# Patient Record
Sex: MALE | Race: WHITE | Employment: OTHER | URBAN - METROPOLITAN AREA
[De-identification: names, ages, dates, MRNs, and addresses within clinical notes are randomized per-mention and may not be internally consistent; named-entity substitution may affect disease eponyms.]

---

## 2017-01-04 ENCOUNTER — ONCOLOGY VISIT (OUTPATIENT)
Dept: TRANSPLANT | Facility: CLINIC | Age: 57
End: 2017-01-04
Attending: INTERNAL MEDICINE
Payer: COMMERCIAL

## 2017-01-04 ENCOUNTER — APPOINTMENT (OUTPATIENT)
Dept: LAB | Facility: CLINIC | Age: 57
End: 2017-01-04
Attending: INTERNAL MEDICINE
Payer: COMMERCIAL

## 2017-01-04 VITALS
RESPIRATION RATE: 18 BRPM | TEMPERATURE: 98.2 F | SYSTOLIC BLOOD PRESSURE: 156 MMHG | HEART RATE: 83 BPM | BODY MASS INDEX: 35.99 KG/M2 | WEIGHT: 254.19 LBS | OXYGEN SATURATION: 98 % | DIASTOLIC BLOOD PRESSURE: 92 MMHG

## 2017-01-04 DIAGNOSIS — D46.9 MDS (MYELODYSPLASTIC SYNDROME) (H): Primary | ICD-10-CM

## 2017-01-04 DIAGNOSIS — D89.813 GVHD AS COMPLICATION OF BONE MARROW TRANSPLANT (H): Primary | ICD-10-CM

## 2017-01-04 DIAGNOSIS — D46.9 MDS (MYELODYSPLASTIC SYNDROME) (H): ICD-10-CM

## 2017-01-04 DIAGNOSIS — T86.09 GVHD AS COMPLICATION OF BONE MARROW TRANSPLANT (H): Primary | ICD-10-CM

## 2017-01-04 LAB
ALBUMIN SERPL-MCNC: 3.4 G/DL (ref 3.4–5)
ALP SERPL-CCNC: 91 U/L (ref 40–150)
ALT SERPL W P-5'-P-CCNC: 40 U/L (ref 0–70)
ANION GAP SERPL CALCULATED.3IONS-SCNC: 5 MMOL/L (ref 3–14)
AST SERPL W P-5'-P-CCNC: ABNORMAL U/L (ref 0–45)
BASOPHILS # BLD AUTO: 0 10E9/L (ref 0–0.2)
BASOPHILS NFR BLD AUTO: 0.5 %
BILIRUB SERPL-MCNC: 0.4 MG/DL (ref 0.2–1.3)
BUN SERPL-MCNC: 14 MG/DL (ref 7–30)
CALCIUM SERPL-MCNC: 8.4 MG/DL (ref 8.5–10.1)
CHLORIDE SERPL-SCNC: 104 MMOL/L (ref 94–109)
CO2 SERPL-SCNC: 31 MMOL/L (ref 20–32)
CREAT SERPL-MCNC: 0.79 MG/DL (ref 0.66–1.25)
DIFFERENTIAL METHOD BLD: ABNORMAL
EOSINOPHIL # BLD AUTO: 0 10E9/L (ref 0–0.7)
EOSINOPHIL NFR BLD AUTO: 0.2 %
ERYTHROCYTE [DISTWIDTH] IN BLOOD BY AUTOMATED COUNT: 13.3 % (ref 10–15)
GFR SERPL CREATININE-BSD FRML MDRD: ABNORMAL ML/MIN/1.7M2
GLUCOSE SERPL-MCNC: 89 MG/DL (ref 70–99)
HCT VFR BLD AUTO: 49.9 % (ref 40–53)
HGB BLD-MCNC: 17.1 G/DL (ref 13.3–17.7)
IMM GRANULOCYTES # BLD: 0.2 10E9/L (ref 0–0.4)
IMM GRANULOCYTES NFR BLD: 2.5 %
LYMPHOCYTES # BLD AUTO: 1.9 10E9/L (ref 0.8–5.3)
LYMPHOCYTES NFR BLD AUTO: 31.9 %
MCH RBC QN AUTO: 32.9 PG (ref 26.5–33)
MCHC RBC AUTO-ENTMCNC: 34.3 G/DL (ref 31.5–36.5)
MCV RBC AUTO: 96 FL (ref 78–100)
MONOCYTES # BLD AUTO: 0.7 10E9/L (ref 0–1.3)
MONOCYTES NFR BLD AUTO: 12.4 %
NEUTROPHILS # BLD AUTO: 3.1 10E9/L (ref 1.6–8.3)
NEUTROPHILS NFR BLD AUTO: 52.5 %
NRBC # BLD AUTO: 0 10*3/UL
NRBC BLD AUTO-RTO: 0 /100
PLATELET # BLD AUTO: 110 10E9/L (ref 150–450)
POTASSIUM SERPL-SCNC: 4.6 MMOL/L (ref 3.4–5.3)
PROT SERPL-MCNC: 7 G/DL (ref 6.8–8.8)
RBC # BLD AUTO: 5.2 10E12/L (ref 4.4–5.9)
SIROLIMUS BLD-MCNC: 11.5 UG/L (ref 5–15)
SODIUM SERPL-SCNC: 140 MMOL/L (ref 133–144)
TME LAST DOSE: NORMAL H
WBC # BLD AUTO: 6 10E9/L (ref 4–11)

## 2017-01-04 PROCEDURE — 25000128 H RX IP 250 OP 636: Mod: ZF | Performed by: INTERNAL MEDICINE

## 2017-01-04 PROCEDURE — 80053 COMPREHEN METABOLIC PANEL: CPT | Performed by: PHYSICIAN ASSISTANT

## 2017-01-04 PROCEDURE — 96365 THER/PROPH/DIAG IV INF INIT: CPT

## 2017-01-04 PROCEDURE — 80195 ASSAY OF SIROLIMUS: CPT | Performed by: PHYSICIAN ASSISTANT

## 2017-01-04 PROCEDURE — 85025 COMPLETE CBC W/AUTO DIFF WBC: CPT | Performed by: PHYSICIAN ASSISTANT

## 2017-01-04 PROCEDURE — 87799 DETECT AGENT NOS DNA QUANT: CPT | Performed by: PHYSICIAN ASSISTANT

## 2017-01-04 PROCEDURE — 25000125 ZZHC RX 250: Mod: ZF | Performed by: INTERNAL MEDICINE

## 2017-01-04 PROCEDURE — 99211 OFF/OP EST MAY X REQ PHY/QHP: CPT | Mod: 25

## 2017-01-04 RX ORDER — ACYCLOVIR 800 MG/1
800 TABLET ORAL 2 TIMES DAILY
Qty: 60 TABLET | Refills: 3 | Status: SHIPPED
Start: 2017-01-04 | End: 2017-05-01

## 2017-01-04 RX ADMIN — SODIUM CHLORIDE 1750 MG: 9 INJECTION, SOLUTION INTRAVENOUS at 12:05

## 2017-01-04 NOTE — MR AVS SNAPSHOT
After Visit Summary   1/4/2017    Byron Russo    MRN: [id number]           Patient Information     Date Of Birth          1960        Visit Information        Provider Department      1/4/2017 10:30 AM Uli Enciso MD [facility] Blood and Marrow Transplant        Today's Diagnoses     MDS (myelodysplastic syndrome) (H)               [facility] [place]  [address] [place] [address]  Phone: [phone]  Clinic Hours:   Monday-Friday:    8am to 4:30pm   Weekends and holidays:    8am to noon (in general)  If your fever is 100.5  or greater,   call the clinic.  After hours call the   hospital at [phone] or   [phone]. Ask for the BMT   fellow for pediatric or adult patients           Care Instructions    1/11/17 @ 2pm labs, 230pm with MD/infusion         Follow-ups after your visit        Your next 10 appointments already scheduled     Jan 11, 2017 10:30 AM   [facility] Draw with  [facility] DRAW   [facility] Draw ([facility])    [address] [place] [id number] [phone]            Jan 11, 2017 11:30 AM   Infusion 120 with UC BMT INFUSION, UC 2 ATC   [facility] Blood and Marrow Transplant ([facility])    [address] [place] [id number] [phone]            Jan 11, 2017  2:30 PM   Return with Uli Enciso MD   [facility] Blood and Marrow Transplant ([facility])    [address] [place] [id number] [phone]              Who to contact     If you have questions or need follow up information about today's clinic visit or your schedule please contact [facility] BLOOD AND MARROW TRANSPLANT directly at [phone].  Normal or non-critical lab and imaging results will be communicated to you by MyChart, letter or phone within 4 business days after the clinic has received the results.  If you do not hear from us within 7 days, please contact the clinic through [employer] or phone. If you have a critical or abnormal lab result, we will notify you by phone as soon as possible.  Submit refill requests through [employer] or call your pharmacy and they will forward the refill request to us. Please allow 3 business days for your refill to be completed.          Additional Information About Your Visit        [employer] Information     [employer] gives you secure access to your electronic health record. If you see a primary care provider, you can also send messages to your care team and make appointments. If you have questions, please call your primary care clinic.  If you do not have a primary care provider, please call [phone] and they will assist you.        Care EveryWhere ID     This is your Care EveryWhere ID. This could be used by other organizations to access your [place] medical records  [id number]        Your Vitals Were     Pulse Temperature Respirations Pulse Oximetry          83 98.2  F (36.8  C) (Oral) 18 98%         Blood Pressure from Last 3 Encounters:   01/04/17 156/92   12/28/16 129/81   12/14/16 141/89    Weight from Last 3 Encounters:   01/04/17 115.3 kg (254 lb 3.1 oz)   12/28/16 113.898 kg (251 lb 1.6 oz)   12/14/16 116.983 kg (257 lb 14.4 oz)              We Performed the Following     CBC with platelets differential     CMV DNA quantification     Comprehensive metabolic panel     EBV DNA PCR Quantitative Whole Blood     Sirolimus level          Today's Medication Changes          These changes are accurate as of: 1/4/17  1:47 PM.  If you have any questions, ask your nurse or doctor.               These medicines have changed or have updated prescriptions.        Dose/Directions    hydrocortisone acetate 1 % Crea   This may have changed:    - when to take this  - reasons to take this   Used for:  MDS (myelodysplastic syndrome) (H)        Externally apply topically 3 times daily    Quantity:  30 g   Refills:  1            Where to get your medicines      These medications were sent to [facility] [place], [place] - [address]  [address], [facility] [address]    Hours:  TRANSPLANT PHONE NUMBER [phone] Phone:  [phone]    - acyclovir 800 MG tablet             Recent Review Flowsheet Data     BMT Recent Results Latest Ref Rng 11/23/2016 11/30/2016 12/7/2016 12/14/2016 12/21/2016 12/28/2016 1/4/2017    WBC 4.0 - 11.0 10e9/L 6.1 8.2 7.6 6.1 5.5 5.2 6.0    Hemoglobin 13.3 - 17.7 g/dL 15.8 16.4 16.3 15.5 16.1 17.4 17.1    Platelet Count 150 - 450 10e9/L 204 168 134(L) 120(L) 111(L) 84(L) 110(L)    Neutrophils (Absolute) 1.6 - 8.3 10e9/L 4.1 4.8 4.6 4.6 2.4 2.4 3.1    Sodium 133 - 144 mmol/L 142 140 139 140 142 139 140    Potassium 3.4 - 5.3 mmol/L 3.8 3.7 3.7 4.0 4.0 4.4 4.6    Chloride 94 - 109 mmol/L 105 105 106 106 106 105 104    Glucose 70 - 99 mg/dL 94 98 84 109(H) 94 101(H) 89    Urea Nitrogen 7 - 30 mg/dL 12 20 14 14 13 14 14    Creatinine 0.66 - 1.25 mg/dL 0.76 0.91 0.87 0.85 0.96 0.86 0.79    Calcium (Total) 8.5 - 10.1 mg/dL 8.2(L) 8.5 8.3(L) 8.2(L) 8.4(L) 8.7 8.4(L)    Protein (Total) 6.8 - 8.8 g/dL 7.2 7.1 6.6(L) 6.4(L) 7.0 7.0 7.0    Albumin 3.4 - 5.0 g/dL 3.4 3.3(L) 3.3(L) 3.2(L) 3.5 3.4 3.4    Alkaline Phosphatase 40 - 150 U/L 103 90 85 90 103 96 91    AST 0 - 45 U/L 38 34 32 38 36 Canceled, Test credited  Unsatisfactory specimen - hemolyzed  NOTIFIED JOHNATHAN PAGE AT BMT AT 1440 ON 12/28/16 BY ISABELLE Canceled, Test credited  Unsatisfactory specimen - hemolyzed  NOTIFIED DR STOVALL BMT 01/04 1115 BY [facility]    ALT 0 - 70 U/L 64 53 44 44 40 41 40    MCV 78 - 100 fl 98 97 98 97 97 95 96               Primary Care Provider Office Phone # Fax #    Cole Duong [phone]       [address] [address]        Thank you!     Thank you for choosing [facility] BLOOD AND MARROW TRANSPLANT   for your care. Our goal is always to provide you with excellent care. Hearing back from our patients is one way we can continue to improve our services. Please take a few minutes to complete the written survey that you may receive in the mail after your visit with us. Thank you!             Your Updated Medication List - Protect others around you: Learn how to safely use, store and throw away your medicines at www.disposemymeds.org.          This list is accurate as of: 1/4/17  1:47 PM.  Always use your most recent med list.                   Brand Name Dispense Instructions for use    acyclovir 800 MG tablet    ZOVIRAX    60 tablet    Take 1 tablet (800 mg) by mouth 2 times daily       aspirin 81 MG EC tablet      Take 1 tablet (81 mg) by mouth daily       carboxymethylcellulose 0.5 % Soln ophthalmic solution    carboxymethylcellulose sodium    1 Bottle    Place 1 drop into both eyes 3 times daily as needed for dry eyes       dexamethasone 0.5 MG/5ML Elix     237 mL    10 ml by mouth swish and spit  3-4 times daily       hydrocortisone acetate 1 % Crea     30 g    Externally apply topically 3 times daily       levofloxacin 250 MG tablet    LEVAQUIN    30 tablet    Take 1 tablet (250 mg) by mouth daily       levothyroxine 150 MCG tablet    SYNTHROID/LEVOTHROID     Take 150 mcg by mouth daily.       magnesium oxide 400 (241.3 MG) MG tablet    MAG-OX     Take 2 tablets daily       metoprolol 25 MG tablet    LOPRESSOR     Take 2 tablets (50 mg) by mouth 2 times daily       NITROGLYCERIN ER PO      Take by mouth as needed       OMEPRAZOLE PO      Take 20 mg by mouth       predniSONE 20 MG tablet    DELTASONE     Take 2.5 tablets (50 mg) by mouth daily       rosuvastatin 5 MG tablet    CRESTOR    30 tablet    Take 1 tablet (5 mg) by mouth daily       sirolimus 0.5 MG tablet    RAPAMUNE - GENERIC EQUIVALENT    30 tablet    Take 1 tablet (0.5 mg) by mouth daily Starting on 12/24       sulfamethoxazole-trimethoprim 800-160  MG per tablet    BACTRIM DS/SEPTRA DS    28 tablet    Take 1 tablet by mouth 2 times daily On Mondays and Tuesdays       triamcinolone 0.1 % cream    KENALOG    453 g    Apply sparingly to affected area three times daily as needed       ursodiol 300 MG capsule    ACTIGALL    90 capsule    Take 1 capsule (300 mg) by mouth 3 times daily       voriconazole 50 MG tablet    VFEND     Take 4 tablets (200 mg) by mouth 2 times daily

## 2017-01-04 NOTE — Clinical Note
1/4/2017      RE: Byron Russo  [address] [address]       REASON FOR VISIT:  I saw Byron Russo today for evaluation of poorly controlled chronic nvfge-ehgkgl-otbs disease.      HISTORY OF PRESENT ILLNESS:  Mr. 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  His last engraftment analysis    2 months ago showed him to have 100% donor CD33 and CD3 fractions.  He developed a skin rash as he was tapering his cyclosporine which developed at the beginning of November of this past year.  The skin biopsy was equivocal but could not exclude GVHD.  He was initially treated with topical steroids, but the rash spread and he was eventually started on 80 mg of prednisone every day on 11/16.  He also then developed lichenoid changes in the mouth, eosinophilia and liver function elevation, all compatible with chronic ssngm-xppvsd-gzra disease.  An attempt was made to taper his prednisone, but he developed new buccal ulcers that were noted on a clinic visit on 12/28/2016.  He also has very pronounced erythema which seems to be worsening, predominantly in his upper body and face.  He was seen in clinic on 12/28.  He has continued to take 50 mg of prednisone daily.  Of note, his sirolimus level on his two last clinic visits have all been extremely high, above 20, and so adjustments have been made accordingly.  Last week I was notified that he might potentially be eligible for a study of a ROCK2 inhibitor, but he would require sirolimus washout.  He is now here for reevaluation and making a decision regarding switches in his immunosuppressive therapy.        REVIEW OF SYSTEMS:  Otherwise, he states he has had no fevers, chills, nausea, vomiting, diarrhea, cough, hemoptysis, shortness of breath, hematuria, dysuria, bruising or bleeding.  The remainder of the 10-point review of systems is negative except for mouth sores and  the rash.       PHYSICAL EXAMINATION:     VITAL SIGNS:  He was slightly hypertensive with a blood pressure of 150/92.  Vitals otherwise were normal.    HEENT:  Sclerae were anicteric and non-injected.  He does have lichenoid changes of the buccal mucosa bilaterally as well as central ulcerations bilaterally to the front of the bite line.    LYMPH NODES:  No palpable supraclavicular, cervical, axillary or inguinal adenopathy.   LUNGS:  Clear to auscultation.   CARDIAC:  Without S3, rub or murmur.   ABDOMEN:  Nondistended, active bowel sounds, no organomegaly.   EXTREMITIES:  Trace pedal edema.  He does have a rather bright reddish brown rash that is most prominent on his scalp but also extends to the upper part of the torso.  The rash then fades both anteriorly and posteriorly on the trunk and at the midline disappears.  He also has involvement of his upper arms and forearms with a similar rash.       LABORATORY DATA:  Labs today included normal electrolytes and liver function test panel.  His white count was 6,000, hemoglobin 17.1 and platelets 110,000.      ASSESSMENT AND PLAN:     1.  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    2.  Steroid-refractory chronic xygda-llgpfd-kiqp disease.        He did give me the history that on the days that he takes Bactrim he feels that the rash is much worse than on the days when he does not.  For this reason, we will discontinue Bactrim and switch him to aerosol pentamidine starting a week from today and continuing on a monthly basis indefinitely.  I will also have him stop taking sirolimus and follow the level from today and allow the level to drop over the next week which will be slow because of the long half-life (3 days) of sirolimus.  I also decided to initiate a series of 3 weekly  methylprednisolone boluses at 15 mg/kg, the first of which will be given today.  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  In the event he does not respond to these measures, I would then consider enrolling him in the ROCK2 inhibitor study.           Uli Enciso MD

## 2017-01-04 NOTE — NURSING NOTE
Chief Complaint   Patient presents with     Blood Draw     Venipuncuture       Jaja Swanson,ANTHONY January 4, 2017 10:30 AM  Labs and vitals done see flow sheets.

## 2017-01-04 NOTE — PROGRESS NOTES
Infusion Nursing Note:  Byron Russo presents today for Methylpred.   Patient seen by provider today: Yes    Note: Pt received Methylpred over 90 minutes.  Tolerated well.    Intravenous Access:  Peripheral IV placed.    Treatment Conditions:  Not Applicable.      Post Infusion Assessment:  Patient tolerated infusion without incident.  Blood return noted pre and post infusion.  Site patent and intact, free from redness, edema or discomfort.  No evidence of extravasations.    Discharge Plan:   Discharge instructions reviewed with: Patient.  Patient and/or family verbalized understanding of discharge instructions and all questions answered.    Qi Ochoa RN

## 2017-01-04 NOTE — PROGRESS NOTES
REASON FOR VISIT:  I saw Byron Russo today for evaluation of poorly controlled chronic kkppz-ujevcg-kyky disease.      HISTORY OF PRESENT ILLNESS:  Mr. 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  His last engraftment analysis    2 months ago showed him to have 100% donor CD33 and CD3 fractions.  He developed a skin rash as he was tapering his cyclosporine which developed at the beginning of November of this past year.  The skin biopsy was equivocal but could not exclude GVHD.  He was initially treated with topical steroids, but the rash spread and he was eventually started on 80 mg of prednisone every day on 11/16.  He also then developed lichenoid changes in the mouth, eosinophilia and liver function elevation, all compatible with chronic vwpgk-truzyc-xyaq disease.  An attempt was made to taper his prednisone, but he developed new buccal ulcers that were noted on a clinic visit on 12/28/2016.  He also has very pronounced erythema which seems to be worsening, predominantly in his upper body and face.  He was seen in clinic on 12/28.  He has continued to take 50 mg of prednisone daily.  Of note, his sirolimus level on his two last clinic visits have all been extremely high, above 20, and so adjustments have been made accordingly.  Last week I was notified that he might potentially be eligible for a study of a ROCK2 inhibitor, but he would require sirolimus washout.  He is now here for reevaluation and making a decision regarding switches in his immunosuppressive therapy.        REVIEW OF SYSTEMS:  Otherwise, he states he has had no fevers, chills, nausea, vomiting, diarrhea, cough, hemoptysis, shortness of breath, hematuria, dysuria, bruising or bleeding.  The remainder of the 10-point review of systems is negative except for mouth sores and the rash.       PHYSICAL EXAMINATION:     VITAL SIGNS:  He was slightly hypertensive  with a blood pressure of 150/92.  Vitals otherwise were normal.    HEENT:  Sclerae were anicteric and non-injected.  He does have lichenoid changes of the buccal mucosa bilaterally as well as central ulcerations bilaterally to the front of the bite line.    LYMPH NODES:  No palpable supraclavicular, cervical, axillary or inguinal adenopathy.   LUNGS:  Clear to auscultation.   CARDIAC:  Without S3, rub or murmur.   ABDOMEN:  Nondistended, active bowel sounds, no organomegaly.   EXTREMITIES:  Trace pedal edema.  He does have a rather bright reddish brown rash that is most prominent on his scalp but also extends to the upper part of the torso.  The rash then fades both anteriorly and posteriorly on the trunk and at the midline disappears.  He also has involvement of his upper arms and forearms with a similar rash.       LABORATORY DATA:  Labs today included normal electrolytes and liver function test panel.  His white count was 6,000, hemoglobin 17.1 and platelets 110,000.      ASSESSMENT AND PLAN:     1.  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    2.  Steroid-refractory chronic nfmke-krxlvm-jlim disease.        He did give me the history that on the days that he takes Bactrim he feels that the rash is much worse than on the days when he does not.  For this reason, we will discontinue Bactrim and switch him to aerosol pentamidine starting a week from today and continuing on a monthly basis indefinitely.  I will also have him stop taking sirolimus and follow the level from today and allow the level to drop over the next week which will be slow because of the long half-life (3 days) of sirolimus.  I also decided to initiate a series of 3 weekly methylprednisolone boluses at 15 mg/kg, the first of which will be given today.  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  In the event he does not respond to these measures, I would then consider enrolling him in the ROCK2 inhibitor study.

## 2017-01-04 NOTE — NURSING NOTE
BMT Heme Malignancy Rooming Note    Byron Russo - 1/4/2017 10:43 AM     Chief Complaint   Patient presents with     Blood Draw     Venipuncuture     RECHECK     Patient with MDS here for provider visit         /92 mmHg  Pulse 83  Temp(Src) 98.2  F (36.8  C) (Oral)  Resp 18  Wt 115.3 kg (254 lb 3.1 oz)  SpO2 98%     Medications reviewed: Yes    Dressing changed: Not applicable     Medications given: No    Staff time:3 minutes     Additional information if applicable:      Johanny Moser CMA

## 2017-01-05 LAB
CMV DNA SPEC NAA+PROBE-ACNC: NORMAL [IU]/ML
CMV DNA SPEC NAA+PROBE-LOG#: NORMAL {LOG_IU}/ML
EBV DNA # SPEC NAA+PROBE: 585 {COPIES}/ML
EBV DNA SPEC NAA+PROBE-LOG#: 2.8 {LOG_COPIES}/ML
SPECIMEN SOURCE: NORMAL

## 2017-01-10 DIAGNOSIS — D46.22 RAEB-2 (REFRACTORY ANEMIA WITH EXCESS BLASTS-2) (H): Primary | ICD-10-CM

## 2017-01-11 ENCOUNTER — INFUSION THERAPY VISIT (OUTPATIENT)
Dept: TRANSPLANT | Facility: CLINIC | Age: 57
End: 2017-01-11
Attending: INTERNAL MEDICINE
Payer: COMMERCIAL

## 2017-01-11 ENCOUNTER — APPOINTMENT (OUTPATIENT)
Dept: LAB | Facility: CLINIC | Age: 57
End: 2017-01-11
Attending: INTERNAL MEDICINE
Payer: COMMERCIAL

## 2017-01-11 VITALS
OXYGEN SATURATION: 97 % | SYSTOLIC BLOOD PRESSURE: 160 MMHG | BODY MASS INDEX: 36.79 KG/M2 | TEMPERATURE: 98 F | HEART RATE: 94 BPM | DIASTOLIC BLOOD PRESSURE: 99 MMHG | WEIGHT: 259.9 LBS

## 2017-01-11 DIAGNOSIS — D46.22 RAEB-2 (REFRACTORY ANEMIA WITH EXCESS BLASTS-2) (H): ICD-10-CM

## 2017-01-11 DIAGNOSIS — D89.813 GVHD AS COMPLICATION OF BONE MARROW TRANSPLANT (H): Primary | ICD-10-CM

## 2017-01-11 DIAGNOSIS — T86.09 GVHD AS COMPLICATION OF BONE MARROW TRANSPLANT (H): Primary | ICD-10-CM

## 2017-01-11 DIAGNOSIS — D46.9 MDS (MYELODYSPLASTIC SYNDROME) (H): ICD-10-CM

## 2017-01-11 LAB
ALBUMIN SERPL-MCNC: 3.1 G/DL (ref 3.4–5)
ALP SERPL-CCNC: 76 U/L (ref 40–150)
ALT SERPL W P-5'-P-CCNC: 46 U/L (ref 0–70)
ANION GAP SERPL CALCULATED.3IONS-SCNC: 9 MMOL/L (ref 3–14)
AST SERPL W P-5'-P-CCNC: 39 U/L (ref 0–45)
BASOPHILS # BLD AUTO: 0 10E9/L (ref 0–0.2)
BASOPHILS NFR BLD AUTO: 0.5 %
BILIRUB SERPL-MCNC: 0.5 MG/DL (ref 0.2–1.3)
BUN SERPL-MCNC: 17 MG/DL (ref 7–30)
CALCIUM SERPL-MCNC: 8.2 MG/DL (ref 8.5–10.1)
CHLORIDE SERPL-SCNC: 104 MMOL/L (ref 94–109)
CO2 SERPL-SCNC: 28 MMOL/L (ref 20–32)
CREAT SERPL-MCNC: 0.68 MG/DL (ref 0.66–1.25)
DIFFERENTIAL METHOD BLD: ABNORMAL
EOSINOPHIL # BLD AUTO: 0 10E9/L (ref 0–0.7)
EOSINOPHIL NFR BLD AUTO: 0.4 %
ERYTHROCYTE [DISTWIDTH] IN BLOOD BY AUTOMATED COUNT: 14 % (ref 10–15)
GFR SERPL CREATININE-BSD FRML MDRD: ABNORMAL ML/MIN/1.7M2
GLUCOSE SERPL-MCNC: 123 MG/DL (ref 70–99)
HCT VFR BLD AUTO: 47.5 % (ref 40–53)
HGB BLD-MCNC: 16.4 G/DL (ref 13.3–17.7)
IMM GRANULOCYTES # BLD: 0.4 10E9/L (ref 0–0.4)
IMM GRANULOCYTES NFR BLD: 4.4 %
LYMPHOCYTES # BLD AUTO: 2.5 10E9/L (ref 0.8–5.3)
LYMPHOCYTES NFR BLD AUTO: 31.2 %
MCH RBC QN AUTO: 32.8 PG (ref 26.5–33)
MCHC RBC AUTO-ENTMCNC: 34.5 G/DL (ref 31.5–36.5)
MCV RBC AUTO: 95 FL (ref 78–100)
MONOCYTES # BLD AUTO: 0.7 10E9/L (ref 0–1.3)
MONOCYTES NFR BLD AUTO: 8.5 %
NEUTROPHILS # BLD AUTO: 4.4 10E9/L (ref 1.6–8.3)
NEUTROPHILS NFR BLD AUTO: 55 %
NRBC # BLD AUTO: 0 10*3/UL
NRBC BLD AUTO-RTO: 0 /100
PLATELET # BLD AUTO: 104 10E9/L (ref 150–450)
POTASSIUM SERPL-SCNC: 4.3 MMOL/L (ref 3.4–5.3)
PROT SERPL-MCNC: 6.4 G/DL (ref 6.8–8.8)
RBC # BLD AUTO: 5 10E12/L (ref 4.4–5.9)
SIROLIMUS BLD-MCNC: 5.5 UG/L (ref 5–15)
SODIUM SERPL-SCNC: 141 MMOL/L (ref 133–144)
TME LAST DOSE: NORMAL H
WBC # BLD AUTO: 8 10E9/L (ref 4–11)

## 2017-01-11 PROCEDURE — 80053 COMPREHEN METABOLIC PANEL: CPT | Performed by: INTERNAL MEDICINE

## 2017-01-11 PROCEDURE — 40000268 ZZH STATISTIC NO CHARGES: Mod: ZF

## 2017-01-11 PROCEDURE — 85025 COMPLETE CBC W/AUTO DIFF WBC: CPT | Performed by: INTERNAL MEDICINE

## 2017-01-11 PROCEDURE — 80195 ASSAY OF SIROLIMUS: CPT | Performed by: INTERNAL MEDICINE

## 2017-01-11 PROCEDURE — 36415 COLL VENOUS BLD VENIPUNCTURE: CPT

## 2017-01-11 RX ORDER — AMLODIPINE BESYLATE 5 MG/1
5 TABLET ORAL DAILY
Qty: 30 TABLET | Refills: 11 | Status: SHIPPED | OUTPATIENT
Start: 2017-01-11 | End: 2017-03-10

## 2017-01-11 NOTE — PROGRESS NOTES
I saw Byron Russo today for evaluation of chronic bnljj-kllqha-mzqq disease.      HISTORY OF PRESENT ILLNESS:  Mr. 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  He is 100% engrafted in both CD33 and CD3 fractions.  He developed a rash about 2 months ago when he was tapering his cyclosporine, which developed at the beginning of November.  A biopsy was equivocal, but could not exclude acute GVHD.  He was initially treated with topical steroids, but then the rash spread, and he was eventually started on 80 mg of prednisone.  He then went on to develop lichenoid changes in the mouth, eosinophilia and liver function test elevation, which were felt to be compatible with chronic belkf-lwyivc-yldg disease.  An attempt was made to taper his prednisone, but he developed new buccal ulcers.  He was seen in clinic by me on 01/04.  At that point, I decided that he seemed to be gradually worsening on his present regimen of 50 mg of prednisone plus Sirolimus.  We held the Sirolimus in preparation for instituting cyclosporine instead.  He states that since stopping the Sirolimus his mouth has improved, and the skin on his face has darkened and is less erythematous.  No fevers, chills, nausea, vomiting, diarrhea, cough, hemoptysis, shortness of breath, hematuria, dysuria, bruising or bleeding.  The remainder of the 10-point review of systems is negative.      PHYSICAL EXAMINATION:     VITAL SIGNS:  He was quite hypertensive at 160/99.  The remainder of the vitals were unremarkable.   HEENT:  Sclerae were anicteric and non-injected.  He did have subtle lichenoid changes of the buccal mucosa bilaterally, but not as pronounced as last week when he had ulcerations, which he currently does not.   LYMPH NODES:  No palpable supraclavicular, cervical, axillary or inguinal adenopathy.   LUNGS:  Clear to auscultation.   CARDIAC:  Without S3, rub or murmur.   ABDOMEN:  Nondistended,  active bowel sounds, no organomegaly.   EXTREMITIES:  Trace pedal edema.   SKIN:  No skin rash.  He does have a darkening, brownish rash on his face that is much less red than it was a week ago.  It extends to the upper part of the torso as well.  Overall, the appearance appeared improved.      LABORATORY DATA:  Today's labs included a white count of 8000, hemoglobin 16.4 and platelets 104,000.  His electrolyte panel was normal with a creatinine of 0.68.  Liver function tests were normal.      ASSESSMENT   1.  Day 245 post non-myeloablative allogeneic sibling transplant for MDS.  No evidence of active disease.   2.  Acute transformed to chronic slmqc-weqidw-zskw disease refractory to Sirolimus and steroids.  He has been off Sirolimus for a week now.  We will start cyclosporine at 200 mg p.o. b.i.d., as well as amlodipine for better control of his high blood pressure.  I am concerned enough about the pressure that I wish to delay the methylprednisolone infusion for 2 days to allow him to start therapy for the hypertension.  Amlodipine is the most effective antihypertensive agent for patients on cyclosporine.  He will return, therefore, on Friday.  We have rescheduled his remaining 2 methylprednisolone boluses to Fridays.  I instructed him to bring an extra tablet of amlodipine to the clinic on Friday, so that in case he again is hypertensive, he can take the second 5-mg dose of amlodipine.  Last week, he gave a history that his face reddened worse after the 2 days in a week when he took Bactrim as prophylaxis for PCP.  I have discontinued that, and substituted monthly aerosol pentamidine.

## 2017-01-11 NOTE — MR AVS SNAPSHOT
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  After hours call the   hospital at [phone] or   [phone]. Ask for the BMT   fellow for pediatric or adult patients           Care Instructions    1/13 1Pm arrival with labs,infusion and visit        Follow-ups after your visit        Your next 10 appointments already scheduled     Jan 13, 2017  1:30 PM   Return with  BMT BRENDA #3   [facility] Blood and Marrow Transplant ([email or web address] [place])    [address] [place] [address] [phone]              Future tests that were ordered for you today     Open Future Orders        Priority Expected Expires Ordered    CBC with platelets differential - NOW Routine 1/13/2017 1/11/2018 1/11/2017    Basic metabolic panel - NOW Routine 1/13/2017 1/11/2018 1/11/2017    CMV DNA quantification Routine 1/13/2017 1/11/2018 1/11/2017            Who to contact     If you have questions or need follow up information about today's clinic visit or your schedule please contact [facility] BLOOD AND MARROW TRANSPLANT directly at [phone].  Normal or non-critical lab and imaging results will be communicated to you by MyChart, letter or phone within 4 business days after the clinic has received the results. If you do not hear from us within 7 days, please contact the clinic through MyChart or phone. If you have a  critical or abnormal lab result, we will notify you by phone as soon as possible.  Submit refill requests through [employer] or call your pharmacy and they will forward the refill request to us. Please allow 3 business days for your refill to be completed.          Additional Information About Your Visit        [facility] [employer] Information     [employer] gives you secure access to your electronic health record. If you see a primary care provider, you can also send messages to your care team and make appointments. If you have questions, please call your primary care clinic.  If you do not have a primary care provider, please call [phone] and they will assist you.        Care EveryWhere ID     This is your Care EveryWhere ID. This could be used by other organizations to access your [place] medical records  [id number]        Your Vitals Were     Pulse Temperature Pulse Oximetry             94 98  F (36.7  C) (Oral) 97%          Blood Pressure from Last 3 Encounters:   01/11/17 160/99   01/04/17 156/92   12/28/16 129/81    Weight from Last 3 Encounters:   01/11/17 117.89 kg (259 lb 14.4 oz)   01/04/17 115.3 kg (254 lb 3.1 oz)   12/28/16 113.898 kg (251 lb 1.6 oz)              We Performed the Following     CBC with platelets differential     Comprehensive metabolic panel     Sirolimus level          Today's Medication Changes          These changes are accurate as of: 1/11/17  3:27 PM.  If you have any questions, ask your nurse or doctor.               Start taking these medicines.        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        Dose/Directions    hydrocortisone acetate 1 % Crea   This may have changed:    - when to take this  -  reasons to take this   Used for:  MDS (myelodysplastic syndrome) (H)        Externally apply topically 3 times daily   Quantity:  30 g   Refills:  1            Where to get your medicines      These medications were sent to [place], [place] - [address] [facility] [address]  [address] [facility] [address], [facility] [address]    Hours:  TRANSPLANT PHONE NUMBER [phone] Phone:  [phone]    - amLODIPine 5 MG tablet             Recent Review Flowsheet Data     BMT Recent Results Latest Ref Rng 11/30/2016 12/7/2016 12/14/2016 12/21/2016 12/28/2016 1/4/2017 1/11/2017    WBC 4.0 - 11.0 10e9/L 8.2 7.6 6.1 5.5 5.2 6.0 8.0    Hemoglobin 13.3 - 17.7 g/dL 16.4 16.3 15.5 16.1 17.4 17.1 16.4    Platelet Count 150 - 450 10e9/L 168 134(L) 120(L) 111(L) 84(L) 110(L) 104(L)    Neutrophils (Absolute) 1.6 - 8.3 10e9/L 4.8 4.6 4.6 2.4 2.4 3.1 4.4    Sodium 133 - 144 mmol/L 140 139 140 142 139 140 141    Potassium 3.4 - 5.3 mmol/L 3.7 3.7 4.0 4.0 4.4 4.6 4.3    Chloride 94 - 109 mmol/L 105 106 106 106 105 104 104    Glucose 70 - 99 mg/dL 98 84 109(H) 94 101(H) 89 123(H)    Urea Nitrogen 7 - 30 mg/dL 20 14 14 13 14 14 17    Creatinine 0.66 - 1.25 mg/dL 0.91 0.87 0.85 0.96 0.86 0.79 0.68    Calcium (Total) 8.5 - 10.1 mg/dL 8.5 8.3(L) 8.2(L) 8.4(L) 8.7 8.4(L) 8.2(L)    Protein (Total) 6.8 - 8.8 g/dL 7.1 6.6(L) 6.4(L) 7.0 7.0 7.0 6.4(L)    Albumin 3.4 - 5.0 g/dL 3.3(L) 3.3(L) 3.2(L) 3.5 3.4 3.4 3.1(L)    Alkaline Phosphatase 40 - 150 U/L 90 85 90 103 96 91 76    AST 0 - 45 U/L 34 32 38 36 Canceled, Test credited  Unsatisfactory specimen - hemolyzed  NOTIFIED JOHNATHAN PAGE AT BMT AT 1440 ON 12/28/16 BY ISABELLE Canceled, Test credited  Unsatisfactory specimen - hemolyzed  NOTIFIED DR STOVALL BMT 01/04 1115 BY [facility] 39    ALT 0 - 70 U/L 53 44 44 40 41 40 46    MCV 78 - 100 fl 97 98 97 97 95 96 95               Primary Care Provider Office Phone # Fax #    Cole Duong [phone]       [facility]  [address] [address]        Thank you!     Thank you for choosing [facility] BLOOD AND MARROW TRANSPLANT  for your care. Our goal is always to provide you with excellent care. Hearing back from our patients is one way we can continue to improve our services. Please take a few minutes to complete the written survey that you may receive in the mail after your visit with us. Thank you!             Your Updated Medication List - Protect others around you: Learn how to safely use, store and throw away your medicines at www.disposemymeds.org.          This list is accurate as of: 1/11/17  3:27 PM.  Always use your most recent med list.                   Brand Name Dispense Instructions for use    acyclovir 800 MG tablet    ZOVIRAX    60 tablet    Take 1 tablet (800 mg) by mouth 2 times daily       amLODIPine 5 MG tablet    NORVASC    30 tablet    Take 1 tablet (5 mg) by mouth daily       aspirin 81 MG EC tablet      Take 1 tablet (81 mg) by mouth daily       carboxymethylcellulose 0.5 % Soln ophthalmic solution    carboxymethylcellulose sodium    1 Bottle    Place 1 drop into both eyes 3 times daily as needed for dry eyes       dexamethasone 0.5 MG/5ML Elix     237 mL    10 ml by mouth swish and spit  3-4 times daily       hydrocortisone acetate 1 % Crea     30 g    Externally apply topically 3 times daily       levofloxacin 250 MG tablet    LEVAQUIN    30 tablet    Take 1 tablet (250 mg) by mouth daily       levothyroxine 150 MCG tablet    SYNTHROID/LEVOTHROID     Take 150 mcg by mouth daily.       magnesium oxide 400 (241.3 MG) MG tablet    MAG-OX     Take 2 tablets daily       metoprolol 25 MG tablet    LOPRESSOR     Take 2 tablets (50 mg) by mouth 2 times daily       NITROGLYCERIN ER PO      Take by mouth as needed       OMEPRAZOLE PO      Take 20 mg by mouth       predniSONE 20 MG tablet    DELTASONE     Take 2.5 tablets (50 mg) by mouth daily       rosuvastatin 5 MG tablet    CRESTOR    30 tablet     Take 1 tablet (5 mg) by mouth daily       sirolimus 0.5 MG tablet    RAPAMUNE - GENERIC EQUIVALENT    30 tablet    Take 1 tablet (0.5 mg) by mouth daily Starting on 12/24       triamcinolone 0.1 % cream    KENALOG    453 g    Apply sparingly to affected area three times daily as needed       ursodiol 300 MG capsule    ACTIGALL    90 capsule    Take 1 capsule (300 mg) by mouth 3 times daily       voriconazole 50 MG tablet    VFEND     Take 4 tablets (200 mg) by mouth 2 times daily

## 2017-01-11 NOTE — NURSING NOTE
Chief Complaint   Patient presents with     Blood Draw     vs/labs by cma   See doc flowsheets for details.  KEIKO Leslie, ANTHONY

## 2017-01-13 ENCOUNTER — INFUSION THERAPY VISIT (OUTPATIENT)
Dept: TRANSPLANT | Facility: CLINIC | Age: 57
End: 2017-01-13
Attending: PHYSICIAN ASSISTANT
Payer: COMMERCIAL

## 2017-01-13 ENCOUNTER — APPOINTMENT (OUTPATIENT)
Dept: LAB | Facility: CLINIC | Age: 57
End: 2017-01-13
Attending: PHYSICIAN ASSISTANT
Payer: COMMERCIAL

## 2017-01-13 VITALS
SYSTOLIC BLOOD PRESSURE: 134 MMHG | BODY MASS INDEX: 37.08 KG/M2 | WEIGHT: 261.9 LBS | HEART RATE: 94 BPM | DIASTOLIC BLOOD PRESSURE: 87 MMHG | RESPIRATION RATE: 16 BRPM | TEMPERATURE: 98.7 F | OXYGEN SATURATION: 96 %

## 2017-01-13 DIAGNOSIS — D46.22 RAEB-2 (REFRACTORY ANEMIA WITH EXCESS BLASTS-2) (H): ICD-10-CM

## 2017-01-13 DIAGNOSIS — D46.9 MDS (MYELODYSPLASTIC SYNDROME) (H): ICD-10-CM

## 2017-01-13 DIAGNOSIS — L03.012 PARONYCHIA, LEFT: Primary | ICD-10-CM

## 2017-01-13 DIAGNOSIS — D89.813 GVHD AS COMPLICATION OF BONE MARROW TRANSPLANT (H): Primary | ICD-10-CM

## 2017-01-13 DIAGNOSIS — T86.09 GVHD AS COMPLICATION OF BONE MARROW TRANSPLANT (H): Primary | ICD-10-CM

## 2017-01-13 LAB
ANION GAP SERPL CALCULATED.3IONS-SCNC: 9 MMOL/L (ref 3–14)
BASOPHILS # BLD AUTO: 0 10E9/L (ref 0–0.2)
BASOPHILS NFR BLD AUTO: 0.5 %
BUN SERPL-MCNC: 22 MG/DL (ref 7–30)
CALCIUM SERPL-MCNC: 8.3 MG/DL (ref 8.5–10.1)
CHLORIDE SERPL-SCNC: 107 MMOL/L (ref 94–109)
CO2 SERPL-SCNC: 27 MMOL/L (ref 20–32)
CREAT SERPL-MCNC: 0.78 MG/DL (ref 0.66–1.25)
DIFFERENTIAL METHOD BLD: ABNORMAL
EOSINOPHIL # BLD AUTO: 0 10E9/L (ref 0–0.7)
EOSINOPHIL NFR BLD AUTO: 0.2 %
ERYTHROCYTE [DISTWIDTH] IN BLOOD BY AUTOMATED COUNT: 14.1 % (ref 10–15)
GFR SERPL CREATININE-BSD FRML MDRD: ABNORMAL ML/MIN/1.7M2
GLUCOSE SERPL-MCNC: 106 MG/DL (ref 70–99)
HCT VFR BLD AUTO: 47 % (ref 40–53)
HGB BLD-MCNC: 15.9 G/DL (ref 13.3–17.7)
IMM GRANULOCYTES # BLD: 0.3 10E9/L (ref 0–0.4)
IMM GRANULOCYTES NFR BLD: 3.4 %
LYMPHOCYTES # BLD AUTO: 2.6 10E9/L (ref 0.8–5.3)
LYMPHOCYTES NFR BLD AUTO: 32.1 %
MCH RBC QN AUTO: 32.3 PG (ref 26.5–33)
MCHC RBC AUTO-ENTMCNC: 33.8 G/DL (ref 31.5–36.5)
MCV RBC AUTO: 95 FL (ref 78–100)
MONOCYTES # BLD AUTO: 0.9 10E9/L (ref 0–1.3)
MONOCYTES NFR BLD AUTO: 10.6 %
NEUTROPHILS # BLD AUTO: 4.4 10E9/L (ref 1.6–8.3)
NEUTROPHILS NFR BLD AUTO: 53.2 %
NRBC # BLD AUTO: 0 10*3/UL
NRBC BLD AUTO-RTO: 0 /100
PLATELET # BLD AUTO: 104 10E9/L (ref 150–450)
POTASSIUM SERPL-SCNC: 4.3 MMOL/L (ref 3.4–5.3)
RBC # BLD AUTO: 4.93 10E12/L (ref 4.4–5.9)
SODIUM SERPL-SCNC: 143 MMOL/L (ref 133–144)
WBC # BLD AUTO: 8.2 10E9/L (ref 4–11)

## 2017-01-13 PROCEDURE — 80158 DRUG ASSAY CYCLOSPORINE: CPT | Performed by: INTERNAL MEDICINE

## 2017-01-13 PROCEDURE — 25000128 H RX IP 250 OP 636: Mod: ZF | Performed by: INTERNAL MEDICINE

## 2017-01-13 PROCEDURE — 80048 BASIC METABOLIC PNL TOTAL CA: CPT | Performed by: INTERNAL MEDICINE

## 2017-01-13 PROCEDURE — 96365 THER/PROPH/DIAG IV INF INIT: CPT

## 2017-01-13 PROCEDURE — 94642 AEROSOL INHALATION TREATMENT: CPT

## 2017-01-13 PROCEDURE — 25000125 ZZHC RX 250: Mod: ZF | Performed by: INTERNAL MEDICINE

## 2017-01-13 PROCEDURE — 85025 COMPLETE CBC W/AUTO DIFF WBC: CPT | Performed by: INTERNAL MEDICINE

## 2017-01-13 PROCEDURE — 36415 COLL VENOUS BLD VENIPUNCTURE: CPT

## 2017-01-13 RX ORDER — LEVOFLOXACIN 250 MG/1
250 TABLET, FILM COATED ORAL DAILY
Qty: 30 TABLET | Refills: 1 | Status: SHIPPED | OUTPATIENT
Start: 2017-01-13 | End: 2017-03-31

## 2017-01-13 RX ORDER — URSODIOL 300 MG/1
300 CAPSULE ORAL 3 TIMES DAILY
Qty: 90 CAPSULE | Refills: 3 | Status: SHIPPED | OUTPATIENT
Start: 2017-01-13 | End: 2017-05-01

## 2017-01-13 RX ORDER — PENTAMIDINE ISETHIONATE 300 MG/300MG
300 INHALANT RESPIRATORY (INHALATION)
Status: DISCONTINUED | OUTPATIENT
Start: 2017-01-13 | End: 2017-01-13 | Stop reason: HOSPADM

## 2017-01-13 RX ORDER — CEPHALEXIN 500 MG/1
500 CAPSULE ORAL 4 TIMES DAILY
Qty: 20 CAPSULE | Refills: 0 | Status: SHIPPED | OUTPATIENT
Start: 2017-01-13 | End: 2017-01-18

## 2017-01-13 RX ORDER — METOPROLOL TARTRATE 25 MG/1
50 TABLET, FILM COATED ORAL 2 TIMES DAILY
Qty: 60 TABLET | Refills: 1 | Status: SHIPPED | OUTPATIENT
Start: 2017-01-13 | End: 2017-02-24

## 2017-01-13 RX ORDER — ROSUVASTATIN CALCIUM 5 MG/1
5 TABLET, COATED ORAL DAILY
Qty: 30 TABLET | Refills: 3 | Status: SHIPPED | OUTPATIENT
Start: 2017-01-13 | End: 2017-02-02

## 2017-01-13 RX ORDER — VORICONAZOLE 50 MG/1
200 TABLET, FILM COATED ORAL 2 TIMES DAILY
Qty: 60 TABLET | Refills: 1 | Status: SHIPPED | OUTPATIENT
Start: 2017-01-13 | End: 2017-02-01

## 2017-01-13 RX ADMIN — PENTAMIDINE ISETHIONATE 300 MG: 300 INHALANT RESPIRATORY (INHALATION) at 14:43

## 2017-01-13 RX ADMIN — SODIUM CHLORIDE 1750 MG: 9 INJECTION, SOLUTION INTRAVENOUS at 14:22

## 2017-01-13 NOTE — MR AVS SNAPSHOT
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  After hours call the   hospital at [phone] or   [phone]. Ask for the BMT   fellow for pediatric or adult patients           Care Instructions    - RTC on Friday for provider visit, labs, and provider visit 1/20/17 @ 2pm  - Call if your rash gets worse  - Provider will call you with any changes to your cyclosporin dose  - Hold your cyclosporin next week to check a level  - Schedule podiatry visit with Dr. Vigil 1/18/17 @ 7am   - Warm foot soaks twice daily  - Start Keflex 500 mg every 6 hours x 5 days. HOLD your Levaquin while you are taking the Keflex and restart Levaquin therapy complete.    Alla Pena, MSN, APRN, ACNP-BC    [address] [address], [place]  [address]        Follow-ups after your visit        Additional Services     PODIATRY/FOOT & ANKLE SURGERY REFERRAL       Your provider has referred you to: Dr. Vigil in the podiatry clinic     Please be aware that coverage of these services is subject to the terms and limitations of your health insurance plan.  Call member services at your health plan with any benefit or coverage questions.      Please bring the following to your appointment:  >>   Any x-rays, CTs or MRIs which have been performed.  Contact the facility where they were done to arrange for  prior to your  scheduled appointment.    >>   List of current medications   >>   This referral request   >>   Any documents/labs given to you for this referral                  Your next 10 appointments already scheduled     Jan 18, 2017  7:00 AM   [facility]    [address] [address] [place] [address] [phone]            Jan 20, 2017  2:30 PM   Return with  BMT BRENDA #1   [facility] Blood and Marrow Transplant ([facility])    [address] [facility]  [address]  [facility] [address] [phone]              Future tests that were ordered for you today     Open Future Orders        Priority Expected Expires Ordered    Cyclosporine Routine 1/20/2017 7/12/2017 1/13/2017    CBC with platelets differential Routine 1/20/2017 7/12/2017 1/13/2017    Comprehensive metabolic panel Routine 1/20/2017 7/12/2017 1/13/2017    Magnesium Routine 1/20/2017 7/12/2017 1/13/2017            Who to contact     If you have questions or need follow up information about today's clinic visit or your schedule please contact [facility] BLOOD AND MARROW TRANSPLANT directly at [phone].  Normal or non-critical lab and imaging results will be communicated to you by [employer], letter or phone within 4 business days after the clinic has received the results. If you do not hear from us within 7 days, please contact the clinic through [employer] or phone. If you have a critical or abnormal lab result, we will notify you by phone as soon as possible.  Submit refill requests through [employer] or call your pharmacy and they will forward the refill request to us. Please allow 3 business days for your refill to be completed.          Additional Information About Your Visit        [email or web address] Information     [employer] gives you secure access to your electronic health record. If you see a primary care provider, you can also send messages to your care team and  make appointments. If you have questions, please call your primary care clinic.  If you do not have a primary care provider, please call [phone] and they will assist you.        Care EveryWhere ID     This is your Care EveryWhere ID. This could be used by other organizations to access your [facility] medical records  [id number]        Your Vitals Were     Pulse Temperature Respirations Pulse Oximetry          94 98.7  F (37.1  C) (Oral) 16 96%         Blood Pressure from Last 3 Encounters:   01/13/17 134/87   01/11/17 160/99   01/04/17 156/92    Weight from Last 3 Encounters:   01/13/17 118.797 kg (261 lb 14.4 oz)   01/11/17 117.89 kg (259 lb 14.4 oz)   01/04/17 115.3 kg (254 lb 3.1 oz)              We Performed the Following     Basic metabolic panel - NOW     CBC with platelets differential - NOW     CMV DNA quantification     Cyclosporine     PODIATRY/FOOT & ANKLE SURGERY REFERRAL          Today's Medication Changes          These changes are accurate as of: 1/13/17  4:11 PM.  If you have any questions, ask your nurse or doctor.               Start taking these medicines.        Dose/Directions    cephALEXin 500 MG capsule   Commonly known as:  KEFLEX   Used for:  Paronychia, left        Dose:  500 mg   Take 1 capsule (500 mg) by mouth 4 times daily for 5 days   Quantity:  20 capsule   Refills:  0         These medicines have changed or have updated prescriptions.        Dose/Directions    hydrocortisone acetate 1 % Crea   This may have changed:    - when to take this  - reasons to take this   Used for:  MDS (myelodysplastic syndrome) (H)        Externally apply topically 3 times daily   Quantity:  30 g   Refills:  1            Where to get your medicines      These medications were sent to [facility] - [place], [place] - [address] [facility] [address]  [address] [facility] [address] [address]    Hours:  TRANSPLANT PHONE NUMBER [phone] Phone:  [phone]    - cephALEXin 500  MG capsule             Recent Review Flowsheet Data     BMT Recent Results Latest Ref Rng 12/7/2016 12/14/2016 12/21/2016 12/28/2016 1/4/2017 1/11/2017 1/13/2017    WBC 4.0 - 11.0 10e9/L 7.6 6.1 5.5 5.2 6.0 8.0 8.2    Hemoglobin 13.3 - 17.7 g/dL 16.3 15.5 16.1 17.4 17.1 16.4 15.9    Platelet Count 150 - 450 10e9/L 134(L) 120(L) 111(L) 84(L) 110(L) 104(L) 104(L)    Neutrophils (Absolute) 1.6 - 8.3 10e9/L 4.6 4.6 2.4 2.4 3.1 4.4 4.4    Sodium 133 - 144 mmol/L 139 140 142 139 140 141 143    Potassium 3.4 - 5.3 mmol/L 3.7 4.0 4.0 4.4 4.6 4.3 4.3    Chloride 94 - 109 mmol/L 106 106 106 105 104 104 107    Glucose 70 - 99 mg/dL 84 109(H) 94 101(H) 89 123(H) 106(H)    Urea Nitrogen 7 - 30 mg/dL 14 14 13 14 14 17 22    Creatinine 0.66 - 1.25 mg/dL 0.87 0.85 0.96 0.86 0.79 0.68 0.78    Calcium (Total) 8.5 - 10.1 mg/dL 8.3(L) 8.2(L) 8.4(L) 8.7 8.4(L) 8.2(L) 8.3(L)    Protein (Total) 6.8 - 8.8 g/dL 6.6(L) 6.4(L) 7.0 7.0 7.0 6.4(L) -    Albumin 3.4 - 5.0 g/dL 3.3(L) 3.2(L) 3.5 3.4 3.4 3.1(L) -    Alkaline Phosphatase 40 - 150 U/L 85 90 103 96 91 76 -    AST 0 - 45 U/L 32 38 36 Canceled, Test credited  Unsatisfactory specimen - hemolyzed  NOTIFIED JOHNATHAN PAGE AT BMT AT 1440 ON 12/28/16 BY ISABELLE Canceled, Test credited  Unsatisfactory specimen - hemolyzed  NOTIFIED DR STOVALL BMT 01/04 1115 BY [facility] 39 -    ALT 0 - 70 U/L 44 44 40 41 40 46 -    MCV 78 - 100 fl 98 97 97 95 96 95 95               Primary Care Provider Office Phone # Fax #    Cole Duong [phone]       [address] [address]        Thank you!     Thank you for choosing [facility] BLOOD AND MARROW TRANSPLANT  for your care. Our goal is always to provide you with excellent care. Hearing back from our patients is one way we can continue to improve our services. Please take a few minutes to complete the written survey that you may receive in the mail after your visit with us. Thank you!             Your Updated Medication  List - Protect others around you: Learn how to safely use, store and throw away your medicines at www.disposemymeds.org.          This list is accurate as of: 1/13/17  4:11 PM.  Always use your most recent med list.                   Brand Name Dispense Instructions for use    acyclovir 800 MG tablet    ZOVIRAX    60 tablet    Take 1 tablet (800 mg) by mouth 2 times daily       amLODIPine 5 MG tablet    NORVASC    30 tablet    Take 1 tablet (5 mg) by mouth daily       aspirin 81 MG EC tablet      Take 1 tablet (81 mg) by mouth daily       carboxymethylcellulose 0.5 % Soln ophthalmic solution    carboxymethylcellulose sodium    1 Bottle    Place 1 drop into both eyes 3 times daily as needed for dry eyes       cephALEXin 500 MG capsule    KEFLEX    20 capsule    Take 1 capsule (500 mg) by mouth 4 times daily for 5 days       dexamethasone 0.5 MG/5ML Elix     237 mL    10 ml by mouth swish and spit  3-4 times daily       hydrocortisone acetate 1 % Crea     30 g    Externally apply topically 3 times daily       levofloxacin 250 MG tablet    LEVAQUIN    30 tablet    Take 1 tablet (250 mg) by mouth daily       levothyroxine 150 MCG tablet    SYNTHROID/LEVOTHROID     Take 150 mcg by mouth daily.       magnesium oxide 400 (241.3 MG) MG tablet    MAG-OX     Take 2 tablets daily       metoprolol 25 MG tablet    LOPRESSOR     Take 2 tablets (50 mg) by mouth 2 times daily       NITROGLYCERIN ER PO      Take by mouth as needed       OMEPRAZOLE PO      Take 20 mg by mouth       predniSONE 20 MG tablet    DELTASONE     Take 2.5 tablets (50 mg) by mouth daily       rosuvastatin 5 MG tablet    CRESTOR    30 tablet    Take 1 tablet (5 mg) by mouth daily       sirolimus 0.5 MG tablet    RAPAMUNE - GENERIC EQUIVALENT    30 tablet    Take 1 tablet (0.5 mg) by mouth daily Starting on 12/24       triamcinolone 0.1 % cream    KENALOG    453 g    Apply sparingly to affected area three times daily as needed       ursodiol 300 MG capsule     ACTIGALL    90 capsule    Take 1 capsule (300 mg) by mouth 3 times daily       voriconazole 50 MG tablet    VFEND     Take 4 tablets (200 mg) by mouth 2 times daily

## 2017-01-13 NOTE — PROGRESS NOTES
Infusion Nursing Note:  Byron Russo presents today for Methylprednisone.  Patient seen by provider today: Yes    Note: Pt given IV Methylpred over 90 minutes.  Pt also given Pentamidine.      Intravenous Access:  Peripheral IV placed.    Treatment Conditions:  Not Applicable.      Post Infusion Assessment:  Patient tolerated infusion without incident.  Blood return noted pre and post infusion.  Site patent and intact, free from redness, edema or discomfort.  No evidence of extravasations.    Discharge Plan:   Discharge instructions reviewed with: Patient.  Patient and/or family verbalized understanding of discharge instructions and all questions answered.    Qi Ochoa, RN

## 2017-01-13 NOTE — PROGRESS NOTES
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    Interim History:  Mr. Russo was seen in the BMT clinic for a follow up visit today. States rash and oral sores have improved with IV steroids. Currently taking oral Cyclosporin, held dose to check a level today. No fevers, chills, cough, or SOB. Notes ongoing infections involving bilateral great toes at the distal-lateral nail margins c/w paronychia. Agreeable to visit with podiatry, foot soaks, and keflex therapy. No other new complaints.     Review of Systems: 10 point ROS negative except as noted above.    Physical ExaM  Blood pressure 134/87, pulse 94, temperature 98.7  F (37.1  C), temperature source Oral, resp. rate 16, weight 118.797 kg (261 lb 14.4 oz), SpO2 96 %.  General: NAD, well appearing  Eyes: JEREMY, sclera anicteric.  Schirmers 23/28.    Nose/Mouth/Throat: OP clear, buccal mucosa moist but now with patchy erythema on the buccal mucosa and new yellow ulcerations  Lungs: CTA bilaterally  Cardiovascular: Regular rate and rhythm, no M/R/G   Abdominal/Rectal: obese; +BS, soft, NT, ND, No HSM.    Lymphatics: trace LE edema.  Venous stasis skin changes to the LLE  Skin: erythema to the face, upper chest and back. Rash over abdomen and back barely visible on exam today 1/13. Bilateral great toe erythema c/w paronychia.   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 BMT/MDS:  Stem cell transplant 5/11; ABO matched.   In CR.  -8/19: Day +100 BMBX showed no morphologic or flow evidence of MDS. 100% donor. PB 84% donor CD3 (up from 76% a month ago). 100% CD33  - 11/23 PB CD34 100% donor in the CD3 & CD 33 fractions    2. HEMATOLOGY/COAGULATION:  Transfuse for hgb > 8g/dL and plts > 10. Transfusion independent. Eosinophilia resolved.  - Hx PE 2/16-8/10/16: Tx lovenox x6mo. Off now.    - Plts stable 100K     3. GI:  Ursodiol due to previously elevated LFT; protonix as GI ppx.   -LFTs currently WNL    4. INFECTIONS AND PROPHYLAXIS:  Afebrile. Notable bilateral paronychia with report of purulent drainage expressed on occasion from L great toe.  - Bilateral great toe paronychia: Start Keflex QID x 5 days (hold Levaquin with therapy), referral to Dr. Yaritza MD, warm water foot soaks BID, and bacitracin daily  - Prophylaxis ACV 800mg BID, Bactrim, V Fend & Levaquin while on steroids   -low level EBV 11/16/16 (742). 11/30 [id number]   still low would not obtain CTs- will repeat next visit      5. GVH: Erythematuos rash over face, chest, abdomen, and upper arms improved with IV MP therapy.  - Continue IV MP boluses Q Friday, received dose today   - Recent GVH therapy: Was on CSA taper, down to 25mg BID when he developed a new rash, seen 11/1/2016.  11/1/2016: Skin biopsy c/w drug eruption but couldn't exclude GVHD.  Initially Rx with topical steroids.  Prednisone 80 mg QD was added on 11/16 due to persistant rash,  lichenoid changes in the mouth, eosinophilia and LFT elevation, all c/w CGVH. Tried to taper  Prednisone, now on 50mg daily, still erythematous. Started IV MP with improvement. Changed Siro to CSA last visit. Checking CSA level today.   - Recent Schirmers neg   - Stopped cyclosprine after taper about 1 week ago.     6. FEN: Eating adequate amounts of a regular diet. No N/V/D.   -  CR and electrolytes WNL  - Hx of CSA induced hypomagnesia resolved. Check Mag next visit since CSA recently restarted    - No more supplemental mg. Check mg next visit.     7. CV:  Hx HTN. Hypertensive last visit, unable to give steroid infusion. BP today 130/80's.  - Currently on Norvasc, Metoprolol, and Crestor    8. ENDO: Hypothyroidism, continue synthroid    Plan:  - RTC on Friday for provider visit, labs, and IV MP  - CSA level pending  - Start Keflex 500 mg every 6 hours x 5 days. HOLD Levaquin while you are taking the Keflex  - Referral to Dr. Vigil in Podiatry      Alla Pena, MSN, APRN, ACNP-BC  Pager: [phone]

## 2017-01-13 NOTE — NURSING NOTE
Chief Complaint   Patient presents with     Blood Draw     Pt with hx of MDS labs here for labs. PIV placed, labs collected.

## 2017-01-13 NOTE — PATIENT INSTRUCTIONS
- RTC on Friday for provider visit, labs, and provider visit 1/20/17 @ 2pm  - Call if your rash gets worse  - Provider will call you with any changes to your cyclosporin dose  - Hold your cyclosporin next week to check a level  - Schedule podiatry visit with Dr. Vigil 1/18/17 @ 7am   - Warm foot soaks twice daily  - Start Keflex 500 mg every 6 hours x 5 days. HOLD your Levaquin while you are taking the Keflex and restart Levaquin therapy complete.    Alla Pena, MSN, APRN, ACNP-BC    [address] [place], [place]  [place] [facility]

## 2017-01-14 ENCOUNTER — TELEPHONE (OUTPATIENT)
Dept: TRANSPLANT | Facility: CLINIC | Age: 57
End: 2017-01-14

## 2017-01-14 DIAGNOSIS — D89.813 GVHD AS COMPLICATION OF BONE MARROW TRANSPLANT (H): Primary | ICD-10-CM

## 2017-01-14 DIAGNOSIS — T86.09 GVHD AS COMPLICATION OF BONE MARROW TRANSPLANT (H): Primary | ICD-10-CM

## 2017-01-14 LAB
CYCLOSPORINE BLD LC/MS/MS-MCNC: 175 UG/L (ref 50–400)
TME LAST DOSE: NORMAL H

## 2017-01-14 RX ORDER — CYCLOSPORINE 25 MG/1
25 CAPSULE, LIQUID FILLED ORAL 2 TIMES DAILY
Qty: 540 CAPSULE | Refills: 0 | Status: SHIPPED | OUTPATIENT
Start: 2017-01-14 | End: 2017-01-16 | Stop reason: DRUGHIGH

## 2017-01-14 RX ORDER — CYCLOSPORINE 100 MG/1
CAPSULE, LIQUID FILLED ORAL
Qty: 120 CAPSULE | COMMUNITY
Start: 2017-01-14 | End: 2017-02-01

## 2017-01-14 NOTE — TELEPHONE ENCOUNTER
I spoke with Byron regarding his CSA level from his clinic visit dated 1/13. Per Dr Callahan, Byron is to increase his dose to 225 mg PO BID. Byron repeated these directions to me and voiced his understanding. 25 mg capsules were sent to the [facility] pharmacy, Byron stated he has enough to last through next Friday.    Beau Azar, PharmD

## 2017-01-15 LAB
CMV DNA SPEC NAA+PROBE-ACNC: NORMAL [IU]/ML
CMV DNA SPEC NAA+PROBE-LOG#: NORMAL {LOG_IU}/ML
SPECIMEN SOURCE: NORMAL

## 2017-01-16 RX ORDER — CYCLOSPORINE 25 MG/1
25 CAPSULE, LIQUID FILLED ORAL 2 TIMES DAILY
Qty: 60 CAPSULE | Refills: 3 | Status: SHIPPED | OUTPATIENT
Start: 2017-01-16 | End: 2017-01-20

## 2017-01-18 ENCOUNTER — OFFICE VISIT (OUTPATIENT)
Dept: PODIATRY | Facility: CLINIC | Age: 57
End: 2017-01-18
Payer: COMMERCIAL

## 2017-01-18 VITALS
BODY MASS INDEX: 35.41 KG/M2 | DIASTOLIC BLOOD PRESSURE: 101 MMHG | HEART RATE: 95 BPM | OXYGEN SATURATION: 96 % | SYSTOLIC BLOOD PRESSURE: 142 MMHG | HEIGHT: 72 IN | WEIGHT: 261.4 LBS

## 2017-01-18 DIAGNOSIS — L60.0 INGROWING NAIL: Primary | ICD-10-CM

## 2017-01-18 PROCEDURE — 99203 OFFICE O/P NEW LOW 30 MIN: CPT | Performed by: PODIATRIST

## 2017-01-18 NOTE — PROGRESS NOTES
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     Social History Main Topics     Smoking status: Never Smoker      Smokeless tobacco: Former User     Alcohol Use: No     Drug Use: No     Sexual Activity: Not on file     Other Topics Concern     Not on file     Social History Narrative     Family History   Problem Relation Age of Onset     Myocardial Infarction Father 58     Coronary Artery Disease Father      Heart Failure Mother      Coronary Artery Disease Brother      Coronary Artery Disease Brother      CANCER Brother      GIST       WBC      8.2   1/13/2017  RBC     4.93   1/13/2017  HGB     15.9   1/13/2017  HCT     47.0   1/13/2017  No components found with this name: mct  MCV       95   1/13/2017  MCH     32.3   1/13/2017  MCHC     33.8   1/13/2017  RDW     14.1   1/13/2017  PLT      104   1/13/2017  Last Basic Metabolic Panel:  NA      143   1/13/2017   POTASSIUM      4.3   1/13/2017  CHLORIDE      107   1/13/2017  TRACE      8.3   1/13/2017  CO2       27   1/13/2017  BUN       22   1/13/2017  CR     0.78   1/13/2017  CR     0.69   4/20/2016  GLC      106   1/13/2017  SUBJECTIVE:  A 56-year-old male  presents from Uli Enciso MD, for ingrown toenail.  He relates he has had an ingrown toenail for about 2 months.  He was started on some steroids and he has been having problems on and off since.  It comes and goes.  He relates no specific injury, but he is in karate, so he could have injured it.  He is on Keflex currently and has no problems with that.  It has stayed about the same overall.  He relates the left one is bothering him now.  The right one is just starting to hurt a little bit.        OBJECTIVE:  DP and PT 2/4 bilaterally.  He has incurvated hallux nail borders bilaterally.  The right one has no erythema, no drainage, no odor, no calor.  There is some minimal pain on palpation of the left medial nail border.  He has some hypergranulation tissue with some erythema and serosanguineous drainage and mild edema.  No odor and no calor.  There is pain on palpation of the ingrown nail distally.        ASSESSMENT/PLAN:  Paronychia on the left medial hallux nail border.  Early paronychia right hallux nail border medially.  Diagnosis and treatment options discussed with the patient.  I advised him to continue the foot soaks and continue the triple antibiotic or bacitracin ointment and light dressing.  I advised him on nail border stretching.  The left medial hallux nail border was debrided and local wound care done upon consent today.  He will return to clinic and see me in 1 week.  If this is not resolved, we will plan on doing a P&A procedure.

## 2017-01-18 NOTE — PATIENT INSTRUCTIONS
Thanks for coming today.  Ortho/Sports Medicine Clinic  [address] [place] [address]    To schedule future appointments in Ortho Clinic, you may call [phone].    To schedule ordered imaging by your Provider: Call [facility] at [phone]    [employer]iter available online at:   [email or web address]    Please call if any further questions or concerns [phone] and ask for the Orthopedic Department. Clinic hours 8 am to 5 pm.    Return to clinic if symptoms worsen.

## 2017-01-18 NOTE — NURSING NOTE
"Byron Russo's goals for this visit include: Find a solution for bilateral ingrown toenails  He requests these members of his care team be copied on today's visit information: yes    PCP: Cole Duong    Referring Provider:  CASEY Aguiar CNP  [address] [address]    Chief Complaint   Patient presents with     Consult     Ingrown Toenail       Initial /101 mmHg  Pulse 95  Ht 1.816 m (5' 11.5\")  Wt 118.57 kg (261 lb 6.4 oz)  BMI 35.95 kg/m2  SpO2 96% Estimated body mass index is 35.95 kg/(m^2) as calculated from the following:    Height as of this encounter: 1.816 m (5' 11.5\").    Weight as of this encounter: 118.57 kg (261 lb 6.4 oz).  BP completed using cuff size: large    "

## 2017-01-20 ENCOUNTER — APPOINTMENT (OUTPATIENT)
Dept: LAB | Facility: CLINIC | Age: 57
End: 2017-01-20
Attending: INTERNAL MEDICINE
Payer: COMMERCIAL

## 2017-01-20 ENCOUNTER — ONCOLOGY VISIT (OUTPATIENT)
Dept: TRANSPLANT | Facility: CLINIC | Age: 57
End: 2017-01-20
Attending: STUDENT IN AN ORGANIZED HEALTH CARE EDUCATION/TRAINING PROGRAM
Payer: COMMERCIAL

## 2017-01-20 ENCOUNTER — INFUSION THERAPY VISIT (OUTPATIENT)
Dept: TRANSPLANT | Facility: CLINIC | Age: 57
End: 2017-01-20
Attending: INTERNAL MEDICINE
Payer: COMMERCIAL

## 2017-01-20 VITALS
BODY MASS INDEX: 36.24 KG/M2 | DIASTOLIC BLOOD PRESSURE: 94 MMHG | SYSTOLIC BLOOD PRESSURE: 139 MMHG | HEART RATE: 99 BPM | WEIGHT: 263.45 LBS | RESPIRATION RATE: 18 BRPM | OXYGEN SATURATION: 99 % | TEMPERATURE: 98.1 F

## 2017-01-20 DIAGNOSIS — D46.9 MDS (MYELODYSPLASTIC SYNDROME) (H): Primary | ICD-10-CM

## 2017-01-20 DIAGNOSIS — T86.09 GVHD AS COMPLICATION OF BONE MARROW TRANSPLANT (H): ICD-10-CM

## 2017-01-20 DIAGNOSIS — D46.22 RAEB-2 (REFRACTORY ANEMIA WITH EXCESS BLASTS-2) (H): ICD-10-CM

## 2017-01-20 DIAGNOSIS — D89.813 GVHD AS COMPLICATION OF BONE MARROW TRANSPLANT (H): ICD-10-CM

## 2017-01-20 DIAGNOSIS — L03.012 PARONYCHIA, LEFT: ICD-10-CM

## 2017-01-20 LAB
ALBUMIN SERPL-MCNC: 3.1 G/DL (ref 3.4–5)
ALP SERPL-CCNC: 63 U/L (ref 40–150)
ALT SERPL W P-5'-P-CCNC: 38 U/L (ref 0–70)
ANION GAP SERPL CALCULATED.3IONS-SCNC: 6 MMOL/L (ref 3–14)
AST SERPL W P-5'-P-CCNC: 28 U/L (ref 0–45)
BASOPHILS # BLD AUTO: 0 10E9/L (ref 0–0.2)
BASOPHILS NFR BLD AUTO: 0.3 %
BILIRUB SERPL-MCNC: 1.8 MG/DL (ref 0.2–1.3)
BUN SERPL-MCNC: 26 MG/DL (ref 7–30)
CALCIUM SERPL-MCNC: 8 MG/DL (ref 8.5–10.1)
CHLORIDE SERPL-SCNC: 108 MMOL/L (ref 94–109)
CO2 SERPL-SCNC: 26 MMOL/L (ref 20–32)
CREAT SERPL-MCNC: 0.69 MG/DL (ref 0.66–1.25)
CYCLOSPORINE BLD LC/MS/MS-MCNC: 415 UG/L (ref 50–400)
DIFFERENTIAL METHOD BLD: ABNORMAL
EOSINOPHIL # BLD AUTO: 0 10E9/L (ref 0–0.7)
EOSINOPHIL NFR BLD AUTO: 0.3 %
ERYTHROCYTE [DISTWIDTH] IN BLOOD BY AUTOMATED COUNT: 14.4 % (ref 10–15)
GFR SERPL CREATININE-BSD FRML MDRD: ABNORMAL ML/MIN/1.7M2
GLUCOSE SERPL-MCNC: 92 MG/DL (ref 70–99)
HCT VFR BLD AUTO: 46 % (ref 40–53)
HGB BLD-MCNC: 16 G/DL (ref 13.3–17.7)
IMM GRANULOCYTES # BLD: 0.2 10E9/L (ref 0–0.4)
IMM GRANULOCYTES NFR BLD: 2 %
LYMPHOCYTES # BLD AUTO: 2.7 10E9/L (ref 0.8–5.3)
LYMPHOCYTES NFR BLD AUTO: 30.2 %
MAGNESIUM SERPL-MCNC: 1.8 MG/DL (ref 1.6–2.3)
MCH RBC QN AUTO: 33 PG (ref 26.5–33)
MCHC RBC AUTO-ENTMCNC: 34.8 G/DL (ref 31.5–36.5)
MCV RBC AUTO: 95 FL (ref 78–100)
MONOCYTES # BLD AUTO: 0.8 10E9/L (ref 0–1.3)
MONOCYTES NFR BLD AUTO: 8.6 %
NEUTROPHILS # BLD AUTO: 5.2 10E9/L (ref 1.6–8.3)
NEUTROPHILS NFR BLD AUTO: 58.6 %
NRBC # BLD AUTO: 0 10*3/UL
NRBC BLD AUTO-RTO: 0 /100
PLATELET # BLD AUTO: 114 10E9/L (ref 150–450)
POTASSIUM SERPL-SCNC: 4.8 MMOL/L (ref 3.4–5.3)
PROT SERPL-MCNC: 6 G/DL (ref 6.8–8.8)
RBC # BLD AUTO: 4.85 10E12/L (ref 4.4–5.9)
SODIUM SERPL-SCNC: 141 MMOL/L (ref 133–144)
TME LAST DOSE: ABNORMAL H
WBC # BLD AUTO: 8.9 10E9/L (ref 4–11)

## 2017-01-20 PROCEDURE — 80158 DRUG ASSAY CYCLOSPORINE: CPT | Performed by: NURSE PRACTITIONER

## 2017-01-20 PROCEDURE — 85025 COMPLETE CBC W/AUTO DIFF WBC: CPT | Performed by: NURSE PRACTITIONER

## 2017-01-20 PROCEDURE — 83735 ASSAY OF MAGNESIUM: CPT | Performed by: NURSE PRACTITIONER

## 2017-01-20 PROCEDURE — 99212 OFFICE O/P EST SF 10 MIN: CPT | Mod: ZF

## 2017-01-20 PROCEDURE — 80053 COMPREHEN METABOLIC PANEL: CPT | Performed by: NURSE PRACTITIONER

## 2017-01-20 PROCEDURE — 25000128 H RX IP 250 OP 636: Mod: ZF | Performed by: PHYSICIAN ASSISTANT

## 2017-01-20 PROCEDURE — 99212 OFFICE O/P EST SF 10 MIN: CPT | Mod: 25

## 2017-01-20 PROCEDURE — 96366 THER/PROPH/DIAG IV INF ADDON: CPT | Mod: ZF

## 2017-01-20 PROCEDURE — 25000125 ZZHC RX 250: Mod: ZF | Performed by: PHYSICIAN ASSISTANT

## 2017-01-20 PROCEDURE — 96365 THER/PROPH/DIAG IV INF INIT: CPT | Mod: ZF

## 2017-01-20 PROCEDURE — 36415 COLL VENOUS BLD VENIPUNCTURE: CPT

## 2017-01-20 RX ORDER — CYCLOSPORINE 25 MG/1
25 CAPSULE, LIQUID FILLED ORAL 2 TIMES DAILY
Qty: 60 CAPSULE | Refills: 3 | Status: SHIPPED | OUTPATIENT
Start: 2017-01-20 | End: 2017-02-16

## 2017-01-20 RX ADMIN — SODIUM CHLORIDE: 9 INJECTION, SOLUTION INTRAVENOUS at 15:21

## 2017-01-20 ASSESSMENT — PAIN SCALES - GENERAL: PAINLEVEL: MODERATE PAIN (4)

## 2017-01-20 NOTE — PROGRESS NOTES
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    Interim History:  Mr. Russo is seen for a follow up visit today. States rash and oral sores have improved with IV steroids. His face is less inflamed.  Currently taking oral Cyclosporine, held dose to check a level today. No fevers, chills, cough, or SOB.  Voracious appetite.  Seeing Dr. Vigil for bilateral great toe paronychia.  This is improving per pt.  He completed keflex course last night.    Review of Systems: 10 point ROS negative except as noted above.    Physical Exam  Blood pressure 139/94, pulse 99, temperature 98.1  F (36.7  C), temperature source Oral, resp. rate 18, weight 119.5 kg (263 lb 7.2 oz), SpO2 99 %.  General: NAD, well appearing  Eyes: JEREMY, sclera anicteric.  Schirmers 23/28.    Nose/Mouth/Throat: OP clear, buccal mucosa moist but with faint patchy erythema on the buccal mucosa with lichenoid changes  Lungs: CTA bilaterally  Cardiovascular: Regular rate and rhythm, no M/R/G   Abdominal/Rectal: obese; +BS, soft, NT, ND, No HSM.    Lymphatics: trace-1+ LE edema.  Venous stasis skin changes to the LLE  Skin: erythema to the face, upper chest and back. Face rash is darkening--like 'burning out';  Faint erythema on back; none on abdomen.   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  BMT/MDS:  Stem cell transplant 5/11; ABO matched.   In CR.  - 8/19: Day +100 BMBX showed no morphologic or flow evidence of MDS. 100% donor. PB 84% donor CD3 (up from 76% a month ago). 100% CD33  - 11/23 PB CD34 100% donor in the CD3 & CD 33 fractions    2. HEMATOLOGY/COAGULATION:  Transfuse for hgb > 8g/dL and plts > 10. Transfusion independent. Eosinophilia has resolved.  - Hx PE 2/16-8/10/16: Tx lovenox x 6mo. Off now.    - Plts stable 100K     3. GI:  Ursodiol due to previously elevated LFT; protonix as GI ppx.   -LFTs with new elevated bilirubin.  Will check LFTs at next visit.  Will continue ursodiol.  Note that he is on vfend.  No change to this today.  Will follow LFTs and if needed, will consider drug holiday.      4. INFECTIONS AND PROPHYLAXIS:  Afebrile. Notable bilateral paronychia with report of purulent drainage expressed on occasion from L great toe at prior visit.  Improving per pt.  F/u w/ Dr. Vigil (podiatry) next week (1/25).  S/p Keflex course last night, continue warm water foot soaks BID, and bacitracin daily  - Prophylaxis ACV 800mg BID, Bactrim, V Fend & Levaquin while on steroids  - low level EBV 11/16/16 (742). 11/30 CHZ=[id number]   still low would not obtain CTs- will repeat next visit      5. GVH: Erythematuos rash over face, chest, abdomen, and upper arms improved with IV MP therapy.  - Continue IV MP boluses Q Friday, received 2nd dose today; will check w/ Dr. Enciso regarding future therapy.  - Recent GVH therapy: Was on CSA taper, down to 25mg BID when he developed a new rash, seen 11/1/2016.  11/1/2016: Skin biopsy c/w drug eruption but couldn't exclude GVHD.  Initially Rx with topical steroids.  Prednisone 80 mg QD was added on 11/16 due to persistant rash,  lichenoid changes in the mouth, eosinophilia and LFT elevation, all c/w CGVH. Tried to taper  Prednisone, now on 50mg daily, still erythematous. Started IV MP with improvement. Changed Siro to CSA. Level 1/13 was 175 and dose  was increased.  Level pending from today.   - Recent Schirmers neg     6. FEN:   - CR and electrolytes WNL  - Hx of CSA induced hypomagnesia resolved. Mg WNL today.    7. CV:  Hx HTN.   - Currently on Norvasc, Metoprolol, and Crestor    8. ENDO: Hypothyroidism, continue synthroid    Plan:  - RTC next week for provider visit, labs, and ? IV MP; confirming plan w/ Dr. Enciso (per Evelia Lozano, RN).  - CSA level pending   - 1/25 Dr. Vigil in Podiatry    Marlene Ratliff pa-c  [phone]

## 2017-01-20 NOTE — NURSING NOTE
BMT Heme Malignancy Rooming Note    Byron Russo - 1/20/2017 2:47 PM     Chief Complaint   Patient presents with     Labs Only     Labs drawn peripherally     RECHECK     Return: GVHD/AML        /94 mmHg  Pulse 99  Temp(Src) 98.1  F (36.7  C) (Oral)  Resp 18  Wt 119.5 kg (263 lb 7.2 oz)  SpO2 99%     Medications reviewed: Yes    Labs drawn: No    Dressing changed: Not applicable     Medications given: No    Staff time:6    Additional information if applicable: Patient needs refill on Gengraf 25mg.    SUPA LOPEZ, CMA

## 2017-01-20 NOTE — PROGRESS NOTES
Infusion Nursing Note:  Byron Russo presents today for scheduled infusion.    Patient seen by provider today: Yes: Marlene Ratliff   present during visit today: Not Applicable.    Note: Labs were monitored.    Intravenous Access:  Peripheral IV placed.    Treatment Conditions:  Patient received scheduled IV solu medrol over 1 1/2 hours.        Post Infusion Assessment:  Patient tolerated infusion without incident.    Discharge Plan:   Patient discharged in stable condition accompanied by: self.    HUBER CLEARY RN

## 2017-01-24 DIAGNOSIS — D46.9 MDS (MYELODYSPLASTIC SYNDROME) (H): Primary | ICD-10-CM

## 2017-01-25 ENCOUNTER — OFFICE VISIT (OUTPATIENT)
Dept: PODIATRY | Facility: CLINIC | Age: 57
End: 2017-01-25
Payer: COMMERCIAL

## 2017-01-25 VITALS — HEART RATE: 95 BPM | DIASTOLIC BLOOD PRESSURE: 96 MMHG | OXYGEN SATURATION: 97 % | SYSTOLIC BLOOD PRESSURE: 140 MMHG

## 2017-01-25 DIAGNOSIS — L60.0 INGROWING NAIL: Primary | ICD-10-CM

## 2017-01-25 PROCEDURE — 99213 OFFICE O/P EST LOW 20 MIN: CPT | Performed by: PODIATRIST

## 2017-01-25 NOTE — PROGRESS NOTES
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     Social History Main Topics     Smoking status: Never Smoker      Smokeless tobacco: Former User     Alcohol Use: No     Drug Use: No     Sexual Activity: Not on file     Other Topics Concern     Not on file     Social History Narrative     Family History   Problem Relation Age of Onset     Myocardial Infarction Father 58     Coronary Artery Disease Father      Heart Failure Mother      Coronary Artery Disease Brother      Coronary Artery Disease Brother      CANCER Brother      GIST       WBC      8.9   1/20/2017  RBC     4.85   1/20/2017  HGB     16.0   1/20/2017  HCT     46.0   1/20/2017  No components found with this name: mct  MCV       95   1/20/2017  MCH     33.0   1/20/2017  MCHC     34.8   1/20/2017  RDW     14.4   1/20/2017  PLT      114   1/20/2017  Last Basic Metabolic Panel:  NA      141   1/20/2017   POTASSIUM      4.8   1/20/2017  CHLORIDE      108   1/20/2017  TRACE      8.0   1/20/2017  CO2       26   1/20/2017  BUN       26   1/20/2017  CR     0.69   1/20/2017  CR     0.69   4/20/2016  GLC       92   1/20/2017  SUBJECTIVE FINDINGS:  A 56-year-old  male returns to clinic for paronychia, right and left medial hallux nail borders.  Relates after we debrided them last week, it felt great.  It has been intermittently bothering him.  If he is on his feet, his legs have been swelling up and he is on his feet doing a lot of stuff; it really hurts and then at other times it does not hurt.  Relates he has been using the triple antibiotic or the Neosporin or the bacitracin on it.      OBJECTIVE FINDINGS:  DP and PT are 2/4 bilaterally.  CFT is less than 3 seconds.  He has incurvated hallux nail borders bilaterally with pain on palpation.  There is no gross erythema.  There is minimal edema, no odor, no calor, some dry drainage on the distal nail borders of the medial hallux bilaterally.  He does have some peripheral edema.      ASSESSMENT AND PLAN:  Paronychia, left and right medial hallux nail borders.  Diagnosis and treatment options discussed with him.  The nail borders were debrided and local wound care done upon consent today.  He will continue the bacitracin and a light dressing and the foot soaks.  He has compression socks.  He relates he has not been using them because it is hard to get them on but he will start getting those on again.  Diagnosis and treatment options discussed with him.  He opted for no P&A procedure today but he related that he prefers to just trim them out and see how it goes again.  The plan will be to see him back next week again and we will reconsider a P&A procedure if they are not better.  If they are better, we will leave it at that.

## 2017-01-25 NOTE — PATIENT INSTRUCTIONS
Thanks for coming today.  Ortho/Sports Medicine Clinic  [address] [place] [address]    To schedule future appointments in Ortho Clinic, you may call [phone].    To schedule ordered imaging by your Provider: Call [facility] at [phone]    [employer] Search available online at:   [email or web address]    Please call if any further questions or concerns [phone] and ask for the Orthopedic Department. Clinic hours 8 am to 5 pm.    Return to clinic if symptoms worsen.

## 2017-01-25 NOTE — NURSING NOTE
"Byron Russo's goals for this visit include: Find a solution for bilateral ingrown toenails  He requests these members of his care team be copied on today's visit information: yes    PCP: Cole Duong    Referring Provider:  No referring provider defined for this encounter.    Chief Complaint   Patient presents with     RECHECK     Recheck ingrown toenail       Initial /96 mmHg  Pulse 95  SpO2 97% Estimated body mass index is 36.24 kg/(m^2) as calculated from the following:    Height as of 1/18/17: 1.816 m (5' 11.5\").    Weight as of 1/20/17: 119.5 kg (263 lb 7.2 oz).  BP completed using cuff size: large    "

## 2017-01-31 ENCOUNTER — OFFICE VISIT (OUTPATIENT)
Dept: PODIATRY | Facility: CLINIC | Age: 57
End: 2017-01-31
Payer: COMMERCIAL

## 2017-01-31 VITALS — OXYGEN SATURATION: 96 % | HEART RATE: 104 BPM | DIASTOLIC BLOOD PRESSURE: 89 MMHG | SYSTOLIC BLOOD PRESSURE: 133 MMHG

## 2017-01-31 DIAGNOSIS — L60.0 INGROWING NAIL: Primary | ICD-10-CM

## 2017-01-31 PROCEDURE — 99212 OFFICE O/P EST SF 10 MIN: CPT | Performed by: PODIATRIST

## 2017-01-31 NOTE — PROGRESS NOTES
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     Social History Main Topics     Smoking status: Never Smoker      Smokeless tobacco: Former User     Alcohol Use: No     Drug Use: No     Sexual Activity: Not on file     Other Topics Concern     Not on file     Social History Narrative     Family History   Problem Relation Age of Onset     Myocardial Infarction Father 58     Coronary Artery Disease Father      Heart Failure Mother      Coronary Artery Disease Brother      Coronary Artery Disease Brother      CANCER Brother      GIST       WBC      8.9   1/20/2017  RBC     4.85   1/20/2017  HGB     16.0   1/20/2017  HCT     46.0   1/20/2017  No components found with this name: mct  MCV       95   1/20/2017  MCH     33.0   1/20/2017  MCHC     34.8   1/20/2017  RDW     14.4   1/20/2017  PLT      114   1/20/2017  Last Basic Metabolic Panel:  NA      141   1/20/2017   POTASSIUM      4.8   1/20/2017  CHLORIDE      108   1/20/2017  TRACE      8.0   1/20/2017  CO2       26   1/20/2017  BUN       26   1/20/2017  CR     0.69   1/20/2017  CR     0.69   4/20/2016  GLC       92   1/20/2017  SUBJECTIVE:  A 56-year-old male  returns to clinic for paronychia, left and right medial hallux nail borders.  He relates he is doing well.  He did karate and that seems to be doing well.  He still has a little bit of pain but overall it is not much at all.      OBJECTIVE:  Vascular status intact bilaterally.  He has hallux nail borders bilaterally.  There is some mild edema on the distal medial right nail border.  There is no erythema, no odor, no calor, no drainage.      ASSESSMENT AND PLAN:  Paronychia, left and right medial hallux nail borders.  Diagnosis and treatment options discussed with him.  These are doing well.  The right medial nail border was reduced upon consent.  We will continue the bacitracin on the right one until that is completely resolved.  He can discontinue that as tolerated.  Return to clinic and see me as needed.

## 2017-01-31 NOTE — NURSING NOTE
"Byron Russo's goals for this visit include: Recheck ingrown toenails  He requests these members of his care team be copied on today's visit information: yes    PCP: Cole Duong    Referring Provider:  No referring provider defined for this encounter.    Chief Complaint   Patient presents with     RECHECK     Recheck ingrown toenails       Initial /89 mmHg  Pulse 104  SpO2 96% Estimated body mass index is 36.24 kg/(m^2) as calculated from the following:    Height as of 1/18/17: 1.816 m (5' 11.5\").    Weight as of 1/20/17: 119.5 kg (263 lb 7.2 oz).  BP completed using cuff size: large    "

## 2017-02-01 ENCOUNTER — ONCOLOGY VISIT (OUTPATIENT)
Dept: TRANSPLANT | Facility: CLINIC | Age: 57
End: 2017-02-01
Attending: INTERNAL MEDICINE
Payer: COMMERCIAL

## 2017-02-01 ENCOUNTER — APPOINTMENT (OUTPATIENT)
Dept: LAB | Facility: CLINIC | Age: 57
End: 2017-02-01
Attending: INTERNAL MEDICINE
Payer: COMMERCIAL

## 2017-02-01 VITALS
OXYGEN SATURATION: 97 % | DIASTOLIC BLOOD PRESSURE: 99 MMHG | RESPIRATION RATE: 16 BRPM | WEIGHT: 260.1 LBS | TEMPERATURE: 97.9 F | SYSTOLIC BLOOD PRESSURE: 147 MMHG | BODY MASS INDEX: 35.78 KG/M2 | HEART RATE: 100 BPM

## 2017-02-01 DIAGNOSIS — T86.09 GVHD AS COMPLICATION OF BONE MARROW TRANSPLANT (H): ICD-10-CM

## 2017-02-01 DIAGNOSIS — D46.22 RAEB-2 (REFRACTORY ANEMIA WITH EXCESS BLASTS-2) (H): Primary | ICD-10-CM

## 2017-02-01 DIAGNOSIS — D46.9 MDS (MYELODYSPLASTIC SYNDROME) (H): ICD-10-CM

## 2017-02-01 DIAGNOSIS — D89.813 GVHD AS COMPLICATION OF BONE MARROW TRANSPLANT (H): ICD-10-CM

## 2017-02-01 LAB
ALBUMIN SERPL-MCNC: 3.4 G/DL (ref 3.4–5)
ALP SERPL-CCNC: 78 U/L (ref 40–150)
ALT SERPL W P-5'-P-CCNC: 36 U/L (ref 0–70)
ANION GAP SERPL CALCULATED.3IONS-SCNC: 9 MMOL/L (ref 3–14)
AST SERPL W P-5'-P-CCNC: 30 U/L (ref 0–45)
BASOPHILS # BLD AUTO: 0 10E9/L (ref 0–0.2)
BASOPHILS NFR BLD AUTO: 0.2 %
BILIRUB SERPL-MCNC: 1 MG/DL (ref 0.2–1.3)
BUN SERPL-MCNC: 38 MG/DL (ref 7–30)
CALCIUM SERPL-MCNC: 8.6 MG/DL (ref 8.5–10.1)
CHLORIDE SERPL-SCNC: 106 MMOL/L (ref 94–109)
CO2 SERPL-SCNC: 24 MMOL/L (ref 20–32)
CREAT SERPL-MCNC: 0.95 MG/DL (ref 0.66–1.25)
CYCLOSPORINE BLD LC/MS/MS-MCNC: 430 UG/L (ref 50–400)
DIFFERENTIAL METHOD BLD: ABNORMAL
EOSINOPHIL # BLD AUTO: 0 10E9/L (ref 0–0.7)
EOSINOPHIL NFR BLD AUTO: 0.1 %
ERYTHROCYTE [DISTWIDTH] IN BLOOD BY AUTOMATED COUNT: 14.4 % (ref 10–15)
GFR SERPL CREATININE-BSD FRML MDRD: 83 ML/MIN/1.7M2
GLUCOSE SERPL-MCNC: 118 MG/DL (ref 70–99)
HCT VFR BLD AUTO: 41.9 % (ref 40–53)
HGB BLD-MCNC: 14.5 G/DL (ref 13.3–17.7)
IMM GRANULOCYTES # BLD: 0.3 10E9/L (ref 0–0.4)
IMM GRANULOCYTES NFR BLD: 2.7 %
LYMPHOCYTES # BLD AUTO: 1.6 10E9/L (ref 0.8–5.3)
LYMPHOCYTES NFR BLD AUTO: 15.3 %
MAGNESIUM SERPL-MCNC: 1.7 MG/DL (ref 1.6–2.3)
MCH RBC QN AUTO: 32.5 PG (ref 26.5–33)
MCHC RBC AUTO-ENTMCNC: 34.6 G/DL (ref 31.5–36.5)
MCV RBC AUTO: 94 FL (ref 78–100)
MONOCYTES # BLD AUTO: 0.7 10E9/L (ref 0–1.3)
MONOCYTES NFR BLD AUTO: 6.3 %
NEUTROPHILS # BLD AUTO: 7.9 10E9/L (ref 1.6–8.3)
NEUTROPHILS NFR BLD AUTO: 75.4 %
NRBC # BLD AUTO: 0 10*3/UL
NRBC BLD AUTO-RTO: 0 /100
PLATELET # BLD AUTO: 116 10E9/L (ref 150–450)
POTASSIUM SERPL-SCNC: 5.4 MMOL/L (ref 3.4–5.3)
PROT SERPL-MCNC: 6.6 G/DL (ref 6.8–8.8)
RBC # BLD AUTO: 4.46 10E12/L (ref 4.4–5.9)
SODIUM SERPL-SCNC: 139 MMOL/L (ref 133–144)
TME LAST DOSE: ABNORMAL H
WBC # BLD AUTO: 10.5 10E9/L (ref 4–11)

## 2017-02-01 PROCEDURE — 99212 OFFICE O/P EST SF 10 MIN: CPT | Mod: ZF

## 2017-02-01 PROCEDURE — 87799 DETECT AGENT NOS DNA QUANT: CPT | Performed by: INTERNAL MEDICINE

## 2017-02-01 PROCEDURE — 80158 DRUG ASSAY CYCLOSPORINE: CPT | Performed by: INTERNAL MEDICINE

## 2017-02-01 PROCEDURE — 85025 COMPLETE CBC W/AUTO DIFF WBC: CPT | Performed by: INTERNAL MEDICINE

## 2017-02-01 PROCEDURE — 80053 COMPREHEN METABOLIC PANEL: CPT | Performed by: INTERNAL MEDICINE

## 2017-02-01 PROCEDURE — 83735 ASSAY OF MAGNESIUM: CPT | Performed by: INTERNAL MEDICINE

## 2017-02-01 PROCEDURE — 36415 COLL VENOUS BLD VENIPUNCTURE: CPT

## 2017-02-01 RX ORDER — LEVOTHYROXINE SODIUM 150 UG/1
150 TABLET ORAL DAILY
Qty: 30 TABLET | Refills: 11 | Status: SHIPPED | OUTPATIENT
Start: 2017-02-01 | End: 2018-01-30

## 2017-02-01 RX ORDER — VORICONAZOLE 200 MG/1
200 TABLET, FILM COATED ORAL 2 TIMES DAILY
Qty: 60 TABLET | Refills: 11 | Status: SHIPPED | OUTPATIENT
Start: 2017-02-01 | End: 2017-03-10

## 2017-02-01 RX ORDER — CYCLOSPORINE 100 MG/1
CAPSULE, LIQUID FILLED ORAL
Qty: 120 CAPSULE | Refills: 11 | Status: SHIPPED | OUTPATIENT
Start: 2017-02-01 | End: 2017-02-16

## 2017-02-01 RX ORDER — BENZOCAINE/MENTHOL 6 MG-10 MG
LOZENGE MUCOUS MEMBRANE
Qty: 60 G | Refills: 11 | Status: SHIPPED | OUTPATIENT
Start: 2017-02-01 | End: 2017-05-12

## 2017-02-01 NOTE — MR AVS SNAPSHOT
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  After hours call the   hospital at [phone] or   [phone]. Ask for the BMT   fellow for pediatric or adult patients           Care Instructions    Pt need monthly pentamidine per his nc      2/15 330pm labs, visit and pentamidine        Follow-ups after your visit        Your next 10 appointments already scheduled     Feb 15, 2017  4:00 PM   Return with  BMT BRENDA #4   [facility] Blood and Marrow Transplant ([facility])    [address] [place] [id number] [phone]            Mar 13, 2017  1:00 PM   BMT Nurse Visit with Uc Bmt Nurse 1   [facility] Blood and Marrow Transplant ([facility])    [address] [place] [id number]   [phone]            Apr 13, 2017  1:00 PM   BMT Nurse Visit with Uc Bmt Nurse 1   [facility] Blood and Marrow Transplant ([facility])    [address] [place] [address]   [phone]            May 12, 2017  1:00 PM   BMT Nurse Visit with Uc Bmt Nurse 1   [facility] Blood and Marrow Transplant ([facility])    [address] [place] [address]   [phone]               Who to contact     If you have questions or need follow up information about today's clinic visit or your schedule please contact [facility] BLOOD AND MARROW TRANSPLANT directly at [phone].  Normal or non-critical lab and imaging results will be communicated to you by [employer], letter or phone within 4 business days after the clinic has received the results. If you do not hear from us within 7 days, please contact the clinic through [employer] or phone. If you have a critical or abnormal lab result, we will notify you by phone as soon as possible.  Submit refill requests through [employer] or call your pharmacy and they will forward the refill request to us. Please allow 3 business days for your refill to be completed.          Additional Information About Your Visit        [employer] Information     [employer] gives you secure access to your electronic health record. If you see a primary care provider, you can also send messages to your care team and make appointments. If you have questions, please call your primary care clinic.  If you do not have a primary care provider, please call [phone] and they will assist you.        Care EveryWhere ID     This is your Care EveryWhere ID. This could be used by other organizations to access your [facility] medical records  [id number]        Your Vitals Were     Pulse Temperature Respirations Pulse Oximetry          100 97.9  F (36.6  C) (Oral) 16 97%         Blood Pressure from Last 3 Encounters:   02/01/17 147/99   01/31/17 133/89   01/25/17 140/96    Weight from Last 3 Encounters:   02/01/17 117.981 kg (260 lb 1.6 oz)   01/20/17 119.5 kg (263 lb 7.2 oz)   01/18/17 118.57 kg (261 lb 6.4 oz)              We Performed the Following     CBC with platelets differential     CMV DNA quantification     Comprehensive metabolic panel     Cyclosporine     EBV DNA PCR Quantitative Whole Blood     Magnesium          Today's Medication Changes          These changes are accurate  as of: 2/1/17  4:21 PM.  If you have any questions, ask your nurse or doctor.               Start taking these medicines.        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   Quantity:  60 g   Refills:  11         These medicines have changed or have updated prescriptions.        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 Please contact your care team if you have questions.     OMEPRAZOLE PO   Stopped by:  Uli Enciso MD                Where to get your medicines      These medications were sent to [address] [address]    Hours:  TRANSPLANT PHONE NUMBER [phone] Phone:  [phone]    - cycloSPORINE modified 100 MG capsule  - hydrocortisone 1 % cream  - levothyroxine 150 MCG tablet  - voriconazole 200 MG tablet             Recent Review Flowsheet Data     BMT Recent Results Latest Ref Rng 12/21/2016 12/28/2016 1/4/2017 1/11/2017 1/13/2017 1/20/2017 2/1/2017    WBC 4.0 - 11.0 10e9/L 5.5 5.2 6.0 8.0 8.2 8.9 10.5    Hemoglobin 13.3 - 17.7 g/dL 16.1 17.4 17.1 16.4 15.9 16.0 14.5    Platelet Count 150 - 450 10e9/L 111(L) 84(L) 110(L) 104(L) 104(L) 114(L) 116(L)    Neutrophils (Absolute) 1.6 - 8.3 10e9/L 2.4 2.4 3.1 4.4 4.4 5.2 7.9    Sodium 133 - 144 mmol/L 142 139 140 141 143 141 139    Potassium 3.4 - 5.3 mmol/L 4.0 4.4 4.6 4.3 4.3 4.8 5.4(H)    Chloride 94 - 109 mmol/L 106  105 104 104 107 108 106    Glucose 70 - 99 mg/dL 94 101(H) 89 123(H) 106(H) 92 118(H)    Urea Nitrogen 7 - 30 mg/dL 13 14 14 17 22 26 38(H)    Creatinine 0.66 - 1.25 mg/dL 0.96 0.86 0.79 0.68 0.78 0.69 0.95    Calcium (Total) 8.5 - 10.1 mg/dL 8.4(L) 8.7 8.4(L) 8.2(L) 8.3(L) 8.0(L) 8.6    Protein (Total) 6.8 - 8.8 g/dL 7.0 7.0 7.0 6.4(L) - 6.0(L) 6.6(L)    Albumin 3.4 - 5.0 g/dL 3.5 3.4 3.4 3.1(L) - 3.1(L) 3.4    Alkaline Phosphatase 40 - 150 U/L 103 96 91 76 - 63 78    AST 0 - 45 U/L 36 Canceled, Test credited  Unsatisfactory specimen - hemolyzed  NOTIFIED JOHNATHAN PAGE AT BMT AT 1440 ON 12/28/16 BY ISABELLE Canceled, Test credited  Unsatisfactory specimen - hemolyzed  NOTIFIED DR STOVALL BMT 01/04 1115 BY [facility] 39 - 28 30    ALT 0 - 70 U/L 40 41 40 46 - 38 36    MCV 78 - 100 fl 97 95 96 95 95 95 94               Primary Care Provider Office Phone # Fax #    Cole Duong [phone]       [address] [address]        Thank you!     Thank you for choosing [facility] BLOOD AND MARROW TRANSPLANT  for your care. Our goal is always to provide you with excellent care. Hearing back from our patients is one way we can continue to improve our services. Please take a few minutes to complete the written survey that you may receive in the mail after your visit with us. Thank you!             Your Updated Medication List - Protect others around you: Learn how to safely use, store and throw away your medicines at www.disposemymeds.org.          This list is accurate as of: 2/1/17  4:21 PM.  Always use your most recent med list.                   Brand Name Dispense Instructions for use    acyclovir 800 MG tablet    ZOVIRAX    60 tablet    Take 1 tablet (800 mg) by mouth 2 times daily       amLODIPine 5 MG tablet    NORVASC    30 tablet    Take 1 tablet (5 mg) by mouth daily       aspirin 81 MG EC tablet      Take 1 tablet (81 mg) by mouth daily       carboxymethylcellulose 0.5 % Soln  ophthalmic solution    carboxymethylcellulose sodium    1 Bottle    Place 1 drop into both eyes 3 times daily as needed for dry eyes       * cycloSPORINE modified 25 MG capsule    GENERIC EQUIVALENT    60 capsule    Take 1 capsule (25 mg) by mouth 2 times daily       * cycloSPORINE modified 100 MG capsule    GENERIC EQUIVALENT    120 capsule    Take two 100 mg capsules and one 25 mg capsule by mouth twice daily. Total dose is 225mg twice daily       hydrocortisone 1 % cream    CORTAID    60 g    Apply to affected area three times daily for 14 days.       levofloxacin 250 MG tablet    LEVAQUIN    30 tablet    Take 1 tablet (250 mg) by mouth daily       levothyroxine 150 MCG tablet    SYNTHROID/LEVOTHROID    30 tablet    Take 1 tablet (150 mcg) by mouth daily       magnesium oxide 400 (241.3 MG) MG tablet    MAG-OX     Take 2 tablets daily       metoprolol 25 MG tablet    LOPRESSOR    60 tablet    Take 2 tablets (50 mg) by mouth 2 times daily       NITROGLYCERIN ER PO      Take by mouth as needed       predniSONE 20 MG tablet    DELTASONE     Take 2.5 tablets (50 mg) by mouth daily       rosuvastatin 5 MG tablet    CRESTOR    30 tablet    Take 1 tablet (5 mg) by mouth daily       ursodiol 300 MG capsule    ACTIGALL    90 capsule    Take 1 capsule (300 mg) by mouth 3 times daily       voriconazole 200 MG tablet    VFEND    60 tablet    Take 1 tablet (200 mg) by mouth 2 times daily       * Notice:  This list has 2 medication(s) that are the same as other medications prescribed for you. Read the directions carefully, and ask your doctor or other care provider to review them with you.

## 2017-02-01 NOTE — NURSING NOTE
Chief Complaint   Patient presents with     RECHECK     Return: MDS/AML/GVHD     Blood Draw     Labs collected via venipuncture by RN.      Labs collected peripherally in right hand.   Checked in for next visit.   Carla Cosby RN

## 2017-02-01 NOTE — NURSING NOTE
BMT Heme Malignancy Rooming Note    Byron Russo - 2/1/2017 3:38 PM     Chief Complaint   Patient presents with     RECHECK     Return: MDS/AML/GVHD        /98 mmHg  Pulse 100  Temp(Src) 97.9  F (36.6  C) (Oral)  Resp 16  Wt 117.981 kg (260 lb 1.6 oz)  SpO2 97%     Medications reviewed: Yes    Labs drawn: No    Dressing changed: Not applicable     Medications given: No    Staff time:6    Additional information if applicable: n/a    SUPA LOPEZ CMA

## 2017-02-01 NOTE — PROGRESS NOTES
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  He has developed chronic zkcty-nnqjgh-neoq disease for which he is currently being treated with Sirolimus and 50 mg daily of prednisone.  When seen about 5 weeks ago, he had flared, and I started him on methylprednisolone boluses.  He had 3 of those, and it was discontinued 2 weeks ago.  He has had a good response, but he has persistent erythema of the face.  He had been on Sirolimus.  I switched him back to cyclosporine about 2 weeks ago; the last level was 175, and the dose was increased.  He states that since last being seen here in clinic approximately 2 weeks ago, he has noted some worsening of the erythema of his face and some darkening.  He has, however, not notice any other symptoms like mouth sores, arthralgias, shortness of breath, nausea, vomiting or diarrhea.  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  The remainder of the 10-point review of systems is negative, except that he has had paronychia for which he has been seen by a podiatrist 2 times, and was once treated with a 10-day course of Keflex.  He was seen yesterday, and per Dr. Vigil's note in Podiatry he was doing much better.      PHYSICAL EXAMINATION:   GENERAL:  His face was obviously hyperpigmented and a bit erythematous.   VITAL SIGNS:  Blood pressure was elevated at 147/99.   HEENT:  Sclerae were anicteric and non-injected.  Buccal mucosa was intact.   LYMPH NODES:  No palpable supraclavicular, cervical, axillary or inguinal adenopathy.   LUNGS:  Clear to auscultation.   CARDIAC:  Without S3, rub or murmur.   ABDOMEN:  Nondistended, active bowel sounds, no organomegaly.   EXTREMITIES:  Trace pedal edema.   SKIN:  He does have the aforementioned hyperpigmentation and slight erythema involving only his face, the very upper most " part of the chest and the nape of his neck.  The rest of his skin is only minimally hyperpigmented without erythema.      LABORATORY DATA:  Today, white count is 10,500, hemoglobin 14.5 and platelets 116,000.  Electrolyte panel was normal.  Liver function tests were normal.  Cyclosporine is pending at the time of this dictation.      ASSESSMENT AND PLAN:   1.  Now 266 days status post non-myeloablative allogeneic sibling transplant.  Last restaging was at day 180 and showed no evidence of recurrent disease and 100% donor engraftment.   2.  History of abnormal liver function tests.  These have resolved.   3.  He is on prophylaxis with acyclovir, Bactrim, Vfend, and Levaquin while on steroids.  He also has a history of EBV reactivation.  His EBV PCR is pending at the time of this dictation.   4.  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  I will leave him on his present immunosuppressive regimen of 50 mg daily, as well as CSA.  We will adjust CSA to be in the high therapeutic range.  I will treat the localized skin abnormality with 1% hydrocortisone cream.   5.  He is hypertensive still on 5 mg of amlodipine.  We will increase this to 10 mg.       DISPOSITION:  He will return in 2 weeks for a followup to assess the potential response or lack thereof to the addition of the topical steroids and adjustment of the CSA.     Uli Enciso M.D.

## 2017-02-02 DIAGNOSIS — D46.9 MDS (MYELODYSPLASTIC SYNDROME) (H): Primary | ICD-10-CM

## 2017-02-02 LAB
CMV DNA SPEC NAA+PROBE-ACNC: NORMAL [IU]/ML
CMV DNA SPEC NAA+PROBE-LOG#: NORMAL {LOG_IU}/ML
EBV DNA # SPEC NAA+PROBE: NORMAL {COPIES}/ML
EBV DNA SPEC NAA+PROBE-LOG#: NORMAL {LOG_COPIES}/ML
SPECIMEN SOURCE: NORMAL

## 2017-02-02 RX ORDER — ROSUVASTATIN CALCIUM 5 MG/1
5 TABLET, COATED ORAL DAILY
Qty: 30 TABLET | Refills: 3 | Status: SHIPPED
Start: 2017-02-02 | End: 2017-05-01

## 2017-02-15 ENCOUNTER — TELEPHONE (OUTPATIENT)
Dept: OTHER | Facility: CLINIC | Age: 57
End: 2017-02-15

## 2017-02-15 ENCOUNTER — ONCOLOGY VISIT (OUTPATIENT)
Dept: TRANSPLANT | Facility: CLINIC | Age: 57
End: 2017-02-15
Attending: STUDENT IN AN ORGANIZED HEALTH CARE EDUCATION/TRAINING PROGRAM
Payer: COMMERCIAL

## 2017-02-15 ENCOUNTER — APPOINTMENT (OUTPATIENT)
Dept: LAB | Facility: CLINIC | Age: 57
End: 2017-02-15
Attending: STUDENT IN AN ORGANIZED HEALTH CARE EDUCATION/TRAINING PROGRAM
Payer: COMMERCIAL

## 2017-02-15 VITALS
BODY MASS INDEX: 34.51 KG/M2 | SYSTOLIC BLOOD PRESSURE: 133 MMHG | HEART RATE: 93 BPM | TEMPERATURE: 97 F | WEIGHT: 250.9 LBS | RESPIRATION RATE: 16 BRPM | OXYGEN SATURATION: 95 % | DIASTOLIC BLOOD PRESSURE: 86 MMHG

## 2017-02-15 DIAGNOSIS — D46.9 MDS (MYELODYSPLASTIC SYNDROME) (H): Primary | ICD-10-CM

## 2017-02-15 DIAGNOSIS — D89.813 GVHD AS COMPLICATION OF BONE MARROW TRANSPLANT (H): ICD-10-CM

## 2017-02-15 DIAGNOSIS — T86.09 GVHD AS COMPLICATION OF BONE MARROW TRANSPLANT (H): ICD-10-CM

## 2017-02-15 DIAGNOSIS — R60.0 EDEMA OF BOTH UPPER EXTREMITIES: ICD-10-CM

## 2017-02-15 LAB
ALBUMIN SERPL-MCNC: 3.5 G/DL (ref 3.4–5)
ALP SERPL-CCNC: 78 U/L (ref 40–150)
ALT SERPL W P-5'-P-CCNC: 32 U/L (ref 0–70)
ANION GAP SERPL CALCULATED.3IONS-SCNC: 10 MMOL/L (ref 3–14)
AST SERPL W P-5'-P-CCNC: 30 U/L (ref 0–45)
BASOPHILS # BLD AUTO: 0 10E9/L (ref 0–0.2)
BASOPHILS NFR BLD AUTO: 0.4 %
BILIRUB SERPL-MCNC: 1 MG/DL (ref 0.2–1.3)
BUN SERPL-MCNC: 50 MG/DL (ref 7–30)
CALCIUM SERPL-MCNC: 8.7 MG/DL (ref 8.5–10.1)
CHLORIDE SERPL-SCNC: 103 MMOL/L (ref 94–109)
CO2 SERPL-SCNC: 26 MMOL/L (ref 20–32)
CREAT SERPL-MCNC: 1.87 MG/DL (ref 0.66–1.25)
CYCLOSPORINE BLD LC/MS/MS-MCNC: 581 UG/L (ref 50–400)
DIFFERENTIAL METHOD BLD: ABNORMAL
EOSINOPHIL # BLD AUTO: 0 10E9/L (ref 0–0.7)
EOSINOPHIL NFR BLD AUTO: 0.1 %
ERYTHROCYTE [DISTWIDTH] IN BLOOD BY AUTOMATED COUNT: 14.8 % (ref 10–15)
GFR SERPL CREATININE-BSD FRML MDRD: 38 ML/MIN/1.7M2
GLUCOSE SERPL-MCNC: 92 MG/DL (ref 70–99)
HCT VFR BLD AUTO: 39.8 % (ref 40–53)
HGB BLD-MCNC: 14 G/DL (ref 13.3–17.7)
IMM GRANULOCYTES # BLD: 0.4 10E9/L (ref 0–0.4)
IMM GRANULOCYTES NFR BLD: 4.7 %
LYMPHOCYTES # BLD AUTO: 2.7 10E9/L (ref 0.8–5.3)
LYMPHOCYTES NFR BLD AUTO: 31.7 %
MAGNESIUM SERPL-MCNC: 1.7 MG/DL (ref 1.6–2.3)
MCH RBC QN AUTO: 32.3 PG (ref 26.5–33)
MCHC RBC AUTO-ENTMCNC: 35.2 G/DL (ref 31.5–36.5)
MCV RBC AUTO: 92 FL (ref 78–100)
MONOCYTES # BLD AUTO: 1 10E9/L (ref 0–1.3)
MONOCYTES NFR BLD AUTO: 11.8 %
NEUTROPHILS # BLD AUTO: 4.3 10E9/L (ref 1.6–8.3)
NEUTROPHILS NFR BLD AUTO: 51.3 %
NRBC # BLD AUTO: 0.1 10*3/UL
NRBC BLD AUTO-RTO: 1 /100
PLATELET # BLD AUTO: 126 10E9/L (ref 150–450)
POTASSIUM SERPL-SCNC: 4.2 MMOL/L (ref 3.4–5.3)
PROT SERPL-MCNC: 6.4 G/DL (ref 6.8–8.8)
RBC # BLD AUTO: 4.33 10E12/L (ref 4.4–5.9)
SODIUM SERPL-SCNC: 139 MMOL/L (ref 133–144)
TME LAST DOSE: ABNORMAL H
WBC # BLD AUTO: 8.4 10E9/L (ref 4–11)

## 2017-02-15 PROCEDURE — 83735 ASSAY OF MAGNESIUM: CPT | Performed by: STUDENT IN AN ORGANIZED HEALTH CARE EDUCATION/TRAINING PROGRAM

## 2017-02-15 PROCEDURE — 85025 COMPLETE CBC W/AUTO DIFF WBC: CPT | Performed by: STUDENT IN AN ORGANIZED HEALTH CARE EDUCATION/TRAINING PROGRAM

## 2017-02-15 PROCEDURE — 80053 COMPREHEN METABOLIC PANEL: CPT | Performed by: STUDENT IN AN ORGANIZED HEALTH CARE EDUCATION/TRAINING PROGRAM

## 2017-02-15 PROCEDURE — 99212 OFFICE O/P EST SF 10 MIN: CPT

## 2017-02-15 PROCEDURE — 86665 EPSTEIN-BARR CAPSID VCA: CPT | Performed by: STUDENT IN AN ORGANIZED HEALTH CARE EDUCATION/TRAINING PROGRAM

## 2017-02-15 PROCEDURE — 36415 COLL VENOUS BLD VENIPUNCTURE: CPT

## 2017-02-15 PROCEDURE — 80158 DRUG ASSAY CYCLOSPORINE: CPT | Performed by: STUDENT IN AN ORGANIZED HEALTH CARE EDUCATION/TRAINING PROGRAM

## 2017-02-15 RX ORDER — SULFAMETHOXAZOLE/TRIMETHOPRIM 800-160 MG
1 TABLET ORAL 2 TIMES DAILY
Qty: 30 TABLET | Refills: 0 | Status: SHIPPED | OUTPATIENT
Start: 2017-02-15 | End: 2017-05-01

## 2017-02-15 NOTE — NURSING NOTE
BMT Heme Malignancy Rooming Note    Byron Russo - 2/15/2017 4:01 PM     Chief Complaint   Patient presents with     Blood Draw     venipuncture     RECHECK     Patient with AML here for provider visit         /86 (BP Location: Left arm, Patient Position: Chair, Cuff Size: Adult Regular)  Pulse 93  Temp 97  F (36.1  C) (Oral)  Resp 16  Wt 113.8 kg (250 lb 14.4 oz)  SpO2 95%  BMI 34.51 kg/m2     Medications reviewed: Yes    Dressing changed: No     Medications given: No    Staff time:5  Minutes     Additional information if applicable:  Patient has had a chest congestion and wheezing for the past 2 weeks.     Johanny Moser, CMA

## 2017-02-15 NOTE — PROGRESS NOTES
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  He has developed chronic zqgwo-abchie-ykja disease for which he is currently being treated with CSA, systemic and topical steroids. S/p 3 boluses of methylprednisolone (completed January). Two weeks ago topical steroid for persistent erythema of his face added.    HPI: Byron returns for follow up. He has some concerns today. He feels more shaky. He has been having shortness of breath and general fatigue. He can work but is exhausted at the end of the day and sleeps more. Not able to do stairs and feels his activity is limited by both fatigue and back pain and neck immobility. The skin around his neck has been progressively more swollen, something he has attributed to \"moon face\". Feels he needs to follow food with fluids to help swallow food. No tho dysphagia. Eating with vigarous appetite every since steroids. He feels he has not been able to recover from cough in November. Cough is productive of sputum, he endorses wheezing and shortness of breath. HHe denies any n/v/d/c. His issues with Paronychia have been worse since steroid bolus treatments. He states his face has generally improved and thought the lighting in clinic was off. Using topical steroid cream to face only.    ROS:  10-point review of systems is negative, except what is mentioned in HPI     PHYSICAL EXAMINATION:   /86 (BP Location: Left arm, Patient Position: Chair, Cuff Size: Adult Regular)  Pulse 93  Temp 97  F (36.1  C) (Oral)  Resp 16  Wt 113.8 kg (250 lb 14.4 oz)  SpO2 95%  BMI 34.51 kg/m2    GENERAL:  His face was obviously hyperpigmented and a bit erythematous.   HEENT:  Sclerae were anicteric and non-injected.  Buccal mucosa was intact. OP non erythematous  LYMPH NODES:  No palpable supraclavicular, cervical, axillary or inguinal adenopathy. "   LUNGS:  Crackles and rales to lungs in all fields, wheezes present on exhale  CARDIAC:  Without S3, rub or murmur.   ABDOMEN:  Nondistended, active bowel sounds, no organomegaly.   EXTREMITIES:  Trace pedal edema.   SKIN:  Erythema and violaceous hyperpigmentation involving his face; erythema to the very upper most part of the chest and the nape of his neck.  The rest of his skin is only minimally hyperpigmented without erythema.      LABORATORY DATA:    CBC RESULTS:   Recent Labs   Lab Test  02/15/17   1545   WBC  8.4   RBC  4.33*   HGB  14.0   HCT  39.8*   MCV  92   MCH  32.3   MCHC  35.2   RDW  14.8   PLT  126*     .bmp     ASSESSMENT AND PLAN:   1.  Now 280 days status post non-myeloablative allogeneic sibling transplant.  Last restaging was at day 180 and showed no evidence of recurrent disease and 100% donor engraftment.     2.  History of abnormal liver function tests.  These have resolved.     3.  He is on prophylaxis with acyclovir, Bactrim, Vfend, and Levaquin while on steroids.  He also has a history of EBV reactivation.  EBV and CMV negative on 2/1, pending today. Should be on bactrim, pentamidine given a little over 1 mo ago. Pt to start 2/20. Will check IgG and IgM titers.    4.  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 Steroids of 50 mg daily, as well as CSA at 225 mg/BID.  Plan to keep CSA in the high therapeutic range. Localized skin abnormality treated with 1% hydrocortisone cream. Not significantly improved toady. Lungs sound concerning for gvhd changes, cxr not obvious for lobar infection and exam notes global wheezes/crackles. Should follow up with Dr Enciso and possibly with Dr Brady. O2 sats wnl.    5.  HTN: recently increased to 10 mg of amlodipine     Plan: F/u with Dr Enciso next week. Check IgG, IgM and pulmonary tests, CCT to be next week as well      Angela Bunch PAC  [phone]      Addendum: CSA level 581, per pharmacy pt to resume doses tonight 2/16 at 175mg BID, pt contacted and  verbalized understanding. 25mg tab CSA sent to pharmacy as he will run out this week on new schedule

## 2017-02-15 NOTE — MR AVS SNAPSHOT
After Visit Summary   2/15/2017    Byron Russo    MRN: [id number]           Patient Information     Date Of Birth          1960        Visit Information        Provider Department      2/15/2017 4:00 PM  BMT BRENDA #4 [facility] Blood and Marrow Transplant        Today's Diagnoses     MDS (myelodysplastic syndrome) (H)    -  1    Edema of both upper extremities              [facility] ([facility])  [address] [place] [address]  Phone: [phone]  Clinic Hours:   Monday-Friday:    8am to 4:30pm   Weekends and holidays:    8am to noon (in general)  If your fever is 100.5  or greater,   call the clinic.  After hours call the   hospital at [phone] or   [phone]. Ask for the BMT   fellow for pediatric or adult patients            Follow-ups after your visit        Your next 10 appointments already scheduled     Mar 13, 2017  1:00 PM CDT   BMT Nurse Visit with Uc Bmt Nurse 1   [facility] Blood and Marrow Transplant ([facility])    [address] [place] [id number] [phone]            Apr 13, 2017  1:00 PM CDT   BMT Nurse Visit with Uc Bmt Nurse 1   [facility] Blood and Marrow Transplant ([facility])    [address] [place] [id number] [phone]            May 12, 2017  1:00 PM CDT   BMT Nurse Visit with Uc Bmt Nurse 1   [facility] Blood and Marrow Transplant ([facility])    [address] [place] [id number] [phone]              Future tests that were ordered for you today     Open Future Orders        Priority Expected Expires Ordered    IgG Routine 2/20/2017 3/10/2017 2/15/2017    IgM Routine 2/20/2017 3/10/2017 2/15/2017    Pulmonary Function Test Routine 2/20/2017 3/10/2017 2/15/2017    CT Chest w/o contrast Routine 2/20/2017 3/10/2017 2/15/2017    CBC with platelets differential Routine 2/20/2017  3/10/2017 2/15/2017    Comprehensive metabolic panel Routine 2/20/2017 3/10/2017 2/15/2017    Magnesium Routine 2/20/2017 3/10/2017 2/15/2017            Who to contact     If you have questions or need follow up information about today's clinic visit or your schedule please contact [facility] BLOOD AND MARROW TRANSPLANT directly at [phone].  Normal or non-critical lab and imaging results will be communicated to you by Exaviohart, letter or phone within 4 business days after the clinic has received the results. If you do not hear from us within 7 days, please contact the clinic through Trott or phone. If you have a critical or abnormal lab result, we will notify you by phone as soon as possible.  Submit refill requests through MobileCause or call your pharmacy and they will forward the refill request to us. Please allow 3 business days for your refill to be completed.          Additional Information About Your Visit        Exaviohart Information     MobileCause gives you secure access to your electronic health record. If you see a primary care provider, you can also send messages to your care team and make appointments. If you have questions, please call your primary care clinic.  If you do not have a primary care provider, please call [phone] and they will assist you.        Care EveryWhere ID     This is your Care EveryWhere ID. This could be used by other organizations to access your [place] medical records  [id number]        Your Vitals Were     Pulse Temperature Respirations Pulse Oximetry BMI (Body Mass Index)       93 97  F (36.1  C) (Oral) 16 95% 34.51 kg/m2        Blood Pressure from Last 3 Encounters:   02/15/17 133/86   02/01/17 (!) 147/99   01/31/17 133/89    Weight from Last 3 Encounters:   02/15/17 113.8 kg (250 lb 14.4 oz)   02/01/17 118 kg (260 lb 1.6 oz)   01/20/17 119.5 kg (263 lb 7.2 oz)              We Performed the Following     CBC with platelets differential     CMV DNA quantification      Comprehensive metabolic panel     Cyclosporine     EBV Capsid Antibody IgG     Magnesium     US Upper Extremity Venous Duplex Bilat          Today's Medication Changes          These changes are accurate as of: 2/15/17  5:39 PM.  If you have any questions, ask your nurse or doctor.               Start taking these medicines.        Dose/Directions    sulfamethoxazole-trimethoprim 800-160 MG per tablet   Commonly known as:  BACTRIM DS/SEPTRA DS   Used for:  MDS (myelodysplastic syndrome) (H)        Dose:  1 tablet   Take 1 tablet by mouth 2 times daily   Quantity:  30 tablet   Refills:  0            Where to get your medicines      These medications were sent to [facility] [address] [place] [address] [id number] [place] [address]     Phone:  [phone]     sulfamethoxazole-trimethoprim 800-160 MG per tablet                Recent Review Flowsheet Data     BMT Recent Results Latest Ref Rng & Units 12/28/2016 1/4/2017 1/11/2017 1/13/2017 1/20/2017 2/1/2017 2/15/2017    WBC 4.0 - 11.0 10e9/L 5.2 6.0 8.0 8.2 8.9 10.5 8.4    Hemoglobin 13.3 - 17.7 g/dL 17.4 17.1 16.4 15.9 16.0 14.5 14.0    Platelet Count 150 - 450 10e9/L 84(L) 110(L) 104(L) 104(L) 114(L) 116(L) 126(L)    Neutrophils (Absolute) 1.6 - 8.3 10e9/L 2.4 3.1 4.4 4.4 5.2 7.9 4.3    Sodium 133 - 144 mmol/L 139 140 141 143 141 139 -    Potassium 3.4 - 5.3 mmol/L 4.4 4.6 4.3 4.3 4.8 5.4(H) -    Chloride 94 - 109 mmol/L 105 104 104 107 108 106 -    Glucose 70 - 99 mg/dL 101(H) 89 123(H) 106(H) 92 118(H) -    Urea Nitrogen 7 - 30 mg/dL 14 14 17 22 26 38(H) -    Creatinine 0.66 - 1.25 mg/dL 0.86 0.79 0.68 0.78 0.69 0.95 -    Calcium (Total) 8.5 - 10.1 mg/dL 8.7 8.4(L) 8.2(L) 8.3(L) 8.0(L) 8.6 -    Protein (Total) 6.8 - 8.8 g/dL 7.0 7.0 6.4(L) - 6.0(L) 6.6(L) -    Albumin 3.4 - 5.0 g/dL 3.4 3.4 3.1(L) - 3.1(L) 3.4 -    Bilirubin (Direct) 0.0 - 0.2 mg/dL - - - - - - -    Alkaline Phosphatase 40 - 150 U/L 96 91 76 - 63 78 -    AST 0 - 45  U/L Canceled, Test credited  Unsatisfactory specimen - hemolyzed  NOTIFIED JOHNATHAN PAGE AT BMT AT 1440 ON 12/28/16 BY ISABELLE Canceled, Test credited  Unsatisfactory specimen - hemolyzed  NOTIFIED DR STOVALL BMT 01/04 1115 BY [facility] 39 - 28 30 -    ALT 0 - 70 U/L 41 40 46 - 38 36 -    MCV 78 - 100 fl 95 96 95 95 95 94 92               Primary Care Provider Office Phone # Fax #    Cole Duong [phone]       [facility] [address] [facility] [address]        Thank you!     Thank you for choosing [facility] BLOOD AND MARROW TRANSPLANT  for your care. Our goal is always to provide you with excellent care. Hearing back from our patients is one way we can continue to improve our services. Please take a few minutes to complete the written survey that you may receive in the mail after your visit with us. Thank you!             Your Updated Medication List - Protect others around you: Learn how to safely use, store and throw away your medicines at www.disposemymeds.org.          This list is accurate as of: 2/15/17  5:39 PM.  Always use your most recent med list.                   Brand Name Dispense Instructions for use    acyclovir 800 MG tablet    ZOVIRAX    60 tablet    Take 1 tablet (800 mg) by mouth 2 times daily       amLODIPine 5 MG tablet    NORVASC    30 tablet    Take 1 tablet (5 mg) by mouth daily       aspirin 81 MG EC tablet      Take 1 tablet (81 mg) by mouth daily       carboxymethylcellulose 0.5 % Soln ophthalmic solution    carboxymethylcellulose sodium    1 Bottle    Place 1 drop into both eyes 3 times daily as needed for dry eyes       * cycloSPORINE modified 25 MG capsule    GENERIC EQUIVALENT    60 capsule    Take 1 capsule (25 mg) by mouth 2 times daily       * cycloSPORINE modified 100 MG capsule    GENERIC EQUIVALENT    120 capsule    Take two 100 mg capsules and one 25 mg capsule by mouth twice daily. Total dose is 225mg twice daily       hydrocortisone 1 % cream    CORTAID     60 g    Apply to affected area three times daily for 14 days.       levofloxacin 250 MG tablet    LEVAQUIN    30 tablet    Take 1 tablet (250 mg) by mouth daily       levothyroxine 150 MCG tablet    SYNTHROID/LEVOTHROID    30 tablet    Take 1 tablet (150 mcg) by mouth daily       magnesium oxide 400 (241.3 MG) MG tablet    MAG-OX     Take 2 tablets daily       metoprolol 25 MG tablet    LOPRESSOR    60 tablet    Take 2 tablets (50 mg) by mouth 2 times daily       NITROGLYCERIN ER PO      Take by mouth as needed       predniSONE 20 MG tablet    DELTASONE     Take 2.5 tablets (50 mg) by mouth daily       ranitidine 150 MG tablet    ZANTAC    60 tablet    Take 1 tablet (150 mg) by mouth 2 times daily       rosuvastatin 5 MG tablet    CRESTOR    30 tablet    Take 1 tablet (5 mg) by mouth daily       sulfamethoxazole-trimethoprim 800-160 MG per tablet    BACTRIM DS/SEPTRA DS    30 tablet    Take 1 tablet by mouth 2 times daily       ursodiol 300 MG capsule    ACTIGALL    90 capsule    Take 1 capsule (300 mg) by mouth 3 times daily       voriconazole 200 MG tablet    VFEND    60 tablet    Take 1 tablet (200 mg) by mouth 2 times daily       * Notice:  This list has 2 medication(s) that are the same as other medications prescribed for you. Read the directions carefully, and ask your doctor or other care provider to review them with you.

## 2017-02-15 NOTE — NURSING NOTE
Chief Complaint   Patient presents with     Blood Draw     venipuncture     Vitals done and labs drawn, see flow sheets.  SUPA LOPEZ, CMA

## 2017-02-16 LAB — EBV VCA IGG SER QL IA: 7.2 AI (ref 0–0.8)

## 2017-02-16 RX ORDER — CYCLOSPORINE 100 MG/1
150 CAPSULE, LIQUID FILLED ORAL 2 TIMES DAILY
Qty: 120 CAPSULE | Refills: 11 | COMMUNITY
Start: 2017-02-16 | End: 2017-05-08

## 2017-02-16 RX ORDER — CYCLOSPORINE 25 MG/1
25 CAPSULE, LIQUID FILLED ORAL 2 TIMES DAILY
Qty: 60 CAPSULE | Refills: 3 | Status: SHIPPED
Start: 2017-02-16 | End: 2017-05-08

## 2017-02-16 NOTE — TELEPHONE ENCOUNTER
Called patient with the results of his cyclosporine labs of 581. He is currently asymptomatic. I advised that he hold his evening dose again tonight and to call the clinic in the AM for plans for further dosing. In addition I will contact his care team for further dosing of his cyclosporine. He understood this plan.

## 2017-02-21 ENCOUNTER — APPOINTMENT (OUTPATIENT)
Dept: LAB | Facility: CLINIC | Age: 57
End: 2017-02-21
Attending: INTERNAL MEDICINE
Payer: COMMERCIAL

## 2017-02-21 ENCOUNTER — ONCOLOGY VISIT (OUTPATIENT)
Dept: TRANSPLANT | Facility: CLINIC | Age: 57
End: 2017-02-21
Attending: INTERNAL MEDICINE
Payer: COMMERCIAL

## 2017-02-21 VITALS
TEMPERATURE: 98.4 F | BODY MASS INDEX: 34.11 KG/M2 | DIASTOLIC BLOOD PRESSURE: 81 MMHG | OXYGEN SATURATION: 95 % | WEIGHT: 248 LBS | HEART RATE: 114 BPM | SYSTOLIC BLOOD PRESSURE: 116 MMHG

## 2017-02-21 DIAGNOSIS — D46.9 MDS (MYELODYSPLASTIC SYNDROME) (H): Primary | ICD-10-CM

## 2017-02-21 DIAGNOSIS — T86.09 GVHD AS COMPLICATION OF BONE MARROW TRANSPLANT (H): ICD-10-CM

## 2017-02-21 DIAGNOSIS — D46.9 MDS (MYELODYSPLASTIC SYNDROME) (H): ICD-10-CM

## 2017-02-21 DIAGNOSIS — D89.813 GVHD AS COMPLICATION OF BONE MARROW TRANSPLANT (H): ICD-10-CM

## 2017-02-21 LAB
ALBUMIN SERPL-MCNC: 3.5 G/DL (ref 3.4–5)
ALP SERPL-CCNC: 72 U/L (ref 40–150)
ALT SERPL W P-5'-P-CCNC: 30 U/L (ref 0–70)
ANION GAP SERPL CALCULATED.3IONS-SCNC: 13 MMOL/L (ref 3–14)
AST SERPL W P-5'-P-CCNC: 23 U/L (ref 0–45)
BASOPHILS # BLD AUTO: 0 10E9/L (ref 0–0.2)
BASOPHILS NFR BLD AUTO: 0.3 %
BILIRUB SERPL-MCNC: 0.9 MG/DL (ref 0.2–1.3)
BUN SERPL-MCNC: 43 MG/DL (ref 7–30)
CALCIUM SERPL-MCNC: 8.6 MG/DL (ref 8.5–10.1)
CHLORIDE SERPL-SCNC: 104 MMOL/L (ref 94–109)
CO2 SERPL-SCNC: 23 MMOL/L (ref 20–32)
CREAT SERPL-MCNC: 1.37 MG/DL (ref 0.66–1.25)
DIFFERENTIAL METHOD BLD: ABNORMAL
DLCOCOR-%PRED-PRE: 106 %
DLCOCOR-PRE: 32.61 ML/MIN/MMHG
DLCOUNC-%PRED-PRE: 104 %
DLCOUNC-PRE: 32.04 ML/MIN/MMHG
DLCOUNC-PRED: 30.57 ML/MIN/MMHG
EOSINOPHIL # BLD AUTO: 0 10E9/L (ref 0–0.7)
EOSINOPHIL NFR BLD AUTO: 0 %
ERV-%PRED-PRE: 24 %
ERV-PRE: 0.25 L
ERV-PRED: 1.02 L
ERYTHROCYTE [DISTWIDTH] IN BLOOD BY AUTOMATED COUNT: 15.7 % (ref 10–15)
EXPTIME-PRE: 6.25 SEC
FEF2575-%PRED-POST: 71 %
FEF2575-%PRED-PRE: 74 %
FEF2575-POST: 2.49 L/SEC
FEF2575-PRE: 2.59 L/SEC
FEF2575-PRED: 3.49 L/SEC
FEFMAX-%PRED-PRE: 62 %
FEFMAX-PRE: 6.26 L/SEC
FEFMAX-PRED: 10.06 L/SEC
FEV1-%PRED-PRE: 63 %
FEV1-PRE: 2.57 L
FEV1FEV6-PRE: 82 %
FEV1FEV6-PRED: 80 %
FEV1FVC-PRE: 82 %
FEV1FVC-PRED: 78 %
FEV1SVC-PRE: 72 %
FEV1SVC-PRED: 74 %
FIFMAX-PRE: 3.7 L/SEC
FRCPLETH-%PRED-PRE: 81 %
FRCPLETH-PRE: 2.98 L
FRCPLETH-PRED: 3.68 L
FVC-%PRED-PRE: 60 %
FVC-PRE: 3.14 L
FVC-PRED: 5.19 L
GFR SERPL CREATININE-BSD FRML MDRD: 54 ML/MIN/1.7M2
GLUCOSE SERPL-MCNC: 128 MG/DL (ref 70–99)
HCT VFR BLD AUTO: 39.2 % (ref 40–53)
HGB BLD-MCNC: 13.9 G/DL (ref 13.3–17.7)
IC-%PRED-PRE: 74 %
IC-PRE: 3.31 L
IC-PRED: 4.45 L
IMM GRANULOCYTES # BLD: 0.4 10E9/L (ref 0–0.4)
IMM GRANULOCYTES NFR BLD: 2.9 %
LYMPHOCYTES # BLD AUTO: 3.5 10E9/L (ref 0.8–5.3)
LYMPHOCYTES NFR BLD AUTO: 29.1 %
MAGNESIUM SERPL-MCNC: 1.7 MG/DL (ref 1.6–2.3)
MCH RBC QN AUTO: 33.1 PG (ref 26.5–33)
MCHC RBC AUTO-ENTMCNC: 35.5 G/DL (ref 31.5–36.5)
MCV RBC AUTO: 93 FL (ref 78–100)
MONOCYTES # BLD AUTO: 1.1 10E9/L (ref 0–1.3)
MONOCYTES NFR BLD AUTO: 8.9 %
NEUTROPHILS # BLD AUTO: 7 10E9/L (ref 1.6–8.3)
NEUTROPHILS NFR BLD AUTO: 58.8 %
NRBC # BLD AUTO: 0.2 10*3/UL
NRBC BLD AUTO-RTO: 2 /100
PLATELET # BLD AUTO: 185 10E9/L (ref 150–450)
POTASSIUM SERPL-SCNC: 4.2 MMOL/L (ref 3.4–5.3)
PROT SERPL-MCNC: 6.4 G/DL (ref 6.8–8.8)
RBC # BLD AUTO: 4.2 10E12/L (ref 4.4–5.9)
RVPLETH-%PRED-PRE: 115 %
RVPLETH-PRE: 2.73 L
RVPLETH-PRED: 2.36 L
SODIUM SERPL-SCNC: 140 MMOL/L (ref 133–144)
TLCPLETH-%PRED-PRE: 83 %
TLCPLETH-PRE: 6.29 L
TLCPLETH-PRED: 7.53 L
VA-%PRED-PRE: 78 %
VA-PRE: 5.64 L
VC-%PRED-PRE: 65 %
VC-PRE: 3.56 L
VC-PRED: 5.47 L
WBC # BLD AUTO: 11.9 10E9/L (ref 4–11)

## 2017-02-21 PROCEDURE — 82784 ASSAY IGA/IGD/IGG/IGM EACH: CPT | Performed by: STUDENT IN AN ORGANIZED HEALTH CARE EDUCATION/TRAINING PROGRAM

## 2017-02-21 PROCEDURE — 80053 COMPREHEN METABOLIC PANEL: CPT | Performed by: STUDENT IN AN ORGANIZED HEALTH CARE EDUCATION/TRAINING PROGRAM

## 2017-02-21 PROCEDURE — 99212 OFFICE O/P EST SF 10 MIN: CPT | Mod: ZF

## 2017-02-21 PROCEDURE — 36415 COLL VENOUS BLD VENIPUNCTURE: CPT

## 2017-02-21 PROCEDURE — 85025 COMPLETE CBC W/AUTO DIFF WBC: CPT | Performed by: STUDENT IN AN ORGANIZED HEALTH CARE EDUCATION/TRAINING PROGRAM

## 2017-02-21 PROCEDURE — 83735 ASSAY OF MAGNESIUM: CPT | Performed by: STUDENT IN AN ORGANIZED HEALTH CARE EDUCATION/TRAINING PROGRAM

## 2017-02-21 PROCEDURE — 80158 DRUG ASSAY CYCLOSPORINE: CPT | Performed by: INTERNAL MEDICINE

## 2017-02-21 NOTE — NURSING NOTE
BMT Heme Malignancy Rooming Note    Byron Russo - 2/21/2017 4:39 PM     Chief Complaint   Patient presents with     Blood Draw     Patient is here today for labs to be drawn via his right AC area by RN. Vitals charted, and patient checked into his appt.     RECHECK     GVHD/MDS        /81  Pulse 114  Temp 98.4  F (36.9  C) (Oral)  Wt 112.5 kg (248 lb)  SpO2 95%  BMI 34.11 kg/m2     Medications reviewed: Yes    Labs drawn: No    Dressing changed: No     Medications given: No    Staff time:6    Additional information if applicable: n/a    SUPA LOPEZ CMA

## 2017-02-21 NOTE — NURSING NOTE
Chief Complaint   Patient presents with     Blood Draw     Patient is here today for labs to be drawn via his right AC area by RN. Vitals charted, and patient checked into his appt.     Ciara Camilo RN

## 2017-02-21 NOTE — MR AVS SNAPSHOT
After Visit Summary   2/21/2017    Byron Russo    MRN: [id number]           Patient Information     Date Of Birth          1960        Visit Information        Provider Department      2/21/2017 5:00 PM Uli Enciso MD [facility] Blood and Marrow Transplant        Today's Diagnoses     MDS (myelodysplastic syndrome) (H)              [facility] ([facility]  [address] [place] [address]  Phone: [phone]  Clinic Hours:   Monday-Friday:    8am to 4:30pm   Weekends and holidays:    8am to noon (in general)  If your fever is 100.5  or greater,   call the clinic.  After hours call the   hospital at [phone] or   [phone]. Ask for the BMT   fellow for pediatric or adult patients           Care Instructions    FRi: 430PM LABS ONLY    3/8 4pm labs and          Follow-ups after your visit        Your next 10 appointments already scheduled     Feb 24, 2017  4:30 PM CST   [facility] Lab Draw with  [facility] LAB DRAW   [facility] Draw ([facility])    [address] [place] [id number]   [phone]            Mar 08, 2017  4:00 PM CST   [facility] Lab Draw with  [facility] LAB DRAW   [facility] Draw ([facility])    [address] [place] [address]   [phone]            Mar 08, 2017  4:30 PM CST   Return with Uli Enciso MD   [facility] Blood and Marrow Transplant ([facility])    [address] [place] [phone]   [phone]            Mar 13, 2017  1:00 PM CDT   BMT Nurse Visit with Uc Bmt Nurse 1   [facility] Blood and Marrow Transplant ([facility])    [address] [place] [address]   [phone]            Apr 13, 2017  1:00 PM CDT   BMT Nurse Visit with Uc Bmt Nurse 1   [facility] Blood and Marrow Transplant ([facility])    [address] [address] [phone]            May 12, 2017  1:00 PM CDT   BMT Nurse Visit with Uc Bmt Nurse 1   [facility] Blood and Marrow Transplant ([facility] [place])    [address] [place] [id number] [phone]              Future tests that were ordered for you today     Open Future Orders        Priority Expected Expires Ordered    Comprehensive metabolic panel Routine 3/8/2017 2/21/2018 2/21/2017    CBC with platelets differential Routine 3/8/2017 2/21/2018 2/21/2017    Cyclosporine Routine 3/8/2017 2/21/2018 2/21/2017    CMV DNA quantification Routine 3/8/2017 2/21/2018 2/21/2017    Comprehensive metabolic panel Routine 2/24/2017 2/21/2018 2/21/2017    CBC with platelets differential Routine 2/24/2017 2/21/2018 2/21/2017    CMV DNA quantification Routine 2/24/2017 2/21/2018 2/21/2017    Cyclosporine Routine 2/24/2017 2/21/2018 2/21/2017            Who to contact     If you have questions or need follow up information about today's clinic visit or your schedule please contact [facility] BLOOD AND MARROW TRANSPLANT directly at [phone].  Normal or non-critical lab and imaging results will be communicated to you by SoftArthart, letter or phone within 4 business days after the clinic has received the results. If you do not hear from us within 7 days, please contact the clinic through SoftArthart or phone. If you have a critical or abnormal lab result, we will notify you by phone as soon as possible.  Submit refill requests through [employer] or call your pharmacy and they will forward the refill request to us. Please allow 3 business days for your refill to be completed.          Additional Information About Your Visit        SoftArthart Information     [employer] gives you secure access to your electronic health record. If you see a primary care provider, you can also send messages to your care team and make appointments.  If you have questions, please call your primary care clinic.  If you do not have a primary care provider, please call [phone] and they will assist you.        Care EveryWhere ID     This is your Care EveryWhere ID. This could be used by other organizations to access your [place] medical records  [id number]        Your Vitals Were     Pulse Temperature Pulse Oximetry BMI (Body Mass Index)          114 98.4  F (36.9  C) (Oral) 95% 34.11 kg/m2         Blood Pressure from Last 3 Encounters:   02/21/17 116/81   02/15/17 133/86   02/01/17 (!) 147/99    Weight from Last 3 Encounters:   02/21/17 112.5 kg (248 lb)   02/15/17 113.8 kg (250 lb 14.4 oz)   02/01/17 118 kg (260 lb 1.6 oz)              We Performed the Following     CBC with platelets differential     Comprehensive metabolic panel     Cyclosporine     IgG     IgM     Magnesium        Recent Review Flowsheet Data     BMT Recent Results Latest Ref Rng & Units 1/4/2017 1/11/2017 1/13/2017 1/20/2017 2/1/2017 2/15/2017 2/21/2017    WBC 4.0 - 11.0 10e9/L 6.0 8.0 8.2 8.9 10.5 8.4 11.9(H)    Hemoglobin 13.3 - 17.7 g/dL 17.1 16.4 15.9 16.0 14.5 14.0 13.9    Platelet Count 150 - 450 10e9/L 110(L) 104(L) 104(L) 114(L) 116(L) 126(L) 185    Neutrophils (Absolute) 1.6 - 8.3 10e9/L 3.1 4.4 4.4 5.2 7.9 4.3 7.0    Sodium 133 - 144 mmol/L 140 141 143 141 139 139 140    Potassium 3.4 - 5.3 mmol/L 4.6 4.3 4.3 4.8 5.4(H) 4.2 4.2    Chloride 94 - 109 mmol/L 104 104 107 108 106 103 104    Glucose 70 - 99 mg/dL 89 123(H) 106(H) 92 118(H) 92 128(H)    Urea Nitrogen 7 - 30 mg/dL 14 17 22 26 38(H) 50(H) 43(H)    Creatinine 0.66 - 1.25 mg/dL 0.79 0.68 0.78 0.69 0.95 1.87(H) 1.37(H)    Calcium (Total) 8.5 - 10.1 mg/dL 8.4(L) 8.2(L) 8.3(L) 8.0(L) 8.6 8.7 8.6    Protein (Total) 6.8 - 8.8 g/dL 7.0 6.4(L) - 6.0(L) 6.6(L) 6.4(L) 6.4(L)    Albumin 3.4 - 5.0 g/dL 3.4 3.1(L) - 3.1(L) 3.4 3.5 3.5    Alkaline Phosphatase 40 - 150 U/L 91 76 - 63 78 78 72    AST 0 - 45 U/L Canceled, Test  credited  Unsatisfactory specimen - hemolyzed  NOTIFIED DR STOVALL BMT 01/04 1115 BY [facility] 39 - 28 30 30 23    ALT 0 - 70 U/L 40 46 - 38 36 32 30    MCV 78 - 100 fl 96 95 95 95 94 92 93               Primary Care Provider Office Phone # Fax #    Cole Duong [phone]       [place] [address] [address] [facility] [address]        Thank you!     Thank you for choosing [facility] BLOOD AND MARROW TRANSPLANT  for your care. Our goal is always to provide you with excellent care. Hearing back from our patients is one way we can continue to improve our services. Please take a few minutes to complete the written survey that you may receive in the mail after your visit with us. Thank you!             Your Updated Medication List - Protect others around you: Learn how to safely use, store and throw away your medicines at www.disposemymeds.org.          This list is accurate as of: 2/21/17  5:29 PM.  Always use your most recent med list.                   Brand Name Dispense Instructions for use    acyclovir 800 MG tablet    ZOVIRAX    60 tablet    Take 1 tablet (800 mg) by mouth 2 times daily       amLODIPine 5 MG tablet    NORVASC    30 tablet    Take 1 tablet (5 mg) by mouth daily       aspirin 81 MG EC tablet      Take 1 tablet (81 mg) by mouth daily       carboxymethylcellulose 0.5 % Soln ophthalmic solution    carboxymethylcellulose sodium    1 Bottle    Place 1 drop into both eyes 3 times daily as needed for dry eyes       * cycloSPORINE modified 100 MG capsule    GENERIC EQUIVALENT    120 capsule    Take one 100 mg capsule and three 25 mg capsule by mouth twice daily. Total dose is 175mg twice daily       * cycloSPORINE modified 25 MG capsule    GENERIC EQUIVALENT    60 capsule    Take 1 capsule (25 mg) by mouth 2 times daily Take one 100 mg capsule and three 25 mg capsule by mouth twice daily. Total dose is 175mg twice daily       hydrocortisone 1 % cream    CORTAID    60 g    Apply to affected  area three times daily for 14 days.       levofloxacin 250 MG tablet    LEVAQUIN    30 tablet    Take 1 tablet (250 mg) by mouth daily       levothyroxine 150 MCG tablet    SYNTHROID/LEVOTHROID    30 tablet    Take 1 tablet (150 mcg) by mouth daily       magnesium oxide 400 (241.3 MG) MG tablet    MAG-OX     Take 2 tablets daily       metoprolol 25 MG tablet    LOPRESSOR    60 tablet    Take 2 tablets (50 mg) by mouth 2 times daily       NITROGLYCERIN ER PO      Take by mouth as needed       predniSONE 20 MG tablet    DELTASONE     Take 2.5 tablets (50 mg) by mouth daily       ranitidine 150 MG tablet    ZANTAC    60 tablet    Take 1 tablet (150 mg) by mouth 2 times daily       rosuvastatin 5 MG tablet    CRESTOR    30 tablet    Take 1 tablet (5 mg) by mouth daily       sulfamethoxazole-trimethoprim 800-160 MG per tablet    BACTRIM DS/SEPTRA DS    30 tablet    Take 1 tablet by mouth 2 times daily       ursodiol 300 MG capsule    ACTIGALL    90 capsule    Take 1 capsule (300 mg) by mouth 3 times daily       voriconazole 200 MG tablet    VFEND    60 tablet    Take 1 tablet (200 mg) by mouth 2 times daily       * Notice:  This list has 2 medication(s) that are the same as other medications prescribed for you. Read the directions carefully, and ask your doctor or other care provider to review them with you.

## 2017-02-21 NOTE — PROGRESS NOTES
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  We are following him for a rather nasty case of chronic kkrhf-tklnoy-rooc disease which affects predominantly the skin.  In the last several weeks, there has been disproportionate erythema and hyperpigmentation on his face.  He works as a [employer]  and we are beginning to wonder whether this has something to do with the distribution of his erythema.  The rest of his skin is actually quite good.  He states that he feels terrible, that he has become progressively more fatigued, that he goes to bed early nearly every night.  He has had no fevers, chills, nausea, vomiting, diarrhea, cough, hemoptysis, shortness of breath, hematuria, dysuria, bruising or bleeding.  The remainder of a 10-point review of systems is negative except he is experiencing some weight loss.       PHYSICAL EXAMINATION:     GENERAL:  He is flagrantly cushingoid.   VITAL SIGNS:  Unremarkable.   HEENT:  Sclerae were anicteric and non-injected.  Buccal mucosa was intact.   LYMPH NODES:  No palpable supraclavicular, cervical, axillary or inguinal adenopathy.   LUNGS:  Clear to auscultation.   CARDIAC:  Without S3, rub or murmur.   ABDOMEN:  Nondistended, active bowel sounds, no organomegaly.   EXTREMITIES:  Trace pedal edema.     SKIN:  He had slight, spotty hyper- and hypopigmentation involving the entire trunk and legs and arms.  There was marked erythema and hyperpigmentation to his face extending around to the back of his neck.       LABORATORY DATA:  He had a CT scan of the chest, abdomen and pelvis that showed no active infectious process or lymphadenopathy.  He had pulmonary function tests which showed acceptable PFTs with some mild obstructive component.  DLCO was normal.  His white count was 11,900, hemoglobin 13.9 and platelets 285,000.  Creatinine was down to 1.37 from 1.89 five days ago when  cyclosporine was decreased.  His liver function tests were normal.       ASSESSMENT AND PLAN:     1.  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   2.  Chronic rqklh-pmmgel-cytr disease affecting primarily the skin.        He has been on 50 mg of prednisone daily for a long time.  I will decrease to 40 mg. I also discussed with him potential strategies for avoiding light exposure from the YAG laser.  In doing some preliminary reading on this, the emissions are primarily in the infrared range, but they do extend into the visible range at 562 nanometers, which is green light.  I told him that he should try to go and find a 's type mask which is somewhat tinted to block both ultraviolet and diminish visible light as well as possible UV emissions that might be escaping from the machine.  He will do so starting immediately.  I will have him return on Friday for followup of the elevated creatinine.  I will have him hold his CsA for the second dose today, then resume at 150 mg b.i.d. down from 175 b.i.d. which he is currently taking.  I will plan to see him in 2 weeks for evaluation of whether the mask is having an effect on his erythema and hyperpigmentation.  It is possible that he will have to take a 2-week or so hiatus from work in order to sort this out, but I am hoping that by using the mask we can avoid the need for this.  Byron has worked at his present vocation for 38 years and does not want, understandably, to change to another job.

## 2017-02-22 ENCOUNTER — TELEPHONE (OUTPATIENT)
Dept: TRANSPLANT | Facility: CLINIC | Age: 57
End: 2017-02-22

## 2017-02-22 LAB
CYCLOSPORINE BLD LC/MS/MS-MCNC: 415 UG/L (ref 50–400)
IGG SERPL-MCNC: 359 MG/DL (ref 695–1620)
IGM SERPL-MCNC: 60 MG/DL (ref 60–265)
TME LAST DOSE: ABNORMAL H

## 2017-02-24 ENCOUNTER — ONCOLOGY VISIT (OUTPATIENT)
Dept: TRANSPLANT | Facility: CLINIC | Age: 57
End: 2017-02-24
Attending: INTERNAL MEDICINE
Payer: COMMERCIAL

## 2017-02-24 ENCOUNTER — APPOINTMENT (OUTPATIENT)
Dept: LAB | Facility: CLINIC | Age: 57
End: 2017-02-24
Attending: INTERNAL MEDICINE
Payer: COMMERCIAL

## 2017-02-24 VITALS
BODY MASS INDEX: 35.11 KG/M2 | DIASTOLIC BLOOD PRESSURE: 81 MMHG | HEART RATE: 88 BPM | OXYGEN SATURATION: 98 % | SYSTOLIC BLOOD PRESSURE: 128 MMHG | RESPIRATION RATE: 18 BRPM | WEIGHT: 255.3 LBS | TEMPERATURE: 97.4 F

## 2017-02-24 DIAGNOSIS — T86.09 GVHD AS COMPLICATION OF BONE MARROW TRANSPLANT (H): ICD-10-CM

## 2017-02-24 DIAGNOSIS — D46.9 MDS (MYELODYSPLASTIC SYNDROME) (H): ICD-10-CM

## 2017-02-24 DIAGNOSIS — D89.813 GVHD AS COMPLICATION OF BONE MARROW TRANSPLANT (H): ICD-10-CM

## 2017-02-24 DIAGNOSIS — D46.9 MDS (MYELODYSPLASTIC SYNDROME) (H): Primary | ICD-10-CM

## 2017-02-24 PROCEDURE — 40000268 ZZH STATISTIC NO CHARGES: Mod: ZF

## 2017-02-24 PROCEDURE — 36415 COLL VENOUS BLD VENIPUNCTURE: CPT

## 2017-02-24 RX ORDER — METOPROLOL TARTRATE 25 MG/1
50 TABLET, FILM COATED ORAL 2 TIMES DAILY
Qty: 60 TABLET | Refills: 3 | Status: SHIPPED | OUTPATIENT
Start: 2017-02-24 | End: 2017-06-29

## 2017-02-24 NOTE — MR AVS SNAPSHOT
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  After hours call the   hospital at [phone] or   [phone]. Ask for the BMT   fellow for pediatric or adult patients           Care Instructions    No instructions        Follow-ups after your visit        Your next 10 appointments already scheduled     Mar 08, 2017  4:00 PM CST   [facility] with  [facility]   [facility] ([facility])    [address] [place] [address]   [phone]            Mar 08, 2017  4:30 PM CST   Return with Uli Enciso MD   [facility] ([facility])    [address] [place] [address]   [phone]            Mar 13, 2017  1:00 PM CDT   BMT Nurse Visit with [facility] Bmt Nurse 1   [facility] ([facility])    [address] [place] [address]   [phone]            Apr 13, 2017  1:00 PM CDT   BMT Nurse Visit with Uc Bmt Nurse 1   [facility] ([facility])    [address] [place] [address]   [phone]            May 12, 2017  1:00 PM CDT   BMT Nurse Visit with Uc Bmt Nurse 1   [facility] (  [facility])    [address]  [address]nd [address]  [facility] [address] [phone]              Who to contact     If you have questions or need follow up information about today's clinic visit or your schedule please contact [facility] directly at [phone].  Normal or non-critical lab and imaging results will be communicated to you by MyChart, letter or phone within 4 business days after the clinic has received the results. If you do not hear from us within 7 days, please contact the clinic through [employer] or phone. If you have a critical or abnormal lab result, we will notify you by phone as soon as possible.  Submit refill requests through [employer]) or call your pharmacy and they will forward the refill request to us. Please allow 3 business days for your refill to be completed.          Additional Information About Your Visit        [employer] Information     [employer] gives you secure access to your electronic health record. If you see a primary care provider, you can also send messages to your care team and make appointments. If you have questions, please call your primary care clinic.  If you do not have a primary care provider, please call [phone] and they will assist you.        Care EveryWhere ID     This is your Care EveryWhere ID. This could be used by other organizations to access your [place] medical records  [id number]        Your Vitals Were     Pulse Temperature Respirations Pulse Oximetry BMI (Body Mass Index)       88 97.4  F (36.3  C) (Oral) 18 98% 35.11 kg/m2        Blood Pressure from Last 3 Encounters:   02/24/17 128/81   02/21/17 116/81   02/15/17 133/86    Weight from Last 3 Encounters:   02/24/17 115.8 kg (255 lb 4.8 oz)   02/21/17 112.5 kg (248 lb)   02/15/17 113.8 kg (250 lb 14.4 oz)              Today, you had the following     No orders found for display         Where to get your medicines      These medications were sent to  [place], [place] - [address]  [address], [facility] [address]    Hours:  TRANSPLANT PHONE NUMBER [phone] Phone:  [phone]     metoprolol 25 MG tablet          Recent Review Flowsheet Data     BMT Recent Results Latest Ref Rng & Units 1/11/2017 1/13/2017 1/20/2017 2/1/2017 2/15/2017 2/21/2017 2/24/2017    WBC 4.0 - 11.0 10e9/L 8.0 8.2 8.9 10.5 8.4 11.9(H) 9.7    Hemoglobin 13.3 - 17.7 g/dL 16.4 15.9 16.0 14.5 14.0 13.9 13.0(L)    Platelet Count 150 - 450 10e9/L 104(L) 104(L) 114(L) 116(L) 126(L) 185 140(L)    Neutrophils (Absolute) 1.6 - 8.3 10e9/L 4.4 4.4 5.2 7.9 4.3 7.0 7.4    Sodium 133 - 144 mmol/L 141 143 141 139 139 140 139    Potassium 3.4 - 5.3 mmol/L 4.3 4.3 4.8 5.4(H) 4.2 4.2 4.9    Chloride 94 - 109 mmol/L 104 107 108 106 103 104 108    Glucose 70 - 99 mg/dL 123(H) 106(H) 92 118(H) 92 128(H) 89    Urea Nitrogen 7 - 30 mg/dL 17 22 26 38(H) 50(H) 43(H) 36(H)    Creatinine 0.66 - 1.25 mg/dL 0.68 0.78 0.69 0.95 1.87(H) 1.37(H) 0.92    Calcium (Total) 8.5 - 10.1 mg/dL 8.2(L) 8.3(L) 8.0(L) 8.6 8.7 8.6 8.3(L)    Protein (Total) 6.8 - 8.8 g/dL 6.4(L) - 6.0(L) 6.6(L) 6.4(L) 6.4(L) 6.2(L)    Albumin 3.4 - 5.0 g/dL 3.1(L) - 3.1(L) 3.4 3.5 3.5 3.2(L)    Alkaline Phosphatase 40 - 150 U/L 76 - 63 78 78 72 70    AST 0 - 45 U/L 39 - 28 30 30 23 36    ALT 0 - 70 U/L 46 - 38 36 32 30 30    MCV 78 - 100 fl 95 95 95 94 92 93 93               Primary Care Provider Office Phone # Fax #    Cole Duong [phone]       [address] [address]        Thank you!     Thank you for choosing [facility]  for your care. Our goal is always to provide you with excellent care. Hearing back from our patients is one way we can continue to improve our services. Please take a few minutes to complete the written survey that you may receive in the mail after your visit with us. Thank you!              Your Updated Medication List - Protect others around you: Learn how to safely use, store and throw away your medicines at www.disposemymeds.org.          This list is accurate as of: 2/24/17 11:59 PM.  Always use your most recent med list.                   Brand Name Dispense Instructions for use    acyclovir 800 MG tablet    ZOVIRAX    60 tablet    Take 1 tablet (800 mg) by mouth 2 times daily       amLODIPine 5 MG tablet    NORVASC    30 tablet    Take 1 tablet (5 mg) by mouth daily       aspirin 81 MG EC tablet      Take 1 tablet (81 mg) by mouth daily       carboxymethylcellulose 0.5 % Soln ophthalmic solution    carboxymethylcellulose sodium    1 Bottle    Place 1 drop into both eyes 3 times daily as needed for dry eyes       * cycloSPORINE modified 100 MG capsule    GENERIC EQUIVALENT    120 capsule    Take one 100 mg capsule and three 25 mg capsule by mouth twice daily. Total dose is 175mg twice daily       * cycloSPORINE modified 25 MG capsule    GENERIC EQUIVALENT    60 capsule    Take 1 capsule (25 mg) by mouth 2 times daily Take one 100 mg capsule and three 25 mg capsule by mouth twice daily. Total dose is 175mg twice daily       hydrocortisone 1 % cream    CORTAID    60 g    Apply to affected area three times daily for 14 days.       levofloxacin 250 MG tablet    LEVAQUIN    30 tablet    Take 1 tablet (250 mg) by mouth daily       levothyroxine 150 MCG tablet    SYNTHROID/LEVOTHROID    30 tablet    Take 1 tablet (150 mcg) by mouth daily       magnesium oxide 400 (241.3 MG) MG tablet    MAG-OX     Take 2 tablets daily       metoprolol 25 MG tablet    LOPRESSOR    60 tablet    Take 2 tablets (50 mg) by mouth 2 times daily       NITROGLYCERIN ER PO      Take by mouth as needed       predniSONE 20 MG tablet    DELTASONE     Take 2.5 tablets (50 mg) by mouth daily       ranitidine 150 MG tablet    ZANTAC    60 tablet    Take 1 tablet (150 mg) by mouth 2 times daily       rosuvastatin 5 MG tablet     CRESTOR    30 tablet    Take 1 tablet (5 mg) by mouth daily       sulfamethoxazole-trimethoprim 800-160 MG per tablet    BACTRIM DS/SEPTRA DS    30 tablet    Take 1 tablet by mouth 2 times daily       ursodiol 300 MG capsule    ACTIGALL    90 capsule    Take 1 capsule (300 mg) by mouth 3 times daily       voriconazole 200 MG tablet    VFEND    60 tablet    Take 1 tablet (200 mg) by mouth 2 times daily       * Notice:  This list has 2 medication(s) that are the same as other medications prescribed for you. Read the directions carefully, and ask your doctor or other care provider to review them with you.

## 2017-02-24 NOTE — PROGRESS NOTES
"Pt here for lab re-check only for CSA level.  Pt asking why he has to see provider today.  MD Callahan notified and determined pt \"did not need to be seen by provider today\".  Plan to notify patient, via telephone, of CSA level and what to do with his current oral dosing.  Pt left lobby, ambulatory, and denies complaints.  Pt encouraged to call BMT clinic with questions or concerns.  "

## 2017-02-24 NOTE — NURSING NOTE
Chief Complaint   Patient presents with     Blood Draw     Labs collected peripherally by RN.     Corazon attempt x6, some blood return on 6th attempt. Checked in for appt.  Carla Cosby RN

## 2017-03-08 ENCOUNTER — APPOINTMENT (OUTPATIENT)
Dept: LAB | Facility: CLINIC | Age: 57
End: 2017-03-08
Attending: INTERNAL MEDICINE
Payer: COMMERCIAL

## 2017-03-08 ENCOUNTER — ONCOLOGY VISIT (OUTPATIENT)
Dept: TRANSPLANT | Facility: CLINIC | Age: 57
End: 2017-03-08
Attending: INTERNAL MEDICINE
Payer: COMMERCIAL

## 2017-03-08 VITALS
RESPIRATION RATE: 16 BRPM | SYSTOLIC BLOOD PRESSURE: 126 MMHG | TEMPERATURE: 97.9 F | WEIGHT: 261.9 LBS | DIASTOLIC BLOOD PRESSURE: 82 MMHG | HEART RATE: 90 BPM | BODY MASS INDEX: 36.02 KG/M2

## 2017-03-08 DIAGNOSIS — D46.22 RAEB-2 (REFRACTORY ANEMIA WITH EXCESS BLASTS-2) (H): Primary | ICD-10-CM

## 2017-03-08 DIAGNOSIS — D46.9 MDS (MYELODYSPLASTIC SYNDROME) (H): ICD-10-CM

## 2017-03-08 LAB
ALBUMIN SERPL-MCNC: 3.7 G/DL (ref 3.4–5)
ALP SERPL-CCNC: 91 U/L (ref 40–150)
ALT SERPL W P-5'-P-CCNC: 31 U/L (ref 0–70)
ANION GAP SERPL CALCULATED.3IONS-SCNC: 10 MMOL/L (ref 3–14)
AST SERPL W P-5'-P-CCNC: 22 U/L (ref 0–45)
BASOPHILS # BLD AUTO: 0.1 10E9/L (ref 0–0.2)
BASOPHILS NFR BLD AUTO: 0.5 %
BILIRUB SERPL-MCNC: 0.8 MG/DL (ref 0.2–1.3)
BUN SERPL-MCNC: 36 MG/DL (ref 7–30)
CALCIUM SERPL-MCNC: 8.7 MG/DL (ref 8.5–10.1)
CHLORIDE SERPL-SCNC: 105 MMOL/L (ref 94–109)
CO2 SERPL-SCNC: 27 MMOL/L (ref 20–32)
CREAT SERPL-MCNC: 1.31 MG/DL (ref 0.66–1.25)
CYCLOSPORINE BLD LC/MS/MS-MCNC: 258 UG/L (ref 50–400)
DIFFERENTIAL METHOD BLD: ABNORMAL
EOSINOPHIL # BLD AUTO: 0 10E9/L (ref 0–0.7)
EOSINOPHIL NFR BLD AUTO: 0.4 %
ERYTHROCYTE [DISTWIDTH] IN BLOOD BY AUTOMATED COUNT: 19.5 % (ref 10–15)
GFR SERPL CREATININE-BSD FRML MDRD: 57 ML/MIN/1.7M2
GLUCOSE SERPL-MCNC: 99 MG/DL (ref 70–99)
HCT VFR BLD AUTO: 36.7 % (ref 40–53)
HGB BLD-MCNC: 12.4 G/DL (ref 13.3–17.7)
IMM GRANULOCYTES # BLD: 0.5 10E9/L (ref 0–0.4)
IMM GRANULOCYTES NFR BLD: 4.1 %
LYMPHOCYTES # BLD AUTO: 2.7 10E9/L (ref 0.8–5.3)
LYMPHOCYTES NFR BLD AUTO: 24.7 %
MCH RBC QN AUTO: 34 PG (ref 26.5–33)
MCHC RBC AUTO-ENTMCNC: 33.8 G/DL (ref 31.5–36.5)
MCV RBC AUTO: 101 FL (ref 78–100)
MONOCYTES # BLD AUTO: 1.1 10E9/L (ref 0–1.3)
MONOCYTES NFR BLD AUTO: 10.3 %
NEUTROPHILS # BLD AUTO: 6.6 10E9/L (ref 1.6–8.3)
NEUTROPHILS NFR BLD AUTO: 60 %
NRBC # BLD AUTO: 0.2 10*3/UL
NRBC BLD AUTO-RTO: 2 /100
PLATELET # BLD AUTO: 195 10E9/L (ref 150–450)
POTASSIUM SERPL-SCNC: 4.2 MMOL/L (ref 3.4–5.3)
PROT SERPL-MCNC: 6.7 G/DL (ref 6.8–8.8)
RBC # BLD AUTO: 3.65 10E12/L (ref 4.4–5.9)
SODIUM SERPL-SCNC: 142 MMOL/L (ref 133–144)
TME LAST DOSE: NORMAL H
WBC # BLD AUTO: 10.9 10E9/L (ref 4–11)

## 2017-03-08 PROCEDURE — 80158 DRUG ASSAY CYCLOSPORINE: CPT | Performed by: INTERNAL MEDICINE

## 2017-03-08 PROCEDURE — 36415 COLL VENOUS BLD VENIPUNCTURE: CPT

## 2017-03-08 PROCEDURE — 80053 COMPREHEN METABOLIC PANEL: CPT | Performed by: INTERNAL MEDICINE

## 2017-03-08 PROCEDURE — 99213 OFFICE O/P EST LOW 20 MIN: CPT | Mod: ZF

## 2017-03-08 PROCEDURE — 85025 COMPLETE CBC W/AUTO DIFF WBC: CPT | Performed by: INTERNAL MEDICINE

## 2017-03-08 RX ORDER — FLUCONAZOLE 100 MG/1
100 TABLET ORAL DAILY
Qty: 30 TABLET | Refills: 11 | Status: SHIPPED | OUTPATIENT
Start: 2017-03-08 | End: 2018-01-30

## 2017-03-08 RX ORDER — AMLODIPINE BESYLATE 5 MG/1
5 TABLET ORAL DAILY
Qty: 30 TABLET | Refills: 11 | Status: SHIPPED | OUTPATIENT
Start: 2017-03-08 | End: 2017-03-10

## 2017-03-08 ASSESSMENT — PAIN SCALES - GENERAL: PAINLEVEL: MODERATE PAIN (4)

## 2017-03-08 NOTE — NURSING NOTE
BMT Heme Malignancy Rooming Note    Byron Russo - 3/8/2017 4:11 PM     Chief Complaint   Patient presents with     RECHECK     post bmt for MDS here for labs and md visit        /82  Pulse 90  Temp 97.9  F (36.6  C)  Resp 16  Wt 118.8 kg (261 lb 14.4 oz)  BMI 36.02 kg/m2     Medications reviewed: Yes    Labs drawn: Yes    Drawn by: Clinic Staff  Via: venipuncture  See Doc Flowsheet for details.      Dressing changed: No     Medications given: No    Staff time:10    Additional information if applicable: julia Henry RN

## 2017-03-08 NOTE — PROGRESS NOTES
HISTORY OF PRESENT ILLNESS:  I saw Byron Russo today in followup for his facial erythema and chronic bmxli-troxqz-nfxk disease.  Mr. 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  He is currently taking 40 mg of prednisone daily.  When I saw him 2 weeks ago, I noticed that he had very pronounced facial erythema and hyperpigmentation.  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  He at my advice went and got himself a 's type mask with a colored shield and has been using that for approximately a week and a half.        REVIEW OF SYSTEMS:  No fevers, chills, nausea, vomiting, diarrhea, cough, hemoptysis, shortness of breath, hematuria, dysuria, bruising or bleeding.  The remainder of the 10-point review of systems is negative except for the pronounced facial erythema which he says greatly worsens over the course of the day and improves overnight.      PHYSICAL EXAMINATION:     GENERAL:  He looks very similar to what he did 2 weeks ago with pronounced hyperpigmentation and erythema of the face, floridly cushingoid.  There is miliary appearance to the rash as well.  His mouth had subtle lichenoid changes, but no open ulcerations.     LUNGS:  Clear to auscultation.   CARDIAC:  Without S3, rub or murmur.   ABDOMEN:  Nondistended, active bowel sounds, no organomegaly.   EXTREMITIES:  Pronounced pedal edema, which he says has worsened over the last couple of weeks since we increased his amlodipine.  He has patchy involvement with erythema of his skin in the upper neck and anterior chest.      LABORATORY DATA:  Today include a white count of 10,900, hemoglobin 12.4 and platelets 195,000.  The electrolytes and liver function tests were unavailable at the time of this dictation because labs are taking an inordinately long time to return today for some reason.      ASSESSMENT AND PLAN:   1.  Three hundred one days status post non-myeloablative allo-sib  transplant for myelodysplastic syndrome with no evidence of relapse.   2.  Chronic currz-ilkytc-eyol disease.  The pronounced localization of the worst part of the rash to his face suggests that this is some factor other than chronic kgoey-uawdft-shoe disease that is contributing to it.  In reviewing his meds it seems quite possible that voriconazole is implicated as it can cause photosensitization and he is exposed to bright light.  I will therefore have him discontinue the voriconazole and substitute fluconazole.  Secondly, because of the very pronounced edema and weight gain of his lower extremities I will have him decrease his amlodipine from 10 to 5 mg daily after skipping a day.  Finally, I will decrease his prednisone from 40 to 30 mg daily as he is floridly cushingoid and there is no other evidence of active chronic vmfzq-qlndun-ovvz disease currently.  I will have him return in 3 weeks to see me in followup.

## 2017-03-08 NOTE — MR AVS SNAPSHOT
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  After hours call the   hospital at [phone] or   [phone]. Ask for the BMT   fellow for pediatric or adult patients           Care Instructions    3/29 330pm labs and              Follow-ups after your visit        Additional Services     Dermatology Referral       Your provider has referred you to:dematology at [facility].       Please be aware that coverage of these services is subject to the terms and limitations of your health insurance plan.  Call member services at your health plan with any benefit or coverage questions.      Please bring the following with you to your appointment:    (1) Any X-Rays, CTs or MRIs which have been performed.  Contact the facility where they were done to arrange for  prior to your scheduled appointment.    (2) List of current medications  (3) This referral request   (4) Any documents/labs given to you for this referral                  Your next 10 appointments already scheduled     Mar 29, 2017  3:30 PM CDT   Masonic Lab Draw with  MASONIC LAB DRAW   [facility] Masonic Lab Draw ([facility])    [address] [place] [id number] [phone]            Mar 29, 2017  4:00 PM CDT   Return with Uli Enciso MD   [facility] Blood and Marrow  Transplant ([facility])    [address]  2nd Floor  [facility] [address] [phone]              Future tests that were ordered for you today     Open Future Orders        Priority Expected Expires Ordered    Dermatology Referral ASAP 3/15/2017 3/8/2018 3/8/2017            Who to contact     If you have questions or need follow up information about today's clinic visit or your schedule please contact [facility] BLOOD AND MARROW TRANSPLANT directly at [phone].  Normal or non-critical lab and imaging results will be communicated to you by [employer], letter or phone within 4 business days after the clinic has received the results. If you do not hear from us within 7 days, please contact the clinic through [employer] or phone. If you have a critical or abnormal lab result, we will notify you by phone as soon as possible.  Submit refill requests through [employer] or call your pharmacy and they will forward the refill request to us. Please allow 3 business days for your refill to be completed.          Additional Information About Your Visit        [employer] Information     [employer] gives you secure access to your electronic health record. If you see a primary care provider, you can also send messages to your care team and make appointments. If you have questions, please call your primary care clinic.  If you do not have a primary care provider, please call [phone] and they will assist you.        Care EveryWhere ID     This is your Care EveryWhere ID. This could be used by other organizations to access your [place] medical records  [id number]        Your Vitals Were     Pulse Temperature Respirations BMI (Body Mass Index)          90 97.9  F (36.6  C) 16 36.02 kg/m2         Blood Pressure from Last 3 Encounters:   03/08/17 126/82   02/24/17 128/81   02/21/17 116/81    Weight from Last 3 Encounters:   03/08/17 118.8 kg (261 lb 14.4 oz)   02/24/17 115.8 kg (255 lb 4.8 oz)    02/21/17 112.5 kg (248 lb)              We Performed the Following     CBC with platelets differential     CMV DNA quantification     Comprehensive metabolic panel     Cyclosporine          Today's Medication Changes          These changes are accurate as of: 3/8/17  5:04 PM.  If you have any questions, ask your nurse or doctor.               Start taking these medicines.        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        Dose/Directions    * amLODIPine 5 MG tablet   Commonly known as:  NORVASC   This may have changed:  Another medication with the same name was added. Make sure you understand how and when to take each.   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 Make sure you understand how and when to take each.   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 Make sure you understand how and when to take each.   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 Total dose is 175mg twice daily    Quantity:  120 capsule   Refills:  11       * cycloSPORINE modified 25 MG capsule   Commonly known as:  GENERIC EQUIVALENT   This may have changed:  additional instructions   Used for:  MDS (myelodysplastic syndrome) (H)        Dose:  25 mg   Take 1 capsule (25 mg) by mouth 2 times daily Take one 100 mg capsule and three 25 mg capsule by mouth twice daily. Total dose is 175mg twice daily   Quantity:  60 capsule   Refills:  3       * Notice:  This list has 4 medication(s) that are the same as other medications prescribed for you. Read the directions carefully, and ask your doctor or other care provider to review them with you.         Where to get your medicines      These medications were sent to [facility] [address] [facility] [address]  [address] [place] [address] [address]-[address] [address]    Hours:  TRANSPLANT PHONE NUMBER [phone] Phone:  [phone]     amLODIPine 5 MG tablet    fluconazole 100 MG tablet                Recent Review Flowsheet Data     BMT Recent Results Latest Ref Rng & Units 1/13/2017 1/20/2017 2/1/2017 2/15/2017 2/21/2017 2/24/2017 3/8/2017    WBC 4.0 - 11.0 10e9/L 8.2 8.9 10.5 8.4 11.9(H) 9.7 10.9    Hemoglobin 13.3 - 17.7 g/dL 15.9 16.0 14.5 14.0 13.9 13.0(L) 12.4(L)    Platelet Count 150 - 450 10e9/L 104(L) 114(L) 116(L) 126(L) 185 140(L) 195    Neutrophils (Absolute) 1.6 - 8.3 10e9/L 4.4 5.2 7.9 4.3 7.0 7.4 6.6    Sodium 133 - 144 mmol/L 143 141 139 139 140 139 -    Potassium 3.4 - 5.3 mmol/L 4.3 4.8 5.4(H) 4.2 4.2 4.9 -    Chloride 94 - 109 mmol/L 107 108 106 103 104 108 -    Glucose 70 - 99 mg/dL 106(H) 92 118(H) 92 128(H) 89 -    Urea Nitrogen 7 - 30 mg/dL 22 26 38(H) 50(H) 43(H) 36(H) -    Creatinine 0.66 - 1.25 mg/dL 0.78 0.69 0.95 1.87(H) 1.37(H) 0.92 -    Calcium (Total) 8.5 - 10.1 mg/dL 8.3(L) 8.0(L) 8.6 8.7 8.6 8.3(L) -    Protein (Total) 6.8 - 8.8 g/dL - 6.0(L) 6.6(L) 6.4(L) 6.4(L) 6.2(L) -    Albumin 3.4 - 5.0 g/dL - 3.1(L) 3.4 3.5 3.5  3.2(L) -    Alkaline Phosphatase 40 - 150 U/L - 63 78 78 72 70 -    AST 0 - 45 U/L - 28 30 30 23 36 -    ALT 0 - 70 U/L - 38 36 32 30 30 -    MCV 78 - 100 fl 95 95 94 92 93 93 101(H)               Primary Care Provider Office Phone # Fax #    Cole Duong [phone]       [address] [address]        Thank you!     Thank you for choosing [facility] BLOOD AND MARROW TRANSPLANT  for your care. Our goal is always to provide you with excellent care. Hearing back from our patients is one way we can continue to improve our services. Please take a few minutes to complete the written survey that you may receive in the mail after your visit with us. Thank you!             Your Updated Medication List - Protect others around you: Learn how to safely use, store and throw away your medicines at www.disposemymeds.org.          This list is accurate as of: 3/8/17  5:04 PM.  Always use your most recent med list.                   Brand Name Dispense Instructions for use    acyclovir 800 MG tablet    ZOVIRAX    60 tablet    Take 1 tablet (800 mg) by mouth 2 times daily       * amLODIPine 5 MG tablet    NORVASC    30 tablet    Take 1 tablet (5 mg) by mouth daily       * amLODIPine 5 MG tablet    NORVASC    30 tablet    Take 1 tablet (5 mg) by mouth daily       aspirin 81 MG EC tablet      Take 1 tablet (81 mg) by mouth daily       carboxymethylcellulose 0.5 % Soln ophthalmic solution    carboxymethylcellulose sodium    1 Bottle    Place 1 drop into both eyes 3 times daily as needed for dry eyes       * cycloSPORINE modified 100 MG capsule    GENERIC EQUIVALENT    120 capsule    Take one 100 mg capsule and three 25 mg capsule by mouth twice daily. Total dose is 175mg twice daily       * cycloSPORINE modified 25 MG capsule    GENERIC EQUIVALENT    60 capsule    Take 1 capsule (25 mg) by mouth 2 times daily Take one 100 mg capsule and three 25 mg capsule by mouth twice daily. Total dose  is 175mg twice daily       fluconazole 100 MG tablet    DIFLUCAN    30 tablet    Take 1 tablet (100 mg) by mouth daily       hydrocortisone 1 % cream    CORTAID    60 g    Apply to affected area three times daily for 14 days.       levofloxacin 250 MG tablet    LEVAQUIN    30 tablet    Take 1 tablet (250 mg) by mouth daily       levothyroxine 150 MCG tablet    SYNTHROID/LEVOTHROID    30 tablet    Take 1 tablet (150 mcg) by mouth daily       magnesium oxide 400 (241.3 MG) MG tablet    MAG-OX     Take 2 tablets daily       metoprolol 25 MG tablet    LOPRESSOR    60 tablet    Take 2 tablets (50 mg) by mouth 2 times daily       NITROGLYCERIN ER PO      Take by mouth as needed Reported on 3/8/2017       predniSONE 20 MG tablet    DELTASONE     Take 40 mg by mouth daily       ranitidine 150 MG tablet    ZANTAC    60 tablet    Take 1 tablet (150 mg) by mouth 2 times daily       rosuvastatin 5 MG tablet    CRESTOR    30 tablet    Take 1 tablet (5 mg) by mouth daily       sulfamethoxazole-trimethoprim 800-160 MG per tablet    BACTRIM DS/SEPTRA DS    30 tablet    Take 1 tablet by mouth 2 times daily       ursodiol 300 MG capsule    ACTIGALL    90 capsule    Take 1 capsule (300 mg) by mouth 3 times daily       voriconazole 200 MG tablet    VFEND    60 tablet    Take 1 tablet (200 mg) by mouth 2 times daily       * Notice:  This list has 4 medication(s) that are the same as other medications prescribed for you. Read the directions carefully, and ask your doctor or other care provider to review them with you.

## 2017-03-10 ENCOUNTER — ONCOLOGY VISIT (OUTPATIENT)
Dept: TRANSPLANT | Facility: CLINIC | Age: 57
End: 2017-03-10
Attending: STUDENT IN AN ORGANIZED HEALTH CARE EDUCATION/TRAINING PROGRAM
Payer: COMMERCIAL

## 2017-03-10 VITALS
DIASTOLIC BLOOD PRESSURE: 76 MMHG | OXYGEN SATURATION: 97 % | RESPIRATION RATE: 18 BRPM | HEART RATE: 90 BPM | TEMPERATURE: 98 F | WEIGHT: 261.6 LBS | BODY MASS INDEX: 35.98 KG/M2 | SYSTOLIC BLOOD PRESSURE: 111 MMHG

## 2017-03-10 DIAGNOSIS — D46.22 RAEB-2 (REFRACTORY ANEMIA WITH EXCESS BLASTS-2) (H): ICD-10-CM

## 2017-03-10 LAB
ALBUMIN UR-MCNC: NEGATIVE MG/DL
ANION GAP SERPL CALCULATED.3IONS-SCNC: 11 MMOL/L (ref 3–14)
APPEARANCE UR: CLEAR
BILIRUB UR QL STRIP: NEGATIVE
BUN SERPL-MCNC: 29 MG/DL (ref 7–30)
CALCIUM SERPL-MCNC: 8.8 MG/DL (ref 8.5–10.1)
CHLORIDE SERPL-SCNC: 105 MMOL/L (ref 94–109)
CO2 SERPL-SCNC: 26 MMOL/L (ref 20–32)
COLOR UR AUTO: YELLOW
CREAT SERPL-MCNC: 0.99 MG/DL (ref 0.66–1.25)
GFR SERPL CREATININE-BSD FRML MDRD: 79 ML/MIN/1.7M2
GLUCOSE SERPL-MCNC: 93 MG/DL (ref 70–99)
GLUCOSE UR STRIP-MCNC: NEGATIVE MG/DL
HGB UR QL STRIP: NEGATIVE
HYALINE CASTS #/AREA URNS LPF: 33 /LPF (ref 0–2)
KETONES UR STRIP-MCNC: NEGATIVE MG/DL
LEUKOCYTE ESTERASE UR QL STRIP: NEGATIVE
MUCOUS THREADS #/AREA URNS LPF: PRESENT /LPF
NITRATE UR QL: NEGATIVE
PH UR STRIP: 5 PH (ref 5–7)
POTASSIUM SERPL-SCNC: 4 MMOL/L (ref 3.4–5.3)
RBC #/AREA URNS AUTO: 8 /HPF (ref 0–2)
SODIUM SERPL-SCNC: 142 MMOL/L (ref 133–144)
SP GR UR STRIP: 1.02 (ref 1–1.03)
URN SPEC COLLECT METH UR: ABNORMAL
UROBILINOGEN UR STRIP-MCNC: 0 MG/DL (ref 0–2)
WBC #/AREA URNS AUTO: 5 /HPF (ref 0–2)

## 2017-03-10 PROCEDURE — 96374 THER/PROPH/DIAG INJ IV PUSH: CPT

## 2017-03-10 PROCEDURE — 25000128 H RX IP 250 OP 636: Mod: ZF | Performed by: STUDENT IN AN ORGANIZED HEALTH CARE EDUCATION/TRAINING PROGRAM

## 2017-03-10 PROCEDURE — 80048 BASIC METABOLIC PNL TOTAL CA: CPT | Performed by: STUDENT IN AN ORGANIZED HEALTH CARE EDUCATION/TRAINING PROGRAM

## 2017-03-10 PROCEDURE — 81001 URINALYSIS AUTO W/SCOPE: CPT | Performed by: STUDENT IN AN ORGANIZED HEALTH CARE EDUCATION/TRAINING PROGRAM

## 2017-03-10 PROCEDURE — 87086 URINE CULTURE/COLONY COUNT: CPT | Performed by: STUDENT IN AN ORGANIZED HEALTH CARE EDUCATION/TRAINING PROGRAM

## 2017-03-10 PROCEDURE — 99212 OFFICE O/P EST SF 10 MIN: CPT | Mod: 25

## 2017-03-10 PROCEDURE — 99212 OFFICE O/P EST SF 10 MIN: CPT | Mod: ZF

## 2017-03-10 RX ORDER — FUROSEMIDE 20 MG
20 TABLET ORAL DAILY
Qty: 30 TABLET | Refills: 1 | Status: SHIPPED | OUTPATIENT
Start: 2017-03-10 | End: 2017-09-19

## 2017-03-10 RX ORDER — FUROSEMIDE 10 MG/ML
20 INJECTION INTRAMUSCULAR; INTRAVENOUS ONCE
Status: COMPLETED | OUTPATIENT
Start: 2017-03-10 | End: 2017-03-10

## 2017-03-10 RX ORDER — AMLODIPINE BESYLATE 5 MG/1
5 TABLET ORAL DAILY
Qty: 30 TABLET | Refills: 11 | COMMUNITY
Start: 2017-03-10 | End: 2017-07-12 | Stop reason: SINTOL

## 2017-03-10 RX ADMIN — FUROSEMIDE 20 MG: 10 INJECTION, SOLUTION INTRAMUSCULAR; INTRAVENOUS at 14:58

## 2017-03-10 ASSESSMENT — PAIN SCALES - GENERAL: PAINLEVEL: MODERATE PAIN (4)

## 2017-03-10 NOTE — NURSING NOTE
BMT Heme Malignancy Rooming Note    Byron Russo - 3/10/2017 1:08 PM     Chief Complaint   Patient presents with     RECHECK     Patient with MDS here for provider visit         /76 (BP Location: Left arm, Patient Position: Chair, Cuff Size: Adult Large)  Pulse 90  Temp 98  F (36.7  C) (Oral)  Resp 18  Wt 118.7 kg (261 lb 9.6 oz)  SpO2 97%  BMI 35.98 kg/m2     Medications reviewed: Yes    Dressing changed: No     Medications given: No    Staff time:5 minutes     Additional information if applicable:      Johanny Moser, CMA

## 2017-03-10 NOTE — NURSING NOTE
Peripheral IV was placed by RN for lab draw and possible infusion.   Johanny Moser CMA March 10, 2017

## 2017-03-10 NOTE — NURSING NOTE
Provider order received to administer Lasix 20mg IVP at this time.  PIV placed by Johanny JEAN MA.  PT tolerated IVP Lasix at 1505.  PT to give UA/UC sample and then may discharge to home.  Pt to take oral Lasix as d/c script for next 3 days.  Pt reports understanding of plan of care.

## 2017-03-10 NOTE — MR AVS SNAPSHOT
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  After hours call the   hospital at [phone] or   [phone]. Ask for the BMT   fellow for pediatric or adult patients            Follow-ups after your visit        Your next 10 appointments already scheduled     Mar 13, 2017  3:30 PM CDT   Masonic Lab Draw with  MASONIC LAB DRAW   [facility] Masonic Lab Draw ([facility])    [address] [place] [id number] [phone]            Mar 13, 2017  4:00 PM CDT   Return with  BMT BRENDA #4   [facility] Blood and Marrow Transplant ([facility])    [address] [place] [address]   [phone]            Mar 29, 2017  3:30 PM CDT   Masonic Lab Draw with  MASONIC LAB DRAW   [facility] Masonic Lab Draw ([facility])    [address] [place] [address]  [address] [facility] [address]   [phone]            Mar 29, 2017  4:00 PM CDT   Return with Uli Enciso MD   [facility] Blood and Marrow Transplant ([facility])    [address] [place] [address]   [phone]            Apr 11, 2017  3:15 PM CDT   (Arrive by 3:00 PM)   New Patient Visit with Praveen Nation MD   [facility] Dermatology ([facility])    [address] [place] [address]  [address] [facility]  [address] [phone]              Who to contact     If you have questions or need follow up information about today's clinic visit or your schedule please contact [facility] BLOOD AND MARROW TRANSPLANT directly at [phone].  Normal or non-critical lab and imaging results will be communicated to you by [employer], letter or phone within 4 business days after the clinic has received the results. If you do not hear from us within 7 days, please contact the clinic through [facility] or phone. If you have a critical or abnormal lab result, we will notify you by phone as soon as possible.  Submit refill requests through [employer] or call your pharmacy and they will forward the refill request to us. Please allow 3 business days for your refill to be completed.          Additional Information About Your Visit        [employer] Information     [employer] gives you secure access to your electronic health record. If you see a primary care provider, you can also send messages to your care team and make appointments. If you have questions, please call your primary care clinic.  If you do not have a primary care provider, please call [phone] and they will assist you.        Care EveryWhere ID     This is your Care EveryWhere ID. This could be used by other organizations to access your [place] medical records  [id number]        Your Vitals Were     Pulse Temperature Respirations Pulse Oximetry BMI (Body Mass Index)       90 98  F (36.7  C) (Oral) 18 97% 35.98 kg/m2        Blood Pressure from Last 3 Encounters:   03/10/17 111/76   03/08/17 126/82   02/24/17 128/81    Weight from Last 3 Encounters:   03/10/17 118.7 kg (261 lb 9.6 oz)   03/08/17 118.8 kg (261 lb 14.4 oz)   02/24/17 115.8 kg (255 lb 4.8 oz)              We Performed the Following     Basic metabolic panel     Routine UA with Microscopic - NOW     Urine Culture Aerobic Bacterial          Today's Medication Changes          These changes are accurate as of: 3/10/17   3:36 PM.  If you have any questions, ask your nurse or doctor.               Start taking these medicines.        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 Make sure you understand how and when to take each.   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 Total dose is 175mg twice daily   Quantity:  120 capsule   Refills:  11       * cycloSPORINE modified 25 MG capsule   Commonly known as:  GENERIC EQUIVALENT   This may have changed:  additional instructions   Used for:  MDS (myelodysplastic syndrome) (H)        Dose:  25 mg   Take 1 capsule (25 mg) by mouth 2 times daily Take one 100 mg capsule and three 25 mg capsule by mouth twice daily. Total dose is 175mg twice daily   Quantity:  60 capsule   Refills:  3       * Notice:  This list has 2 medication(s) that are the same as other medications prescribed for you. Read the directions carefully, and ask your doctor or other care provider to review them with you.         Where to get your medicines      These medications were sent to [address] [address]    Hours:   TRANSPLANT PHONE NUMBER [phone] Phone:  [phone]     furosemide 20 MG tablet                Recent Review Flowsheet Data     BMT Recent Results Latest Ref Rng & Units 1/20/2017 2/1/2017 2/15/2017 2/21/2017 2/24/2017 3/8/2017 3/10/2017    WBC 4.0 - 11.0 10e9/L 8.9 10.5 8.4 11.9(H) 9.7 10.9 -    Hemoglobin 13.3 - 17.7 g/dL 16.0 14.5 14.0 13.9 13.0(L) 12.4(L) -    Platelet Count 150 - 450 10e9/L 114(L) 116(L) 126(L) 185 140(L) 195 -    Neutrophils (Absolute) 1.6 - 8.3 10e9/L 5.2 7.9 4.3 7.0 7.4 6.6 -    Sodium 133 - 144 mmol/L 141 139 139 140 139 142 142    Potassium 3.4 - 5.3 mmol/L 4.8 5.4(H) 4.2 4.2 4.9 4.2 4.0    Chloride 94 - 109 mmol/L 108 106 103 104 108 105 105    Glucose 70 - 99 mg/dL 92 118(H) 92 128(H) 89 99 93    Urea Nitrogen 7 - 30 mg/dL 26 38(H) 50(H) 43(H) 36(H) 36(H) 29    Creatinine 0.66 - 1.25 mg/dL 0.69 0.95 1.87(H) 1.37(H) 0.92 1.31(H) 0.99    Calcium (Total) 8.5 - 10.1 mg/dL 8.0(L) 8.6 8.7 8.6 8.3(L) 8.7 8.8    Protein (Total) 6.8 - 8.8 g/dL 6.0(L) 6.6(L) 6.4(L) 6.4(L) 6.2(L) 6.7(L) -    Albumin 3.4 - 5.0 g/dL 3.1(L) 3.4 3.5 3.5 3.2(L) 3.7 -    Alkaline Phosphatase 40 - 150 U/L 63 78 78 72 70 91 -    AST 0 - 45 U/L 28 30 30 23 36 22 -    ALT 0 - 70 U/L 38 36 32 30 30 31 -    MCV 78 - 100 fl 95 94 92 93 93 101(H) -               Primary Care Provider Office Phone # Fax #    Cole Duong [phone]       [place] [address] [address] [facility] [address]        Thank you!     Thank you for choosing [facility] BLOOD AND MARROW TRANSPLANT  for your care. Our goal is always to provide you with excellent care. Hearing back from our patients is one way we can continue to improve our services. Please take a few minutes to complete the written survey that you may receive in the mail after your visit with us. Thank you!             Your Updated Medication List - Protect others around you: Learn how to safely use, store and throw away your medicines at www.disposemymeds.org.           This list is accurate as of: 3/10/17  3:36 PM.  Always use your most recent med list.                   Brand Name Dispense Instructions for use    acyclovir 800 MG tablet    ZOVIRAX    60 tablet    Take 1 tablet (800 mg) by mouth 2 times daily       amLODIPine 5 MG tablet    NORVASC    30 tablet    Take 1 tablet (5 mg) by mouth daily HOLD       aspirin 81 MG EC tablet      Take 1 tablet (81 mg) by mouth daily       carboxymethylcellulose 0.5 % Soln ophthalmic solution    carboxymethylcellulose sodium    1 Bottle    Place 1 drop into both eyes 3 times daily as needed for dry eyes       * cycloSPORINE modified 100 MG capsule    GENERIC EQUIVALENT    120 capsule    Take one 100 mg capsule and three 25 mg capsule by mouth twice daily. Total dose is 175mg twice daily       * cycloSPORINE modified 25 MG capsule    GENERIC EQUIVALENT    60 capsule    Take 1 capsule (25 mg) by mouth 2 times daily Take one 100 mg capsule and three 25 mg capsule by mouth twice daily. Total dose is 175mg twice daily       fluconazole 100 MG tablet    DIFLUCAN    30 tablet    Take 1 tablet (100 mg) by mouth daily       furosemide 20 MG tablet    LASIX    30 tablet    Take 1 tablet (20 mg) by mouth daily       hydrocortisone 1 % cream    CORTAID    60 g    Apply to affected area three times daily for 14 days.       levofloxacin 250 MG tablet    LEVAQUIN    30 tablet    Take 1 tablet (250 mg) by mouth daily       levothyroxine 150 MCG tablet    SYNTHROID/LEVOTHROID    30 tablet    Take 1 tablet (150 mcg) by mouth daily       magnesium oxide 400 (241.3 MG) MG tablet    MAG-OX     Take 2 tablets daily       metoprolol 25 MG tablet    LOPRESSOR    60 tablet    Take 2 tablets (50 mg) by mouth 2 times daily       NITROGLYCERIN ER PO      Take by mouth as needed Reported on 3/8/2017       predniSONE 20 MG tablet    DELTASONE     Take 30 mg by mouth daily       ranitidine 150 MG tablet    ZANTAC    60 tablet    Take 1 tablet (150 mg) by mouth 2  times daily       rosuvastatin 5 MG tablet    CRESTOR    30 tablet    Take 1 tablet (5 mg) by mouth daily       sulfamethoxazole-trimethoprim 800-160 MG per tablet    BACTRIM DS/SEPTRA DS    30 tablet    Take 1 tablet by mouth 2 times daily       ursodiol 300 MG capsule    ACTIGALL    90 capsule    Take 1 capsule (300 mg) by mouth 3 times daily       * Notice:  This list has 2 medication(s) that are the same as other medications prescribed for you. Read the directions carefully, and ask your doctor or other care provider to review them with you.

## 2017-03-10 NOTE — PROGRESS NOTES
BMT Clinic Progress Note    CC: worsening LE swelling     HISTORY: Mr. 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  He is currently taking 30 mg of prednisone daily, decrased 3/8. Has very pronounced facial erythema and hyperpigmentation.  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 He, at Dr Enciso's advice, started using a 's type mask with a colored shield early March 2017.     Interim events: pt was seen this week Wednesday, since then he notes significantly worsening swelling to his bilateral LE. He feels the swelling up to his thigh, he denies scrotal swelling. No shortness of breath or new cough. Can lie down comfortably without SOB. Drinking lots and urinating maybe a little less during the day (3x/day) then frequently up at night to urinate. Historically he wakes up with resolution of edema but since Wednesday it is much worse and does not resolve after a night sleep with legs elevated. He denies any fevers, chills, n/v/d/c. No dysuria. No blood in urine, no dark urine.     REVIEW OF SYSTEMS: The remainder of the 10-point review of systems is negative except for the pronounced facial erythema which he says greatly worsens over the course of the day and improves overnight.      PHYSICAL EXAMINATION:     /76 (BP Location: Left arm, Patient Position: Chair, Cuff Size: Adult Large)  Pulse 90  Temp 98  F (36.7  C) (Oral)  Resp 18  Wt 118.7 kg (261 lb 9.6 oz)  SpO2 97%  BMI 35.98 kg/m2    GENERAL:  NAD. Pronounced hyperpigmentation and erythema of the face, floridly cushingoid.  There is miliary appearance to the rash as well.  His mouth had subtle lichenoid changes, but no open ulcerations.     LUNGS:  Clear to auscultation. No fluid sounds on either base  CARDIAC:  Without S3, rub or murmur.   ABDOMEN:  Nondistended, active bowel sounds, no organomegaly.   EXTREMITIES: 2+ pitting edema to knees      LABORATORY DATA:    Last Basic  Metabolic Panel:  Lab Results   Component Value Date     03/10/2017      Lab Results   Component Value Date    POTASSIUM 4.0 03/10/2017     Lab Results   Component Value Date    CHLORIDE 105 03/10/2017     Lab Results   Component Value Date    TRACE 8.8 03/10/2017     Lab Results   Component Value Date    CO2 26 03/10/2017     Lab Results   Component Value Date    BUN 29 03/10/2017     Lab Results   Component Value Date    CR 0.99 03/10/2017    CR 0.69 04/20/2016     Lab Results   Component Value Date    GLC 93 03/10/2017       ASSESSMENT AND PLAN:   1.  Three hundred one days status post non-myeloablative allo-sib transplant for myelodysplastic syndrome with no evidence of relapse.     2.  Chronic mxcmp-erucwq-vgvs disease.  The pronounced localization of the worst part of the rash to his face suggests that this is some factor other than chronic cdgwn-anqiqi-ahuz disease that is contributing to it.  In reviewing his meds it seems quite possible that voriconazole is implicated as it can cause photosensitization and he is exposed to bright light. Dr Enciso held voriconazole and switched to fluconazole 3/8 and steroids reduced to 30mg daily.    3.  LE edema: now 2+ pitting up to knees, pt feels edema to thighs. No scrotal swelling per pt. Cr. 0.9 gave 20mg IV lasix today, then continue on po 20mg daily. No dysuria, no dark urine  UA neg protein however + hyaline casts/RBCs. Should re-eval Monday    - BP Pressures very normal, norvasc had been decreased 3/8, ok to hold outright for now.      RTC Monday to monitor sx, Cr on lasix    Angela Bunch PAC  [phone]

## 2017-03-11 LAB
BACTERIA SPEC CULT: NO GROWTH
Lab: NORMAL
MICRO REPORT STATUS: NORMAL
SPECIMEN SOURCE: NORMAL

## 2017-03-13 ENCOUNTER — ONCOLOGY VISIT (OUTPATIENT)
Dept: TRANSPLANT | Facility: CLINIC | Age: 57
End: 2017-03-13
Attending: STUDENT IN AN ORGANIZED HEALTH CARE EDUCATION/TRAINING PROGRAM
Payer: COMMERCIAL

## 2017-03-13 VITALS
BODY MASS INDEX: 35.81 KG/M2 | OXYGEN SATURATION: 98 % | SYSTOLIC BLOOD PRESSURE: 127 MMHG | WEIGHT: 260.4 LBS | DIASTOLIC BLOOD PRESSURE: 90 MMHG | RESPIRATION RATE: 18 BRPM | HEART RATE: 106 BPM | TEMPERATURE: 97.7 F

## 2017-03-13 DIAGNOSIS — D46.22 RAEB-2 (REFRACTORY ANEMIA WITH EXCESS BLASTS-2) (H): Primary | ICD-10-CM

## 2017-03-13 LAB
ALBUMIN SERPL-MCNC: 3.7 G/DL (ref 3.4–5)
ALBUMIN UR-MCNC: NEGATIVE MG/DL
ALP SERPL-CCNC: 92 U/L (ref 40–150)
ALT SERPL W P-5'-P-CCNC: 27 U/L (ref 0–70)
ANION GAP SERPL CALCULATED.3IONS-SCNC: 11 MMOL/L (ref 3–14)
APPEARANCE UR: CLEAR
AST SERPL W P-5'-P-CCNC: 16 U/L (ref 0–45)
BASOPHILS # BLD AUTO: 0 10E9/L (ref 0–0.2)
BASOPHILS NFR BLD AUTO: 0.3 %
BILIRUB SERPL-MCNC: 0.8 MG/DL (ref 0.2–1.3)
BILIRUB UR QL STRIP: NEGATIVE
BUN SERPL-MCNC: 43 MG/DL (ref 7–30)
CALCIUM SERPL-MCNC: 8.9 MG/DL (ref 8.5–10.1)
CHLORIDE SERPL-SCNC: 104 MMOL/L (ref 94–109)
CO2 SERPL-SCNC: 26 MMOL/L (ref 20–32)
COLOR UR AUTO: YELLOW
CREAT SERPL-MCNC: 1.3 MG/DL (ref 0.66–1.25)
CYCLOSPORINE BLD LC/MS/MS-MCNC: 247 UG/L (ref 50–400)
DIFFERENTIAL METHOD BLD: ABNORMAL
EOSINOPHIL # BLD AUTO: 0 10E9/L (ref 0–0.7)
EOSINOPHIL NFR BLD AUTO: 0.3 %
ERYTHROCYTE [DISTWIDTH] IN BLOOD BY AUTOMATED COUNT: 19.1 % (ref 10–15)
GFR SERPL CREATININE-BSD FRML MDRD: 57 ML/MIN/1.7M2
GLUCOSE SERPL-MCNC: 96 MG/DL (ref 70–99)
GLUCOSE UR STRIP-MCNC: NEGATIVE MG/DL
HCT VFR BLD AUTO: 36.5 % (ref 40–53)
HGB BLD-MCNC: 12.6 G/DL (ref 13.3–17.7)
HGB UR QL STRIP: NEGATIVE
HYALINE CASTS #/AREA URNS LPF: 107 /LPF (ref 0–2)
IMM GRANULOCYTES # BLD: 0.3 10E9/L (ref 0–0.4)
IMM GRANULOCYTES NFR BLD: 2.3 %
KETONES UR STRIP-MCNC: NEGATIVE MG/DL
LEUKOCYTE ESTERASE UR QL STRIP: NEGATIVE
LYMPHOCYTES # BLD AUTO: 2.3 10E9/L (ref 0.8–5.3)
LYMPHOCYTES NFR BLD AUTO: 20.2 %
MCH RBC QN AUTO: 34.3 PG (ref 26.5–33)
MCHC RBC AUTO-ENTMCNC: 34.5 G/DL (ref 31.5–36.5)
MCV RBC AUTO: 100 FL (ref 78–100)
MONOCYTES # BLD AUTO: 0.9 10E9/L (ref 0–1.3)
MONOCYTES NFR BLD AUTO: 7.5 %
MUCOUS THREADS #/AREA URNS LPF: PRESENT /LPF
NEUTROPHILS # BLD AUTO: 8.1 10E9/L (ref 1.6–8.3)
NEUTROPHILS NFR BLD AUTO: 69.4 %
NITRATE UR QL: NEGATIVE
NRBC # BLD AUTO: 0 10*3/UL
NRBC BLD AUTO-RTO: 0 /100
PH UR STRIP: 5 PH (ref 5–7)
PLATELET # BLD AUTO: 189 10E9/L (ref 150–450)
POTASSIUM SERPL-SCNC: 4 MMOL/L (ref 3.4–5.3)
PROT SERPL-MCNC: 6.8 G/DL (ref 6.8–8.8)
RBC # BLD AUTO: 3.67 10E12/L (ref 4.4–5.9)
RBC #/AREA URNS AUTO: 1 /HPF (ref 0–2)
SODIUM SERPL-SCNC: 141 MMOL/L (ref 133–144)
SP GR UR STRIP: 1.02 (ref 1–1.03)
SQUAMOUS #/AREA URNS AUTO: 1 /HPF (ref 0–1)
TME LAST DOSE: NORMAL H
URN SPEC COLLECT METH UR: ABNORMAL
UROBILINOGEN UR STRIP-MCNC: 0 MG/DL (ref 0–2)
WBC # BLD AUTO: 11.6 10E9/L (ref 4–11)
WBC #/AREA URNS AUTO: 4 /HPF (ref 0–2)

## 2017-03-13 PROCEDURE — 85025 COMPLETE CBC W/AUTO DIFF WBC: CPT | Performed by: STUDENT IN AN ORGANIZED HEALTH CARE EDUCATION/TRAINING PROGRAM

## 2017-03-13 PROCEDURE — 36415 COLL VENOUS BLD VENIPUNCTURE: CPT

## 2017-03-13 PROCEDURE — 99212 OFFICE O/P EST SF 10 MIN: CPT

## 2017-03-13 PROCEDURE — 80158 DRUG ASSAY CYCLOSPORINE: CPT | Performed by: STUDENT IN AN ORGANIZED HEALTH CARE EDUCATION/TRAINING PROGRAM

## 2017-03-13 PROCEDURE — 81001 URINALYSIS AUTO W/SCOPE: CPT | Performed by: STUDENT IN AN ORGANIZED HEALTH CARE EDUCATION/TRAINING PROGRAM

## 2017-03-13 PROCEDURE — 80053 COMPREHEN METABOLIC PANEL: CPT | Performed by: STUDENT IN AN ORGANIZED HEALTH CARE EDUCATION/TRAINING PROGRAM

## 2017-03-13 ASSESSMENT — PAIN SCALES - GENERAL: PAINLEVEL: MODERATE PAIN (4)

## 2017-03-13 NOTE — MR AVS SNAPSHOT
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  After hours call the   hospital at [phone] or   [phone]. Ask for the BMT   fellow for pediatric or adult patients            Follow-ups after your visit        Your next 10 appointments already scheduled     Mar 29, 2017  3:30 PM CDT   [facility] with  [facility]   [facility] ([facility])    [address] [place] [address] [id number] [phone]            Mar 29, 2017  4:00 PM CDT   Return with Uli Enciso MD   [facility] Blood and Marrow Transplant ([facility])    [address] [place] [address] [id number] [phone]            Apr 11, 2017  3:15 PM CDT   (Arrive by 3:00 PM)   New Patient Visit with Praveen Nation MD   [facility] Dermatology ([facility])    [address] [place] [address] [id number] [phone]              Future tests that were ordered for you today     Open Future Orders        Priority Expected Expires Ordered    CBC with platelets differential Routine 3/20/2017 3/31/2017 3/14/2017    Comprehensive metabolic panel Routine 3/20/2017 3/31/2017 3/14/2017            Who to contact     If you have questions or need follow up information about today's clinic visit or your schedule please contact [facility]  [facility] BLOOD AND MARROW TRANSPLANT directly at [phone].  Normal or non-critical lab and imaging results will be communicated to you by Skoodathart, letter or phone within 4 business days after the clinic has received the results. If you do not hear from us within 7 days, please contact the clinic through [employer] or phone. If you have a critical or abnormal lab result, we will notify you by phone as soon as possible.  Submit refill requests through EyeEm or call your pharmacy and they will forward the refill request to us. Please allow 3 business days for your refill to be completed.          Additional Information About Your Visit        EyeEm Information     EyeEm gives you secure access to your electronic health record. If you see a primary care provider, you can also send messages to your care team and make appointments. If you have questions, please call your primary care clinic.  If you do not have a primary care provider, please call [phone] and they will assist you.        Care EveryWhere ID     This is your Care EveryWhere ID. This could be used by other organizations to access your [place] medical records  [id number]        Your Vitals Were     Pulse Temperature Respirations Pulse Oximetry BMI (Body Mass Index)       106 97.7  F (36.5  C) (Oral) 18 98% 35.81 kg/m2        Blood Pressure from Last 3 Encounters:   03/13/17 127/90   03/10/17 111/76   03/08/17 126/82    Weight from Last 3 Encounters:   03/13/17 118.1 kg (260 lb 6.4 oz)   03/10/17 118.7 kg (261 lb 9.6 oz)   03/08/17 118.8 kg (261 lb 14.4 oz)              We Performed the Following     CBC with platelets differential     Comprehensive metabolic panel     Cyclosporine     Routine UA with Microscopic - NOW          Today's Medication Changes          These changes are accurate as of: 3/13/17 11:59 PM.  If you have any questions, ask your nurse or doctor.               These medicines have changed or have updated prescriptions.         Dose/Directions    * cycloSPORINE modified 100 MG capsule   Commonly known as:  GENERIC EQUIVALENT   This may have changed:  Another medication with the same name was changed. Make sure you understand how and when to take each.   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 Total dose is 175mg twice daily   Quantity:  60 capsule   Refills:  3       * Notice:  This list has 2 medication(s) that are the same as other medications prescribed for you. Read the directions carefully, and ask your doctor or other care provider to review them with you.             Recent Review Flowsheet Data     BMT Recent Results Latest Ref Rng & Units 2/1/2017 2/15/2017 2/21/2017 2/24/2017 3/8/2017 3/10/2017 3/13/2017    WBC 4.0 - 11.0 10e9/L 10.5 8.4 11.9(H) 9.7 10.9 - 11.6(H)    Hemoglobin 13.3 - 17.7 g/dL 14.5 14.0 13.9 13.0(L) 12.4(L) - 12.6(L)    Platelet Count 150 - 450 10e9/L 116(L) 126(L) 185 140(L) 195 - 189    Neutrophils (Absolute) 1.6 - 8.3 10e9/L 7.9 4.3 7.0 7.4 6.6 - 8.1    Sodium 133 - 144 mmol/L 139 139 140 139 142 142 141    Potassium 3.4 - 5.3 mmol/L 5.4(H) 4.2 4.2 4.9 4.2 4.0 4.0    Chloride 94 - 109 mmol/L 106 103 104 108 105 105 104    Glucose 70 - 99 mg/dL 118(H) 92 128(H) 89 99 93 96    Urea Nitrogen 7 - 30 mg/dL 38(H) 50(H) 43(H) 36(H) 36(H) 29 43(H)    Creatinine 0.66 - 1.25 mg/dL 0.95 1.87(H) 1.37(H) 0.92 1.31(H) 0.99 1.30(H)    Calcium (Total) 8.5 - 10.1 mg/dL 8.6 8.7 8.6 8.3(L) 8.7 8.8 8.9    Protein (Total) 6.8 - 8.8 g/dL 6.6(L) 6.4(L) 6.4(L) 6.2(L) 6.7(L) - 6.8    Albumin  3.4 - 5.0 g/dL 3.4 3.5 3.5 3.2(L) 3.7 - 3.7    Alkaline Phosphatase 40 - 150 U/L 78 78 72 70 91 - 92    AST 0 - 45 U/L 30 30 23 36 22 - 16    ALT 0 - 70 U/L 36 32 30 30 31 - 27    MCV 78 - 100 fl 94 92 93 93 101(H) - 100               Primary Care Provider Office Phone # Fax #    Cole Duong [phone]       [facility] [address] [facility] [address]        Thank you!     Thank you for choosing [facility] BLOOD AND MARROW TRANSPLANT  for your care. Our goal is always to provide you with excellent care. Hearing back from our patients is one way we can continue to improve our services. Please take a few minutes to complete the written survey that you may receive in the mail after your visit with us. Thank you!             Your Updated Medication List - Protect others around you: Learn how to safely use, store and throw away your medicines at www.disposemymeds.org.          This list is accurate as of: 3/13/17 11:59 PM.  Always use your most recent med list.                   Brand Name Dispense Instructions for use    acyclovir 800 MG tablet    ZOVIRAX    60 tablet    Take 1 tablet (800 mg) by mouth 2 times daily       amLODIPine 5 MG tablet    NORVASC    30 tablet    Take 1 tablet (5 mg) by mouth daily HOLD       aspirin 81 MG EC tablet      Take 1 tablet (81 mg) by mouth daily       carboxymethylcellulose 0.5 % Soln ophthalmic solution    carboxymethylcellulose sodium    1 Bottle    Place 1 drop into both eyes 3 times daily as needed for dry eyes       * cycloSPORINE modified 100 MG capsule    GENERIC EQUIVALENT    120 capsule    150 mg 2 times daily       * cycloSPORINE modified 25 MG capsule    GENERIC EQUIVALENT    60 capsule    Take 1 capsule (25 mg) by mouth 2 times daily Take one 100 mg capsule and three 25 mg capsule by mouth twice daily. Total dose is 175mg twice daily       fluconazole 100 MG tablet    DIFLUCAN    30 tablet    Take 1 tablet (100 mg) by mouth daily        furosemide 20 MG tablet    LASIX    30 tablet    Take 1 tablet (20 mg) by mouth daily       hydrocortisone 1 % cream    CORTAID    60 g    Apply to affected area three times daily for 14 days.       levofloxacin 250 MG tablet    LEVAQUIN    30 tablet    Take 1 tablet (250 mg) by mouth daily       levothyroxine 150 MCG tablet    SYNTHROID/LEVOTHROID    30 tablet    Take 1 tablet (150 mcg) by mouth daily       magnesium oxide 400 (241.3 MG) MG tablet    MAG-OX     Take 2 tablets daily       metoprolol 25 MG tablet    LOPRESSOR    60 tablet    Take 2 tablets (50 mg) by mouth 2 times daily       NITROGLYCERIN ER PO      Take by mouth as needed Reported on 3/8/2017       predniSONE 20 MG tablet    DELTASONE     Take 30 mg by mouth daily       ranitidine 150 MG tablet    ZANTAC    60 tablet    Take 1 tablet (150 mg) by mouth 2 times daily       rosuvastatin 5 MG tablet    CRESTOR    30 tablet    Take 1 tablet (5 mg) by mouth daily       sulfamethoxazole-trimethoprim 800-160 MG per tablet    BACTRIM DS/SEPTRA DS    30 tablet    Take 1 tablet by mouth 2 times daily       ursodiol 300 MG capsule    ACTIGALL    90 capsule    Take 1 capsule (300 mg) by mouth 3 times daily       * Notice:  This list has 2 medication(s) that are the same as other medications prescribed for you. Read the directions carefully, and ask your doctor or other care provider to review them with you.

## 2017-03-13 NOTE — PROGRESS NOTES
BMT Clinic Progress Note    CC: worsening LE swelling     HISTORY: Mr. 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  He is currently taking 30 mg of prednisone daily, decrased 3/8. Has very pronounced facial erythema and hyperpigmentation.  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 He, at Dr Enciso's advice, started using a 's type mask with a colored shield early March 2017.     Interim events: Byron is here for follow up of worsening swelling to LE. His edema is much improved after the last few days on lasix. He still has no cough or shortness of breath. Eating well without n/v/d/c. No dysuria or dark blood no blood in urine. Otherwise feeling well.       REVIEW OF SYSTEMS: The remainder of the 10-point review of systems is negative except for the pronounced facial erythema which he says greatly worsens over the course of the day and improves overnight.      PHYSICAL EXAMINATION:     /90 (BP Location: Right arm, Patient Position: Chair, Cuff Size: Adult Large)  Pulse 106  Temp 97.7  F (36.5  C) (Oral)  Resp 18  Wt 118.1 kg (260 lb 6.4 oz)  SpO2 98%  BMI 35.81 kg/m2    GENERAL:  NAD. Pronounced hyperpigmentation and erythema of the face, floridly cushingoid. His mouth had subtle lichenoid changes, but no open ulcerations.     LUNGS:  Clear to auscultation. No fluid sounds on either base  CARDIAC:  Without S3, rub or murmur.   ABDOMEN:  Nondistended, active bowel sounds,  EXTREMITIES: pre-tibial 1+ pitting edema to ankles       LABORATORY DATA:    CBC RESULTS:   Recent Labs   Lab Test  03/13/17   1618   WBC  11.6*   RBC  3.67*   HGB  12.6*   HCT  36.5*   MCV  100   MCH  34.3*   MCHC  34.5   RDW  19.1*   PLT  189     Last Basic Metabolic Panel:  Lab Results   Component Value Date     03/13/2017      Lab Results   Component Value Date    POTASSIUM 4.0 03/13/2017     Lab Results   Component Value Date    CHLORIDE 104 03/13/2017      Lab Results   Component Value Date    TRACE 8.9 03/13/2017     Lab Results   Component Value Date    CO2 26 03/13/2017     Lab Results   Component Value Date    BUN 43 03/13/2017     Lab Results   Component Value Date    CR 1.30 03/13/2017    CR 0.69 04/20/2016     Lab Results   Component Value Date    GLC 96 03/13/2017         ASSESSMENT AND PLAN:   1.  Three hundred one days status post non-myeloablative allo-sib transplant for myelodysplastic syndrome with no evidence of relapse.     2.  Chronic hdmdj-yqjqwo-tgtb disease.  The pronounced localization of the worst part of the rash to his face suggests that this is some factor other than chronic vaywo-andvdh-kghc disease that is contributing to it.  In reviewing his meds it seems quite possible that voriconazole is implicated as it can cause photosensitization and he is exposed to bright light. Dr Enciso held voriconazole and switched to fluconazole 3/8 and steroids reduced to 30mg daily.    3.  LE edema: 3/10 2+ pitting up to knees, pt feels edema to thighs. No scrotal swelling per pt. Cr. 0.9 gave 20mg IV lasix today, then continue on po 20mg daily. No dysuria, no dark urine. Recheck 3/13 Significant improvement of edema. Cr up to 1.3, pt told to use QOD and f/u next week.  UA neg protein however + hyaline casts/RBCs. WNL again today 3/13  - BP Pressures very normal, norvasc had been decreased 3/8, ok to hold outright for now.      RTC Decrease lasix to 20 mg QOD, RTC next week for recheck Cr and edema to possible d/c lasix    Angela Bunch PAC  [phone]

## 2017-03-13 NOTE — NURSING NOTE
BMT Heme Malignancy Rooming Note    Byron Russo - 3/13/2017 4:24 PM     Chief Complaint   Patient presents with     RECHECK     Patient with MDS here for provider visit and lab draw         /90 (BP Location: Right arm, Patient Position: Chair, Cuff Size: Adult Large)  Pulse 106  Temp 97.7  F (36.5  C) (Oral)  Resp 18  Wt 118.1 kg (260 lb 6.4 oz)  SpO2 98%  BMI 35.81 kg/m2     Medications reviewed: Yes    Labs drawn: Yes    Drawn by: Clinic Staff  Via: venipuncture  See Doc Flowsheet for details.      Dressing changed: No     Medications given: No    Staff time:5 minutes     Additional information if applicable:      Johanny Moser CMA

## 2017-03-21 ENCOUNTER — ONCOLOGY VISIT (OUTPATIENT)
Dept: TRANSPLANT | Facility: CLINIC | Age: 57
End: 2017-03-21
Attending: STUDENT IN AN ORGANIZED HEALTH CARE EDUCATION/TRAINING PROGRAM
Payer: COMMERCIAL

## 2017-03-21 VITALS
SYSTOLIC BLOOD PRESSURE: 139 MMHG | BODY MASS INDEX: 36.03 KG/M2 | RESPIRATION RATE: 16 BRPM | DIASTOLIC BLOOD PRESSURE: 92 MMHG | HEART RATE: 108 BPM | TEMPERATURE: 98.4 F | WEIGHT: 262 LBS | OXYGEN SATURATION: 97 %

## 2017-03-21 DIAGNOSIS — D46.22 RAEB-2 (REFRACTORY ANEMIA WITH EXCESS BLASTS-2) (H): ICD-10-CM

## 2017-03-21 DIAGNOSIS — C92.01 ACUTE MYELOID LEUKEMIA IN REMISSION (H): Primary | ICD-10-CM

## 2017-03-21 LAB
ALBUMIN SERPL-MCNC: 3.8 G/DL (ref 3.4–5)
ALP SERPL-CCNC: 97 U/L (ref 40–150)
ALT SERPL W P-5'-P-CCNC: 24 U/L (ref 0–70)
ANION GAP SERPL CALCULATED.3IONS-SCNC: 10 MMOL/L (ref 3–14)
AST SERPL W P-5'-P-CCNC: 19 U/L (ref 0–45)
BASOPHILS # BLD AUTO: 0 10E9/L (ref 0–0.2)
BASOPHILS NFR BLD AUTO: 0.4 %
BILIRUB SERPL-MCNC: 0.7 MG/DL (ref 0.2–1.3)
BUN SERPL-MCNC: 37 MG/DL (ref 7–30)
CALCIUM SERPL-MCNC: 8.8 MG/DL (ref 8.5–10.1)
CHLORIDE SERPL-SCNC: 107 MMOL/L (ref 94–109)
CO2 SERPL-SCNC: 26 MMOL/L (ref 20–32)
CREAT SERPL-MCNC: 1.07 MG/DL (ref 0.66–1.25)
DIFFERENTIAL METHOD BLD: ABNORMAL
EOSINOPHIL # BLD AUTO: 0 10E9/L (ref 0–0.7)
EOSINOPHIL NFR BLD AUTO: 0.2 %
ERYTHROCYTE [DISTWIDTH] IN BLOOD BY AUTOMATED COUNT: 18.5 % (ref 10–15)
GFR SERPL CREATININE-BSD FRML MDRD: 72 ML/MIN/1.7M2
GLUCOSE SERPL-MCNC: 89 MG/DL (ref 70–99)
HCT VFR BLD AUTO: 34.7 % (ref 40–53)
HGB BLD-MCNC: 12 G/DL (ref 13.3–17.7)
IMM GRANULOCYTES # BLD: 0.2 10E9/L (ref 0–0.4)
IMM GRANULOCYTES NFR BLD: 2.3 %
LYMPHOCYTES # BLD AUTO: 1.9 10E9/L (ref 0.8–5.3)
LYMPHOCYTES NFR BLD AUTO: 18.2 %
MCH RBC QN AUTO: 35.7 PG (ref 26.5–33)
MCHC RBC AUTO-ENTMCNC: 34.6 G/DL (ref 31.5–36.5)
MCV RBC AUTO: 103 FL (ref 78–100)
MONOCYTES # BLD AUTO: 1 10E9/L (ref 0–1.3)
MONOCYTES NFR BLD AUTO: 10.1 %
NEUTROPHILS # BLD AUTO: 7 10E9/L (ref 1.6–8.3)
NEUTROPHILS NFR BLD AUTO: 68.8 %
NRBC # BLD AUTO: 0 10*3/UL
NRBC BLD AUTO-RTO: 0 /100
PLATELET # BLD AUTO: 225 10E9/L (ref 150–450)
POTASSIUM SERPL-SCNC: 4.1 MMOL/L (ref 3.4–5.3)
PROT SERPL-MCNC: 6.8 G/DL (ref 6.8–8.8)
RBC # BLD AUTO: 3.36 10E12/L (ref 4.4–5.9)
SODIUM SERPL-SCNC: 143 MMOL/L (ref 133–144)
WBC # BLD AUTO: 10.1 10E9/L (ref 4–11)

## 2017-03-21 PROCEDURE — 80158 DRUG ASSAY CYCLOSPORINE: CPT | Performed by: STUDENT IN AN ORGANIZED HEALTH CARE EDUCATION/TRAINING PROGRAM

## 2017-03-21 PROCEDURE — 36415 COLL VENOUS BLD VENIPUNCTURE: CPT

## 2017-03-21 PROCEDURE — 80053 COMPREHEN METABOLIC PANEL: CPT | Performed by: STUDENT IN AN ORGANIZED HEALTH CARE EDUCATION/TRAINING PROGRAM

## 2017-03-21 PROCEDURE — 85025 COMPLETE CBC W/AUTO DIFF WBC: CPT | Performed by: STUDENT IN AN ORGANIZED HEALTH CARE EDUCATION/TRAINING PROGRAM

## 2017-03-21 PROCEDURE — 99213 OFFICE O/P EST LOW 20 MIN: CPT | Mod: ZF

## 2017-03-21 NOTE — NURSING NOTE
Chief Complaint   Patient presents with     Blood Draw     Vitals done and labs drawn peripherally from left arm.    Brittany Welsh, KEIN

## 2017-03-21 NOTE — PROGRESS NOTES
BMT Clinic Progress Note        HISTORY: Mr. 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  He is currently taking 30 mg of prednisone daily, decrased 3/8. Has very pronounced facial erythema and hyperpigmentation.  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 He, at Dr Enciso's advice, started using a 's type mask with a colored shield early March 2017.     Interim events: Byron is here for follow up of lower leg swelling. Not as good response this week trying to go to QOD lasix. He is hopeful to go down in his steroids. He feels his face rash is stable to improved, dry skin only now. He reports no fevers, no cough or shortness of breath. Eating well without n/v/d/c. No dysuria or dark blood no blood in urine. Otherwise feeling well.        REVIEW OF SYSTEMS: The remainder of the 10-point review of systems is negative except for the pronounced facial erythema which he says greatly worsens over the course of the day and improves overnight.      PHYSICAL EXAMINATION:     BP (!) 139/92  Pulse 108  Temp 98.4  F (36.9  C) (Oral)  Resp 16  Wt 118.8 kg (262 lb)  SpO2 97%  BMI 36.03 kg/m2    GENERAL:  NAD. Pronounced hyperpigmentation and erythema of the face, floridly cushingoid. His mouth had subtle lichenoid changes, but no open ulcerations.     LUNGS:  Clear to auscultation. No fluid sounds on either base  CARDIAC:  Without S3, rub or murmur.   ABDOMEN:  Nondistended, active bowel sounds,  EXTREMITIES: pre-tibial trace edema, 1+ to ankles     LABORATORY DATA:    CBC RESULTS:   Recent Labs   Lab Test  03/21/17   1621   WBC  10.1   RBC  3.36*   HGB  12.0*   HCT  34.7*   MCV  103*   MCH  35.7*   MCHC  34.6   RDW  18.5*   PLT  225     Last Basic Metabolic Panel:  Lab Results   Component Value Date     03/21/2017      Lab Results   Component Value Date    POTASSIUM 4.1 03/21/2017     Lab Results   Component Value Date    CHLORIDE 107  03/21/2017     Lab Results   Component Value Date    TRACE 8.8 03/21/2017     Lab Results   Component Value Date    CO2 26 03/21/2017     Lab Results   Component Value Date    BUN 37 03/21/2017     Lab Results   Component Value Date    CR 1.07 03/21/2017     Lab Results   Component Value Date    GLC 89 03/21/2017         ASSESSMENT AND PLAN:   1.  Three hundred one days status post non-myeloablative allo-sib transplant for myelodysplastic syndrome with no evidence of relapse.     2.  Chronic bhvrb-wieczb-ctuj disease.  The pronounced localization of the worst part of the rash to his face suggests that this is some factor other than chronic lknhx-pidrzd-tqxy disease that is contributing to it.  In reviewing his meds it seems quite possible that voriconazole is implicated as it can cause photosensitization and he is exposed to bright light. Dr Enciso held voriconazole and switched to fluconazole 3/8 and steroids reduced to 30mg daily.    3.  LE edema: 3/10 2+ pitting up to knees, pt feels edema to thighs. No scrotal swelling per pt. Cr. 0.9 gave 20mg IV lasix today, then continue on po 20mg daily. No dysuria, no dark urine. 3/13 Significant improvement of edema. 3/21 Less response on 20mg QOD, would like to return to qday lasix.   UA neg protein however + hyaline casts/RBCs. WNL again 3/13  - BP Pressures very normal, norvasc had been decreased 3/8, ok to hold outright for now.    Plan: Ok to use lasix q day and return next week for labs, pt will try to skip a day when able  RTC 3/29 with Dr Fernie Bunch PAC  [phone]

## 2017-03-21 NOTE — NURSING NOTE
BMT Heme Malignancy Rooming Note    Byron Russo - 3/21/2017 4:28 PM     Chief Complaint   Patient presents with     Blood Draw     RECHECK     patient here with MDS - here for provider visit follow up        BP (!) 139/92  Pulse 108  Temp 98.4  F (36.9  C) (Oral)  Resp 16  Wt 118.8 kg (262 lb)  SpO2 97%  BMI 36.03 kg/m2     Medications reviewed: Yes    Labs drawn: No    Dressing changed: No     Medications given: No    Staff time:8 minutes    Additional information if applicable: Patient states that his swelling is not going down    Maranda Barclay MA

## 2017-03-21 NOTE — MR AVS SNAPSHOT
After Visit Summary   3/21/2017    Byron Russo    MRN: [id number]           Patient Information     Date Of Birth          1960        Visit Information        Provider Department      3/21/2017 4:30 PM  BMT BRENDA #4 [facility] Blood and Marrow Transplant        Today's Diagnoses     Acute myeloid leukemia in remission (H)    -  1          [facility] ([facility])  [address] [place] [address]  Phone: [phone]  Clinic Hours:   Monday-Friday:    8am to 4:30pm   Weekends and holidays:    8am to noon (in general)  If your fever is 100.5  or greater,   call the clinic.  After hours call the   hospital at [phone] or   [phone]. Ask for the BMT   fellow for pediatric or adult patients            Follow-ups after your visit        Your next 10 appointments already scheduled     Mar 29, 2017  3:30 PM CDT   Masonic Lab Draw with  MASONIC LAB DRAW   [facility] Masonic Lab Draw ([facility])    [address] [place] [address]  2nd [facility] [id number] [phone]            Mar 29, 2017  4:00 PM CDT   Return with Uli Enciso MD   [facility] Blood and Marrow Transplant ([facility])    [address] [place] [address]  2nd [facility] [id number] [phone]            Apr 11, 2017  3:15 PM CDT   (Arrive by 3:00 PM)   New Patient Visit with Praveen Nation MD   [facility] Dermatology ([facility])    [address] [place] [address]  3rd [facility] [id number] [phone]              Future tests that were ordered for you today     Open Future Orders        Priority Expected Expires Ordered    Cyclosporine Routine 3/29/2017 4/7/2017 3/22/2017    Comprehensive metabolic panel Routine 3/29/2017 4/7/2017 3/22/2017    CBC with platelets differential Routine 3/29/2017 4/7/2017 3/22/2017            Who to contact     If you have questions or need follow up information about today's clinic visit  or your schedule please contact [facility] BLOOD AND MARROW TRANSPLANT directly at [phone].  Normal or non-critical lab and imaging results will be communicated to you by Crambuhart, letter or phone within 4 business days after the clinic has received the results. If you do not hear from us within 7 days, please contact the clinic through Equiomt or phone. If you have a critical or abnormal lab result, we will notify you by phone as soon as possible.  Submit refill requests through 3DSoC or call your pharmacy and they will forward the refill request to us. Please allow 3 business days for your refill to be completed.          Additional Information About Your Visit        3DSoC Information     3DSoC gives you secure access to your electronic health record. If you see a primary care provider, you can also send messages to your care team and make appointments. If you have questions, please call your primary care clinic.  If you do not have a primary care provider, please call [phone] and they will assist you.        Care EveryWhere ID     This is your Care EveryWhere ID. This could be used by other organizations to access your [place] medical records  [id number]        Your Vitals Were     Pulse Temperature Respirations Pulse Oximetry BMI (Body Mass Index)       108 98.4  F (36.9  C) (Oral) 16 97% 36.03 kg/m2        Blood Pressure from Last 3 Encounters:   03/21/17 (!) 139/92   03/13/17 127/90   03/10/17 111/76    Weight from Last 3 Encounters:   03/21/17 118.8 kg (262 lb)   03/13/17 118.1 kg (260 lb 6.4 oz)   03/10/17 118.7 kg (261 lb 9.6 oz)                 Today's Medication Changes          These changes are accurate as of: 3/21/17 11:59 PM.  If you have any questions, ask your nurse or doctor.               These medicines have changed or have updated prescriptions.        Dose/Directions    * cycloSPORINE modified 100 MG capsule   Commonly known as:  GENERIC EQUIVALENT   This may have changed:   Another medication with the same name was changed. Make sure you understand how and when to take each.   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 Read the directions carefully, and ask your doctor or other care provider to review them with you.             Recent Review Flowsheet Data     BMT Recent Results Latest Ref Rng & Units 2/15/2017 2/21/2017 2/24/2017 3/8/2017 3/10/2017 3/13/2017 3/21/2017    WBC 4.0 - 11.0 10e9/L 8.4 11.9(H) 9.7 10.9 - 11.6(H) 10.1    Hemoglobin 13.3 - 17.7 g/dL 14.0 13.9 13.0(L) 12.4(L) - 12.6(L) 12.0(L)    Platelet Count 150 - 450 10e9/L 126(L) 185 140(L) 195 - 189 225    Neutrophils (Absolute) 1.6 - 8.3 10e9/L 4.3 7.0 7.4 6.6 - 8.1 7.0    Sodium 133 - 144 mmol/L 139 140 139 142 142 141 143    Potassium 3.4 - 5.3 mmol/L 4.2 4.2 4.9 4.2 4.0 4.0 4.1    Chloride 94 - 109 mmol/L 103 104 108 105 105 104 107    Glucose 70 - 99 mg/dL 92 128(H) 89 99 93 96 89    Urea Nitrogen 7 - 30 mg/dL 50(H) 43(H) 36(H) 36(H) 29 43(H) 37(H)    Creatinine 0.66 - 1.25 mg/dL 1.87(H) 1.37(H) 0.92 1.31(H) 0.99 1.30(H) 1.07    Calcium (Total)  8.5 - 10.1 mg/dL 8.7 8.6 8.3(L) 8.7 8.8 8.9 8.8    Protein (Total) 6.8 - 8.8 g/dL 6.4(L) 6.4(L) 6.2(L) 6.7(L) - 6.8 6.8    Albumin 3.4 - 5.0 g/dL 3.5 3.5 3.2(L) 3.7 - 3.7 3.8    Alkaline Phosphatase 40 - 150 U/L 78 72 70 91 - 92 97    AST 0 - 45 U/L 30 23 36 22 - 16 19    ALT 0 - 70 U/L 32 30 30 31 - 27 24    MCV 78 - 100 fl 92 93 93 101(H) - 100 103(H)               Primary Care Provider Office Phone # Fax #    Cole Duong [phone]       [address] [address]        Thank you!     Thank you for choosing [facility] BLOOD AND MARROW TRANSPLANT  for your care. Our goal is always to provide you with excellent care. Hearing back from our patients is one way we can continue to improve our services. Please take a few minutes to complete the written survey that you may receive in the mail after your visit with us. Thank you!             Your Updated Medication List - Protect others around you: Learn how to safely use, store and throw away your medicines at www.disposemymeds.org.          This list is accurate as of: 3/21/17 11:59 PM.  Always use your most recent med list.                   Brand Name Dispense Instructions for use    acyclovir 800 MG tablet    ZOVIRAX    60 tablet    Take 1 tablet (800 mg) by mouth 2 times daily       amLODIPine 5 MG tablet    NORVASC    30 tablet    Take 5 mg by mouth daily       aspirin 81 MG EC tablet      Take 1 tablet (81 mg) by mouth daily       carboxymethylcellulose 0.5 % Soln ophthalmic solution    carboxymethylcellulose sodium    1 Bottle    Place 1 drop into both eyes 3 times daily as needed for dry eyes       * cycloSPORINE modified 100 MG capsule    GENERIC EQUIVALENT    120 capsule    150 mg 2 times daily       * cycloSPORINE modified 25 MG capsule    GENERIC EQUIVALENT    60 capsule    Take 1 capsule (25 mg) by mouth 2 times daily Take one 100 mg capsule and three 25 mg capsule by mouth twice daily. Total dose is 175mg  twice daily       fluconazole 100 MG tablet    DIFLUCAN    30 tablet    Take 1 tablet (100 mg) by mouth daily       furosemide 20 MG tablet    LASIX    30 tablet    Take 1 tablet (20 mg) by mouth daily       hydrocortisone 1 % cream    CORTAID    60 g    Apply to affected area three times daily for 14 days.       levofloxacin 250 MG tablet    LEVAQUIN    30 tablet    Take 1 tablet (250 mg) by mouth daily       levothyroxine 150 MCG tablet    SYNTHROID/LEVOTHROID    30 tablet    Take 1 tablet (150 mcg) by mouth daily       magnesium oxide 400 (241.3 MG) MG tablet    MAG-OX     Take 2 tablets daily       metoprolol 25 MG tablet    LOPRESSOR    60 tablet    Take 2 tablets (50 mg) by mouth 2 times daily       NITROGLYCERIN ER PO      Take by mouth as needed Reported on 3/8/2017       predniSONE 20 MG tablet    DELTASONE     Take 30 mg by mouth daily       ranitidine 150 MG tablet    ZANTAC    60 tablet    Take 1 tablet (150 mg) by mouth 2 times daily       rosuvastatin 5 MG tablet    CRESTOR    30 tablet    Take 1 tablet (5 mg) by mouth daily       sulfamethoxazole-trimethoprim 800-160 MG per tablet    BACTRIM DS/SEPTRA DS    30 tablet    Take 1 tablet by mouth 2 times daily       ursodiol 300 MG capsule    ACTIGALL    90 capsule    Take 1 capsule (300 mg) by mouth 3 times daily       * Notice:  This list has 2 medication(s) that are the same as other medications prescribed for you. Read the directions carefully, and ask your doctor or other care provider to review them with you.

## 2017-03-22 LAB
CYCLOSPORINE BLD LC/MS/MS-MCNC: 176 UG/L (ref 50–400)
TME LAST DOSE: NORMAL H

## 2017-03-29 ENCOUNTER — ONCOLOGY VISIT (OUTPATIENT)
Dept: TRANSPLANT | Facility: CLINIC | Age: 57
End: 2017-03-29
Attending: INTERNAL MEDICINE
Payer: COMMERCIAL

## 2017-03-29 ENCOUNTER — APPOINTMENT (OUTPATIENT)
Dept: LAB | Facility: CLINIC | Age: 57
End: 2017-03-29
Attending: INTERNAL MEDICINE
Payer: COMMERCIAL

## 2017-03-29 VITALS
RESPIRATION RATE: 16 BRPM | BODY MASS INDEX: 36.31 KG/M2 | TEMPERATURE: 96.3 F | HEART RATE: 92 BPM | DIASTOLIC BLOOD PRESSURE: 82 MMHG | SYSTOLIC BLOOD PRESSURE: 123 MMHG | WEIGHT: 264 LBS | OXYGEN SATURATION: 97 %

## 2017-03-29 DIAGNOSIS — C92.01 ACUTE MYELOID LEUKEMIA IN REMISSION (H): ICD-10-CM

## 2017-03-29 LAB
ALBUMIN SERPL-MCNC: 3.7 G/DL (ref 3.4–5)
ALP SERPL-CCNC: 96 U/L (ref 40–150)
ALT SERPL W P-5'-P-CCNC: 27 U/L (ref 0–70)
ANION GAP SERPL CALCULATED.3IONS-SCNC: 8 MMOL/L (ref 3–14)
AST SERPL W P-5'-P-CCNC: 16 U/L (ref 0–45)
BASOPHILS # BLD AUTO: 0.1 10E9/L (ref 0–0.2)
BASOPHILS NFR BLD AUTO: 0.5 %
BILIRUB SERPL-MCNC: 0.6 MG/DL (ref 0.2–1.3)
BUN SERPL-MCNC: 35 MG/DL (ref 7–30)
CALCIUM SERPL-MCNC: 8.7 MG/DL (ref 8.5–10.1)
CHLORIDE SERPL-SCNC: 106 MMOL/L (ref 94–109)
CO2 SERPL-SCNC: 27 MMOL/L (ref 20–32)
CREAT SERPL-MCNC: 1.23 MG/DL (ref 0.66–1.25)
CYCLOSPORINE BLD LC/MS/MS-MCNC: 127 UG/L (ref 50–400)
DIFFERENTIAL METHOD BLD: ABNORMAL
EOSINOPHIL # BLD AUTO: 0.1 10E9/L (ref 0–0.7)
EOSINOPHIL NFR BLD AUTO: 0.6 %
ERYTHROCYTE [DISTWIDTH] IN BLOOD BY AUTOMATED COUNT: 16.7 % (ref 10–15)
GFR SERPL CREATININE-BSD FRML MDRD: 61 ML/MIN/1.7M2
GLUCOSE SERPL-MCNC: 97 MG/DL (ref 70–99)
HCT VFR BLD AUTO: 35.3 % (ref 40–53)
HGB BLD-MCNC: 11.9 G/DL (ref 13.3–17.7)
IMM GRANULOCYTES # BLD: 0.4 10E9/L (ref 0–0.4)
IMM GRANULOCYTES NFR BLD: 3.3 %
LYMPHOCYTES # BLD AUTO: 1.9 10E9/L (ref 0.8–5.3)
LYMPHOCYTES NFR BLD AUTO: 16.8 %
MCH RBC QN AUTO: 35.4 PG (ref 26.5–33)
MCHC RBC AUTO-ENTMCNC: 33.7 G/DL (ref 31.5–36.5)
MCV RBC AUTO: 105 FL (ref 78–100)
MONOCYTES # BLD AUTO: 1.2 10E9/L (ref 0–1.3)
MONOCYTES NFR BLD AUTO: 10.5 %
NEUTROPHILS # BLD AUTO: 7.6 10E9/L (ref 1.6–8.3)
NEUTROPHILS NFR BLD AUTO: 68.3 %
NRBC # BLD AUTO: 0.1 10*3/UL
NRBC BLD AUTO-RTO: 1 /100
PLATELET # BLD AUTO: 215 10E9/L (ref 150–450)
POTASSIUM SERPL-SCNC: 3.9 MMOL/L (ref 3.4–5.3)
PROT SERPL-MCNC: 6.6 G/DL (ref 6.8–8.8)
RBC # BLD AUTO: 3.36 10E12/L (ref 4.4–5.9)
SODIUM SERPL-SCNC: 141 MMOL/L (ref 133–144)
TME LAST DOSE: NORMAL H
WBC # BLD AUTO: 11.1 10E9/L (ref 4–11)

## 2017-03-29 PROCEDURE — 80053 COMPREHEN METABOLIC PANEL: CPT | Performed by: STUDENT IN AN ORGANIZED HEALTH CARE EDUCATION/TRAINING PROGRAM

## 2017-03-29 PROCEDURE — 36415 COLL VENOUS BLD VENIPUNCTURE: CPT

## 2017-03-29 PROCEDURE — 80158 DRUG ASSAY CYCLOSPORINE: CPT | Performed by: STUDENT IN AN ORGANIZED HEALTH CARE EDUCATION/TRAINING PROGRAM

## 2017-03-29 PROCEDURE — 85025 COMPLETE CBC W/AUTO DIFF WBC: CPT | Performed by: STUDENT IN AN ORGANIZED HEALTH CARE EDUCATION/TRAINING PROGRAM

## 2017-03-29 NOTE — NURSING NOTE
Chief Complaint   Patient presents with     Blood Draw     Vitals done and labs drawn peripherally from right arm.    Brittany Welsh, KEIN

## 2017-03-29 NOTE — NURSING NOTE
BMT Heme Malignancy Rooming Note    Byron Russo - 3/29/2017 3:51 PM     Chief Complaint   Patient presents with     Blood Draw        /82  Pulse 92  Temp 96.3  F (35.7  C) (Tympanic)  Resp 16  Wt 119.7 kg (264 lb)  SpO2 97%  BMI 36.31 kg/m2     Medications reviewed: Yes    Labs drawn: Yes    Drawn by: Clinic Staff  Via: venipuncture  See Doc Flowsheet for details.      Dressing changed: Not applicable     Medications given: No    Staff time:0    Additional information if applicable: not at this time    GEOFF COLE RN

## 2017-03-29 NOTE — MR AVS SNAPSHOT
After Visit Summary   3/29/2017    Byron Russo    MRN: [id number]           Patient Information     Date Of Birth          1960        Visit Information        Provider Department      3/29/2017 4:00 PM Uli Enciso MD [facility] Blood and Marrow Transplant        Today's Diagnoses     Acute myeloid leukemia in remission (H)              [facility] ([facility])  [address] [place] [address]  Phone: [phone]  Clinic Hours:   Monday-Friday:    8am to 4:30pm   Weekends and holidays:    8am to noon (in general)  If your fever is 100.5  or greater,   call the clinic.  After hours call the   hospital at [phone] or   [phone]. Ask for the BMT   fellow for pediatric or adult patients           Care Instructions    4/19 3pm labs and         Follow-ups after your visit        Your next 10 appointments already scheduled     Apr 11, 2017  3:15 PM CDT   (Arrive by 3:00 PM)   New Patient Visit with Praveen Nation MD   [facility] Dermatology ([facility])    [address] [place] [address]  3rd [facility] [address] [phone]            Apr 19, 2017  3:00 PM CDT   Masonic Lab Draw with  MASONIC LAB DRAW   [facility] Masonic Lab Draw ([facility])    [address] [place] [address]   [phone]            Apr 19, 2017  3:30 PM CDT   Return with Uli Enciso MD   [facility] Blood and Marrow Transplant ([facility])    [address] [place] [address]   [phone]            May 11, 2017 12:30 PM CDT   Masonic Lab Draw with  MASONIC LAB DRAW   [facility] Masonic Lab Draw ([facility])    [address] [place] [address]   [phone]            May 11, 2017  1:00 PM CDT   Bone Marrow Biopsy with  BMT BRENDA #2, UU BONE MARROW BIOPSY   [facility] Blood and Marrow Transplant (  [facility])    [address] [id number] [phone]            May 17, 2017  9:30 AM CDT   BMT Anniversary Visit with Uli Enciso MD   [facility] Blood and Marrow Transplant ([facility] [place])    [address] [id number] [phone]              Future tests that were ordered for you today     Open Future Orders        Priority Expected Expires Ordered    Comprehensive metabolic panel Routine 4/19/2017 3/29/2018 3/29/2017    CBC with platelets differential Routine 4/19/2017 3/29/2018 3/29/2017    CMV DNA quantification Routine 4/19/2017 3/29/2018 3/29/2017    Cyclosporine Routine 4/19/2017 3/29/2018 3/29/2017            Who to contact     If you have questions or need follow up information about today's clinic visit or your schedule please contact [facility] BLOOD AND MARROW TRANSPLANT directly at [phone].  Normal or non-critical lab and imaging results will be communicated to you by [employer]hart, letter or phone within 4 business days after the clinic has received the results. If you do not hear from us within 7 days, please contact the clinic through Pecabut or phone. If you have a critical or abnormal lab result, we will notify you by phone as soon as possible.  Submit refill requests through Getbazza or call your pharmacy and they will forward the refill request to us. Please allow 3 business days for your refill to be completed.          Additional Information About Your Visit        Getbazza Information     Get[employer]za gives you secure access to your electronic health record. If you see a primary care provider, you can also send messages to your care team and make appointments. If you have questions, please call your primary care clinic.  If you do not have a primary care provider, please call [phone] and they will assist you.        Care EveryWhere ID     This is your Care EveryWhere ID. This  could be used by other organizations to access your [place] medical records  [id number]        Your Vitals Were     Pulse Temperature Respirations Pulse Oximetry BMI (Body Mass Index)       92 96.3  F (35.7  C) (Tympanic) 16 97% 36.31 kg/m2        Blood Pressure from Last 3 Encounters:   03/29/17 123/82   03/21/17 (!) 139/92   03/13/17 127/90    Weight from Last 3 Encounters:   03/29/17 119.7 kg (264 lb)   03/21/17 118.8 kg (262 lb)   03/13/17 118.1 kg (260 lb 6.4 oz)              We Performed the Following     CBC with platelets differential     Comprehensive metabolic panel     Cyclosporine          Today's Medication Changes          These changes are accurate as of: 3/29/17  4:30 PM.  If you have any questions, ask your nurse or doctor.               These medicines have changed or have updated prescriptions.        Dose/Directions    * cycloSPORINE modified 100 MG capsule   Commonly known as:  GENERIC EQUIVALENT   This may have changed:  Another medication with the same name was changed. Make sure you understand how and when to take each.   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 Read the directions carefully, and ask your doctor or other care provider to review them with you.             Recent Review Flowsheet Data     BMT Recent Results Latest Ref Rng & Units 2/21/2017 2/24/2017 3/8/2017 3/10/2017 3/13/2017 3/21/2017 3/29/2017    WBC 4.0 - 11.0 10e9/L 11.9(H) 9.7 10.9 - 11.6(H) 10.1 11.1(H)    Hemoglobin 13.3 - 17.7 g/dL 13.9 13.0(L) 12.4(L) - 12.6(L) 12.0(L) 11.9(L)    Platelet Count 150 - 450 10e9/L 185 140(L) 195 - 189 225 215    Neutrophils (Absolute) 1.6 - 8.3 10e9/L 7.0 7.4 6.6 - 8.1 7.0 7.6    Sodium 133 - 144 mmol/L 140 139 142 142 141 143 141    Potassium 3.4 - 5.3 mmol/L 4.2 4.9 4.2 4.0 4.0 4.1 3.9    Chloride 94 - 109 mmol/L 104 108 105 105 104 107 106    Glucose 70 - 99 mg/dL 128(H) 89 99 93 96 89 97    Urea Nitrogen 7 - 30 mg/dL 43(H) 36(H) 36(H) 29 43(H) 37(H) 35(H)    Creatinine 0.66 - 1.25 mg/dL 1.37(H) 0.92 1.31(H) 0.99 1.30(H) 1.07 1.23    Calcium (Total) 8.5 - 10.1 mg/dL 8.6 8.3(L) 8.7 8.8 8.9 8.8 8.7    Protein (Total) 6.8 - 8.8 g/dL 6.4(L) 6.2(L) 6.7(L) - 6.8 6.8 6.6(L)    Albumin 3.4 - 5.0 g/dL 3.5 3.2(L) 3.7 - 3.7 3.8 3.7    Alkaline Phosphatase 40 - 150 U/L 72 70 91 - 92 97 96    AST 0 - 45 U/L 23 36 22 - 16 19 16    ALT 0 - 70 U/L 30 30 31 - 27 24 27    MCV 78 - 100 fl 93 93 101(H) - 100 103(H) 105(H)               Primary Care Provider Office Phone # Fax #    Cole Duong [phone]       [place] [address] [address] [facility] [address]        Thank you!     Thank you for choosing [facility] BLOOD AND MARROW TRANSPLANT  for your care. Our goal is always to provide you with excellent care. Hearing back from our patients is one way we can continue to improve our services. Please take a few minutes to complete the written survey that you may receive in the mail after your visit with us. Thank you!             Your Updated Medication List - Protect others around you: Learn how to safely use, store  and throw away your medicines at www.disposemymeds.org.          This list is accurate as of: 3/29/17  4:30 PM.  Always use your most recent med list.                   Brand Name Dispense Instructions for use    acyclovir 800 MG tablet    ZOVIRAX    60 tablet    Take 1 tablet (800 mg) by mouth 2 times daily       amLODIPine 5 MG tablet    NORVASC    30 tablet    Take 5 mg by mouth daily       aspirin 81 MG EC tablet      Take 1 tablet (81 mg) by mouth daily       carboxymethylcellulose 0.5 % Soln ophthalmic solution    carboxymethylcellulose sodium    1 Bottle    Place 1 drop into both eyes 3 times daily as needed for dry eyes       * cycloSPORINE modified 100 MG capsule    GENERIC EQUIVALENT    120 capsule    150 mg 2 times daily       * cycloSPORINE modified 25 MG capsule    GENERIC EQUIVALENT    60 capsule    Take 1 capsule (25 mg) by mouth 2 times daily Take one 100 mg capsule and three 25 mg capsule by mouth twice daily. Total dose is 175mg twice daily       fluconazole 100 MG tablet    DIFLUCAN    30 tablet    Take 1 tablet (100 mg) by mouth daily       furosemide 20 MG tablet    LASIX    30 tablet    Take 1 tablet (20 mg) by mouth daily       hydrocortisone 1 % cream    CORTAID    60 g    Apply to affected area three times daily for 14 days.       levofloxacin 250 MG tablet    LEVAQUIN    30 tablet    Take 1 tablet (250 mg) by mouth daily       levothyroxine 150 MCG tablet    SYNTHROID/LEVOTHROID    30 tablet    Take 1 tablet (150 mcg) by mouth daily       magnesium oxide 400 (241.3 MG) MG tablet    MAG-OX     Take 2 tablets daily       metoprolol 25 MG tablet    LOPRESSOR    60 tablet    Take 2 tablets (50 mg) by mouth 2 times daily       NITROGLYCERIN ER PO      Take by mouth as needed Reported on 3/8/2017       predniSONE 20 MG tablet    DELTASONE     Take 30 mg by mouth daily       ranitidine 150 MG tablet    ZANTAC    60 tablet    Take 1 tablet (150 mg) by mouth 2 times daily       rosuvastatin 5 MG  tablet    CRESTOR    30 tablet    Take 1 tablet (5 mg) by mouth daily       sulfamethoxazole-trimethoprim 800-160 MG per tablet    BACTRIM DS/SEPTRA DS    30 tablet    Take 1 tablet by mouth 2 times daily       ursodiol 300 MG capsule    ACTIGALL    90 capsule    Take 1 capsule (300 mg) by mouth 3 times daily       * Notice:  This list has 2 medication(s) that are the same as other medications prescribed for you. Read the directions carefully, and ask your doctor or other care provider to review them with you.

## 2017-03-30 ENCOUNTER — TELEPHONE (OUTPATIENT)
Dept: TRANSPLANT | Facility: CLINIC | Age: 57
End: 2017-03-30

## 2017-03-30 NOTE — TELEPHONE ENCOUNTER
I spoke with Byron regarding his CSA level from his clinic visit dated 3/29. Byron stated he didn't take the PM dose the night before his appointment or the AM dose the morning of. A true trough is likely therapeutic. No changes were made to his regimen of 175mg BID    Beau Azar, AndrewD

## 2017-03-31 DIAGNOSIS — D46.9 MDS (MYELODYSPLASTIC SYNDROME) (H): ICD-10-CM

## 2017-03-31 RX ORDER — LEVOFLOXACIN 250 MG/1
250 TABLET, FILM COATED ORAL DAILY
Qty: 30 TABLET | Refills: 3 | Status: SHIPPED | OUTPATIENT
Start: 2017-03-31 | End: 2017-08-08

## 2017-04-11 ENCOUNTER — OFFICE VISIT (OUTPATIENT)
Dept: DERMATOLOGY | Facility: CLINIC | Age: 57
End: 2017-04-11

## 2017-04-11 DIAGNOSIS — L73.8 SEBACEOUS HYPERPLASIA: ICD-10-CM

## 2017-04-11 DIAGNOSIS — L53.9 FACIAL ERYTHEMA: ICD-10-CM

## 2017-04-11 DIAGNOSIS — D89.813 GRAFT-VERSUS-HOST DISEASE OF SKIN (H): Primary | ICD-10-CM

## 2017-04-11 DIAGNOSIS — D46.9 MDS (MYELODYSPLASTIC SYNDROME) (H): ICD-10-CM

## 2017-04-11 DIAGNOSIS — L98.8 GRAFT-VERSUS-HOST DISEASE OF SKIN (H): Primary | ICD-10-CM

## 2017-04-11 RX ORDER — DESONIDE 0.5 MG/G
CREAM TOPICAL 2 TIMES DAILY
Qty: 60 G | Refills: 5 | Status: SHIPPED | OUTPATIENT
Start: 2017-04-11 | End: 2017-09-19

## 2017-04-11 ASSESSMENT — PAIN SCALES - GENERAL: PAINLEVEL: NO PAIN (0)

## 2017-04-11 NOTE — PROGRESS NOTES
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 Facial erythema, question of photo-related eruption whether phototoxic vs photoallergic in relation to work with YAG laser and voriconazole or chemical sunscreen- switched to fluconazole 3/8  - physical blocker sunscreen only  - desonide 0.05% cream to the face BID prn  4. Sebaceous hyperplasia      Encounter Date: Apr 11, 2017    CC:   Chief Complaint   Patient presents with     Derm Problem     Byron is here today for graft versus host disease that's affecting his face.            History of Present Illness:  Mr. Byron Russo is a 56 year old male who presents in consultation from oncology. Pt has a history of myelodysplastic syndrome s/p BMT 5/2016. While tapering his cyclosporine he developed a rash on the trun and thighs.Biopsy at that time was suggestive of drug eruption but could not rule out GVHD (11/2016). He was initially treated with topical steroids but due to rash progression was started on 80 mg daily (11/2016). The then developed lichenoid changes in the mough, eosinophilia and liver function elevation which were felt to be compatible with chronic GVHD per oncology. He was subsequently treated with three weekly methylprednisolone boluses 15 mg/kg and restarted on cyclosporine. Since that time he reports being on 50 mg of prednisone daily for a prolonged period and never saw a complete resolution of his rash. He also reports that he develope facial redness several months ago which was initially thought to be secondary to GVHD but later more of potential photosensitization from his voriconazole and work with YAG lasers. He was switched from voriconazole to fluconazole in early March but hasn't noted a huge  improvement. He also started using a sunscreen at work with chemical blockers. He is currently being managed with cyclosporine 150 mg BID and daily oral prednisone (20 mg daily) tapering down. He reports that the redness of his face will sometimes seem to clear completely and be normal skin toned--at these times the white bumps on his face also become less noticeable.    He notes pinkness on his back and arms, denies any involvement of the legs. He denies any symptoms of itching on the face or body but does not occasional itch of the bumps on his scalp. Has been using a chemical blocker sunscreen but not for the past week.         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 The patient denies use of tanning beds.    Family History:  There is no family history of melanoma.    Medications:  Current Outpatient Prescriptions   Medication Sig Dispense Refill     levofloxacin (LEVAQUIN) 250 MG tablet Take 1 tablet (250 mg) by mouth daily 30 tablet 3     amLODIPine (NORVASC) 5 MG tablet Take 5 mg by mouth daily  30 tablet 11     furosemide (LASIX) 20 MG tablet Take 1 tablet (20 mg) by mouth daily (Patient taking differently: Take 20 mg by mouth every other day ) 30 tablet 1     fluconazole (DIFLUCAN) 100 MG tablet  Take 1 tablet (100 mg) by mouth daily 30 tablet 11     metoprolol (LOPRESSOR) 25 MG tablet Take 2 tablets (50 mg) by mouth 2 times daily 60 tablet 3     cycloSPORINE modified (GENERIC EQUIVALENT) 100 MG capsule 150 mg 2 times daily  120 capsule 11     cycloSPORINE modified (GENERIC EQUIVALENT) 25 MG capsule Take 1 capsule (25 mg) by mouth 2 times daily Take one 100 mg capsule and three 25 mg capsule by mouth twice daily. Total dose is 175mg twice daily (Patient taking differently: Take 150 mg by mouth 2 times daily Take one 100 mg capsule and three 25 mg capsule by mouth twice daily. Total dose is 175mg twice daily) 60 capsule 3     sulfamethoxazole-trimethoprim (BACTRIM DS/SEPTRA DS) 800-160 MG per tablet Take 1 tablet by mouth 2 times daily 30 tablet 0     rosuvastatin (CRESTOR) 5 MG tablet Take 1 tablet (5 mg) by mouth daily 30 tablet 3     levothyroxine (SYNTHROID/LEVOTHROID) 150 MCG tablet Take 1 tablet (150 mcg) by mouth daily 30 tablet 11     hydrocortisone (CORTAID) 1 % cream Apply to affected area three times daily for 14 days. 60 g 11     ranitidine (ZANTAC) 150 MG tablet Take 1 tablet (150 mg) by mouth 2 times daily 60 tablet 11     ursodiol (ACTIGALL) 300 MG capsule Take 1 capsule (300 mg) by mouth 3 times daily 90 capsule 3     acyclovir (ZOVIRAX) 800 MG tablet Take 1 tablet (800 mg) by mouth 2 times daily 60 tablet 3     predniSONE (DELTASONE) 20 MG tablet Take 20 mg by mouth daily   3     carboxymethylcellulose (CARBOXYMETHYLCELLULOSE SODIUM) 0.5 % SOLN ophthalmic solution Place 1 drop into both eyes 3 times daily as needed for dry eyes 1 Bottle 1     magnesium oxide (MAG-OX) 400 (241.3 MG) MG tablet Take 2 tablets daily       aspirin 81 MG EC tablet Take 1 tablet (81 mg) by mouth daily       NITROGLYCERIN ER PO Take by mouth as needed Reported on 3/8/2017          Allergies   Allergen Reactions     Atorvastatin Other (See Comments)     Back pain  Tolerates atrovastatin     Docosahexaenoic Acid (Dha)       Other reaction(s): Intolerance-Can't Take     Tape [Adhesive Tape] Rash     The patient mostly has problems with the paper taper.  When the Tegaderm was on for a week and they did not use adhesive remover it seemed like they were ripping my skin off with it.         Review of Systems:  Skin: No new/changing moles, nothing bleeding/nonhealing/painful/tender/rapidly-growing..      Physical exam:  Vitals: There were no vitals taken for this visit.  GEN: Well-appearing male, no discomfort or distress, cooperative with the exam  SKIN: Waist-up skin, which includes the head/face, neck, both arms, chest, back, abdomen, digits and/or nails was examined.  -diffuse poorly defined background of erythema over the forearms, upper arms, flanks, back, face with some perifollicular prominence  - face with background erythema and diffuse innumerable 1 mm cream colored papules over the cheeks, nose, forehead, temples, jaw, neck, ears c/w sebaceous glands on dermoscopy  - diascopy on the face notable for whitening at the edges of pressure,   -Multiple regular brown pigmented macules and papules are identified on the chest, back.   -There is xerosis of the back--moderate with overlying scale.   - few scattered erythematous papules on the vertex scalp, many follicularly based    -No other lesions of concern on areas examined.     Impression/Plan:  1. Graft vs host disease, chronic based on clinical diagnosis by oncology. Biopsy previously demonstrated interface dermatitis with eosinophils suggesting drug eruption vs GVHD given notable follicular interface changes. Currently on cyclosporine 150 mg BID and prednisone taper (20 mg daily currently). It does not appear that the facial erythema and texture changes are related to underlying GVHD though that is certainly in the differential. At this time more suspicious for photoreaction discussed below in addition to sebaceous hyperplasia.   - restart topical steroid as he tapers down off  of oral prednisone   - triamcinolone 0.1% ointment BID prn to body areas   - desonide 0.05% cream BID prn to the face  - gentle skin care with emollient use, especially on the back    2. Scalp folliculitis/dermatitis  - patient notes mild itching but declines treatment today  - could consider clobetasol solution if desired at a future date    3. 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 Discussed this with patient and explained why it appears more prominent when his face is red due to contrast.     4. Facial erythema. Unclear etiology. Question of photoallergic vs phototoxic rxn in combination with exposure to YAG laser work. Less concerned for GVHD on the face especially due to complete blanching on diascopy.   - physical blocker sunscreen with zinc oxide (as opposed to chemical blocker as patient could have an allergic contact dermatitis to one of the ingredients)  - desonide 0.05% cream BID prn    CC Dr. Enciso on close of this encounter.    Follow-up: 1 month .    Discussed and examined with [facility] staff dermatologist Dr. Praveen Nation.    Maranda Mauricio MD  PGY-2, Dermatology    Staff Involved:  Resident(Maradna Mauricio)/Staff(as above)    Staff Physician Comments:   I saw and evaluated the patient with the resident and I agree with the assessment and plan.  I was present for the examination.    Praveen Nation MD  Dermatology Staff Physician  , Department of Dermatology

## 2017-04-11 NOTE — NURSING NOTE
Dermatology Rooming Note    Byron Russo's goals for this visit include:   Chief Complaint   Patient presents with     Derm Problem     Byron is here today for graft versus host disease that's affecting his face.          Alison Zayas LPN

## 2017-04-11 NOTE — LETTER
4/11/2017       RE: Byron Russo  [id number] [address] [place] [id number]     Dear Colleague,    Thank you for referring your patient, Byron Russo, to the [facility] DERMATOLOGY at [facility]. Please see a copy of my visit note below.    [facility] Dermatology Note      Dermatology Problem List:  1.Hx of myelodysplastic syndrome s/p non-myeloablative allo-sib transplant  2. 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 Facial erythema, question of photo-related eruption whether phototoxic vs photoallergic in relation to work with YAG laser and voriconazole or chemical sunscreen- switched to fluconazole 3/8  - physical blocker sunscreen only  - desonide 0.05% cream to the face BID prn  4. Sebaceous hyperplasia      Encounter Date: Apr 11, 2017    CC:   Chief Complaint   Patient presents with     Derm Problem     Byron is here today for graft versus host disease that's affecting his face.            History of Present Illness:  Mr. Byron Russo is a 56 year old male who presents in consultation from oncology. Pt has a history of myelodysplastic syndrome s/p BMT 5/2016. While tapering his cyclosporine he developed a rash on the trun and thighs.Biopsy at that time was suggestive of drug eruption but could not rule out GVHD (11/2016). He was initially treated with topical steroids but due to rash progression was started on 80 mg daily (11/2016). The then developed lichenoid changes in the mough, eosinophilia and liver function elevation which were felt to be compatible with chronic GVHD per oncology. He was subsequently treated with three weekly methylprednisolone boluses 15 mg/kg and restarted on cyclosporine. Since that time he reports being on 50 mg of prednisone daily for a prolonged period and never saw a complete resolution of his rash. He also reports that he  develope facial redness several months ago which was initially thought to be secondary to GVHD but later more of potential photosensitization from his voriconazole and work with Y[employer] lasers. He was switched from voriconazole to fluconazole in early March but hasn't noted a huge improvement. He also started using a sunscreen at work with chemical blockers. He is currently being managed with cyclosporine 150 mg BID and daily oral prednisone (20 mg daily) tapering down. He reports that the redness of his face will sometimes seem to clear completely and be normal skin toned--at these times the white bumps on his face also become less noticeable.    He notes pinkness on his back and arms, denies any involvement of the legs. He denies any symptoms of itching on the face or body but does not occasional itch of the bumps on his scalp. Has been using a chemical blocker sunscreen but not for the past week.         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 The patient denies use of tanning beds.    Family History:  There is no family history of melanoma.    Medications:  Current Outpatient Prescriptions   Medication Sig Dispense Refill     levofloxacin (LEVAQUIN) 250 MG tablet Take 1 tablet (250 mg) by mouth daily 30  tablet 3     amLODIPine (NORVASC) 5 MG tablet Take 5 mg by mouth daily  30 tablet 11     furosemide (LASIX) 20 MG tablet Take 1 tablet (20 mg) by mouth daily (Patient taking differently: Take 20 mg by mouth every other day ) 30 tablet 1     fluconazole (DIFLUCAN) 100 MG tablet Take 1 tablet (100 mg) by mouth daily 30 tablet 11     metoprolol (LOPRESSOR) 25 MG tablet Take 2 tablets (50 mg) by mouth 2 times daily 60 tablet 3     cycloSPORINE modified (GENERIC EQUIVALENT) 100 MG capsule 150 mg 2 times daily  120 capsule 11     cycloSPORINE modified (GENERIC EQUIVALENT) 25 MG capsule Take 1 capsule (25 mg) by mouth 2 times daily Take one 100 mg capsule and three 25 mg capsule by mouth twice daily. Total dose is 175mg twice daily (Patient taking differently: Take 150 mg by mouth 2 times daily Take one 100 mg capsule and three 25 mg capsule by mouth twice daily. Total dose is 175mg twice daily) 60 capsule 3     sulfamethoxazole-trimethoprim (BACTRIM DS/SEPTRA DS) 800-160 MG per tablet Take 1 tablet by mouth 2 times daily 30 tablet 0     rosuvastatin (CRESTOR) 5 MG tablet Take 1 tablet (5 mg) by mouth daily 30 tablet 3     levothyroxine (SYNTHROID/LEVOTHROID) 150 MCG tablet Take 1 tablet (150 mcg) by mouth daily 30 tablet 11     hydrocortisone (CORTAID) 1 % cream Apply to affected area three times daily for 14 days. 60 g 11     ranitidine (ZANTAC) 150 MG tablet Take 1 tablet (150 mg) by mouth 2 times daily 60 tablet 11     ursodiol (ACTIGALL) 300 MG capsule Take 1 capsule (300 mg) by mouth 3 times daily 90 capsule 3     acyclovir (ZOVIRAX) 800 MG tablet Take 1 tablet (800 mg) by mouth 2 times daily 60 tablet 3     predniSONE (DELTASONE) 20 MG tablet Take 20 mg by mouth daily   3     carboxymethylcellulose (CARBOXYMETHYLCELLULOSE SODIUM) 0.5 % SOLN ophthalmic solution Place 1 drop into both eyes 3 times daily as needed for dry eyes 1 Bottle 1     magnesium oxide (MAG-OX) 400 (241.3 MG) MG tablet Take 2 tablets daily        aspirin 81 MG EC tablet Take 1 tablet (81 mg) by mouth daily       NITROGLYCERIN ER PO Take by mouth as needed Reported on 3/8/2017          Allergies   Allergen Reactions     Atorvastatin Other (See Comments)     Back pain  Tolerates atrovastatin     Docosahexaenoic Acid (Dha)      Other reaction(s): Intolerance-Can't Take     Tape [Adhesive Tape] Rash     The patient mostly has problems with the paper taper.  When the Tegaderm was on for a week and they did not use adhesive remover it seemed like they were ripping my skin off with it.         Review of Systems:  Skin: No new/changing moles, nothing bleeding/nonhealing/painful/tender/rapidly-growing..      Physical exam:  Vitals: There were no vitals taken for this visit.  GEN: Well-appearing male, no discomfort or distress, cooperative with the exam  SKIN: Waist-up skin, which includes the head/face, neck, both arms, chest, back, abdomen, digits and/or nails was examined.  -diffuse poorly defined background of erythema over the forearms, upper arms, flanks, back, face with some perifollicular prominence  - face with background erythema and diffuse innumerable 1 mm cream colored papules over the cheeks, nose, forehead, temples, jaw, neck, ears c/w sebaceous glands on dermoscopy  - diascopy on the face notable for whitening at the edges of pressure,   -Multiple regular brown pigmented macules and papules are identified on the chest, back.   -There is xerosis of the back--moderate with overlying scale.   - few scattered erythematous papules on the vertex scalp, many follicularly based    -No other lesions of concern on areas examined.     Impression/Plan:  1. Graft vs host disease, chronic based on clinical diagnosis by oncology. Biopsy previously demonstrated interface dermatitis with eosinophils suggesting drug eruption vs GVHD given notable follicular interface changes. Currently on cyclosporine 150 mg BID and prednisone taper (20 mg daily currently). It does not  appear that the facial erythema and texture changes are related to underlying GVHD though that is certainly in the differential. At this time more suspicious for photoreaction discussed below in addition to sebaceous hyperplasia.   - restart topical steroid as he tapers down off of oral prednisone   - triamcinolone 0.1% ointment BID prn to body areas   - desonide 0.05% cream BID prn to the face  - gentle skin care with emollient use, especially on the back    2. Scalp folliculitis/dermatitis  - patient notes mild itching but declines treatment today  - could consider clobetasol solution if desired at a future date    3. 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 Discussed this with patient and explained why it appears more prominent when his face is red due to contrast.     4. Facial erythema. Unclear etiology. Question of photoallergic vs phototoxic rxn in combination with exposure to YAG laser work. Less concerned for GVHD on the face especially due to complete blanching on diascopy.   - physical blocker sunscreen with zinc oxide (as opposed to chemical blocker as patient could have an allergic contact dermatitis to one of the ingredients)  - desonide 0.05% cream BID prn    CC Dr. Enciso on close of this encounter.    Follow-up: 1 month .    Discussed and examined with [facility] staff dermatologist Dr. Praveen Nation.    Maranda Mauricio MD  PGY-2, Dermatology    Staff Involved:  Resident(Maranda Mauricio)/Staff(as above)    Staff Physician Comments:   I saw and evaluated the patient with the resident and I agree with the assessment and plan.  I was present for the examination.    Praveen Nation MD  Dermatology Staff Physician  , Department of Dermatology

## 2017-04-11 NOTE — MR AVS SNAPSHOT
After Visit Summary   4/11/2017    Byron Russo    MRN: [id number]           Patient Information     Date Of Birth          1960        Visit Information        Provider Department      4/11/2017 3:15 PM Praveen Nation MD [facility] Dermatology        Today's Diagnoses     Xolit-hqtber-aslt disease of skin (H)    -  1    MDS (myelodysplastic syndrome) (H)          Care Instructions      Recommendations for dry skin and dermatitis   1. Bathe or shower daily in lukewarm water  2. Use a gentle non-soap detergent cleanser  - Soaps are alkaline (which can irritate sensitive skin) and remove natural moisturizing factors   - Recommended products, in no particular order, include:   - Bars:    - Aveeno Moisturizing Bar    - Cetaphil Gentle Cleansing Bar    - Dove Sensitive Skin Unscented Beauty Bar    - Olay Ultra Moisture Bar   - Liquid Cleansers:    - Aquanil Cleanser    - CeraVe Hydrating Cleanser    - Cetaphil Gentle Skin Cleanser  - Avoid scented soaps or bath additives unless your doctor tells you otherwise  - Focus on washing the face, underarms, and underwear areas; other sites usually do not need frequent washing  3. Rinse off thoroughly, then pat dry until skin is slightly damp  4. Apply moisturizer to damp skin within 3-5 minutes of exiting the bath/shower  - Recommended products, in no particular order, include:   - Lotions (thinner/lighter, but may be less effective)    - AmLactin Cerapeutic Restoring Body Lotion    - CeraVe Facial Moisturizing Lotion (AM and/or PM)    - Lubriderm Advanced Therapy Lotion   - Creams (thicker, likely the best balance of effectiveness and feel)    - AmLactin Ultra Hydrating Body Cream    - Aveeno Eczema Therapy Moisturizing Cream    - Aveeno Eczema Therapy Itch Relief Balm    - CeraVe Itch Relief Moisturizing Cream   - Ointments (thickest)    - Vaseline  5. If prescribed a topical steroid medication, this may be applied before or after the moisturizer  (whichever order you prefer)  6. Reapply moisturizer one or two additional times throughout the day when dry skin is present; once this improves, reduce to daily or every other day as needed to prevent recurrence  7. If dry skin or dermatitis is present on the hands, keep moisturizer near the sink and apply after washing and drying your hands  8. A humidifier may be helpful during the winter months (when ambient humidity is very low)          Follow-ups after your visit        Follow-up notes from your care team     Return in about 4 weeks (around 5/9/2017).      Your next 10 appointments already scheduled     Apr 19, 2017  3:00 PM CDT   Masonic Lab Draw with  MASONIC LAB DRAW   [facility] Masonic Lab Draw ([facility])    [address] [place] [address]   [phone]            Apr 19, 2017  3:30 PM CDT   Return with Uli Enciso MD   [facility] Blood and Marrow Transplant ([facility])    [address] [place] [address]   [phone]            May 11, 2017 12:30 PM CDT   Masonic Lab Draw with  MASONIC LAB DRAW   [facility] Masonic Lab Draw ([facility])    [address] [place] [address]   [phone]            May 11, 2017  1:00 PM CDT   Bone Marrow Biopsy with  BMT BRENDA #2, UU BONE MARROW BIOPSY   [facility] Blood and Marrow Transplant ([facility])    [address] [place] [address]   [phone]            May 17, 2017  9:30 AM CDT   BMT Anniversary Visit with Uli Enciso MD   [facility] Blood and Marrow Transplant ([facility])    [address] [place] [address]   [phone]            May 19, 2017  2:15 PM CDT   (Arrive by 2:00 PM)   Return Visit with Praveen Nation MD   [facility] Dermatology ([facility])    [address] [place] [address]  [address] [facility] [address] [phone]              Who to contact     Please call your clinic at [phone] to:    Ask questions about your health    Make or cancel appointments    Discuss your medicines    Learn about your test results    Speak to your doctor   If you have compliments or concerns about an experience at your clinic, or if you wish to file a complaint, please contact [facility] Patient Relations at [phone] or email us at [email or web address]         Additional Information About Your Visit        T3Mediahart Information     Knowthenat gives you secure access to your electronic health record. If you see a primary care provider, you can also send messages to your care team and make appointments. If you have questions, please call your primary care clinic.  If you do not have a primary care provider, please call [phone] and they will assist you.      Secret Escapes is an electronic gateway that provides easy, online access to your medical records. With Secret Escapes, you can request a clinic appointment, read your test results, renew a prescription or communicate with your care team.     To access your existing account, please contact your [facility] or call [phone] for assistance.        Care EveryWhere ID     This is your Care EveryWhere ID. This could be used by other organizations to access your [facility] medical records  [id number]         Blood Pressure from Last 3 Encounters:   03/29/17 123/82   03/21/17 (!) 139/92   03/13/17 127/90    Weight from Last 3 Encounters:   03/29/17 119.7 kg (264 lb)   03/21/17 118.8 kg (262 lb)   03/13/17 118.1 kg (260 lb 6.4 oz)              We Performed the Following     Dermatology Referral          Today's Medication Changes          These changes are accurate as of: 4/11/17  5:03 PM.  If you have any questions, ask your nurse or doctor.               Start taking these  medicines.        Dose/Directions    desonide 0.05 % cream   Commonly known as:  DESOWEN   Used for:  Oaeqe-kgabco-yqiz disease of skin (H)   Started by:  Praveen Nation MD        Apply topically 2 times daily   Quantity:  60 g   Refills:  5         These medicines have changed or have updated prescriptions.        Dose/Directions    * cycloSPORINE modified 100 MG capsule   Commonly known as:  GENERIC EQUIVALENT   This may have changed:  Another medication with the same name was changed. Make sure you understand how and when to take each.   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 Read the directions carefully, and ask your doctor or other care provider to review them with you.         Where to get your medicines      These medications were sent to [address] [address]    Hours:  TRANSPLANT PHONE NUMBER [phone] Phone:  [phone]     desonide 0.05 % cream                Primary Care Provider Office Phone # Fax #    [email or web address]  MD Ad [phone]       [place] [address] [address] [facility] [address]        Thank you!     Thank you for choosing [facility] DERMATOLOGY  for your care. Our goal is always to provide you with excellent care. Hearing back from our patients is one way we can continue to improve our services. Please take a few minutes to complete the written survey that you may receive in the mail after your visit with us. Thank you!             Your Updated Medication List - Protect others around you: Learn how to safely use, store and throw away your medicines at www.disposemymeds.org.          This list is accurate as of: 4/11/17  5:03 PM.  Always use your most recent med list.                   Brand Name Dispense Instructions for use    acyclovir 800 MG tablet    ZOVIRAX    60 tablet    Take 1 tablet (800 mg) by mouth 2 times daily       amLODIPine 5 MG tablet    NORVASC    30 tablet    Take 5 mg by mouth daily       aspirin 81 MG EC tablet      Take 1 tablet (81 mg) by mouth daily       carboxymethylcellulose 0.5 % Soln ophthalmic solution    carboxymethylcellulose sodium    1 Bottle    Place 1 drop into both eyes 3 times daily as needed for dry eyes       * cycloSPORINE modified 100 MG capsule    GENERIC EQUIVALENT    120 capsule    150 mg 2 times daily       * cycloSPORINE modified 25 MG capsule    GENERIC EQUIVALENT    60 capsule    Take 1 capsule (25 mg) by mouth 2 times daily Take one 100 mg capsule and three 25 mg capsule by mouth twice daily. Total dose is 175mg twice daily       desonide 0.05 % cream    DESOWEN    60 g    Apply topically 2 times daily       fluconazole 100 MG tablet    DIFLUCAN    30 tablet    Take 1 tablet (100 mg) by mouth daily       furosemide 20 MG tablet    LASIX    30 tablet    Take 1 tablet (20 mg) by mouth daily       hydrocortisone 1 % cream    CORTAID    60 g    Apply to affected area three times daily for 14 days.       levofloxacin 250 MG tablet    LEVAQUIN    30  tablet    Take 1 tablet (250 mg) by mouth daily       levothyroxine 150 MCG tablet    SYNTHROID/LEVOTHROID    30 tablet    Take 1 tablet (150 mcg) by mouth daily       magnesium oxide 400 (241.3 MG) MG tablet    MAG-OX     Take 2 tablets daily       metoprolol 25 MG tablet    LOPRESSOR    60 tablet    Take 2 tablets (50 mg) by mouth 2 times daily       NITROGLYCERIN ER PO      Take by mouth as needed Reported on 3/8/2017       predniSONE 20 MG tablet    DELTASONE     Take 20 mg by mouth daily       ranitidine 150 MG tablet    ZANTAC    60 tablet    Take 1 tablet (150 mg) by mouth 2 times daily       rosuvastatin 5 MG tablet    CRESTOR    30 tablet    Take 1 tablet (5 mg) by mouth daily       sulfamethoxazole-trimethoprim 800-160 MG per tablet    BACTRIM DS/SEPTRA DS    30 tablet    Take 1 tablet by mouth 2 times daily       ursodiol 300 MG capsule    ACTIGALL    90 capsule    Take 1 capsule (300 mg) by mouth 3 times daily       * Notice:  This list has 2 medication(s) that are the same as other medications prescribed for you. Read the directions carefully, and ask your doctor or other care provider to review them with you.

## 2017-04-11 NOTE — PATIENT INSTRUCTIONS
Recommendations for dry skin and dermatitis   1. Bathe or shower daily in lukewarm water  2. Use a gentle non-soap detergent cleanser  - Soaps are alkaline (which can irritate sensitive skin) and remove natural moisturizing factors   - Recommended products, in no particular order, include:   - Bars:    - Aveeno Moisturizing Bar    - Cetaphil Gentle Cleansing Bar    - Dove Sensitive Skin Unscented Beauty Bar    - Olay Ultra Moisture Bar   - Liquid Cleansers:    - Aquanil Cleanser    - CeraVe Hydrating Cleanser    - Cetaphil Gentle Skin Cleanser  - Avoid scented soaps or bath additives unless your doctor tells you otherwise  - Focus on washing the face, underarms, and underwear areas; other sites usually do not need frequent washing  3. Rinse off thoroughly, then pat dry until skin is slightly damp  4. Apply moisturizer to damp skin within 3-5 minutes of exiting the bath/shower  - Recommended products, in no particular order, include:   - Lotions (thinner/lighter, but may be less effective)    - AmLactin Cerapeutic Restoring Body Lotion    - CeraVe Facial Moisturizing Lotion (AM and/or PM)    - Lubriderm Advanced Therapy Lotion   - Creams (thicker, likely the best balance of effectiveness and feel)    - AmLactin Ultra Hydrating Body Cream    - Aveeno Eczema Therapy Moisturizing Cream    - Aveeno Eczema Therapy Itch Relief Balm    - CeraVe Itch Relief Moisturizing Cream   - Ointments (thickest)    - Vaseline  5. If prescribed a topical steroid medication, this may be applied before or after the moisturizer (whichever order you prefer)  6. Reapply moisturizer one or two additional times throughout the day when dry skin is present; once this improves, reduce to daily or every other day as needed to prevent recurrence  7. If dry skin or dermatitis is present on the hands, keep moisturizer near the sink and apply after washing and drying your hands  8. A humidifier may be helpful during the winter months (when ambient  humidity is very low)

## 2017-04-19 ENCOUNTER — ONCOLOGY VISIT (OUTPATIENT)
Dept: TRANSPLANT | Facility: CLINIC | Age: 57
End: 2017-04-19
Attending: INTERNAL MEDICINE
Payer: COMMERCIAL

## 2017-04-19 VITALS
SYSTOLIC BLOOD PRESSURE: 116 MMHG | OXYGEN SATURATION: 99 % | DIASTOLIC BLOOD PRESSURE: 77 MMHG | HEART RATE: 93 BPM | BODY MASS INDEX: 37.41 KG/M2 | RESPIRATION RATE: 18 BRPM | WEIGHT: 272 LBS | TEMPERATURE: 97.1 F

## 2017-04-19 DIAGNOSIS — C92.01 ACUTE MYELOID LEUKEMIA IN REMISSION (H): ICD-10-CM

## 2017-04-19 LAB
ALBUMIN SERPL-MCNC: 3.8 G/DL (ref 3.4–5)
ALP SERPL-CCNC: 80 U/L (ref 40–150)
ALT SERPL W P-5'-P-CCNC: 26 U/L (ref 0–70)
ANION GAP SERPL CALCULATED.3IONS-SCNC: 7 MMOL/L (ref 3–14)
AST SERPL W P-5'-P-CCNC: 19 U/L (ref 0–45)
BASOPHILS # BLD AUTO: 0 10E9/L (ref 0–0.2)
BASOPHILS NFR BLD AUTO: 0.6 %
BILIRUB SERPL-MCNC: 0.4 MG/DL (ref 0.2–1.3)
BUN SERPL-MCNC: 38 MG/DL (ref 7–30)
CALCIUM SERPL-MCNC: 8.8 MG/DL (ref 8.5–10.1)
CHLORIDE SERPL-SCNC: 104 MMOL/L (ref 94–109)
CO2 SERPL-SCNC: 29 MMOL/L (ref 20–32)
CREAT SERPL-MCNC: 1.3 MG/DL (ref 0.66–1.25)
CYCLOSPORINE BLD LC/MS/MS-MCNC: 165 UG/L (ref 50–400)
DIFFERENTIAL METHOD BLD: ABNORMAL
EOSINOPHIL # BLD AUTO: 0.1 10E9/L (ref 0–0.7)
EOSINOPHIL NFR BLD AUTO: 0.9 %
ERYTHROCYTE [DISTWIDTH] IN BLOOD BY AUTOMATED COUNT: 13.2 % (ref 10–15)
GFR SERPL CREATININE-BSD FRML MDRD: 57 ML/MIN/1.7M2
GLUCOSE SERPL-MCNC: 87 MG/DL (ref 70–99)
HCT VFR BLD AUTO: 39 % (ref 40–53)
HGB BLD-MCNC: 13 G/DL (ref 13.3–17.7)
IMM GRANULOCYTES # BLD: 0.1 10E9/L (ref 0–0.4)
IMM GRANULOCYTES NFR BLD: 1.6 %
LYMPHOCYTES # BLD AUTO: 0.9 10E9/L (ref 0.8–5.3)
LYMPHOCYTES NFR BLD AUTO: 13.9 %
MCH RBC QN AUTO: 36 PG (ref 26.5–33)
MCHC RBC AUTO-ENTMCNC: 33.3 G/DL (ref 31.5–36.5)
MCV RBC AUTO: 108 FL (ref 78–100)
MONOCYTES # BLD AUTO: 1.1 10E9/L (ref 0–1.3)
MONOCYTES NFR BLD AUTO: 17.6 %
NEUTROPHILS # BLD AUTO: 4.2 10E9/L (ref 1.6–8.3)
NEUTROPHILS NFR BLD AUTO: 65.4 %
NRBC # BLD AUTO: 0 10*3/UL
NRBC BLD AUTO-RTO: 0 /100
PLATELET # BLD AUTO: 180 10E9/L (ref 150–450)
POTASSIUM SERPL-SCNC: 4 MMOL/L (ref 3.4–5.3)
PROT SERPL-MCNC: 6.7 G/DL (ref 6.8–8.8)
RBC # BLD AUTO: 3.61 10E12/L (ref 4.4–5.9)
SODIUM SERPL-SCNC: 140 MMOL/L (ref 133–144)
TME LAST DOSE: NORMAL H
WBC # BLD AUTO: 6.4 10E9/L (ref 4–11)

## 2017-04-19 PROCEDURE — 99212 OFFICE O/P EST SF 10 MIN: CPT | Mod: ZF

## 2017-04-19 PROCEDURE — 80158 DRUG ASSAY CYCLOSPORINE: CPT | Performed by: INTERNAL MEDICINE

## 2017-04-19 PROCEDURE — 36415 COLL VENOUS BLD VENIPUNCTURE: CPT

## 2017-04-19 PROCEDURE — 85025 COMPLETE CBC W/AUTO DIFF WBC: CPT | Performed by: INTERNAL MEDICINE

## 2017-04-19 PROCEDURE — 80053 COMPREHEN METABOLIC PANEL: CPT | Performed by: INTERNAL MEDICINE

## 2017-04-19 ASSESSMENT — PAIN SCALES - GENERAL: PAINLEVEL: NO PAIN (0)

## 2017-04-19 NOTE — NURSING NOTE
BMT Heme Malignancy Rooming Note    Byron Russo - 4/19/2017 3:21 PM     Chief Complaint   Patient presents with     Blood Draw     labs only     RECHECK     post bmt for myelodysplastic syndrome - here for provider visit follow up        /77 (BP Location: Right arm, Cuff Size: Adult Large)  Pulse 93  Temp 97.1  F (36.2  C) (Oral)  Resp 18  Wt 123.4 kg (272 lb)  SpO2 99%  BMI 37.41 kg/m2     Medications reviewed: Yes    Labs drawn: No    Dressing changed: Not applicable     Medications given: No    Staff time:6 minutes    Additional information if applicable: JOSE Barclay MA

## 2017-04-19 NOTE — MR AVS SNAPSHOT
After Visit Summary   4/19/2017    Byron Russo    MRN: [id number]           Patient Information     Date Of Birth          1960        Visit Information        Provider Department      4/19/2017 3:30 PM Uli Enciso MD [facility] Blood and Marrow Transplant        Today's Diagnoses     Acute myeloid leukemia in remission ()              [facility] [place] ([facility])  [address] [place] [address]  Phone: [phone]  Clinic Hours:   Monday-Friday:    8am to 4:30pm   Weekends and holidays:    8am to noon (in general)  If your fever is 100.5  or greater,   call the clinic.  After hours call the   hospital at [phone] or   [phone]. Ask for the BMT   fellow for pediatric or adult patients           Care Instructions    4/26 3pm labs only    Gita: [phone] to reschedule bmx        Follow-ups after your visit        Your next 10 appointments already scheduled     Apr 26, 2017  3:00 PM CDT   Masonic Lab Draw with  MASONIC LAB DRAW   [facility] Masonic Lab Draw ([facility])    [address] [place] [address]   [phone]            May 11, 2017 12:30 PM CDT   Masonic Lab Draw with  MASONIC LAB DRAW   [facility] Masonic Lab Draw ([facility])    [address] [place] [address]   [phone]            May 11, 2017  1:00 PM CDT   Bone Marrow Biopsy with  BMT BRENDA #2, UU BONE MARROW BIOPSY   [facility] Blood and Marrow Transplant ([facility])    [address] [place] [address]   [phone]            May 17, 2017  9:30 AM CDT   BMT Anniversary Visit with Uli Enciso MD   [facility] Blood and Marrow Transplant ([facility])    [address] [place] [address]   [phone]            May 19, 2017  2:15 PM CDT   (Arrive by 2:00 PM)   Return Visit with Praveen Agosto  MD Chapis   [facility] Dermatology ([facility])    [address]  3rd [address]  [facility] [address] [phone]              Future tests that were ordered for you today     Open Future Orders        Priority Expected Expires Ordered    Basic metabolic panel Routine 4/26/2017 4/19/2018 4/19/2017    Cyclosporine Routine 4/26/2017 4/19/2018 4/19/2017            Who to contact     If you have questions or need follow up information about today's clinic visit or your schedule please contact [facility] BLOOD AND MARROW TRANSPLANT directly at [phone].  Normal or non-critical lab and imaging results will be communicated to you by Everpixhart, letter or phone within 4 business days after the clinic has received the results. If you do not hear from us within 7 days, please contact the clinic through Tilana [employer] or phone. If you have a critical or abnormal lab result, we will notify you by phone as soon as possible.  Submit refill requests through NeoChord or call your pharmacy and they will forward the refill request to us. Please allow 3 business days for your refill to be completed.          Additional Information About Your Visit        Everpixhart Information     NeoChord gives you secure access to your electronic health record. If you see a primary care provider, you can also send messages to your care team and make appointments. If you have questions, please call your primary care clinic.  If you do not have a primary care provider, please call [phone] and they will assist you.        Care EveryWhere ID     This is your Care EveryWhere ID. This could be used by other organizations to access your [facility] medical records  [id number]        Your Vitals Were     Pulse Temperature Respirations Pulse Oximetry BMI (Body Mass Index)       93 97.1  F (36.2  C) (Oral) 18 99% 37.41 kg/m2        Blood Pressure from Last 3 Encounters:   04/19/17 116/77   03/29/17 123/82   03/21/17 (!) 139/92     Weight from Last 3 Encounters:   04/19/17 123.4 kg (272 lb)   03/29/17 119.7 kg (264 lb)   03/21/17 118.8 kg (262 lb)              We Performed the Following     CBC with platelets differential     CMV DNA quantification     Comprehensive metabolic panel     Cyclosporine          Today's Medication Changes          These changes are accurate as of: 4/19/17  4:14 PM.  If you have any questions, ask your nurse or doctor.               These medicines have changed or have updated prescriptions.        Dose/Directions    * cycloSPORINE modified 100 MG capsule   Commonly known as:  GENERIC EQUIVALENT   This may have changed:  Another medication with the same name was changed. Make sure you understand how and when to take each.   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 Read the directions carefully, and ask your doctor or other care provider to review them with you.             Recent Review Flowsheet Data     BMT Recent Results Latest Ref Rng & Units 2/24/2017 3/8/2017 3/10/2017 3/13/2017 3/21/2017 3/29/2017 4/19/2017    WBC 4.0 - 11.0 10e9/L 9.7  10.9 - 11.6(H) 10.1 11.1(H) 6.4    Hemoglobin 13.3 - 17.7 g/dL 13.0(L) 12.4(L) - 12.6(L) 12.0(L) 11.9(L) 13.0(L)    Platelet Count 150 - 450 10e9/L 140(L) 195 - 189 225 215 180    Neutrophils (Absolute) 1.6 - 8.3 10e9/L 7.4 6.6 - 8.1 7.0 7.6 4.2    Sodium 133 - 144 mmol/L 139 142 142 141 143 141 140    Potassium 3.4 - 5.3 mmol/L 4.9 4.2 4.0 4.0 4.1 3.9 4.0    Chloride 94 - 109 mmol/L 108 105 105 104 107 106 104    Glucose 70 - 99 mg/dL 89 99 93 96 89 97 87    Urea Nitrogen 7 - 30 mg/dL 36(H) 36(H) 29 43(H) 37(H) 35(H) 38(H)    Creatinine 0.66 - 1.25 mg/dL 0.92 1.31(H) 0.99 1.30(H) 1.07 1.23 1.30(H)    Calcium (Total) 8.5 - 10.1 mg/dL 8.3(L) 8.7 8.8 8.9 8.8 8.7 8.8    Protein (Total) 6.8 - 8.8 g/dL 6.2(L) 6.7(L) - 6.8 6.8 6.6(L) 6.7(L)    Albumin 3.4 - 5.0 g/dL 3.2(L) 3.7 - 3.7 3.8 3.7 3.8    Alkaline Phosphatase 40 - 150 U/L 70 91 - 92 97 96 80    AST 0 - 45 U/L 36 22 - 16 19 16 19    ALT 0 - 70 U/L 30 31 - 27 24 27 26    MCV 78 - 100 fl 93 101(H) - 100 103(H) 105(H) 108(H)               Primary Care Provider Office Phone # Fax #    Cole Duong [phone]       [address] [address]        Thank you!     Thank you for choosing [facility] BLOOD AND MARROW TRANSPLANT  for your care. Our goal is always to provide you with excellent care. Hearing back from our patients is one way we can continue to improve our services. Please take a few minutes to complete the written survey that you may receive in the mail after your visit with us. Thank you!             Your Updated Medication List - Protect others around you: Learn how to safely use, store and throw away your medicines at www.disposemymeds.org.          This list is accurate as of: 4/19/17  4:14 PM.  Always use your most recent med list.                   Brand Name Dispense Instructions for use    acyclovir 800 MG tablet    ZOVIRAX    60 tablet    Take 1 tablet (800 mg) by mouth 2 times daily       amLODIPine 5 MG  tablet    NORVASC    30 tablet    Take 5 mg by mouth daily       aspirin 81 MG EC tablet      Take 1 tablet (81 mg) by mouth daily       carboxymethylcellulose 0.5 % Soln ophthalmic solution    carboxymethylcellulose sodium    1 Bottle    Place 1 drop into both eyes 3 times daily as needed for dry eyes       * cycloSPORINE modified 100 MG capsule    GENERIC EQUIVALENT    120 capsule    150 mg 2 times daily       * cycloSPORINE modified 25 MG capsule    GENERIC EQUIVALENT    60 capsule    Take 1 capsule (25 mg) by mouth 2 times daily Take one 100 mg capsule and three 25 mg capsule by mouth twice daily. Total dose is 175mg twice daily       desonide 0.05 % cream    DESOWEN    60 g    Apply topically 2 times daily       EUCERIN EX      PRN       fluconazole 100 MG tablet    DIFLUCAN    30 tablet    Take 1 tablet (100 mg) by mouth daily       furosemide 20 MG tablet    LASIX    30 tablet    Take 1 tablet (20 mg) by mouth daily       hydrocortisone 1 % cream    CORTAID    60 g    Apply to affected area three times daily for 14 days.       levofloxacin 250 MG tablet    LEVAQUIN    30 tablet    Take 1 tablet (250 mg) by mouth daily       levothyroxine 150 MCG tablet    SYNTHROID/LEVOTHROID    30 tablet    Take 1 tablet (150 mcg) by mouth daily       magnesium oxide 400 (241.3 MG) MG tablet    MAG-OX     Take 2 tablets daily       metoprolol 25 MG tablet    LOPRESSOR    60 tablet    Take 2 tablets (50 mg) by mouth 2 times daily       NITROGLYCERIN ER PO      Take by mouth as needed Reported on 3/8/2017       predniSONE 20 MG tablet    DELTASONE     Take 20 mg by mouth daily       ranitidine 150 MG tablet    ZANTAC    60 tablet    Take 1 tablet (150 mg) by mouth 2 times daily       rosuvastatin 5 MG tablet    CRESTOR    30 tablet    Take 1 tablet (5 mg) by mouth daily       sulfamethoxazole-trimethoprim 800-160 MG per tablet    BACTRIM DS/SEPTRA DS    30 tablet    Take 1 tablet by mouth 2 times daily       ursodiol 300 MG  capsule    ACTIGALL    90 capsule    Take 1 capsule (300 mg) by mouth 3 times daily       * Notice:  This list has 2 medication(s) that are the same as other medications prescribed for you. Read the directions carefully, and ask your doctor or other care provider to review them with you.

## 2017-04-19 NOTE — PROGRESS NOTES
HPI      ROS      Physical Exam    Chief Complaint   Patient presents with     Blood Draw     labs only     Vitals and labs done peripherally.  See doc flow sheets for details.  Aziza Ca CMA

## 2017-04-19 NOTE — PROGRESS NOTES
BMT Clinic Progress Note        HISTORY: Mr. 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  He is currently taking 30 mg of prednisone daily, decreased 3/8. Has very pronounced facial erythema and hyperpigmentation.  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 He, at Dr Enciso's advice, started using a 's type mask with a colored shield early March 2017.     Interim events: Byron is here for follow up of lower leg swelling. This is much improved on intermittent lasix (1-2 time a week) .He is hopeful to go down in his steroids. He feels his face rash is stable to improved, dry skin only now. He reports no fevers, no cough or shortness of breath. Eating well without n/v/d/c. No dysuria or dark blood no blood in urine. Otherwise feeling well.        REVIEW OF SYSTEMS: The remainder of the 10-point review of systems is negative except for the pronounced facial erythema which he says greatly worsens over the course of the day and improves overnight.      PHYSICAL EXAMINATION:     /77 (BP Location: Right arm, Cuff Size: Adult Large)  Pulse 93  Temp 97.1  F (36.2  C) (Oral)  Resp 18  Wt 123.4 kg (272 lb)  SpO2 99%  BMI 37.41 kg/m2    GENERAL:  NAD. Pronounced hyperpigmentation and erythema of the face, floridly cushingoid. His mouth had subtle lichenoid changes, but no open ulcerations.     LUNGS:  Clear to auscultation. No fluid sounds on either base  CARDIAC:  Without S3, rub or murmur.   ABDOMEN:  Nondistended, active bowel sounds,  EXTREMITIES: pre-tibial trace edema, 1+ to ankles     LABORATORY DATA:    CBC RESULTS:     Lab Results   Component Value Date    WBC 6.4 04/19/2017     Lab Results   Component Value Date    RBC 3.61 04/19/2017     Lab Results   Component Value Date    HGB 13.0 04/19/2017     Lab Results   Component Value Date    HCT 39.0 04/19/2017     No components found for: MCT  Lab Results   Component Value Date    MCV  108 04/19/2017     Lab Results   Component Value Date    MCH 36.0 04/19/2017     Lab Results   Component Value Date    MCHC 33.3 04/19/2017     Lab Results   Component Value Date    RDW 13.2 04/19/2017     Lab Results   Component Value Date     04/19/2017       ASSESSMENT AND PLAN:   1.  343 days status post non-myeloablative allo-sib transplant for myelodysplastic syndrome with no evidence of relapse.     2.  Chronic fciwy-krtxnj-whlx disease.  The pronounced localization of the worst part of the rash to his face suggests that this is some factor other than chronic lymyw-qvbrst-iuoe disease that is contributing to it.  In reviewing his meds it seems quite possible that voriconazole is implicated as it can cause photosensitization and he is exposed to bright light. Dr Enciso held voriconazole and switched to fluconazole 3/8 and steroids reduced to 30mg daily. Now decreased to 20 mg daily. Am reluctant to taper further now b/o erythema on back, which may be worsening.    3.  LE edema: Significant improvement of edema. Continue prn lasix- BP Pressures very normal, norvasc had been decreased 3/8Plan: Run CSA on low end of normal.    FEN: elevated crn 1.30. Skip another dose of CSA and resume at 125 mg po BID.    RTC one week labs.    RT Fernie 5-19 for anniversary visit.    Uli Enciso M.D.

## 2017-04-19 NOTE — NURSING NOTE
Chief Complaint   Patient presents with     Blood Draw     labs only     Vitals and labs done peripherally.  See doc flow sheets for details.  Aziza Ca CMA

## 2017-04-26 DIAGNOSIS — C92.01 ACUTE MYELOID LEUKEMIA IN REMISSION (H): ICD-10-CM

## 2017-04-26 LAB
ANION GAP SERPL CALCULATED.3IONS-SCNC: 10 MMOL/L (ref 3–14)
BUN SERPL-MCNC: 36 MG/DL (ref 7–30)
CALCIUM SERPL-MCNC: 8.7 MG/DL (ref 8.5–10.1)
CHLORIDE SERPL-SCNC: 105 MMOL/L (ref 94–109)
CO2 SERPL-SCNC: 26 MMOL/L (ref 20–32)
CREAT SERPL-MCNC: 1.59 MG/DL (ref 0.66–1.25)
CYCLOSPORINE BLD LC/MS/MS-MCNC: 155 UG/L (ref 50–400)
GFR SERPL CREATININE-BSD FRML MDRD: 46 ML/MIN/1.7M2
GLUCOSE SERPL-MCNC: 88 MG/DL (ref 70–99)
POTASSIUM SERPL-SCNC: 3.9 MMOL/L (ref 3.4–5.3)
SODIUM SERPL-SCNC: 141 MMOL/L (ref 133–144)
TME LAST DOSE: NORMAL H

## 2017-04-26 PROCEDURE — 80158 DRUG ASSAY CYCLOSPORINE: CPT | Performed by: INTERNAL MEDICINE

## 2017-04-26 PROCEDURE — 80048 BASIC METABOLIC PNL TOTAL CA: CPT | Performed by: INTERNAL MEDICINE

## 2017-04-28 DIAGNOSIS — C92.01 ACUTE MYELOID LEUKEMIA IN REMISSION (H): ICD-10-CM

## 2017-04-28 DIAGNOSIS — T86.09 GVHD AS COMPLICATION OF BONE MARROW TRANSPLANT (H): Primary | ICD-10-CM

## 2017-04-28 DIAGNOSIS — D89.813 GVHD AS COMPLICATION OF BONE MARROW TRANSPLANT (H): Primary | ICD-10-CM

## 2017-05-01 DIAGNOSIS — C92.01 ACUTE MYELOID LEUKEMIA IN REMISSION (H): ICD-10-CM

## 2017-05-01 DIAGNOSIS — T86.09 GVHD AS COMPLICATION OF BONE MARROW TRANSPLANT (H): ICD-10-CM

## 2017-05-01 DIAGNOSIS — D46.9 MDS (MYELODYSPLASTIC SYNDROME) (H): ICD-10-CM

## 2017-05-01 DIAGNOSIS — D89.813 GVHD AS COMPLICATION OF BONE MARROW TRANSPLANT (H): ICD-10-CM

## 2017-05-01 LAB
ANION GAP SERPL CALCULATED.3IONS-SCNC: 13 MMOL/L (ref 3–14)
BUN SERPL-MCNC: 34 MG/DL (ref 7–30)
CALCIUM SERPL-MCNC: 8.7 MG/DL (ref 8.5–10.1)
CHLORIDE SERPL-SCNC: 106 MMOL/L (ref 94–109)
CO2 SERPL-SCNC: 24 MMOL/L (ref 20–32)
CREAT SERPL-MCNC: 0.93 MG/DL (ref 0.66–1.25)
GFR SERPL CREATININE-BSD FRML MDRD: 84 ML/MIN/1.7M2
GLUCOSE SERPL-MCNC: 93 MG/DL (ref 70–99)
POTASSIUM SERPL-SCNC: 3.7 MMOL/L (ref 3.4–5.3)
SODIUM SERPL-SCNC: 143 MMOL/L (ref 133–144)

## 2017-05-01 PROCEDURE — 80048 BASIC METABOLIC PNL TOTAL CA: CPT | Performed by: INTERNAL MEDICINE

## 2017-05-01 RX ORDER — ROSUVASTATIN CALCIUM 5 MG/1
5 TABLET, COATED ORAL DAILY
Qty: 30 TABLET | Refills: 11 | Status: SHIPPED | OUTPATIENT
Start: 2017-05-01 | End: 2018-01-31

## 2017-05-01 RX ORDER — SULFAMETHOXAZOLE/TRIMETHOPRIM 800-160 MG
1 TABLET ORAL 2 TIMES DAILY
Qty: 30 TABLET | Refills: 6 | Status: SHIPPED | OUTPATIENT
Start: 2017-05-01 | End: 2018-01-30

## 2017-05-01 RX ORDER — URSODIOL 300 MG/1
300 CAPSULE ORAL 3 TIMES DAILY
Qty: 90 CAPSULE | Refills: 11 | Status: SHIPPED | OUTPATIENT
Start: 2017-05-01 | End: 2017-11-15

## 2017-05-01 RX ORDER — SULFAMETHOXAZOLE/TRIMETHOPRIM 800-160 MG
1 TABLET ORAL 2 TIMES DAILY
Qty: 30 TABLET | Refills: 5 | Status: SHIPPED | OUTPATIENT
Start: 2017-05-01 | End: 2017-05-01

## 2017-05-01 RX ORDER — ACYCLOVIR 800 MG/1
800 TABLET ORAL 2 TIMES DAILY
Qty: 60 TABLET | Refills: 11 | Status: SHIPPED | OUTPATIENT
Start: 2017-05-01 | End: 2018-01-31

## 2017-05-01 NOTE — NURSING NOTE
Chief Complaint   Patient presents with     Blood Draw     Patient is here today for labs via jamie AC.

## 2017-05-02 DIAGNOSIS — C92.01 ACUTE MYELOID LEUKEMIA IN REMISSION (H): Primary | ICD-10-CM

## 2017-05-05 DIAGNOSIS — C92.01 ACUTE MYELOID LEUKEMIA IN REMISSION (H): ICD-10-CM

## 2017-05-05 LAB
ANION GAP SERPL CALCULATED.3IONS-SCNC: 9 MMOL/L (ref 3–14)
BUN SERPL-MCNC: 30 MG/DL (ref 7–30)
CALCIUM SERPL-MCNC: 8.6 MG/DL (ref 8.5–10.1)
CHLORIDE SERPL-SCNC: 106 MMOL/L (ref 94–109)
CO2 SERPL-SCNC: 27 MMOL/L (ref 20–32)
CREAT SERPL-MCNC: 0.91 MG/DL (ref 0.66–1.25)
GFR SERPL CREATININE-BSD FRML MDRD: 87 ML/MIN/1.7M2
GLUCOSE SERPL-MCNC: 96 MG/DL (ref 70–99)
POTASSIUM SERPL-SCNC: 3.9 MMOL/L (ref 3.4–5.3)
SODIUM SERPL-SCNC: 142 MMOL/L (ref 133–144)

## 2017-05-05 PROCEDURE — 80048 BASIC METABOLIC PNL TOTAL CA: CPT | Performed by: INTERNAL MEDICINE

## 2017-05-05 PROCEDURE — 80158 DRUG ASSAY CYCLOSPORINE: CPT | Performed by: INTERNAL MEDICINE

## 2017-05-05 NOTE — NURSING NOTE
Chief Complaint   Patient presents with     Blood Draw     labs drawn by vpt by rn.       Tala Cortes RN

## 2017-05-06 LAB
CYCLOSPORINE BLD LC/MS/MS-MCNC: 66 UG/L (ref 50–400)
TME LAST DOSE: NORMAL H

## 2017-05-08 DIAGNOSIS — T86.09 GVHD AS COMPLICATION OF BONE MARROW TRANSPLANT (H): ICD-10-CM

## 2017-05-08 DIAGNOSIS — D46.9 MDS (MYELODYSPLASTIC SYNDROME) (H): ICD-10-CM

## 2017-05-08 DIAGNOSIS — D89.813 GVHD AS COMPLICATION OF BONE MARROW TRANSPLANT (H): Primary | ICD-10-CM

## 2017-05-08 DIAGNOSIS — T86.09 GVHD AS COMPLICATION OF BONE MARROW TRANSPLANT (H): Primary | ICD-10-CM

## 2017-05-08 DIAGNOSIS — D89.813 GVHD AS COMPLICATION OF BONE MARROW TRANSPLANT (H): ICD-10-CM

## 2017-05-08 RX ORDER — CYCLOSPORINE 100 MG/1
100 CAPSULE, LIQUID FILLED ORAL 2 TIMES DAILY
Qty: 120 CAPSULE | Refills: 11 | COMMUNITY
Start: 2017-05-08 | End: 2017-09-19

## 2017-05-08 RX ORDER — CYCLOSPORINE 25 MG/1
CAPSULE, LIQUID FILLED ORAL
Qty: 60 CAPSULE | Refills: 3 | COMMUNITY
Start: 2017-05-08 | End: 2017-09-19

## 2017-05-12 ENCOUNTER — OFFICE VISIT (OUTPATIENT)
Dept: TRANSPLANT | Facility: CLINIC | Age: 57
End: 2017-05-12
Attending: NURSE PRACTITIONER
Payer: COMMERCIAL

## 2017-05-12 VITALS
WEIGHT: 270.28 LBS | SYSTOLIC BLOOD PRESSURE: 128 MMHG | BODY MASS INDEX: 37.17 KG/M2 | RESPIRATION RATE: 18 BRPM | OXYGEN SATURATION: 98 % | TEMPERATURE: 98.5 F | DIASTOLIC BLOOD PRESSURE: 87 MMHG | HEART RATE: 86 BPM

## 2017-05-12 DIAGNOSIS — D46.9 MDS (MYELODYSPLASTIC SYNDROME) (H): Primary | ICD-10-CM

## 2017-05-12 DIAGNOSIS — T86.09 GVHD AS COMPLICATION OF BONE MARROW TRANSPLANT (H): ICD-10-CM

## 2017-05-12 DIAGNOSIS — D89.813 GVHD AS COMPLICATION OF BONE MARROW TRANSPLANT (H): ICD-10-CM

## 2017-05-12 LAB
ALBUMIN SERPL-MCNC: 3.8 G/DL (ref 3.4–5)
ALP SERPL-CCNC: 79 U/L (ref 40–150)
ALT SERPL W P-5'-P-CCNC: 23 U/L (ref 0–70)
ANION GAP SERPL CALCULATED.3IONS-SCNC: 9 MMOL/L (ref 3–14)
AST SERPL W P-5'-P-CCNC: 16 U/L (ref 0–45)
BASOPHILS # BLD AUTO: 0 10E9/L (ref 0–0.2)
BASOPHILS NFR BLD AUTO: 0.3 %
BILIRUB SERPL-MCNC: 0.4 MG/DL (ref 0.2–1.3)
BUN SERPL-MCNC: 16 MG/DL (ref 7–30)
CALCIUM SERPL-MCNC: 9 MG/DL (ref 8.5–10.1)
CD19 CELLS # BLD: 297 CELLS/UL (ref 107–698)
CD19 CELLS NFR BLD: 20 % (ref 6–27)
CD3 CELLS # BLD: 1005 CELLS/UL (ref 603–2990)
CD3 CELLS NFR BLD: 66 % (ref 49–84)
CD3+CD4+ CELLS # BLD: 679 CELLS/UL (ref 441–2156)
CD3+CD4+ CELLS NFR BLD: 45 % (ref 28–63)
CD3+CD4+ CELLS/CD3+CD8+ CLL BLD: 2.14 % (ref 1.4–2.6)
CD3+CD8+ CELLS # BLD: 317 CELLS/UL (ref 125–1312)
CD3+CD8+ CELLS NFR BLD: 21 % (ref 10–40)
CD3-CD16+CD56+ CELLS # BLD: 204 CELLS/UL (ref 95–640)
CD3-CD16+CD56+ CELLS NFR BLD: 13 % (ref 4–25)
CHLORIDE SERPL-SCNC: 103 MMOL/L (ref 94–109)
CO2 SERPL-SCNC: 28 MMOL/L (ref 20–32)
CREAT SERPL-MCNC: 0.7 MG/DL (ref 0.66–1.25)
CYCLOSPORINE BLD LC/MS/MS-MCNC: 78 UG/L (ref 50–400)
DIFFERENTIAL METHOD BLD: ABNORMAL
EOSINOPHIL # BLD AUTO: 0 10E9/L (ref 0–0.7)
EOSINOPHIL NFR BLD AUTO: 0.3 %
ERYTHROCYTE [DISTWIDTH] IN BLOOD BY AUTOMATED COUNT: 12.3 % (ref 10–15)
GFR SERPL CREATININE-BSD FRML MDRD: ABNORMAL ML/MIN/1.7M2
GLUCOSE SERPL-MCNC: 105 MG/DL (ref 70–99)
HCT VFR BLD AUTO: 43.4 % (ref 40–53)
HGB BLD-MCNC: 14.7 G/DL (ref 13.3–17.7)
IFC SPECIMEN: NORMAL
IMM GRANULOCYTES # BLD: 0.1 10E9/L (ref 0–0.4)
IMM GRANULOCYTES NFR BLD: 0.8 %
IMMUNODEFICIENCY MARKERS SPEC-IMP: NORMAL
LYMPHOCYTES # BLD AUTO: 1.4 10E9/L (ref 0.8–5.3)
LYMPHOCYTES NFR BLD AUTO: 16.5 %
MCH RBC QN AUTO: 34.8 PG (ref 26.5–33)
MCHC RBC AUTO-ENTMCNC: 33.9 G/DL (ref 31.5–36.5)
MCV RBC AUTO: 103 FL (ref 78–100)
MONOCYTES # BLD AUTO: 0.8 10E9/L (ref 0–1.3)
MONOCYTES NFR BLD AUTO: 9.2 %
NEUTROPHILS # BLD AUTO: 6.3 10E9/L (ref 1.6–8.3)
NEUTROPHILS NFR BLD AUTO: 72.9 %
NRBC # BLD AUTO: 0 10*3/UL
NRBC BLD AUTO-RTO: 0 /100
PLATELET # BLD AUTO: 187 10E9/L (ref 150–450)
POTASSIUM SERPL-SCNC: 4 MMOL/L (ref 3.4–5.3)
PROT SERPL-MCNC: 6.7 G/DL (ref 6.8–8.8)
RBC # BLD AUTO: 4.23 10E12/L (ref 4.4–5.9)
SODIUM SERPL-SCNC: 140 MMOL/L (ref 133–144)
T4 FREE SERPL-MCNC: 1.15 NG/DL (ref 0.76–1.46)
TME LAST DOSE: NORMAL H
TSH SERPL DL<=0.05 MIU/L-ACNC: 0.43 MU/L (ref 0.4–4)
WBC # BLD AUTO: 8.7 10E9/L (ref 4–11)

## 2017-05-12 PROCEDURE — 88185 FLOWCYTOMETRY/TC ADD-ON: CPT | Performed by: NURSE PRACTITIONER

## 2017-05-12 PROCEDURE — 88264 CHROMOSOME ANALYSIS 20-25: CPT | Performed by: NURSE PRACTITIONER

## 2017-05-12 PROCEDURE — 85025 COMPLETE CBC W/AUTO DIFF WBC: CPT | Performed by: INTERNAL MEDICINE

## 2017-05-12 PROCEDURE — 00000161 ZZHCL STATISTIC H-SPHEME PROCESS B/S: Performed by: NURSE PRACTITIONER

## 2017-05-12 PROCEDURE — 88311 DECALCIFY TISSUE: CPT | Performed by: NURSE PRACTITIONER

## 2017-05-12 PROCEDURE — 00000058 ZZHCL STATISTIC BONE MARROW ASP PERF TC 38220: Performed by: NURSE PRACTITIONER

## 2017-05-12 PROCEDURE — 86355 B CELLS TOTAL COUNT: CPT | Performed by: INTERNAL MEDICINE

## 2017-05-12 PROCEDURE — 86357 NK CELLS TOTAL COUNT: CPT | Performed by: INTERNAL MEDICINE

## 2017-05-12 PROCEDURE — 87340 HEPATITIS B SURFACE AG IA: CPT | Performed by: INTERNAL MEDICINE

## 2017-05-12 PROCEDURE — 38221 DX BONE MARROW BIOPSIES: CPT | Mod: ZF

## 2017-05-12 PROCEDURE — 40000951 ZZHCL STATISTIC BONE MARROW INTERP TC 85097: Performed by: NURSE PRACTITIONER

## 2017-05-12 PROCEDURE — 40001005 ZZHCL STATISTIC FLOW >15 ABY TC 88189: Performed by: NURSE PRACTITIONER

## 2017-05-12 PROCEDURE — 88280 CHROMOSOME KARYOTYPE STUDY: CPT | Performed by: NURSE PRACTITIONER

## 2017-05-12 PROCEDURE — 86360 T CELL ABSOLUTE COUNT/RATIO: CPT | Performed by: INTERNAL MEDICINE

## 2017-05-12 PROCEDURE — 40000424 ZZHCL STATISTIC BONE MARROW CORE PERF TC 38221: Performed by: NURSE PRACTITIONER

## 2017-05-12 PROCEDURE — 81267 CHIMERISM ANAL NO CELL SELEC: CPT | Performed by: NURSE PRACTITIONER

## 2017-05-12 PROCEDURE — 88161 CYTOPATH SMEAR OTHER SOURCE: CPT | Performed by: NURSE PRACTITIONER

## 2017-05-12 PROCEDURE — 36416 COLLJ CAPILLARY BLOOD SPEC: CPT | Performed by: NURSE PRACTITIONER

## 2017-05-12 PROCEDURE — 84439 ASSAY OF FREE THYROXINE: CPT | Performed by: INTERNAL MEDICINE

## 2017-05-12 PROCEDURE — 86359 T CELLS TOTAL COUNT: CPT | Performed by: INTERNAL MEDICINE

## 2017-05-12 PROCEDURE — 80158 DRUG ASSAY CYCLOSPORINE: CPT | Performed by: INTERNAL MEDICINE

## 2017-05-12 PROCEDURE — 81268 CHIMERISM ANAL W/CELL SELECT: CPT | Performed by: INTERNAL MEDICINE

## 2017-05-12 PROCEDURE — 84443 ASSAY THYROID STIM HORMONE: CPT | Performed by: INTERNAL MEDICINE

## 2017-05-12 PROCEDURE — 80053 COMPREHEN METABOLIC PANEL: CPT | Performed by: INTERNAL MEDICINE

## 2017-05-12 PROCEDURE — 86704 HEP B CORE ANTIBODY TOTAL: CPT | Performed by: INTERNAL MEDICINE

## 2017-05-12 PROCEDURE — 88237 TISSUE CULTURE BONE MARROW: CPT | Performed by: NURSE PRACTITIONER

## 2017-05-12 PROCEDURE — 88184 FLOWCYTOMETRY/ TC 1 MARKER: CPT | Performed by: NURSE PRACTITIONER

## 2017-05-12 PROCEDURE — G0364 BONE MARROW ASPIRATE &BIOPSY: HCPCS | Mod: ZF

## 2017-05-12 PROCEDURE — 88305 TISSUE EXAM BY PATHOLOGIST: CPT | Performed by: NURSE PRACTITIONER

## 2017-05-12 PROCEDURE — 86803 HEPATITIS C AB TEST: CPT | Performed by: INTERNAL MEDICINE

## 2017-05-12 PROCEDURE — 40000611 ZZHCL STATISTIC MORPHOLOGY W/INTERP HEMEPATH TC 85060: Performed by: NURSE PRACTITIONER

## 2017-05-12 ASSESSMENT — PAIN SCALES - GENERAL: PAINLEVEL: NO PAIN (0)

## 2017-05-12 NOTE — PROGRESS NOTES
BMT ONC Adult Bone Marrow Biopsy Procedure Note  May 12, 2017  BP (!) 131/91  Pulse 81  Temp 98.3  F (36.8  C)  Resp 18  Wt 122.6 kg (270 lb 4.5 oz)  SpO2 98%  BMI 37.17 kg/m2     Learning needs assessment complete within 12 months? YES    DIAGNOSIS: MDS     PROCEDURE: Unilateral Bone Marrow Biopsy and Unilateral Aspirate    LOCATION: [facility] 2nd Floor    Patient s identification was positively verified by verbal identification and invasive procedure safety checklist was completed. Informed consent was obtained. The patient was placed in the prone position and prepped and draped in a sterile manner. Approximately 10 cc of 1% Lidocaine was used over the left posterior iliac spine. Following this a 3 mm incision was made. Trephine bone marrow core(s) was (were) obtained from the IC. Bone marrow aspirates were obtained from the [facility]. Aspirates were sent for morphology, immunophenotyping, cytogenetics and molecular diagnostics RFLP. A total of approximately 20 ml of marrow was aspirated. Following this procedure a sterile dressing was applied to the bone marrow biopsy site(s). The patient was placed in the supine position to maintain pressure on the biopsy site. Post-procedure wound care instructions were given.     Complications: NO    Pre-procedural pain: 0 out of 10 on the numeric pain rating scale.     Procedural pain: 2 out of 10 on the numeric pain rating scale.     Post-procedural pain assessment: 0 out of 10 on the numeric pain rating scale.     Interventions: NO    Length of procedure:20 minutes or less      Procedure performed by: Lexy Son

## 2017-05-12 NOTE — NURSING NOTE
"Oncology Rooming Note    May 12, 2017 11:33 AM   Byron Russo is a 56 year old male who presents for:    Chief Complaint   Patient presents with     RECHECK     provider visit, labs, BM bx s/p bmt txp for MDS/AML     Initial Vitals: BP (!) 131/91  Pulse 81  Temp 98.3  F (36.8  C)  Resp 18  Wt 122.6 kg (270 lb 4.5 oz)  SpO2 98%  BMI 37.17 kg/m2 Estimated body mass index is 37.17 kg/(m^2) as calculated from the following:    Height as of 1/18/17: 1.816 m (5' 11.5\").    Weight as of this encounter: 122.6 kg (270 lb 4.5 oz). Body surface area is 2.49 meters squared.  No Pain (0) Comment: Data Unavailable   No LMP for male patient.  Allergies reviewed: Yes  Medications reviewed: Yes    0BMT Teaching Flowsheet    Byron Russo is a 56 year old male  The primary encounter diagnosis was MDS (myelodysplastic syndrome) (H). 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    Teaching Topic: Bone marrow biopsy    Person(s) involved in teaching: Patient  Motivation Level High  Asks Questions: Yes  Eager to Learn: Yes  Cooperative: Yes  Receptive (willing/able to accept information): Yes  Any cultural factors/Jain beliefs that may influence understanding or compliance? No    Patient demonstrates understanding of the following:  - Reason for the appointment, diagnosis and treatment plan: Yes  - Knowledge of proper use of medications and conditions for which they are ordered (with special attention to potential side effects or drug interactions): Yes  - Which situations necessitate calling provider and whom to contact: Yes    Teaching concerns addressed: After BMBX cares      Pain management techniques: Yes      Infection Control:  Patient demonstrates understanding of the following:  Bone marrow biopsy procedure site care taught Yes  Signs and symptoms of infection taught Yes      Instructional Materials Used/Given: Your Bone Marrow Biopsy    Time spent with " patient: 10 minutes.    Katy Barkley RN

## 2017-05-12 NOTE — NURSING NOTE
Pt remained supine for 30 minutes post bmbx.  Patient's vital signs were within normal limits and stable, dressing remained CDI.  Patient denies pain at biopsy site, steady on his feet and discharged into the care of his wife.

## 2017-05-12 NOTE — MR AVS SNAPSHOT
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  After hours call the   hospital at [phone] or   [phone]. Ask for the BMT   fellow on-call            Follow-ups after your visit        Your next 10 appointments already scheduled     May 17, 2017  9:30 AM CDT   BMT Anniversary Visit with Uli Enciso MD   [facility] Blood and Marrow Transplant ([facility])    [address] [place] [id number] [phone]            May 19, 2017  2:15 PM CDT   (Arrive by 2:00 PM)   Return Visit with Praveen Nation MD   [facility] Dermatology ([facility])    [address] [place] [id number] [phone]              Who to contact     If you have questions or need follow up information about today's clinic visit or your schedule please contact [facility] BLOOD AND MARROW TRANSPLANT directly at [phone].  Normal or non-critical lab and imaging results will be communicated to you by MyChart, letter or phone within 4 business days after the clinic has received the results. If you do not hear from us within 7 days, please contact the clinic through MyChart or phone. If you have a critical or abnormal lab result, we will notify you by phone as soon as  possible.  Submit refill requests through [employer] or call your pharmacy and they will forward the refill request to us. Please allow 3 business days for your refill to be completed.          Additional Information About Your Visit        [employer] Information     [employer] gives you secure access to your electronic health record. If you see a primary care provider, you can also send messages to your care team and make appointments. If you have questions, please call your primary care clinic.  If you do not have a primary care provider, please call [phone] and they will assist you.        Care EveryWhere ID     This is your Care EveryWhere ID. This could be used by other organizations to access your [facility] medical records  [id number]        Your Vitals Were     Pulse Temperature Respirations Pulse Oximetry BMI (Body Mass Index)       81 98.3  F (36.8  C) 18 98% 37.17 kg/m2        Blood Pressure from Last 3 Encounters:   05/12/17 (!) 131/91   04/19/17 116/77   03/29/17 123/82    Weight from Last 3 Encounters:   05/12/17 122.6 kg (270 lb 4.5 oz)   04/19/17 123.4 kg (272 lb)   03/29/17 119.7 kg (264 lb)              We Performed the Following     BMT Research TTL     Bone Marrow Aspirate (Charge)     Bone Marrow Biopsy (Charge)     Bone marrow biopsy     CBC with platelets differential     CHROMOSOME BONE MARROW With Professional Interpretation     Comprehensive metabolic panel     Cyclosporine     DNA marker post bmt engraft bld     DNA Marker Post Bmt Engraftment Bone Marrow     Hepatitis B core antibody     Hepatitis B surface antigen     Hepatits C antibody     Leukemia Lymphoma Evaluation     T Cell Subset Extended Profile     T4 free     TSH          Today's Medication Changes          These changes are accurate as of: 5/12/17 11:59 AM.  If you have any questions, ask your nurse or doctor.               Stop taking these medicines if you haven't already. Please contact your care team if you have questions.      hydrocortisone 1 % cream   Commonly known as:  CORTAID                    Recent Review Flowsheet Data     BMT Recent Results Latest Ref Rng & Units 3/21/2017 3/29/2017 4/19/2017 4/26/2017 5/1/2017 5/5/2017 5/12/2017    WBC 4.0 - 11.0 10e9/L 10.1 11.1(H) 6.4 - - - 8.7    Hemoglobin 13.3 - 17.7 g/dL 12.0(L) 11.9(L) 13.0(L) - - - 14.7    Platelet Count 150 - 450 10e9/L 225 215 180 - - - 187    Neutrophils (Absolute) 1.6 - 8.3 10e9/L 7.0 7.6 4.2 - - - 6.3    Sodium 133 - 144 mmol/L 143 141 140 141 143 142 -    Potassium 3.4 - 5.3 mmol/L 4.1 3.9 4.0 3.9 3.7 3.9 -    Chloride 94 - 109 mmol/L 107 106 104 105 106 106 -    Glucose 70 - 99 mg/dL 89 97 87 88 93 96 -    Urea Nitrogen 7 - 30 mg/dL 37(H) 35(H) 38(H) 36(H) 34(H) 30 -    Creatinine 0.66 - 1.25 mg/dL 1.07 1.23 1.30(H) 1.59(H) 0.93 0.91 -    Calcium (Total) 8.5 - 10.1 mg/dL 8.8 8.7 8.8 8.7 8.7 8.6 -    Protein (Total) 6.8 - 8.8 g/dL 6.8 6.6(L) 6.7(L) - - - -    Albumin 3.4 - 5.0 g/dL 3.8 3.7 3.8 - - - -    Alkaline Phosphatase 40 - 150 U/L 97 96 80 - - - -    AST 0 - 45 U/L 19 16 19 - - - -    ALT 0 - 70 U/L 24 27 26 - - - -    MCV 78 - 100 fl 103(H) 105(H) 108(H) - - - 103(H)               Primary Care Provider Office Phone # Fax #    Cole MD Ad [phone]       [address] [place] [address]        Thank you!     Thank you for choosing [facility] BLOOD AND MARROW TRANSPLANT  for your care. Our goal is always to provide you with excellent care. Hearing back from our patients is one way we can continue to improve our services. Please take a few minutes to complete the written survey that you may receive in the mail after your visit with us. Thank you!             Your Updated Medication List - Protect others around you: Learn how to safely use, store and throw away your medicines at www.disposemymeds.org.          This list is accurate as of: 5/12/17 11:59 AM.  Always use your most recent med list.                    Brand Name Dispense Instructions for use    acyclovir 800 MG tablet    ZOVIRAX    60 tablet    Take 1 tablet (800 mg) by mouth 2 times daily       amLODIPine 5 MG tablet    NORVASC    30 tablet    Take 5 mg by mouth daily       aspirin 81 MG EC tablet      Take 81 mg by mouth daily Reported on 5/12/2017       carboxymethylcellulose 0.5 % Soln ophthalmic solution    carboxymethylcellulose sodium    1 Bottle    Place 1 drop into both eyes 3 times daily as needed for dry eyes       * cycloSPORINE modified 100 MG capsule    GENERIC EQUIVALENT    120 capsule    Take 1 capsule (100 mg) by mouth 2 times daily       * cycloSPORINE modified 25 MG capsule    GENERIC EQUIVALENT    60 capsule    Reported on 5/12/2017       desonide 0.05 % cream    DESOWEN    60 g    Apply topically 2 times daily       EUCERIN EX      PRN       fluconazole 100 MG tablet    DIFLUCAN    30 tablet    Take 1 tablet (100 mg) by mouth daily       furosemide 20 MG tablet    LASIX    30 tablet    Take 1 tablet (20 mg) by mouth daily       levofloxacin 250 MG tablet    LEVAQUIN    30 tablet    Take 1 tablet (250 mg) by mouth daily       levothyroxine 150 MCG tablet    SYNTHROID/LEVOTHROID    30 tablet    Take 1 tablet (150 mcg) by mouth daily       magnesium oxide 400 (241.3 MG) MG tablet    MAG-OX     Take 2 tablets daily       metoprolol 25 MG tablet    LOPRESSOR    60 tablet    Take 2 tablets (50 mg) by mouth 2 times daily       NITROGLYCERIN ER PO      Take by mouth as needed Reported on 5/12/2017       predniSONE 20 MG tablet    DELTASONE     Take 20 mg by mouth daily       ranitidine 150 MG tablet    ZANTAC    60 tablet    Take 1 tablet (150 mg) by mouth 2 times daily       rosuvastatin 5 MG tablet    CRESTOR    30 tablet    Take 1 tablet (5 mg) by mouth daily       sulfamethoxazole-trimethoprim 800-160 MG per tablet    BACTRIM DS/SEPTRA DS    30 tablet    Take 1 tablet by mouth 2 times daily On Monday's and Tuesday's only       ursodiol 300 MG  capsule    ACTIGALL    90 capsule    Take 1 capsule (300 mg) by mouth 3 times daily       * Notice:  This list has 2 medication(s) that are the same as other medications prescribed for you. Read the directions carefully, and ask your doctor or other care provider to review them with you.

## 2017-05-15 LAB
COPATH REPORT: NORMAL
HBV CORE AB SERPL QL IA: NONREACTIVE
HBV SURFACE AG SERPL QL IA: NONREACTIVE
HCV AB SERPL QL IA: NORMAL

## 2017-05-16 LAB — COPATH REPORT: NORMAL

## 2017-05-17 ENCOUNTER — OFFICE VISIT (OUTPATIENT)
Dept: TRANSPLANT | Facility: CLINIC | Age: 57
End: 2017-05-17
Attending: INTERNAL MEDICINE
Payer: COMMERCIAL

## 2017-05-17 VITALS
RESPIRATION RATE: 18 BRPM | SYSTOLIC BLOOD PRESSURE: 124 MMHG | OXYGEN SATURATION: 100 % | TEMPERATURE: 98.3 F | DIASTOLIC BLOOD PRESSURE: 86 MMHG | HEART RATE: 96 BPM

## 2017-05-17 DIAGNOSIS — D46.22 RAEB-2 (REFRACTORY ANEMIA WITH EXCESS BLASTS-2) (H): Primary | ICD-10-CM

## 2017-05-17 PROCEDURE — 99213 OFFICE O/P EST LOW 20 MIN: CPT | Mod: ZF

## 2017-05-17 RX ORDER — AMOXICILLIN 500 MG/1
2000 CAPSULE ORAL ONCE
Qty: 4 CAPSULE | Refills: 0 | Status: SHIPPED | OUTPATIENT
Start: 2017-05-17 | End: 2017-05-17

## 2017-05-17 ASSESSMENT — PAIN SCALES - GENERAL: PAINLEVEL: NO PAIN (0)

## 2017-05-17 NOTE — NURSING NOTE
"Oncology Rooming Note    May 17, 2017 3:19 PM   Byron Russo is a 56 year old male who presents for:    Chief Complaint   Patient presents with     RECHECK     patient here with MDS - here for provider visit post transplant anniversary     Initial Vitals: /86  Pulse 96  Temp 98.3  F (36.8  C) (Oral)  Resp 18  SpO2 100% Estimated body mass index is 37.17 kg/(m^2) as calculated from the following:    Height as of 1/18/17: 1.816 m (5' 11.5\").    Weight as of 5/12/17: 122.6 kg (270 lb 4.5 oz). There is no height or weight on file to calculate BSA.  No Pain (0) Comment: Data Unavailable   No LMP for male patient.  Allergies reviewed: Yes  Medications reviewed: Yes    Medications: Medication refills not needed today.  Pharmacy name entered into EPIC:    [facility] [place], [place] - [address] [facility] [id number]  [facility] [address] [place] [id number] [facility]    Clinical concerns: NA provider was NOT notified.    9 minutes for nursing intake (face to face time)     Maranda Barclay MA              "

## 2017-05-17 NOTE — PROGRESS NOTES
BMT Clinic Progress Note        HISTORY: Mr. 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  He is currently taking 30 mg of prednisone daily, decreased 3/8. Has very pronounced facial erythema and hyperpigmentation.  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 He, at [facility], started using a 's type mask with a colored shield early March 2017.     Interim events: Byron is here for follow up of lower leg swelling. This is much improved on intermittent lasix (1-2 time a week) .He is hopeful to go down in his steroids. He feels his face rash is stable to improved, dry skin only now. He reports no fevers, no cough or shortness of breath. Eating well without n/v/d/c. No dysuria or dark blood no blood in urine. Otherwise feeling well.        REVIEW OF SYSTEMS: The remainder of the 10-point review of systems is negative except for the pronounced facial erythema which he says greatly worsens over the course of the day and improves overnight.      PHYSICAL EXAMINATION:     There were no vitals taken for this visit.    GENERAL:  NAD. Pronounced hyperpigmentation and erythema of the face, floridly cushingoid. His mouth had subtle lichenoid changes, but no open ulcerations.     LUNGS:  Clear to auscultation. No fluid sounds on either base  CARDIAC:  Without S3, rub or murmur.   ABDOMEN:  Nondistended, active bowel sounds,  EXTREMITIES: pre-tibial trace edema, 1+ to ankles   Skin Decreased erythema face. Erythema in areas of sun exposure on forearms. Fine erythema on back.    LABORATORY DATA:      Lab Results   Component Value Date    WBC 8.7 05/12/2017     Lab Results   Component Value Date    RBC 4.23 05/12/2017     Lab Results   Component Value Date    HGB 14.7 05/12/2017     Lab Results   Component Value Date    HCT 43.4 05/12/2017     No components found for: MCT  Lab Results   Component Value Date     05/12/2017     Lab Results   Component Value  Date    MCH 34.8 05/12/2017     Lab Results   Component Value Date    MCHC 33.9 05/12/2017     Lab Results   Component Value Date    RDW 12.3 05/12/2017     Lab Results   Component Value Date     05/12/2017     Last Basic Metabolic Panel:  Lab Results   Component Value Date     05/19/2017      Lab Results   Component Value Date    POTASSIUM 4.1 05/19/2017     Lab Results   Component Value Date    CHLORIDE 107 05/19/2017     Lab Results   Component Value Date    TRACE 8.7 05/19/2017     Lab Results   Component Value Date    CO2 26 05/19/2017     Lab Results   Component Value Date    BUN 25 05/19/2017     Lab Results   Component Value Date    CR 0.82 05/19/2017     Lab Results   Component Value Date     05/19/2017         ASSESSMENT AND PLAN:   1. 371 days status post non-myeloablative allo-sib transplant for myelodysplastic syndrome with no evidence of relapse. 5-12-17 marrow shows no evidence of disease and 100% donor engraftment    2.  Chronic olhua-xocwrf-exja disease.  The pronounced localization of the worst part of the rash to his face suggests that this is some factor other than chronic zinns-xywenq-xrzh disease that is contributing to it.  In reviewing his meds it seems quite possible that voriconazole is implicated as it can cause photosensitization and he is exposed to bright light. I  held voriconazole and switched to fluconazole 3/8 and steroids reduced to 30mg daily. Now decreased to 20 mg daily. Am reluctant to taper further now b/o erythema on back, which may be worsening.    3.  LE edema: Significant improvement of edema. Continue prn lasix- BP Pressures very normal, norvasc had been decreased 3/8Plan: Run CSA on low end of normal.    FEN: Add electrolytes to Friday lab draw.    RTC Fernie two months.    Uli Enciso M.D.

## 2017-05-17 NOTE — MR AVS SNAPSHOT
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  After hours call the   hospital at [phone] or   [phone]. Ask for the BMT   fellow on-call            Follow-ups after your visit        Your next 10 appointments already scheduled     May 18, 2017  7:00 AM CDT   Return Visit with Alessandro Vigil DPM   [facility] ([facility])    [address] [place] [id number] [phone]            May 19, 2017  1:30 PM CDT   [facility] with  [facility]   [facility] ([facility])    [address] [place] [id number] [phone]            May 19, 2017  2:15 PM CDT   (Arrive by 2:00 PM)   Return Visit with Praveen Nation MD   [facility] Dermatology ([facility])    [address] [place] [address]  3rd [facility] [id number] [phone]            Jul 12, 2017  3:30 PM CDT   [facility] with  [facility]   [facility] ([facility])    [address] [place] [address] [phone]            Jul 12, 2017  4:00 PM CDT   Return with Uli Enciso MD   [facility] Blood and Marrow Transplant ([facility])    [address] [place] [address]    [phone]              Future tests that were ordered for you today     Open Future Orders        Priority Expected Expires Ordered    Comprehensive metabolic panel Routine 7/19/2017 5/17/2018 5/17/2017    CBC with platelets differential Routine 7/19/2017 5/17/2018 5/17/2017    Sirolimus level Routine 7/19/2017 5/17/2018 5/17/2017    CMV DNA quantification Routine 7/19/2017 5/17/2018 5/17/2017    Comprehensive metabolic panel Routine 5/19/2017 11/13/2017 5/17/2017            Who to contact     If you have questions or need follow up information about today's clinic visit or your schedule please contact [facility] B[facility] MARROW TRANSPL[facility] directly at [phone].  Normal or non-critical lab and imaging results will be communicated to you by UrbanBoundhart, letter or phone within 4 business days after the clinic has received the results. If you do not hear from us within 7 days, please contact the clinic through Urban[employer] or phone. If you have a critical or abnormal lab result, we will notify you by phone as soon as possible.  Submit refill requests through textmetix or call your pharmacy and they will forward the refill request to us. Please allow 3 business days for your refill to be completed.          Additional Information About Your Visit        textmetix Information     textmetix gives you secure access to your electronic health record. If you see a primary care provider, you can also send messages to your care team and make appointments. If you have questions, please call your primary care clinic.  If you do not have a primary care provider, please call [phone] and they will assist you.        Care EveryWhere ID     This is your Care EveryWhere ID. This could be used by other organizations to access your [place] medical records  [id number]        Your Vitals Were     Pulse Temperature Respirations Pulse Oximetry          96 98.3  F (36.8  C) (Oral) 18 100%         Blood Pressure from Last 3 Encounters:    05/17/17 124/86   05/12/17 128/87   04/19/17 116/77    Weight from Last 3 Encounters:   05/12/17 122.6 kg (270 lb 4.5 oz)   04/19/17 123.4 kg (272 lb)   03/29/17 119.7 kg (264 lb)                 Today's Medication Changes          These changes are accurate as of: 5/17/17  3:49 PM.  If you have any questions, ask your nurse or doctor.               Start taking these medicines.        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   Quantity:  4 capsule   Refills:  0            Where to get your medicines      These medications were sent to [address] [address]    Hours:  TRANSPLANT PHONE NUMBER [phone] Phone:  [phone]     amoxicillin 500 MG capsule                Recent Review Flowsheet Data     BMT Recent Results Latest Ref Rng & Units 3/21/2017 3/29/2017 4/19/2017 4/26/2017 5/1/2017 5/5/2017 5/12/2017    WBC 4.0 - 11.0 10e9/L 10.1 11.1(H) 6.4 - - - 8.7    Hemoglobin 13.3 - 17.7 g/dL 12.0(L) 11.9(L) 13.0(L) - - - 14.7    Platelet Count 150 - 450 10e9/L 225 215 180 - - - 187    Neutrophils (Absolute) 1.6 - 8.3 10e9/L 7.0 7.6 4.2 - - - 6.3    Sodium 133 - 144 mmol/L 143 141 140 141 143 142 140    Potassium 3.4 - 5.3 mmol/L 4.1 3.9 4.0 3.9 3.7 3.9 4.0    Chloride 94 - 109 mmol/L 107 106 104 105 106 106 103    Glucose 70 - 99 mg/dL 89 97 87 88 93 96 105(H)    Urea Nitrogen 7 - 30 mg/dL 37(H) 35(H) 38(H) 36(H) 34(H) 30 16    Creatinine 0.66 - 1.25 mg/dL 1.07 1.23 1.30(H) 1.59(H) 0.93 0.91 0.70    Calcium (Total) 8.5 - 10.1 mg/dL 8.8 8.7 8.8 8.7 8.7 8.6 9.0    Protein (Total) 6.8 - 8.8 g/dL 6.8 6.6(L) 6.7(L) - - - 6.7(L)    Albumin 3.4 - 5.0 g/dL 3.8 3.7 3.8 - - -  3.8    Alkaline Phosphatase 40 - 150 U/L 97 96 80 - - - 79    AST 0 - 45 U/L 19 16 19 - - - 16    ALT 0 - 70 U/L 24 27 26 - - - 23    MCV 78 - 100 fl 103(H) 105(H) 108(H) - - - 103(H)               Primary Care Provider Office Phone # Fax #    Radhamorteza MD Ad [phone]       [address] [address]        Thank you!     Thank you for choosing [facility]  for your care. Our goal is always to provide you with excellent care. Hearing back from our patients is one way we can continue to improve our services. Please take a few minutes to complete the written survey that you may receive in the mail after your visit with us. Thank you!             Your Updated Medication List - Protect others around you: Learn how to safely use, store and throw away your medicines at www.disposemymeds.org.          This list is accurate as of: 5/17/17  3:49 PM.  Always use your most recent med list.                   Brand Name Dispense Instructions for use    acyclovir 800 MG tablet    ZOVIRAX    60 tablet    Take 1 tablet (800 mg) by mouth 2 times daily       amLODIPine 5 MG tablet    NORVASC    30 tablet    Take 5 mg by mouth daily       amoxicillin 500 MG capsule    AMOXIL    4 capsule    Take 4 capsules (2,000 mg) by mouth once for 1 dose Take 30 min prior to procedure.       aspirin 81 MG EC tablet      Take 81 mg by mouth daily Reported on 5/12/2017       carboxymethylcellulose 0.5 % Soln ophthalmic solution    carboxymethylcellulose sodium    1 Bottle    Place 1 drop into both eyes 3 times daily as needed for dry eyes       * cycloSPORINE modified 100 MG capsule    GENERIC EQUIVALENT    120 capsule    Take 1 capsule (100 mg) by mouth 2 times daily       * cycloSPORINE modified 25 MG capsule    GENERIC EQUIVALENT    60 capsule    Reported on 5/12/2017       desonide 0.05 % cream    DESOWEN    60 g    Apply topically 2 times daily       EUCERIN EX      PRN        fluconazole 100 MG tablet    DIFLUCAN    30 tablet    Take 1 tablet (100 mg) by mouth daily       furosemide 20 MG tablet    LASIX    30 tablet    Take 1 tablet (20 mg) by mouth daily       levofloxacin 250 MG tablet    LEVAQUIN    30 tablet    Take 1 tablet (250 mg) by mouth daily       levothyroxine 150 MCG tablet    SYNTHROID/LEVOTHROID    30 tablet    Take 1 tablet (150 mcg) by mouth daily       magnesium oxide 400 (241.3 MG) MG tablet    MAG-OX     Take 2 tablets daily       metoprolol 25 MG tablet    LOPRESSOR    60 tablet    Take 2 tablets (50 mg) by mouth 2 times daily       NITROGLYCERIN ER PO      Take by mouth as needed Reported on 5/12/2017       predniSONE 20 MG tablet    DELTASONE     Take 20 mg by mouth daily       ranitidine 150 MG tablet    ZANTAC    60 tablet    Take 1 tablet (150 mg) by mouth 2 times daily       rosuvastatin 5 MG tablet    CRESTOR    30 tablet    Take 1 tablet (5 mg) by mouth daily       sulfamethoxazole-trimethoprim 800-160 MG per tablet    BACTRIM DS/SEPTRA DS    30 tablet    Take 1 tablet by mouth 2 times daily On Monday's and Tuesday's only       ursodiol 300 MG capsule    ACTIGALL    90 capsule    Take 1 capsule (300 mg) by mouth 3 times daily       * Notice:  This list has 2 medication(s) that are the same as other medications prescribed for you. Read the directions carefully, and ask your doctor or other care provider to review them with you.

## 2017-05-18 ENCOUNTER — OFFICE VISIT (OUTPATIENT)
Dept: PODIATRY | Facility: CLINIC | Age: 57
End: 2017-05-18
Payer: COMMERCIAL

## 2017-05-18 VITALS — SYSTOLIC BLOOD PRESSURE: 120 MMHG | HEART RATE: 93 BPM | DIASTOLIC BLOOD PRESSURE: 80 MMHG

## 2017-05-18 DIAGNOSIS — L60.0 INGROWING NAIL: Primary | ICD-10-CM

## 2017-05-18 PROCEDURE — 11750 EXCISION NAIL&NAIL MATRIX: CPT | Performed by: PODIATRIST

## 2017-05-18 RX ORDER — NEOMYCIN SULFATE, POLYMYXIN B SULFATE, HYDROCORTISONE 3.5; 10000; 1 MG/ML; [USP'U]/ML; MG/ML
1-2 SOLUTION/ DROPS AURICULAR (OTIC) 2 TIMES DAILY
Qty: 10 ML | Refills: 0 | Status: SHIPPED | OUTPATIENT
Start: 2017-05-18 | End: 2017-06-01

## 2017-05-18 ASSESSMENT — PAIN SCALES - GENERAL: PAINLEVEL: SEVERE PAIN (7)

## 2017-05-18 NOTE — NURSING NOTE
"Byron Russo's goals for this visit include: Evaluate bilateral great toenail issue.   He requests these members of his care team be copied on today's visit information: yes    PCP: Cole Duong    Referring Provider:  ESTABLISHED PATIENT  No address on file    Chief Complaint   Patient presents with     RECHECK     Toenail issues, bilateral great toes x 1 month. R>L       Initial /80  Pulse 93 Estimated body mass index is 37.17 kg/(m^2) as calculated from the following:    Height as of 1/18/17: 1.816 m (5' 11.5\").    Weight as of 5/12/17: 122.6 kg (270 lb 4.5 oz).  Medication Reconciliation: complete    "

## 2017-05-18 NOTE — PROGRESS NOTES
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     Social History Main Topics     Smoking status: Never Smoker     Smokeless tobacco: Former User     Alcohol use No     Drug use: No     Sexual activity: Not on file     Other Topics Concern     Not on file     Social History Narrative     Family History   Problem Relation Age of Onset     Myocardial Infarction Father 58     Coronary Artery Disease Father      Heart Failure Mother      Coronary Artery Disease Brother      Coronary Artery Disease Brother      CANCER Brother      GIST     Skin Cancer No family hx of      Melanoma No family hx of      Lab Results   Component Value Date    WBC 8.7 05/12/2017     Lab Results   Component Value Date    RBC 4.23 05/12/2017     Lab Results   Component Value Date    HGB 14.7 05/12/2017     Lab Results   Component Value Date    HCT 43.4 05/12/2017     No components found for: MCT  Lab Results   Component Value Date     05/12/2017     Lab Results   Component Value Date    MCH 34.8 05/12/2017     Lab Results   Component Value Date    MCHC 33.9 05/12/2017     Lab Results   Component Value Date    RDW 12.3 05/12/2017      Lab Results   Component Value Date     05/12/2017     Last Basic Metabolic Panel:  Lab Results   Component Value Date     05/12/2017      Lab Results   Component Value Date    POTASSIUM 4.0 05/12/2017     Lab Results   Component Value Date    CHLORIDE 103 05/12/2017     Lab Results   Component Value Date    TRACE 9.0 05/12/2017     Lab Results   Component Value Date    CO2 28 05/12/2017     Lab Results   Component Value Date    BUN 16 05/12/2017     Lab Results   Component Value Date    CR 0.70 05/12/2017     Lab Results   Component Value Date     05/12/2017     SUBJECTIVE FINDINGS:  A 56-year-old male who returns to clinic for recurrent ingrown toenails.  He relates it has been sore, started about a month ago.  Relates the right one seems to be worse but the left one is just as sore.  He stubs the right one occasionally and it really hurts.  His primary physician gave him amoxicillin 500 mg x4 to take prior to the procedure, which he did take today.  Relates no other specific relieving or aggravating factors, request P&A procedure.  Previous notes reviewed.       OBJECTIVE FINDINGS:  DP and PT are 2/4 bilaterally.  CFT is less than 3 seconds.  He has incurvated hallux nails bilaterally with some pressure erythema and edema on the medial lateral hallux nail borders bilaterally.  His right medial hallux nail border serosanguineous drainage with hyper granulation tissue.  Minimal erythema, no odor, no calor.  He has pain to palpation bilaterally.      ASSESSMENT AND PLAN:  Paronychia, left and right medial and lateral hallux nail borders, this is recurrent.  Diagnosis and treatment options discussed with the patient.  The patient requests P&A procedure.        PROCEDURE:  Potential risks, benefits, likely outcomes and followup discussed with the patient.  Consent signed.  The right and left foot were anesthetized with 5 mL of 1% lidocaine plain using a hallux digital block.  The right and left  foot were prepped and draped in sterile technique.  Anesthesia was checked and achieved on the left.  It was not achieved on the hypergranulation tissue area on the right medial nail border.  I put some topical lidocaine on it, and it still was not achieved, so another right hallux block with 3 mL of 1% lidocaine plain done, anesthesia was checked and achieved.  Left hallux was exsanguinated and a Penrose tourniquet applied to the base of the hallux, left medial and lateral hallux nail borders were freed from the margins using a dermal curet.  The nail borders were removed using an English anvil and a hemostat.  The nail borders were curetted of debris.  Phenol was applied to the medial and lateral hallux nail borders using cotton-tipped applicators x3 for about 30 seconds each.  The nail borders were copiously irrigated with rubbing alcohol.  The tourniquet was released and CFT returned to preop levels which was less than 3 seconds.  Silvadene cream and sterile compression dressing were applied to the left medial and lateral hallux nail border.  The same procedure was done on the right medial and lateral hallux nail border.  The patient opted for over-the-counter pain medications, prescription for Cortisporin otic solution given and use discussed with him.  Advised him on foot soaks, dressings dispensed.  He will return to clinic and see me in 1 week.

## 2017-05-18 NOTE — PATIENT INSTRUCTIONS
Thanks for coming today.  [facility]  [address] [place] [address]    To schedule future appointments in Ortho Clinic, you may call [phone].    To schedule ordered imaging by your provider:   Call Central Imaging [phone]    To schedule an injection ordered by your provider:  Call Central Imaging Injection scheduling line: [phone]  [facility] available online at:  [email or web address]    Please call if any further questions or concerns ([phone]).  Clinic hours 8 am to 5 pm.    Return to clinic (call) if symptoms worsen or fail to improve.

## 2017-05-18 NOTE — MR AVS SNAPSHOT
After Visit Summary   2017    Byron Russo    MRN: [id number]           Patient Information     Date Of Birth          1960        Visit Information        Provider Department      2017 7:00 AM Alessandro Vigil DPM [facility]        Today's Diagnoses     Ingrowing nail    -  1      Care Instructions    Thanks for coming today.  Ortho[facility]Sports Medicine Clinic  [address] [place] [address]    To schedule future appointments in Ortho Clinic, you may call [phone].    To schedule ordered imaging by your provider:   Call Central Imaging [phone]    To schedule an injection ordered by your provider:  Call Central Imaging Injection scheduling line: [phone]  [employer] available online at:  [email or web address]    Please call if any further questions or concerns ([phone]).  Clinic hours 8 am to 5 pm.    Return to clinic (call) if symptoms worsen or fail to improve.          Follow-ups after your visit        Your next 10 appointments already scheduled     May 19, 2017  1:30 PM CDT   [facility] Lab Draw with  [facility] DRAW   [facility] Masonic Lab Draw ([facility])    [address] [place] [id number] [phone]            May 19, 2017  2:15 PM CDT   (Arrive by 2:00 PM)   Return Visit with Praveen Nation MD   [facility] Dermatology ([facility])    [address] [place] [id number] [phone]            May 26, 2017  3:00 PM CDT   Return Visit with Alessandro Vigil DPM   [facility] ([facility])    [address] [place] [id number] [phone]            2017  3:30 PM CDT   Masonic Lab Draw with  [employer] [facility] DRAW   [facility] Masonic Lab Draw ([facility])    [address] [place] [id number] [phone]             Jul 12, 2017  4:00 PM CDT   Return with Uli Enciso MD   [facility] Blood and Marrow Transplant ([facility])    [address] [facility]  [address] [facility] [address] [phone]              Who to contact     If you have questions or need follow up information about today's clinic visit or your schedule please contact [facility] directly at [phone].  Normal or non-critical lab and imaging results will be communicated to you by [employer], letter or phone within 4 business days after the clinic has received the results. If you do not hear from us within 7 days, please contact the clinic through [employer] or phone. If you have a critical or abnormal lab result, we will notify you by phone as soon as possible.  Submit refill requests through [employer] or call your pharmacy and they will forward the refill request to us. Please allow 3 business days for your refill to be completed.          Additional Information About Your Visit        [employer] Information     [employer] gives you secure access to your electronic health record. If you see a primary care provider, you can also send messages to your care team and make appointments. If you have questions, please call your primary care clinic.  If you do not have a primary care provider, please call [phone] and they will assist you.      [employer] is an electronic gateway that provides easy, online access to your medical records. With [employer], you can request a clinic appointment, read your test results, renew a prescription or communicate with your care team.     To access your existing account, please contact your [facility] or call [phone] for assistance.        Care EveryWhere ID     This is your Care EveryWhere ID. This could be used by other organizations to access your [place] medical records  [id number]        Your Vitals Were     Pulse                   93            Blood  Pressure from Last 3 Encounters:   05/18/17 120/80   05/17/17 124/86   05/12/17 128/87    Weight from Last 3 Encounters:   05/12/17 122.6 kg (270 lb 4.5 oz)   04/19/17 123.4 kg (272 lb)   03/29/17 119.7 kg (264 lb)              We Performed the Following     REMOVAL NAIL/NAIL BED, PARTIAL OR COMPLETE     REMOVAL NAIL/NAIL BED, PARTIAL OR COMPLETE          Today's Medication Changes          These changes are accurate as of: 5/18/17 11:59 PM.  If you have any questions, ask your nurse or doctor.               Start taking these medicines.        Dose/Directions    neomycin-polymyxin-hydrocortisone otic solution   Commonly known as:  CORTISPORIN   Used for:  Ingrowing nail        Dose:  1-2 drop   1-2 drops by Other route 2 times daily for 14 days   Quantity:  10 mL   Refills:  0            Where to get your medicines      These medications were sent to [address] [address]    Hours:  TRANSPLANT PHONE NUMBER [phone] Phone:  [phone]     neomycin-polymyxin-hydrocortisone otic solution                Primary Care Provider Office Phone # Fax #    Julesarmen Duong [phone]       [place] [address] [address] [facility] [address]        Thank you!     Thank you for choosing [facility]  for your care. Our goal is always to provide you with excellent care. Hearing back from our patients is one way we can continue to improve our services. Please take a few minutes to complete the written survey that you may receive in the mail after your visit with us. Thank you!             Your Updated Medication List - Protect others around you: Learn how to safely use, store and throw away your medicines at www.disposemymeds.org.          This list is accurate as of: 5/18/17 11:59 PM.  Always use your most recent med list.                   Brand Name Dispense Instructions for use     acyclovir 800 MG tablet    ZOVIRAX    60 tablet    Take 1 tablet (800 mg) by mouth 2 times daily       amLODIPine 5 MG tablet    NORVASC    30 tablet    Take 5 mg by mouth daily       aspirin 81 MG EC tablet      Take 81 mg by mouth daily Reported on 5/12/2017       carboxymethylcellulose 0.5 % Soln ophthalmic solution    carboxymethylcellulose sodium    1 Bottle    Place 1 drop into both eyes 3 times daily as needed for dry eyes       * cycloSPORINE modified 100 MG capsule    GENERIC EQUIVALENT    120 capsule    Take 1 capsule (100 mg) by mouth 2 times daily       * cycloSPORINE modified 25 MG capsule    GENERIC EQUIVALENT    60 capsule    Reported on 5/12/2017       desonide 0.05 % cream    DESOWEN    60 g    Apply topically 2 times daily       EUCERIN EX      PRN       fluconazole 100 MG tablet    DIFLUCAN    30 tablet    Take 1 tablet (100 mg) by mouth daily       furosemide 20 MG tablet    LASIX    30 tablet    Take 1 tablet (20 mg) by mouth daily       levofloxacin 250 MG tablet    LEVAQUIN    30 tablet    Take 1 tablet (250 mg) by mouth daily       levothyroxine 150 MCG tablet    SYNTHROID/LEVOTHROID    30 tablet    Take 1 tablet (150 mcg) by mouth daily       magnesium oxide 400 (241.3 MG) MG tablet    MAG-OX     Take 2 tablets daily       metoprolol 25 MG tablet    LOPRESSOR    60 tablet    Take 2 tablets (50 mg) by mouth 2 times daily       neomycin-polymyxin-hydrocortisone otic solution    CORTISPORIN    10 mL    1-2 drops by Other route 2 times daily for 14 days       NITROGLYCERIN ER PO      Take by mouth as needed Reported on 5/12/2017       predniSONE 20 MG tablet    DELTASONE     Take 20 mg by mouth daily       ranitidine 150 MG tablet    ZANTAC    60 tablet    Take 1 tablet (150 mg) by mouth 2 times daily       rosuvastatin 5 MG tablet    CRESTOR    30 tablet    Take 1 tablet (5 mg) by mouth daily       sulfamethoxazole-trimethoprim 800-160 MG per tablet    BACTRIM DS/SEPTRA DS    30 tablet     Take 1 tablet by mouth 2 times daily On Monday's and Tuesday's only       ursodiol 300 MG capsule    ACTIGALL    90 capsule    Take 1 capsule (300 mg) by mouth 3 times daily       * Notice:  This list has 2 medication(s) that are the same as other medications prescribed for you. Read the directions carefully, and ask your doctor or other care provider to review them with you.

## 2017-05-19 ENCOUNTER — OFFICE VISIT (OUTPATIENT)
Dept: DERMATOLOGY | Facility: CLINIC | Age: 57
End: 2017-05-19

## 2017-05-19 DIAGNOSIS — D46.22 RAEB-2 (REFRACTORY ANEMIA WITH EXCESS BLASTS-2) (H): ICD-10-CM

## 2017-05-19 DIAGNOSIS — D89.811 CHRONIC GRAFT-VERSUS-HOST DISEASE (H): Primary | ICD-10-CM

## 2017-05-19 LAB
ALBUMIN SERPL-MCNC: 3.8 G/DL (ref 3.4–5)
ALP SERPL-CCNC: 82 U/L (ref 40–150)
ALT SERPL W P-5'-P-CCNC: 25 U/L (ref 0–70)
ANION GAP SERPL CALCULATED.3IONS-SCNC: 8 MMOL/L (ref 3–14)
AST SERPL W P-5'-P-CCNC: 21 U/L (ref 0–45)
BILIRUB SERPL-MCNC: 0.4 MG/DL (ref 0.2–1.3)
BUN SERPL-MCNC: 25 MG/DL (ref 7–30)
CALCIUM SERPL-MCNC: 8.7 MG/DL (ref 8.5–10.1)
CHLORIDE SERPL-SCNC: 107 MMOL/L (ref 94–109)
CO2 SERPL-SCNC: 26 MMOL/L (ref 20–32)
CREAT SERPL-MCNC: 0.82 MG/DL (ref 0.66–1.25)
GFR SERPL CREATININE-BSD FRML MDRD: ABNORMAL ML/MIN/1.7M2
GLUCOSE SERPL-MCNC: 118 MG/DL (ref 70–99)
POTASSIUM SERPL-SCNC: 4.1 MMOL/L (ref 3.4–5.3)
PROT SERPL-MCNC: 6.9 G/DL (ref 6.8–8.8)
SODIUM SERPL-SCNC: 141 MMOL/L (ref 133–144)

## 2017-05-19 PROCEDURE — 80053 COMPREHEN METABOLIC PANEL: CPT | Performed by: INTERNAL MEDICINE

## 2017-05-19 ASSESSMENT — PAIN SCALES - GENERAL: PAINLEVEL: NO PAIN (0)

## 2017-05-19 NOTE — NURSING NOTE
"Dermatology Rooming Note    Byron Russo's goals for this visit include:   Chief Complaint   Patient presents with     Derm Problem     Byron is here today for a 1 month follow up for GVHD. Byron notes\" everything is doing pretty good\"     Janel Graham MA    "

## 2017-05-19 NOTE — LETTER
"5/19/2017       RE: Byron Russo  [address] [facility] [address]     Dear Colleague,    Thank you for referring your patient, Byron Russo, to the [facility] DERMATOLOGY at [facility]. Please see a copy of my visit note below.    [facility] Dermatology Note    Dermatology Problem List:  1. Hx of myelodysplastic syndrome s/p non-myeloablative allo-sib transplant  2. 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 Facial erythema  - switched to fluconazole  - physical blocker sunscreen, desonide 0.05% cream  4. Sebaceous hyperplasia    Encounter Date: May 19, 2017    CC:   Chief Complaint   Patient presents with     Derm Problem     Byron is here today for a 1 month follow up for GVHD. Byron notes\" everything is doing pretty good\"     History of Present Illness:  Mr. Byron Russo is a 56 year old male who presents as a follow up for GVHD. Today the patient reports that today is doing pretty well. He saw his oncologist for a yearly visit and he is still in remission, but he still has chronic GVHD. He is wondering about sun screen use and when to apply the cream. He reports that his hands are pretty red and he states that this is from driving in the sun. He states that he does his best to prevent sun exposure. He notes that he switched to fluconazole and uses Lasix very occasionally. He also switched is sunscreen brand which has made a difference. The patient reports no other lesions of concern at this time.     Otherwise, the patient reports no painful, bleeding, nonhealing, or pruritic lesions, and denies new or changing moles.    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     Acute myeloid leukemia (H)     MDS (myelodysplastic syndrome) (H)     GVHD as complication of bone marrow " transplant (H)     Past Medical History:   Diagnosis Date     CAD (coronary artery disease) 2001     Hyperlipidemia      Hypothyroidism      Obesity      Pulmonary embolism (H) 2/2016     Varicose veins      Past Surgical History:   Procedure Laterality Date     APPENDECTOMY       ARTHROSCOPY KNEE WITH MEDIAL MENISCECTOMY  6/20/2011    Procedure:ARTHROSCOPY KNEE WITH MEDIAL MENISCECTOMY; Surgeon:SACHIN SAMANO; Location:[facility]     coronary artery stents placed x 5  2001     Social History:  The patient works with [employer]. The patient denies use of tanning beds.  He is .     Family History:  There is no family history of melanoma.    Medications:  Current Outpatient Prescriptions   Medication Sig Dispense Refill     neomycin-polymyxin-hydrocortisone (CORTISPORIN) otic solution 1-2 drops by Other route 2 times daily for 14 days 10 mL 0     cycloSPORINE modified (GENERIC EQUIVALENT) 100 MG capsule Take 1 capsule (100 mg) by mouth 2 times daily 120 capsule 11     cycloSPORINE modified (GENERIC EQUIVALENT) 25 MG capsule Reported on 5/12/2017 60 capsule 3     rosuvastatin (CRESTOR) 5 MG tablet Take 1 tablet (5 mg) by mouth daily 30 tablet 11     ursodiol (ACTIGALL) 300 MG capsule Take 1 capsule (300 mg) by mouth 3 times daily 90 capsule 11     acyclovir (ZOVIRAX) 800 MG tablet Take 1 tablet (800 mg) by mouth 2 times daily 60 tablet 11     sulfamethoxazole-trimethoprim (BACTRIM DS/SEPTRA DS) 800-160 MG per tablet Take 1 tablet by mouth 2 times daily On Monday's and Tuesday's only 30 tablet 6     Emollient (EUCERIN EX) PRN       desonide (DESOWEN) 0.05 % cream Apply topically 2 times daily 60 g 5     levofloxacin (LEVAQUIN) 250 MG tablet Take 1 tablet (250 mg) by mouth daily 30 tablet 3     amLODIPine (NORVASC) 5 MG tablet Take 5 mg by mouth daily  30 tablet 11     furosemide (LASIX) 20 MG tablet Take 1 tablet (20 mg) by mouth daily (Patient not taking: Reported on 5/12/2017) 30 tablet 1     fluconazole  (DIFLUCAN) 100 MG tablet Take 1 tablet (100 mg) by mouth daily 30 tablet 11     metoprolol (LOPRESSOR) 25 MG tablet Take 2 tablets (50 mg) by mouth 2 times daily 60 tablet 3     levothyroxine (SYNTHROID/LEVOTHROID) 150 MCG tablet Take 1 tablet (150 mcg) by mouth daily 30 tablet 11     ranitidine (ZANTAC) 150 MG tablet Take 1 tablet (150 mg) by mouth 2 times daily 60 tablet 11     predniSONE (DELTASONE) 20 MG tablet Take 20 mg by mouth daily   3     carboxymethylcellulose (CARBOXYMETHYLCELLULOSE SODIUM) 0.5 % SOLN ophthalmic solution Place 1 drop into both eyes 3 times daily as needed for dry eyes 1 Bottle 1     magnesium oxide (MAG-OX) 400 (241.3 MG) MG tablet Take 2 tablets daily       aspirin 81 MG EC tablet Take 81 mg by mouth daily Reported on 5/12/2017       NITROGLYCERIN ER PO Take by mouth as needed Reported on 5/12/2017       Allergies   Allergen Reactions     Atorvastatin Other (See Comments)     Back pain  Tolerates atrovastatin     Docosahexaenoic Acid (Dha)      Other reaction(s): Intolerance-Can't Take     Tape [Adhesive Tape] Rash     The patient mostly has problems with the paper taper.  When the Tegaderm was on for a week and they did not use adhesive remover it seemed like they were ripping my skin off with it.     Review of Systems:  Skin: No new/changing moles, nothing bleeding/nonhealing/painful/tender/rapidly-growing..    Physical exam:  Vitals: There were no vitals taken for this visit.  GEN: Well-appearing male, no discomfort or distress, cooperative with the exam  SKIN: Waist-up skin, which includes the head/face, neck, both arms, chest, back, abdomen, digits and/or nails was examined.  -  Ill defined blanchable pale pink patches without epidermal changes on the left upper abdomen  - Mild erythema on the inframammary regions and extending to left upper chest  - No other lesions of concern on areas examined.     Impression/Plan:  1. Graft vs host disease, chronic based on clinical diagnosis by  oncology. Biopsy previously demonstrated interface dermatitis with eosinophils suggesting drug eruption vs GVHD given notable follicular interface changes.  - Continue triamcinolone 0.1% ointment - apply BID prn to body areas  - Continue desonide 0.05% cream - apply BID prn to the face  - Continue gentle skin care with emollient use, especially on the back    2. Facial erythema. Unclear etiology. Question of photoallergic vs phototoxic rxn in combination with exposure to [employer] laser work.   - Continue sun screen. Discussion to apply 30 minutes before going outside and reapply every 1.5 hours.   - Continue desonide 0.05% cream - apply BID prn    Follow-up in 2 months, earlier for new or changing lesions.     Staff Involved:  Scribe Disclosure:   I, Soniya Lozada, am serving as a scribe to document services personally performed by Dr. Praveen Nation, based on data collection and the provider's statements to me.       Praveen Nation MD

## 2017-05-19 NOTE — PROGRESS NOTES
"[facility] Dermatology Note    Dermatology Problem List:  1. Hx of myelodysplastic syndrome s/p non-myeloablative allo-sib transplant  2. 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 Facial erythema  - switched to fluconazole  - physical blocker sunscreen, desonide 0.05% cream  4. Sebaceous hyperplasia    Encounter Date: May 19, 2017    CC:   Chief Complaint   Patient presents with     Derm Problem     Byron is here today for a 1 month follow up for GVHD. Byron notes\" everything is doing pretty good\"     History of Present Illness:  Mr. Byron Russo is a 56 year old male who presents as a follow up for GVHD. Today the patient reports that today is doing pretty well. He saw his oncologist for a yearly visit and he is still in remission, but he still has chronic GVHD. He is wondering about sun screen use and when to apply the cream. He reports that his hands are pretty red and he states that this is from driving in the sun. He states that he does his best to prevent sun exposure. He notes that he switched to fluconazole and uses Lasix very occasionally. He also switched is sunscreen brand which has made a difference. The patient reports no other lesions of concern at this time.     Otherwise, the patient reports no painful, bleeding, nonhealing, or pruritic lesions, and denies new or changing moles.    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     Acute myeloid leukemia (H)     MDS (myelodysplastic syndrome) (H)     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)     Past Medical History:   Diagnosis Date     CAD (coronary artery disease) 2001     Hyperlipidemia      Hypothyroidism      Obesity      Pulmonary embolism (H) 2/2016     Varicose veins      Past Surgical History:   Procedure Laterality Date     APPENDECTOMY       ARTHROSCOPY KNEE WITH " MEDIAL MENISCECTOMY  6/20/2011    Procedure:ARTHROSCOPY KNEE WITH MEDIAL MENISCECTOMY; Surgeon:SACHIN SAMANO; Location:[facility]     coronary artery stents placed x 5 2001     Social History:  The patient works with [employer]. The patient denies use of tanning beds.  He is .     Family History:  There is no family history of melanoma.    Medications:  Current Outpatient Prescriptions   Medication Sig Dispense Refill     neomycin-polymyxin-hydrocortisone (CORTISPORIN) otic solution 1-2 drops by Other route 2 times daily for 14 days 10 mL 0     cycloSPORINE modified (GENERIC EQUIVALENT) 100 MG capsule Take 1 capsule (100 mg) by mouth 2 times daily 120 capsule 11     cycloSPORINE modified (GENERIC EQUIVALENT) 25 MG capsule Reported on 5/12/2017 60 capsule 3     rosuvastatin (CRESTOR) 5 MG tablet Take 1 tablet (5 mg) by mouth daily 30 tablet 11     ursodiol (ACTIGALL) 300 MG capsule Take 1 capsule (300 mg) by mouth 3 times daily 90 capsule 11     acyclovir (ZOVIRAX) 800 MG tablet Take 1 tablet (800 mg) by mouth 2 times daily 60 tablet 11     sulfamethoxazole-trimethoprim (BACTRIM DS/SEPTRA DS) 800-160 MG per tablet Take 1 tablet by mouth 2 times daily On Monday's and Tuesday's only 30 tablet 6     Emollient (EUCERIN EX) PRN       desonide (DESOWEN) 0.05 % cream Apply topically 2 times daily 60 g 5     levofloxacin (LEVAQUIN) 250 MG tablet Take 1 tablet (250 mg) by mouth daily 30 tablet 3     amLODIPine (NORVASC) 5 MG tablet Take 5 mg by mouth daily  30 tablet 11     furosemide (LASIX) 20 MG tablet Take 1 tablet (20 mg) by mouth daily (Patient not taking: Reported on 5/12/2017) 30 tablet 1     fluconazole (DIFLUCAN) 100 MG tablet Take 1 tablet (100 mg) by mouth daily 30 tablet 11     metoprolol (LOPRESSOR) 25 MG tablet Take 2 tablets (50 mg) by mouth 2 times daily 60 tablet 3     levothyroxine (SYNTHROID/LEVOTHROID) 150 MCG tablet Take 1 tablet (150 mcg) by mouth daily 30 tablet 11     ranitidine (ZANTAC) 150 MG  tablet Take 1 tablet (150 mg) by mouth 2 times daily 60 tablet 11     predniSONE (DELTASONE) 20 MG tablet Take 20 mg by mouth daily   3     carboxymethylcellulose (CARBOXYMETHYLCELLULOSE SODIUM) 0.5 % SOLN ophthalmic solution Place 1 drop into both eyes 3 times daily as needed for dry eyes 1 Bottle 1     magnesium oxide (MAG-OX) 400 (241.3 MG) MG tablet Take 2 tablets daily       aspirin 81 MG EC tablet Take 81 mg by mouth daily Reported on 5/12/2017       NITROGLYCERIN ER PO Take by mouth as needed Reported on 5/12/2017       Allergies   Allergen Reactions     Atorvastatin Other (See Comments)     Back pain  Tolerates atrovastatin     Docosahexaenoic Acid (Dha)      Other reaction(s): Intolerance-Can't Take     Tape [Adhesive Tape] Rash     The patient mostly has problems with the paper taper.  When the Tegaderm was on for a week and they did not use adhesive remover it seemed like they were ripping my skin off with it.     Review of Systems:  Skin: No new/changing moles, nothing bleeding/nonhealing/painful/tender/rapidly-growing..    Physical exam:  Vitals: There were no vitals taken for this visit.  GEN: Well-appearing male, no discomfort or distress, cooperative with the exam  SKIN: Waist-up skin, which includes the head/face, neck, both arms, chest, back, abdomen, digits and/or nails was examined.  -  Ill defined blanchable pale pink patches without epidermal changes on the left upper abdomen  - Mild erythema on the inframammary regions and extending to left upper chest  - No other lesions of concern on areas examined.     Impression/Plan:  1. Graft vs host disease, chronic. Biopsy of L upper back previously demonstrated interface dermatitis with eosinophils suggesting drug eruption vs GVHD given notable follicular interface changes.  - Continue triamcinolone 0.1% ointment - apply BID prn to body areas  - Continue desonide 0.05% cream - apply BID prn to the face  - Continue gentle skin care with emollient use,  especially on the back    2. Facial erythema. Unclear etiology. Question of photoallergic vs phototoxic rxn in combination with exposure to YAG laser work.   - Continue sun screen. Discussion to apply 30 minutes before going outside and reapply every 1.5 hours.   - Continue desonide 0.05% cream - apply BID prn    Follow-up in 2 months, earlier for new or changing lesions.     Staff Involved:  Scribe Disclosure:   I, Soniya Lozada, am serving as a scribe to document services personally performed by Dr. Praveen Nation, based on data collection and the provider's statements to me.     Staff attestation:  The documentation recorded by the scribe accurately reflects the services I personally performed and the decisions I personally made.    Praveen Nation MD  Staff Dermatologist    Department of Dermatology

## 2017-05-19 NOTE — NURSING NOTE
Chief Complaint   Patient presents with     Lab Only     patient here with MDS - here for peripheral labs from right arm for chemistry panel recheck

## 2017-05-19 NOTE — MR AVS SNAPSHOT
After Visit Summary   5/19/2017    Byron Russo    MRN: [id number]           Patient Information     Date Of Birth          1960        Visit Information        Provider Department      5/19/2017 2:15 PM Praveen Nation MD [facility] D[facility]at[facility]        Today's Diagnoses     Chronic htgpj-hudgjr-oclp disease (H)    -  1      Care Instructions              Neutrogena Helioplex - SPF50 and broad spectrum        Follow-ups after your visit        Follow-up notes from your care team     Return in about 2 months (around 7/19/2017).      Your next 10 appointments already scheduled     May 26, 2017  3:00 PM CDT   Return Visit with Alessandro Vigil DPM   [facility] ([facility]    [address] [place] [id number] [phone]            Jul 12, 2017  3:30 PM CDT   [facility] with  [facility]   [facility] Draw ([facility])    [address] [place] [id number] [phone]            Jul 12, 2017  4:00 PM CDT   Return with Uli Enciso MD   [facility] Blood and Marrow Transplant ([facility])    [address] [place] [id number] [phone]            Jul 20, 2017  3:45 PM CDT   (Arrive by 3:30 PM)   Return Visit with Raymundo Chan MD   [facility] ([facility])    [address] [place] [id number] [phone]              Who to contact     Please call your clinic at [phone] to:    Ask questions about your health    Make or cancel appointments    Discuss your medicines    Learn about your test results    Speak to your doctor   If you have compliments or concerns about an experience at your clinic, or if you wish to file a complaint, please contact [facility] Patient Relations at [phone] or email us at  [email or web address]         Additional Information About Your Visit        MyChart Information     [employer] gives you secure access to your electronic health record. If you see a primary care provider, you can also send messages to your care team and make appointments. If you have questions, please call your primary care clinic.  If you do not have a primary care provider, please call [phone] and they will assist you.      [employer] is an electronic gateway that provides easy, online access to your medical records. With [employer], you can request a clinic appointment, read your test results, renew a prescription or communicate with your care team.     To access your existing account, please contact your [facility] or call [phone] for assistance.        Care EveryWhere ID     This is your Care EveryWhere ID. This could be used by other organizations to access your [facility] medical records  [id number]         Blood Pressure from Last 3 Encounters:   05/18/17 120/80   05/17/17 124/86   05/12/17 128/87    Weight from Last 3 Encounters:   05/12/17 122.6 kg (270 lb 4.5 oz)   04/19/17 123.4 kg (272 lb)   03/29/17 119.7 kg (264 lb)              Today, you had the following     No orders found for display       Primary Care Provider Office Phone # Fax #    Cole Duong [phone]       [place] [address] [address] [facility] [address]        Thank you!     Thank you for choosing [facility]  for your care. Our goal is always to provide you with excellent care. Hearing back from our patients is one way we can continue to improve our services. Please take a few minutes to complete the written survey that you may receive in the mail after your visit with us. Thank you!             Your Updated Medication List - Protect others around you: Learn how to safely use, store and throw away your medicines at www.disposemymeds.org.          This list  is accurate as of: 5/19/17  3:10 PM.  Always use your most recent med list.                   Brand Name Dispense Instructions for use    acyclovir 800 MG tablet    ZOVIRAX    60 tablet    Take 1 tablet (800 mg) by mouth 2 times daily       amLODIPine 5 MG tablet    NORVASC    30 tablet    Take 5 mg by mouth daily       aspirin 81 MG EC tablet      Take 81 mg by mouth daily Reported on 5/12/2017       carboxymethylcellulose 0.5 % Soln ophthalmic solution    carboxymethylcellulose sodium    1 Bottle    Place 1 drop into both eyes 3 times daily as needed for dry eyes       * cycloSPORINE modified 100 MG capsule    GENERIC EQUIVALENT    120 capsule    Take 1 capsule (100 mg) by mouth 2 times daily       * cycloSPORINE modified 25 MG capsule    GENERIC EQUIVALENT    60 capsule    Reported on 5/12/2017       desonide 0.05 % cream    DESOWEN    60 g    Apply topically 2 times daily       EUCERIN EX      PRN       fluconazole 100 MG tablet    DIFLUCAN    30 tablet    Take 1 tablet (100 mg) by mouth daily       furosemide 20 MG tablet    LASIX    30 tablet    Take 1 tablet (20 mg) by mouth daily       levofloxacin 250 MG tablet    LEVAQUIN    30 tablet    Take 1 tablet (250 mg) by mouth daily       levothyroxine 150 MCG tablet    SYNTHROID/LEVOTHROID    30 tablet    Take 1 tablet (150 mcg) by mouth daily       magnesium oxide 400 (241.3 MG) MG tablet    MAG-OX     Take 2 tablets daily       metoprolol 25 MG tablet    LOPRESSOR    60 tablet    Take 2 tablets (50 mg) by mouth 2 times daily       neomycin-polymyxin-hydrocortisone otic solution    CORTISPORIN    10 mL    1-2 drops by Other route 2 times daily for 14 days       NITROGLYCERIN ER PO      Take by mouth as needed Reported on 5/12/2017       predniSONE 20 MG tablet    DELTASONE     Take 20 mg by mouth daily       ranitidine 150 MG tablet    ZANTAC    60 tablet    Take 1 tablet (150 mg) by mouth 2 times daily       rosuvastatin 5 MG tablet    CRESTOR    30 tablet     Take 1 tablet (5 mg) by mouth daily       sulfamethoxazole-trimethoprim 800-160 MG per tablet    BACTRIM DS/SEPTRA DS    30 tablet    Take 1 tablet by mouth 2 times daily On Monday's and Tuesday's only       ursodiol 300 MG capsule    ACTIGALL    90 capsule    Take 1 capsule (300 mg) by mouth 3 times daily       * Notice:  This list has 2 medication(s) that are the same as other medications prescribed for you. Read the directions carefully, and ask your doctor or other care provider to review them with you.

## 2017-05-22 LAB — COPATH REPORT: NORMAL

## 2017-05-23 DIAGNOSIS — C92.01 ACUTE MYELOID LEUKEMIA IN REMISSION (H): Primary | ICD-10-CM

## 2017-05-23 RX ORDER — AMOXICILLIN 500 MG/1
2000 CAPSULE ORAL ONCE
Qty: 8 CAPSULE | Refills: 0 | Status: SHIPPED | OUTPATIENT
Start: 2017-05-23 | End: 2017-05-25

## 2017-05-25 RX ORDER — AMOXICILLIN 500 MG/1
2000 CAPSULE ORAL ONCE
Qty: 8 CAPSULE | Refills: 0 | Status: SHIPPED | OUTPATIENT
Start: 2017-05-25 | End: 2017-05-25

## 2017-05-26 ENCOUNTER — OFFICE VISIT (OUTPATIENT)
Dept: PODIATRY | Facility: CLINIC | Age: 57
End: 2017-05-26
Payer: COMMERCIAL

## 2017-05-26 VITALS — SYSTOLIC BLOOD PRESSURE: 130 MMHG | DIASTOLIC BLOOD PRESSURE: 80 MMHG

## 2017-05-26 DIAGNOSIS — L60.0 INGROWING NAIL: Primary | ICD-10-CM

## 2017-05-26 PROCEDURE — 99024 POSTOP FOLLOW-UP VISIT: CPT | Performed by: PODIATRIST

## 2017-05-26 ASSESSMENT — PAIN SCALES - GENERAL: PAINLEVEL: NO PAIN (0)

## 2017-05-26 NOTE — PATIENT INSTRUCTIONS
Thanks for coming today.  [facility]  [address] [place] [address]    To schedule future appointments in Ortho Clinic, you may call [phone].    To schedule ordered imaging by your provider:   Call [facility] [phone]    To schedule an injection ordered by your provider:  Call [facility] Injection scheduling line: [phone]  [employer] available online at:  [email or web address]    Please call if any further questions or concerns ([phone]).  Clinic hours 8 am to 5 pm.    Return to clinic (call) if symptoms worsen or fail to improve.

## 2017-05-26 NOTE — NURSING NOTE
"Byrno Russo's goals for this visit include: Recheck bilateral toes after P&A  He requests these members of his care team be copied on today's visit information: yes    PCP: Cole Duong    Referring Provider:  No referring provider defined for this encounter.    Chief Complaint   Patient presents with     RECHECK     Follow up after bilateral P&A       Initial /80 Estimated body mass index is 37.17 kg/(m^2) as calculated from the following:    Height as of 1/18/17: 1.816 m (5' 11.5\").    Weight as of 5/12/17: 122.6 kg (270 lb 4.5 oz).  Medication Reconciliation: complete    "

## 2017-05-26 NOTE — PROGRESS NOTES
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     Social History Main Topics     Smoking status: Never Smoker     Smokeless tobacco: Former User     Alcohol use No     Drug use: No     Sexual activity: Not on file     Other Topics Concern     Not on file     Social History Narrative     Family History   Problem Relation Age of Onset     Myocardial Infarction Father 58     Coronary Artery Disease Father      Heart Failure Mother      Coronary Artery Disease Brother      Coronary Artery Disease Brother      CANCER Brother      GIST     Skin Cancer No family hx of      Melanoma No family hx of      SUBJECTIVE:  A 56-year-old male returns to clinic for paronychia, status post P&A.  He relates he is doing well.  It has felt much better since we took them out.  A little bit of pain after the anesthetic wore off but not bad.  He has been doing the wound cares.  No new problems.      OBJECTIVE:  Right and left hallux nail borders:  Some maceration.  Minimal edema.  No gross erythema, no odor, no calor.  No active drainage.      ASSESSMENT AND PLAN:  Paronychia, left and right medial hallux nail borders.  These are  healing well.  Diagnosis and treatment options discussed with the patient.  He is status post P&A.  He can d/c local wound care as tolerated.  He will return to clinic and see me as needed.

## 2017-05-30 LAB — COPATH REPORT: NORMAL

## 2017-06-29 DIAGNOSIS — D46.9 MDS (MYELODYSPLASTIC SYNDROME) (H): ICD-10-CM

## 2017-06-29 RX ORDER — METOPROLOL TARTRATE 25 MG/1
50 TABLET, FILM COATED ORAL 2 TIMES DAILY
Qty: 60 TABLET | Refills: 5 | Status: SHIPPED | OUTPATIENT
Start: 2017-06-29 | End: 2018-01-02

## 2017-07-12 ENCOUNTER — APPOINTMENT (OUTPATIENT)
Dept: LAB | Facility: CLINIC | Age: 57
End: 2017-07-12
Attending: INTERNAL MEDICINE
Payer: COMMERCIAL

## 2017-07-12 ENCOUNTER — ONCOLOGY VISIT (OUTPATIENT)
Dept: TRANSPLANT | Facility: CLINIC | Age: 57
End: 2017-07-12
Attending: INTERNAL MEDICINE
Payer: COMMERCIAL

## 2017-07-12 VITALS
OXYGEN SATURATION: 97 % | WEIGHT: 290.3 LBS | RESPIRATION RATE: 16 BRPM | SYSTOLIC BLOOD PRESSURE: 140 MMHG | HEART RATE: 106 BPM | DIASTOLIC BLOOD PRESSURE: 88 MMHG | TEMPERATURE: 97.5 F | BODY MASS INDEX: 39.92 KG/M2

## 2017-07-12 DIAGNOSIS — D46.22 RAEB-2 (REFRACTORY ANEMIA WITH EXCESS BLASTS-2) (H): ICD-10-CM

## 2017-07-12 LAB
ALBUMIN SERPL-MCNC: 3.7 G/DL (ref 3.4–5)
ALP SERPL-CCNC: 74 U/L (ref 40–150)
ALT SERPL W P-5'-P-CCNC: 24 U/L (ref 0–70)
ANION GAP SERPL CALCULATED.3IONS-SCNC: 10 MMOL/L (ref 3–14)
AST SERPL W P-5'-P-CCNC: 19 U/L (ref 0–45)
BASOPHILS # BLD AUTO: 0 10E9/L (ref 0–0.2)
BASOPHILS NFR BLD AUTO: 0.5 %
BILIRUB SERPL-MCNC: 0.6 MG/DL (ref 0.2–1.3)
BUN SERPL-MCNC: 20 MG/DL (ref 7–30)
CALCIUM SERPL-MCNC: 8.5 MG/DL (ref 8.5–10.1)
CHLORIDE SERPL-SCNC: 105 MMOL/L (ref 94–109)
CO2 SERPL-SCNC: 25 MMOL/L (ref 20–32)
CREAT SERPL-MCNC: 0.96 MG/DL (ref 0.66–1.25)
CYCLOSPORINE BLD LC/MS/MS-MCNC: 60 UG/L (ref 50–400)
DIFFERENTIAL METHOD BLD: ABNORMAL
EOSINOPHIL # BLD AUTO: 0.1 10E9/L (ref 0–0.7)
EOSINOPHIL NFR BLD AUTO: 1.3 %
ERYTHROCYTE [DISTWIDTH] IN BLOOD BY AUTOMATED COUNT: 12.8 % (ref 10–15)
GFR SERPL CREATININE-BSD FRML MDRD: 82 ML/MIN/1.7M2
GLUCOSE SERPL-MCNC: 101 MG/DL (ref 70–99)
HCT VFR BLD AUTO: 42.8 % (ref 40–53)
HGB BLD-MCNC: 14.5 G/DL (ref 13.3–17.7)
IMM GRANULOCYTES # BLD: 0.1 10E9/L (ref 0–0.4)
IMM GRANULOCYTES NFR BLD: 0.7 %
LYMPHOCYTES # BLD AUTO: 1.7 10E9/L (ref 0.8–5.3)
LYMPHOCYTES NFR BLD AUTO: 21.1 %
MCH RBC QN AUTO: 33.3 PG (ref 26.5–33)
MCHC RBC AUTO-ENTMCNC: 33.9 G/DL (ref 31.5–36.5)
MCV RBC AUTO: 98 FL (ref 78–100)
MONOCYTES # BLD AUTO: 0.9 10E9/L (ref 0–1.3)
MONOCYTES NFR BLD AUTO: 11.3 %
NEUTROPHILS # BLD AUTO: 5.4 10E9/L (ref 1.6–8.3)
NEUTROPHILS NFR BLD AUTO: 65.1 %
NRBC # BLD AUTO: 0 10*3/UL
NRBC BLD AUTO-RTO: 0 /100
PLATELET # BLD AUTO: 185 10E9/L (ref 150–450)
POTASSIUM SERPL-SCNC: 3.5 MMOL/L (ref 3.4–5.3)
PROT SERPL-MCNC: 6.6 G/DL (ref 6.8–8.8)
RBC # BLD AUTO: 4.35 10E12/L (ref 4.4–5.9)
SODIUM SERPL-SCNC: 140 MMOL/L (ref 133–144)
TME LAST DOSE: NORMAL H
WBC # BLD AUTO: 8.3 10E9/L (ref 4–11)

## 2017-07-12 PROCEDURE — 36415 COLL VENOUS BLD VENIPUNCTURE: CPT | Performed by: INTERNAL MEDICINE

## 2017-07-12 PROCEDURE — 99213 OFFICE O/P EST LOW 20 MIN: CPT

## 2017-07-12 PROCEDURE — 99212 OFFICE O/P EST SF 10 MIN: CPT

## 2017-07-12 PROCEDURE — 80053 COMPREHEN METABOLIC PANEL: CPT | Performed by: INTERNAL MEDICINE

## 2017-07-12 PROCEDURE — 80158 DRUG ASSAY CYCLOSPORINE: CPT | Performed by: INTERNAL MEDICINE

## 2017-07-12 PROCEDURE — 80195 ASSAY OF SIROLIMUS: CPT | Performed by: INTERNAL MEDICINE

## 2017-07-12 PROCEDURE — 85025 COMPLETE CBC W/AUTO DIFF WBC: CPT | Performed by: INTERNAL MEDICINE

## 2017-07-12 RX ORDER — LISINOPRIL 10 MG/1
10 TABLET ORAL DAILY
Qty: 30 TABLET | Refills: 11 | Status: SHIPPED | OUTPATIENT
Start: 2017-07-12 | End: 2017-09-19

## 2017-07-12 RX ORDER — PREDNISONE 5 MG/1
5 TABLET ORAL DAILY
Qty: 30 TABLET | Refills: 11 | Status: SHIPPED | OUTPATIENT
Start: 2017-07-12 | End: 2017-09-19

## 2017-07-12 RX ORDER — PREDNISONE 10 MG/1
10 TABLET ORAL DAILY
Qty: 30 TABLET | Refills: 11 | Status: SHIPPED | OUTPATIENT
Start: 2017-07-12 | End: 2017-11-15

## 2017-07-12 ASSESSMENT — PAIN SCALES - GENERAL: PAINLEVEL: MODERATE PAIN (4)

## 2017-07-12 NOTE — PROGRESS NOTES
BMT Clinic Progress Note        HISTORY: Mr. Russo is now 14 months status post non-myeloablative allogeneic siblingPBSC for MDS RAEB-2.  He is currently taking 20 mg of prednisone daily, decreased 3/8/17. C/O 30+ pound weight gain, says this has happened since we switched him to amlodipine from lisinopril. Has lewsspronounced facial erythema and hyperpigmentation.  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 He, at [facility], started using a 's type mask with a colored shield early March 2017.     Interim events: Bryon is here for follow up of lower leg swelling. This worse. Says body weight up 30+  pounds since last visit.his steroids. He feels his face rash is stable to improved, dry skin only now. He reports no fevers, no cough or shortness of breath. Eating well without n/v/d/c. No dysuria or dark blood no blood in urine. Otherwise feeling well.        REVIEW OF SYSTEMS: The remainder of the 10-point review of systems is negative except for the pronounced facial erythema which he says greatly worsens over the course of the day and improves overnight.      PHYSICAL EXAMINATION:     /88  Pulse 106  Temp 97.5  F (36.4  C)  Resp 16  Wt 131.7 kg (290 lb 4.8 oz)  SpO2 97%  BMI 39.92 kg/m2    GENERAL:  NAD.Less pronounced hyperpigmentation and erythema of the face, floridly cushingoid. His mouth had subtle lichenoid changes, but no open ulcerations.     LUNGS:  Clear to auscultation. No fluid sounds on either base  CARDIAC:  Without S3, rub or murmur.   ABDOMEN:  Nondistended, active bowel sounds,  EXTREMITIES: pre-tibial 3+edema, 3+ to ankles   Skin Decreased erythema face. Erythema in areas of sun exposure on forearms. Fine erythema on back is improved..    LABORATORY DATA:      Lab Results   Component Value Date    WBC 8.3 07/12/2017     Lab Results   Component Value Date    RBC 4.35 07/12/2017     Lab Results   Component Value Date    HGB 14.5 07/12/2017     Lab Results    Component Value Date    HCT 42.8 07/12/2017     No components found for: MCT  Lab Results   Component Value Date    MCV 98 07/12/2017     Lab Results   Component Value Date    MCH 33.3 07/12/2017     Lab Results   Component Value Date    MCHC 33.9 07/12/2017     Lab Results   Component Value Date    RDW 12.8 07/12/2017     Lab Results   Component Value Date     07/12/2017     Last Basic Metabolic Panel:  Lab Results   Component Value Date     07/12/2017      Lab Results   Component Value Date    POTASSIUM 3.5 07/12/2017     Lab Results   Component Value Date    CHLORIDE 105 07/12/2017     Lab Results   Component Value Date    TRACE 8.5 07/12/2017     Lab Results   Component Value Date    CO2 25 07/12/2017     Lab Results   Component Value Date    BUN 20 07/12/2017     Lab Results   Component Value Date    CR 0.96 07/12/2017     Lab Results   Component Value Date     07/12/2017     Liver Function Studies -   Recent Labs   Lab Test  07/12/17   1511   PROTTOTAL  6.6*   ALBUMIN  3.7   BILITOTAL  0.6   ALKPHOS  74   AST  19   ALT  24             ASSESSMENT AND PLAN:   1. 14 months status post non-myeloablative allo-sib transplant for myelodysplastic syndrome with no evidence of relapse. 5-12-17 marrow shows no evidence of disease and 100% donor engraftment    2.  Chronic ssvkd-wivbbn-hkwg disease.  The pronounced localization of the worst part of the rash to his face suggests that this is some factor other than chronic wcedb-bnlakw-jaum disease that is contributing to it.  In reviewing his meds it seems quite possible that voriconazole is implicated as it can cause photosensitization and he is exposed to bright light. I  held voriconazole and switched to fluconazole 3/8/17 and steroids reduced to 30mg daily. Now will decrease to 15 mg daily. Am reluctant to taper further now b/o erythema on back, which may be worsening.    3.  LE edema: Significant increase in edema. 30 # weight gain. Continue prn  lasix- BP Stop amlodipine and start lisinopril, 10 mg daily.: Run CSA on low end of normal.    FEN: check electrolyrs in two weeks b/o starting lisinopril.    RTC Fernie two months.    Uli Enciso M.D.

## 2017-07-12 NOTE — MR AVS SNAPSHOT
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  After hours call the   hospital at [phone] or   [phone]. Ask for the BMT   fellow on-call            Follow-ups after your visit        Your next 10 appointments already scheduled     Jul 12, 2017  4:00 PM CDT   Return with Uli Enciso MD   [facility] Blood and Marrow Transplant ([facility])    [address] [place] [id number] [phone]            Jul 20, 2017  3:45 PM CDT   (Arrive by 3:30 PM)   Return Visit with Raymundo Chan MD   [facility] Dermatology ([facility])    [address] [place] [id number] [phone]              Future tests that were ordered for you today     Open Future Orders        Priority Expected Expires Ordered    Comprehensive metabolic panel Routine 9/20/2017 7/12/2018 7/12/2017    CBC with platelets differential Routine 9/20/2017 7/12/2018 7/12/2017    Cyclosporine Routine 9/20/2017 7/12/2018 7/12/2017    CMV DNA quantification Routine 9/20/2017 7/12/2018 7/12/2017    Basic metabolic panel Routine 7/26/2017 1/8/2018 7/12/2017            Who to contact     If you have questions or need follow up information about today's clinic visit or your schedule please contact [facility] BLOOD AND MARROW TRANSPLANT directly at  [phone].  Normal or non-critical lab and imaging results will be communicated to you by [employer], letter or phone within 4 business days after the clinic has received the results. If you do not hear from us within 7 days, please contact the clinic through SM[employer] or phone. If you have a critical or abnormal lab result, we will notify you by phone as soon as possible.  Submit refill requests through [employer] or call your pharmacy and they will forward the refill request to us. Please allow 3 business days for your refill to be completed.          Additional Information About Your Visit        [employer] Information     [employer] gives you secure access to your electronic health record. If you see a primary care provider, you can also send messages to your care team and make appointments. If you have questions, please call your primary care clinic.  If you do not have a primary care provider, please call [phone] and they will assist you.        Care EveryWhere ID     This is your Care EveryWhere ID. This could be used by other organizations to access your [facility] medical records  [id number]        Your Vitals Were     Pulse Temperature Respirations Pulse Oximetry BMI (Body Mass Index)       106 97.5  F (36.4  C) 16 97% 39.92 kg/m2        Blood Pressure from Last 3 Encounters:   07/12/17 140/88   05/26/17 130/80   05/18/17 120/80    Weight from Last 3 Encounters:   07/12/17 131.7 kg (290 lb 4.8 oz)   05/12/17 122.6 kg (270 lb 4.5 oz)   04/19/17 123.4 kg (272 lb)              We Performed the Following     CBC with platelets differential     CMV DNA quantification     Comprehensive metabolic panel     Cyclosporine          Today's Medication Changes          These changes are accurate as of: 7/12/17  3:55 PM.  If you have any questions, ask your nurse or doctor.               Start taking these medicines.        Dose/Directions    lisinopril 10 MG tablet   Commonly known as:  PRINIVIL/ZESTRIL   Used for:  RAEB-2  (refractory anemia with excess blasts-2) (H)   Started by:  Uli Enciso MD        Dose:  10 mg   Take 1 tablet (10 mg) by mouth daily   Quantity:  30 tablet   Refills:  11         These medicines have changed or have updated prescriptions.        Dose/Directions    * predniSONE 20 MG tablet   Commonly known as:  DELTASONE   This may have changed:  Another medication with the same name was added. Make sure you understand how and when to take each.   Used for:  MDS (myelodysplastic syndrome) (H)        Dose:  20 mg   Take 20 mg by mouth daily   Refills:  3       * predniSONE 10 MG tablet   Commonly known as:  DELTASONE   This may have changed:  You were already taking a medication with the same name, and this prescription was added. Make sure you understand how and when to take each.   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 Make sure you understand how and when to take each.   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 Read the directions carefully, and ask your doctor or other care provider to review them with you.      Stop taking these medicines if you haven't already. Please contact your care team if you have questions.     amLODIPine 5 MG tablet   Commonly known as:  NORVASC   Stopped by:  Uli Enciso MD                Where to get your medicines      These medications were sent to [facility]  [place], [place] - [address]  [address], [facility] [address]    Hours:  TRANSPLANT PHONE NUMBER [phone] Phone:  [phone]     lisinopril 10 MG tablet    predniSONE 10 MG tablet    predniSONE 5 MG tablet                Recent Review Flowsheet Data     BMT Recent Results Latest Ref Rng & Units 4/19/2017 4/26/2017 5/1/2017 5/5/2017 5/12/2017 5/19/2017 7/12/2017    WBC 4.0 - 11.0 10e9/L 6.4 - - - 8.7 - 8.3    Hemoglobin 13.3 - 17.7 g/dL 13.0(L) - - - 14.7 - 14.5    Platelet Count 150 - 450 10e9/L 180 - - - 187 - 185    Neutrophils (Absolute) 1.6 - 8.3 10e9/L 4.2 - - - 6.3 - 5.4    Sodium 133 - 144 mmol/L 140 141 143 142 140 141 140    Potassium 3.4 - 5.3 mmol/L 4.0 3.9 3.7 3.9 4.0 4.1 3.5    Chloride 94 - 109 mmol/L 104 105 106 106 103 107 105    Glucose 70 - 99 mg/dL 87 88 93 96 105(H) 118(H) 101(H)    Urea Nitrogen 7 - 30 mg/dL 38(H) 36(H) 34(H) 30 16 25 20    Creatinine 0.66 - 1.25 mg/dL 1.30(H) 1.59(H) 0.93 0.91 0.70 0.82 0.96    Calcium (Total) 8.5 - 10.1 mg/dL 8.8 8.7 8.7 8.6 9.0 8.7 8.5    Protein (Total) 6.8 - 8.8 g/dL 6.7(L) - - - 6.7(L) 6.9 6.6(L)    Albumin 3.4 - 5.0 g/dL 3.8 - - - 3.8 3.8 3.7    Alkaline Phosphatase 40 - 150 U/L 80 - - - 79 82 74    AST 0 - 45 U/L 19 - - - 16 21 19    ALT 0 - 70 U/L 26 - - - 23 25 24    MCV 78 - 100 fl 108(H) - - - 103(H) - 98               Primary Care Provider Office Phone # Fax #    Cole Duong [phone]       [address] [address]        Equal Access to Services     FELI ERICKSON : Allan Madison, waquin luqlyubov, qaybget kaalmachelly hickman, john julian. So [facility] [phone].    ATENCIÓN: Si habla español, tiene a hernandez disposición servicios gratuitos de asistencia lingüística. Shay al [phone].    We comply with applicable federal civil rights laws and [place] laws. We do not discriminate on the basis of race, color, national  origin, age, disability sex, sexual orientation or gender identity.            Thank you!     Thank you for choosing [facility] BLOOD AND MARROW TRANSPLANT  for your care. Our goal is always to provide you with excellent care. Hearing back from our patients is one way we can continue to improve our services. Please take a few minutes to complete the written survey that you may receive in the mail after your visit with us. Thank you!             Your Updated Medication List - Protect others around you: Learn how to safely use, store and throw away your medicines at www.disposemymeds.org.          This list is accurate as of: 7/12/17  3:55 PM.  Always use your most recent med list.                   Brand Name Dispense Instructions for use Diagnosis    acyclovir 800 MG tablet    ZOVIRAX    60 tablet    Take 1 tablet (800 mg) by mouth 2 times daily    MDS (myelodysplastic syndrome) (H)       aspirin 81 MG EC tablet      Take 81 mg by mouth daily Reported on 5/12/2017        carboxymethylcellulose 0.5 % Soln ophthalmic solution    carboxymethylcellulose sodium    1 Bottle    Place 1 drop into both eyes 3 times daily as needed for dry eyes    MDS (myelodysplastic syndrome) (H)       * cycloSPORINE modified 100 MG capsule    GENERIC EQUIVALENT    120 capsule    Take 1 capsule (100 mg) by mouth 2 times daily    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)       * cycloSPORINE modified 25 MG capsule    GENERIC EQUIVALENT    60 capsule    Reported on 5/12/2017    MDS (myelodysplastic syndrome) (H)       desonide 0.05 % cream    DESOWEN    60 g    Apply topically 2 times daily    Tgkpu-sozlfx-cdea disease of skin (H)       EUCERIN EX      PRN        fluconazole 100 MG tablet    DIFLUCAN    30 tablet    Take 1 tablet (100 mg) by mouth daily    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       furosemide 20 MG tablet    LASIX    30 tablet    Take 1 tablet (20 mg) by mouth daily    RAEB-2 (refractory anemia with  excess blasts-2) (H)       levofloxacin 250 MG tablet    LEVAQUIN    30 tablet    Take 1 tablet (250 mg) by mouth daily    MDS (myelodysplastic syndrome) (H)       levothyroxine 150 MCG tablet    SYNTHROID/LEVOTHROID    30 tablet    Take 1 tablet (150 mcg) by mouth daily    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       lisinopril 10 MG tablet    PRINIVIL/ZESTRIL    30 tablet    Take 1 tablet (10 mg) by mouth daily    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       magnesium oxide 400 (241.3 MG) MG tablet    MAG-OX     Take 2 tablets daily        metoprolol 25 MG tablet    LOPRESSOR    60 tablet    Take 2 tablets (50 mg) by mouth 2 times daily    MDS (myelodysplastic syndrome) (H)       NITROGLYCERIN ER PO      Take by mouth as needed Reported on 5/12/2017        * predniSONE 20 MG tablet    DELTASONE     Take 20 mg by mouth daily    MDS (myelodysplastic syndrome) (H)       * predniSONE 10 MG tablet    DELTASONE    30 tablet    Take 1 tablet (10 mg) by mouth daily    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       * predniSONE 5 MG tablet    DELTASONE    30 tablet    Take 1 tablet (5 mg) by mouth daily    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  Read the directions carefully, and ask your doctor or other care provider to review them with you.

## 2017-07-12 NOTE — NURSING NOTE
"Oncology Rooming Note    July 12, 2017 3:17 PM   Byron Russo is a 56 year old male who presents for:    Chief Complaint   Patient presents with     RECHECK     post bmt for MDS/RAEB  here for md visit and labs     Initial Vitals: /88  Pulse 106  Temp 97.5  F (36.4  C)  Resp 16  Wt 131.7 kg (290 lb 4.8 oz)  SpO2 97%  BMI 39.92 kg/m2 Estimated body mass index is 39.92 kg/(m^2) as calculated from the following:    Height as of 1/18/17: 1.816 m (5' 11.5\").    Weight as of this encounter: 131.7 kg (290 lb 4.8 oz). Body surface area is 2.58 meters squared.  Moderate Pain (4) Comment: left ribs   No LMP for male patient.  Allergies reviewed: Yes  Medications reviewed: Yes    Medications: Medication refills not needed today.  Pharmacy name entered into EPIC:    [place], [place] - [address] [facility] [address]  [facility] [address] [place] [address] [facility]    Clinical concerns: na     10 minutes for nursing intake (face to face time)     Fely Henry RN            "

## 2017-07-20 ENCOUNTER — OFFICE VISIT (OUTPATIENT)
Dept: DERMATOLOGY | Facility: CLINIC | Age: 57
End: 2017-07-20

## 2017-07-20 DIAGNOSIS — D89.811 CHRONIC GVHD (H): Primary | ICD-10-CM

## 2017-07-20 ASSESSMENT — PAIN SCALES - GENERAL: PAINLEVEL: MILD PAIN (3)

## 2017-07-20 NOTE — LETTER
7/20/2017       RE: Byron Russo  [address] [address] [facility] [id number]     Dear Colleague,    Thank you for referring your patient, Byron Russo, to the [facility] DERMATOLOGY at [facility]. Please see a copy of my visit note below.    [facility] Dermatology Note      Dermatology Problem List:  1. Hx of myelodysplastic syndrome s/p non-myeloablative allo-sib transplant  2. 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  However, given the clinical setting as well as the presence of notable follicular interface changes, GVHD cannot be entirely excluded. Close clinical followup is recommended. I have personally reviewed all specimens and or slides, including the listed special stains, and used them with my medical judgement to determine the final diagnosis.  - on prednisone taper, current dose 15mg daily  - on cyclosporine  - not using any lotions or creams, previously prescribed triamcinolone 0.1%   3. Facial erythema  - switched to fluconazole  - physical blocker sunscreen, not using desonide 0.05% cream  4. Sebaceous hyperplasia    Encounter Date: Jul 20, 2017    CC:   Chief Complaint   Patient presents with     Derm Problem     Byron is here today for a 2 month follow up for GVHD         History of Present Illness:  Mr. Byron Russo is a 56 year old male who presents as a follow-up for GVHD. The patient was last seen on 5/19/2017 by Dr. Nation and was doing well at that time; the patient was advised to continue his topical triamcinolone and desonide along with emollients. Generalized facial redness was also discussed and he was told to use careful sun protection and desonide 0.05% twice a day as needed. In the  "interim the patient has continued to do well. He has no been using any of the creams or any emollient lotions. A few weeks ago he had antibiotic pellets inserted into his gumline for treatment of gingivitis. Since that time, the redness in his face has greatly improved. He stated that he used to have a bright \"beet red\" almost purple tone to his face but now it is much more flesh-toned. Last week when he saw his oncologist he was tapered down from 20mg to 15mg because the oncologist thought that he was looking so much better. The patient has also switched to sensitive skin (Dove) soap and deoderant. He as been much more careful about sun exposure this summer as well, applying sun screen before his drive home in the afternoon and when he plans on being out of doors and wearing hats outside.    The patient states that when the GVHD flared during a cyclosporin taper in 11/2016, it started with a red color on the face. The red color on the body went away with steroid use but it remained persistent on the face. Until the antibiotic pellets as described above. He has otherwise been feeling well, no recent infections, no itching or painful skin lesions. He denies any areas of skin tightening.      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 The patient is  and lives with his wife.       Medications:  Current Outpatient Prescriptions   Medication Sig Dispense Refill     lisinopril (PRINIVIL/ZESTRIL) 10 MG tablet Take 1 tablet (10 mg) by mouth daily 30 tablet 11     predniSONE (DELTASONE) 10 MG tablet Take 1 tablet (10 mg) by mouth daily 30 tablet 11     predniSONE (DELTASONE) 5 MG tablet Take 1 tablet (5 mg) by mouth daily 30 tablet 11     metoprolol (LOPRESSOR) 25 MG tablet Take 2 tablets (50 mg) by mouth 2 times daily 60 tablet 5     cycloSPORINE modified (GENERIC EQUIVALENT) 100 MG capsule Take 1 capsule (100 mg) by mouth 2 times daily 120 capsule 11     cycloSPORINE modified (GENERIC EQUIVALENT) 25 MG capsule Reported on 5/12/2017 60 capsule 3     rosuvastatin (CRESTOR) 5 MG tablet Take 1 tablet (5 mg) by mouth daily 30 tablet 11     ursodiol (ACTIGALL) 300 MG capsule Take 1 capsule (300 mg) by mouth 3 times daily 90 capsule 11     acyclovir (ZOVIRAX) 800 MG tablet Take 1 tablet (800 mg) by mouth 2 times daily 60 tablet 11     sulfamethoxazole-trimethoprim (BACTRIM DS/SEPTRA DS) 800-160 MG per tablet Take 1 tablet by mouth 2 times daily On Monday's and Tuesday's only 30 tablet 6     Emollient (EUCERIN EX) PRN       desonide (DESOWEN) 0.05 % cream Apply topically 2 times daily 60 g 5     levofloxacin (LEVAQUIN) 250 MG tablet Take 1 tablet (250 mg) by mouth daily 30 tablet 3     furosemide (LASIX) 20 MG tablet Take 1 tablet (20 mg) by mouth daily 30 tablet 1     fluconazole (DIFLUCAN) 100 MG tablet Take 1 tablet (100 mg) by mouth daily 30 tablet 11     levothyroxine (SYNTHROID/LEVOTHROID) 150 MCG tablet Take 1 tablet (150 mcg) by mouth daily 30 tablet 11     ranitidine (ZANTAC) 150 MG tablet Take 1 tablet (150 mg) by mouth 2 times daily 60 tablet 11     predniSONE (DELTASONE) 20 MG tablet Take 20 mg by mouth daily   3     carboxymethylcellulose (CARBOXYMETHYLCELLULOSE SODIUM) 0.5 % SOLN  ophthalmic solution Place 1 drop into both eyes 3 times daily as needed for dry eyes 1 Bottle 1     magnesium oxide (MAG-OX) 400 (241.3 MG) MG tablet Take 2 tablets daily       aspirin 81 MG EC tablet Take 81 mg by mouth daily Reported on 5/12/2017       NITROGLYCERIN ER PO Take by mouth as needed Reported on 5/12/2017          Allergies   Allergen Reactions     Atorvastatin Other (See Comments)     Back pain  Tolerates atrovastatin     Docosahexaenoic Acid (Dha)      Other reaction(s): Intolerance-Can't Take     Tape [Adhesive Tape] Rash     The patient mostly has problems with the paper taper.  When the Tegaderm was on for a week and they did not use adhesive remover it seemed like they were ripping my skin off with it.         Review of Systems:  -As per HPI  -Constitutional: The patient denies fatigue, fevers, chills, unintended weight loss, and night sweats.  -HEENT: Patient denies nonhealing oral sores.  -Skin: As above in HPI. No additional skin concerns.    Physical exam:  Vitals: There were no vitals taken for this visit.  GEN: This is a well developed, well-nourished male in no acute distress, in a pleasant mood.    SKIN: Waist-up skin, which includes the head/face, neck, both arms, chest, back, abdomen, digits and/or nails was examined.  -Diffuse reddish tinged skin tone noted over abdomen and back, stretch marks with telangiectasias noted over midback  -Mildly reddish skin tone to face, no discrete lesions or papules, no dry or scaling skin  -No oral lesions  -No other lesions of concern on areas examined.     Impression/Plan:    1. Graft vs host disease, chronic: Patient has improved despite no topical treatments. Biopsy of L upper back previously demonstrated interface dermatitis with eosinophils suggesting drug eruption vs GVHD given notable follicular interface changes.  - Continue gentle skin care  - Continue careful sun protection     2. Facial erythema:  Improved. Unclear etiology. Question of  photoallergic vs phototoxic rxn in combination with exposure to YAG laser work.   - Continue sun screen. Discussion to apply 30 minutes before going outside and reapply every 1.5 hours.       CC Cole Toro on close of this encounter.  Follow-up prn for new or changing lesions.       Dr. Cahn staffed the patient.    Staff Involved:  Resident(Alison M. Lerman)/Staff(as above)    I have seen and examined this patient and agree with the assessment and plan as documented in the resident's note    Raymundo Chan MD  Dermatology Attending

## 2017-07-20 NOTE — PATIENT INSTRUCTIONS
- Please continue careful sun protection     - Follow up as needed      Sunscreens topical dosage forms  What are Sunscreens topical dosage forms?  SUNSCREENS protect your skin from the harmful effects of the sun and help to prevent sunburn. There are 2 different kinds of sunscreens called 'physical sunscreens' and 'chemical sunscreens.' Physical sunscreens reflect the sun's UV radiation. Chemical sunscreens absorb the sun's UV radiation. All physical sunscreens give UVA and UVB protection. All chemical sunscreens give UVB protection. Some chemical sunscreens give both UVA and UVB protection. Limiting the amount of time you spend in the sun and using sunscreens can help prevent wrinkles and skin damage, such as skin cancer.  What should my health care professional know before I receive Sunscreens?  They need to know if you have any of these conditions:    an unusual reaction to sunscreens, PABA, other medicines, foods, dyes, or preservatives    pregnant or trying to get pregnant    breast-feeding  How should this medicine be used?  The sun protection factor (SPF) found on the product label tells you how much protection a sunscreen offers. Products with high SPFs give more protection against the sun than products with low SPF. Choose a sunscreen product based on the type of activity in which you are involved, your age, site of application, your skin condition, and your skin type. Ask your pharmacist or health care professional about which sunscreen product is best for you.  Sunscreens are for external use only; apply only to the skin. Do not take by mouth. Apply evenly and liberally to all exposed areas of the skin 30 minutes before any sun exposure. Reapply sunscreens every 1--2 hours and after swimming, excessive sweating, or towel drying. Follow the directions on the product label.  Sunscreens are not recommended for infants less than 6 months of age. Infants in this age group should be kept out of the sun.  Children and infants who are 6 months of age and older should use sunscreens that contain an SPF of 15 or higher. Contact your pediatrician or health care professional regarding the use of this medicine in children.  Use topical insect repellants containing diethyltoluamide, DEET cautiously while using sunscreens. Sunscreens may increase the absorption of diethyltoluamide, DEET into the skin. This is especially important in children.  What if I miss a dose?  Apply it as soon as you remember.  What drug(s) may interact with Sunscreens?    Estrasorb  topical estrogen emulsion    insect repellants containing diethyltoluamide, DEET  Tell your prescriber or health care professional about all other medicines you are taking, including non-prescription medicines, nutritional supplements, or herbal products. Check with your health care professional before stopping or starting any of your medicines.  What should I watch for while taking Sunscreens?  Do not get sunscreen in your eyes. If you do, rinse out with plenty of cool water.  Minimize your exposure to the sun between the hours of 10 a.m. and 2 p.m. (11 a.m. and 3 p.m. daylight savings time). Be extra careful on cloudy or overcast days and around reflective surfaces such as concrete, sand, snow, or water. You should also wear protective clothing including a hat, long-sleeved shirt, and sunglasses. Avoid sunlamps and tanning beds.  Some sunscreens may discolor and stain light-colored fabrics yellow. Allow sunscreen to dry before covering the area to which the sunscreen was applied.  What side effects may I notice from receiving Sunscreens?  Side effects that you should report to your prescriber or health care professional as soon as possible:    dark red spots on the skin    painful, red, pus-filled blisters in hair follicles  Side effects that usually do not require medical attention (report to your prescriber or health care professional if they continue or are  bothersome):    acne    burning or itching of the skin    dry or irritated skin  If you experience skin irritation from a sunscreen you can try a different formulation to prevent the reaction from recurring.  Where can I keep my medicine?  Keep out of reach of children.  Store below 40 degrees C (104 degrees F), preferably at room temperature between 15--30 degrees C (59 and 86 degrees F), unless otherwise specified by the . Store away from heat and direct light. Replace sunscreens yearly to maintain their effectiveness. Discard after expiration date on the bottle.  NOTE:This sheet is a summary. It may not cover all possible information. If you have questions about this medicine, talk to your doctor, pharmacist, or health care provider. Copyright  2016 [employer]

## 2017-07-20 NOTE — PROGRESS NOTES
[facility] Dermatology Note      Dermatology Problem List:  1. Hx of myelodysplastic syndrome s/p non-myeloablative allo-sib transplant  2. 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  However, given the clinical setting as well as the presence of notable follicular interface changes, GVHD cannot be entirely excluded. Close clinical followup is recommended. I have personally reviewed all specimens and or slides, including the listed special stains, and used them with my medical judgement to determine the final diagnosis.  - on prednisone taper, current dose 15mg daily  - on cyclosporine  - not using any lotions or creams, previously prescribed triamcinolone 0.1%   3. Facial erythema  - switched to fluconazole  - physical blocker sunscreen, not using desonide 0.05% cream  4. Sebaceous hyperplasia    Encounter Date: Jul 20, 2017    CC:   Chief Complaint   Patient presents with     Derm Problem     Byron is here today for a 2 month follow up for GVHD         History of Present Illness:  Mr. Byron Russo is a 56 year old male who presents as a follow-up for GVHD. The patient was last seen on 5/19/2017 by Dr. Nation and was doing well at that time; the patient was advised to continue his topical triamcinolone and desonide along with emollients. Generalized facial redness was also discussed and he was told to use careful sun protection and desonide 0.05% twice a day as needed. In the interim the patient has continued to do well. He has no been using any of the creams or any emollient lotions. A few weeks ago he had antibiotic pellets inserted into his gumline for treatment of gingivitis. Since that time, the redness in his face has greatly improved. He stated that he used to have a  "bright \"beet red\" almost purple tone to his face but now it is much more flesh-toned. Last week when he saw his oncologist he was tapered down from 20mg to 15mg because the oncologist thought that he was looking so much better. The patient has also switched to sensitive skin (Dove) soap and deoderant. He as been much more careful about sun exposure this summer as well, applying sun screen before his drive home in the afternoon and when he plans on being out of doors and wearing hats outside.    The patient states that when the GVHD flared during a cyclosporin taper in 11/2016, it started with a red color on the face. The red color on the body went away with steroid use but it remained persistent on the face. Until the antibiotic pellets as described above. He has otherwise been feeling well, no recent infections, no itching or painful skin lesions. He denies any areas of skin tightening.      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     Acute myeloid leukemia (H)     MDS (myelodysplastic syndrome) (H)     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)     Past Medical History:   Diagnosis Date     CAD (coronary artery disease) 2001     Hyperlipidemia      Hypothyroidism      Obesity      Pulmonary embolism (H) 2/2016     Varicose veins      Past Surgical History:   Procedure Laterality Date     APPENDECTOMY       ARTHROSCOPY KNEE WITH MEDIAL MENISCECTOMY  6/20/2011    Procedure:ARTHROSCOPY KNEE WITH MEDIAL MENISCECTOMY; Surgeon:SACHIN SAMANO; Location:[facility] OR     coronary artery stents placed x 5  2001       Social History:  The patient works with [employer] to [employer]. The patient is  and lives with his wife.       Medications:  Current Outpatient Prescriptions   Medication Sig Dispense Refill     lisinopril (PRINIVIL/ZESTRIL) 10 MG tablet Take 1 tablet (10 mg) by mouth daily 30 tablet 11     predniSONE " (DELTASONE) 10 MG tablet Take 1 tablet (10 mg) by mouth daily 30 tablet 11     predniSONE (DELTASONE) 5 MG tablet Take 1 tablet (5 mg) by mouth daily 30 tablet 11     metoprolol (LOPRESSOR) 25 MG tablet Take 2 tablets (50 mg) by mouth 2 times daily 60 tablet 5     cycloSPORINE modified (GENERIC EQUIVALENT) 100 MG capsule Take 1 capsule (100 mg) by mouth 2 times daily 120 capsule 11     cycloSPORINE modified (GENERIC EQUIVALENT) 25 MG capsule Reported on 5/12/2017 60 capsule 3     rosuvastatin (CRESTOR) 5 MG tablet Take 1 tablet (5 mg) by mouth daily 30 tablet 11     ursodiol (ACTIGALL) 300 MG capsule Take 1 capsule (300 mg) by mouth 3 times daily 90 capsule 11     acyclovir (ZOVIRAX) 800 MG tablet Take 1 tablet (800 mg) by mouth 2 times daily 60 tablet 11     sulfamethoxazole-trimethoprim (BACTRIM DS/SEPTRA DS) 800-160 MG per tablet Take 1 tablet by mouth 2 times daily On Monday's and Tuesday's only 30 tablet 6     Emollient (EUCERIN EX) PRN       desonide (DESOWEN) 0.05 % cream Apply topically 2 times daily 60 g 5     levofloxacin (LEVAQUIN) 250 MG tablet Take 1 tablet (250 mg) by mouth daily 30 tablet 3     furosemide (LASIX) 20 MG tablet Take 1 tablet (20 mg) by mouth daily 30 tablet 1     fluconazole (DIFLUCAN) 100 MG tablet Take 1 tablet (100 mg) by mouth daily 30 tablet 11     levothyroxine (SYNTHROID/LEVOTHROID) 150 MCG tablet Take 1 tablet (150 mcg) by mouth daily 30 tablet 11     ranitidine (ZANTAC) 150 MG tablet Take 1 tablet (150 mg) by mouth 2 times daily 60 tablet 11     predniSONE (DELTASONE) 20 MG tablet Take 20 mg by mouth daily   3     carboxymethylcellulose (CARBOXYMETHYLCELLULOSE SODIUM) 0.5 % SOLN ophthalmic solution Place 1 drop into both eyes 3 times daily as needed for dry eyes 1 Bottle 1     magnesium oxide (MAG-OX) 400 (241.3 MG) MG tablet Take 2 tablets daily       aspirin 81 MG EC tablet Take 81 mg by mouth daily Reported on 5/12/2017       NITROGLYCERIN ER PO Take by mouth as needed  Reported on 5/12/2017          Allergies   Allergen Reactions     Atorvastatin Other (See Comments)     Back pain  Tolerates atrovastatin     Docosahexaenoic Acid (Dha)      Other reaction(s): Intolerance-Can't Take     Tape [Adhesive Tape] Rash     The patient mostly has problems with the paper taper.  When the Tegaderm was on for a week and they did not use adhesive remover it seemed like they were ripping my skin off with it.         Review of Systems:  -As per HPI  -Constitutional: The patient denies fatigue, fevers, chills, unintended weight loss, and night sweats.  -HEENT: Patient denies nonhealing oral sores.  -Skin: As above in HPI. No additional skin concerns.    Physical exam:  Vitals: There were no vitals taken for this visit.  GEN: This is a well developed, well-nourished male in no acute distress, in a pleasant mood.    SKIN: Waist-up skin, which includes the head/face, neck, both arms, chest, back, abdomen, digits and/or nails was examined.  -Diffuse reddish tinged skin tone noted over abdomen and back, stretch marks with telangiectasias noted over midback  -Mildly reddish skin tone to face, no discrete lesions or papules, no dry or scaling skin  -No oral lesions  -No other lesions of concern on areas examined.     Impression/Plan:    1. Graft vs host disease, chronic: Patient has improved despite no topical treatments. Biopsy of L upper back previously demonstrated interface dermatitis with eosinophils suggesting drug eruption vs GVHD given notable follicular interface changes.  - Continue gentle skin care  - Continue careful sun protection     2. Facial erythema:  Improved. Unclear etiology. Question of photoallergic vs phototoxic rxn in combination with exposure to Y[employer] laser work.   - Continue sun screen. Discussion to apply 30 minutes before going outside and reapply every 1.5 hours.       CC Cole Toro on close of this encounter.  Follow-up prn for new or changing lesions.       Dr. Chan  staffed the patient.    Staff Involved:  Resident(Alison M. Lerman)/Staff(as above)    I have seen and examined this patient and agree with the assessment and plan as documented in the resident's note    Raymundo Chan MD  Dermatology Attending

## 2017-07-20 NOTE — NURSING NOTE
Dermatology Rooming Note    Byron Russo's goals for this visit include:   Chief Complaint   Patient presents with     Derm Problem     Byron is here today for a 2 month follow up for GVHD     Janel Graham MA

## 2017-07-26 DIAGNOSIS — D46.22 RAEB-2 (REFRACTORY ANEMIA WITH EXCESS BLASTS-2) (H): ICD-10-CM

## 2017-07-26 LAB
ANION GAP SERPL CALCULATED.3IONS-SCNC: 8 MMOL/L (ref 3–14)
BUN SERPL-MCNC: 16 MG/DL (ref 7–30)
CALCIUM SERPL-MCNC: 8.6 MG/DL (ref 8.5–10.1)
CHLORIDE SERPL-SCNC: 105 MMOL/L (ref 94–109)
CO2 SERPL-SCNC: 25 MMOL/L (ref 20–32)
CREAT SERPL-MCNC: 0.88 MG/DL (ref 0.66–1.25)
GFR SERPL CREATININE-BSD FRML MDRD: 90 ML/MIN/1.7M2
GLUCOSE SERPL-MCNC: 115 MG/DL (ref 70–99)
POTASSIUM SERPL-SCNC: 3.9 MMOL/L (ref 3.4–5.3)
SODIUM SERPL-SCNC: 138 MMOL/L (ref 133–144)

## 2017-07-26 PROCEDURE — 80048 BASIC METABOLIC PNL TOTAL CA: CPT | Performed by: INTERNAL MEDICINE

## 2017-07-26 NOTE — NURSING NOTE
Chief Complaint   Patient presents with     Blood Draw     Labs drawn by RN from T     Labs drawn from R hand.  Anyi Nicholas RN

## 2017-08-08 DIAGNOSIS — D46.9 MDS (MYELODYSPLASTIC SYNDROME) (H): ICD-10-CM

## 2017-08-08 RX ORDER — LEVOFLOXACIN 250 MG/1
250 TABLET, FILM COATED ORAL DAILY
Qty: 30 TABLET | Refills: 5 | Status: SHIPPED | OUTPATIENT
Start: 2017-08-08 | End: 2018-01-30

## 2017-09-13 ENCOUNTER — ONCOLOGY VISIT (OUTPATIENT)
Dept: TRANSPLANT | Facility: CLINIC | Age: 57
End: 2017-09-13
Attending: INTERNAL MEDICINE
Payer: COMMERCIAL

## 2017-09-13 ENCOUNTER — APPOINTMENT (OUTPATIENT)
Dept: LAB | Facility: CLINIC | Age: 57
End: 2017-09-13
Attending: INTERNAL MEDICINE
Payer: COMMERCIAL

## 2017-09-13 VITALS
DIASTOLIC BLOOD PRESSURE: 92 MMHG | BODY MASS INDEX: 39.64 KG/M2 | HEART RATE: 82 BPM | SYSTOLIC BLOOD PRESSURE: 138 MMHG | TEMPERATURE: 98 F | OXYGEN SATURATION: 100 % | WEIGHT: 288.2 LBS

## 2017-09-13 DIAGNOSIS — D46.22 RAEB-2 (REFRACTORY ANEMIA WITH EXCESS BLASTS-2) (H): ICD-10-CM

## 2017-09-13 LAB
ALBUMIN SERPL-MCNC: 3.8 G/DL (ref 3.4–5)
ALP SERPL-CCNC: 75 U/L (ref 40–150)
ALT SERPL W P-5'-P-CCNC: 27 U/L (ref 0–70)
ANION GAP SERPL CALCULATED.3IONS-SCNC: 9 MMOL/L (ref 3–14)
AST SERPL W P-5'-P-CCNC: 16 U/L (ref 0–45)
BASOPHILS # BLD AUTO: 0.1 10E9/L (ref 0–0.2)
BASOPHILS NFR BLD AUTO: 0.6 %
BILIRUB SERPL-MCNC: 0.4 MG/DL (ref 0.2–1.3)
BUN SERPL-MCNC: 22 MG/DL (ref 7–30)
CALCIUM SERPL-MCNC: 8.7 MG/DL (ref 8.5–10.1)
CHLORIDE SERPL-SCNC: 106 MMOL/L (ref 94–109)
CO2 SERPL-SCNC: 25 MMOL/L (ref 20–32)
CREAT SERPL-MCNC: 1.2 MG/DL (ref 0.66–1.25)
DIFFERENTIAL METHOD BLD: ABNORMAL
EOSINOPHIL # BLD AUTO: 0.2 10E9/L (ref 0–0.7)
EOSINOPHIL NFR BLD AUTO: 2.3 %
ERYTHROCYTE [DISTWIDTH] IN BLOOD BY AUTOMATED COUNT: 12.9 % (ref 10–15)
GFR SERPL CREATININE-BSD FRML MDRD: 63 ML/MIN/1.7M2
GLUCOSE SERPL-MCNC: 98 MG/DL (ref 70–99)
HCT VFR BLD AUTO: 42.6 % (ref 40–53)
HGB BLD-MCNC: 14.6 G/DL (ref 13.3–17.7)
IMM GRANULOCYTES # BLD: 0.1 10E9/L (ref 0–0.4)
IMM GRANULOCYTES NFR BLD: 1.3 %
LYMPHOCYTES # BLD AUTO: 2.2 10E9/L (ref 0.8–5.3)
LYMPHOCYTES NFR BLD AUTO: 26.7 %
MCH RBC QN AUTO: 33.4 PG (ref 26.5–33)
MCHC RBC AUTO-ENTMCNC: 34.3 G/DL (ref 31.5–36.5)
MCV RBC AUTO: 98 FL (ref 78–100)
MONOCYTES # BLD AUTO: 1.1 10E9/L (ref 0–1.3)
MONOCYTES NFR BLD AUTO: 13.3 %
NEUTROPHILS # BLD AUTO: 4.7 10E9/L (ref 1.6–8.3)
NEUTROPHILS NFR BLD AUTO: 55.8 %
NRBC # BLD AUTO: 0 10*3/UL
NRBC BLD AUTO-RTO: 0 /100
PLATELET # BLD AUTO: 222 10E9/L (ref 150–450)
POTASSIUM SERPL-SCNC: 4 MMOL/L (ref 3.4–5.3)
PROT SERPL-MCNC: 7 G/DL (ref 6.8–8.8)
RBC # BLD AUTO: 4.37 10E12/L (ref 4.4–5.9)
SODIUM SERPL-SCNC: 140 MMOL/L (ref 133–144)
WBC # BLD AUTO: 8.3 10E9/L (ref 4–11)

## 2017-09-13 PROCEDURE — 80158 DRUG ASSAY CYCLOSPORINE: CPT | Performed by: INTERNAL MEDICINE

## 2017-09-13 PROCEDURE — 80053 COMPREHEN METABOLIC PANEL: CPT | Performed by: INTERNAL MEDICINE

## 2017-09-13 PROCEDURE — 99212 OFFICE O/P EST SF 10 MIN: CPT

## 2017-09-13 PROCEDURE — 36415 COLL VENOUS BLD VENIPUNCTURE: CPT

## 2017-09-13 PROCEDURE — 85025 COMPLETE CBC W/AUTO DIFF WBC: CPT | Performed by: INTERNAL MEDICINE

## 2017-09-13 RX ORDER — LISINOPRIL 20 MG/1
20 TABLET ORAL DAILY
Qty: 30 TABLET | Refills: 11 | Status: SHIPPED | OUTPATIENT
Start: 2017-09-13 | End: 2018-01-31

## 2017-09-13 ASSESSMENT — PAIN SCALES - GENERAL: PAINLEVEL: NO PAIN (0)

## 2017-09-13 NOTE — NURSING NOTE
Chief Complaint   Patient presents with     Blood Draw     Pt is here for a lab draw for MD visit     Fe Aguillon MA

## 2017-09-13 NOTE — NURSING NOTE
"Oncology Rooming Note    September 13, 2017 3:43 PM   Byron Russo is a 56 year old male who presents for:    Chief Complaint   Patient presents with     Blood Draw     Pt is here for a lab draw for MD visit     RECHECK     MDS and refractory anemia post bmt txp here for provider visit.      Initial Vitals: BP (!) 138/94 (BP Location: Right arm, Patient Position: Right side, Cuff Size: Adult Regular)  Temp 98  F (36.7  C) (Oral)  Wt 130.7 kg (288 lb 3.2 oz)  SpO2 100%  BMI 39.64 kg/m2 Estimated body mass index is 39.64 kg/(m^2) as calculated from the following:    Height as of 1/18/17: 1.816 m (5' 11.5\").    Weight as of this encounter: 130.7 kg (288 lb 3.2 oz). Body surface area is 2.57 meters squared.  No Pain (0) Comment: Data Unavailable   No LMP for male patient.  Allergies reviewed: Yes  Medications reviewed: Yes    Medications: Medication refills not needed today.  Pharmacy name entered into EPIC:    [facility] [place], [place] - [address] [facility] [id number]  [facility] [address] [place], [place] [id number] [facility]    Clinical concerns: Redness and oozing of the eye.    5 minutes for nursing intake (face to face time)     Michele Almonte RN              "

## 2017-09-13 NOTE — MR AVS SNAPSHOT
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  After hours call the   hospital at [phone] or   [phone]. 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 Eval for possible cGVHD of the eye.    Your provider has referred you to: [facility]: [facility] - [place] [phone]   [email or web address]    Please be aware that coverage of these services is subject to the terms and limitations of your health insurance plan.  Call member services at your health plan with any benefit or coverage questions.      Please bring the following with you to your appointment:    (1) Any X-Rays, CTs or MRIs which have been performed.  Contact the facility where they were done to arrange for  prior to your scheduled appointment.    (2) List of current medications  (3) This referral request   (4) Any documents/labs given to you for this referral                  Your next 10 appointments already scheduled     Sep 27, 2017  3:30 PM CDT   [facility] Lab Draw with  [facility] DRAW   [facility] Lab Draw ([facility])    [address]  [address] [facility] [address] [phone]            Nov 15, 2017  3:30 PM RJ Begum  Lab Draw with  [facility] LAB DRAW   [facility] Lab Draw ([facility])    [address] [facility]  [address] [facility] [address] [phone]            Nov 15, 2017  4:00 PM CST   Return with Uli Enciso MD   [facility] Blood and Marrow Transplant ([facility])    [address] [id number] [phone]              Future tests that were ordered for you today     Open Future Orders        Priority Expected Expires Ordered    Basic metabolic panel Routine 9/27/2017 3/12/2018 9/13/2017    Comprehensive metabolic panel Routine 11/15/2017 9/13/2018 9/13/2017    CBC with platelets differential Routine 11/15/2017 9/13/2018 9/13/2017    Cyclosporine Routine 11/15/2017 9/13/2018 9/13/2017    IgG Routine 11/15/2017 9/13/2018 9/13/2017            Who to contact     If you have questions or need follow up information about today's clinic visit or your schedule please contact [facility] BLOOD AND MARROW TRANSPLANT directly at [phone].  Normal or non-critical lab and imaging results will be communicated to you by Mailpilehart, letter or phone within 4 business days after the clinic has received the results. If you do not hear from us within 7 days, please contact the clinic through "[employer]t or phone. If you have a critical or abnormal lab result, we will notify you by phone as soon as possible.  Submit refill requests through [email or web address] or call your pharmacy and they will forward the refill request to us. Please allow 3 business days for your refill to be completed.          Additional Information About Your Visit        Mailpilehart Information     [email or web address] gives you secure access to your electronic health record. If you see a primary care provider, you can also send messages to your care team and make appointments. If you have questions, please call your primary care clinic.  If you do not have a primary care provider, please call [phone]  and they will assist you.        Care EveryWhere ID     This is your Care EveryWhere ID. This could be used by other organizations to access your [place] medical records  [id number]        Your Vitals Were     Pulse Temperature Pulse Oximetry BMI (Body Mass Index)          82 98  F (36.7  C) (Oral) 100% 39.64 kg/m2         Blood Pressure from Last 3 Encounters:   09/13/17 (!) 138/92   07/12/17 140/88   05/26/17 130/80    Weight from Last 3 Encounters:   09/13/17 130.7 kg (288 lb 3.2 oz)   07/12/17 131.7 kg (290 lb 4.8 oz)   05/12/17 122.6 kg (270 lb 4.5 oz)              We Performed the Following     CBC with platelets differential     CMV DNA quantification     Comprehensive metabolic panel     Cyclosporine     Ophthalmology Adult Referral          Today's Medication Changes          These changes are accurate as of: 9/13/17  4:31 PM.  If you have any questions, ask your nurse or doctor.               These medicines have changed or have updated prescriptions.        Dose/Directions    * lisinopril 10 MG tablet   Commonly known as:  PRINIVIL/ZESTRIL   This may have changed:  Another medication with the same name was added. Make sure you understand how and when to take each.   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 Make sure you understand how and when to take each.   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 Continue taking this medication, and follow the directions you see here.   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 Continue taking this medication, and follow the directions you see here.   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 Read the directions carefully, and ask your doctor or other care provider to review them with you.         Where to get your medicines      These medications were sent to [address] [address]    Hours:  TRANSPLANT PHONE NUMBER [phone] Phone:  [phone]     lisinopril 20 MG tablet                Recent Review Flowsheet Data     BMT Recent Results Latest Ref Rng & Units 5/1/2017 5/5/2017 5/12/2017 5/19/2017 7/12/2017 7/26/2017 9/13/2017    WBC 4.0 - 11.0 10e9/L - - 8.7 - 8.3 - 8.3    Hemoglobin 13.3 - 17.7 g/dL - - 14.7 - 14.5 - 14.6    Platelet Count 150 - 450 10e9/L - - 187 - 185 - 222    Neutrophils (Absolute) 1.6 - 8.3 10e9/L - - 6.3 - 5.4 - 4.7    INR 0.86 - 1.14 - - - - - - -    Sodium 133 - 144 mmol/L 143 142 140 141 140 138 140    Potassium 3.4 - 5.3 mmol/L 3.7 3.9 4.0 4.1 3.5 3.9 4.0    Chloride 94 - 109 mmol/L 106 106 103 107 105 105 106    Glucose 70 - 99 mg/dL 93 96 105(H) 118(H) 101(H) 115(H) 98    Urea Nitrogen 7 - 30 mg/dL 34(H) 30 16 25 20 16 22    Creatinine 0.66 - 1.25 mg/dL 0.93 0.91 0.70  0.82 0.96 0.88 1.20    Calcium (Total) 8.5 - 10.1 mg/dL 8.7 8.6 9.0 8.7 8.5 8.6 8.7    Protein (Total) 6.8 - 8.8 g/dL - - 6.7(L) 6.9 6.6(L) - 7.0    Albumin 3.4 - 5.0 g/dL - - 3.8 3.8 3.7 - 3.8    Bilirubin (Direct) 0.0 - 0.2 mg/dL - - - - - - -    Alkaline Phosphatase 40 - 150 U/L - - 79 82 74 - 75    AST 0 - 45 U/L - - 16 21 19 - 16    ALT 0 - 70 U/L - - 23 25 24 - 27    MCV 78 - 100 fl - - 103(H) - 98 - 98               Primary Care Provider Office Phone # Fax #    Cole Duong [phone]       [address] [address]        Equal Access to Services     YANETH ERICKSON : Hadrocky solero Soradha, waaxda luqadaha, qaybta kaalmada adeselinyachelly, john thompson . So [facility] [phone].    ATENCIÓN: Si habla español, tiene a hernandez disposición servicios gratuitos de asistencia lingüística. [facility] [phone].    We comply with applicable federal civil rights laws and [place] laws. We do not discriminate on the basis of race, color, national origin, age, disability sex, sexual orientation or gender identity.            Thank you!     Thank you for choosing [facility] B[facility]OD [facility] MARROW TRANSPLANT  for your care. Our goal is always to provide you with excellent care. Hearing back from our patients is one way we can continue to improve our services. Please take a few minutes to complete the written survey that you may receive in the mail after your visit with us. Thank you!             Your Updated Medication List - Protect others around you: Learn how to safely use, store and throw away your medicines at www.disposemymeds.org.          This list is accurate as of: 9/13/17  4:31 PM.  Always use your most recent med list.                   Brand Name Dispense Instructions for use Diagnosis    acyclovir 800 MG tablet    ZOVIRAX    60 tablet    Take 1 tablet (800 mg) by mouth 2 times daily    MDS (myelodysplastic syndrome) (H)       aspirin 81 MG EC  tablet      Take 81 mg by mouth daily Reported on 5/12/2017        carboxymethylcellulose 0.5 % Soln ophthalmic solution    carboxymethylcellulose sodium    1 Bottle    Place 1 drop into both eyes 3 times daily as needed for dry eyes    MDS (myelodysplastic syndrome) (H)       * cycloSPORINE modified 100 MG capsule    GENERIC EQUIVALENT    120 capsule    Take 1 capsule (100 mg) by mouth 2 times daily    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)       * cycloSPORINE modified 25 MG capsule    GENERIC EQUIVALENT    60 capsule    Reported on 5/12/2017    MDS (myelodysplastic syndrome) (H)       desonide 0.05 % cream    DESOWEN    60 g    Apply topically 2 times daily    Wqmgg-riycwv-gppc disease of skin (H)       EUCERIN EX      PRN        fluconazole 100 MG tablet    DIFLUCAN    30 tablet    Take 1 tablet (100 mg) by mouth daily    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       furosemide 20 MG tablet    LASIX    30 tablet    Take 1 tablet (20 mg) by mouth daily    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       levofloxacin 250 MG tablet    LEVAQUIN    30 tablet    Take 1 tablet (250 mg) by mouth daily    MDS (myelodysplastic syndrome) (H)       levothyroxine 150 MCG tablet    SYNTHROID/LEVOTHROID    30 tablet    Take 1 tablet (150 mcg) by mouth daily    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       * lisinopril 10 MG tablet    PRINIVIL/ZESTRIL    30 tablet    Take 1 tablet (10 mg) by mouth daily    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       * lisinopril 20 MG tablet    PRINIVIL/ZESTRIL    30 tablet    Take 1 tablet (20 mg) by mouth daily    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       magnesium oxide 400 (241.3 MG) MG tablet    MAG-OX     Take 2 tablets daily        metoprolol 25 MG tablet    LOPRESSOR    60 tablet    Take 2 tablets (50 mg) by mouth 2 times daily    MDS (myelodysplastic syndrome) (H)       NITROGLYCERIN ER PO      Take by mouth as  needed Reported on 5/12/2017        * predniSONE 10 MG tablet    DELTASONE    30 tablet    Take 1 tablet (10 mg) by mouth daily    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       * predniSONE 5 MG tablet    DELTASONE    30 tablet    Take 1 tablet (5 mg) by mouth daily    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 Read the directions carefully, and ask your doctor or other care provider to review them with you.

## 2017-09-13 NOTE — PROGRESS NOTES
HISTORY OF PRESENT ILLNESS:  I saw Mr. 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  He developed chronic zuxah-tbqgfo-ssvp disease which was rather severe and primarily involved his skin, but also involved abnormal liver function tests.  He is currently down to 15 mg of prednisone every other day.  He takes low-dose cyclosporine 100 mg p.o. b.i.d.  He states that he feels very healthy.  He is working full-time.  He has no fevers, chills, nausea, vomiting, diarrhea, cough, hemoptysis, shortness of breath, hematuria, dysuria, bruising or bleeding.  The remainder of the 10-point review of systems is negative except for some excess debris that accumulates frequently in his eyes.       PHYSICAL EXAMINATION:     VITAL SIGNS:  His blood pressure was 140/94.  Other vital signs were normal.     GENERAL:  He looks mildly cushingoid.  His skin is clearly less red in appearance than it had been 2 months ago and still continues to improve.   HEENT:  Sclerae were slightly injected-looking.  Buccal mucosa was intact.   LYMPH NODES:  No palpable supraclavicular, cervical, axillary or inguinal adenopathy.   LUNGS:  Clear to auscultation.   CARDIAC:  Without S3, rub or murmur.   ABDOMEN:  Nondistended, active bowel sounds, no organomegaly.   EXTREMITIES:  Trace pedal edema.   SKIN:  No skin rash.      LABORATORY DATA:  Labs today include a white count of 8,300, hemoglobin of 14.6 and platelets of 222,000.  Electrolyte panel was remarkable for slightly elevated creatinine of 1.20 and potassium of 4.0.  Liver function tests were normal.       ASSESSMENT AND PLAN:   1.  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    2.  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  He is markedly improved.  We will decrease the prednisone to 10 mg daily.      3.  Because of his hypertension which was documented again on a second pressure measurement, I will increase his lisinopril to 20 mg daily.     4.  I would like to have him come back in 2 weeks for a labs-only appointment to make sure his creatinine is not continuing to rise.  He will return to see me in 2 months.     5.  I also told him to decrease his cyclosporine from 100 to 75 mg p.o. b.i.d.

## 2017-09-14 LAB
CMV DNA SPEC NAA+PROBE-ACNC: NORMAL [IU]/ML
CMV DNA SPEC NAA+PROBE-LOG#: NORMAL {LOG_IU}/ML
CYCLOSPORINE BLD LC/MS/MS-MCNC: 84 UG/L (ref 50–400)
SPECIMEN SOURCE: NORMAL
TME LAST DOSE: NORMAL H

## 2017-09-19 ENCOUNTER — OFFICE VISIT (OUTPATIENT)
Dept: OPHTHALMOLOGY | Facility: CLINIC | Age: 57
End: 2017-09-19
Attending: OPHTHALMOLOGY
Payer: COMMERCIAL

## 2017-09-19 DIAGNOSIS — D46.22 RAEB-2 (REFRACTORY ANEMIA WITH EXCESS BLASTS-2) (H): ICD-10-CM

## 2017-09-19 DIAGNOSIS — H52.7 REFRACTIVE ERROR: ICD-10-CM

## 2017-09-19 DIAGNOSIS — H01.00A BLEPHARITIS OF UPPER AND LOWER EYELIDS OF BOTH EYES, UNSPECIFIED TYPE: ICD-10-CM

## 2017-09-19 DIAGNOSIS — H01.00B BLEPHARITIS OF UPPER AND LOWER EYELIDS OF BOTH EYES, UNSPECIFIED TYPE: ICD-10-CM

## 2017-09-19 DIAGNOSIS — Z94.81 BONE MARROW TRANSPLANT STATUS (H): Primary | ICD-10-CM

## 2017-09-19 DIAGNOSIS — H10.89 GIANT PAPILLARY CONJUNCTIVITIS: ICD-10-CM

## 2017-09-19 DIAGNOSIS — H04.123 BILATERAL DRY EYES: ICD-10-CM

## 2017-09-19 PROCEDURE — 99213 OFFICE O/P EST LOW 20 MIN: CPT | Mod: ZF

## 2017-09-19 RX ORDER — NEOMYCIN SULFATE, POLYMYXIN B SULFATE AND DEXAMETHASONE 3.5; 10000; 1 MG/ML; [USP'U]/ML; MG/ML
1 SUSPENSION/ DROPS OPHTHALMIC 3 TIMES DAILY
Qty: 1 BOTTLE | Refills: 3 | Status: SHIPPED | OUTPATIENT
Start: 2017-09-19 | End: 2017-11-15

## 2017-09-19 RX ORDER — CYCLOSPORINE 100 MG/1
75 CAPSULE, LIQUID FILLED ORAL 2 TIMES DAILY
COMMUNITY
End: 2017-11-15

## 2017-09-19 RX ORDER — NEOMYCIN SULFATE, POLYMYXIN B SULFATE, AND DEXAMETHASONE 3.5; 10000; 1 MG/G; [USP'U]/G; MG/G
1 OINTMENT OPHTHALMIC 3 TIMES DAILY
Qty: 1 TUBE | Refills: 3 | Status: SHIPPED | OUTPATIENT
Start: 2017-09-19 | End: 2017-11-15

## 2017-09-19 ASSESSMENT — CONF VISUAL FIELD
METHOD: COUNTING FINGERS
OD_NORMAL: 1
OS_NORMAL: 1

## 2017-09-19 ASSESSMENT — VISUAL ACUITY
CORRECTION_TYPE: CONTACTS
METHOD: SNELLEN - LINEAR
OD_CC: J1+
OS_CC: 20/20
OD_CC: 20/100+/-

## 2017-09-19 ASSESSMENT — EXTERNAL EXAM - RIGHT EYE: OD_EXAM: NORMAL

## 2017-09-19 ASSESSMENT — TONOMETRY
OS_IOP_MMHG: 14
IOP_METHOD: ICARE
OD_IOP_MMHG: 15

## 2017-09-19 ASSESSMENT — EXTERNAL EXAM - LEFT EYE: OS_EXAM: NORMAL

## 2017-09-19 ASSESSMENT — CUP TO DISC RATIO
OD_RATIO: 0.35
OS_RATIO: 0.4

## 2017-09-19 NOTE — NURSING NOTE
Chief Complaints and History of Present Illnesses   Patient presents with     New Patient     Possible GVHD     HPI    Affected eye(s):  Both   Symptoms:     Blurred vision   Decreased vision   Redness   Dryness   Burning   Eye discharge         Do you have eye pain now?:  No      Comments:  Using refresh 3-4 x daily. Symptoms started after taking levofloxacin. Wears contacts all day everyday. Last eye exam was about 1 year ago.   Michelle VAIL September 19, 2017 3:48 PM

## 2017-09-19 NOTE — LETTER
9/19/2017       RE: Byron Russo  [id number] [address] [facility] [address]     Dear Colleague,    Thank you for referring your patient, Byron Russo, to the EYE CLINIC at [facility]. Please see a copy of my visit note below.    Assessment & Plan      Byron Russo is a 56 year old male with the following diagnoses:   1. Bone marrow transplant status (H)    2. 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 Bilateral dry eyes    4. Giant papillary conjunctivitis - Both Eyes    5. Blepharitis of upper and lower eyelids of both eyes, unspecified type    6. 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  Increased eye mattering and redness both eyes for 4-5 months  Has seen dermatology and is s/p multiple sampling bx that were inconclusive for etiology.  Felt most likely to be a drug eruption    Ocular exam today significant for floppy eyelid syndrome, papillary conjunctivitis and blepharitis  Long history of contact lens use and belly sleeping  Low suspicion for graft versus host disease (GVHD) at this time    Recommend contact lens holiday  Start warm compresses twice a day  Maxitrol drops three times a day both eyes   Maxitrol sandy at bedtime both eyes to all eyelids  Artificial tears twice a day and as needed   Avoid sleeping on face      Patient disposition:   Return in about 4 weeks (around 10/17/2017) for VT only.          Attending Physician Attestation:  Complete documentation of historical and exam elements from today's encounter can be found in the full encounter summary report (not reduplicated in this progress note).  I personally obtained the chief complaint(s) and history of present illness.  I confirmed and edited as necessary the review  of systems, past medical/surgical history, family history, social history, and examination findings as documented by others; and I examined the patient myself.  I personally reviewed the relevant tests, images, and reports as documented above.  I formulated and edited as necessary the assessment and plan and discussed the findings and management plan with the patient and family. . - Jose Weems MD       Again, thank you for allowing me to participate in the care of your patient.      Sincerely,    Jose Weems MD

## 2017-09-19 NOTE — MR AVS SNAPSHOT
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      Your next 10 appointments already scheduled     Sep 27, 2017  3:30 PM CDT   Masonic Lab Draw with  MASONIC LAB DRAW   [facility] Masonic Lab Draw ([facility])    [address] [place] [id number] [phone]            Oct 19, 2017  2:45 PM CDT   RETURN GENERAL with Jose Weems MD   Eye Clinic ([facility])    [address]  [address] [place] [address] Clin 9a  [facility] [address]   [phone]            Nov 15, 2017  3:30 PM CST   Masonic Lab Draw with  MASONIC LAB DRAW   [facility] Masonic Lab Draw ([facility])    [address] [place] [id number] [phone]            Nov 15, 2017  4:00 PM CST   Return with Uli Enciso MD   [facility] Blood and Marrow Transplant ([facility])    [address] [place] [id number] [phone]              Who to contact     Please call your clinic at [phone] to:    Ask questions about your health    Make or cancel appointments    Discuss your medicines    Learn about your test results    Speak to your doctor   If you have compliments or concerns about an experience at your  clinic, or if you wish to file a complaint, please contact [facility] Patient Relations at [phone] or email us at [email or web address]         Additional Information About Your Visit        [employer] Information     [employer] gives you secure access to your electronic health record. If you see a primary care provider, you can also send messages to your care team and make appointments. If you have questions, please call your primary care clinic.  If you do not have a primary care provider, please call [phone] and they will assist you.      [employer] is an electronic gateway that provides easy, online access to your medical records. With [employer], you can request a clinic appointment, read your test results, renew a prescription or communicate with your care team.     To access your existing account, please contact your [facility] Clinic or call [phone] for assistance.        Care EveryWhere ID     This is your Care EveryWhere ID. This could be used by other organizations to access your [place] medical records  [id number]         Blood Pressure from Last 3 Encounters:   09/13/17 (!) 138/92   07/12/17 140/88   05/26/17 130/80    Weight from Last 3 Encounters:   09/13/17 130.7 kg (288 lb 3.2 oz)   07/12/17 131.7 kg (290 lb 4.8 oz)   05/12/17 122.6 kg (270 lb 4.5 oz)              Today, you had the following     No orders found for display         Today's Medication Changes          These changes are accurate as of: 9/19/17  4:58 PM.  If you have any questions, ask your nurse or doctor.               Start taking these medicines.        Dose/Directions    * neomycin-polymyxin-dexamethasone 3.5-70509-0.1 Oint ophthalmic ointment   Commonly known as:  MAXITROL   Used for:  Giant papillary conjunctivitis, Blepharitis of upper and lower eyelids of both eyes, unspecified type   Started by:  Jose Weems MD        Dose:  1 Application   Place 1  Application into both eyes 3 times daily   Quantity:  1 Tube   Refills:  3       * neomycin-polymyxin-dexamethasone 3.5-00825-0.1 Susp ophthalmic susp   Commonly known as:  MAXITROL   Used for:  Giant papillary conjunctivitis, Blepharitis of upper and lower eyelids of both eyes, unspecified type   Started by:  Jose Weems MD        Dose:  1 drop   Place 1 drop into both eyes 3 times daily   Quantity:  1 Bottle   Refills:  3       * Notice:  This list has 2 medication(s) that are the same as other medications prescribed for you. Read the directions carefully, and ask your doctor or other care provider to review them with you.      These medicines have changed or have updated prescriptions.        Dose/Directions    cycloSPORINE modified 100 MG capsule   Commonly known as:  GENERIC EQUIVALENT   This may have changed:  Another medication with the same name was removed. Continue taking this medication, and follow the directions you see here.   Changed by:  Jose Weems MD        Dose:  75 mg   Take 75 mg by mouth 2 times daily   Refills:  0       lisinopril 20 MG tablet   Commonly known as:  PRINIVIL/ZESTRIL   This may have changed:  Another medication with the same name was removed. Continue taking this medication, and follow the directions you see here.   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 Continue taking this medication, and follow the directions you see here.   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 Please contact your care team if you have questions.     furosemide 20 MG tablet   Commonly known as:  LASIX   Stopped by:  Jose Weems MD                Where to get your medicines      These medications were sent to [place], [place] - [address]  [address], [facility] [place] [facility] [address]    Hours:  TRANSPLANT PHONE NUMBER [phone] Phone:  [phone]     neomycin-polymyxin-dexamethasone 3.5-54187-8.1 Oint ophthalmic ointment    neomycin-polymyxin-dexamethasone 3.5-52005-6.1 Susp ophthalmic susp                Primary Care Provider Office Phone # Fax #    Cole Duong [phone]       [place] [address] [address] [facility] [address]        Equal Access to Services     FELI ERICKSON : Hadii aad ku hadasho Soradha, waaxda luqadaha, qaybta kaalmada vikc, john julian. So [facility] [phone].    ATENCIÓN: Si habla español, tiene a hernandez disposición servicios gratuitos de asistencia lingüística. Shay al [phone].    We comply with applicable federal civil rights laws and [place] laws. We do not discriminate on the basis of race, color, national origin, age, disability sex, sexual orientation or gender identity.            Thank you!     Thank you for choosing EYE CLINIC  for your care. Our goal is always to provide you with excellent care. Hearing back from our patients is one way we can continue to improve our services. Please take a few minutes to complete the written survey that you may receive in the mail after your visit with us. Thank you!             Your Updated Medication List - Protect others around you: Learn how to safely use, store and throw away your medicines at www.disposemymeds.org.          This list is accurate as of: 9/19/17  4:58 PM.  Always use your most recent med list.                   Brand Name Dispense Instructions for use Diagnosis    acyclovir 800  MG tablet    ZOVIRAX    60 tablet    Take 1 tablet (800 mg) by mouth 2 times daily    MDS (myelodysplastic syndrome) (H)       aspirin 81 MG EC tablet      Take 81 mg by mouth daily Reported on 5/12/2017        carboxymethylcellulose 0.5 % Soln ophthalmic solution    carboxymethylcellulose sodium    1 Bottle    Place 1 drop into both eyes 3 times daily as needed for dry eyes    MDS (myelodysplastic syndrome) (H)       cycloSPORINE modified 100 MG capsule    GENERIC EQUIVALENT     Take 75 mg by mouth 2 times daily        EUCERIN EX      PRN        fluconazole 100 MG tablet    DIFLUCAN    30 tablet    Take 1 tablet (100 mg) by mouth daily    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       levofloxacin 250 MG tablet    LEVAQUIN    30 tablet    Take 1 tablet (250 mg) by mouth daily    MDS (myelodysplastic syndrome) (H)       levothyroxine 150 MCG tablet    SYNTHROID/LEVOTHROID    30 tablet    Take 1 tablet (150 mcg) by mouth daily    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       lisinopril 20 MG tablet    PRINIVIL/ZESTRIL    30 tablet    Take 1 tablet (20 mg) by mouth daily    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       magnesium oxide 400 (241.3 MG) MG tablet    MAG-OX     Take 2 tablets daily        metoprolol 25 MG tablet    LOPRESSOR    60 tablet    Take 2 tablets (50 mg) by mouth 2 times daily    MDS (myelodysplastic syndrome) (H)       * neomycin-polymyxin-dexamethasone 3.5-42096-3.1 Oint ophthalmic ointment    MAXITROL    1 Tube    Place 1 Application into both eyes 3 times daily    Giant papillary conjunctivitis, Blepharitis of upper and lower eyelids of both eyes, unspecified type       * neomycin-polymyxin-dexamethasone 3.5-92321-8.1 Susp ophthalmic susp    MAXITROL    1 Bottle    Place 1 drop into both eyes 3 times daily    Giant papillary conjunctivitis, Blepharitis of upper and lower eyelids of both eyes, unspecified type       NITROGLYCERIN ER PO      Take by mouth as  needed Reported on 5/12/2017        predniSONE 10 MG tablet    DELTASONE    30 tablet    Take 1 tablet (10 mg) by mouth daily    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 Read the directions carefully, and ask your doctor or other care provider to review them with you.

## 2017-09-19 NOTE — PROGRESS NOTES
Assessment & Plan      Byron Russo is a 56 year old male with the following diagnoses:   1. Bone marrow transplant status (H)    2. 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 Bilateral dry eyes    4. Giant papillary conjunctivitis - Both Eyes    5. Blepharitis of upper and lower eyelids of both eyes, unspecified type    6. 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  Increased eye mattering and redness both eyes for 4-5 months  Has seen dermatology and is s/p multiple sampling bx that were inconclusive for etiology.  Felt most likely to be a drug eruption    Ocular exam today significant for floppy eyelid syndrome, papillary conjunctivitis and blepharitis  Long history of contact lens use and belly sleeping  Low suspicion for graft versus host disease (GVHD) at this time    Recommend contact lens holiday  Start warm compresses twice a day  Maxitrol drops three times a day both eyes   Maxitrol sandy at bedtime both eyes to all eyelids  Artificial tears twice a day and as needed   Avoid sleeping on face      Patient disposition:   Return in about 4 weeks (around 10/17/2017) for VT only.          Attending Physician Attestation:  Complete documentation of historical and exam elements from today's encounter can be found in the full encounter summary report (not reduplicated in this progress note).  I personally obtained the chief complaint(s) and history of present illness.  I confirmed and edited as necessary the review of systems, past medical/surgical history, family history, social history, and examination findings as documented by others; and I examined the patient myself.  I personally reviewed the relevant tests, images, and reports as documented above.  I formulated and edited as necessary the  assessment and plan and discussed the findings and management plan with the patient and family. . - Jose Weems MD

## 2017-09-27 DIAGNOSIS — D46.22 RAEB-2 (REFRACTORY ANEMIA WITH EXCESS BLASTS-2) (H): ICD-10-CM

## 2017-09-27 LAB
ANION GAP SERPL CALCULATED.3IONS-SCNC: 9 MMOL/L (ref 3–14)
BUN SERPL-MCNC: 22 MG/DL (ref 7–30)
CALCIUM SERPL-MCNC: 8.9 MG/DL (ref 8.5–10.1)
CHLORIDE SERPL-SCNC: 104 MMOL/L (ref 94–109)
CO2 SERPL-SCNC: 26 MMOL/L (ref 20–32)
CREAT SERPL-MCNC: 1.13 MG/DL (ref 0.66–1.25)
GFR SERPL CREATININE-BSD FRML MDRD: 67 ML/MIN/1.7M2
GLUCOSE SERPL-MCNC: 91 MG/DL (ref 70–99)
POTASSIUM SERPL-SCNC: 4.1 MMOL/L (ref 3.4–5.3)
SODIUM SERPL-SCNC: 139 MMOL/L (ref 133–144)

## 2017-09-27 PROCEDURE — 80048 BASIC METABOLIC PNL TOTAL CA: CPT | Performed by: INTERNAL MEDICINE

## 2017-10-16 DIAGNOSIS — D46.22 RAEB-2 (REFRACTORY ANEMIA WITH EXCESS BLASTS-2) (H): Primary | ICD-10-CM

## 2017-10-16 RX ORDER — AMOXICILLIN 500 MG/1
2000 CAPSULE ORAL ONCE
Qty: 8 CAPSULE | Refills: 0 | Status: SHIPPED | OUTPATIENT
Start: 2017-10-16 | End: 2017-10-16

## 2017-10-19 ENCOUNTER — OFFICE VISIT (OUTPATIENT)
Dept: OPHTHALMOLOGY | Facility: CLINIC | Age: 57
End: 2017-10-19
Attending: OPHTHALMOLOGY
Payer: COMMERCIAL

## 2017-10-19 DIAGNOSIS — H01.00A BLEPHARITIS OF UPPER AND LOWER EYELIDS OF BOTH EYES, UNSPECIFIED TYPE: ICD-10-CM

## 2017-10-19 DIAGNOSIS — H02.59 FLOPPY EYELID SYNDROME: ICD-10-CM

## 2017-10-19 DIAGNOSIS — H10.89 GIANT PAPILLARY CONJUNCTIVITIS: Primary | ICD-10-CM

## 2017-10-19 DIAGNOSIS — H01.00B BLEPHARITIS OF UPPER AND LOWER EYELIDS OF BOTH EYES, UNSPECIFIED TYPE: ICD-10-CM

## 2017-10-19 PROCEDURE — 99213 OFFICE O/P EST LOW 20 MIN: CPT | Mod: ZF

## 2017-10-19 ASSESSMENT — TONOMETRY
IOP_METHOD: ICARE
OS_IOP_MMHG: 09
OD_IOP_MMHG: 10

## 2017-10-19 ASSESSMENT — EXTERNAL EXAM - LEFT EYE: OS_EXAM: NORMAL

## 2017-10-19 ASSESSMENT — VISUAL ACUITY
OD_CC: 20/25
OS_CC+: -2
OS_CC: 20/30
METHOD: SNELLEN - LINEAR
CORRECTION_TYPE: GLASSES
OD_CC+: -1

## 2017-10-19 ASSESSMENT — CUP TO DISC RATIO
OD_RATIO: 0.35
OS_RATIO: 0.4

## 2017-10-19 ASSESSMENT — CONF VISUAL FIELD
OS_NORMAL: 1
METHOD: COUNTING FINGERS
OD_NORMAL: 1

## 2017-10-19 ASSESSMENT — EXTERNAL EXAM - RIGHT EYE: OD_EXAM: NORMAL

## 2017-10-19 NOTE — NURSING NOTE
Chief Complaints and History of Present Illnesses   Patient presents with     Follow Up For     Giant papillary conjunctivitis, Belpharitis, ELÍAS - Both Eyes     HPI    Affected eye(s):  Both   Symptoms:     No blurred vision   No decreased vision   Eye discharge (Comment: mild)         Do you have eye pain now?:  No      Comments:  Pt states his eyes are more normal now. A lot less symptoms. Comfort and vision is good.  Noemy VAIL 2:48 PM October 19, 2017

## 2017-10-19 NOTE — MR AVS SNAPSHOT
After Visit Summary   10/19/2017    Byron Russo    MRN: [id number]           Patient Information     Date Of Birth          1960        Visit Information        Provider Department      10/19/2017 2:45 PM Jose Weems MD [facility]        Today's Diagnoses     Giant papillary conjunctivitis - Both Eyes    -  1    Blepharitis of upper and lower eyelids of both eyes, unspecified type        Floppy eyelid syndrome - Both Eyes           Follow-ups after your visit        Follow-up notes from your care team     Return in about 1 year (around 10/19/2018), or if symptoms worsen or fail to improve.      Your next 10 appointments already scheduled     Nov 15, 2017  3:30 PM CST   Masonic Lab Draw with  MASONIC LAB DRAW   [facility] Masonic Lab Draw ([facility])    [address] [place] [id number] [phone]            Nov 15, 2017  4:00 PM CST   Return with Uli Enciso MD   [facility] Blood and Marrow Transplant ([facility])    [address] [place] [id number] [phone]              Who to contact     Please call your clinic at [phone] to:    Ask questions about your health    Make or cancel appointments    Discuss your medicines    Learn about your test results    Speak to your doctor   If you have compliments or concerns about an experience at your clinic, or if you wish to file a complaint, please contact [facility] Patient Relations at [phone] or email us at [email or web address]         Additional Information About Your Visit        MyChart Information     [employer] gives you secure access to your electronic health record. If you see a primary care provider, you can also send messages to your care team and make appointments. If you have questions, please call your primary care clinic.  If you do not have a primary care provider,  please call [phone] and they will assist you.      [employer] is an electronic gateway that provides easy, online access to your medical records. With [employer], you can request a clinic appointment, read your test results, renew a prescription or communicate with your care team.     To access your existing account, please contact your [facility] or call [phone] for assistance.        Care EveryWhere ID     This is your Care EveryWhere ID. This could be used by other organizations to access your [place] medical records  [id number]         Blood Pressure from Last 3 Encounters:   09/13/17 (!) 138/92   07/12/17 140/88   05/26/17 130/80    Weight from Last 3 Encounters:   09/13/17 130.7 kg (288 lb 3.2 oz)   07/12/17 131.7 kg (290 lb 4.8 oz)   05/12/17 122.6 kg (270 lb 4.5 oz)              Today, you had the following     No orders found for display       Primary Care Provider Office Phone # Fax #    Cole Duong [phone]       [facility] [address] [facility] [address]        Equal Access to Services     YANETH ERICKSON : Hadii soledad solero Soradha, waaxda luqadaha, qaybta kaalmada adeselinyada, john julian. So [facility] [phone].    ATENCIÓN: Si habla español, tiene a hernandez disposición servicios gratuitos de asistencia lingüística. [facility] [phone].    We comply with applicable federal civil rights laws and [place] laws. We do not discriminate on the basis of race, color, national origin, age, disability, sex, sexual orientation, or gender identity.            Thank you!     Thank you for choosing EYE CLINIC  for your care. Our goal is always to provide you with excellent care. Hearing back from our patients is one way we can continue to improve our services. Please take a few minutes to complete the written survey that you may receive in the mail after your visit with us. Thank you!             Your Updated  Medication List - Protect others around you: Learn how to safely use, store and throw away your medicines at www.disposemymeds.org.          This list is accurate as of: 10/19/17  4:46 PM.  Always use your most recent med list.                   Brand Name Dispense Instructions for use Diagnosis    acyclovir 800 MG tablet    ZOVIRAX    60 tablet    Take 1 tablet (800 mg) by mouth 2 times daily    MDS (myelodysplastic syndrome) (H)       aspirin 81 MG EC tablet      Take 81 mg by mouth daily Reported on 5/12/2017        carboxymethylcellulose 0.5 % Soln ophthalmic solution    carboxymethylcellulose sodium    1 Bottle    Place 1 drop into both eyes 3 times daily as needed for dry eyes    MDS (myelodysplastic syndrome) (H)       cycloSPORINE modified 100 MG capsule    GENERIC EQUIVALENT     Take 75 mg by mouth 2 times daily        EUCERIN EX      PRN        fluconazole 100 MG tablet    DIFLUCAN    30 tablet    Take 1 tablet (100 mg) by mouth daily    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       levofloxacin 250 MG tablet    LEVAQUIN    30 tablet    Take 1 tablet (250 mg) by mouth daily    MDS (myelodysplastic syndrome) (H)       levothyroxine 150 MCG tablet    SYNTHROID/LEVOTHROID    30 tablet    Take 1 tablet (150 mcg) by mouth daily    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       lisinopril 20 MG tablet    PRINIVIL/ZESTRIL    30 tablet    Take 1 tablet (20 mg) by mouth daily    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       magnesium oxide 400 (241.3 MG) MG tablet    MAG-OX     Take 2 tablets daily        metoprolol 25 MG tablet    LOPRESSOR    60 tablet    Take 2 tablets (50 mg) by mouth 2 times daily    MDS (myelodysplastic syndrome) (H)       * neomycin-polymyxin-dexamethasone 3.5-02414-1.1 Oint ophthalmic ointment    MAXITROL    1 Tube    Place 1 Application into both eyes 3 times daily    Giant papillary conjunctivitis, Blepharitis of upper and lower eyelids of both eyes,  unspecified type       * neomycin-polymyxin-dexamethasone 3.5-41784-9.1 Susp ophthalmic susp    MAXITROL    1 Bottle    Place 1 drop into both eyes 3 times daily    Giant papillary conjunctivitis, Blepharitis of upper and lower eyelids of both eyes, unspecified type       NITROGLYCERIN ER PO      Take by mouth as needed Reported on 5/12/2017        predniSONE 10 MG tablet    DELTASONE    30 tablet    Take 1 tablet (10 mg) by mouth daily    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 Read the directions carefully, and ask your doctor or other care provider to review them with you.

## 2017-10-19 NOTE — PROGRESS NOTES
Assessment & Plan      Byron Russo is a 56 year old male with the following diagnoses:   1. Giant papillary conjunctivitis - Both Eyes    2. Blepharitis of upper and lower eyelids of both eyes, unspecified type    3. Floppy eyelid syndrome - Both Eyes         Improved symptoms and ocular exam today     Ok to resume contact lens use.  Discussed healthy hygiene habits  Continue warm compresses twice a day  Continue artificial tears twice a day and gel artificial tears at night   Avoid sleeping on stomach    Stop maxitrol      Patient disposition:   Return in about 1 year (around 10/19/2018), or if symptoms worsen or fail to improve.          Attending Physician Attestation:  Complete documentation of historical and exam elements from today's encounter can be found in the full encounter summary report (not reduplicated in this progress note).  I personally obtained the chief complaint(s) and history of present illness.  I confirmed and edited as necessary the review of systems, past medical/surgical history, family history, social history, and examination findings as documented by others; and I examined the patient myself.  I personally reviewed the relevant tests, images, and reports as documented above.  I formulated and edited as necessary the assessment and plan and discussed the findings and management plan with the patient and family. . - Jose Weems MD

## 2017-11-15 ENCOUNTER — APPOINTMENT (OUTPATIENT)
Dept: LAB | Facility: CLINIC | Age: 57
End: 2017-11-15
Attending: INTERNAL MEDICINE
Payer: COMMERCIAL

## 2017-11-15 ENCOUNTER — ONCOLOGY VISIT (OUTPATIENT)
Dept: TRANSPLANT | Facility: CLINIC | Age: 57
End: 2017-11-15
Attending: INTERNAL MEDICINE
Payer: COMMERCIAL

## 2017-11-15 VITALS
RESPIRATION RATE: 16 BRPM | HEART RATE: 96 BPM | BODY MASS INDEX: 38.96 KG/M2 | HEIGHT: 71 IN | DIASTOLIC BLOOD PRESSURE: 81 MMHG | OXYGEN SATURATION: 97 % | SYSTOLIC BLOOD PRESSURE: 135 MMHG | TEMPERATURE: 98.2 F | WEIGHT: 278.3 LBS

## 2017-11-15 DIAGNOSIS — D46.22 RAEB-2 (REFRACTORY ANEMIA WITH EXCESS BLASTS-2) (H): ICD-10-CM

## 2017-11-15 LAB
ALBUMIN SERPL-MCNC: 4 G/DL (ref 3.4–5)
ALP SERPL-CCNC: 75 U/L (ref 40–150)
ALT SERPL W P-5'-P-CCNC: 34 U/L (ref 0–70)
ANION GAP SERPL CALCULATED.3IONS-SCNC: 6 MMOL/L (ref 3–14)
AST SERPL W P-5'-P-CCNC: 22 U/L (ref 0–45)
BASOPHILS # BLD AUTO: 0.1 10E9/L (ref 0–0.2)
BASOPHILS NFR BLD AUTO: 0.9 %
BILIRUB SERPL-MCNC: 0.4 MG/DL (ref 0.2–1.3)
BUN SERPL-MCNC: 24 MG/DL (ref 7–30)
CALCIUM SERPL-MCNC: 8.6 MG/DL (ref 8.5–10.1)
CHLORIDE SERPL-SCNC: 107 MMOL/L (ref 94–109)
CO2 SERPL-SCNC: 26 MMOL/L (ref 20–32)
CREAT SERPL-MCNC: 1.38 MG/DL (ref 0.66–1.25)
CYCLOSPORINE BLD LC/MS/MS-MCNC: 59 UG/L (ref 50–400)
DIFFERENTIAL METHOD BLD: ABNORMAL
EOSINOPHIL # BLD AUTO: 0.6 10E9/L (ref 0–0.7)
EOSINOPHIL NFR BLD AUTO: 8.2 %
ERYTHROCYTE [DISTWIDTH] IN BLOOD BY AUTOMATED COUNT: 12.3 % (ref 10–15)
GFR SERPL CREATININE-BSD FRML MDRD: 53 ML/MIN/1.7M2
GLUCOSE SERPL-MCNC: 96 MG/DL (ref 70–99)
HCT VFR BLD AUTO: 41.5 % (ref 40–53)
HGB BLD-MCNC: 13.8 G/DL (ref 13.3–17.7)
IMM GRANULOCYTES # BLD: 0 10E9/L (ref 0–0.4)
IMM GRANULOCYTES NFR BLD: 0.5 %
LYMPHOCYTES # BLD AUTO: 1.7 10E9/L (ref 0.8–5.3)
LYMPHOCYTES NFR BLD AUTO: 22.1 %
MCH RBC QN AUTO: 33.7 PG (ref 26.5–33)
MCHC RBC AUTO-ENTMCNC: 33.3 G/DL (ref 31.5–36.5)
MCV RBC AUTO: 101 FL (ref 78–100)
MONOCYTES # BLD AUTO: 1.3 10E9/L (ref 0–1.3)
MONOCYTES NFR BLD AUTO: 17.1 %
NEUTROPHILS # BLD AUTO: 3.9 10E9/L (ref 1.6–8.3)
NEUTROPHILS NFR BLD AUTO: 51.2 %
NRBC # BLD AUTO: 0 10*3/UL
NRBC BLD AUTO-RTO: 0 /100
PLATELET # BLD AUTO: 229 10E9/L (ref 150–450)
POTASSIUM SERPL-SCNC: 4.2 MMOL/L (ref 3.4–5.3)
PROT SERPL-MCNC: 7 G/DL (ref 6.8–8.8)
RBC # BLD AUTO: 4.1 10E12/L (ref 4.4–5.9)
SODIUM SERPL-SCNC: 139 MMOL/L (ref 133–144)
TME LAST DOSE: NORMAL H
WBC # BLD AUTO: 7.6 10E9/L (ref 4–11)

## 2017-11-15 PROCEDURE — 90686 IIV4 VACC NO PRSV 0.5 ML IM: CPT | Mod: ZF | Performed by: INTERNAL MEDICINE

## 2017-11-15 PROCEDURE — 99212 OFFICE O/P EST SF 10 MIN: CPT

## 2017-11-15 PROCEDURE — 25000128 H RX IP 250 OP 636: Mod: ZF | Performed by: INTERNAL MEDICINE

## 2017-11-15 PROCEDURE — 36415 COLL VENOUS BLD VENIPUNCTURE: CPT

## 2017-11-15 PROCEDURE — G0008 ADMIN INFLUENZA VIRUS VAC: HCPCS

## 2017-11-15 PROCEDURE — 85025 COMPLETE CBC W/AUTO DIFF WBC: CPT | Performed by: INTERNAL MEDICINE

## 2017-11-15 PROCEDURE — 80158 DRUG ASSAY CYCLOSPORINE: CPT | Performed by: INTERNAL MEDICINE

## 2017-11-15 PROCEDURE — 99211 OFF/OP EST MAY X REQ PHY/QHP: CPT | Mod: ZF

## 2017-11-15 PROCEDURE — 80053 COMPREHEN METABOLIC PANEL: CPT | Performed by: INTERNAL MEDICINE

## 2017-11-15 PROCEDURE — 82784 ASSAY IGA/IGD/IGG/IGM EACH: CPT | Performed by: INTERNAL MEDICINE

## 2017-11-15 RX ORDER — CYCLOSPORINE 25 MG/1
50 CAPSULE, LIQUID FILLED ORAL 2 TIMES DAILY
Qty: 120 CAPSULE | Refills: 0 | COMMUNITY
Start: 2017-11-15 | End: 2017-12-01

## 2017-11-15 RX ORDER — PREDNISONE 10 MG/1
10 TABLET ORAL DAILY
Qty: 30 TABLET | Refills: 11 | COMMUNITY
Start: 2017-11-15 | End: 2018-01-31

## 2017-11-15 RX ADMIN — INFLUENZA A VIRUS A/MICHIGAN/45/2015 X-275 (H1N1) ANTIGEN (FORMALDEHYDE INACTIVATED), INFLUENZA A VIRUS A/HONG KONG/4801/2014 X-263B (H3N2) ANTIGEN (FORMALDEHYDE INACTIVATED), INFLUENZA B VIRUS B/PHUKET/3073/2013 ANTIGEN (FORMALDEHYDE INACTIVATED), AND INFLUENZA B VIRUS B/BRISBANE/60/2008 ANTIGEN (FORMALDEHYDE INACTIVATED) 0.5 ML: 15; 15; 15; 15 INJECTION, SUSPENSION INTRAMUSCULAR at 15:28

## 2017-11-15 ASSESSMENT — PAIN SCALES - GENERAL: PAINLEVEL: NO PAIN (0)

## 2017-11-15 NOTE — NURSING NOTE
Byron Russo      1.  Has the patient received the information for the influenza vaccine? YES    2.  Does the patient have any of the following contraindications?     Allergy to eggs? No     Allergic reaction to previous influenza vaccines? No     Any other problems to previous influenza vaccines? No     Paralyzed by Guillain-Homeworth syndrome? No     Currently pregnant? NO     Current moderate or severe illness? No     Allergy to contact lens solution? No    3.  The vaccine has been administered in the usual fashion and the patient was instructed to wait 20 minutes before leaving the building in the event of an allergic reaction: YES    Vaccination given by Trista VIDAL  Recorded by Fely Henry

## 2017-11-15 NOTE — NURSING NOTE
Chief Complaint   Patient presents with     Blood Draw     Labs drawn from venipuncture by RN. Vs taken and pt checked in for appt     Labs collected from venipuncture by RN. Vitals taken. Checked in for appointment(s).    Abbey Weeks RN

## 2017-11-15 NOTE — PROGRESS NOTES
I saw Mr. 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  He has chronic djbot-mfxggb-zqvc disease, which was relatively severe and primarily involved his skin, but he also had abnormal liver function tests.  He is currently taking 10 mg of prednisone every day.  He denies fevers, chills, nausea, vomiting, diarrhea, cough, hemoptysis, shortness of breath, hematuria, dysuria, bruising or bleeding.  The debris that accumulated in his eyes has subsided since he started using steroid drops.  He continues to work full time.  The remainder of a 10-point review of systems is negative.      PHYSICAL EXAMINATION:    GENERAL:  He looked less cushingoid.  His hyperpigmentation of the face is gradually fading.   VITAL SIGNS:  Unremarkable.   HEENT:  Sclerae were anicteric and non-injected.  Buccal mucosa showed some lichenoid changes, but no open ulcerations.   LUNGS:  Clear to auscultation.   CARDIAC:  Without S3, rub or murmur.   ABDOMEN:  Nondistended, active bowel sounds, no organomegaly.   EXTREMITIES:  Trace pedal edema.   SKIN:  No skin rash.  Hyperpigmentation is primarily confined to his face and scalp.      LABORATORY DATA:  Today included a white count 7600, hemoglobin of 13.8 and platelets 229,000.  His electrolyte panel showed normal potassium, but further elevated creatinine of 1.38.  Liver function tests were normal.  CsA level is pending, as is a repeat IgG      ASSESSMENT AND PLAN:   1.  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  Currently in remission.   2.  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  Markedly improved.  We will further decrease his prednisone to 10 alternating with 5 mg every other day.   3.  History of hypertension.  He is well controlled today.  No changes in medication.   4.  Elevated creatinine.  I will have him hold his  second dose of cyclosporine today, and then start back on 50 mg b.i.d., down from 75 mg b.i.d.  He will return to the clinic on Monday for a repeat electrolyte battery and provider visit.     Uli Enciso M.D.

## 2017-11-15 NOTE — NURSING NOTE
"Oncology Rooming Note    November 15, 2017 3:11 PM   Byron Russo is a 57 year old male who presents for:    Chief Complaint   Patient presents with     Blood Draw     Labs drawn from venipuncture by RN. Vs taken and pt checked in for appt     RECHECK     MDS (myelodysplastic syndrome)      Initial Vitals: /81 (BP Location: Right arm, Cuff Size: Adult Regular)  Pulse 96  Temp 98.2  F (36.8  C) (Oral)  Resp 16  Ht 1.816 m (5' 11.5\")  Wt 126.2 kg (278 lb 4.8 oz)  SpO2 97%  BMI 38.28 kg/m2 Estimated body mass index is 38.28 kg/(m^2) as calculated from the following:    Height as of this encounter: 1.816 m (5' 11.5\").    Weight as of this encounter: 126.2 kg (278 lb 4.8 oz). Body surface area is 2.52 meters squared.  No Pain (0) Comment: Data Unavailable   No LMP for male patient.  Allergies reviewed: Yes  Medications reviewed: Yes    Medications: Medication refills not needed today.  Pharmacy name entered into [facility]:    [facility] [place], [place] - [phone]  [facility] [address] [place] [address]    Clinical concerns:  No new concerns  Provider was notified.    5 minutes for nursing intake (face to face time)     Lavern Cheek MA              "

## 2017-11-15 NOTE — MR AVS SNAPSHOT
After Visit Summary   11/15/2017    Byron Russo    MRN: [id number]           Patient Information     Date Of Birth          1960        Visit Information        Provider Department      11/15/2017 4:00 PM Uli Enciso MD [facility] Blood and Marrow Transplant        Today's Diagnoses     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              [facility] [place]  [address] [place] [address]  Phone: [phone]  Clinic Hours:   Monday-Thursday:7am to 7pm   Friday: 7am to 5pm   Weekends and holidays:    8am to noon (in general)  If your fever is 100.5  or greater,   call the clinic.  After hours call the   hospital at [phone] or   [phone]. Ask for the BMT   fellow on-call            Follow-ups after your visit        Your next 10 appointments already scheduled     Nov 15, 2017  4:00 PM CST   Return with Uli Enciso MD   [facility] Blood and Marrow Transplant ([facility])    [address] [place] [address] [phone]            Nov 20, 2017  2:30 PM CST   [facility] Lab Draw with  MAS[facility]IC LAB DRAW   [facility] Mason[facility] Lab Draw ([facility])    [address] [place] [address] [phone]            Nov 20, 2017  3:00 PM CST   Return with  BMT BRENDA #1   [facility] Blood and Marrow Transplant ([facility])    [address] [place] [address] [phone]              Who to contact     If you have questions or need follow up information about today's clinic visit or your schedule please contact [facility] BLOOD AND MARROW TRANSPLANT directly at [phone].  Normal or non-critical lab and imaging results will be communicated to you by MyChart, letter or phone within 4 business days after the clinic has received the results. If you do not hear from us within 7 days, please contact the clinic  "through [employer] or phone. If you have a critical or abnormal lab result, we will notify you by phone as soon as possible.  Submit refill requests through [employer] or call your pharmacy and they will forward the refill request to us. Please allow 3 business days for your refill to be completed.          Additional Information About Your Visit        [employer] Information     [employer] gives you secure access to your electronic health record. If you see a primary care provider, you can also send messages to your care team and make appointments. If you have questions, please call your primary care clinic.  If you do not have a primary care provider, please call [phone] and they will assist you.        Care EveryWhere ID     This is your Care EveryWhere ID. This could be used by other organizations to access your [facility] medical records  [id number]        Your Vitals Were     Pulse Temperature Respirations Height Pulse Oximetry BMI (Body Mass Index)    96 98.2  F (36.8  C) (Oral) 16 1.816 m (5' 11.5\") 97% 38.28 kg/m2       Blood Pressure from Last 3 Encounters:   11/15/17 135/81   09/13/17 (!) 138/92   07/12/17 140/88    Weight from Last 3 Encounters:   11/15/17 126.2 kg (278 lb 4.8 oz)   09/13/17 130.7 kg (288 lb 3.2 oz)   07/12/17 131.7 kg (290 lb 4.8 oz)              We Performed the Following     CBC with platelets differential     Comprehensive metabolic panel     Cyclosporine     IgG          Today's Medication Changes          These changes are accurate as of: 11/15/17  3:57 PM.  If you have any questions, ask your nurse or doctor.               Start taking these medicines.        Dose/Directions    cycloSPORINE modified 25 MG capsule   Commonly known as:  GENERIC EQUIVALENT   Used for:  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   Replaces:  cycloSPORINE modified 100 MG capsule   Started by:  Uli Enciso MD        Dose:  50 mg   Take 2 capsules (50 mg) by mouth 2 times daily   Quantity:  120 " capsule   Refills:  0         These medicines have changed or have updated prescriptions.        Dose/Directions    predniSONE 10 MG tablet   Commonly known as:  DELTASONE   This may have changed:  additional instructions   Used for:  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   Changed by:  Uli Enciso MD        Dose:  10 mg   Take 1 tablet (10 mg) by mouth daily Alternating with 5mg   Quantity:  30 tablet   Refills:  11         Stop taking these medicines if you haven't already. Please contact your care team if you have questions.     carboxymethylcellulose 0.5 % Soln ophthalmic solution   Commonly known as:  carboxymethylcellulose sodium   Stopped by:  Uli Enciso MD           cycloSPORINE modified 100 MG capsule   Commonly known as:  GENERIC EQUIVALENT   Replaced by:  cycloSPORINE modified 25 MG capsule   Stopped by:  Uli Enciso MD           EUCERIN EX   Stopped by:  Uli Enciso MD           neomycin-polymyxin-dexamethasone 3.5-12942-0.1 Oint ophthalmic ointment   Commonly known as:  MAXITROL   Stopped by:  Uli Enciso MD           neomycin-polymyxin-dexamethasone 3.5-38582-8.1 Susp ophthalmic susp   Commonly known as:  MAXITROL   Stopped by:  Uli Enciso MD                    Recent Review Flowsheet Data     BMT Recent Results Latest Ref Rng & Units 5/12/2017 5/19/2017 7/12/2017 7/26/2017 9/13/2017 9/27/2017 11/15/2017    WBC 4.0 - 11.0 10e9/L 8.7 - 8.3 - 8.3 - 7.6    Hemoglobin 13.3 - 17.7 g/dL 14.7 - 14.5 - 14.6 - 13.8    Platelet Count 150 - 450 10e9/L 187 - 185 - 222 - 229    Neutrophils (Absolute) 1.6 - 8.3 10e9/L 6.3 - 5.4 - 4.7 - 3.9    INR 0.86 - 1.14 - - - - - - -    Sodium 133 - 144 mmol/L 140 141 140 138 140 139 139    Potassium 3.4 - 5.3 mmol/L 4.0 4.1 3.5 3.9 4.0 4.1 4.2    Chloride 94 - 109 mmol/L 103 107 105 105 106 104 107    Glucose 70 - 99 mg/dL 105(H) 118(H) 101(H) 115(H) 98 91 96    Urea Nitrogen 7 - 30 mg/dL 16 25 20 16 22 22 24    Creatinine 0.66 -  1.25 mg/dL 0.70 0.82 0.96 0.88 1.20 1.13 1.38(H)    Calcium (Total) 8.5 - 10.1 mg/dL 9.0 8.7 8.5 8.6 8.7 8.9 8.6    Protein (Total) 6.8 - 8.8 g/dL 6.7(L) 6.9 6.6(L) - 7.0 - 7.0    Albumin 3.4 - 5.0 g/dL 3.8 3.8 3.7 - 3.8 - 4.0    Bilirubin (Direct) 0.0 - 0.2 mg/dL - - - - - - -    Alkaline Phosphatase 40 - 150 U/L 79 82 74 - 75 - 75    AST 0 - 45 U/L 16 21 19 - 16 - 22    ALT 0 - 70 U/L 23 25 24 - 27 - 34    MCV 78 - 100 fl 103(H) - 98 - 98 - 101(H)               Primary Care Provider Office Phone # Fax #    Cole Duong [phone]       [place] [address] [address] [facility] [address]        Equal Access to Services     [facility] : Hadii soledad [place], mandieda layne, qashirley hickman, john thompson . So [facility] [phone].    ATENCIÓN: Si habla español, tiene a hernandez disposición servicios gratuitos de asistencia lingüística. [facility] [phone].    We comply with applicable federal civil rights laws and [place] laws. We do not discriminate on the basis of race, color, national origin, age, disability, sex, sexual orientation, or gender identity.            Thank you!     Thank you for choosing [facility] BLOOD AND MARROW TRANSPLANT  for your care. Our goal is always to provide you with excellent care. Hearing back from our patients is one way we can continue to improve our services. Please take a few minutes to complete the written survey that you may receive in the mail after your visit with us. Thank you!             Your Updated Medication List - Protect others around you: Learn how to safely use, store and throw away your medicines at www.Discourseeds.org.          This list is accurate as of: 11/15/17  3:58 PM.  Always use your most recent med list.                   Brand Name Dispense Instructions for use Diagnosis    acyclovir 800 MG tablet    ZOVIRAX    60 tablet    Take 1 tablet (800 mg) by mouth 2 times daily    MDS  (myelodysplastic syndrome) (H)       aspirin 81 MG EC tablet      Take 81 mg by mouth daily Reported on 5/12/2017        cycloSPORINE modified 25 MG capsule    GENERIC EQUIVALENT    120 capsule    Take 2 capsules (50 mg) by mouth 2 times daily    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       fluconazole 100 MG tablet    DIFLUCAN    30 tablet    Take 1 tablet (100 mg) by mouth daily    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       levofloxacin 250 MG tablet    LEVAQUIN    30 tablet    Take 1 tablet (250 mg) by mouth daily    MDS (myelodysplastic syndrome) (H)       levothyroxine 150 MCG tablet    SYNTHROID/LEVOTHROID    30 tablet    Take 1 tablet (150 mcg) by mouth daily    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       lisinopril 20 MG tablet    PRINIVIL/ZESTRIL    30 tablet    Take 1 tablet (20 mg) by mouth daily    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       magnesium oxide 400 (241.3 MG) MG tablet    MAG-OX     Take 2 tablets daily        metoprolol 25 MG tablet    LOPRESSOR    60 tablet    Take 2 tablets (50 mg) by mouth 2 times daily    MDS (myelodysplastic syndrome) (H)       NITROGLYCERIN ER PO      Take by mouth as needed Reported on 5/12/2017        predniSONE 10 MG tablet    DELTASONE    30 tablet    Take 1 tablet (10 mg) by mouth daily Alternating with 5mg    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

## 2017-11-16 LAB — IGG SERPL-MCNC: 742 MG/DL (ref 695–1620)

## 2017-11-20 ENCOUNTER — APPOINTMENT (OUTPATIENT)
Dept: LAB | Facility: CLINIC | Age: 57
End: 2017-11-20
Attending: NURSE PRACTITIONER
Payer: COMMERCIAL

## 2017-11-20 ENCOUNTER — ONCOLOGY VISIT (OUTPATIENT)
Dept: TRANSPLANT | Facility: CLINIC | Age: 57
End: 2017-11-20
Attending: NURSE PRACTITIONER
Payer: COMMERCIAL

## 2017-11-20 VITALS
SYSTOLIC BLOOD PRESSURE: 108 MMHG | RESPIRATION RATE: 18 BRPM | BODY MASS INDEX: 38.65 KG/M2 | HEART RATE: 95 BPM | TEMPERATURE: 98.1 F | OXYGEN SATURATION: 96 % | DIASTOLIC BLOOD PRESSURE: 72 MMHG | WEIGHT: 276.1 LBS | HEIGHT: 71 IN

## 2017-11-20 DIAGNOSIS — D46.22 RAEB-2 (REFRACTORY ANEMIA WITH EXCESS BLASTS-2) (H): ICD-10-CM

## 2017-11-20 LAB
ANION GAP SERPL CALCULATED.3IONS-SCNC: 8 MMOL/L (ref 3–14)
BUN SERPL-MCNC: 21 MG/DL (ref 7–30)
CALCIUM SERPL-MCNC: 9 MG/DL (ref 8.5–10.1)
CHLORIDE SERPL-SCNC: 104 MMOL/L (ref 94–109)
CO2 SERPL-SCNC: 25 MMOL/L (ref 20–32)
CREAT SERPL-MCNC: 1.38 MG/DL (ref 0.66–1.25)
GFR SERPL CREATININE-BSD FRML MDRD: 53 ML/MIN/1.7M2
GLUCOSE SERPL-MCNC: 103 MG/DL (ref 70–99)
POTASSIUM SERPL-SCNC: 4.4 MMOL/L (ref 3.4–5.3)
SODIUM SERPL-SCNC: 137 MMOL/L (ref 133–144)

## 2017-11-20 PROCEDURE — 99212 OFFICE O/P EST SF 10 MIN: CPT | Mod: ZF

## 2017-11-20 PROCEDURE — 36415 COLL VENOUS BLD VENIPUNCTURE: CPT

## 2017-11-20 PROCEDURE — 80048 BASIC METABOLIC PNL TOTAL CA: CPT | Performed by: INTERNAL MEDICINE

## 2017-11-20 RX ORDER — DEXAMETHASONE 0.5 MG/5ML
SOLUTION ORAL
Qty: 600 ML | Refills: 0 | Status: SHIPPED | OUTPATIENT
Start: 2017-11-20 | End: 2018-05-16

## 2017-11-20 ASSESSMENT — PAIN SCALES - GENERAL: PAINLEVEL: NO PAIN (0)

## 2017-11-20 NOTE — NURSING NOTE
Chief Complaint   Patient presents with     Blood Draw     Labs drawn via  by RN. VS taken.     Amelia Johns RN

## 2017-11-20 NOTE — MR AVS SNAPSHOT
After Visit Summary   11/20/2017    Byron Russo    MRN: [id number]           Patient Information     Date Of Birth          1960        Visit Information        Provider Department      11/20/2017 3:00 PM  BMT BRENDA #4 [facility] Blood and Marrow Transplant        Today's Diagnoses     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              [facility] ([facility] [place]  [address] [place] [address]  Phone: [phone]  Clinic Hours:   Monday-Thursday:7am to 7pm   Friday: 7am to 5pm   Weekends and holidays:    8am to noon (in general)  If your fever is 100.5  or greater,   call the clinic.  After hours call the   hospital at [phone] or   [phone]. Ask for the BMT   fellow on-call            Follow-ups after your visit        Your next 10 appointments already scheduled     Jan 17, 2018  3:00 PM CST   [facility]ic Lab Draw with  MASONIC LAB DRAW   [facility] Masonic Lab Draw ([facility])    [address] [place] [address] [phone]            Jan 17, 2018  3:30 PM CST   Return with Uli Enciso MD   [facility] Blood and Marrow Transplant ([facility])    [address] [place] [address] [phone]              Who to contact     If you have questions or need follow up information about today's clinic visit or your schedule please contact [facility] BLOOD AND MARROW TRANSPLANT directly at [phone].  Normal or non-critical lab and imaging results will be communicated to you by MyChart, letter or phone within 4 business days after the clinic has received the results. If you do not hear from us within 7 days, please contact the clinic through MyChart or phone. If you have a critical or abnormal lab result, we will notify you by phone as soon as possible.  Submit refill requests through [employer] or call your pharmacy and they will forward the refill request  "to us. Please allow 3 business days for your refill to be completed.          Additional Information About Your Visit        [employer] Information     [employer] gives you secure access to your electronic health record. If you see a primary care provider, you can also send messages to your care team and make appointments. If you have questions, please call your primary care clinic.  If you do not have a primary care provider, please call [phone] and they will assist you.        Care EveryWhere ID     This is your Care EveryWhere ID. This could be used by other organizations to access your [facility] medical records  [id number]        Your Vitals Were     Pulse Temperature Respirations Height Pulse Oximetry BMI (Body Mass Index)    95 98.1  F (36.7  C) (Oral) 18 1.816 m (5' 11.5\") 96% 37.98 kg/m2       Blood Pressure from Last 3 Encounters:   11/20/17 108/72   11/15/17 135/81   09/13/17 (!) 138/92    Weight from Last 3 Encounters:   11/20/17 125.2 kg (276 lb 1.6 oz)   11/15/17 126.2 kg (278 lb 4.8 oz)   09/13/17 130.7 kg (288 lb 3.2 oz)              We Performed the Following     Basic metabolic panel          Today's Medication Changes          These changes are accurate as of: 11/20/17  3:58 PM.  If you have any questions, ask your nurse or doctor.               Start taking these medicines.        Dose/Directions    dexamethasone 0.1 MG/ML solution   Used for:  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        Swish and Spit 5-10 mL QID   Quantity:  600 mL   Refills:  0            Where to get your medicines      These medications were sent to [facility] [place], [place] - [address]  [address] [address]    Hours:  TRANSPLANT PHONE NUMBER [phone] Phone:  [phone]     dexamethasone 0.1 MG/ML solution                Recent Review Flowsheet Data     BMT Recent Results Latest Ref Rng & Units 5/19/2017 7/12/2017 7/26/2017 9/13/2017 " 9/27/2017 11/15/2017 11/20/2017    WBC 4.0 - 11.0 10e9/L - 8.3 - 8.3 - 7.6 -    Hemoglobin 13.3 - 17.7 g/dL - 14.5 - 14.6 - 13.8 -    Platelet Count 150 - 450 10e9/L - 185 - 222 - 229 -    Neutrophils (Absolute) 1.6 - 8.3 10e9/L - 5.4 - 4.7 - 3.9 -    INR 0.86 - 1.14 - - - - - - -    Sodium 133 - 144 mmol/L 141 140 138 140 139 139 137    Potassium 3.4 - 5.3 mmol/L 4.1 3.5 3.9 4.0 4.1 4.2 4.4    Chloride 94 - 109 mmol/L 107 105 105 106 104 107 104    Glucose 70 - 99 mg/dL 118(H) 101(H) 115(H) 98 91 96 103(H)    Urea Nitrogen 7 - 30 mg/dL 25 20 16 22 22 24 21    Creatinine 0.66 - 1.25 mg/dL 0.82 0.96 0.88 1.20 1.13 1.38(H) 1.38(H)    Calcium (Total) 8.5 - 10.1 mg/dL 8.7 8.5 8.6 8.7 8.9 8.6 9.0    Protein (Total) 6.8 - 8.8 g/dL 6.9 6.6(L) - 7.0 - 7.0 -    Albumin 3.4 - 5.0 g/dL 3.8 3.7 - 3.8 - 4.0 -    Bilirubin (Direct) 0.0 - 0.2 mg/dL - - - - - - -    Alkaline Phosphatase 40 - 150 U/L 82 74 - 75 - 75 -    AST 0 - 45 U/L 21 19 - 16 - 22 -    ALT 0 - 70 U/L 25 24 - 27 - 34 -    MCV 78 - 100 fl - 98 - 98 - 101(H) -               Primary Care Provider Office Phone # Fax #    Cole Duong [phone]       [address] [address]        Equal Access to Services     [facility] AH: farshad Urbinaadaha, qaanthonyta kaalmada vick, john julian. So [facility] [phone].    ATENCIÓN: Si habla español, tiene a hernandez disposición servicios gratuitos de asistencia lingüística. Llame al [phone].    We comply with applicable federal civil rights laws and [place] laws. We do not discriminate on the basis of race, color, national origin, age, disability, sex, sexual orientation, or gender identity.            Thank you!     Thank you for choosing [facility] BLOOD AND MARROW TRANSPLANT  for your care. Our goal is always to provide you with excellent care. Hearing back from our patients is one way we can continue to improve our  services. Please take a few minutes to complete the written survey that you may receive in the mail after your visit with us. Thank you!             Your Updated Medication List - Protect others around you: Learn how to safely use, store and throw away your medicines at www.disposemymeds.org.          This list is accurate as of: 11/20/17  3:58 PM.  Always use your most recent med list.                   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       fluconazole 100 MG tablet    DIFLUCAN    30 tablet    Take 1 tablet (100 mg) by mouth daily    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       levofloxacin 250 MG tablet    LEVAQUIN    30 tablet    Take 1 tablet (250 mg) by mouth daily    MDS (myelodysplastic syndrome) (H)       levothyroxine 150 MCG tablet    SYNTHROID/LEVOTHROID    30 tablet    Take 1 tablet (150 mcg) by mouth daily    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       lisinopril 20 MG tablet    PRINIVIL/ZESTRIL    30 tablet    Take 1 tablet (20 mg) by mouth daily    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       magnesium oxide 400 (241.3 MG) MG tablet    MAG-OX     Take 2 tablets daily        metoprolol 25 MG tablet    LOPRESSOR    60 tablet    Take 2 tablets (50 mg) by mouth 2 times daily    MDS (myelodysplastic syndrome) (H)       NITROGLYCERIN ER PO      Take by mouth as needed Reported on 5/12/2017        predniSONE 10 MG  tablet    DELTASONE    30 tablet    Take 1 tablet (10 mg) by mouth daily Alternating with 5mg    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

## 2017-11-20 NOTE — NURSING NOTE
"Oncology Rooming Note    November 20, 2017 2:57 PM   Byron Russo is a 57 year old male who presents for:    Chief Complaint   Patient presents with     Blood Draw     Labs drawn via  by RN. VS taken.     RECHECK     MDS (myelodysplastic syndrome)      Initial Vitals: /72 (BP Location: Right arm, Patient Position: Sitting, Cuff Size: Adult Large)  Pulse 95  Temp 98.1  F (36.7  C) (Oral)  Resp 18  Ht 1.816 m (5' 11.5\")  Wt 125.2 kg (276 lb 1.6 oz)  SpO2 96%  BMI 37.98 kg/m2 Estimated body mass index is 37.98 kg/(m^2) as calculated from the following:    Height as of this encounter: 1.816 m (5' 11.5\").    Weight as of this encounter: 125.2 kg (276 lb 1.6 oz). Body surface area is 2.51 meters squared.  No Pain (0) Comment: Data Unavailable   No LMP for male patient.  Allergies reviewed: Yes  Medications reviewed: Yes    Medications: Medication refills not needed today.  Pharmacy name entered into EPIC:    [facility] [place], [place] - [address]  [facility] [address] [place] [address]    Clinical concerns: No new concerns  Provider was notified.    5 minutes for nursing intake (face to face time)     Lavern Cheek MA              "

## 2017-11-20 NOTE — PROGRESS NOTES
BMT CLINIC NOTE  CC:f/u Cr    HPI: Mr Mccarthy is doing well. He reports he drinks plenty of fluid- decreased caffeine intake to just 1 soda over the weekend. He has decreased CSA to 50mg BID as instructed but Cr is unchanged. Upon further discussion he reports that he started lisinopril 20mg 9/2017- given this I suspect bump in Cr is due to comination of increased ACE dose + csa on kidney function. Overall eye symtpoms (crusting/sensitivity) is much improved though not resolved. Persistent dry mouth- going to start salivia powder (just articial saliva rinse per Dentist). Byron tells me Dr Enciso was going to start dex swish and spit if whatever his dentist was giving him was not a steroid.  No swelling - no new complaints      PHYSICAL EXAMINATION:    GENERAL:  Appears cushingoid.   VITAL SIGNS:  Unremarkable. BP is 108/72-  HEENT:  Sclerae were anicteric and non-injected.  Buccal mucosa showed some lichenoid changes, but no open ulcerations.   EXTREMITIES:  Trace pedal edema.   SKIN:  No skin rash.  Hyperpigmentation is primarily confined to his face and scalp.      LABORATORY DATA:    Lab Results   Component Value Date    WBC 7.6 11/15/2017    ANEU 3.9 11/15/2017    HGB 13.8 11/15/2017    HCT 41.5 11/15/2017     11/15/2017     11/20/2017    POTASSIUM 4.4 11/20/2017    CHLORIDE 104 11/20/2017    CO2 25 11/20/2017     (H) 11/20/2017    BUN 21 11/20/2017    CR 1.38 (H) 11/20/2017    MAG 1.7 02/21/2017    INR 1.1 06/29/2016    BILITOTAL 0.4 11/15/2017    AST 22 11/15/2017    ALT 34 11/15/2017    ALKPHOS 75 11/15/2017    PROTTOTAL 7.0 11/15/2017    ALBUMIN 4.0 11/15/2017        ASSESSMENT AND PLAN:   1.  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  Currently in remission.   2.  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  Markedly improvedper dr Enciso  We will further decrease his prednisone to 10  alternating with 5 mg every other day.   - Continue CSA at lower dose of 50mg BID.   - start dex swish and spit (TID) as pt didn't think he could do QID. + articifial saliva powder from dentist- okay to stop swish and spit in 1 month to see if it make a difference.   - If dry eye/mouth worsen pt to call and request sooner appt.   3.  History of hypertension.  He is well controlled today- After discussed with Mr Russo seems HTN was not controlled- tried norvasc but had significant lower extremity edema so increased ACE to 20mg daily from 10mg daily. Cr elevation is likely result of this change.   -If BP again low/normal next visit consider decrease ACE back to 10mg daily (further taper off CSA).   4.  Elevated creatinine- suspected due to CSA/ACE on Cr   - Continue decrease CSA dose as long as gvhd symptoms not worse.       RTC in 2 months  -Start dex swish and spit TID for dry mouth/lichoid changes of oral mucosa   - call with questions or worsening symptoms prior to this visit    MEÑO HoytC  [phone]

## 2017-12-01 DIAGNOSIS — D46.22 RAEB-2 (REFRACTORY ANEMIA WITH EXCESS BLASTS-2) (H): ICD-10-CM

## 2017-12-01 RX ORDER — CYCLOSPORINE 25 MG/1
50 CAPSULE, LIQUID FILLED ORAL 2 TIMES DAILY
Qty: 120 CAPSULE | Refills: 0 | Status: SHIPPED | OUTPATIENT
Start: 2017-12-01 | End: 2018-01-17

## 2017-12-29 RX ORDER — ALBUTEROL SULFATE 0.83 MG/ML
2.5 SOLUTION RESPIRATORY (INHALATION) EVERY 6 HOURS PRN
Status: CANCELLED
Start: 2017-12-29

## 2017-12-29 RX ORDER — PENTAMIDINE ISETHIONATE 300 MG/300MG
300 INHALANT RESPIRATORY (INHALATION)
Status: CANCELLED
Start: 2017-12-29

## 2018-01-02 DIAGNOSIS — D46.9 MDS (MYELODYSPLASTIC SYNDROME) (H): ICD-10-CM

## 2018-01-02 RX ORDER — METOPROLOL TARTRATE 25 MG/1
50 TABLET, FILM COATED ORAL 2 TIMES DAILY
Qty: 60 TABLET | Refills: 11 | Status: SHIPPED | OUTPATIENT
Start: 2018-01-02 | End: 2018-01-04

## 2018-01-04 DIAGNOSIS — D46.9 MDS (MYELODYSPLASTIC SYNDROME) (H): ICD-10-CM

## 2018-01-04 RX ORDER — METOPROLOL TARTRATE 25 MG/1
50 TABLET, FILM COATED ORAL 2 TIMES DAILY
Qty: 120 TABLET | Refills: 11 | Status: SHIPPED | OUTPATIENT
Start: 2018-01-04 | End: 2018-01-31

## 2018-01-17 ENCOUNTER — APPOINTMENT (OUTPATIENT)
Dept: LAB | Facility: CLINIC | Age: 58
End: 2018-01-17
Attending: INTERNAL MEDICINE
Payer: COMMERCIAL

## 2018-01-17 ENCOUNTER — ONCOLOGY VISIT (OUTPATIENT)
Dept: TRANSPLANT | Facility: CLINIC | Age: 58
End: 2018-01-17
Attending: INTERNAL MEDICINE
Payer: COMMERCIAL

## 2018-01-17 VITALS
HEART RATE: 86 BPM | RESPIRATION RATE: 16 BRPM | WEIGHT: 266.8 LBS | DIASTOLIC BLOOD PRESSURE: 77 MMHG | TEMPERATURE: 99.4 F | SYSTOLIC BLOOD PRESSURE: 118 MMHG | OXYGEN SATURATION: 96 % | BODY MASS INDEX: 36.7 KG/M2

## 2018-01-17 DIAGNOSIS — T86.09 GVHD AS COMPLICATION OF BONE MARROW TRANSPLANT (H): ICD-10-CM

## 2018-01-17 DIAGNOSIS — D46.22 RAEB-2 (REFRACTORY ANEMIA WITH EXCESS BLASTS-2) (H): Primary | ICD-10-CM

## 2018-01-17 DIAGNOSIS — D89.813 GVHD AS COMPLICATION OF BONE MARROW TRANSPLANT (H): ICD-10-CM

## 2018-01-17 LAB
BASOPHILS # BLD AUTO: 0.1 10E9/L (ref 0–0.2)
BASOPHILS NFR BLD AUTO: 0.9 %
CYCLOSPORINE BLD LC/MS/MS-MCNC: 101 UG/L (ref 50–400)
DIFFERENTIAL METHOD BLD: ABNORMAL
EOSINOPHIL # BLD AUTO: 0.3 10E9/L (ref 0–0.7)
EOSINOPHIL NFR BLD AUTO: 4 %
ERYTHROCYTE [DISTWIDTH] IN BLOOD BY AUTOMATED COUNT: 12.4 % (ref 10–15)
HCT VFR BLD AUTO: 41.5 % (ref 40–53)
HGB BLD-MCNC: 14 G/DL (ref 13.3–17.7)
IMM GRANULOCYTES # BLD: 0 10E9/L (ref 0–0.4)
IMM GRANULOCYTES NFR BLD: 0.3 %
LYMPHOCYTES # BLD AUTO: 1.2 10E9/L (ref 0.8–5.3)
LYMPHOCYTES NFR BLD AUTO: 18.5 %
MCH RBC QN AUTO: 33.3 PG (ref 26.5–33)
MCHC RBC AUTO-ENTMCNC: 33.7 G/DL (ref 31.5–36.5)
MCV RBC AUTO: 99 FL (ref 78–100)
MONOCYTES # BLD AUTO: 1.1 10E9/L (ref 0–1.3)
MONOCYTES NFR BLD AUTO: 16.8 %
NEUTROPHILS # BLD AUTO: 3.9 10E9/L (ref 1.6–8.3)
NEUTROPHILS NFR BLD AUTO: 59.5 %
NRBC # BLD AUTO: 0 10*3/UL
NRBC BLD AUTO-RTO: 0 /100
PLATELET # BLD AUTO: 235 10E9/L (ref 150–450)
RBC # BLD AUTO: 4.21 10E12/L (ref 4.4–5.9)
TME LAST DOSE: NORMAL H
WBC # BLD AUTO: 6.5 10E9/L (ref 4–11)

## 2018-01-17 PROCEDURE — 80158 DRUG ASSAY CYCLOSPORINE: CPT | Performed by: INTERNAL MEDICINE

## 2018-01-17 PROCEDURE — 36415 COLL VENOUS BLD VENIPUNCTURE: CPT

## 2018-01-17 PROCEDURE — 80053 COMPREHEN METABOLIC PANEL: CPT | Performed by: INTERNAL MEDICINE

## 2018-01-17 PROCEDURE — 85025 COMPLETE CBC W/AUTO DIFF WBC: CPT | Performed by: INTERNAL MEDICINE

## 2018-01-17 PROCEDURE — G0463 HOSPITAL OUTPT CLINIC VISIT: HCPCS | Mod: ZF

## 2018-01-17 RX ORDER — CYCLOSPORINE 25 MG/1
25 CAPSULE, LIQUID FILLED ORAL 2 TIMES DAILY
Qty: 120 CAPSULE | Refills: 0 | COMMUNITY
Start: 2018-01-17 | End: 2018-01-25

## 2018-01-17 ASSESSMENT — PAIN SCALES - GENERAL: PAINLEVEL: MILD PAIN (2)

## 2018-01-17 NOTE — NURSING NOTE
"Oncology Rooming Note    January 17, 2018 3:55 PM   Byron Russo is a 57 year old male who presents for:    Chief Complaint   Patient presents with     Labs Only     venipuncture, vitals checked     RECHECK     MDS post txp here for provider visit.      Initial Vitals: /77  Pulse 86  Temp 99.4  F (37.4  C)  Resp 16  Wt 121 kg (266 lb 12.8 oz)  SpO2 96%  BMI 36.7 kg/m2 Estimated body mass index is 36.7 kg/(m^2) as calculated from the following:    Height as of 11/20/17: 1.816 m (5' 11.5\").    Weight as of this encounter: 121 kg (266 lb 12.8 oz). Body surface area is 2.47 meters squared.  Mild Pain (2) Comment: Data Unavailable   No LMP for male patient.  Allergies reviewed: Yes  Medications reviewed: Yes    Medications: Medication refills not needed today.  Pharmacy name entered into EPIC:    [place], [place] - [address] [facility] [id number]  [facility] [address] [place] [address] - [address] [facility]    Clinical concerns: None    5 minutes for nursing intake (face to face time)     Michele Almonte RN              "

## 2018-01-17 NOTE — PROGRESS NOTES
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  He has chronic bhktn-txbvrc-cbqc disease for which he is currently taking 10 mg of prednisone every other day alternating with 5 mg of prednisone every other day.  He states that overall he feels well.  He is recovering from a URI that he has for 2-3 weeks already but clearly is improving.  He denies fevers, chills, nausea, vomiting, diarrhea, cough, hemoptysis, shortness of breath, hematuria, dysuria, bruising or bleeding.  He has no mouth sores.  He does have altered taste sense which prevents him from eating things like steak.  This has been since he developed the chronic tvihg-xajlqy-eaii disease.  He continues to work full-time.  The remainder of the 10-point review of systems is negative.       PHYSICAL EXAMINATION:     GENERAL:  He looked fit.   VITAL SIGNS:  Unremarkable.   HEENT:  Sclerae were anicteric and non-injected.  Buccal mucosa showed lichenoid changes, but no open ulcerations.   LUNGS:  Clear to auscultation.   CARDIAC:  Without S3, rub or murmur.   ABDOMEN:  Nondistended, active bowel sounds, no organomegaly.  He had a small, easily reducible umbilical hernia.    EXTREMITIES:  Trace pedal edema.   SKIN:  No skin rash.  He has patchy hyperpigmentation affecting primarily the face and scalp, but this is much improved since we discontinued his voriconazole.      LABORATORY DATA:  Today's labs included a CBC showing a white count of 6500, hemoglobin 14 and platelets 235,000.  He had 300 absolute eosinophils.  His electrolyte battery showed a creatinine persistently elevated at 1.43.  His LFTs were remarkable for a slightly elevated bilirubin of 1.6.  The rest of the liver function tests were normal.       ASSESSMENT AND PLAN:   1.  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    2.  Chronic svtwi-dvzwet-hxqa disease, markedly improved.  We will leave him at 10 mg alternating with    5 mg every other day of prednisone.  Because of his slightly elevated creatinine, we will decrease his cyclosporine further to 25 mg p.o. b.i.d.       I will have him return in 2 weeks for a repeat metabolic laboratory battery including LFTs to monitor the creatinine and the bilirubin.  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

## 2018-01-17 NOTE — MR AVS SNAPSHOT
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  After hours call the   hospital at [phone] or   [phone]. Ask for the BMT   fellow on-call            Follow-ups after your visit        Your next 10 appointments already scheduled     Jan 31, 2018  3:30 PM CST   [facility] with  [facility]   [facility] ([facility])    [address] [place] [address]  [address]  [facility] [address]   [phone]            Mar 21, 2018  3:00 PM CDT   [facility] with  [facility]   [facility] ([facility])    [address] [place] [address]  [address]  [facility] [address]   [phone]            Mar 21, 2018  3:30 PM CDT   Return with Uli Enciso MD   [facility] Blood and Marrow Transplant ([facility])    [address] [place] [address]  [address]  [facility] [address]   [phone]              Future tests that were ordered for you today     Open Future Orders        Priority Expected Expires Ordered    Comprehensive metabolic panel Routine 3/14/2018 1/17/2019 1/17/2018    CBC with platelets differential Routine 3/14/2018 1/17/2019 1/17/2018    Cyclosporine Routine 3/14/2018 1/17/2019 1/17/2018    CMV DNA quantification Routine 3/14/2018 1/17/2019  1/17/2018    Comprehensive metabolic panel Routine 1/31/2018 7/16/2018 1/17/2018            Who to contact     If you have questions or need follow up information about today's clinic visit or your schedule please contact [facility] B[facility]OD [facility] MARR[facility] TRANSPLANT directly at [phone].  Normal or non-critical lab and imaging results will be communicated to you by MyChart, letter or phone within 4 business days after the clinic has received the results. If you do not hear from us within 7 days, please contact the clinic through [employer] or phone. If you have a critical or abnormal lab result, we will notify you by phone as soon as possible.  Submit refill requests through [employer] or call your pharmacy and they will forward the refill request to us. Please allow 3 business days for your refill to be completed.          Additional Information About Your Visit        MyChart Information     [employer] gives you secure access to your electronic health record. If you see a primary care provider, you can also send messages to your care team and make appointments. If you have questions, please call your primary care clinic.  If you do not have a primary care provider, please call [phone] and they will assist you.        Care EveryWhere ID     This is your Care EveryWhere ID. This could be used by other organizations to access your [facility] medical records  [id number]        Your Vitals Were     Pulse Temperature Respirations Pulse Oximetry BMI (Body Mass Index)       86 99.4  F (37.4  C) 16 96% 36.7 kg/m2        Blood Pressure from Last 3 Encounters:   01/17/18 118/77   11/20/17 108/72   11/15/17 135/81    Weight from Last 3 Encounters:   01/17/18 121 kg (266 lb 12.8 oz)   11/20/17 125.2 kg (276 lb 1.6 oz)   11/15/17 126.2 kg (278 lb 4.8 oz)              We Performed the Following     CBC with platelets differential     CMV DNA quantification - NOW     Comprehensive metabolic panel     Cyclosporine          Today's  Medication Changes          These changes are accurate as of: 1/17/18  4:16 PM.  If you have any questions, ask your nurse or doctor.               These medicines have changed or have updated prescriptions.        Dose/Directions    cycloSPORINE modified 25 MG capsule   Commonly known as:  GENERIC EQUIVALENT   This may have changed:  how much to take   Used for:  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   Changed by:  Uli Enciso MD        Dose:  25 mg   Take 1 capsule (25 mg) by mouth 2 times daily   Quantity:  120 capsule   Refills:  0                Recent Review Flowsheet Data     BMT Recent Results Latest Ref Rng & Units 7/12/2017 7/26/2017 9/13/2017 9/27/2017 11/15/2017 11/20/2017 1/17/2018    WBC 4.0 - 11.0 10e9/L 8.3 - 8.3 - 7.6 - 6.5    Hemoglobin 13.3 - 17.7 g/dL 14.5 - 14.6 - 13.8 - 14.0    Platelet Count 150 - 450 10e9/L 185 - 222 - 229 - 235    Neutrophils (Absolute) 1.6 - 8.3 10e9/L 5.4 - 4.7 - 3.9 - 3.9    INR 0.86 - 1.14 - - - - - - -    Sodium 133 - 144 mmol/L 140 138 140 139 139 137 133    Potassium 3.4 - 5.3 mmol/L 3.5 3.9 4.0 4.1 4.2 4.4 4.8    Chloride 94 - 109 mmol/L 105 105 106 104 107 104 102    Glucose 70 - 99 mg/dL 101(H) 115(H) 98 91 96 103(H) 108(H)    Urea Nitrogen 7 - 30 mg/dL 20 16 22 22 24 21 25    Creatinine 0.66 - 1.25 mg/dL 0.96 0.88 1.20 1.13 1.38(H) 1.38(H) 1.43(H)    Calcium (Total) 8.5 - 10.1 mg/dL 8.5 8.6 8.7 8.9 8.6 9.0 8.6    Protein (Total) 6.8 - 8.8 g/dL 6.6(L) - 7.0 - 7.0 - 7.7    Albumin 3.4 - 5.0 g/dL 3.7 - 3.8 - 4.0 - 3.8    Bilirubin (Direct) 0.0 - 0.2 mg/dL - - - - - - -    Alkaline Phosphatase 40 - 150 U/L 74 - 75 - 75 - 79    AST 0 - 45 U/L 19 - 16 - 22 - 32    ALT 0 - 70 U/L 24 - 27 - 34 - 34    MCV 78 - 100 fl 98 - 98 - 101(H) - 99               Primary Care Provider Office Phone # Fax #    Cole Duong [phone]       [address] [address]        Equal Access to Services     FELI ERICKSON :  Hadii aad ku hadnato Sojoslynali, waaxda luqadaha, qaybta kaalmada vick, john haydeemila watt laniviaanna eliel. So [facility] [phone].    ATENCIÓN: Si krystal patiño, tiene a hernandez disposición servicios gratuitos de asistencia lingüística. Llame al [phone].    We comply with applicable federal civil rights laws and [place] laws. We do not discriminate on the basis of race, color, national origin, age, disability, sex, sexual orientation, or gender identity.            Thank you!     Thank you for choosing [facility] BLOOD AND MARROW TRANSPLANT  for your care. Our goal is always to provide you with excellent care. Hearing back from our patients is one way we can continue to improve our services. Please take a few minutes to complete the written survey that you may receive in the mail after your visit with us. Thank you!             Your Updated Medication List - Protect others around you: Learn how to safely use, store and throw away your medicines at www.disposemymeds.org.          This list is accurate as of: 1/17/18  4:16 PM.  Always use your most recent med list.                   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       fluconazole 100 MG tablet    DIFLUCAN    30 tablet    Take 1 tablet (100 mg) by mouth daily    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       levofloxacin 250 MG tablet    LEVAQUIN    30 tablet    Take 1 tablet (250 mg) by mouth daily     MDS (myelodysplastic syndrome) (H)       levothyroxine 150 MCG tablet    SYNTHROID/LEVOTHROID    30 tablet    Take 1 tablet (150 mcg) by mouth daily    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       lisinopril 20 MG tablet    PRINIVIL/ZESTRIL    30 tablet    Take 1 tablet (20 mg) by mouth daily    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       magnesium oxide 400 (241.3 MG) MG tablet    MAG-OX     Take 2 tablets daily        metoprolol tartrate 25 MG tablet    LOPRESSOR    120 tablet    Take 2 tablets (50 mg) by mouth 2 times daily    MDS (myelodysplastic syndrome) (H)       NITROGLYCERIN ER PO      Take by mouth as needed Reported on 5/12/2017        predniSONE 10 MG tablet    DELTASONE    30 tablet    Take 1 tablet (10 mg) by mouth daily Alternating with 5mg    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

## 2018-01-23 LAB
ALBUMIN SERPL-MCNC: 3.8 G/DL (ref 3.4–5)
ALP SERPL-CCNC: 79 U/L (ref 40–150)
ALT SERPL W P-5'-P-CCNC: 34 U/L (ref 0–70)
ANION GAP SERPL CALCULATED.3IONS-SCNC: 7 MMOL/L (ref 3–14)
AST SERPL W P-5'-P-CCNC: 32 U/L (ref 0–45)
BILIRUB SERPL-MCNC: 0.3 MG/DL (ref 0.2–1.3)
BUN SERPL-MCNC: 25 MG/DL (ref 7–30)
CALCIUM SERPL-MCNC: 8.6 MG/DL (ref 8.5–10.1)
CHLORIDE SERPL-SCNC: 102 MMOL/L (ref 94–109)
CO2 SERPL-SCNC: 24 MMOL/L (ref 20–32)
CREAT SERPL-MCNC: 1.43 MG/DL (ref 0.66–1.25)
GFR SERPL CREATININE-BSD FRML MDRD: 51 ML/MIN/1.7M2
GLUCOSE SERPL-MCNC: 108 MG/DL (ref 70–99)
POTASSIUM SERPL-SCNC: 4.8 MMOL/L (ref 3.4–5.3)
PROT SERPL-MCNC: 7.7 G/DL (ref 6.8–8.8)
SODIUM SERPL-SCNC: 133 MMOL/L (ref 133–144)

## 2018-01-25 DIAGNOSIS — D46.22 RAEB-2 (REFRACTORY ANEMIA WITH EXCESS BLASTS-2) (H): ICD-10-CM

## 2018-01-25 RX ORDER — CYCLOSPORINE 25 MG/1
25 CAPSULE, LIQUID FILLED ORAL 2 TIMES DAILY
Qty: 120 CAPSULE | Refills: 3 | Status: SHIPPED | OUTPATIENT
Start: 2018-01-25 | End: 2018-09-28

## 2018-01-30 DIAGNOSIS — D46.9 MDS (MYELODYSPLASTIC SYNDROME) (H): ICD-10-CM

## 2018-01-30 DIAGNOSIS — D46.22 RAEB-2 (REFRACTORY ANEMIA WITH EXCESS BLASTS-2) (H): ICD-10-CM

## 2018-01-30 RX ORDER — SULFAMETHOXAZOLE/TRIMETHOPRIM 800-160 MG
1 TABLET ORAL 2 TIMES DAILY
Qty: 48 TABLET | Refills: 3 | Status: SHIPPED | OUTPATIENT
Start: 2018-01-30 | End: 2019-01-23

## 2018-01-30 RX ORDER — LEVOTHYROXINE SODIUM 150 UG/1
150 TABLET ORAL DAILY
Qty: 90 TABLET | Refills: 3 | Status: SHIPPED | OUTPATIENT
Start: 2018-01-30 | End: 2019-01-23

## 2018-01-30 RX ORDER — FLUCONAZOLE 100 MG/1
100 TABLET ORAL DAILY
Qty: 90 TABLET | Refills: 3 | Status: SHIPPED | OUTPATIENT
Start: 2018-01-30 | End: 2019-01-23

## 2018-01-30 RX ORDER — LEVOFLOXACIN 250 MG/1
250 TABLET, FILM COATED ORAL DAILY
Qty: 90 TABLET | Refills: 3 | Status: SHIPPED | OUTPATIENT
Start: 2018-01-30 | End: 2019-01-23

## 2018-01-31 DIAGNOSIS — D46.22 RAEB-2 (REFRACTORY ANEMIA WITH EXCESS BLASTS-2) (H): ICD-10-CM

## 2018-01-31 DIAGNOSIS — T86.09 GVHD AS COMPLICATION OF BONE MARROW TRANSPLANT (H): ICD-10-CM

## 2018-01-31 DIAGNOSIS — D89.813 GVHD AS COMPLICATION OF BONE MARROW TRANSPLANT (H): ICD-10-CM

## 2018-01-31 LAB
ALBUMIN SERPL-MCNC: 3.9 G/DL (ref 3.4–5)
ALP SERPL-CCNC: 84 U/L (ref 40–150)
ALT SERPL W P-5'-P-CCNC: 35 U/L (ref 0–70)
ANION GAP SERPL CALCULATED.3IONS-SCNC: 5 MMOL/L (ref 3–14)
AST SERPL W P-5'-P-CCNC: 34 U/L (ref 0–45)
BILIRUB SERPL-MCNC: 0.3 MG/DL (ref 0.2–1.3)
BUN SERPL-MCNC: 22 MG/DL (ref 7–30)
CALCIUM SERPL-MCNC: 8.6 MG/DL (ref 8.5–10.1)
CHLORIDE SERPL-SCNC: 104 MMOL/L (ref 94–109)
CO2 SERPL-SCNC: 27 MMOL/L (ref 20–32)
CREAT SERPL-MCNC: 1.18 MG/DL (ref 0.66–1.25)
GFR SERPL CREATININE-BSD FRML MDRD: 64 ML/MIN/1.7M2
GLUCOSE SERPL-MCNC: 100 MG/DL (ref 70–99)
POTASSIUM SERPL-SCNC: 4.9 MMOL/L (ref 3.4–5.3)
PROT SERPL-MCNC: 8.2 G/DL (ref 6.8–8.8)
SODIUM SERPL-SCNC: 136 MMOL/L (ref 133–144)

## 2018-01-31 PROCEDURE — 80053 COMPREHEN METABOLIC PANEL: CPT | Performed by: INTERNAL MEDICINE

## 2018-01-31 RX ORDER — LISINOPRIL 20 MG/1
20 TABLET ORAL DAILY
Qty: 90 TABLET | Refills: 3 | Status: SHIPPED | OUTPATIENT
Start: 2018-01-31 | End: 2018-07-18

## 2018-01-31 RX ORDER — METOPROLOL TARTRATE 25 MG/1
25 TABLET, FILM COATED ORAL 2 TIMES DAILY
Qty: 180 TABLET | Refills: 3 | Status: SHIPPED | OUTPATIENT
Start: 2018-01-31 | End: 2018-07-18

## 2018-01-31 RX ORDER — ROSUVASTATIN CALCIUM 5 MG/1
5 TABLET, COATED ORAL DAILY
Qty: 90 TABLET | Refills: 3 | Status: SHIPPED | OUTPATIENT
Start: 2018-01-31 | End: 2019-01-23

## 2018-01-31 RX ORDER — PREDNISONE 10 MG/1
10 TABLET ORAL DAILY
Qty: 90 TABLET | Refills: 3 | Status: SHIPPED | OUTPATIENT
Start: 2018-01-31 | End: 2018-05-16

## 2018-01-31 RX ORDER — ACYCLOVIR 800 MG/1
800 TABLET ORAL 2 TIMES DAILY
Qty: 180 TABLET | Refills: 3 | Status: SHIPPED | OUTPATIENT
Start: 2018-01-31 | End: 2019-01-23

## 2018-03-21 ENCOUNTER — ONCOLOGY VISIT (OUTPATIENT)
Dept: TRANSPLANT | Facility: CLINIC | Age: 58
End: 2018-03-21
Attending: INTERNAL MEDICINE
Payer: COMMERCIAL

## 2018-03-21 ENCOUNTER — APPOINTMENT (OUTPATIENT)
Dept: LAB | Facility: CLINIC | Age: 58
End: 2018-03-21
Attending: INTERNAL MEDICINE
Payer: COMMERCIAL

## 2018-03-21 VITALS
OXYGEN SATURATION: 93 % | DIASTOLIC BLOOD PRESSURE: 76 MMHG | BODY MASS INDEX: 34.7 KG/M2 | HEART RATE: 101 BPM | SYSTOLIC BLOOD PRESSURE: 119 MMHG | RESPIRATION RATE: 16 BRPM | TEMPERATURE: 98.8 F | WEIGHT: 252.3 LBS

## 2018-03-21 DIAGNOSIS — D46.22 RAEB-2 (REFRACTORY ANEMIA WITH EXCESS BLASTS-2) (H): ICD-10-CM

## 2018-03-21 LAB
ALBUMIN SERPL-MCNC: 3.7 G/DL (ref 3.4–5)
ALP SERPL-CCNC: 92 U/L (ref 40–150)
ALT SERPL W P-5'-P-CCNC: 36 U/L (ref 0–70)
ANION GAP SERPL CALCULATED.3IONS-SCNC: 7 MMOL/L (ref 3–14)
AST SERPL W P-5'-P-CCNC: 42 U/L (ref 0–45)
BASOPHILS # BLD AUTO: 0.1 10E9/L (ref 0–0.2)
BASOPHILS NFR BLD AUTO: 1.1 %
BILIRUB SERPL-MCNC: 0.4 MG/DL (ref 0.2–1.3)
BUN SERPL-MCNC: 16 MG/DL (ref 7–30)
CALCIUM SERPL-MCNC: 8.8 MG/DL (ref 8.5–10.1)
CHLORIDE SERPL-SCNC: 100 MMOL/L (ref 94–109)
CO2 SERPL-SCNC: 25 MMOL/L (ref 20–32)
CREAT SERPL-MCNC: 1.06 MG/DL (ref 0.66–1.25)
CYCLOSPORINE BLD LC/MS/MS-MCNC: <25 UG/L (ref 50–400)
DIFFERENTIAL METHOD BLD: ABNORMAL
EOSINOPHIL # BLD AUTO: 0 10E9/L (ref 0–0.7)
EOSINOPHIL NFR BLD AUTO: 0.6 %
ERYTHROCYTE [DISTWIDTH] IN BLOOD BY AUTOMATED COUNT: 13.2 % (ref 10–15)
GFR SERPL CREATININE-BSD FRML MDRD: 72 ML/MIN/1.7M2
GLUCOSE SERPL-MCNC: 110 MG/DL (ref 70–99)
HCT VFR BLD AUTO: 46.2 % (ref 40–53)
HGB BLD-MCNC: 16.1 G/DL (ref 13.3–17.7)
IMM GRANULOCYTES # BLD: 0 10E9/L (ref 0–0.4)
IMM GRANULOCYTES NFR BLD: 0.6 %
LYMPHOCYTES # BLD AUTO: 1 10E9/L (ref 0.8–5.3)
LYMPHOCYTES NFR BLD AUTO: 18.3 %
MCH RBC QN AUTO: 33.4 PG (ref 26.5–33)
MCHC RBC AUTO-ENTMCNC: 34.8 G/DL (ref 31.5–36.5)
MCV RBC AUTO: 96 FL (ref 78–100)
MONOCYTES # BLD AUTO: 0.6 10E9/L (ref 0–1.3)
MONOCYTES NFR BLD AUTO: 10.7 %
NEUTROPHILS # BLD AUTO: 3.6 10E9/L (ref 1.6–8.3)
NEUTROPHILS NFR BLD AUTO: 68.7 %
NRBC # BLD AUTO: 0 10*3/UL
NRBC BLD AUTO-RTO: 0 /100
PLATELET # BLD AUTO: 200 10E9/L (ref 150–450)
POTASSIUM SERPL-SCNC: 4.8 MMOL/L (ref 3.4–5.3)
PROT SERPL-MCNC: 8.2 G/DL (ref 6.8–8.8)
RBC # BLD AUTO: 4.82 10E12/L (ref 4.4–5.9)
SODIUM SERPL-SCNC: 132 MMOL/L (ref 133–144)
TME LAST DOSE: ABNORMAL H
WBC # BLD AUTO: 5.3 10E9/L (ref 4–11)

## 2018-03-21 PROCEDURE — 80158 DRUG ASSAY CYCLOSPORINE: CPT | Performed by: INTERNAL MEDICINE

## 2018-03-21 PROCEDURE — 80053 COMPREHEN METABOLIC PANEL: CPT | Performed by: INTERNAL MEDICINE

## 2018-03-21 PROCEDURE — 36415 COLL VENOUS BLD VENIPUNCTURE: CPT

## 2018-03-21 PROCEDURE — G0463 HOSPITAL OUTPT CLINIC VISIT: HCPCS

## 2018-03-21 PROCEDURE — 85025 COMPLETE CBC W/AUTO DIFF WBC: CPT | Performed by: INTERNAL MEDICINE

## 2018-03-21 RX ORDER — PILOCARPINE HYDROCHLORIDE 5 MG/1
5 TABLET, FILM COATED ORAL 3 TIMES DAILY
Qty: 90 TABLET | Refills: 11 | Status: SHIPPED | OUTPATIENT
Start: 2018-03-21 | End: 2019-05-02

## 2018-03-21 ASSESSMENT — PAIN SCALES - GENERAL: PAINLEVEL: NO PAIN (0)

## 2018-03-21 NOTE — LETTER
3/21/2018      RE: Byron Russo  [address] [address] [facility] [place] [address]       HISTORY OF PRESENT ILLNESS:  I saw Byron Russo today approximately 22 months status post non-myeloablative allogeneic sibling transplant for MDS RAEB-2.  He has had a persistent case of chronic gbvzf-osmbfe-rytp disease which currently primarily affects his mouth and presents as dryness, for which he is taking 10 mg alternating with 5 mg of prednisone every other day, as well as cyclosporine 25 mg p.o. b.i.d.  When I last saw him 2 months ago, he had rather pronounced edema of the lower extremities, so I switched his antihypertensives and amlodipine to lisinopril, and he has had nearly complete resolution of the edema.  I note that his weight is down 10 kilograms over the last 2 months.  A large part of this is surely edema.  He does continue to complain of the dry mouth, which keeps him from eating things he normally likes, like steak or hamburger.  No fevers, chills, nausea, vomiting, diarrhea, shortness of breath, hematuria, dysuria, bruising or bleeding.  He has no mouth sores.  He continues to work full-time.      REVIEW OF SYSTEMS:  The remainder of the 10-point review of systems is negative.      PHYSICAL EXAMINATION:   GENERAL:  He looked fit.   VITAL SIGNS:  Unremarkable.   HEENT:  Sclerae were anicteric and non-injected.  Buccal mucosa showed chronic lichenoid changes but no open ulcerations.   LUNGS:  Clear to auscultation.   CARDIAC:  Without S3, rub or murmur.   ABDOMEN:  Nondistended, active bowel sounds, no organomegaly.   EXTREMITIES:  Trace pedal edema.   SKIN RASH:  None.  He has patchy hyperpigmentation of the face and scalp, but this is dramatically improved since we discontinued voriconazole.      LABORATORY DATA:  Labs today included a white count of 5300, hemoglobin 16.1, platelets of 200,000.  His electrolyte panel was normal with creatinine 1.06, potassium 4.8.  LFTs were within normal  limits.      ASSESSMENT AND PLAN:   1.  22 months status post non-myeloablative allogeneic sibling transplant for MDS RAEB-2, currently in complete remission.   2.  Chronic hhurh-tilcsb-joao disease, currently well controlled.  We will decrease his prednisone to 5 mg daily of prednisone.  Because of his dryness, we will add pilocarpine 5 mg p.o. t.i.d.       DISPOSITION:  Byron will be scheduled by mail to return for his 24-month restaging.     Uli Enciso M.D.

## 2018-03-21 NOTE — MR AVS SNAPSHOT
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  After hours call the   hospital at [phone] or   [phone]. Ask for the BMT   fellow on-call            Follow-ups after your visit        Your next 10 appointments already scheduled     May 11, 2018 11:00 AM CDT   [facility] with  [facility] DRAW   [facility] ([facility])    [address] [place] [address]  [address] [place] [id number] [phone]            May 11, 2018 11:30 AM CDT   Bone Marrow Biopsy with  BMT BRENDA #4, UU BONE MARROW BIOPSY   [facility] ([facility])    [address] [place] [address]  [address]  [facility] [address] [phone]            May 16, 2018 10:00 AM CDT   BMT Anniversary Visit with Uli Enciso MD   [facility] ([facility])    [address] [place] [address]  [address] [place] [id number] [phone]              Who to contact     If you have questions or need follow up information about today's clinic visit or your schedule please contact [facility]ARR[facility]ANSPL[facility] directly at [phone].  Normal or non-critical lab and imaging results will be communicated to you by MyChart, letter or phone within 4 business days after the clinic has received the results. If you do not hear  from us within 7 days, please contact the clinic through [employer] or phone. If you have a critical or abnormal lab result, we will notify you by phone as soon as possible.  Submit refill requests through [employer] or call your pharmacy and they will forward the refill request to us. Please allow 3 business days for your refill to be completed.          Additional Information About Your Visit        [employer] Information     [employer] gives you secure access to your electronic health record. If you see a primary care provider, you can also send messages to your care team and make appointments. If you have questions, please call your primary care clinic.  If you do not have a primary care provider, please call [phone] and they will assist you.        Care EveryWhere ID     This is your Care EveryWhere ID. This could be used by other organizations to access your [place] medical records  [id number]        Your Vitals Were     Pulse Temperature Respirations Pulse Oximetry BMI (Body Mass Index)       101 98.8  F (37.1  C) 16 93% 34.7 kg/m2        Blood Pressure from Last 3 Encounters:   03/21/18 119/76   01/17/18 118/77   11/20/17 108/72    Weight from Last 3 Encounters:   03/21/18 114.4 kg (252 lb 4.8 oz)   01/17/18 121 kg (266 lb 12.8 oz)   11/20/17 125.2 kg (276 lb 1.6 oz)              We Performed the Following     CBC with platelets differential     CMV DNA quantification     Comprehensive metabolic panel     Cyclosporine          Today's Medication Changes          These changes are accurate as of 3/21/18 11:59 PM.  If you have any questions, ask your nurse or doctor.               Start taking these medicines.        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        Dose:  20 mg   Take 1 tablet (20 mg) by mouth daily   Quantity:  90 tablet   Refills:  3       metoprolol tartrate 25 MG tablet   Commonly known as:  LOPRESSOR   This may have changed:  when to take this   Used for:  MDS (myelodysplastic syndrome) (H)        Dose:  25 mg   Take 1 tablet (25 mg) by mouth 2 times daily   Quantity:  180 tablet   Refills:  3            Where to get your medicines      These medications were sent to [facility] [address] [place] [address] [address] [place] [address] [address] [place] [address]    Hours:  TRANSPLANT PHONE NUMBER [phone] Phone:  [phone]     pilocarpine 5 MG tablet                Recent Review Flowsheet Data     BMT Recent Results Latest Ref Rng & Units 9/13/2017 9/27/2017 11/15/2017 11/20/2017 1/17/2018 1/31/2018 3/21/2018    WBC 4.0 - 11.0 10e9/L 8.3 - 7.6 - 6.5 - 5.3    Hemoglobin 13.3 - 17.7 g/dL 14.6 - 13.8 - 14.0 - 16.1    Platelet Count 150 - 450 10e9/L 222 - 229 - 235 - 200    Neutrophils (Absolute) 1.6 - 8.3 10e9/L 4.7 - 3.9 - 3.9 - 3.6    INR 0.86 - 1.14 - - - - - - -    Sodium 133 - 144 mmol/L 140 139 139 137 133 136 132(L)    Potassium 3.4 - 5.3 mmol/L 4.0 4.1 4.2 4.4 4.8 4.9 4.8    Chloride 94 - 109 mmol/L 106 104 107 104 102 104 100    Glucose 70 - 99 mg/dL 98 91 96 103(H) 108(H) 100(H) 110(H)    Urea Nitrogen 7 - 30 mg/dL 22 22 24 21 25 22 16    Creatinine 0.66 - 1.25 mg/dL 1.20 1.13 1.38(H) 1.38(H) 1.43(H) 1.18 1.06    Calcium (Total) 8.5 - 10.1 mg/dL 8.7 8.9 8.6 9.0 8.6 8.6 8.8    Protein (Total) 6.8 - 8.8 g/dL 7.0 - 7.0 - 7.7 8.2 8.2    Albumin 3.4 - 5.0 g/dL 3.8 - 4.0 - 3.8 3.9 3.7    Bilirubin (Direct) 0.0 - 0.2 mg/dL - - - - - - -    Alkaline Phosphatase 40 - 150  U/L 75 - 75 - 79 84 92    AST 0 - 45 U/L 16 - 22 - 32 34 42    ALT 0 - 70 U/L 27 - 34 - 34 35 36    MCV 78 - 100 fl 98 - 101(H) - 99 - 96               Primary Care Provider Office Phone # Fax #    Cole Duong [phone]       [facility] [address] [facility] [address]        Equal Access to Services     YANETH ERICKSON : Hadii aad ku hadasho Soomaali, waaxda luqadaha, qaybta kaalmada adeegyada, waxay idiin hayaan adeeg khinosilvia lacharito . So [facility] [phone].    ATENCIÓN: Si krystal patiño, tiene a hernandez disposición servicios gratuitos de asistencia lingüística. [facility] [phone].    We comply with applicable federal civil rights laws and [place] laws. We do not discriminate on the basis of race, color, national origin, age, disability, sex, sexual orientation, or gender identity.            Thank you!     Thank you for choosing [facility]ARR[facility] TRANSPL[facility]  for your care. Our goal is always to provide you with excellent care. Hearing back from our patients is one way we can continue to improve our services. Please take a few minutes to complete the written survey that you may receive in the mail after your visit with us. Thank you!             Your Updated Medication List - Protect others around you: Learn how to safely use, store and throw away your medicines at www.disposemymeds.org.          This list is accurate as of 3/21/18 11:59 PM.  Always use your most recent med list.                   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       fluconazole 100 MG tablet    DIFLUCAN    90 tablet    Take 1 tablet (100 mg) by mouth daily    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, MDS (myelodysplastic syndrome) (H)       levofloxacin 250 MG tablet    LEVAQUIN    90 tablet    Take 1 tablet (250 mg) by mouth daily    MDS (myelodysplastic syndrome) (H), 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       levothyroxine 150 MCG tablet    SYNTHROID/LEVOTHROID    90 tablet    Take 1 tablet (150 mcg) by mouth daily    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, MDS (myelodysplastic syndrome) (H)       lisinopril 20 MG tablet    PRINIVIL/ZESTRIL    90 tablet    Take 1 tablet (20 mg) by mouth daily    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       magnesium oxide 400 (241.3 MG) MG tablet    MAG-OX     Take 2 tablets daily        metoprolol tartrate 25 MG tablet    LOPRESSOR    180 tablet    Take 1 tablet (25 mg) by mouth 2 times daily    MDS (myelodysplastic syndrome) (H)       NITROGLYCERIN ER PO      Take by mouth as needed Reported on 5/12/2017        pilocarpine 5 MG tablet    SALAGEN    90 tablet    Take 1 tablet (5 mg) by mouth 3 times daily    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       predniSONE 10 MG tablet    DELTASONE    90 tablet    Take 1 tablet (10 mg) by mouth daily Alternating with 5mg    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

## 2018-03-21 NOTE — NURSING NOTE
"Oncology Rooming Note    March 21, 2018 3:20 PM   Byron Russo is a 57 year old male who presents for:    Chief Complaint   Patient presents with     Labs Only     venipuncture, vitals checked     RECHECK     Pt is here for labs and to see MD     Initial Vitals: /76  Pulse 101  Temp 98.8  F (37.1  C)  Resp 16  Wt 114.4 kg (252 lb 4.8 oz)  SpO2 93%  BMI 34.7 kg/m2 Estimated body mass index is 34.7 kg/(m^2) as calculated from the following:    Height as of 11/20/17: 1.816 m (5' 11.5\").    Weight as of this encounter: 114.4 kg (252 lb 4.8 oz). Body surface area is 2.4 meters squared.  No Pain (0) Comment: Data Unavailable   No LMP for male patient.  Allergies reviewed: yes  Medications reviewed: Yes    Medications: Medication refills not needed today.  Pharmacy name entered into EPIC:    [facility] [place], [place] - [address] [facility] [id number]  [facility] [address] [place] [address] [facility]    Clinical concerns: none     6 minutes for nursing intake (face to face time)     Fe Aguillon MA              "

## 2018-03-21 NOTE — PROGRESS NOTES
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  He has had a persistent case of chronic qotsd-noaauo-mrtt disease which currently primarily affects his mouth and presents as dryness, for which he is taking 10 mg alternating with 5 mg of prednisone every other day, as well as cyclosporine 25 mg p.o. b.i.d.  When I last saw him 2 months ago, he had rather pronounced edema of the lower extremities, so I switched his antihypertensives and amlodipine to lisinopril, and he has had nearly complete resolution of the edema.  I note that his weight is down 10 kilograms over the last 2 months.  A large part of this is surely edema.  He does continue to complain of the dry mouth, which keeps him from eating things he normally likes, like steak or hamburger.  No fevers, chills, nausea, vomiting, diarrhea, shortness of breath, hematuria, dysuria, bruising or bleeding.  He has no mouth sores.  He continues to work full-time.      REVIEW OF SYSTEMS:  The remainder of the 10-point review of systems is negative.      PHYSICAL EXAMINATION:   GENERAL:  He looked fit.   VITAL SIGNS:  Unremarkable.   HEENT:  Sclerae were anicteric and non-injected.  Buccal mucosa showed chronic lichenoid changes but no open ulcerations.   LUNGS:  Clear to auscultation.   CARDIAC:  Without S3, rub or murmur.   ABDOMEN:  Nondistended, active bowel sounds, no organomegaly.   EXTREMITIES:  Trace pedal edema.   SKIN RASH:  None.  He has patchy hyperpigmentation of the face and scalp, but this is dramatically improved since we discontinued voriconazole.      LABORATORY DATA:  Labs today included a white count of 5300, hemoglobin 16.1, platelets of 200,000.  His electrolyte panel was normal with creatinine 1.06, potassium 4.8.  LFTs were within normal limits.      ASSESSMENT AND PLAN:   1.  22 months status post non-myeloablative allogeneic sibling  transplant for MDS RAEB-2, currently in complete remission.   2.  Chronic kusth-ouwqkr-dtrg disease, currently well controlled.  We will decrease his prednisone to 5 mg daily of prednisone.  Because of his dryness, we will add pilocarpine 5 mg p.o. t.i.d.       DISPOSITION:  Byron will be scheduled by mail to return for his 24-month restaging.     Uli Enciso M.D.

## 2018-05-11 ENCOUNTER — APPOINTMENT (OUTPATIENT)
Dept: LAB | Facility: CLINIC | Age: 58
End: 2018-05-11
Attending: NURSE PRACTITIONER
Payer: COMMERCIAL

## 2018-05-11 ENCOUNTER — OFFICE VISIT (OUTPATIENT)
Dept: TRANSPLANT | Facility: CLINIC | Age: 58
End: 2018-05-11
Attending: NURSE PRACTITIONER
Payer: COMMERCIAL

## 2018-05-11 VITALS
HEIGHT: 71 IN | DIASTOLIC BLOOD PRESSURE: 78 MMHG | RESPIRATION RATE: 18 BRPM | OXYGEN SATURATION: 95 % | HEART RATE: 101 BPM | BODY MASS INDEX: 31.37 KG/M2 | WEIGHT: 224.1 LBS | SYSTOLIC BLOOD PRESSURE: 100 MMHG | TEMPERATURE: 97.7 F

## 2018-05-11 DIAGNOSIS — D46.9 MDS (MYELODYSPLASTIC SYNDROME) (H): Primary | ICD-10-CM

## 2018-05-11 LAB
ALBUMIN SERPL-MCNC: 3.3 G/DL (ref 3.4–5)
ALP SERPL-CCNC: 102 U/L (ref 40–150)
ALT SERPL W P-5'-P-CCNC: 36 U/L (ref 0–70)
ANION GAP SERPL CALCULATED.3IONS-SCNC: 6 MMOL/L (ref 3–14)
AST SERPL W P-5'-P-CCNC: 47 U/L (ref 0–45)
BASOPHILS # BLD AUTO: 0.1 10E9/L (ref 0–0.2)
BASOPHILS NFR BLD AUTO: 1 %
BILIRUB SERPL-MCNC: 0.4 MG/DL (ref 0.2–1.3)
BUN SERPL-MCNC: 13 MG/DL (ref 7–30)
CALCIUM SERPL-MCNC: 8.8 MG/DL (ref 8.5–10.1)
CHLORIDE SERPL-SCNC: 104 MMOL/L (ref 94–109)
CO2 SERPL-SCNC: 25 MMOL/L (ref 20–32)
CREAT SERPL-MCNC: 1.03 MG/DL (ref 0.66–1.25)
DIFFERENTIAL METHOD BLD: ABNORMAL
EOSINOPHIL # BLD AUTO: 0.4 10E9/L (ref 0–0.7)
EOSINOPHIL NFR BLD AUTO: 7.2 %
ERYTHROCYTE [DISTWIDTH] IN BLOOD BY AUTOMATED COUNT: 14 % (ref 10–15)
GFR SERPL CREATININE-BSD FRML MDRD: 75 ML/MIN/1.7M2
GLUCOSE SERPL-MCNC: 91 MG/DL (ref 70–99)
HCT VFR BLD AUTO: 49.4 % (ref 40–53)
HGB BLD-MCNC: 16.9 G/DL (ref 13.3–17.7)
IMM GRANULOCYTES # BLD: 0 10E9/L (ref 0–0.4)
IMM GRANULOCYTES NFR BLD: 0.6 %
LYMPHOCYTES # BLD AUTO: 1.1 10E9/L (ref 0.8–5.3)
LYMPHOCYTES NFR BLD AUTO: 22.8 %
MCH RBC QN AUTO: 33.3 PG (ref 26.5–33)
MCHC RBC AUTO-ENTMCNC: 34.2 G/DL (ref 31.5–36.5)
MCV RBC AUTO: 97 FL (ref 78–100)
MONOCYTES # BLD AUTO: 1 10E9/L (ref 0–1.3)
MONOCYTES NFR BLD AUTO: 20.1 %
NEUTROPHILS # BLD AUTO: 2.4 10E9/L (ref 1.6–8.3)
NEUTROPHILS NFR BLD AUTO: 48.3 %
NRBC # BLD AUTO: 0 10*3/UL
NRBC BLD AUTO-RTO: 0 /100
PLATELET # BLD AUTO: 174 10E9/L (ref 150–450)
PLATELET # BLD EST: ABNORMAL 10*3/UL
POIKILOCYTOSIS BLD QL SMEAR: SLIGHT
POTASSIUM SERPL-SCNC: 4.4 MMOL/L (ref 3.4–5.3)
PROT SERPL-MCNC: 7.8 G/DL (ref 6.8–8.8)
RBC # BLD AUTO: 5.08 10E12/L (ref 4.4–5.9)
SODIUM SERPL-SCNC: 135 MMOL/L (ref 133–144)
T4 FREE SERPL-MCNC: 1.39 NG/DL (ref 0.76–1.46)
TSH SERPL DL<=0.005 MIU/L-ACNC: 0.47 MU/L (ref 0.4–4)
WBC # BLD AUTO: 4.9 10E9/L (ref 4–11)

## 2018-05-11 PROCEDURE — 88311 DECALCIFY TISSUE: CPT | Performed by: INTERNAL MEDICINE

## 2018-05-11 PROCEDURE — G0463 HOSPITAL OUTPT CLINIC VISIT: HCPCS | Mod: 25,ZF

## 2018-05-11 PROCEDURE — 40000951 ZZHCL STATISTIC BONE MARROW INTERP TC 85097: Performed by: INTERNAL MEDICINE

## 2018-05-11 PROCEDURE — 84439 ASSAY OF FREE THYROXINE: CPT | Performed by: INTERNAL MEDICINE

## 2018-05-11 PROCEDURE — 40000611 ZZHCL STATISTIC MORPHOLOGY W/INTERP HEMEPATH TC 85060: Performed by: INTERNAL MEDICINE

## 2018-05-11 PROCEDURE — 40001005 ZZHCL STATISTIC FLOW >15 ABY TC 88189: Performed by: INTERNAL MEDICINE

## 2018-05-11 PROCEDURE — 88280 CHROMOSOME KARYOTYPE STUDY: CPT | Performed by: INTERNAL MEDICINE

## 2018-05-11 PROCEDURE — 38222 DX BONE MARROW BX & ASPIR: CPT

## 2018-05-11 PROCEDURE — 84443 ASSAY THYROID STIM HORMONE: CPT | Performed by: INTERNAL MEDICINE

## 2018-05-11 PROCEDURE — 00000058 ZZHCL STATISTIC BONE MARROW ASP PERF TC 38220: Performed by: INTERNAL MEDICINE

## 2018-05-11 PROCEDURE — 88185 FLOWCYTOMETRY/TC ADD-ON: CPT | Performed by: INTERNAL MEDICINE

## 2018-05-11 PROCEDURE — 36416 COLLJ CAPILLARY BLOOD SPEC: CPT | Performed by: INTERNAL MEDICINE

## 2018-05-11 PROCEDURE — 88161 CYTOPATH SMEAR OTHER SOURCE: CPT | Performed by: INTERNAL MEDICINE

## 2018-05-11 PROCEDURE — 80053 COMPREHEN METABOLIC PANEL: CPT | Performed by: INTERNAL MEDICINE

## 2018-05-11 PROCEDURE — 88305 TISSUE EXAM BY PATHOLOGIST: CPT | Performed by: INTERNAL MEDICINE

## 2018-05-11 PROCEDURE — 85025 COMPLETE CBC W/AUTO DIFF WBC: CPT | Performed by: INTERNAL MEDICINE

## 2018-05-11 PROCEDURE — 00000161 ZZHCL STATISTIC H-SPHEME PROCESS B/S: Performed by: INTERNAL MEDICINE

## 2018-05-11 PROCEDURE — 40000424 ZZHCL STATISTIC BONE MARROW CORE PERF TC 38221: Performed by: INTERNAL MEDICINE

## 2018-05-11 PROCEDURE — 81267 CHIMERISM ANAL NO CELL SELEC: CPT | Performed by: INTERNAL MEDICINE

## 2018-05-11 PROCEDURE — 88237 TISSUE CULTURE BONE MARROW: CPT | Performed by: INTERNAL MEDICINE

## 2018-05-11 PROCEDURE — 88264 CHROMOSOME ANALYSIS 20-25: CPT | Performed by: INTERNAL MEDICINE

## 2018-05-11 PROCEDURE — 88184 FLOWCYTOMETRY/ TC 1 MARKER: CPT | Performed by: INTERNAL MEDICINE

## 2018-05-11 ASSESSMENT — PAIN SCALES - GENERAL: PAINLEVEL: SEVERE PAIN (7)

## 2018-05-11 NOTE — PROGRESS NOTES
"BMT ONC Adult Bone Marrow Biopsy Procedure Note  May 11, 2018  /78 (BP Location: Left arm, Patient Position: Chair, Cuff Size: Adult Regular)  Pulse 101  Temp 97.7  F (36.5  C) (Oral)  Resp 18  Ht 1.816 m (5' 11.5\")  Wt 101.7 kg (224 lb 1.6 oz)  SpO2 95%  BMI 30.82 kg/m2     Learning needs assessment complete within 12 months? YES    DIAGNOSIS: MDS    PROCEDURE: Unilateral Bone Marrow Biopsy and Unilateral Aspirate    LOCATION: [facility] 2nd Floor    Patient s identification was positively verified by verbal identification and invasive procedure safety checklist was completed. Informed consent was obtained. Following the administration of none as pre-medication, patient was placed in the prone position and prepped and draped in a sterile manner. Approximately 10 cc of 1% Lidocaine was used over the left posterior iliac spine. Following this a 3 mm incision was made. Trephine bone marrow core(s) was (were) obtained from the [facility]. Bone marrow aspirates were obtained from the [facility]. Aspirates were sent for morphology, immunophenotyping, cytogenetics and molecular diagnostics RFLP. A total of approximately 20 ml of marrow was aspirated. Following this procedure a sterile dressing was applied to the bone marrow biopsy site(s). The patient was placed in the supine position to maintain pressure on the biopsy site. Post-procedure wound care instructions were given.     Complications: NO    Pre-procedural pain: 0 out of 10 on the numeric pain rating scale.     Procedural pain: 2 out of 10 on the numeric pain rating scale.     Post-procedural pain assessment: 0 out of 10 on the numeric pain rating scale.     Interventions: NO    Length of procedure:20 minutes or less      Procedure performed by: Jesi Giles NP    "

## 2018-05-11 NOTE — MR AVS SNAPSHOT
After Visit Summary   5/11/2018    Byron Russo    MRN: [id number]           Patient Information     Date Of Birth          1960        Visit Information        Provider Department      5/11/2018 11:30 AM UU BONE MARROW BIOPSY;  BMT BRENDA #4 [facility] Blood and Marrow Transplant            [facility] ([facility]  [address] [place] [address]  Phone: [phone]  Clinic Hours:   Monday-Thursday:7am to 7pm   Friday: 7am to 5pm   Weekends and holidays:    8am to noon (in general)  If your fever is 100.5  or greater,   call the clinic.  After hours call the   hospital at [phone] or   [phone]. Ask for the BMT   fellow on-call            Follow-ups after your visit        Your next 10 appointments already scheduled     May 11, 2018 11:00 AM CDT   Masonic Lab Draw with  MASONIC LAB DRAW   [facility] Masonic Lab Draw ([facility])    [address] [place] [address]  [address]  [facility] [address] [phone]            May 11, 2018 11:30 AM CDT   Bone Marrow Biopsy with  BMT BRENDA #4, UU BONE MARROW BIOPSY   [facility] Blood and Marrow Transplant ([facility])    [address] [facility]  [address]  [facility] [address] [phone]            May 16, 2018  3:00 PM CDT   BMT Anniversary Visit with Uli Enciso MD   [facility] Blood and Marrow Transplant ([facility])    [address] [place] [address]  [address]  [facility] [address] [phone]              Who to contact     If you have questions or need follow up information about today's clinic visit or your schedule please contact [facility] BLOOD AND MARROW TRANSPLANT directly at [phone].  Normal or non-critical lab and imaging results will be communicated to you by MyChart, letter or phone within 4 business days after the clinic has received the results. If you do not hear from us within 7 days, please contact the clinic through [employer]  or phone. If you have a critical or abnormal lab result, we will notify you by phone as soon as possible.  Submit refill requests through [employer] or call your pharmacy and they will forward the refill request to us. Please allow 3 business days for your refill to be completed.          Additional Information About Your Visit        [employer] Information     [employer] gives you secure access to your electronic health record. If you see a primary care provider, you can also send messages to your care team and make appointments. If you have questions, please call your primary care clinic.  If you do not have a primary care provider, please call [phone] and they will assist you.        Care EveryWhere ID     This is your Care EveryWhere ID. This could be used by other organizations to access your [place] medical records  [id number]         Blood Pressure from Last 3 Encounters:   03/21/18 119/76   01/17/18 118/77   11/20/17 108/72    Weight from Last 3 Encounters:   03/21/18 114.4 kg (252 lb 4.8 oz)   01/17/18 121 kg (266 lb 12.8 oz)   11/20/17 125.2 kg (276 lb 1.6 oz)              Today, you had the following     No orders found for display         Today's Medication Changes          These changes are accurate as of 4/27/18  4:15 PM.  If you have any questions, ask your nurse or doctor.               These medicines have changed or have updated prescriptions.        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7 g/dL 14.6 - 13.8 - 14.0 - 16.1    Platelet Count 150 - 450 10e9/L 222 - 229 - 235 - 200    Neutrophils (Absolute) 1.6 - 8.3 10e9/L 4.7 - 3.9 - 3.9 - 3.6    INR 0.86 - 1.14 - - - - - - -    Sodium 133 - 144 mmol/L 140 139 139 137 133 136 132(L)    Potassium 3.4 - 5.3 mmol/L 4.0 4.1 4.2 4.4 4.8 4.9 4.8    Chloride 94 - 109 mmol/L 106 104 107 104 102 104 100    Glucose 70 - 99 mg/dL 98 91 96 103(H) 108(H) 100(H) 110(H)    Urea Nitrogen 7 - 30 mg/dL 22 22 24 21 25 22 16    Creatinine 0.66 - 1.25 mg/dL 1.20 1.13 1.38(H) 1.38(H) 1.43(H) 1.18 1.06    Calcium (Total) 8.5 - 10.1 mg/dL 8.7 8.9 8.6 9.0 8.6 8.6 8.8    Protein (Total) 6.8 - 8.8 g/dL 7.0 - 7.0 - 7.7 8.2 8.2    Albumin 3.4 - 5.0 g/dL 3.8 - 4.0 - 3.8 3.9 3.7    Bilirubin (Direct) 0.0 - 0.2 mg/dL - - - - - - -    Alkaline Phosphatase 40 - 150 U/L 75 - 75 - 79 84 92    AST 0 - 45 U/L 16 - 22 - 32 34 42    ALT 0 - 70 U/L 27 - 34 - 34 35 36    MCV 78 - 100 fl 98 - 101(H) - 99 - 96               Primary Care Provider Office Phone # Fax #    Radhamorteza Duong [phone]       Critical access hospital [address] [facility] [address]        Equal Access to Services     FELI ERICKSON : Allan [place], farshad tillman, kizzy hickman, john julian. So [facility] [phone].    ATENCIÓN: Si habla español, tiene a hernandez disposición servicios gratuitos de asistencia lingüística. Llame al [phone].    We comply with applicable federal civil rights laws and [place] laws. We do not discriminate on the basis of race, color, national origin, age, disability, sex, sexual orientation, or gender identity.            Thank you!     Thank you for choosing [facility] BLOOD AND MARROW TRANSPLANT  for  your care. Our goal is always to provide you with excellent care. Hearing back from our patients is one way we can continue to improve our services. Please take a few minutes to complete the written survey that you may receive in the mail after your visit with us. Thank you!             Your Updated Medication List - Protect others around you: Learn how to safely use, store and throw away your medicines at www.disposemymeds.org.          This list is accurate as of 4/27/18  4:15 PM.  Always use your most recent med list.                   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       fluconazole 100 MG tablet    DIFLUCAN    90 tablet    Take 1 tablet (100 mg) by mouth daily    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, MDS (myelodysplastic syndrome) (H)       levofloxacin 250 MG tablet    LEVAQUIN    90 tablet    Take 1 tablet (250 mg) by mouth daily    MDS (myelodysplastic syndrome) (H), 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       levothyroxine 150 MCG tablet    SYNTHROID/LEVOTHROID    90 tablet    Take 1 tablet (150 mcg) by mouth daily    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, MDS (myelodysplastic syndrome) (H)       lisinopril 20 MG tablet    PRINIVIL/ZESTRIL    90 tablet    Take 1 tablet (20 mg) by mouth daily    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       magnesium oxide 400 (241.3 Mg) MG tablet    MAG-OX     Take 2  tablets daily        metoprolol tartrate 25 MG tablet    LOPRESSOR    180 tablet    Take 1 tablet (25 mg) by mouth 2 times daily    MDS (myelodysplastic syndrome) (H)       NITROGLYCERIN ER PO      Take by mouth as needed Reported on 5/12/2017        pilocarpine 5 MG tablet    SALAGEN    90 tablet    Take 1 tablet (5 mg) by mouth 3 times daily    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       predniSONE 10 MG tablet    DELTASONE    90 tablet    Take 1 tablet (10 mg) by mouth daily Alternating with 5mg    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

## 2018-05-11 NOTE — NURSING NOTE
BMT Teaching Flowsheet    Byron Russo is a 57 year old male  The encounter diagnosis was MDS (myelodysplastic syndrome) (H).    Teaching Topic: Post Procedure Instructions for BMBX    Person(s) involved in teaching: Patient  Motivation Level  Asks Questions: Yes  Eager to Learn: Yes  Cooperative: Yes  Receptive (willing/able to accept information): Yes  Any cultural factors/Congregation beliefs that may influence understanding or compliance? No    Patient demonstrates understanding of the following:  - Reason for the appointment, diagnosis and treatment plan: Yes  - Knowledge of proper use of medications and conditions for which they are ordered (with special attention to potential side effects or drug interactions): Yes  - Which situations necessitate calling provider and whom to contact: Yes    Teaching concerns addressed: Post Procedure Instructions for BMBX reviewed with Pt. Pt with no further questions at this time.     Proper use and care of (medical equipment, care aids, etc.) NA  Pain management techniques: Yes  Patient instructed on hand hygiene: NA  How and/when to access community resources: NA    Infection Control:  Patient demonstrates understanding of the following:  Surgical procedure site care taught Yes  Signs and symptoms of infection taught Yes  Wound care taught NA  Central venous catheter care taught NA    Instructional Materials Used/Given: Verbal Instruction    Time spent with patient: 5 minutes.    Specific Concerns: NA

## 2018-05-11 NOTE — NURSING NOTE
Pt monitored in the Supine position x 30 minutes post BMBX.  Upon D/C Pt denies pain, pressure drsg C/D/I. Post Procedure Instructions reviewed with Pt.  See Teaching Note. Pt left clinic ambulatory.

## 2018-05-14 LAB
COPATH REPORT: NORMAL
COPATH REPORT: NORMAL

## 2018-05-15 LAB — COPATH REPORT: NORMAL

## 2018-05-16 ENCOUNTER — OFFICE VISIT (OUTPATIENT)
Dept: TRANSPLANT | Facility: CLINIC | Age: 58
End: 2018-05-16
Payer: COMMERCIAL

## 2018-05-16 VITALS
HEART RATE: 96 BPM | WEIGHT: 225.1 LBS | BODY MASS INDEX: 30.96 KG/M2 | RESPIRATION RATE: 24 BRPM | OXYGEN SATURATION: 93 % | TEMPERATURE: 97 F | SYSTOLIC BLOOD PRESSURE: 109 MMHG | DIASTOLIC BLOOD PRESSURE: 70 MMHG

## 2018-05-16 DIAGNOSIS — D46.22 RAEB-2 (REFRACTORY ANEMIA WITH EXCESS BLASTS-2) (H): Primary | ICD-10-CM

## 2018-05-16 PROCEDURE — G0463 HOSPITAL OUTPT CLINIC VISIT: HCPCS | Mod: ZF

## 2018-05-16 RX ORDER — DEXAMETHASONE 0.5 MG/5ML
.5-1 SOLUTION ORAL 4 TIMES DAILY
Qty: 600 ML | Refills: 11 | Status: SHIPPED | OUTPATIENT
Start: 2018-05-16 | End: 2019-07-29

## 2018-05-16 RX ORDER — PREDNISONE 10 MG/1
20 TABLET ORAL DAILY
Qty: 60 TABLET | Refills: 11 | Status: SHIPPED | OUTPATIENT
Start: 2018-05-16 | End: 2018-12-13

## 2018-05-16 ASSESSMENT — PAIN SCALES - GENERAL: PAINLEVEL: NO PAIN (0)

## 2018-05-16 NOTE — MR AVS SNAPSHOT
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  After hours call the   hospital at [phone] or   [phone]. Ask for the BMT   fellow on-call           Care Instructions    CC'd chart: RTC Fernie 2 months with labs.           Follow-ups after your visit        Your next 10 appointments already scheduled     Jul 18, 2018  3:00 PM CDT   [facility]ic Lab Draw with  MASONIC LAB DRAW   [facility] Masonic Lab Draw ([facility])    [address] [place] [address]  [address] [place] [address] [phone]            Jul 18, 2018  3:30 PM CDT   Return with  BMT DOM   [facility] Blood and Marrow Transplant ([facility])    [address] [place] [address]  [address] [place] [id number] [phone]              Future tests that were ordered for you today     Open Future Orders        Priority Expected Expires Ordered    Comprehensive metabolic panel Routine 7/18/2018 5/16/2019 5/16/2018    CBC with platelets differential Routine 7/18/2018 5/16/2019 5/16/2018    IgG Routine 7/18/2018 5/16/2019 5/16/2018            Who to contact     If you have questions or need follow up information about today's clinic visit or your schedule please contact [facility] BLOOD AND MARROW TRANSPLANT directly at [phone].  Normal or non-critical lab and imaging results will be communicated to you  by [employer], letter or phone within 4 business days after the clinic has received the results. If you do not hear from us within 7 days, please contact the clinic through [employer] or phone. If you have a critical or abnormal lab result, we will notify you by phone as soon as possible.  Submit refill requests through [employer] or call your pharmacy and they will forward the refill request to us. Please allow 3 business days for your refill to be completed.          Additional Information About Your Visit        [employer] Information     [employer] gives you secure access to your electronic health record. If you see a primary care provider, you can also send messages to your care team and make appointments. If you have questions, please call your primary care clinic.  If you do not have a primary care provider, please call [phone] and they will assist you.        Care EveryWhere ID     This is your Care EveryWhere ID. This could be used by other organizations to access your [place] medical records  [id number]        Your Vitals Were     Pulse Temperature Respirations Pulse Oximetry BMI (Body Mass Index)       96 97  F (36.1  C) (Oral) 24 93% 30.96 kg/m2        Blood Pressure from Last 3 Encounters:   05/16/18 109/70   05/11/18 100/78   03/21/18 119/76    Weight from Last 3 Encounters:   05/16/18 102.1 kg (225 lb 1.6 oz)   05/11/18 101.7 kg (224 lb 1.6 oz)   03/21/18 114.4 kg (252 lb 4.8 oz)              We Performed the Following     Colonoscopy - HIM Scan          Today's Medication Changes          These changes are accurate as of 5/16/18  4:04 PM.  If you have any questions, ask your nurse or doctor.               These medicines have changed or have updated prescriptions.        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         Dose:  0.5-1 mg   Swish and spit 5-10 mLs (0.5-1 mg) in mouth 4 times daily   Quantity:  600 mL   Refills:  11       metoprolol tartrate 25 MG tablet   Commonly known as:  LOPRESSOR   This may have changed:  when to take this   Used for:  MDS (myelodysplastic syndrome) (H)        Dose:  25 mg   Take 1 tablet (25 mg) by mouth 2 times daily   Quantity:  180 tablet   Refills:  3       predniSONE 10 MG tablet   Commonly known as:  DELTASONE   This may have changed:    - how much to take  - additional instructions   Used for:  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 [address] [place] [address]    Hours:  TRANSPLANT PHONE NUMBER [phone] Phone:  [phone]     dexamethasone 0.1 MG/ML solution    predniSONE 10 MG tablet                Recent Review Flowsheet Data     BMT Recent Results Latest Ref Rng & Units 9/27/2017 11/15/2017 11/20/2017 1/17/2018 1/31/2018 3/21/2018 5/11/2018    WBC 4.0 - 11.0 10e9/L - 7.6 - 6.5 - 5.3 4.9    Hemoglobin 13.3 - 17.7 g/dL - 13.8 - 14.0 - 16.1 16.9    Platelet Count 150 - 450 10e9/L - 229 - 235 - 200 174    Neutrophils (Absolute) 1.6 - 8.3 10e9/L - 3.9 - 3.9 - 3.6 2.4    INR 0.86 - 1.14 - - - - - - -    Sodium 133 - 144 mmol/L 139 139 137 133 136 132(L) 135    Potassium 3.4 - 5.3 mmol/L 4.1 4.2 4.4 4.8 4.9 4.8 4.4    Chloride 94 - 109 mmol/L 104 107 104 102 104 100 104    Glucose 70 - 99 mg/dL 91 96 103(H) 108(H) 100(H) 110(H) 91    Urea Nitrogen 7 - 30 mg/dL 22 24 21 25 22 16 13    Creatinine 0.66 - 1.25 mg/dL 1.13 1.38(H) 1.38(H) 1.43(H) 1.18 1.06 1.03    Calcium (Total) 8.5 - 10.1 mg/dL 8.9 8.6 9.0 8.6 8.6 8.8 8.8    Protein (Total) 6.8 - 8.8 g/dL - 7.0 - 7.7 8.2 8.2 7.8    Albumin 3.4 - 5.0 g/dL  - 4.0 - 3.8 3.9 3.7 3.3(L)    Bilirubin (Direct) 0.0 - 0.2 mg/dL - - - - - - -    Alkaline Phosphatase 40 - 150 U/L - 75 - 79 84 92 102    AST 0 - 45 U/L - 22 - 32 34 42 47(H)    ALT 0 - 70 U/L - 34 - 34 35 36 36    MCV 78 - 100 fl - 101(H) - 99 - 96 97               Primary Care Provider Office Phone # Fax #    Cole Duong [phone]       [facility] [address] [facility] [address]        Equal Access to Services     [facility] GEORGINA : Hadii soledad [place], wahandyda layne, qashirley kaalmada vick, john julian. So [facility] [phone].    ATENCIÓN: Si habla español, tiene a hernandez disposición servicios gratuitos de asistencia lingüística. [facility] [phone].    We comply with applicable federal civil rights laws and [place] laws. We do not discriminate on the basis of race, color, national origin, age, disability, sex, sexual orientation, or gender identity.            Thank you!     Thank you for choosing [facility] BLOOD AND MARROW TRANSPLANT  for your care. Our goal is always to provide you with excellent care. Hearing back from our patients is one way we can continue to improve our services. Please take a few minutes to complete the written survey that you may receive in the mail after your visit with us. Thank you!             Your Updated Medication List - Protect others around you: Learn how to safely use, store and throw away your medicines at www.disposemymeds.org.          This list is accurate as of 5/16/18  4:04 PM.  Always use your most recent med list.                   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       fluconazole 100 MG tablet    DIFLUCAN    90 tablet    Take 1 tablet (100 mg) by mouth daily    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, MDS (myelodysplastic syndrome) (H)       levofloxacin 250 MG tablet    LEVAQUIN    90 tablet    Take 1 tablet (250 mg) by mouth daily    MDS (myelodysplastic syndrome) (H), 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       levothyroxine 150 MCG tablet    SYNTHROID/LEVOTHROID    90 tablet    Take 1 tablet (150 mcg) by mouth daily    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, MDS (myelodysplastic syndrome) (H)       lisinopril 20 MG tablet    PRINIVIL/ZESTRIL    90 tablet    Take 1 tablet (20 mg) by mouth daily    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       magnesium oxide 400 (241.3 Mg) MG tablet    MAG-OX     Take 2 tablets daily        metoprolol tartrate 25 MG tablet    LOPRESSOR    180 tablet    Take 1 tablet (25 mg) by mouth 2 times daily    MDS (myelodysplastic syndrome) (H)       NITROGLYCERIN ER PO      Take by mouth as needed Reported on 5/12/2017        pilocarpine 5 MG tablet    SALAGEN    90 tablet    Take 1 tablet (5 mg) by mouth 3 times daily    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       predniSONE 10 MG tablet    DELTASONE    60 tablet    Take 2 tablets (20 mg) by mouth daily    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

## 2018-05-16 NOTE — LETTER
"5/16/2018      RE: Byron Russo  [address] 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  He has developed a persistent case of chronic logan-dzdqda-kvyv disease, which primarily affects his mouth and presents as dryness, for which he has currently been tapered down to 5 mg of prednisone daily.  Since his last visit with me 2 months ago, he has lost over 10 kg of body weight.  He complains that food tastes \"terrible,\" and his mouth is very dry despite being on pilocarpine.  He is not using dexamethasone swish and spit, which we had prescribed earlier for him.  He says he simply has no appetite and has to force himself to eat.  He also is quite fatigued.  No fevers, chills, nausea, vomiting, diarrhea, cough, hemoptysis, shortness of breath, hematuria, dysuria, bruising or bleeding.  The remainder of the 10 point review of systems is within normal limits.      PHYSICAL EXAMINATION:     VITAL SIGNS:  As mentioned, his weight is down 10 kg from the previous visit.  Blood pressure was 109/70 and pulse 96.  He states that his Primary Care physician asked him to halve his lisinopril because he was running on the low side, so he is now currently taking just 10 mg of lisinopril daily.   HEENT:  Sclerae were anicteric and non-injected.  Buccal mucosa showed some lichenoid changes, but no open ulcerations.   LUNGS:  Clear to auscultation.   CARDIAC:  Without S3, rub or murmur.   ABDOMEN:  Nondistended, active bowel sounds, no organomegaly.   EXTREMITIES:  Trace pedal edema.   SKIN:  No skin rash.      LABORATORY:  Labs from his 2 year restaging included a bone marrow aspirate and biopsy, which showed no morphologic or flow cytometric evidence of persistent disease.  DNA engraftment analysis showed 100% donor.  His white count was 4900, hemoglobin 16.9, platelets 174,000.  His T4 and TSH were within " normal limits.  Liver function tests were remarkable for very slight elevation of AST (47).  His electrolyte panel was within normal limits with a creatinine of 1.03.      ASSESSMENT AND PLAN:     1.  Two years status post non-myeloablative allo-sib transplant for MDS-RA-EB2 with no evidence of persistent disease, fully engrafted.   2.  Chronic csvmn-zrdszr-jssq disease.  His unexplained weight loss and lack of appetite could fit with active chronic jtdqr-yzkkoz-pade disease, although there are not any clear physical exam signs of active disease.  As an empiric intervention, therefore, on the supposition that this does represent cbvep-dlidbc-upfw disease and because his weight loss has occurred concurrently with tapering of prednisone, I will put him back up to 20 mg of prednisone daily.  I will also reinstitute the dexamethasone swish and spit.  I will have him call Nicolas Jara in 1 month to see how things are looking.  If the weight loss persists, then we are going to have to investigate further, looking for things such as underlying endocrine disorders or other cancers.  I will plan on seeing him in 2 months.     Uli Enciso M.D.            Uli Enciso MD

## 2018-05-16 NOTE — PROGRESS NOTES
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  He has developed a persistent case of chronic lgqpa-bhmulj-tjnb disease, which primarily affects his mouth and presents as dryness, for which he has currently been tapered down to 5 mg of prednisone daily.  Since his last visit with me 2 months ago, he has lost over 10 kg of body weight.  He complains that food tastes \"terrible,\" and his mouth is very dry despite being on pilocarpine.  He is not using dexamethasone swish and spit, which we had prescribed earlier for him.  He says he simply has no appetite and has to force himself to eat.  He also is quite fatigued.  No fevers, chills, nausea, vomiting, diarrhea, cough, hemoptysis, shortness of breath, hematuria, dysuria, bruising or bleeding.  The remainder of the 10 point review of systems is within normal limits.      PHYSICAL EXAMINATION:     VITAL SIGNS:  As mentioned, his weight is down 10 kg from the previous visit.  Blood pressure was 109/70 and pulse 96.  He states that his Primary Care physician asked him to halve his lisinopril because he was running on the low side, so he is now currently taking just 10 mg of lisinopril daily.   HEENT:  Sclerae were anicteric and non-injected.  Buccal mucosa showed some lichenoid changes, but no open ulcerations.   LUNGS:  Clear to auscultation.   CARDIAC:  Without S3, rub or murmur.   ABDOMEN:  Nondistended, active bowel sounds, no organomegaly.   EXTREMITIES:  Trace pedal edema.   SKIN:  No skin rash.      LABORATORY:  Labs from his 2 year restaging included a bone marrow aspirate and biopsy, which showed no morphologic or flow cytometric evidence of persistent disease.  DNA engraftment analysis showed 100% donor.  His white count was 4900, hemoglobin 16.9, platelets 174,000.  His T4 and TSH were within normal limits.  Liver function tests were remarkable for very slight elevation of AST (47).  His " electrolyte panel was within normal limits with a creatinine of 1.03.      ASSESSMENT AND PLAN:     1.  Two years status post non-myeloablative allo-sib transplant for MDS-RA-EB2 with no evidence of persistent disease, fully engrafted.   2.  Chronic dbwxf-rpezqf-bvwm disease.  His unexplained weight loss and lack of appetite could fit with active chronic iedvq-profvh-elgh disease, although there are not any clear physical exam signs of active disease.  As an empiric intervention, therefore, on the supposition that this does represent dbcvh-ngdvab-hlpd disease and because his weight loss has occurred concurrently with tapering of prednisone, I will put him back up to 20 mg of prednisone daily.  I will also reinstitute the dexamethasone swish and spit.  I will have him call Nicolas Jara in 1 month to see how things are looking.  If the weight loss persists, then we are going to have to investigate further, looking for things such as underlying endocrine disorders or other cancers.  I will plan on seeing him in 2 months.     Uli Enciso M.D.

## 2018-05-16 NOTE — NURSING NOTE
"Oncology Rooming Note    May 16, 2018 3:29 PM   Byron Russo is a 57 year old male who presents for:    Chief Complaint   Patient presents with     RECHECK     AML post txp here for provider visit.      Initial Vitals: /70 (BP Location: Left arm, Patient Position: Sitting)  Pulse 96  Temp 97  F (36.1  C) (Oral)  Resp 24  Wt 102.1 kg (225 lb 1.6 oz)  SpO2 93%  BMI 30.96 kg/m2 Estimated body mass index is 30.96 kg/(m^2) as calculated from the following:    Height as of 5/11/18: 1.816 m (5' 11.5\").    Weight as of this encounter: 102.1 kg (225 lb 1.6 oz). Body surface area is 2.27 meters squared.  No Pain (0) Comment: Data Unavailable   No LMP for male patient.  Allergies reviewed: Yes  Medications reviewed: Yes    Medications: Medication refills not needed today.  Pharmacy name entered into EPIC:    [facility] [place], [place] - [address] [place] [address] [phone]  [facility] [address] [place] [address] [facility]    Clinical concerns: None    10 minutes for nursing intake (face to face time)     Michele Almonte RN              "

## 2018-06-01 LAB — COPATH REPORT: NORMAL

## 2018-06-15 ENCOUNTER — OFFICE VISIT (OUTPATIENT)
Dept: PODIATRY | Facility: CLINIC | Age: 58
End: 2018-06-15
Payer: COMMERCIAL

## 2018-06-15 VITALS — SYSTOLIC BLOOD PRESSURE: 104 MMHG | DIASTOLIC BLOOD PRESSURE: 68 MMHG | HEART RATE: 94 BPM | OXYGEN SATURATION: 95 %

## 2018-06-15 DIAGNOSIS — L60.0 INGROWING NAIL: Primary | ICD-10-CM

## 2018-06-15 PROCEDURE — 99213 OFFICE O/P EST LOW 20 MIN: CPT | Performed by: PODIATRIST

## 2018-06-15 RX ORDER — CEPHALEXIN 500 MG/1
500 CAPSULE ORAL 2 TIMES DAILY
Qty: 20 CAPSULE | Refills: 0 | Status: SHIPPED | OUTPATIENT
Start: 2018-06-15 | End: 2019-02-04

## 2018-06-15 NOTE — PATIENT INSTRUCTIONS
Thanks for coming today.  Ortho/Sports Medicine Clinic  [address] [place] [address]    To schedule future appointments in Ortho Clinic, you may call [phone].    To schedule ordered imaging by your Provider: Call [facility] at [phone]    [employer] available online at:   [email or web address]    Please call if any further questions or concerns [phone] and ask for the Orthopedic Department. Clinic hours 8 am to 5 pm.    Return to clinic if symptoms worsen.

## 2018-06-15 NOTE — PROGRESS NOTES
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     Social History Main Topics     Smoking status: Never Smoker     Smokeless tobacco: Former User     Alcohol use No     Drug use: No     Sexual activity: Not on file     Other Topics Concern     Not on file     Social History Narrative     Family History   Problem Relation Age of Onset     Myocardial Infarction Father 58     Coronary Artery Disease Father      Heart Failure Mother      Glaucoma Mother      Coronary Artery Disease Brother      Coronary Artery Disease Brother      CANCER Brother      GIST     Skin Cancer No family hx of      Melanoma No family hx of      Macular Degeneration No family hx of      SUBJECTIVE:  A 57-year-old male returns to clinic for ingrown toenails; left and right big toenails.  He relates it started about 4 months ago.  The right one has gotten better, but the left one still bothers him.  Relates no specific injury, but he does bump his toenails a lot.  He has been soaking it and putting triple antibiotic on it and using MicroKlenz on it.  He relates it hurts.        OBJECTIVE:  DP and PT 2/4 bilaterally.  He  has a left medial hallux nail border with hypergranulation tissue, erythema, edema and serosanguineous drainage.  Palpation of the nails loosened.  There is local erythema and edema.  Right hallux nail border has no erythema, no drainage, no odor and no calor.  They are incurvated.       ASSESSMENT/PLAN:  Paronychia, left medial hallux nail border.  Diagnosis and treatment options discussed with the patient.  The nail border was debrided upon consent.  Local wound care done upon consent today.  I am going to clean this with MicroKlenz and apply bacitracin which was dispensed and a sterile dressing twice daily.  Advised on foot soaks.  Prescription for Keflex given and use discussed with him.  He will return to clinic in 1 week.

## 2018-06-15 NOTE — LETTER
6/15/2018         RE: Byron Russo  [address] [facility] [address]        Dear Colleague,    Thank you for referring your patient, Byron Russo, to the [facility]. Please see a copy of my visit note below.    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     Social History Main Topics     Smoking status: Never Smoker     Smokeless tobacco: Former User     Alcohol use No     Drug use: No     Sexual activity: Not on file     Other Topics Concern     Not on file     Social History Narrative     Family History   Problem Relation Age of Onset     Myocardial Infarction Father 58     Coronary Artery Disease Father      Heart Failure Mother      Glaucoma Mother      Coronary Artery Disease Brother      Coronary Artery Disease Brother      CANCER Brother      GIST     Skin Cancer No family hx of      Melanoma No family hx of      Macular Degeneration No family hx of      SUBJECTIVE:  A 57-year-old male returns to clinic for ingrown toenails; left and right big toenails.  He relates it started about 4 months ago.  The right one has gotten better,  but the left one still bothers him.  Relates no specific injury, but he does bump his toenails a lot.  He has been soaking it and putting triple antibiotic on it and using MicroKlenz on it.  He relates it hurts.        OBJECTIVE:  DP and PT 2/4 bilaterally.  He has a left medial hallux nail border with hypergranulation tissue, erythema, edema and serosanguineous drainage.  Palpation of the nails loosened.  There is local erythema and edema.  Right hallux nail border has no erythema, no drainage, no odor and no calor.  They are incurvated.       ASSESSMENT/PLAN:  Paronychia, left medial hallux nail border.  Diagnosis and treatment options discussed with the patient.  The nail border was debrided upon consent.  Local wound care done upon consent today.  I am going to clean this with MicroKlenz and apply bacitracin which was dispensed and a sterile dressing twice daily.  Advised on foot soaks.  Prescription for Keflex given and use discussed with him.  He will return to clinic in 1 week.         Again, thank you for allowing me to participate in the care of your patient.        Sincerely,        Alessandro Vigil DPM

## 2018-06-15 NOTE — NURSING NOTE
Byron Russo's chief complaint for this visit includes:  Chief Complaint   Patient presents with     RECHECK     toenail trim     PCP: Cole Duong    Referring Provider:  No referring provider defined for this encounter.    /68  Pulse 94  SpO2 95%  Data Unavailable     Do you need any medication refills at today's visit? No

## 2018-06-15 NOTE — MR AVS SNAPSHOT
After Visit Summary   6/15/2018    Byron Russo    MRN: [id number]           Patient Information     Date Of Birth          1960        Visit Information        Provider Department      6/15/2018 7:00 AM Alessandro Vigil DPM [facility]        Today's Diagnoses     Ingrowing nail    -  1      Care Instructions    Thanks for coming today.  Ortho/Sports Medicine Clinic  [address] [place] [address]    To schedule future appointments in Ortho Clinic, you may call [phone].    To schedule ordered imaging by your Provider: Call [facility] at [phone]    [employer] available online at:   [email or web address]    Please call if any further questions or concerns [phone] and ask for the Orthopedic Department. Clinic hours 8 am to 5 pm.    Return to clinic if symptoms worsen.            Follow-ups after your visit        Your next 10 appointments already scheduled     Jun 21, 2018  9:30 AM CDT   Return Visit with Alessandro Vigil DPM   [facility]    [address] [place] [id number] [phone]            Jul 18, 2018  3:00 PM CDT   [facility] with  [facility]   [facility] Draw ([facility])    [address] [place] [address]  [address] [place] [id number] [phone]            Jul 18, 2018  3:30 PM CDT   Return with  BMT DOM   [facility] Blood and Marrow Transplant ([facility])    [address] [place] [address]  [address] [place] [id number] [phone]              Who to contact     If you have questions or need follow up information about today's clinic visit or your schedule please contact [facility] directly at [phone].  Normal or non-critical lab and imaging results will be communicated to you by MyChart, letter or phone within 4 business days after the clinic has received the  results. If you do not hear from us within 7 days, please contact the clinic through [employer] or phone. If you have a critical or abnormal lab result, we will notify you by phone as soon as possible.  Submit refill requests through [employer] or call your pharmacy and they will forward the refill request to us. Please allow 3 business days for your refill to be completed.          Additional Information About Your Visit        [employer] Information     [employer] gives you secure access to your electronic health record. If you see a primary care provider, you can also send messages to your care team and make appointments. If you have questions, please call your primary care clinic.  If you do not have a primary care provider, please call [phone] and they will assist you.      [employer] is an electronic gateway that provides easy, online access to your medical records. With [employer], you can request a clinic appointment, read your test results, renew a prescription or communicate with your care team.     To access your existing account, please contact your [facility] or call [phone] for assistance.        Care EveryWhere ID     This is your Care EveryWhere ID. This could be used by other organizations to access your [facility] medical records  [id number]        Your Vitals Were     Pulse Pulse Oximetry                94 95%           Blood Pressure from Last 3 Encounters:   06/15/18 104/68   05/16/18 109/70   05/11/18 100/78    Weight from Last 3 Encounters:   05/16/18 102.1 kg (225 lb 1.6 oz)   05/11/18 101.7 kg (224 lb 1.6 oz)   03/21/18 114.4 kg (252 lb 4.8 oz)              Today, you had the following     No orders found for display         Today's Medication Changes          These changes are accurate as of 6/15/18  2:48 PM.  If you have any questions, ask your nurse or doctor.               Start taking these medicines.        Dose/Directions    cephALEXin 500 MG capsule   Commonly  known as:  KEFLEX   Used for:  Ingrowing nail        Dose:  500 mg   Take 1 capsule (500 mg) by mouth 2 times daily   Quantity:  20 capsule   Refills:  0         These medicines have changed or have updated prescriptions.        Dose/Directions    metoprolol tartrate 25 MG tablet   Commonly known as:  LOPRESSOR   This may have changed:  when to take this   Used for:  MDS (myelodysplastic syndrome) (H)        Dose:  25 mg   Take 1 tablet (25 mg) by mouth 2 times daily   Quantity:  180 tablet   Refills:  3            Where to get your medicines      These medications were sent to [facility] [address] [place] [address] [facility]  [address] [facility] [address]     Phone:  [phone]     cephALEXin 500 MG capsule                Primary Care Provider Office Phone # Fax #    Cole Duong [phone]       [facility] [address] [facility] [address]        Equal Access to Services     FELI ERICKSON : Hadii soledad resendez hadasho Sojoslynali, waaxda luqadaha, qaybta kaalmada adeegyada, john thompson . So [facility] [phone].    ATENCIÓN: Si habla español, tiene a hernandez disposición servicios gratuitos de asistencia lingüística. Shay al [phone].    We comply with applicable federal civil rights laws and [place] laws. We do not discriminate on the basis of race, color, national origin, age, disability, sex, sexual orientation, or gender identity.            Thank you!     Thank you for choosing [facility]  for your care. Our goal is always to provide you with excellent care. Hearing back from our patients is one way we can continue to improve our services. Please take a few minutes to complete the written survey that you may receive in the mail after your visit with us. Thank you!             Your Updated Medication List - Protect others around you: Learn how to safely use, store and throw away your medicines at www.disposemymeds.org.           This list is accurate as of 6/15/18  2:48 PM.  Always use your most recent med list.                   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       dexamethasone 0.1 MG/ML solution     600 mL    Swish and spit 5-10 mLs (0.5-1 mg) in mouth 4 times daily    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       fluconazole 100 MG tablet    DIFLUCAN    90 tablet    Take 1 tablet (100 mg) by mouth daily    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, MDS (myelodysplastic syndrome) (H)       levofloxacin 250 MG tablet    LEVAQUIN    90 tablet    Take 1 tablet (250 mg) by mouth daily    MDS (myelodysplastic syndrome) (H), 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       levothyroxine 150 MCG tablet    SYNTHROID/LEVOTHROID    90 tablet    Take 1 tablet (150 mcg) by mouth daily    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, MDS (myelodysplastic syndrome) (H)       lisinopril 20 MG tablet    PRINIVIL/ZESTRIL    90 tablet    Take 1 tablet (20 mg) by mouth daily    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       magnesium oxide 400 (241.3 Mg) MG tablet    MAG-OX     Take 2 tablets daily        metoprolol tartrate 25 MG tablet    LOPRESSOR    180 tablet    Take 1 tablet (25 mg) by mouth 2 times daily    MDS (myelodysplastic syndrome) (H)       NITROGLYCERIN ER PO      Take by mouth as needed Reported on 5/12/2017        pilocarpine 5 MG tablet    SALAGEN     90 tablet    Take 1 tablet (5 mg) by mouth 3 times daily    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       predniSONE 10 MG tablet    DELTASONE    60 tablet    Take 2 tablets (20 mg) by mouth daily    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

## 2018-06-21 ENCOUNTER — OFFICE VISIT (OUTPATIENT)
Dept: PODIATRY | Facility: CLINIC | Age: 58
End: 2018-06-21
Payer: COMMERCIAL

## 2018-06-21 VITALS — OXYGEN SATURATION: 95 % | SYSTOLIC BLOOD PRESSURE: 114 MMHG | HEART RATE: 82 BPM | DIASTOLIC BLOOD PRESSURE: 81 MMHG

## 2018-06-21 DIAGNOSIS — L60.0 INGROWING NAIL: Primary | ICD-10-CM

## 2018-06-21 PROCEDURE — 99213 OFFICE O/P EST LOW 20 MIN: CPT | Performed by: PODIATRIST

## 2018-06-21 NOTE — NURSING NOTE
Byron Russo's chief complaint for this visit includes:  Chief Complaint   Patient presents with     Left Foot - Follow Up For, Toenail     Right Foot - Follow Up For, Toenail     PCP: Cole Duong    Referring Provider:  No referring provider defined for this encounter.    /81 (BP Location: Left arm)  Pulse 82  SpO2 95%  Data Unavailable     Do you need any medication refills at today's visit? No    Rina Rojas CMA

## 2018-06-21 NOTE — LETTER
6/21/2018         RE: Byron Russo  [address] [facility] [address]        Dear Colleague,    Thank you for referring your patient, yBron Russo, to the [facility]. Please see a copy of my visit note below.    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     Social History Main Topics     Smoking status: Never Smoker     Smokeless tobacco: Former User     Alcohol use No     Drug use: No     Sexual activity: Not on file     Other Topics Concern     Not on file     Social History Narrative     Family History   Problem Relation Age of Onset     Myocardial Infarction Father 58     Coronary Artery Disease Father      Heart Failure Mother      Glaucoma Mother      Coronary Artery Disease Brother      Coronary Artery Disease Brother      Cancer Brother      GIST     Skin Cancer No family hx of      Melanoma No family hx of      Macular Degeneration No family hx of      SUBJECTIVE:  A 57 -year-old male returns to clinic for paronychia on the left medial hallux nail border.  He relates that is doing well.  He relates he was doing well until  yesterday and his left lateral hallux nail border caught and it started hurting, so he trimmed the corner out a little bit.  He relates he is taking the Keflex with no problems.  He is using the MicroKlenz and triple antibiotic ointment.         OBJECTIVE:  DP and PT 2/4 left.  Left medial hallux nail border has decreased hypergranulation tissue.  There is no erythema, minimal edema, no odor, no calor, no drainage.  Left lateral hallux nail border has an incurvated nail with some erythema and edema, serosanguineous drainage and pain on palpation.        ASSESSMENT/PLAN:  Paronychia, left medial and lateral hallux nail borders.  Diagnosis and treatment options discussed with him.  The hallux nail was debrided and local wound care done upon consent today.  He will continue the wound cares with MicroKlenz and Bacitracin.  He will finish the Keflex.  He will return to clinic and see me in about 2-3 weeks if not resolved; otherwise, as needed.         Again, thank you for allowing me to participate in the care of your patient.        Sincerely,        Alessandro Vigil DPM

## 2018-06-21 NOTE — PROGRESS NOTES
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     Social History Main Topics     Smoking status: Never Smoker     Smokeless tobacco: Former User     Alcohol use No     Drug use: No     Sexual activity: Not on file     Other Topics Concern     Not on file     Social History Narrative     Family History   Problem Relation Age of Onset     Myocardial Infarction Father 58     Coronary Artery Disease Father      Heart Failure Mother      Glaucoma Mother      Coronary Artery Disease Brother      Coronary Artery Disease Brother      Cancer Brother      GIST     Skin Cancer No family hx of      Melanoma No family hx of      Macular Degeneration No family hx of      SUBJECTIVE:  A 57 -year-old male returns to clinic for paronychia on the left medial hallux nail border.  He relates that is doing well.  He relates he was doing well until yesterday and his left lateral hallux nail border caught and it started hurting, so he trimmed the corner out a little bit.  He relates he is taking the Keflex with no problems.  He is using the MicroKlenz and triple antibiotic ointment.         OBJECTIVE:  DP and PT  2/4 left.  Left medial hallux nail border has decreased hypergranulation tissue.  There is no erythema, minimal edema, no odor, no calor, no drainage.  Left lateral hallux nail border has an incurvated nail with some erythema and edema, serosanguineous drainage and pain on palpation.        ASSESSMENT/PLAN:  Paronychia, left medial and lateral hallux nail borders.  Diagnosis and treatment options discussed with him.  The hallux nail was debrided and local wound care done upon consent today.  He will continue the wound cares with MicroKlenz and Bacitracin.  He will finish the Keflex.  He will return to clinic and see me in about 2-3 weeks if not resolved; otherwise, as needed.

## 2018-06-21 NOTE — MR AVS SNAPSHOT
After Visit Summary   2018    Byron Russo    MRN: [id number]           Patient Information     Date Of Birth          1960        Visit Information        Provider Department      2018 9:30 AM Alessandro Vigil DPM [facility]        Today's Diagnoses     Ingrowing nail    -  1      Care Instructions    Thanks for coming today.  Ortho/Sports Medicine Clinic  [address] [place] [address]    To schedule future appointments in Ortho Clinic, you may call [phone].    To schedule ordered imaging by your provider:   Call Central Imaging [phone]    To schedule an injection ordered by your provider:  Call Central Imaging Injection scheduling line: [phone]  Sharegatehart available online at:  [email or web address]    Please call if any further questions or concerns ([phone]).  Clinic hours 8 am to 5 pm.    Return to clinic (call) if symptoms worsen or fail to improve.            Follow-ups after your visit        Your next 10 appointments already scheduled     2018  3:00 PM CDT   Masonic Lab Draw with  MASONIC LAB DRAW   [facility] Masonic Lab Draw ([facility])    [address] [place] [address]  [address]  [facility] [address] [phone]            2018  3:30 PM CDT   Return with  BMT DOM   [facility] Blood and Marrow Transplant ([facility])    [address] [place] [address]  [address] [place] [id number] [phone]              Who to contact     If you have questions or need follow up information about today's clinic visit or your schedule please contact [facility] directly at [phone].  Normal or non-critical lab and imaging results will be communicated to you by MyChart, letter or phone within 4 business days after the clinic has received the results. If you do not hear from us within 7 days, please contact the clinic through SonicLivingt or  phone. If you have a critical or abnormal lab result, we will notify you by phone as soon as possible.  Submit refill requests through Glints or call your pharmacy and they will forward the refill request to us. Please allow 3 business days for your refill to be completed.          Additional Information About Your Visit        Sandatahart Information     Glints gives you secure access to your electronic health record. If you see a primary care provider, you can also send messages to your care team and make appointments. If you have questions, please call your primary care clinic.  If you do not have a primary care provider, please call [phone] and they will assist you.      Glints is an electronic gateway that provides easy, online access to your medical records. With Glints, you can request a clinic appointment, read your test results, renew a prescription or communicate with your care team.     To access your existing account, please contact your [facility] or call [phone] for assistance.        Care EveryWhere ID     This is your Care EveryWhere ID. This could be used by other organizations to access your [place] medical records  [id number]        Your Vitals Were     Pulse Pulse Oximetry                82 95%           Blood Pressure from Last 3 Encounters:   06/21/18 114/81   06/15/18 104/68   05/16/18 109/70    Weight from Last 3 Encounters:   05/16/18 102.1 kg (225 lb 1.6 oz)   05/11/18 101.7 kg (224 lb 1.6 oz)   03/21/18 114.4 kg (252 lb 4.8 oz)              Today, you had the following     No orders found for display         Today's Medication Changes          These changes are accurate as of 6/21/18  1:57 PM.  If you have any questions, ask your nurse or doctor.               These medicines have changed or have updated prescriptions.        Dose/Directions    metoprolol tartrate 25 MG tablet   Commonly known as:  LOPRESSOR   This may have changed:  when to take  this   Used for:  MDS (myelodysplastic syndrome) (H)        Dose:  25 mg   Take 1 tablet (25 mg) by mouth 2 times daily   Quantity:  180 tablet   Refills:  3                Primary Care Provider Office Phone # Fax #    Cole Duong [phone]       [facility] [address] [facility] [address]        Equal Access to Services     [facility]: Hadii aad ku hadasho Soomaali, waaxda luqadaha, qaybta kaalmada adeegyada, waxay haydeein hayaan adeselin roblesreena lacharito ah. So [facility] [phone].    ATENCIÓN: Si habla español, tiene a hernandez disposición servicios gratuitos de asistencia lingüística. [facility] [phone].    We comply with applicable federal civil rights laws and [place] laws. We do not discriminate on the basis of race, color, national origin, age, disability, sex, sexual orientation, or gender identity.            Thank you!     Thank you for choosing [facility]  for your care. Our goal is always to provide you with excellent care. Hearing back from our patients is one way we can continue to improve our services. Please take a few minutes to complete the written survey that you may receive in the mail after your visit with us. Thank you!             Your Updated Medication List - Protect others around you: Learn how to safely use, store and throw away your medicines at www.disposemymeds.org.          This list is accurate as of 6/21/18  1:57 PM.  Always use your most recent med list.                   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       dexamethasone 0.1 MG/ML solution     600 mL    Swish and spit 5-10 mLs (0.5-1 mg) in mouth 4 times daily    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       fluconazole 100 MG tablet    DIFLUCAN    90 tablet    Take 1 tablet (100 mg) by mouth daily    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, MDS (myelodysplastic syndrome) (H)       levofloxacin 250 MG tablet    LEVAQUIN    90 tablet    Take 1 tablet (250 mg) by mouth daily    MDS (myelodysplastic syndrome) (H), 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       levothyroxine 150 MCG tablet    SYNTHROID/LEVOTHROID    90 tablet    Take 1 tablet (150 mcg) by mouth daily    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, MDS (myelodysplastic syndrome) (H)       lisinopril 20 MG tablet    PRINIVIL/ZESTRIL    90 tablet    Take 1 tablet (20 mg) by mouth daily    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       magnesium oxide 400 (241.3 Mg) MG tablet    MAG-OX     Take 2 tablets daily        metoprolol tartrate 25 MG tablet    LOPRESSOR    180 tablet    Take 1 tablet (25 mg) by mouth 2 times daily    MDS (myelodysplastic syndrome) (H)       NITROGLYCERIN ER PO      Take by mouth as needed Reported on 5/12/2017        pilocarpine 5 MG tablet    SALAGEN    90 tablet    Take 1 tablet (5 mg) by mouth 3 times daily    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       predniSONE 10 MG tablet    DELTASONE    60 tablet    Take 2 tablets (20 mg) by mouth daily    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

## 2018-06-21 NOTE — PATIENT INSTRUCTIONS
Thanks for coming today.  Ortho/Sports Medicine Clinic  [address] [place] [address]    To schedule future appointments in Ortho Clinic, you may call [phone].    To schedule ordered imaging by your provider:   Call Central Imaging [phone]    To schedule an injection ordered by your provider:  Call Central Imaging Injection scheduling line: [phone]  Meedorhart available online at:  [email or web address]    Please call if any further questions or concerns ([phone]).  Clinic hours 8 am to 5 pm.    Return to clinic (call) if symptoms worsen or fail to improve.

## 2018-07-18 ENCOUNTER — APPOINTMENT (OUTPATIENT)
Dept: LAB | Facility: CLINIC | Age: 58
End: 2018-07-18
Attending: INTERNAL MEDICINE
Payer: COMMERCIAL

## 2018-07-18 ENCOUNTER — ONCOLOGY VISIT (OUTPATIENT)
Dept: TRANSPLANT | Facility: CLINIC | Age: 58
End: 2018-07-18
Attending: INTERNAL MEDICINE
Payer: COMMERCIAL

## 2018-07-18 VITALS
DIASTOLIC BLOOD PRESSURE: 87 MMHG | SYSTOLIC BLOOD PRESSURE: 129 MMHG | BODY MASS INDEX: 30.56 KG/M2 | HEIGHT: 72 IN | HEART RATE: 90 BPM | WEIGHT: 225.6 LBS | RESPIRATION RATE: 16 BRPM | OXYGEN SATURATION: 96 % | TEMPERATURE: 98.5 F

## 2018-07-18 DIAGNOSIS — D46.9 MDS (MYELODYSPLASTIC SYNDROME) (H): ICD-10-CM

## 2018-07-18 DIAGNOSIS — D46.22 RAEB-2 (REFRACTORY ANEMIA WITH EXCESS BLASTS-2) (H): ICD-10-CM

## 2018-07-18 LAB
ALBUMIN SERPL-MCNC: 4.1 G/DL (ref 3.4–5)
ALP SERPL-CCNC: 119 U/L (ref 40–150)
ALT SERPL W P-5'-P-CCNC: 33 U/L (ref 0–70)
ANION GAP SERPL CALCULATED.3IONS-SCNC: 8 MMOL/L (ref 3–14)
AST SERPL W P-5'-P-CCNC: 36 U/L (ref 0–45)
BASOPHILS # BLD AUTO: 0 10E9/L (ref 0–0.2)
BASOPHILS NFR BLD AUTO: 0.4 %
BILIRUB SERPL-MCNC: 0.8 MG/DL (ref 0.2–1.3)
BUN SERPL-MCNC: 16 MG/DL (ref 7–30)
CALCIUM SERPL-MCNC: 8.9 MG/DL (ref 8.5–10.1)
CHLORIDE SERPL-SCNC: 103 MMOL/L (ref 94–109)
CO2 SERPL-SCNC: 23 MMOL/L (ref 20–32)
CREAT SERPL-MCNC: 0.9 MG/DL (ref 0.66–1.25)
DIFFERENTIAL METHOD BLD: ABNORMAL
EOSINOPHIL # BLD AUTO: 0 10E9/L (ref 0–0.7)
EOSINOPHIL NFR BLD AUTO: 0.1 %
ERYTHROCYTE [DISTWIDTH] IN BLOOD BY AUTOMATED COUNT: 14.7 % (ref 10–15)
GFR SERPL CREATININE-BSD FRML MDRD: 88 ML/MIN/1.7M2
GLUCOSE SERPL-MCNC: 94 MG/DL (ref 70–99)
HCT VFR BLD AUTO: 49.4 % (ref 40–53)
HGB BLD-MCNC: 17.6 G/DL (ref 13.3–17.7)
IMM GRANULOCYTES # BLD: 0 10E9/L (ref 0–0.4)
IMM GRANULOCYTES NFR BLD: 0.5 %
LYMPHOCYTES # BLD AUTO: 1.4 10E9/L (ref 0.8–5.3)
LYMPHOCYTES NFR BLD AUTO: 17.1 %
MCH RBC QN AUTO: 34.4 PG (ref 26.5–33)
MCHC RBC AUTO-ENTMCNC: 35.6 G/DL (ref 31.5–36.5)
MCV RBC AUTO: 97 FL (ref 78–100)
MONOCYTES # BLD AUTO: 1.1 10E9/L (ref 0–1.3)
MONOCYTES NFR BLD AUTO: 14.4 %
NEUTROPHILS # BLD AUTO: 5.3 10E9/L (ref 1.6–8.3)
NEUTROPHILS NFR BLD AUTO: 67.5 %
NRBC # BLD AUTO: 0 10*3/UL
NRBC BLD AUTO-RTO: 0 /100
PLATELET # BLD AUTO: 189 10E9/L (ref 150–450)
POTASSIUM SERPL-SCNC: 4.5 MMOL/L (ref 3.4–5.3)
PROT SERPL-MCNC: 9 G/DL (ref 6.8–8.8)
RBC # BLD AUTO: 5.12 10E12/L (ref 4.4–5.9)
SODIUM SERPL-SCNC: 134 MMOL/L (ref 133–144)
WBC # BLD AUTO: 7.9 10E9/L (ref 4–11)

## 2018-07-18 PROCEDURE — 36415 COLL VENOUS BLD VENIPUNCTURE: CPT

## 2018-07-18 PROCEDURE — 80053 COMPREHEN METABOLIC PANEL: CPT | Performed by: INTERNAL MEDICINE

## 2018-07-18 PROCEDURE — 85025 COMPLETE CBC W/AUTO DIFF WBC: CPT | Performed by: INTERNAL MEDICINE

## 2018-07-18 PROCEDURE — 82784 ASSAY IGA/IGD/IGG/IGM EACH: CPT | Performed by: INTERNAL MEDICINE

## 2018-07-18 PROCEDURE — G0463 HOSPITAL OUTPT CLINIC VISIT: HCPCS

## 2018-07-18 PROCEDURE — 87102 FUNGUS ISOLATION CULTURE: CPT | Performed by: PHYSICIAN ASSISTANT

## 2018-07-18 RX ORDER — AMOXICILLIN 500 MG/1
TABLET, FILM COATED ORAL
Qty: 8 TABLET | Refills: 0 | Status: SHIPPED | OUTPATIENT
Start: 2018-07-18 | End: 2018-08-23

## 2018-07-18 RX ORDER — METOPROLOL TARTRATE 25 MG/1
25 TABLET, FILM COATED ORAL DAILY
COMMUNITY
Start: 2018-07-18 | End: 2019-05-02

## 2018-07-18 ASSESSMENT — PAIN SCALES - GENERAL: PAINLEVEL: SEVERE PAIN (7)

## 2018-07-18 NOTE — LETTER
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 Several months ago he had been losing weight and trouble eating. He still had some mild intermittent soreness in his mouth; it's worse with sharp or salty items like a recent bout with potato chips. He had no skin rash, worsening of dry eyes, though he did lose some scalp hair he says. His energy is still been acceptable and he has not had fevers or chills. He had a mild sore throat and stuffy/runny nose, several days ago, over the weekend, but that is improving without intervention.   2 months ago he was started on 20 mg a day of prednisone (up from 5) and continued on low-dose cyclosporine. His weight is stabilized. He is able to eat better and his mouth, though still irritated is not as uncomfortable as before.His mouth remains dry.    He uses eyedrops one or 2 times a day but can still tolerate his contact lenses. He had a rash about a month ago that lasted a few days, resolved with some topical corticosteroid creams. He has no respiratory, cardiac, GI or  symptoms he still working full time and not had additional time off work. His energy is still fair, and better than 2 months ago.     His exam shows his weight has stabilized since May, but not increased, even though said he is eating better. Vital signs are acceptable. He has no inflammatory skin rash at any site. He has some mild lichenoid changes on both buccal mucosa, left greater than right, but no ulcerations or exudate. He has gum retraction and mild gingivitis over a " number of teeth but no particular areas that are focally red. His lungs are clear. His heart tones are regular without a gallop. His abdomen is nontender and soft. There is no palpable hepatosplenomegaly. He has no edema or bone tenderness.    Laboratory testing showed good blood counts (even higher hemoglobin than usual), at the upper end of normal. His chemistries are acceptable and his albumin is better than last time.    I sent a mouth swab for oral yeast in case he has fluconazole resistant yeast that are contributing to his oral irritation but made no other changes in his medication regimen.  [culture negative at 48h]    He is going to the dentist next week and I gave him amoxicillin 2 g to take before the dental cleaning visit because of his gingivitis and on his request, but made no other interventional changes in his treatment.    I told him if he is not eating and oral symptoms do not  improve, he should come sooner. Otherwise, return in 2 months time. 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  After that time; it will be appropriate to taper his prednisone.    Raymundo Reina M.D.    Professor of Medicine    Results for VERNON BRIGITTE BARRON (MRN [id number]) as of 7/18/2018 16:57   Ref. Range 5/11/2018 11:30 5/11/2018 11:42 5/11/2018 11:57 5/11/2018 12:00 7/18/2018 15:39   Sodium Latest Ref Range: 133 - 144 mmol/L 135    134   Potassium Latest Ref Range: 3.4 - 5.3 mmol/L 4.4    4.5   Chloride Latest Ref Range: 94 - 109 mmol/L 104    103   Carbon Dioxide Latest Ref Range: 20 - 32 mmol/L 25    23   Urea Nitrogen Latest Ref Range: 7 - 30 mg/dL 13    16   Creatinine Latest Ref Range: 0.66 - 1.25 mg/dL 1.03    0.90   GFR Estimate Latest Ref Range: >60 mL/min/1.7m2 75    88   GFR Estimate If Black Latest Ref Range: >60 mL/min/1.7m2 >90    >90   Calcium Latest Ref Range: 8.5 - 10.1 mg/dL 8.8    8.9   Anion Gap Latest Ref Range: 3 - 14 mmol/L 6    8   Albumin  Latest Ref Range: 3.4 - 5.0 g/dL 3.3 (L)    4.1   Protein Total Latest Ref Range: 6.8 - 8.8 g/dL 7.8    9.0 (H)   Bilirubin Total Latest Ref Range: 0.2 - 1.3 mg/dL 0.4    0.8   Alkaline Phosphatase Latest Ref Range: 40 - 150 U/L 102    119   ALT Latest Ref Range: 0 - 70 U/L 36    33   AST Latest Ref Range: 0 - 45 U/L 47 (H)    36   T4 Free Latest Ref Range: 0.76 - 1.46 ng/dL 1.39       TSH Latest Ref Range: 0.40 - 4.00 mU/L 0.47       Glucose Latest Ref Range: 70 - 99 mg/dL 91    94   WBC Latest Ref Range: 4.0 - 11.0 10e9/L 4.9    7.9   Hemoglobin Latest Ref Range: 13.3 - 17.7 g/dL 16.9    17.6   Hematocrit Latest Ref Range: 40.0 - 53.0 % 49.4    49.4   Platelet Count Latest Ref Range: 150 - 450 10e9/L 174    189   RBC Count Latest Ref Range: 4.4 - 5.9 10e12/L 5.08    5.12   MCV Latest Ref Range: 78 - 100 fl 97    97   MCH Latest Ref Range: 26.5 - 33.0 pg 33.3 (H)    34.4 (H)   MCHC Latest Ref Range: 31.5 - 36.5 g/dL 34.2    35.6   RDW Latest Ref Range: 10.0 - 15.0 % 14.0    14.7   Diff Method Unknown Automated Method    Automated Method   % Neutrophils Latest Units: % 48.3    67.5   % Lymphocytes Latest Units: % 22.8    17.1   % Monocytes Latest Units: % 20.1    14.4   % Eosinophils Latest Units: % 7.2    0.1   % Basophils Latest Units: % 1.0    0.4   % Immature Granulocytes Latest Units: % 0.6    0.5   Nucleated RBCs Latest Ref Range: 0 /100 0    0   Absolute Neutrophil Latest Ref Range: 1.6 - 8.3 10e9/L 2.4    5.3   Absolute Lymphocytes Latest Ref Range: 0.8 - 5.3 10e9/L 1.1    1.4   Absolute Monocytes Latest Ref Range: 0.0 - 1.3 10e9/L 1.0    1.1   Absolute Eosinophils Latest Ref Range: 0.0 - 0.7 10e9/L 0.4    0.0   Absolute Basophils Latest Ref Range: 0.0 - 0.2 10e9/L 0.1    0.0   Abs Immature Granulocytes Latest Ref Range: 0 - 0.4 10e9/L 0.0    0.0   Absolute Nucleated RBC Unknown 0.0    0.0   Poikilocytosis Unknown Slight       Platelet Estimate Unknown Confirming automa...       CHROMOSOME BONE MARROW  Unknown    Rpt    DNA MARKER POST BMT ENGRAFTMENT BONE MARROW Unknown    Rpt    LEUKEMIA LYMPHOMA EVALUATION (FLOW CYTOMETRY) Unknown  Rpt      BONE MARROW BIOPSY Unknown   Rpt         BMT DOM

## 2018-07-18 NOTE — NURSING NOTE
Chief Complaint   Patient presents with     Blood Draw            Vitals taken, labs and research labs collected from right antecubital space via .  Pt tolerated procedure.  Checked in for next appt.     Michelle Calderon RN

## 2018-07-18 NOTE — NURSING NOTE
"Oncology Rooming Note    July 18, 2018 3:48 PM   Byron Russo is a 57 year old male who presents for:    Chief Complaint   Patient presents with     Blood Draw          RECHECK     Return: MDS (myelodysplastic syndrome)      Initial Vitals: /87  Pulse 90  Temp 98.5  F (36.9  C) (Oral)  Resp 16  Ht 1.816 m (5' 11.5\")  Wt 102.3 kg (225 lb 9.6 oz)  SpO2 96%  BMI 31.03 kg/m2 Estimated body mass index is 31.03 kg/(m^2) as calculated from the following:    Height as of this encounter: 1.816 m (5' 11.5\").    Weight as of this encounter: 102.3 kg (225 lb 9.6 oz). Body surface area is 2.27 meters squared.  Severe Pain (7) Comment: Data Unavailable   No LMP for male patient.  Allergies reviewed: Yes  Medications reviewed: Yes    Medications: MEDICATION REFILLS NEEDED TODAY. Provider was notified.  Pharmacy name entered into [facility]:    [facility] [place], [place] - [phone]  [facility] [address] [place] [address] [facility]    Clinical concerns: Pt requesting refill of Amoxicillin.  Dr. Reina was notified.    5 minutes for nursing intake (face to face time)     Marlin Summers CMA              "

## 2018-07-18 NOTE — MR AVS SNAPSHOT
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  After hours call the   hospital at [phone] or   [phone]. Ask for the BMT   fellow on-call           Care Instructions    Pt is scheduled and called w/aptgrey thayer Mai  7/19/18  928am          Follow-ups after your visit        Your next 10 appointments already scheduled     Sep 12, 2018 10:00 AM CDT   [facility] Draw with  [facility]RAW   [facility] Draw ([facility])    [address] [place] [address]  [address]  [facility] [address] [phone]            Sep 12, 2018 10:30 AM CDT   Return with Uli Enciso MD   [facility] Blood and Marrow Transplant ([facility])    [address] [place] [address]  [address]  [facility] [address] [phone]              Who to contact     If you have questions or need follow up information about today's clinic visit or your schedule please contact [facility] BLOOD AND MARROW TRANSPLANT directly at [phone].  Normal or non-critical lab and imaging results will be communicated to you by MyChart, letter or phone within 4 business days after the clinic has received the results. If you do not hear from us within 7 days, please contact the clinic through MyChart or phone. If you have a critical or abnormal lab result, we will notify you by  "phone as soon as possible.  Submit refill requests through [employer] or call your pharmacy and they will forward the refill request to us. Please allow 3 business days for your refill to be completed.          Additional Information About Your Visit        [employer] Information     [employer] gives you secure access to your electronic health record. If you see a primary care provider, you can also send messages to your care team and make appointments. If you have questions, please call your primary care clinic.  If you do not have a primary care provider, please call [phone] and they will assist you.        Care EveryWhere ID     This is your Care EveryWhere ID. This could be used by other organizations to access your [facility] medical records  [id number]        Your Vitals Were     Pulse Temperature Respirations Height Pulse Oximetry BMI (Body Mass Index)    90 98.5  F (36.9  C) (Oral) 16 1.816 m (5' 11.5\") 96% 31.03 kg/m2       Blood Pressure from Last 3 Encounters:   07/18/18 129/87   06/21/18 114/81   06/15/18 104/68    Weight from Last 3 Encounters:   07/18/18 102.3 kg (225 lb 9.6 oz)   05/16/18 102.1 kg (225 lb 1.6 oz)   05/11/18 101.7 kg (224 lb 1.6 oz)              We Performed the Following     CBC with platelets differential     Comprehensive metabolic panel     IgG     Yeast culture          Today's Medication Changes          These changes are accurate as of 7/18/18 11:59 PM.  If you have any questions, ask your nurse or doctor.               Start taking these medicines.        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   Quantity:  8 tablet   Refills:  0         Stop taking these medicines if you haven't already. Please contact your care team if you have questions.     lisinopril 20 MG tablet   Commonly known as:  " PRINIVIL/ZESTRIL                Where to get your medicines      These medications were sent to [facility] [place], [place] - [address]  [address], [facility] [address]    Hours:  TRANSPLANT PHONE NUMBER [phone] Phone:  [phone]     amoxicillin 500 MG tablet                Recent Review Flowsheet Data     BMT Recent Results Latest Ref Rng & Units 11/15/2017 11/20/2017 1/17/2018 1/31/2018 3/21/2018 5/11/2018 7/18/2018    WBC 4.0 - 11.0 10e9/L 7.6 - 6.5 - 5.3 4.9 7.9    Hemoglobin 13.3 - 17.7 g/dL 13.8 - 14.0 - 16.1 16.9 17.6    Platelet Count 150 - 450 10e9/L 229 - 235 - 200 174 189    Neutrophils (Absolute) 1.6 - 8.3 10e9/L 3.9 - 3.9 - 3.6 2.4 5.3    INR 0.86 - 1.14 - - - - - - -    Sodium 133 - 144 mmol/L 139 137 133 136 132(L) 135 134    Potassium 3.4 - 5.3 mmol/L 4.2 4.4 4.8 4.9 4.8 4.4 4.5    Chloride 94 - 109 mmol/L 107 104 102 104 100 104 103    Glucose 70 - 99 mg/dL 96 103(H) 108(H) 100(H) 110(H) 91 94    Urea Nitrogen 7 - 30 mg/dL 24 21 25 22 16 13 16    Creatinine 0.66 - 1.25 mg/dL 1.38(H) 1.38(H) 1.43(H) 1.18 1.06 1.03 0.90    Calcium (Total) 8.5 - 10.1 mg/dL 8.6 9.0 8.6 8.6 8.8 8.8 8.9    Protein (Total) 6.8 - 8.8 g/dL 7.0 - 7.7 8.2 8.2 7.8 9.0(H)    Albumin 3.4 - 5.0 g/dL 4.0 - 3.8 3.9 3.7 3.3(L) 4.1    Bilirubin (Direct) 0.0 - 0.2 mg/dL - - - - - - -    Alkaline Phosphatase 40 - 150 U/L 75 - 79 84 92 102 119    AST 0 - 45 U/L 22 - 32 34 42 47(H) 36    ALT 0 - 70 U/L 34 - 34 35 36 36 33    MCV 78 - 100 fl 101(H) - 99 - 96 97 97               Primary Care Provider Office Phone # Fax #    Radhamorteza MD Ad [phone]       [address] [address]        Equal Access to Services     [facility]: Allan Madison, farshad tillman, john vasquez. [facility] [phone].    ATENCIÓN: Si christophe pinedo a hernandez disposición  servicios gratuitos de asistencia lingüística. Shay wallace [phone].    We comply with applicable federal civil rights laws and [place] laws. We do not discriminate on the basis of race, color, national origin, age, disability, sex, sexual orientation, or gender identity.            Thank you!     Thank you for choosing [facility] BLOOD AND MARROW TRANSPLANT  for your care. Our goal is always to provide you with excellent care. Hearing back from our patients is one way we can continue to improve our services. Please take a few minutes to complete the written survey that you may receive in the mail after your visit with us. Thank you!             Your Updated Medication List - Protect others around you: Learn how to safely use, store and throw away your medicines at www.disposemymeds.org.          This list is accurate as of 7/18/18 11:59 PM.  Always use your most recent med list.                   Brand Name Dispense Instructions for use Diagnosis    acyclovir 800 MG tablet    ZOVIRAX    180 tablet    Take 1 tablet (800 mg) by mouth 2 times daily    MDS (myelodysplastic syndrome) (H)       amoxicillin 500 MG tablet    AMOXIL    8 tablet    4 tablets 1-2 hours before dental cleaning visit.    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       fluconazole 100 MG tablet    DIFLUCAN    90 tablet    Take 1 tablet (100 mg) by mouth  daily    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, MDS (myelodysplastic syndrome) (H)       levofloxacin 250 MG tablet    LEVAQUIN    90 tablet    Take 1 tablet (250 mg) by mouth daily    MDS (myelodysplastic syndrome) (H), 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       levothyroxine 150 MCG tablet    SYNTHROID/LEVOTHROID    90 tablet    Take 1 tablet (150 mcg) by mouth daily    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, MDS (myelodysplastic syndrome) (H)       magnesium oxide 400 (241.3 Mg) MG tablet    MAG-OX     Take 2 tablets daily        metoprolol tartrate 25 MG tablet    LOPRESSOR     Take 1 tablet (25 mg) by mouth daily    MDS (myelodysplastic syndrome) (H), 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       NITROGLYCERIN ER PO      Take by mouth as needed Reported on 5/12/2017        pilocarpine 5 MG tablet    SALAGEN    90 tablet    Take 1 tablet (5 mg) by mouth 3 times daily    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       predniSONE 10 MG tablet    DELTASONE    60 tablet    Take 2 tablets (20 mg) by mouth daily    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

## 2018-07-18 NOTE — PROGRESS NOTES
"/87  Pulse 90  Temp 98.5  F (36.9  C) (Oral)  Resp 16  Ht 1.816 m (5' 11.5\")  Wt 102.3 kg (225 lb 9.6 oz)  SpO2 96%  BMI 31.03 kg/m2  Wt Readings from Last 4 Encounters:   07/18/18 102.3 kg (225 lb 9.6 oz)   05/16/18 102.1 kg (225 lb 1.6 oz)   05/11/18 101.7 kg (224 lb 1.6 oz)   03/21/18 114.4 kg (252 lb 4.8 oz)     Byron returns 2 years and 2 months after his allo transplant. Several months ago he had been losing weight and trouble eating. He still had some mild intermittent soreness in his mouth; it's worse with sharp or salty items like a recent bout with potato chips. He had no skin rash, worsening of dry eyes, though he did lose some scalp hair he says. His energy is still been acceptable and he has not had fevers or chills. He had a mild sore throat and stuffy/runny nose, several days ago, over the weekend, but that is improving without intervention.   2 months ago he was started on 20 mg a day of prednisone (up from 5) and continued on low-dose cyclosporine. His weight is stabilized. He is able to eat better and his mouth, though still irritated is not as uncomfortable as before.His mouth remains dry.    He uses eyedrops one or 2 times a day but can still tolerate his contact lenses. He had a rash about a month ago that lasted a few days, resolved with some topical corticosteroid creams. He has no respiratory, cardiac, GI or  symptoms he still working full time and not had additional time off work. His energy is still fair, and better than 2 months ago.     His exam shows his weight has stabilized since May, but not increased, even though said he is eating better. Vital signs are acceptable. He has no inflammatory skin rash at any site. He has some mild lichenoid changes on both buccal mucosa, left greater than right, but no ulcerations or exudate. He has gum retraction and mild gingivitis over a number of teeth but no particular areas that are focally red. His lungs are clear. His heart tones " are regular without a gallop. His abdomen is nontender and soft. There is no palpable hepatosplenomegaly. He has no edema or bone tenderness.    Laboratory testing showed good blood counts (even higher hemoglobin than usual), at the upper end of normal. His chemistries are acceptable and his albumin is better than last time.    I sent a mouth swab for oral yeast in case he has fluconazole resistant yeast that are contributing to his oral irritation but made no other changes in his medication regimen.  [culture negative at 48h]    He is going to the dentist next week and I gave him amoxicillin 2 g to take before the dental cleaning visit because of his gingivitis and on his request, but made no other interventional changes in his treatment.    I told him if he is not eating and oral symptoms do not  improve, he should come sooner. Otherwise, return in 2 months time. 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  After that time; it will be appropriate to taper his prednisone.    Raymundo Reina M.D.    Professor of Medicine    Results for BRIGITTE JUNIOR (MRN [id number]) as of 7/18/2018 16:57   Ref. Range 5/11/2018 11:30 5/11/2018 11:42 5/11/2018 11:57 5/11/2018 12:00 7/18/2018 15:39   Sodium Latest Ref Range: 133 - 144 mmol/L 135    134   Potassium Latest Ref Range: 3.4 - 5.3 mmol/L 4.4    4.5   Chloride Latest Ref Range: 94 - 109 mmol/L 104    103   Carbon Dioxide Latest Ref Range: 20 - 32 mmol/L 25    23   Urea Nitrogen Latest Ref Range: 7 - 30 mg/dL 13    16   Creatinine Latest Ref Range: 0.66 - 1.25 mg/dL 1.03    0.90   GFR Estimate Latest Ref Range: >60 mL/min/1.7m2 75    88   GFR Estimate If Black Latest Ref Range: >60 mL/min/1.7m2 >90    >90   Calcium Latest Ref Range: 8.5 - 10.1 mg/dL 8.8    8.9   Anion Gap Latest Ref Range: 3 - 14 mmol/L 6    8   Albumin Latest Ref Range: 3.4 - 5.0 g/dL 3.3 (L)    4.1   Protein Total Latest Ref Range: 6.8 - 8.8 g/dL  7.8    9.0 (H)   Bilirubin Total Latest Ref Range: 0.2 - 1.3 mg/dL 0.4    0.8   Alkaline Phosphatase Latest Ref Range: 40 - 150 U/L 102    119   ALT Latest Ref Range: 0 - 70 U/L 36    33   AST Latest Ref Range: 0 - 45 U/L 47 (H)    36   T4 Free Latest Ref Range: 0.76 - 1.46 ng/dL 1.39       TSH Latest Ref Range: 0.40 - 4.00 mU/L 0.47       Glucose Latest Ref Range: 70 - 99 mg/dL 91    94   WBC Latest Ref Range: 4.0 - 11.0 10e9/L 4.9    7.9   Hemoglobin Latest Ref Range: 13.3 - 17.7 g/dL 16.9    17.6   Hematocrit Latest Ref Range: 40.0 - 53.0 % 49.4    49.4   Platelet Count Latest Ref Range: 150 - 450 10e9/L 174    189   RBC Count Latest Ref Range: 4.4 - 5.9 10e12/L 5.08    5.12   MCV Latest Ref Range: 78 - 100 fl 97    97   MCH Latest Ref Range: 26.5 - 33.0 pg 33.3 (H)    34.4 (H)   MCHC Latest Ref Range: 31.5 - 36.5 g/dL 34.2    35.6   RDW Latest Ref Range: 10.0 - 15.0 % 14.0    14.7   Diff Method Unknown Automated Method    Automated Method   % Neutrophils Latest Units: % 48.3    67.5   % Lymphocytes Latest Units: % 22.8    17.1   % Monocytes Latest Units: % 20.1    14.4   % Eosinophils Latest Units: % 7.2    0.1   % Basophils Latest Units: % 1.0    0.4   % Immature Granulocytes Latest Units: % 0.6    0.5   Nucleated RBCs Latest Ref Range: 0 /100 0    0   Absolute Neutrophil Latest Ref Range: 1.6 - 8.3 10e9/L 2.4    5.3   Absolute Lymphocytes Latest Ref Range: 0.8 - 5.3 10e9/L 1.1    1.4   Absolute Monocytes Latest Ref Range: 0.0 - 1.3 10e9/L 1.0    1.1   Absolute Eosinophils Latest Ref Range: 0.0 - 0.7 10e9/L 0.4    0.0   Absolute Basophils Latest Ref Range: 0.0 - 0.2 10e9/L 0.1    0.0   Abs Immature Granulocytes Latest Ref Range: 0 - 0.4 10e9/L 0.0    0.0   Absolute Nucleated RBC Unknown 0.0    0.0   Poikilocytosis Unknown Slight       Platelet Estimate Unknown Confirming automa...       CHROMOSOME BONE MARROW Unknown    Rpt    DNA MARKER POST BMT ENGRAFTMENT BONE MARROW Unknown    Rpt    LEUKEMIA LYMPHOMA  EVALUATION (FLOW CYTOMETRY) Unknown  Rpt      BONE MARROW BIOPSY Unknown   Rpt

## 2018-07-19 LAB — IGG SERPL-MCNC: 1230 MG/DL (ref 695–1620)

## 2018-07-23 LAB
SPECIMEN SOURCE: ABNORMAL
YEAST SPEC QL CULT: ABNORMAL

## 2018-08-23 DIAGNOSIS — D46.22 RAEB-2 (REFRACTORY ANEMIA WITH EXCESS BLASTS-2) (H): ICD-10-CM

## 2018-08-23 DIAGNOSIS — D46.9 MDS (MYELODYSPLASTIC SYNDROME) (H): ICD-10-CM

## 2018-08-23 RX ORDER — AMOXICILLIN 500 MG/1
TABLET, FILM COATED ORAL
Qty: 28 TABLET | Refills: 0 | Status: SHIPPED | OUTPATIENT
Start: 2018-08-23 | End: 2020-01-28

## 2018-09-12 ENCOUNTER — ONCOLOGY VISIT (OUTPATIENT)
Dept: TRANSPLANT | Facility: CLINIC | Age: 58
End: 2018-09-12
Attending: INTERNAL MEDICINE
Payer: COMMERCIAL

## 2018-09-12 ENCOUNTER — APPOINTMENT (OUTPATIENT)
Dept: LAB | Facility: CLINIC | Age: 58
End: 2018-09-12
Attending: INTERNAL MEDICINE
Payer: COMMERCIAL

## 2018-09-12 VITALS
WEIGHT: 238.3 LBS | BODY MASS INDEX: 32.77 KG/M2 | HEART RATE: 101 BPM | DIASTOLIC BLOOD PRESSURE: 90 MMHG | OXYGEN SATURATION: 96 % | SYSTOLIC BLOOD PRESSURE: 137 MMHG | TEMPERATURE: 97.6 F

## 2018-09-12 DIAGNOSIS — D46.9 MDS (MYELODYSPLASTIC SYNDROME) (H): ICD-10-CM

## 2018-09-12 DIAGNOSIS — D46.22 RAEB-2 (REFRACTORY ANEMIA WITH EXCESS BLASTS-2) (H): ICD-10-CM

## 2018-09-12 LAB
ALBUMIN SERPL-MCNC: 3.6 G/DL (ref 3.4–5)
ALP SERPL-CCNC: 84 U/L (ref 40–150)
ALT SERPL W P-5'-P-CCNC: 41 U/L (ref 0–70)
ANION GAP SERPL CALCULATED.3IONS-SCNC: 8 MMOL/L (ref 3–14)
AST SERPL W P-5'-P-CCNC: 41 U/L (ref 0–45)
BASOPHILS # BLD AUTO: 0 10E9/L (ref 0–0.2)
BASOPHILS NFR BLD AUTO: 0.4 %
BILIRUB SERPL-MCNC: 0.4 MG/DL (ref 0.2–1.3)
BUN SERPL-MCNC: 17 MG/DL (ref 7–30)
CALCIUM SERPL-MCNC: 8.5 MG/DL (ref 8.5–10.1)
CHLORIDE SERPL-SCNC: 106 MMOL/L (ref 94–109)
CO2 SERPL-SCNC: 24 MMOL/L (ref 20–32)
CREAT SERPL-MCNC: 0.87 MG/DL (ref 0.66–1.25)
DIFFERENTIAL METHOD BLD: ABNORMAL
EOSINOPHIL # BLD AUTO: 0 10E9/L (ref 0–0.7)
EOSINOPHIL NFR BLD AUTO: 0.1 %
ERYTHROCYTE [DISTWIDTH] IN BLOOD BY AUTOMATED COUNT: 13.3 % (ref 10–15)
GFR SERPL CREATININE-BSD FRML MDRD: >90 ML/MIN/1.7M2
GLUCOSE SERPL-MCNC: 90 MG/DL (ref 70–99)
HCT VFR BLD AUTO: 47.3 % (ref 40–53)
HGB BLD-MCNC: 16.2 G/DL (ref 13.3–17.7)
IMM GRANULOCYTES # BLD: 0.1 10E9/L (ref 0–0.4)
IMM GRANULOCYTES NFR BLD: 0.7 %
LYMPHOCYTES # BLD AUTO: 1.9 10E9/L (ref 0.8–5.3)
LYMPHOCYTES NFR BLD AUTO: 21.4 %
MAGNESIUM SERPL-MCNC: 2.3 MG/DL (ref 1.6–2.3)
MCH RBC QN AUTO: 34.2 PG (ref 26.5–33)
MCHC RBC AUTO-ENTMCNC: 34.2 G/DL (ref 31.5–36.5)
MCV RBC AUTO: 100 FL (ref 78–100)
MONOCYTES # BLD AUTO: 1.1 10E9/L (ref 0–1.3)
MONOCYTES NFR BLD AUTO: 12.7 %
NEUTROPHILS # BLD AUTO: 5.8 10E9/L (ref 1.6–8.3)
NEUTROPHILS NFR BLD AUTO: 64.7 %
NRBC # BLD AUTO: 0 10*3/UL
NRBC BLD AUTO-RTO: 0 /100
PLATELET # BLD AUTO: 223 10E9/L (ref 150–450)
POTASSIUM SERPL-SCNC: 4.2 MMOL/L (ref 3.4–5.3)
PROT SERPL-MCNC: 7.6 G/DL (ref 6.8–8.8)
RBC # BLD AUTO: 4.73 10E12/L (ref 4.4–5.9)
SODIUM SERPL-SCNC: 138 MMOL/L (ref 133–144)
WBC # BLD AUTO: 8.9 10E9/L (ref 4–11)

## 2018-09-12 PROCEDURE — 36415 COLL VENOUS BLD VENIPUNCTURE: CPT

## 2018-09-12 PROCEDURE — 85025 COMPLETE CBC W/AUTO DIFF WBC: CPT | Performed by: INTERNAL MEDICINE

## 2018-09-12 PROCEDURE — G0463 HOSPITAL OUTPT CLINIC VISIT: HCPCS

## 2018-09-12 PROCEDURE — 83735 ASSAY OF MAGNESIUM: CPT | Performed by: INTERNAL MEDICINE

## 2018-09-12 PROCEDURE — 80053 COMPREHEN METABOLIC PANEL: CPT | Performed by: INTERNAL MEDICINE

## 2018-09-12 ASSESSMENT — PAIN SCALES - GENERAL: PAINLEVEL: NO PAIN (0)

## 2018-09-12 NOTE — PROGRESS NOTES
"HISTORY OF PRESENT ILLNESS:  I saw Byron Mccarthy today.  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  He has developed a rather persistent case of chronic lgdjw-savxba-zims disease which affects primarily his mouth but which also manifests as weight loss.  In mid 05/2018, he had lost nearly 15 kilos of body weight and said that food tastes terrible and his mouth was uncomfortable.  I increased his prednisone to 20 mg daily from 5 mg daily because this seemed to represent a reactivation of his chronic qdtia-djymxi-gdai disease.  He was then seen by Dr. Reina 2 months later at which point he had not gained weight but was no longer losing weight.  He was continued on 20 mg of prednisone daily.  When seen here today, Byron states that his mouth is \"much better.\"  He is eating much more.  He has gained 8 pounds of body weight and feels great.  He had substantial fatigue previously.  No fevers, chills, nausea, vomiting, diarrhea, cough, hemoptysis, shortness of breath, hematuria, dysuria, bruising or bleeding to report.      PHYSICAL EXAMINATION:   GENERAL:  He looked robust.  He had actually put on some weight.   VITAL SIGNS:  Unremarkable.   HEENT:  Sclerae were anicteric.  Buccal mucosa had quite subtle lichenoid changes, no open ulcerations.   LYMPH NODES:  No palpable supraclavicular, cervical, axillary or inguinal adenopathy.   LUNGS:  Clear to auscultation.   CARDIAC:  Without S3, rub or murmur.   ABDOMEN:  Nondistended, active bowel sounds, no organomegaly.   EXTREMITIES:  Trace pedal edema.   SKIN:  No skin rash.      LABORATORY DATA:  Labs today showed white count of 8900, hemoglobin 16.2, platelets 223,000.  The electrolyte panel was normal with creatinine of 0.87.  Liver functions were normal.      ASSESSMENT AND PLAN:     1.  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  No evidence of relapse.  Hematologically last " marrow at 2 years showed complete remission and 100% engraftment.   2.  Chronic exhre-kwqyah-ygaz disease.  He has now turned his weight loss around and is regaining weight.  I will taper his prednisone to 20 alternating with 10 mg every day and have instructed Nicolas Jara to give him a call in 1 month to see how he is doing with regard to body weight, the taste of food and fatigue.  I plan on seeing him in 2 months in return.     Uli Enciso M.D.

## 2018-09-12 NOTE — NURSING NOTE
Chief Complaint   Patient presents with     Blood Draw     labs drawn via venipuncture by RN     /90 (BP Location: Right arm, Patient Position: Chair, Cuff Size: Adult Large)  Pulse 101  Temp 97.6  F (36.4  C) (Oral)  Wt 108.1 kg (238 lb 4.8 oz)  SpO2 96%  BMI 32.77 kg/m2    Vitals taken.  Labs collected and sent from right antecubital venipuncture. Pt tolerated well. Pt checked in for next appointment.    Suzette Gomez RN

## 2018-09-12 NOTE — NURSING NOTE
"Oncology Rooming Note    September 12, 2018 3:41 PM   Byron Russo is a 57 year old male who presents for:    Chief Complaint   Patient presents with     Blood Draw     labs drawn via venipuncture by RN     RECHECK     Pt is here for labs and to see MD for MDS     Initial Vitals: /90 (BP Location: Right arm, Patient Position: Chair, Cuff Size: Adult Large)  Pulse 101  Temp 97.6  F (36.4  C) (Oral)  Wt 108.1 kg (238 lb 4.8 oz)  SpO2 96%  BMI 32.77 kg/m2 Estimated body mass index is 32.77 kg/(m^2) as calculated from the following:    Height as of 7/18/18: 1.816 m (5' 11.5\").    Weight as of this encounter: 108.1 kg (238 lb 4.8 oz). Body surface area is 2.34 meters squared.  No Pain (0) Comment: Data Unavailable   No LMP for male patient.  Allergies reviewed: Yes  Medications reviewed: Yes    Medications: Medication refills not needed today.  Pharmacy name entered into EPIC:    [facility] [place], [place] - [id number]  [facility] [address] [place] [address] [facility]    Clinical concerns: none     6 minutes for nursing intake (face to face time)     Fe Aguillon MA              "

## 2018-09-12 NOTE — MR AVS SNAPSHOT
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  After hours call the   hospital at [phone] or   [phone]. Ask for the BMT   fellow on-call            Follow-ups after your visit        Your next 10 appointments already scheduled     Nov 14, 2018  3:30 PM CST   Masonic Lab Draw with  MASONIC LAB DRAW   [facility] Masonic Lab Draw ([facility])    [address] [place] [address]  [address] [place] [address] [id number] [phone]            Nov 14, 2018  4:00 PM CST   Return with Uli Enciso MD   [facility] Blood and Marrow Transplant ([facility])    [address] [place] [address]  [address] [place] [address] [id number] [phone]              Future tests that were ordered for you today     Open Future Orders        Priority Expected Expires Ordered    Comprehensive metabolic panel Routine 11/14/2018 12/12/2018 9/12/2018    CBC with platelets differential Routine 11/14/2018 12/12/2018 9/12/2018    IgG Routine 11/14/2018 12/12/2018 9/12/2018            Who to contact     If you have questions or need follow up information about today's clinic visit or your schedule please contact [facility] BLOOD AND MARROW TRANSPLANT directly at [phone].  Normal or non-critical lab and imaging results will be communicated to you by MyChart, letter or  phone within 4 business days after the clinic has received the results. If you do not hear from us within 7 days, please contact the clinic through [employer] or phone. If you have a critical or abnormal lab result, we will notify you by phone as soon as possible.  Submit refill requests through [employer] or call your pharmacy and they will forward the refill request to us. Please allow 3 business days for your refill to be completed.          Additional Information About Your Visit        [employer] Information     [employer] gives you secure access to your electronic health record. If you see a primary care provider, you can also send messages to your care team and make appointments. If you have questions, please call your primary care clinic.  If you do not have a primary care provider, please call [phone] and they will assist you.        Care EveryWhere ID     This is your Care EveryWhere ID. This could be used by other organizations to access your [facility] medical records  [id number]        Your Vitals Were     Pulse Temperature Pulse Oximetry BMI (Body Mass Index)          101 97.6  F (36.4  C) (Oral) 96% 32.77 kg/m2         Blood Pressure from Last 3 Encounters:   09/12/18 137/90   07/18/18 129/87   06/21/18 114/81    Weight from Last 3 Encounters:   09/12/18 108.1 kg (238 lb 4.8 oz)   07/18/18 102.3 kg (225 lb 9.6 oz)   05/16/18 102.1 kg (225 lb 1.6 oz)              We Performed the Following     CBC with platelets differential     Comprehensive metabolic panel     Magnesium        Recent Review Flowsheet Data     BMT Recent Results Latest Ref Rng & Units 11/20/2017 1/17/2018 1/31/2018 3/21/2018 5/11/2018 7/18/2018 9/12/2018    WBC 4.0 - 11.0 10e9/L - 6.5 - 5.3 4.9 7.9 8.9    Hemoglobin 13.3 - 17.7 g/dL - 14.0 - 16.1 16.9 17.6 16.2    Platelet Count 150 - 450 10e9/L - 235 - 200 174 189 223    Neutrophils (Absolute) 1.6 - 8.3 10e9/L - 3.9 - 3.6 2.4 5.3 5.8    INR 0.86 - 1.14 - - - - - - -    Sodium 133 - 144  mmol/L 137 133 136 132(L) 135 134 138    Potassium 3.4 - 5.3 mmol/L 4.4 4.8 4.9 4.8 4.4 4.5 4.2    Chloride 94 - 109 mmol/L 104 102 104 100 104 103 106    Glucose 70 - 99 mg/dL 103(H) 108(H) 100(H) 110(H) 91 94 90    Urea Nitrogen 7 - 30 mg/dL 21 25 22 16 13 16 17    Creatinine 0.66 - 1.25 mg/dL 1.38(H) 1.43(H) 1.18 1.06 1.03 0.90 0.87    Calcium (Total) 8.5 - 10.1 mg/dL 9.0 8.6 8.6 8.8 8.8 8.9 8.5    Protein (Total) 6.8 - 8.8 g/dL - 7.7 8.2 8.2 7.8 9.0(H) 7.6    Albumin 3.4 - 5.0 g/dL - 3.8 3.9 3.7 3.3(L) 4.1 3.6    Bilirubin (Direct) 0.0 - 0.2 mg/dL - - - - - - -    Alkaline Phosphatase 40 - 150 U/L - 79 84 92 102 119 84    AST 0 - 45 U/L - 32 34 42 47(H) 36 41    ALT 0 - 70 U/L - 34 35 36 36 33 41    MCV 78 - 100 fl - 99 - 96 97 97 100               Primary Care Provider Office Phone # Fax #    Cole MD Ad [phone]       [address] [address]        Equal Access to Services     [facility] AH: Hadii aad ku hadasho Sojoslynali, waaxda luqadaha, qaybta kaalmada adeegmoraima, waxay idimila julian. So [facility] [phone].    ATENCIÓN: Si habla español, tiene a hernandez disposición servicios gratuitos de asistencia lingüística. Shay al [phone].    We comply with applicable federal civil rights laws and [place] laws. We do not discriminate on the basis of race, color, national origin, age, disability, sex, sexual orientation, or gender identity.            Thank you!     Thank you for choosing [facility] BLOOD AND MARROW TRANSPLANT  for your care. Our goal is always to provide you with excellent care. Hearing back from our patients is one way we can continue to improve our services. Please take a few minutes to complete the written survey that you may receive in the mail after your visit with us. Thank you!             Your Updated Medication List - Protect others around you: Learn how to safely use, store and throw away your medicines at  www.disposemymeds.org.          This list is accurate as of 9/12/18  4:51 PM.  Always use your most recent med list.                   Brand Name Dispense Instructions for use Diagnosis    acyclovir 800 MG tablet    ZOVIRAX    180 tablet    Take 1 tablet (800 mg) by mouth 2 times daily    MDS (myelodysplastic syndrome) (H)       amoxicillin 500 MG tablet    AMOXIL    28 tablet    Take 4 tablets by mouth 1 hour before dental visit.    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       dexamethasone alcohol free 0.1 MG/ML solution     600 mL    Swish and spit 5-10 mLs (0.5-1 mg) in mouth 4 times daily    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       fluconazole 100 MG tablet    DIFLUCAN    90 tablet    Take 1 tablet (100 mg) by mouth daily    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, MDS (myelodysplastic syndrome) (H)       levofloxacin 250 MG tablet    LEVAQUIN    90 tablet    Take 1 tablet (250 mg) by mouth daily    MDS (myelodysplastic syndrome) (H), 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       levothyroxine 150 MCG tablet    SYNTHROID/LEVOTHROID    90 tablet    Take 1 tablet (150 mcg) by mouth daily    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, MDS (myelodysplastic syndrome) (H)       magnesium oxide 400 (241.3 Mg) MG tablet    MAG-OX     Take 2 tablets daily        metoprolol tartrate 25 MG tablet    LOPRESSOR     Take 1 tablet (25 mg) by mouth daily    MDS (myelodysplastic syndrome) (H), 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        NITROGLYCERIN ER PO      Take by mouth as needed Reported on 5/12/2017        pilocarpine 5 MG tablet    SALAGEN    90 tablet    Take 1 tablet (5 mg) by mouth 3 times daily    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       predniSONE 10 MG tablet    DELTASONE    60 tablet    Take 2 tablets (20 mg) by mouth daily    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

## 2018-09-28 DIAGNOSIS — D46.22 RAEB-2 (REFRACTORY ANEMIA WITH EXCESS BLASTS-2) (H): ICD-10-CM

## 2018-09-28 RX ORDER — CYCLOSPORINE 25 MG/1
25 CAPSULE, LIQUID FILLED ORAL 2 TIMES DAILY
Qty: 60 CAPSULE | Refills: 0 | Status: SHIPPED | OUTPATIENT
Start: 2018-09-28 | End: 2018-11-08

## 2018-11-08 DIAGNOSIS — D46.22 RAEB-2 (REFRACTORY ANEMIA WITH EXCESS BLASTS-2) (H): ICD-10-CM

## 2018-11-08 RX ORDER — CYCLOSPORINE 25 MG/1
25 CAPSULE, LIQUID FILLED ORAL 2 TIMES DAILY
Qty: 180 CAPSULE | Refills: 3 | Status: ON HOLD | OUTPATIENT
Start: 2018-11-08 | End: 2019-02-07

## 2018-11-14 ENCOUNTER — ONCOLOGY VISIT (OUTPATIENT)
Dept: TRANSPLANT | Facility: CLINIC | Age: 58
End: 2018-11-14
Attending: INTERNAL MEDICINE
Payer: COMMERCIAL

## 2018-11-14 ENCOUNTER — APPOINTMENT (OUTPATIENT)
Dept: LAB | Facility: CLINIC | Age: 58
End: 2018-11-14
Attending: INTERNAL MEDICINE
Payer: COMMERCIAL

## 2018-11-14 VITALS
HEART RATE: 101 BPM | SYSTOLIC BLOOD PRESSURE: 144 MMHG | RESPIRATION RATE: 16 BRPM | WEIGHT: 232.1 LBS | OXYGEN SATURATION: 97 % | TEMPERATURE: 98 F | BODY MASS INDEX: 31.92 KG/M2 | DIASTOLIC BLOOD PRESSURE: 89 MMHG

## 2018-11-14 DIAGNOSIS — D46.22 RAEB-2 (REFRACTORY ANEMIA WITH EXCESS BLASTS-2) (H): ICD-10-CM

## 2018-11-14 LAB
ALBUMIN SERPL-MCNC: 3.7 G/DL (ref 3.4–5)
ALP SERPL-CCNC: 80 U/L (ref 40–150)
ALT SERPL W P-5'-P-CCNC: 26 U/L (ref 0–70)
ANION GAP SERPL CALCULATED.3IONS-SCNC: 7 MMOL/L (ref 3–14)
AST SERPL W P-5'-P-CCNC: 26 U/L (ref 0–45)
BASOPHILS # BLD AUTO: 0.1 10E9/L (ref 0–0.2)
BASOPHILS NFR BLD AUTO: 0.7 %
BILIRUB SERPL-MCNC: 0.5 MG/DL (ref 0.2–1.3)
BUN SERPL-MCNC: 10 MG/DL (ref 7–30)
CALCIUM SERPL-MCNC: 8.7 MG/DL (ref 8.5–10.1)
CHLORIDE SERPL-SCNC: 105 MMOL/L (ref 94–109)
CO2 SERPL-SCNC: 26 MMOL/L (ref 20–32)
CREAT SERPL-MCNC: 0.91 MG/DL (ref 0.66–1.25)
DIFFERENTIAL METHOD BLD: ABNORMAL
EOSINOPHIL # BLD AUTO: 0 10E9/L (ref 0–0.7)
EOSINOPHIL NFR BLD AUTO: 0.1 %
ERYTHROCYTE [DISTWIDTH] IN BLOOD BY AUTOMATED COUNT: 13 % (ref 10–15)
GFR SERPL CREATININE-BSD FRML MDRD: 86 ML/MIN/1.7M2
GLUCOSE SERPL-MCNC: 107 MG/DL (ref 70–99)
HCT VFR BLD AUTO: 48.1 % (ref 40–53)
HGB BLD-MCNC: 16.6 G/DL (ref 13.3–17.7)
IMM GRANULOCYTES # BLD: 0.1 10E9/L (ref 0–0.4)
IMM GRANULOCYTES NFR BLD: 0.8 %
LYMPHOCYTES # BLD AUTO: 1.4 10E9/L (ref 0.8–5.3)
LYMPHOCYTES NFR BLD AUTO: 18.4 %
MCH RBC QN AUTO: 34.2 PG (ref 26.5–33)
MCHC RBC AUTO-ENTMCNC: 34.5 G/DL (ref 31.5–36.5)
MCV RBC AUTO: 99 FL (ref 78–100)
MONOCYTES # BLD AUTO: 1 10E9/L (ref 0–1.3)
MONOCYTES NFR BLD AUTO: 12.9 %
NEUTROPHILS # BLD AUTO: 5.2 10E9/L (ref 1.6–8.3)
NEUTROPHILS NFR BLD AUTO: 67.1 %
NRBC # BLD AUTO: 0 10*3/UL
NRBC BLD AUTO-RTO: 0 /100
PLATELET # BLD AUTO: 217 10E9/L (ref 150–450)
POTASSIUM SERPL-SCNC: 4.2 MMOL/L (ref 3.4–5.3)
PROT SERPL-MCNC: 7.5 G/DL (ref 6.8–8.8)
RBC # BLD AUTO: 4.86 10E12/L (ref 4.4–5.9)
SODIUM SERPL-SCNC: 138 MMOL/L (ref 133–144)
WBC # BLD AUTO: 7.7 10E9/L (ref 4–11)

## 2018-11-14 PROCEDURE — 25000128 H RX IP 250 OP 636: Mod: ZF | Performed by: INTERNAL MEDICINE

## 2018-11-14 PROCEDURE — 85025 COMPLETE CBC W/AUTO DIFF WBC: CPT | Performed by: INTERNAL MEDICINE

## 2018-11-14 PROCEDURE — 90686 IIV4 VACC NO PRSV 0.5 ML IM: CPT | Mod: ZF | Performed by: INTERNAL MEDICINE

## 2018-11-14 PROCEDURE — 36415 COLL VENOUS BLD VENIPUNCTURE: CPT

## 2018-11-14 PROCEDURE — 80053 COMPREHEN METABOLIC PANEL: CPT | Performed by: INTERNAL MEDICINE

## 2018-11-14 PROCEDURE — G0008 ADMIN INFLUENZA VIRUS VAC: HCPCS

## 2018-11-14 PROCEDURE — G0463 HOSPITAL OUTPT CLINIC VISIT: HCPCS | Mod: ZF

## 2018-11-14 PROCEDURE — 82784 ASSAY IGA/IGD/IGG/IGM EACH: CPT | Performed by: INTERNAL MEDICINE

## 2018-11-14 RX ADMIN — INFLUENZA A VIRUS A/MICHIGAN/45/2015 X-275 (H1N1) ANTIGEN (FORMALDEHYDE INACTIVATED), INFLUENZA A VIRUS A/SINGAPORE/INFIMH-16-0019/2016 IVR-186 (H3N2) ANTIGEN (FORMALDEHYDE INACTIVATED), INFLUENZA B VIRUS B/PHUKET/3073/2013 ANTIGEN (FORMALDEHYDE INACTIVATED), AND INFLUENZA B VIRUS B/MARYLAND/15/2016 BX-69A ANTIGEN (FORMALDEHYDE INACTIVATED) 0.5 ML: 15; 15; 15; 15 INJECTION, SUSPENSION INTRAMUSCULAR at 16:07

## 2018-11-14 ASSESSMENT — PAIN SCALES - GENERAL: PAINLEVEL: NO PAIN (0)

## 2018-11-14 NOTE — MR AVS SNAPSHOT
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  After hours call the   hospital at [phone] or   [phone]. Ask for the BMT   fellow on-call            Follow-ups after your visit        Your next 10 appointments already scheduled     Feb 13, 2019  4:00 PM CST   Return with Uli Enciso MD   [facility] Blood and Marrow Transplant ([facility])    [address] [place] [address]  [address] [place] [address] [id number] [phone]            Feb 14, 2019  3:15 PM CST   Masonic Lab Draw with  MASONIC LAB DRAW   [facility] Masonic Lab Draw ([facility])    [address] [place] [id number] [phone]              Future tests that were ordered for you today     Open Future Orders        Priority Expected Expires Ordered    Comprehensive metabolic panel Routine 2/13/2019 10/14/2019 11/14/2018    CBC with platelets differential Routine 2/13/2019 10/14/2019 11/14/2018    IgG Routine 2/13/2019 10/14/2019 11/14/2018    General PFT Lab (Please always keep checked) Routine 11/28/2018 11/14/2019 11/14/2018    Pulmonary Function Test Routine 11/28/2018 11/14/2019 11/14/2018            Who to contact     If you have questions or need follow up information about today's clinic visit or your schedule please contact [facility] BLOOD AND MARROW TRANSPLANT directly  at [phone].  Normal or non-critical lab and imaging results will be communicated to you by [employer], letter or phone within 4 business days after the clinic has received the results. If you do not hear from us within 7 days, please contact the clinic through [employer] or phone. If you have a critical or abnormal lab result, we will notify you by phone as soon as possible.  Submit refill requests through [employer] or call your pharmacy and they will forward the refill request to us. Please allow 3 business days for your refill to be completed.          Additional Information About Your Visit        [employer] Information     [employer] gives you secure access to your electronic health record. If you see a primary care provider, you can also send messages to your care team and make appointments. If you have questions, please call your primary care clinic.  If you do not have a primary care provider, please call [phone] and they will assist you.        Care EveryWhere ID     This is your Care EveryWhere ID. This could be used by other organizations to access your [facility] medical records  [id number]        Your Vitals Were     Pulse Temperature Respirations Pulse Oximetry BMI (Body Mass Index)       101 98  F (36.7  C) (Oral) 16 97% 31.92 kg/m2        Blood Pressure from Last 3 Encounters:   11/14/18 144/89   09/12/18 137/90   07/18/18 129/87    Weight from Last 3 Encounters:   11/14/18 105.3 kg (232 lb 1.6 oz)   09/12/18 108.1 kg (238 lb 4.8 oz)   07/18/18 102.3 kg (225 lb 9.6 oz)              We Performed the Following     CBC with platelets differential     Comprehensive metabolic panel     IgG        Recent Review Flowsheet Data     BMT Recent Results Latest Ref Rng & Units 1/17/2018 1/31/2018 3/21/2018 5/11/2018 7/18/2018 9/12/2018 11/14/2018    WBC 4.0 - 11.0 10e9/L 6.5 - 5.3 4.9 7.9 8.9 7.7    Hemoglobin 13.3 - 17.7 g/dL 14.0 - 16.1 16.9 17.6 16.2 16.6    Platelet Count 150 - 450 10e9/L 235 - 200 174 189 223  217    Neutrophils (Absolute) 1.6 - 8.3 10e9/L 3.9 - 3.6 2.4 5.3 5.8 5.2    INR 0.86 - 1.14 - - - - - - -    Sodium 133 - 144 mmol/L 133 136 132(L) 135 134 138 138    Potassium 3.4 - 5.3 mmol/L 4.8 4.9 4.8 4.4 4.5 4.2 4.2    Chloride 94 - 109 mmol/L 102 104 100 104 103 106 105    Glucose 70 - 99 mg/dL 108(H) 100(H) 110(H) 91 94 90 107(H)    Urea Nitrogen 7 - 30 mg/dL 25 22 16 13 16 17 10    Creatinine 0.66 - 1.25 mg/dL 1.43(H) 1.18 1.06 1.03 0.90 0.87 0.91    Calcium (Total) 8.5 - 10.1 mg/dL 8.6 8.6 8.8 8.8 8.9 8.5 8.7    Protein (Total) 6.8 - 8.8 g/dL 7.7 8.2 8.2 7.8 9.0(H) 7.6 7.5    Albumin 3.4 - 5.0 g/dL 3.8 3.9 3.7 3.3(L) 4.1 3.6 3.7    Bilirubin (Direct) 0.0 - 0.2 mg/dL - - - - - - -    Alkaline Phosphatase 40 - 150 U/L 79 84 92 102 119 84 80    AST 0 - 45 U/L 32 34 42 47(H) 36 41 26    ALT 0 - 70 U/L 34 35 36 36 33 41 26    MCV 78 - 100 fl 99 - 96 97 97 100 99               Primary Care Provider Office Phone # Fax #    Cole MD Ad [phone]       [address] [address]        Equal Access to Services     [facility] AH: Allan Madison, wahandyda luqadaha, qaybta kaalmada vick, john julian. [facility] [phone].    ATENCIÓN: Si habla español, tiene a hernandez disposición servicios gratuitos de asistencia lingüística. Shay wallace [phone].    We comply with applicable federal civil rights laws and [place] laws. We do not discriminate on the basis of race, color, national origin, age, disability, sex, sexual orientation, or gender identity.            Thank you!     Thank you for choosing [facility] BLOOD AND MARROW TRANSPLANT  for your care. Our goal is always to provide you with excellent care. Hearing back from our patients is one way we can continue to improve our services. Please take a few minutes to complete the written survey that you may receive in the mail after your visit with us. Thank you!             Your  Updated Medication List - Protect others around you: Learn how to safely use, store and throw away your medicines at www.disposemymeds.org.          This list is accurate as of 11/14/18  4:16 PM.  Always use your most recent med list.                   Brand Name Dispense Instructions for use Diagnosis    acyclovir 800 MG tablet    ZOVIRAX    180 tablet    Take 1 tablet (800 mg) by mouth 2 times daily    MDS (myelodysplastic syndrome) (H)       amoxicillin 500 MG tablet    AMOXIL    28 tablet    Take 4 tablets by mouth 1 hour before dental visit.    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       dexamethasone alcohol free 0.1 MG/ML solution     600 mL    Swish and spit 5-10 mLs (0.5-1 mg) in mouth 4 times daily    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       fluconazole 100 MG tablet    DIFLUCAN    90 tablet    Take 1 tablet (100 mg) by mouth daily    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, MDS (myelodysplastic syndrome) (H)       levofloxacin 250 MG tablet    LEVAQUIN    90 tablet    Take 1 tablet (250 mg) by mouth daily    MDS (myelodysplastic syndrome) (H), 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       levothyroxine 150 MCG tablet    SYNTHROID/LEVOTHROID    90 tablet    Take 1 tablet (150 mcg) by mouth daily    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, MDS (myelodysplastic syndrome) (H)       magnesium oxide 400 (241.3 Mg) MG tablet    MAG-OX     Take 2 tablets daily        metoprolol tartrate 25 MG tablet    LOPRESSOR     Take 1  tablet (25 mg) by mouth daily    MDS (myelodysplastic syndrome) (H), 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       NITROGLYCERIN ER PO      Take by mouth as needed Reported on 5/12/2017        pilocarpine 5 MG tablet    SALAGEN    90 tablet    Take 1 tablet (5 mg) by mouth 3 times daily    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       predniSONE 10 MG tablet    DELTASONE    60 tablet    Take 2 tablets (20 mg) by mouth daily    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

## 2018-11-14 NOTE — NURSING NOTE
Chief Complaint   Patient presents with     Blood Draw     labs drawn, vitals completed, pt checked in for appt     Lela Fritz, CMA

## 2018-11-14 NOTE — NURSING NOTE
"Oncology Rooming Note    November 14, 2018 3:34 PM   Byron Russo is a 58 year old male who presents for:    Chief Complaint   Patient presents with     Blood Draw     labs drawn, vitals completed, pt checked in for appt     RECHECK     RTN-MDS     Initial Vitals: /89  Pulse 101  Temp 98  F (36.7  C) (Oral)  Resp 16  Wt 105.3 kg (232 lb 1.6 oz)  SpO2 97%  BMI 31.92 kg/m2 Estimated body mass index is 31.92 kg/(m^2) as calculated from the following:    Height as of 7/18/18: 1.816 m (5' 11.5\").    Weight as of this encounter: 105.3 kg (232 lb 1.6 oz). Body surface area is 2.3 meters squared.  No Pain (0) Comment: Data Unavailable   No LMP for male patient.  Allergies reviewed: Yes  Medications reviewed: Yes    Medications: Medication refills not needed today.  Pharmacy name entered into EPIC:    [facility] [place], [place] - [address] [facility] [id number]  [facility] [address] [place], [place] [id number] [facility]    Clinical concerns: None     8 minutes for nursing intake (face to face time)     Erica Fuentes CMA              "

## 2018-11-15 LAB — IGG SERPL-MCNC: 1110 MG/DL (ref 695–1620)

## 2018-11-15 NOTE — PROGRESS NOTES
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  He developed a rather severe chronic kkuar-tcrslt-swva disease which affects primarily his mouth but also can manifest as weight loss.  We had tapered him down to 5 mg of prednisone daily when in mid 05/2018, he presented with weight loss.  He was up to 20 mg daily and has turned his weight loss around.  He is currently taking 20 alternating with 10 mg of prednisone every other day.      REVIEW OF SYSTEMS:  Byron reports he feels quite well and is still working full-time.  He does complain of some shortness of breath on exertion which he correlates roughly with time he started taking the increased prednisone.  He has no cough and did not produce sputum.  No wheezing.  His exercise intolerance manifests when climbing stairs in particular.  No fevers, chills, nausea, vomiting, diarrhea, cough, hemoptysis, shortness of breath, hematuria, dysuria, bruising or bleeding to report.  The remainder of the 10-point review of systems is negative.      PHYSICAL EXAMINATION:   GENERAL:  He looked fit.   VITAL SIGNS:  Unremarkable.   HEENT:  Sclerae anicteric and non-injected.  Buccal mucosa was intact.   LUNGS:  Clear to auscultation.   CARDIAC:  Without S3, rub or murmur.   ABDOMEN:  Nondistended, active bowel sounds, no organomegaly.   EXTREMITIES:  Trace pedal edema.   SKIN:  No skin rash.      LABORATORY DATA:  Labs today included a white count of 7700, hemoglobin 16.6, platelets 217,000.  Electrolyte panel was normal.  Liver functions were normal.  IgG returned at 1110.      ASSESSMENT AND PLAN:   1.  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  Last marrow at 2 years post-transplant saw complete remission and 100% engraftment.   2.  Chronic tyzzx-lnddjl-hjur disease.  This is currently under good control.  We will continue at 20 alternating with 10 mg every other day.   3.   Shortness of breath on exertion.  We will order pulmonary function tests.  If these are relatively normal, we will consider performing a cardiac echo.    4.  Follow up.  I will plan on seeing him in 3 months unless we detect pulmonary or cardiac problems.

## 2018-12-13 DIAGNOSIS — D46.22 RAEB-2 (REFRACTORY ANEMIA WITH EXCESS BLASTS-2) (H): ICD-10-CM

## 2018-12-13 RX ORDER — PREDNISONE 10 MG/1
20 TABLET ORAL DAILY
Qty: 135 TABLET | Refills: 3 | Status: SHIPPED | OUTPATIENT
Start: 2018-12-13 | End: 2019-07-29

## 2019-01-04 RX ORDER — PENTAMIDINE ISETHIONATE 300 MG/300MG
300 INHALANT RESPIRATORY (INHALATION)
Status: CANCELLED
Start: 2019-01-04

## 2019-01-04 RX ORDER — ALBUTEROL SULFATE 0.83 MG/ML
2.5 SOLUTION RESPIRATORY (INHALATION) EVERY 6 HOURS PRN
Status: CANCELLED
Start: 2019-01-04

## 2019-01-22 DIAGNOSIS — D89.811 CHRONIC GVHD (H): Primary | ICD-10-CM

## 2019-01-23 DIAGNOSIS — D46.22 RAEB-2 (REFRACTORY ANEMIA WITH EXCESS BLASTS-2) (H): ICD-10-CM

## 2019-01-23 DIAGNOSIS — D46.9 MDS (MYELODYSPLASTIC SYNDROME) (H): ICD-10-CM

## 2019-01-23 RX ORDER — ACYCLOVIR 800 MG/1
800 TABLET ORAL 2 TIMES DAILY
Qty: 180 TABLET | Refills: 3 | Status: SHIPPED | OUTPATIENT
Start: 2019-01-23 | End: 2019-07-29

## 2019-01-23 RX ORDER — LEVOFLOXACIN 250 MG/1
250 TABLET, FILM COATED ORAL DAILY
Qty: 90 TABLET | Refills: 3 | Status: SHIPPED | OUTPATIENT
Start: 2019-01-23 | End: 2019-07-29

## 2019-01-23 RX ORDER — FLUCONAZOLE 100 MG/1
100 TABLET ORAL DAILY
Qty: 90 TABLET | Refills: 3 | Status: SHIPPED | OUTPATIENT
Start: 2019-01-23 | End: 2019-02-13

## 2019-01-23 RX ORDER — SULFAMETHOXAZOLE/TRIMETHOPRIM 800-160 MG
1 TABLET ORAL 2 TIMES DAILY
Qty: 48 TABLET | Refills: 3 | Status: SHIPPED | OUTPATIENT
Start: 2019-01-23 | End: 2019-07-29

## 2019-01-23 RX ORDER — ROSUVASTATIN CALCIUM 5 MG/1
5 TABLET, COATED ORAL DAILY
Qty: 90 TABLET | Refills: 3 | Status: SHIPPED | OUTPATIENT
Start: 2019-01-23 | End: 2019-07-29

## 2019-01-23 RX ORDER — LEVOTHYROXINE SODIUM 150 UG/1
150 TABLET ORAL DAILY
Qty: 90 TABLET | Refills: 3 | Status: SHIPPED | OUTPATIENT
Start: 2019-01-23 | End: 2019-07-29

## 2019-02-04 ENCOUNTER — HOSPITAL ENCOUNTER (INPATIENT)
Facility: CLINIC | Age: 59
LOS: 3 days | Discharge: HOME | DRG: 196 | End: 2019-02-07
Attending: INTERNAL MEDICINE | Admitting: INTERNAL MEDICINE
Payer: COMMERCIAL

## 2019-02-04 ENCOUNTER — TRANSFERRED RECORDS (OUTPATIENT)
Dept: HEALTH INFORMATION MANAGEMENT | Facility: CLINIC | Age: 59
End: 2019-02-04

## 2019-02-04 DIAGNOSIS — D89.813 GVHD AS COMPLICATION OF BONE MARROW TRANSPLANT (H): Primary | ICD-10-CM

## 2019-02-04 DIAGNOSIS — T86.09 GVHD AS COMPLICATION OF BONE MARROW TRANSPLANT (H): Primary | ICD-10-CM

## 2019-02-04 DIAGNOSIS — D46.22 RAEB-2 (REFRACTORY ANEMIA WITH EXCESS BLASTS-2) (H): ICD-10-CM

## 2019-02-04 PROBLEM — J18.9 PNEUMONIA: Status: ACTIVE | Noted: 2019-02-04

## 2019-02-04 LAB
ABO + RH BLD: NORMAL
ABO + RH BLD: NORMAL
ALBUMIN SERPL-MCNC: 3.6 G/DL (ref 3.4–5)
ALP SERPL-CCNC: 87 U/L (ref 40–150)
ALT SERPL W P-5'-P-CCNC: 25 U/L (ref 0–70)
ANION GAP SERPL CALCULATED.3IONS-SCNC: 9 MMOL/L (ref 3–14)
APTT PPP: 31 SEC (ref 22–37)
AST SERPL W P-5'-P-CCNC: 28 U/L (ref 0–45)
BASOPHILS # BLD AUTO: 0.1 10E9/L (ref 0–0.2)
BASOPHILS NFR BLD AUTO: 0.9 %
BILIRUB SERPL-MCNC: 0.5 MG/DL (ref 0.2–1.3)
BLD GP AB SCN SERPL QL: NORMAL
BLOOD BANK CMNT PATIENT-IMP: NORMAL
BUN SERPL-MCNC: 16 MG/DL (ref 7–30)
CALCIUM SERPL-MCNC: 8.8 MG/DL (ref 8.5–10.1)
CHLORIDE SERPL-SCNC: 106 MMOL/L (ref 94–109)
CO2 SERPL-SCNC: 27 MMOL/L (ref 20–32)
CREAT SERPL-MCNC: 0.83 MG/DL (ref 0.66–1.25)
DIFFERENTIAL METHOD BLD: ABNORMAL
EOSINOPHIL # BLD AUTO: 0.1 10E9/L (ref 0–0.7)
EOSINOPHIL NFR BLD AUTO: 0.7 %
ERYTHROCYTE [DISTWIDTH] IN BLOOD BY AUTOMATED COUNT: 13.2 % (ref 10–15)
GFR SERPL CREATININE-BSD FRML MDRD: >90 ML/MIN/{1.73_M2}
GLUCOSE SERPL-MCNC: 131 MG/DL (ref 70–99)
HCT VFR BLD AUTO: 50.2 % (ref 40–53)
HGB BLD-MCNC: 17.3 G/DL (ref 13.3–17.7)
IMM GRANULOCYTES # BLD: 0.1 10E9/L (ref 0–0.4)
IMM GRANULOCYTES NFR BLD: 0.6 %
INR PPP: 1.11 (ref 0.86–1.14)
LDH SERPL L TO P-CCNC: 224 U/L (ref 85–227)
LYMPHOCYTES # BLD AUTO: 1.6 10E9/L (ref 0.8–5.3)
LYMPHOCYTES NFR BLD AUTO: 18.9 %
MAGNESIUM SERPL-MCNC: 2.2 MG/DL (ref 1.6–2.3)
MCH RBC QN AUTO: 34.3 PG (ref 26.5–33)
MCHC RBC AUTO-ENTMCNC: 34.5 G/DL (ref 31.5–36.5)
MCV RBC AUTO: 100 FL (ref 78–100)
MONOCYTES # BLD AUTO: 1.1 10E9/L (ref 0–1.3)
MONOCYTES NFR BLD AUTO: 12.9 %
NEUTROPHILS # BLD AUTO: 5.4 10E9/L (ref 1.6–8.3)
NEUTROPHILS NFR BLD AUTO: 66 %
NRBC # BLD AUTO: 0 10*3/UL
NRBC BLD AUTO-RTO: 0 /100
PHOSPHATE SERPL-MCNC: 4.7 MG/DL (ref 2.5–4.5)
PLATELET # BLD AUTO: 248 10E9/L (ref 150–450)
POTASSIUM SERPL-SCNC: 4.2 MMOL/L (ref 3.4–5.3)
PROT SERPL-MCNC: 7.8 G/DL (ref 6.8–8.8)
RBC # BLD AUTO: 5.04 10E12/L (ref 4.4–5.9)
SODIUM SERPL-SCNC: 142 MMOL/L (ref 133–144)
SPECIMEN EXP DATE BLD: NORMAL
WBC # BLD AUTO: 8.2 10E9/L (ref 4–11)

## 2019-02-04 PROCEDURE — 86900 BLOOD TYPING SEROLOGIC ABO: CPT | Performed by: INTERNAL MEDICINE

## 2019-02-04 PROCEDURE — 25000131 ZZH RX MED GY IP 250 OP 636 PS 637: Performed by: INTERNAL MEDICINE

## 2019-02-04 PROCEDURE — 25000128 H RX IP 250 OP 636: Performed by: INTERNAL MEDICINE

## 2019-02-04 PROCEDURE — 86850 RBC ANTIBODY SCREEN: CPT | Performed by: INTERNAL MEDICINE

## 2019-02-04 PROCEDURE — 83735 ASSAY OF MAGNESIUM: CPT | Performed by: INTERNAL MEDICINE

## 2019-02-04 PROCEDURE — 87633 RESP VIRUS 12-25 TARGETS: CPT | Performed by: INTERNAL MEDICINE

## 2019-02-04 PROCEDURE — 85025 COMPLETE CBC W/AUTO DIFF WBC: CPT | Performed by: INTERNAL MEDICINE

## 2019-02-04 PROCEDURE — 80053 COMPREHEN METABOLIC PANEL: CPT | Performed by: INTERNAL MEDICINE

## 2019-02-04 PROCEDURE — 0B9F8ZX DRAINAGE OF RIGHT LOWER LUNG LOBE, VIA NATURAL OR ARTIFICIAL OPENING ENDOSCOPIC, DIAGNOSTIC: ICD-10-PCS | Performed by: INTERNAL MEDICINE

## 2019-02-04 PROCEDURE — 25000132 ZZH RX MED GY IP 250 OP 250 PS 637: Performed by: INTERNAL MEDICINE

## 2019-02-04 PROCEDURE — 83615 LACTATE (LD) (LDH) ENZYME: CPT | Performed by: INTERNAL MEDICINE

## 2019-02-04 PROCEDURE — 84100 ASSAY OF PHOSPHORUS: CPT | Performed by: INTERNAL MEDICINE

## 2019-02-04 PROCEDURE — 87305 ASPERGILLUS AG IA: CPT | Performed by: INTERNAL MEDICINE

## 2019-02-04 PROCEDURE — 36415 COLL VENOUS BLD VENIPUNCTURE: CPT | Performed by: INTERNAL MEDICINE

## 2019-02-04 PROCEDURE — 99223 1ST HOSP IP/OBS HIGH 75: CPT | Performed by: INTERNAL MEDICINE

## 2019-02-04 PROCEDURE — 85610 PROTHROMBIN TIME: CPT | Performed by: INTERNAL MEDICINE

## 2019-02-04 PROCEDURE — 20600000 ZZH R&B BMT

## 2019-02-04 PROCEDURE — 86901 BLOOD TYPING SEROLOGIC RH(D): CPT | Performed by: INTERNAL MEDICINE

## 2019-02-04 PROCEDURE — 85730 THROMBOPLASTIN TIME PARTIAL: CPT | Performed by: INTERNAL MEDICINE

## 2019-02-04 PROCEDURE — 40000141 ZZH STATISTIC PERIPHERAL IV START W/O US GUIDANCE

## 2019-02-04 PROCEDURE — 87449 NOS EACH ORGANISM AG IA: CPT | Performed by: INTERNAL MEDICINE

## 2019-02-04 RX ORDER — PROCHLORPERAZINE MALEATE 5 MG
5-10 TABLET ORAL EVERY 6 HOURS PRN
Status: DISCONTINUED | OUTPATIENT
Start: 2019-02-04 | End: 2019-02-07 | Stop reason: HOSPADM

## 2019-02-04 RX ORDER — FLUCONAZOLE 100 MG/1
100 TABLET ORAL DAILY
Status: DISCONTINUED | OUTPATIENT
Start: 2019-02-04 | End: 2019-02-07 | Stop reason: HOSPADM

## 2019-02-04 RX ORDER — SODIUM CHLORIDE 9 MG/ML
INJECTION, SOLUTION INTRAVENOUS CONTINUOUS
Status: DISCONTINUED | OUTPATIENT
Start: 2019-02-04 | End: 2019-02-05

## 2019-02-04 RX ORDER — POTASSIUM CHLORIDE 1.5 G/1.58G
20-40 POWDER, FOR SOLUTION ORAL
Status: DISCONTINUED | OUTPATIENT
Start: 2019-02-04 | End: 2019-02-07 | Stop reason: HOSPADM

## 2019-02-04 RX ORDER — ACYCLOVIR 800 MG/1
800 TABLET ORAL 2 TIMES DAILY
Status: DISCONTINUED | OUTPATIENT
Start: 2019-02-04 | End: 2019-02-07 | Stop reason: HOSPADM

## 2019-02-04 RX ORDER — LORAZEPAM 2 MG/ML
.5-1 INJECTION INTRAMUSCULAR EVERY 4 HOURS PRN
Status: DISCONTINUED | OUTPATIENT
Start: 2019-02-04 | End: 2019-02-07 | Stop reason: HOSPADM

## 2019-02-04 RX ORDER — IPRATROPIUM BROMIDE AND ALBUTEROL SULFATE 2.5; .5 MG/3ML; MG/3ML
3 SOLUTION RESPIRATORY (INHALATION) EVERY 4 HOURS PRN
Status: DISCONTINUED | OUTPATIENT
Start: 2019-02-04 | End: 2019-02-07 | Stop reason: HOSPADM

## 2019-02-04 RX ORDER — CYCLOSPORINE 25 MG/1
25 CAPSULE, LIQUID FILLED ORAL 2 TIMES DAILY
Status: DISCONTINUED | OUTPATIENT
Start: 2019-02-04 | End: 2019-02-07 | Stop reason: HOSPADM

## 2019-02-04 RX ORDER — ACETAMINOPHEN 325 MG/1
325-650 TABLET ORAL EVERY 4 HOURS PRN
Status: DISCONTINUED | OUTPATIENT
Start: 2019-02-04 | End: 2019-02-07 | Stop reason: HOSPADM

## 2019-02-04 RX ORDER — MAGNESIUM OXIDE 400 MG/1
800 TABLET ORAL DAILY
Status: DISCONTINUED | OUTPATIENT
Start: 2019-02-04 | End: 2019-02-07 | Stop reason: HOSPADM

## 2019-02-04 RX ORDER — DEXAMETHASONE 0.5 MG/5ML
.5-1 SOLUTION ORAL 4 TIMES DAILY
Status: DISCONTINUED | OUTPATIENT
Start: 2019-02-04 | End: 2019-02-04

## 2019-02-04 RX ORDER — PILOCARPINE HYDROCHLORIDE 5 MG/1
5 TABLET, FILM COATED ORAL 3 TIMES DAILY
Status: DISCONTINUED | OUTPATIENT
Start: 2019-02-04 | End: 2019-02-04

## 2019-02-04 RX ORDER — LIDOCAINE 40 MG/G
CREAM TOPICAL
Status: DISCONTINUED | OUTPATIENT
Start: 2019-02-04 | End: 2019-02-04

## 2019-02-04 RX ORDER — POTASSIUM CL/LIDO/0.9 % NACL 10MEQ/0.1L
10 INTRAVENOUS SOLUTION, PIGGYBACK (ML) INTRAVENOUS
Status: DISCONTINUED | OUTPATIENT
Start: 2019-02-04 | End: 2019-02-07 | Stop reason: HOSPADM

## 2019-02-04 RX ORDER — PILOCARPINE HYDROCHLORIDE 5 MG/1
5 TABLET, FILM COATED ORAL DAILY
Status: DISCONTINUED | OUTPATIENT
Start: 2019-02-05 | End: 2019-02-07 | Stop reason: HOSPADM

## 2019-02-04 RX ORDER — PREDNISONE 10 MG/1
10 TABLET ORAL EVERY OTHER DAY
Status: DISCONTINUED | OUTPATIENT
Start: 2019-02-05 | End: 2019-02-07 | Stop reason: HOSPADM

## 2019-02-04 RX ORDER — METOPROLOL TARTRATE 25 MG/1
25 TABLET, FILM COATED ORAL DAILY
Status: DISCONTINUED | OUTPATIENT
Start: 2019-02-04 | End: 2019-02-07 | Stop reason: HOSPADM

## 2019-02-04 RX ORDER — MAGNESIUM SULFATE HEPTAHYDRATE 40 MG/ML
4 INJECTION, SOLUTION INTRAVENOUS EVERY 4 HOURS PRN
Status: DISCONTINUED | OUTPATIENT
Start: 2019-02-04 | End: 2019-02-07 | Stop reason: HOSPADM

## 2019-02-04 RX ORDER — LIDOCAINE 40 MG/G
CREAM TOPICAL
Status: DISCONTINUED | OUTPATIENT
Start: 2019-02-04 | End: 2019-02-07 | Stop reason: HOSPADM

## 2019-02-04 RX ORDER — POTASSIUM CHLORIDE 750 MG/1
20-40 TABLET, EXTENDED RELEASE ORAL
Status: DISCONTINUED | OUTPATIENT
Start: 2019-02-04 | End: 2019-02-07 | Stop reason: HOSPADM

## 2019-02-04 RX ORDER — LEVOTHYROXINE SODIUM 75 UG/1
150 TABLET ORAL DAILY
Status: DISCONTINUED | OUTPATIENT
Start: 2019-02-04 | End: 2019-02-07 | Stop reason: HOSPADM

## 2019-02-04 RX ORDER — SULFAMETHOXAZOLE/TRIMETHOPRIM 800-160 MG
1 TABLET ORAL
Status: DISCONTINUED | OUTPATIENT
Start: 2019-02-04 | End: 2019-02-07 | Stop reason: HOSPADM

## 2019-02-04 RX ORDER — LORAZEPAM 0.5 MG/1
.5-1 TABLET ORAL EVERY 4 HOURS PRN
Status: DISCONTINUED | OUTPATIENT
Start: 2019-02-04 | End: 2019-02-07 | Stop reason: HOSPADM

## 2019-02-04 RX ORDER — POTASSIUM CHLORIDE 7.45 MG/ML
10 INJECTION INTRAVENOUS
Status: DISCONTINUED | OUTPATIENT
Start: 2019-02-04 | End: 2019-02-07 | Stop reason: HOSPADM

## 2019-02-04 RX ORDER — POTASSIUM CHLORIDE 29.8 MG/ML
20 INJECTION INTRAVENOUS
Status: DISCONTINUED | OUTPATIENT
Start: 2019-02-04 | End: 2019-02-07 | Stop reason: HOSPADM

## 2019-02-04 RX ORDER — PREDNISONE 20 MG/1
20 TABLET ORAL EVERY OTHER DAY
Status: DISCONTINUED | OUTPATIENT
Start: 2019-02-06 | End: 2019-02-07 | Stop reason: HOSPADM

## 2019-02-04 RX ADMIN — ACYCLOVIR 800 MG: 800 TABLET ORAL at 20:13

## 2019-02-04 RX ADMIN — CYCLOSPORINE 25 MG: 25 CAPSULE, LIQUID FILLED ORAL at 21:03

## 2019-02-04 RX ADMIN — SULFAMETHOXAZOLE AND TRIMETHOPRIM 1 TABLET: 800; 160 TABLET ORAL at 20:13

## 2019-02-04 RX ADMIN — SODIUM CHLORIDE: 9 INJECTION, SOLUTION INTRAVENOUS at 20:19

## 2019-02-04 RX ADMIN — CEFEPIME 2 G: 2 INJECTION, POWDER, FOR SOLUTION INTRAVENOUS at 20:14

## 2019-02-04 RX ADMIN — AZITHROMYCIN MONOHYDRATE 500 MG: 500 INJECTION, POWDER, LYOPHILIZED, FOR SOLUTION INTRAVENOUS at 21:35

## 2019-02-04 RX ADMIN — VANCOMYCIN HYDROCHLORIDE 2000 MG: 10 INJECTION, POWDER, LYOPHILIZED, FOR SOLUTION INTRAVENOUS at 20:14

## 2019-02-04 RX ADMIN — RANITIDINE 150 MG: 150 TABLET ORAL at 20:14

## 2019-02-04 ASSESSMENT — ACTIVITIES OF DAILY LIVING (ADL): ADLS_ACUITY_SCORE: 13

## 2019-02-04 ASSESSMENT — MIFFLIN-ST. JEOR: SCORE: 1911.73

## 2019-02-04 NOTE — H&P
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 He has been following with Dr. Enciso. Since this fall he has developed some shortness of breath on exertion. This has been worsening over the last few months - he notices it mostly with activities. He is still able to walk up stairs, carry groceries, etc, but he does noticed it with that. He was referred to pulmonology but not scheduled to see until later this month.     He has been having some cough (non productive), ear pain and sore throat for the last week, for which he went to urgent care on Saturday. Strep test negative and exam otherwise negative. However, he was found to have a low O2 sat. It was recommended that he go to the ED for further workup but he chose rather to go to his PMD today. At his PMD he had a CXR (outside our system) which by report showed bilateral consolidated infiltrates with some pleural effusions. He then had a CT chest done today which showed extensive areas of infiltrate/consolidation in the lungs, with a peripheral and subpleural prominence in the lower lungs. (Results from outside hospital copied below.     No myaligias, fevers, chills, lower extremity edema, or orthopnea. He has been having exertional chest pain which has resolved.     Appetite has been good with the prednisone and his weight has maintained.   Of note his eyes have been a bit more red recently. 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   He denies any rashes or diarrhea.       PAST MEDICAL HISTORY  Past Medical History:   Diagnosis Date     CAD (coronary artery disease) 2001     Hyperlipidemia      Hypothyroidism      Obesity      Pulmonary embolism (H) 2/2016     Varicose veins      Pre-transplant history: He was noted to be pancytopenic in November, 2015.  A bone marrow biopsy was done and showed megaloblastic changes; however, his counts failed to correct with vitamin B12 therapy.  A bone marrow biopsy was therefore repeated in February, 2016.  This marrow revealed hypercellularity with trilineage dysplasia and approximately 17% blasts (RAEB2).  FLT3 and NPM1 were negative.  The malignant cells were positive to CD34, , CD43 and CD45; dimly positive for CD33, CD15 and myeloperoxidase.  Cytogenetics were normal.  He underwent 7 + 3 induction and then presented for a non-myeloablative allogeneic-sibling peripheral blood stem cell transplant    Transplant hospitalization: Yg had an uncomplicated hospital course.  He received a nonmyeloablative prep of cytoxan, fludarabine and 200 cGy of radiation.  He received a peripheral blood stem cell transplant from his ABO matched sibling on 5/11/16.  He developed c diff colitis, which responded well to oral vancomycin solution.  He will take this 5/13-5/23.  He was on lovenox for a h/o PE (February, 2016).  As his platelets were dropping post chemo, this will not continue after discharge.  He developed CSA induced hypertension and hypomagnesemia.  He is now on norvasc 10mg PO daily and magnesium oxide 400mg PO bid.  He will take the following prophylaxes:  1) levaquin, ACV and vfend for microbial prophylaxes.  He and his donor are both CMV negative; 2) ursodiol for VOD prophylaxis; 3) protonix for GI prophylaxis; 4) CSA and MMF for GVHD prophylaxis.  He was converted from IV to oral CSA on 5/20 and he will need a CSA level on 5/23 in the BMT clinic.   He  continues on his PTA dose of synthroid for hypothyroidism. He also has nitroglycerin at home for a h/o CAD diagnosed at age 40, for which he has 5 drug eluting stents.  He has no h/o MI.  While he does have dyslipidemia, he has not been on a statin recently due to a potential allergy to atorvastatin and due to the potential drug interactions between statins, chemotherapy and azoles. Per cardiology, he does not need ASA at this time.     FAMILY HISTORY  Family History   Problem Relation Age of Onset     Myocardial Infarction Father 58     Coronary Artery Disease Father      Heart Failure Mother      Glaucoma Mother      Coronary Artery Disease Brother      Coronary Artery Disease Brother      Cancer Brother         GIST     Skin Cancer No family hx of      Melanoma No family hx of      Macular Degeneration No family hx of        SOCIAL HISTORY  Social History     Socioeconomic History     Marital status:      Spouse name: Not on file     Number of children: Not on file     Years of education: Not on file     Highest education level: Not on file   Social Needs     Financial resource strain: Not on file     Food insecurity - worry: Not on file     Food insecurity - inability: Not on file     Transportation needs - medical: Not on file     Transportation needs - non-medical: Not on file   Occupational History     Not on file   Tobacco Use     Smoking status: Never Smoker     Smokeless tobacco: Former User   Substance and Sexual Activity     Alcohol use: No     Alcohol/week: 0.0 oz     Drug use: No     Sexual activity: Not on file   Other Topics Concern     Parent/sibling w/ CABG, MI or angioplasty before 65F 55M? Not Asked   Social History Narrative     Not on file       CURRENT OUTPATIENT MEDICATIONS  Current Outpatient Medications   Medication Sig Dispense Refill     acyclovir (ZOVIRAX) 800 MG tablet Take 1 tablet (800 mg) by mouth 2 times daily 180 tablet 3     amoxicillin (AMOXIL) 500 MG tablet Take 4 tablets  by mouth 1 hour before dental visit. 28 tablet 0     aspirin 81 MG EC tablet Take 81 mg by mouth daily Reported on 5/12/2017       cephALEXin (KEFLEX) 500 MG capsule Take 1 capsule (500 mg) by mouth 2 times daily 20 capsule 0     cycloSPORINE modified (GENERIC EQUIVALENT) 25 MG capsule Take 1 capsule (25 mg) by mouth 2 times daily 180 capsule 3     dexamethasone 0.1 MG/ML solution Swish and spit 5-10 mLs (0.5-1 mg) in mouth 4 times daily 600 mL 11     fluconazole (DIFLUCAN) 100 MG tablet Take 1 tablet (100 mg) by mouth daily 90 tablet 3     levofloxacin (LEVAQUIN) 250 MG tablet Take 1 tablet (250 mg) by mouth daily 90 tablet 3     levothyroxine (SYNTHROID/LEVOTHROID) 150 MCG tablet Take 1 tablet (150 mcg) by mouth daily 90 tablet 3     magnesium oxide (MAG-OX) 400 (241.3 MG) MG tablet Take 2 tablets daily       metoprolol tartrate (LOPRESSOR) 25 MG tablet Take 1 tablet (25 mg) by mouth daily       NITROGLYCERIN ER PO Take by mouth as needed Reported on 5/12/2017       pilocarpine (SALAGEN) 5 MG tablet Take 1 tablet (5 mg) by mouth 3 times daily 90 tablet 11     predniSONE (DELTASONE) 10 MG tablet Take 20 mg by mouth daily Alternating with 10mg daily. 135 tablet 3     ranitidine (ZANTAC) 150 MG tablet Take 1 tablet (150 mg) by mouth 2 times daily 180 tablet 3     rosuvastatin (CRESTOR) 5 MG tablet Take 1 tablet (5 mg) by mouth daily 90 tablet 3     sulfamethoxazole-trimethoprim (BACTRIM DS/SEPTRA DS) 800-160 MG tablet Take 1 tablet by mouth 2 times daily On Monday's and Tuesday's only 48 tablet 3       ALLERGIES  Allergies   Allergen Reactions     Atorvastatin Other (See Comments)     Back pain  Tolerates atrovastatin     Docosahexaenoic Acid (Dha)      Other reaction(s): Intolerance-Can't Take - Henning 3 fish oil       REVIEW OF SYSTEMS  A complete ROS was performed and was negative except as mentioned in HPI    PHYSICAL EXAM  There were no vitals taken for this visit.  @LASTSAO2(4)@  Wt Readings from Last 3  Encounters:   11/14/18 105.3 kg (232 lb 1.6 oz)   09/12/18 108.1 kg (238 lb 4.8 oz)   07/18/18 102.3 kg (225 lb 9.6 oz)       Gen: alert, pleasant and conversational, NAD  HEET: NC/AT, EOMI w/ PERRL, anicteric sclera. OP clear. MMM.   Neck: supple no JVP  Lymph: No cervical, supraclavicular, axillary or inguinal LAD  CV: normal S1,S2 with RRR no m/r/g  Resp: lungs with bibasilar crackles, and decreased breath sounds on the right side. No wheezes  GI: soft NTND no organomegaly or masses. BS normoactive.   Ext: WWP no edema or cyanosis  Skin: no concerning lesions or rashes  Neuro: A&Ox4, no lateralizing sx. Grossly nonfocal.      LABORATORY AND IMAGING STUDIES    Recent Labs   Lab Test 11/14/18  1515 09/12/18  1535  02/21/17  1625  05/16/16  0424  05/09/16  0230    138   < > 140   < > 141   < > 141   POTASSIUM 4.2 4.2   < > 4.2   < > 4.3   < > 4.1   CHLORIDE 105 106   < > 104   < > 108   < > 107   CO2 26 24   < > 23   < > 25   < > 26   ANIONGAP 7 8   < > 13   < > 8   < > 8   BUN 10 17   < > 43*   < > 10   < > 19   CR 0.91 0.87   < > 1.37*   < > 0.80   < > 0.63*   * 90   < > 128*   < > 92   < > 112*   TRACE 8.7 8.5   < > 8.6   < > 8.2*   < > 8.6   MAG  --  2.3  --  1.7   < > 1.2*   < > 1.9   PHOS  --   --   --   --   --  4.3  --  4.7*    < > = values in this interval not displayed.     Recent Labs   Lab Test 11/14/18  1515 09/12/18  1535 07/18/18  1539   WBC 7.7 8.9 7.9   HGB 16.6 16.2 17.6    223 189   MCV 99 100 97   NEUTROPHIL 67.1 64.7 67.5     Recent Labs   Lab Test 11/14/18  1515 09/12/18  1535 07/18/18  1539   BILITOTAL 0.5 0.4 0.8   ALKPHOS 80 84 119   ALT 26 41 33   AST 26 41 36   ALBUMIN 3.7 3.6 4.1     TSH   Date Value Ref Range Status   05/11/2018 0.47 0.40 - 4.00 mU/L Final   ]    Lab Results   Component Value Date    WBC 7.7 11/14/2018    HGB 16.6 11/14/2018    HCT 48.1 11/14/2018     11/14/2018     11/14/2018    POTASSIUM 4.2 11/14/2018    CHLORIDE 105 11/14/2018    CO2 26  11/14/2018    BUN 10 11/14/2018    CR 0.91 11/14/2018     (H) 11/14/2018    AST 26 11/14/2018    ALT 26 11/14/2018    ALKPHOS 80 11/14/2018    BILITOTAL 0.5 11/14/2018    INR 1.1 06/29/2016       Results for orders placed or performed in visit on 02/21/17   CT Chest w/o contrast    Narrative    EXAMINATION: CT CHEST W/O CONTRAST, 2/21/2017 2:34 PM    TECHNIQUE:  Helical CT images from the lung apices through the upper  abdomen were obtained without IV contrast.    COMPARISON: CT 4/18/2016, 2/2/2016    HISTORY: Myelodysplastic syndrome, unspecified    FINDINGS:    Chest: Partially visualized thyroid is unremarkable. Coronary artery  stents are noted. Normal heart size. No pericardial effusion. Calcific  atherosclerosis of the aortic arch. Normal caliber of the thoracic  aorta and pulmonary arteries. No mediastinal or axillary  lymphadenopathy by size criteria.    No pleural effusion or abnormal pleural thickening. No pneumothorax.    Lungs: Trachea and central airways are patent. Scattered endobronchial  mucous plugging. Mild centrilobular emphysematous changes. There are  scattered pulmonary nodules, noted on series 4, for example:    2 mm right lower lobe solitary pulmonary nodule on image 214,  unchanged since 2/2/2016.    Tiny 2 mm subpleural nodule in the right lower lobe on image 206,  unchanged.    No new suspicious pulmonary nodule. No focal consolidation. No  bronchiectasis or abnormal interstitial attenuation.    Upper abdomen: Limited evaluation of the upper abdomen is unremarkable  in this noncontrast exam.    Bones and soft tissues: No aggressive osseous lesion. No abnormal soft  tissue density. Anterior bridging osteophytes are noted in the mid to  low thoracic spine.      Impression    IMPRESSION:  No significant change in tiny 2 mm pulmonary nodules  since 2/2/2016. No new pulmonary nodule.    I have personally reviewed the examination and initial interpretation  and I agree with the  findings.    MORGAN WEBER MD     CT Chest 2/4/2019 ([facility])   Findings:    Lung and Large Airways: There are extensive areas of   infiltrate/consolidation in the lungs. The lung apices are relatively   spared, but elsewhere the consolidation has a peripheral and   subpleural predominance, most pronounced in the lower lungs. There   are also some areas of peribronchovascular consolidation. Some of   these appear more nodular, such as a 10 mm diameter posterior right   upper lobe nodule (series 3, image 107.    Pleura: Small bilateral pleural effusions. Some of the fluid,   particularly on the left, appears to be at least partially loculated.    Mediastinum/Aorta: Within noncontrast technique limitations, there is   no evidence of lymphadenopathy. Trace of pericardial fluid or   thickening. Vascular calcification includes severe coronary artery   calcification.    Chest Wall/Axilla: No axillary lymphadenopathy. The chest wall   appears normal.    Upper Abdomen: No significant abnormality.    Musculoskeletal: Hypertrophic and degenerative changes in the spine.    Impression:   1. There are extensive areas of infiltrate/consolidation in both   lungs. There is a peripheral subpleural predominance, especially in   the lower lungs, but there are also areas of peribronchial vascular   consolidation, some of which appear more nodular. Associated air   bronchograms. Both lungs are involved fairly symmetrically. The   etiology is uncertain. Atypical infectious or inflammatory etiology   is a consideration, but findings are somewhat more organized   appearing than typical. This could represent a type of organized   pneumonia. Peripheral predominance can be seen in the setting of   cryptogenic organizing pneumonia. Other processes such as   eosinophilic pneumonia more frequently involves the upper lungs.   Peripheral predominance would also be uncommon in sarcoidosis or   hypersensitivity pneumonitis. Additional  clinical and laboratory   workup is likely necessary. Ultimately, if these findings should   persist or progress, tissue diagnosis may be indicated.  2. Small amount of bilateral pleural fluid which appears to be at   least partially loculated on the left.  3. There is no clear evidence of lymphadenopathy.  4. Atherosclerosis which includes severe coronary artery calcification.      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 He is now admitted for workup for finding of extensive bilateral pulmonary infiltrates.     1. BMT: s/p sibling PBSCT for MDS-RAEB2 5/11/2016  Last marrow at 2 years post-transplant with complete remission and 100% engraftment    2. Heme: counts have been normal, will monitor for transfusion needs. No recent transfusions    3. 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 Was tapered down to prednisone 10mg alternating with 20mg daily since November 2018.  Continue cyclosporine 25mg BID. Check trough levels tomorrow  Does not currently take dexamethasone solution for oral GVHD  Artificial tears eye drops for eye irritation- he was on this prior based on ophtho note from 10/2017. He also has a history of needing steroid drops for eyes in the past.    4. ID  Will cover for pneumonia in an immunocompromised patient, with vancomycin, cefepime, and azithromycin. Will hold off on antifungal coverage for now.   Check sputum cultures, RVP, as well as b-d-glucan and galactomannan testing. Check LDH as PJP on the differential.   Will need bronchoscopy for further workup.   Continue prophylactic acyclovir,  fluconazole, TMP-SMX. Hold prophylactic levofloxacin while on broad spectrum abx.  History of VRE    5. Pulm  Shortness on breath on exertion and cough. Found to have bilateral infiltrates as above.   Sats are 91% on RA  Possibilities include infectious pneumonia vs inflammatory from cGVHD (YESENIA)  CT chest images from [facility] brought by patient on disk. Staff will ask for this to be inputted into PACS.   Supplemental O2 as needed to keep sats > 92%  Will consult pulmonology for bronchoscopy.  Trial nebulizer treatments.  PFTs haven't been done yet.     6. CV  Hold ASA  Continue metoprolol  Hold Crestor for now    7. Endo  Continue levothyroxine 150 mcg daily    8. Renal/FEN  NS @ 75ml/hr  Regular diet as tolerated, NPO p MN in case of bronchoscopy  Lytes repleted as per sliding scale   Continue magnesium supplementation    9. ENT  Continue PTA Pilocarpine daily for dry mouth    10. GI  Continue PTA zantac     11. Lines/drains  No central line. PIVs to be placed.    12. Code: FULL    Delbert Mendenhall MD  Hematology Oncology Hospitalist  [facility]  [phone]

## 2019-02-05 LAB
ANION GAP SERPL CALCULATED.3IONS-SCNC: 9 MMOL/L (ref 3–14)
BACTERIA SPEC CULT: ABNORMAL
BUN SERPL-MCNC: 14 MG/DL (ref 7–30)
CALCIUM SERPL-MCNC: 8.1 MG/DL (ref 8.5–10.1)
CHLORIDE SERPL-SCNC: 109 MMOL/L (ref 94–109)
CO2 SERPL-SCNC: 24 MMOL/L (ref 20–32)
CREAT SERPL-MCNC: 0.76 MG/DL (ref 0.66–1.25)
CRYPTOC AG SPEC QL: NORMAL
CYCLOSPORINE BLD LC/MS/MS-MCNC: <25 UG/L (ref 50–400)
ERYTHROCYTE [DISTWIDTH] IN BLOOD BY AUTOMATED COUNT: 13.3 % (ref 10–15)
FLUAV H1 2009 PAND RNA SPEC QL NAA+PROBE: NEGATIVE
FLUAV H1 RNA SPEC QL NAA+PROBE: NEGATIVE
FLUAV H3 RNA SPEC QL NAA+PROBE: NEGATIVE
FLUAV RNA SPEC QL NAA+PROBE: NEGATIVE
FLUBV RNA SPEC QL NAA+PROBE: NEGATIVE
GFR SERPL CREATININE-BSD FRML MDRD: >90 ML/MIN/{1.73_M2}
GLUCOSE SERPL-MCNC: 86 MG/DL (ref 70–99)
GRAM STN SPEC: ABNORMAL
HADV DNA SPEC QL NAA+PROBE: NEGATIVE
HADV DNA SPEC QL NAA+PROBE: NEGATIVE
HCT VFR BLD AUTO: 46 % (ref 40–53)
HGB BLD-MCNC: 15.7 G/DL (ref 13.3–17.7)
HMPV RNA SPEC QL NAA+PROBE: NEGATIVE
HPIV1 RNA SPEC QL NAA+PROBE: NEGATIVE
HPIV2 RNA SPEC QL NAA+PROBE: NEGATIVE
HPIV3 RNA SPEC QL NAA+PROBE: NEGATIVE
Lab: ABNORMAL
MAGNESIUM SERPL-MCNC: 2.1 MG/DL (ref 1.6–2.3)
MCH RBC QN AUTO: 33.8 PG (ref 26.5–33)
MCHC RBC AUTO-ENTMCNC: 34.1 G/DL (ref 31.5–36.5)
MCV RBC AUTO: 99 FL (ref 78–100)
MICROBIOLOGIST REVIEW: NORMAL
PLATELET # BLD AUTO: 227 10E9/L (ref 150–450)
POTASSIUM SERPL-SCNC: 4.1 MMOL/L (ref 3.4–5.3)
PROCALCITONIN SERPL-MCNC: <0.05 NG/ML
RBC # BLD AUTO: 4.65 10E12/L (ref 4.4–5.9)
RHINOVIRUS RNA SPEC QL NAA+PROBE: NEGATIVE
RSV RNA SPEC QL NAA+PROBE: NEGATIVE
RSV RNA SPEC QL NAA+PROBE: NEGATIVE
SODIUM SERPL-SCNC: 142 MMOL/L (ref 133–144)
SPECIMEN SOURCE: ABNORMAL
SPECIMEN SOURCE: ABNORMAL
SPECIMEN SOURCE: NORMAL
SPECIMEN SOURCE: NORMAL
TME LAST DOSE: ABNORMAL H
WBC # BLD AUTO: 8.5 10E9/L (ref 4–11)

## 2019-02-05 PROCEDURE — 85027 COMPLETE CBC AUTOMATED: CPT | Performed by: INTERNAL MEDICINE

## 2019-02-05 PROCEDURE — 25000128 H RX IP 250 OP 636: Performed by: INTERNAL MEDICINE

## 2019-02-05 PROCEDURE — 84145 PROCALCITONIN (PCT): CPT | Performed by: INTERNAL MEDICINE

## 2019-02-05 PROCEDURE — 20600000 ZZH R&B BMT

## 2019-02-05 PROCEDURE — 80158 DRUG ASSAY CYCLOSPORINE: CPT | Performed by: INTERNAL MEDICINE

## 2019-02-05 PROCEDURE — 87205 SMEAR GRAM STAIN: CPT | Performed by: INTERNAL MEDICINE

## 2019-02-05 PROCEDURE — 83735 ASSAY OF MAGNESIUM: CPT | Performed by: INTERNAL MEDICINE

## 2019-02-05 PROCEDURE — 86900 BLOOD TYPING SEROLOGIC ABO: CPT | Performed by: INTERNAL MEDICINE

## 2019-02-05 PROCEDURE — 25000131 ZZH RX MED GY IP 250 OP 636 PS 637: Performed by: INTERNAL MEDICINE

## 2019-02-05 PROCEDURE — 86901 BLOOD TYPING SEROLOGIC RH(D): CPT | Performed by: INTERNAL MEDICINE

## 2019-02-05 PROCEDURE — 86850 RBC ANTIBODY SCREEN: CPT | Performed by: INTERNAL MEDICINE

## 2019-02-05 PROCEDURE — 87899 AGENT NOS ASSAY W/OPTIC: CPT | Performed by: PHYSICIAN ASSISTANT

## 2019-02-05 PROCEDURE — 25000132 ZZH RX MED GY IP 250 OP 250 PS 637: Performed by: INTERNAL MEDICINE

## 2019-02-05 PROCEDURE — 25000125 ZZHC RX 250: Performed by: INTERNAL MEDICINE

## 2019-02-05 PROCEDURE — 80048 BASIC METABOLIC PNL TOTAL CA: CPT | Performed by: INTERNAL MEDICINE

## 2019-02-05 PROCEDURE — 36415 COLL VENOUS BLD VENIPUNCTURE: CPT | Performed by: PHYSICIAN ASSISTANT

## 2019-02-05 PROCEDURE — 36415 COLL VENOUS BLD VENIPUNCTURE: CPT | Performed by: INTERNAL MEDICINE

## 2019-02-05 RX ADMIN — RANITIDINE 150 MG: 150 TABLET ORAL at 07:34

## 2019-02-05 RX ADMIN — SULFAMETHOXAZOLE AND TRIMETHOPRIM 1 TABLET: 800; 160 TABLET ORAL at 07:35

## 2019-02-05 RX ADMIN — PREDNISONE 10 MG: 10 TABLET ORAL at 09:06

## 2019-02-05 RX ADMIN — ACYCLOVIR 800 MG: 800 TABLET ORAL at 07:35

## 2019-02-05 RX ADMIN — CYCLOSPORINE 25 MG: 25 CAPSULE, LIQUID FILLED ORAL at 19:49

## 2019-02-05 RX ADMIN — CEFEPIME 2 G: 2 INJECTION, POWDER, FOR SOLUTION INTRAVENOUS at 12:55

## 2019-02-05 RX ADMIN — PILOCARPINE HYDROCHLORIDE 5 MG: 5 TABLET, FILM COATED ORAL at 07:34

## 2019-02-05 RX ADMIN — AZITHROMYCIN MONOHYDRATE 500 MG: 500 INJECTION, POWDER, LYOPHILIZED, FOR SOLUTION INTRAVENOUS at 21:55

## 2019-02-05 RX ADMIN — LEVOTHYROXINE SODIUM 150 MCG: 75 TABLET ORAL at 07:34

## 2019-02-05 RX ADMIN — METOPROLOL TARTRATE 25 MG: 25 TABLET, FILM COATED ORAL at 07:35

## 2019-02-05 RX ADMIN — FLUCONAZOLE 100 MG: 100 TABLET ORAL at 07:35

## 2019-02-05 RX ADMIN — ACYCLOVIR 800 MG: 800 TABLET ORAL at 19:48

## 2019-02-05 RX ADMIN — VANCOMYCIN HYDROCHLORIDE 2000 MG: 10 INJECTION, POWDER, LYOPHILIZED, FOR SOLUTION INTRAVENOUS at 19:50

## 2019-02-05 RX ADMIN — SULFAMETHOXAZOLE AND TRIMETHOPRIM 1 TABLET: 800; 160 TABLET ORAL at 19:48

## 2019-02-05 RX ADMIN — CYCLOSPORINE 25 MG: 25 CAPSULE, LIQUID FILLED ORAL at 07:34

## 2019-02-05 RX ADMIN — RANITIDINE 150 MG: 150 TABLET ORAL at 19:48

## 2019-02-05 RX ADMIN — CEFEPIME 2 G: 2 INJECTION, POWDER, FOR SOLUTION INTRAVENOUS at 04:20

## 2019-02-05 RX ADMIN — MAGNESIUM OXIDE TAB 400 MG (241.3 MG ELEMENTAL MG) 800 MG: 400 (241.3 MG) TAB at 07:34

## 2019-02-05 RX ADMIN — VANCOMYCIN HYDROCHLORIDE 2000 MG: 10 INJECTION, POWDER, LYOPHILIZED, FOR SOLUTION INTRAVENOUS at 07:34

## 2019-02-05 RX ADMIN — CEFEPIME 2 G: 2 INJECTION, POWDER, FOR SOLUTION INTRAVENOUS at 19:49

## 2019-02-05 ASSESSMENT — ACTIVITIES OF DAILY LIVING (ADL)
ADLS_ACUITY_SCORE: 11

## 2019-02-05 ASSESSMENT — MIFFLIN-ST. JEOR: SCORE: 1901.3

## 2019-02-05 NOTE — PROGRESS NOTES
Below is the pt's psychosocial assessment from 2016 for the staff to review as needed. Any updates to the assessment have been made in red.    BMT CLINICAL SOCIAL WORK ASSESSMENT     Assessment completed on April 20, 2016 of living situation, support system, financial status, functional status, coping, stressors, need for resources and social work intervention provided as needed.  Information for this assessment was provided by Pt report in addition to medical chart review and consultation with medical team.      Present at assessment: Patient, Byron Mccarthy present for this assessment conducted by Natividad Ken, BMT .      Diagnosis: Acute Myeloid Leukemia  (AML)     Date of Diagnosis: 11/2015     Transplant type: Allogeneic     Donor:   Related allogeneic donor stem cell transplant           Donor: Rizwana Bell     Physician: Uli Enciso MD     Nurse Coordinator:  Nicolas Jara RN     Permanent Address:   [address] [place], [place] [id number]     Local Address:   [address] [place] [address]     Contact Information:  Pt Home Phone: [phone]  Pt Cell Phone: [phone]  Pt Email: [email or web address]  Pt's Spouse Latonia Phone: [phone]     Presenting Information:  Byron Mccarthy is a 55 year old male diagnosed with AML who presents for evaluation for Allogeneic transplant at the [facility] ([facility]).  Pt was alone for today's visit.      Decision Making: Self     Health Care Directive: The pt states that he has completed his Health Care Directive (HCD), but it has not been signed yet. We did discuss that their are notarized in the hospital that he could us to make this a valid document. The pt does plan on bring his HCD with him when he is admitted.  SW provided Pt with a copy of HCD and encouraged Pt to have discussion with family members and complete form.      Relationship Status: Pt is  to his spouse Latonia Mccarthy.        Special Needs: Local  "Lodging Needed. Pt plans to stay at his brother's [address] which is located in [place], [place].        Family/Support System: Pt endorsed a great support system including family and close friends who will be available to support Pt throughout transplant process.      Spouse: Latonia Mccarthy     Children: Anyi Mccarthy, Tala Mccarthy, Isaiah Mccarthy     Grandchildren: n/a     Parents:      Siblings: Anastasia Barkley, Surendra \"Otto\" Fabio, Clarence Mccarthy, Aleta Lancaster, Kristal Bell, Rashad Fabio, Gilda Fabio        Caregiver: SW discussed with pt the caregiver role and expectation at length. Pt is agreeable to having a full time caregiver for a minimum of 100 days until cleared by the BMT physician. Pt's identified caregivers are his brother Otto and sister Aleta during the week and spouse Latonia on the weekend. Pt signed the caregiver contract which will be scanned into the EMR. Caregiver education and resources provided. No caregiver concerns identified. Pt and Pt's family confirmed understanding caregiving requirement, including driving restrictions, as discussed during psychosocial assessment.      Name & Numbers  Liza Mccarthy- [phone]- cell  [phone]-home  Morgan Lancaster- [phone]  Latonia Fabio- [phone]     Permanent Living Situation: Pt resides in [place]. No concerns identified at this time.      Temporary Living Situation:  Pt will be living at his brother [address]. Pt states that the family has a prepared room for him in the lower level of their home and his own bathroom.      The pt has returned to his home in [place] and is no longer staying with his brother in [place].     Transportation Mode:  Pt is aware of driving restrictions post-BMT and the need for the caregiver is to drive until cleared to drive by the  BMT physician. SW provided information on parking info and monthly parking pass options.      Insurance: Pt has [employer] health insurance.  Pt denied " "specific insurance concerns at this time. SW reiterated information about the BMT [employer]  should specific insurance questions arise as Pt moves through transplant process.      Sources of Income: Pt is supported by his spouse Latonia. Pt denied anticipation of financial hardship related to BMT at this time. The pt states that his company is continuing to pay him his full salary for the next 6 months, after that time frame he will apply for SSDI if needed. SW encouraged Pt to contact this SW for additional potential resources should financial situation change.      Employment: Pt is currently employed through [employer]  as a .      Mental Health: Pt denied a history of mental health concerns, specific diagnoses or medications at this time.      Chemical Use: .   Based on the information provided, there appear to be no specific risks or concerns identified at this time.      Trauma/Loss/Abuse History: Multiple losses associated with cancer diagnosis and treatment, including health, employment, changes to physical appearance, etc.      Spirituality: Pt identified as Yazdanism, but states that he is not practicing at this time.      Coping: Pt noted that he is currently feeling \"positive, prepared, nervous,excited, and ready to begin\".  Pt shared that his main coping mechanisms are talking with friends and family/friends and exercise.  Pt noted that his family also dolly by talking with each other. SW and Pt discussed additional positive coping mechanisms that Pt can utilize while in the hospital and not able to leave his hospital room.  While hospitalized, Pt plans to watch TV/movies, talk with friends/family over the phone, play games on his computer.      Caregiver Coping: Pt's caregivers were not present, but the pt states that they are doing well and are prepared for the pt to start his BMT and have found ways to cope by doing research and preparing for " the pt for when he goes to his brother [address]     Education Provided: Transplant process expectations, Caregiver requirements, Caregiver self-care, Financial issues related to transplant, Financial resources/grants available, Common psychosocial stressors pre/post transplant, Support group(s) available, Tour/layout of the inpatient unit/non-use of cell phones, Hospital resources available, Web site information, Resources for transplant patients and their families as well as the Clinical Social Work role.      Interventions Provided: Supportive counseling and education      Assessment and Recommendations for Team:  Pt is a 55 year old male diagnosed with AML who is here undergoing preparation for a planned allogeneic transplant for treatment of AML.      Pt is a pleasant and very articulate gentleman who feels prepared and  comfortable communicating with the medical team. Pt has a strong supportive network of family (siblings, children and spouse) who are involved.      Pt has developed strong coping mechanisms such as; talking with friends and family, staying active and playing on his computer     Pt may benefit from ongoing psychosocial support in regards to coping with the adjustment to the BMT process. Pt's family may benefit from ongoing psychosocial support in regards to coping with the adjustment to the BMT process and may also benefit from attending the BMT Caregiver Support Group that meets weekly on the inpatient unit.       Pt has a great support system and a appropriate  Caregiver plan. Pt verbalizes understanding of the transplant process and wanting to proceed. SW provided contact information and encouraged Pt to contact SW with questions, concerns, resources and for support.      Important Information:   The pt states that he is prepared for transplant and has everything arranged. His brother and sister will be his main caregivers during the week and his spouse will come on the weekends to give  them a break. This will allow his spouse Latonia to stay and help care for their 16 year old son so he will have no disruptions in his school schedule. The pt did also report that he believe he has a travel and lodging benefit under his insurance, but he does not feel that he will need to use this benefit at this time. The pt reports that his children are doing well with his diagnosis and plan for transplant.      Follow up Planned:   Psychosocial support  Parking arrangements  Spiritual Health referral

## 2019-02-05 NOTE — PROGRESS NOTES
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 Admitted for hypoxia in the setting of bilateral pulmonary infiltrates.      Diagnosis AMLU Acute myelogeneous leukemia, Unknown  HCT Type Allogeneic    Prep Regimen Cytoxan  Fludarabine  TBI   Donor Source Related PBSC    GVHD Prophylaxis Cyclosporine  Mycophenolate  Primary BMT Provider Uli Enciso      INTERVAL  HISTORY     Patient was admitted overnight. Please see H&P for detailed history of events leading up to hospitalization. Yg endorses a 4 month history of CUELLAR, had plans to see pulmonary outpatient next week but a week ago developed a sore throat and ear pain. Was evaluated in outpatient setting and noted to be hypoxic with ambulation and had infiltrates on CCT. He was subsequently admitted for further evaluation.     Endorses an occasional cough that is non productive, SOB on exertion, but no other URI symptoms. No fevers, chills, or SOB at rest. Occasionally has some chest discomfort when he does cough but not at rest. Chronic GVHD symptoms of dry mouth and dry eyes are stable. No skin tightening, rashes, or decreased ROM. No lightheadedness, dizziness, diarrhea, or rashes.     Review of Systems: 10 point ROS negative except as noted above.    PHYSICAL EXAM     Weight In/Out     Wt Readings from Last 3 Encounters:   02/05/19 106.5 kg (234 lb 12.8 oz)   11/14/18 105.3 kg (232 lb 1.6 oz)   09/12/18 108.1 kg (238 lb 4.8 oz)      I/O last 3 completed shifts:  In: 1515 [I.V.:1515]  Out: -        /82 (BP Location: Right arm)   Pulse 91   Temp 97.6  F (36.4  C) (Oral)   Resp 18   Ht 1.778 m (5' 10\")   Wt 106.5 kg (234 lb 12.8 oz)   SpO2 91%   BMI 33.69 kg/m         General: NAD   Eyes: JEREMY, sclera anicteric   Nose/Mouth/Throat: OP clear, buccal mucosa slightly dry. Receeding gum lines around teeth. No overt " ulcerations or lichenoid changes.   Lungs: Crackles in bases bilaterally, breathing comfortable on room air, good air exchange.   Cardiovascular: RRR, no M/R/G   Abdominal/Rectal: +BS, soft, NT, ND  Lymphatics: no edema  Skin: no rashes or petechaie, nodular appearing lesion on left middle finger PIP joint. Non tender, non-mobile  Neuro: A&O   Additional Findings: PIV in left arm     LABS AND IMAGING - PAST 24 HOURS     Results for orders placed or performed during the hospital encounter of 02/04/19 (from the past 24 hour(s))   CBC with platelets differential   Result Value Ref Range    WBC 8.2 4.0 - 11.0 10e9/L    RBC Count 5.04 4.4 - 5.9 10e12/L    Hemoglobin 17.3 13.3 - 17.7 g/dL    Hematocrit 50.2 40.0 - 53.0 %     78 - 100 fl    MCH 34.3 (H) 26.5 - 33.0 pg    MCHC 34.5 31.5 - 36.5 g/dL    RDW 13.2 10.0 - 15.0 %    Platelet Count 248 150 - 450 10e9/L    Diff Method Automated Method     % Neutrophils 66.0 %    % Lymphocytes 18.9 %    % Monocytes 12.9 %    % Eosinophils 0.7 %    % Basophils 0.9 %    % Immature Granulocytes 0.6 %    Nucleated RBCs 0 0 /100    Absolute Neutrophil 5.4 1.6 - 8.3 10e9/L    Absolute Lymphocytes 1.6 0.8 - 5.3 10e9/L    Absolute Monocytes 1.1 0.0 - 1.3 10e9/L    Absolute Eosinophils 0.1 0.0 - 0.7 10e9/L    Absolute Basophils 0.1 0.0 - 0.2 10e9/L    Abs Immature Granulocytes 0.1 0 - 0.4 10e9/L    Absolute Nucleated RBC 0.0    Comprehensive metabolic panel   Result Value Ref Range    Sodium 142 133 - 144 mmol/L    Potassium 4.2 3.4 - 5.3 mmol/L    Chloride 106 94 - 109 mmol/L    Carbon Dioxide 27 20 - 32 mmol/L    Anion Gap 9 3 - 14 mmol/L    Glucose 131 (H) 70 - 99 mg/dL    Urea Nitrogen 16 7 - 30 mg/dL    Creatinine 0.83 0.66 - 1.25 mg/dL    GFR Estimate >90 >60 mL/min/[1.73_m2]    GFR Estimate If Black >90 >60 mL/min/[1.73_m2]    Calcium 8.8 8.5 - 10.1 mg/dL    Bilirubin Total 0.5 0.2 - 1.3 mg/dL    Albumin 3.6 3.4 - 5.0 g/dL    Protein Total 7.8 6.8 - 8.8 g/dL    Alkaline  Phosphatase 87 40 - 150 U/L    ALT 25 0 - 70 U/L    AST 28 0 - 45 U/L   INR   Result Value Ref Range    INR 1.11 0.86 - 1.14   Magnesium   Result Value Ref Range    Magnesium 2.2 1.6 - 2.3 mg/dL   ABO/RH Type and Screen    Result Value Ref Range    ABO A     RH(D) Pos     Antibody Screen Neg     Test Valid Only At          [facility]    Specimen Expires 02/07/2019    Lactate Dehydrogenase   Result Value Ref Range    Lactate Dehydrogenase 224 85 - 227 U/L   Phosphorus   Result Value Ref Range    Phosphorus 4.7 (H) 2.5 - 4.5 mg/dL   Partial thromboplastin time   Result Value Ref Range    PTT 31 22 - 37 sec   Magnesium   Result Value Ref Range    Magnesium 2.1 1.6 - 2.3 mg/dL   Basic metabolic panel   Result Value Ref Range    Sodium 142 133 - 144 mmol/L    Potassium 4.1 3.4 - 5.3 mmol/L    Chloride 109 94 - 109 mmol/L    Carbon Dioxide 24 20 - 32 mmol/L    Anion Gap 9 3 - 14 mmol/L    Glucose 86 70 - 99 mg/dL    Urea Nitrogen 14 7 - 30 mg/dL    Creatinine 0.76 0.66 - 1.25 mg/dL    GFR Estimate >90 >60 mL/min/[1.73_m2]    GFR Estimate If Black >90 >60 mL/min/[1.73_m2]    Calcium 8.1 (L) 8.5 - 10.1 mg/dL   CBC with platelets   Result Value Ref Range    WBC 8.5 4.0 - 11.0 10e9/L    RBC Count 4.65 4.4 - 5.9 10e12/L    Hemoglobin 15.7 13.3 - 17.7 g/dL    Hematocrit 46.0 40.0 - 53.0 %    MCV 99 78 - 100 fl    MCH 33.8 (H) 26.5 - 33.0 pg    MCHC 34.1 31.5 - 36.5 g/dL    RDW 13.3 10.0 - 15.0 %    Platelet Count 227 150 - 450 10e9/L   Cyclosporine   Result Value Ref Range    Cyclosporine Last Dose Not Provided     Cyclosporine Level <25 (L) 50 - 400 ug/L         Outside Chest CT 2/4/19: Impression:   1. There are extensive areas of infiltrate/consolidation in both   lungs. There is a peripheral subpleural predominance, especially in   the lower lungs, but there are also areas of peribronchial vascular   consolidation, some of which appear more nodular. Associated air    bronchograms. Both lungs are involved fairly symmetrically. The   etiology is uncertain. Atypical infectious or inflammatory etiology   is a consideration, but findings are somewhat more organized   appearing than typical. This could represent a type of organized   pneumonia. Peripheral predominance can be seen in the setting of   cryptogenic organizing pneumonia. Other processes such as   eosinophilic pneumonia more frequently involves the upper lungs.   Peripheral predominance would also be uncommon in sarcoidosis or   hypersensitivity pneumonitis. Additional clinical and laboratory   workup is likely necessary. Ultimately, if these findings should   persist or progress, tissue diagnosis may be indicated.  2. Small amount of bilateral pleural fluid which appears to be at   least partially loculated on the left.  3. There is no clear evidence of lymphadenopathy.  4. Atherosclerosis which includes severe coronary artery calcification        ASSESSMENT BY JESSICA Anayair is a 58 year old man, who is 32 months s/p non-myeloablative allogeneic sibling PBSCT for MDS RAEB2, whose course has been complicated with persistent chronic mvwjs-bugvdh-skqf disease (oral, manifested with weight loss), most recently controlled with prednisone 20mg alternating with 10mg. He is now admitted for workup for finding of extensive bilateral pulmonary infiltrates.      1. 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 Heme: counts have been normal, will monitor for transfusion needs. No recent transfusions     3. GVHD- known cGVHD (dry mouth, dry eyes). Remains on prednisone 10mg/20mg.   Continue cyclosporine 25mg BID. CSA level pending.   Previously on oral dex swish and spit and artificial tears, not currently using.   Pilocarpine for dry mouth      4. ID:   Hypoxia with pulmonary infiltrates: infectious vs. Inflammatory (YESENIA).  CCT  2/4/19 at outside facility as above. Images available in PACS. Started empirically on cefepime, vancomycin, and azithromycin. Will hold off on antifungal escalation for now. RVP, b-d-glucan, galactomannan, crypto antigen, and sputum cx pending. Consult Pulm for possible BAL. Will be NPO at midnight.   - Continue prophy ACV, fluconazole, and TMP-SMX. prophy levo on hold wile on IV abx   - History of VRE     5. Pulm  SOB/CUELLAR with infiltrates- see above. Sats are 91% on RA. Oxygen as needed to keep sats >91%     6. CV: Known CAD with 5 drug eluding stents   Hold ASA  Continue metoprolol  Hold Crestor for now     7. Endo  Continue levothyroxine 150 mcg daily     8. Renal/FEN  Regular diet as tolerated, NPO at midnight for possible BAL tomorrow.   Lytes repleted as per sliding scale   Continue magnesium supplementation     9. ENT  Continue PTA Pilocarpine daily for dry mouth     10. GI  Continue PTA zantac      11. Prophy: Consider lovenox prophylaxis if pt remains hospitalized for more than a few days.      12. Code: FULL    Gerald Payne PA-C  [phone]      _______________________________________________    BMT ATTENDING NOTE    I have seen and personally evaluated the patient.  I reviewed vitals, medications, laboratory results, and I viewed imaging studies.  After doing so, I formulated a plan as documented in this note with the care team and patient today.     Byron Russo is a 58 year old male with cGVHD, admitted on 2/4/2019 for hypoxia, and remains hospitalized pending diagnosis and resolution.      Today, Byron feels about the same, limited exercise tolerance but stable at rest, ongoing cough and chest discomfort.  No fevers or new skin rashes.    On exam, he is pleasant, interactive, sclerae anicteric, cranial nerves grossly intact, no oral mucosal lesions but dry OP and receding gums, normal respiratory effort, no JVD, normal perfusion, abdomen non-distended, skin without rash, no edema, normal affect.       Overall, our plan is to await cultures and viral studies.  Review with pulm with a request for BAL. Hydration while NPO. Oxygen support. Remainder of supportive care as above. Discussed this assessment and plan in detail with patient and team today.      Harrison French MD    This note was created with voice recognition software.  Please notify me of any transcriptional errors.

## 2019-02-05 NOTE — PHARMACY-VANCOMYCIN DOSING SERVICE
Pharmacy Vancomycin Initial Note  Date of Service 2019  Patient's  1960  58 year old, male  Actual Body Weight: 107.5 kg    Indication: Healthcare-Associated Pneumonia    Current estimated CrCl = > 100 mL/min based on SCr 0.82 (Care Everywhere)    Creatinine for last 3 days  No results found for requested labs within last 72 hours.    Recent Vancomycin Level(s) for last 3 days  No results found for requested labs within last 72 hours.      Vancomycin IV Administrations (past 72 hours)      No vancomycin orders with administrations in past 72 hours.                Nephrotoxins and other renal medications (From now, onward)    Start     Dose/Rate Route Frequency Ordered Stop    19  acyclovir (ZOVIRAX) tablet 800 mg      800 mg Oral 2 TIMES DAILY 19 18219  cycloSPORINE modified (GENERIC EQUIVALENT) capsule 25 mg      25 mg Oral 2 TIMES DAILY 19            Contrast Orders - past 72 hours (72h ago, onward)    NONE: Outside facility CT w/o contrast 2019              Plan:  1.  Start vancomycin  2000 mg IV q12h.   2.  Goal Trough Level: 15-20 mg/L   3.  Pharmacy will check trough levels as appropriate in 1-3 Days.    4. Serum creatinine levels will be ordered daily for the first week of therapy and at least twice weekly for subsequent weeks.    5. Mona method utilized to dose vancomycin therapy: Method 2    Amena Gallegos PharmD  [phone] (text capable)

## 2019-02-05 NOTE — PLAN OF CARE
"/83 (BP Location: Right arm)   Pulse 91   Temp 98.3  F (36.8  C) (Oral)   Resp 18   Ht 1.778 m (5' 10\")   Wt 106.5 kg (234 lb 12.8 oz)   SpO2 91%   BMI 33.69 kg/m    VSS, AOx4, denies n/v/d and pain, O2 sats in low 90's on RA, likely to need 1-2L O2 at night to maintain >92%.  Continue POC.  Adult Inpatient Plan of Care  Plan of Care Review  2/5/2019 1420 - No Change by Isaiah Ghosh, RN  Flowsheets  Taken 2/5/2019 1420   Plan of Care Reviewed With  patient     Infection (Pneumonia)  Resolution of Infection Signs/Symptoms  2/5/2019 1420 - No Change by Isaiah Ghosh, RN     "

## 2019-02-05 NOTE — PLAN OF CARE
Oxygen levels continue to be in the low 90s with crackles in the bases. He was instructed to save his urine and stool as he had not been. No complaints were offered.

## 2019-02-05 NOTE — PROGRESS NOTES
"BMT CLINICAL SOCIAL WORK NOTE:    Focus: Supportive Counseling    Data: Pt is a 58 year old male s/p allo BMT, currently 36 months post BMT.    Interventions: Clinical  (CSW) met with Pt to assess coping, provide supportive counseling and assist with resources as needed. Pt shared that overall he is doing well, but did express some stress with work. The pt shared that he went back to work about 1 week after he completed his 100 days. 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 The pt shared that he finds that his energy levels are very low and he spends the weekend \"recovering\" from the week, \" just to do it all over again.\"  The pt is looking for a new job in hopes to help reduce his stressors. The pt stated that he continues to be supported by his wife and kids, 2 of which recently move back in. The pt did have some questions on SSDI and SW did discuss this process, but that he would not be able to apply for this while he is still working full time. The pt did share that he is working to build a car and this is his main coping skill for when times get tough for him. CSW provided empathic listening, validation of concerns, and encouragement. CSW encouraged Pt to contact CSW for support, questions and/or resources.     Assessment: Pt presented as friendly and open.  Pt appears to be coping well at this time. Pt continues to be supported by his wife and children.     Plan: CSW will continue to provide supportive counseling and assistance with resources as needed. CSW will continue to collaborate with multidisciplinary team regarding Pt's plan of care.     AYANNA Melo, [facility]  Pager: [phone]  Phone: [phone]      "

## 2019-02-05 NOTE — CONSULTS
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 His clinic picture and imaging not typical for infection, however given that he is immunosupressive agents will need do bronchoscopy with BAL. His CT-chest shows multiple small nodules, and peripherally located lesions, ? ILD. Will plan to do biopsy as well. Would continue the current Abx therapy for now.       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 Patient reports that for the past 3-4 months he has been feeling progressively short of breath, which is exacerbated with exertion. He has dry cough, no fever or chills. He had sore throat + Ear pain about a week ago, which has since resolved. Patient also reports that few months ago he was having decrease appetite associated with weightloss, his Prednisone dose was increased after which appetite improved. He has been on Bactrim prophylaxis while on prednisone. He denies any sick contact or recent travel. He works at [employer], which he has been doing for ~30years. For his  sore throat patient went to urgent care where he was found to have low sats. CXR showed b/l infiltrates + pleural effusion. Subsequently he also had CT-chest which  extensive areas of infiltrate/consolidation in the lungs. Today on presentation patient had no complains, had minimal cough with no sputum.         Review of Systems:  Complete 12 point ROS negative unless mentioned in HPI  Histories, Prior to Admission Medications, Allergies:    Past Medical History:  Past Medical History:   Diagnosis Date     CAD (coronary artery disease) 2001     Hyperlipidemia      Hypothyroidism      Obesity      Pulmonary embolism (H) 2/2016     Varicose veins        Past Surgical History:  Past Surgical History:   Procedure Laterality Date     APPENDECTOMY       ARTHROSCOPY KNEE WITH MEDIAL MENISCECTOMY  6/20/2011    Procedure:ARTHROSCOPY KNEE WITH MEDIAL MENISCECTOMY; Surgeon:SACHIN SAMANO; Location:[facility] OR     coronary artery stents placed x 5 2001       Past Social History:  Social History     Socioeconomic History     Marital status:      Spouse name: Not on file     Number of children: Not on file     Years of education: Not on file     Highest education level: Not on file   Social Needs     Financial resource strain: Not on file     Food insecurity - worry: Not on file     Food insecurity - inability: Not on file     Transportation needs - medical: Not on file     Transportation needs - non-medical: Not on file   Occupational History     Not on file   Tobacco Use     Smoking status: Never Smoker     Smokeless tobacco: Former User   Substance and Sexual Activity     Alcohol use: No     Alcohol/week: 0.0 oz     Drug use: No     Sexual activity: Not on file   Other Topics Concern     Parent/sibling w/ CABG, MI or angioplasty before 65F 55M? Not Asked   Social History Narrative     Not on file       ETOH: minimal   Tobacco: never smoker   Significant inhalational exposures: metal shaving, Laser/radtiation       Family  History:  Family History   Problem Relation Age of Onset     Myocardial Infarction Father 58     Coronary Artery Disease Father      Heart Failure Mother      Glaucoma Mother      Coronary Artery Disease Brother      Coronary Artery Disease Brother      Cancer Brother         GIST     Skin Cancer No family hx of      Melanoma No family hx of      Macular Degeneration No family hx of        Medications:    acyclovir  800 mg Oral BID     azithromycin  500 mg Intravenous Q24H     ceFEPIme (MAXIPIME) IV  2 g Intravenous Q8H     cycloSPORINE modified  25 mg Oral BID     fluconazole  100 mg Oral Daily     levothyroxine  150 mcg Oral Daily     magnesium oxide  800 mg Oral Daily     metoprolol tartrate  25 mg Oral Daily     pilocarpine  5 mg Oral Daily     predniSONE  10 mg Oral Every Other Day     [START ON 2/6/2019] predniSONE  20 mg Oral Every Other Day     ranitidine  150 mg Oral BID     sodium chloride (PF)  3 mL Intracatheter Q8H     sulfamethoxazole-trimethoprim  1 tablet Oral Q Mon Tues BID     vancomycin (VANCOCIN) IV  2,000 mg Intravenous Q12H     acetaminophen, acetaminophen, hypromellose-dextran, ipratropium - albuterol 0.5 mg/2.5 mg/3 mL, lidocaine 4%, lidocaine (buffered or not buffered), LORazepam **OR** LORazepam, magnesium sulfate, potassium chloride, potassium chloride with lidocaine, potassium chloride, potassium chloride, potassium chloride, potassium phosphate (KPHOS) in D5W IV, potassium phosphate (KPHOS) in D5W IV, potassium phosphate (KPHOS) in D5W IV, potassium phosphate (KPHOS) in D5W IV, prochlorperazine **OR** prochlorperazine, sodium chloride (PF)    Allergies:     Allergies   Allergen Reactions     Atorvastatin Other (See Comments)     Back pain  Tolerates atrovastatin     Docosahexaenoic Acid (Dha)      Other reaction(s): Intolerance-Can't Take - Omega 3 fish oil       Physical Exam:    Temp:  [97.6  F (36.4  C)-98.7  F (37.1  C)] 98.4  F (36.9  C)  Pulse:  [91] 91  Heart Rate:  [85-90]  90  Resp:  [18-20] 18  BP: (108-147)/(71-95) 115/84  SpO2:  [91 %-93 %] 93 %    Intake/Output Summary (Last 24 hours) at 2/5/2019 1740  Last data filed at 2/5/2019 1411  Gross per 24 hour   Intake 2180 ml   Output 1200 ml   Net 980 ml     General: laying in bed in NAD  HEENT: anicteric, moist mucosa  Neck: no palpable lymphadenopathy, no JVD noted  Chest: decrease breath sound lower left field.   Cardiac: RRR no murmurs  Abdomen: Soft, flat, non tender, active BS  Extremities: No LE Edema  Neuro: A&Ox3, no focal deficits     Laboratory, imaging, and microbiologic data:

## 2019-02-06 DIAGNOSIS — R93.89 ABNORMAL FINDING ON IMAGING: Primary | ICD-10-CM

## 2019-02-06 LAB
1,3 BETA GLUCAN SER-MCNC: <31 PG/ML
ANION GAP SERPL CALCULATED.3IONS-SCNC: 7 MMOL/L (ref 3–14)
B-D GLUCAN INTERPRETATION (1,3): NEGATIVE
BASOPHILS # BLD AUTO: 0.1 10E9/L (ref 0–0.2)
BASOPHILS NFR BLD AUTO: 0.8 %
BRONCHOSCOPY: NORMAL
BUN SERPL-MCNC: 11 MG/DL (ref 7–30)
CALCIUM SERPL-MCNC: 8.4 MG/DL (ref 8.5–10.1)
CHLORIDE SERPL-SCNC: 109 MMOL/L (ref 94–109)
CO2 SERPL-SCNC: 24 MMOL/L (ref 20–32)
COPATH REPORT: NORMAL
COPATH REPORT: NORMAL
CREAT SERPL-MCNC: 0.86 MG/DL (ref 0.66–1.25)
CRP SERPL-MCNC: 8.2 MG/L (ref 0–8)
DIFFERENTIAL METHOD BLD: ABNORMAL
EOSINOPHIL # BLD AUTO: 0.3 10E9/L (ref 0–0.7)
EOSINOPHIL NFR BLD AUTO: 4.3 %
ERYTHROCYTE [DISTWIDTH] IN BLOOD BY AUTOMATED COUNT: 13.3 % (ref 10–15)
ERYTHROCYTE [SEDIMENTATION RATE] IN BLOOD BY WESTERGREN METHOD: 66 MM/H (ref 0–20)
GALACTOMANNAN AG SERPL QL IA: NEGATIVE
GALACTOMANNAN AG SERPL-ACNC: 0.05
GFR SERPL CREATININE-BSD FRML MDRD: >90 ML/MIN/{1.73_M2}
GLUCOSE SERPL-MCNC: 84 MG/DL (ref 70–99)
GRAM STN SPEC: ABNORMAL
GRAM STN SPEC: NORMAL
HCT VFR BLD AUTO: 47.3 % (ref 40–53)
HGB BLD-MCNC: 16.8 G/DL (ref 13.3–17.7)
IMM GRANULOCYTES # BLD: 0.1 10E9/L (ref 0–0.4)
IMM GRANULOCYTES NFR BLD: 0.7 %
LYMPHOCYTES # BLD AUTO: 1.6 10E9/L (ref 0.8–5.3)
LYMPHOCYTES NFR BLD AUTO: 22 %
MCH RBC QN AUTO: 35.7 PG (ref 26.5–33)
MCHC RBC AUTO-ENTMCNC: 35.5 G/DL (ref 31.5–36.5)
MCV RBC AUTO: 100 FL (ref 78–100)
MONOCYTES # BLD AUTO: 1.3 10E9/L (ref 0–1.3)
MONOCYTES NFR BLD AUTO: 18 %
NEUTROPHILS # BLD AUTO: 3.9 10E9/L (ref 1.6–8.3)
NEUTROPHILS NFR BLD AUTO: 54.2 %
NRBC # BLD AUTO: 0 10*3/UL
NRBC BLD AUTO-RTO: 0 /100
PHOSPHATE SERPL-MCNC: 4.2 MG/DL (ref 2.5–4.5)
PLATELET # BLD AUTO: 246 10E9/L (ref 150–450)
POTASSIUM SERPL-SCNC: 3.9 MMOL/L (ref 3.4–5.3)
RBC # BLD AUTO: 4.71 10E12/L (ref 4.4–5.9)
SODIUM SERPL-SCNC: 141 MMOL/L (ref 133–144)
SPECIMEN SOURCE: ABNORMAL
SPECIMEN SOURCE: NORMAL
VANCOMYCIN SERPL-MCNC: 15.5 MG/L
WBC # BLD AUTO: 7.2 10E9/L (ref 4–11)

## 2019-02-06 PROCEDURE — G0500 MOD SEDAT ENDO SERVICE >5YRS: HCPCS | Performed by: INTERNAL MEDICINE

## 2019-02-06 PROCEDURE — 25000128 H RX IP 250 OP 636: Performed by: INTERNAL MEDICINE

## 2019-02-06 PROCEDURE — 88108 CYTOPATH CONCENTRATE TECH: CPT | Performed by: INTERNAL MEDICINE

## 2019-02-06 PROCEDURE — 87205 SMEAR GRAM STAIN: CPT | Performed by: INTERNAL MEDICINE

## 2019-02-06 PROCEDURE — 86255 FLUORESCENT ANTIBODY SCREEN: CPT | Performed by: INTERNAL MEDICINE

## 2019-02-06 PROCEDURE — 20600000 ZZH R&B BMT

## 2019-02-06 PROCEDURE — 87076 CULTURE ANAEROBE IDENT EACH: CPT | Performed by: INTERNAL MEDICINE

## 2019-02-06 PROCEDURE — 87116 MYCOBACTERIA CULTURE: CPT | Performed by: INTERNAL MEDICINE

## 2019-02-06 PROCEDURE — 86039 ANTINUCLEAR ANTIBODIES (ANA): CPT | Performed by: INTERNAL MEDICINE

## 2019-02-06 PROCEDURE — 80048 BASIC METABOLIC PNL TOTAL CA: CPT | Performed by: INTERNAL MEDICINE

## 2019-02-06 PROCEDURE — 25000125 ZZHC RX 250: Performed by: INTERNAL MEDICINE

## 2019-02-06 PROCEDURE — 87206 SMEAR FLUORESCENT/ACID STAI: CPT | Performed by: INTERNAL MEDICINE

## 2019-02-06 PROCEDURE — 31624 DX BRONCHOSCOPE/LAVAGE: CPT | Performed by: INTERNAL MEDICINE

## 2019-02-06 PROCEDURE — 36415 COLL VENOUS BLD VENIPUNCTURE: CPT | Performed by: INTERNAL MEDICINE

## 2019-02-06 PROCEDURE — 87305 ASPERGILLUS AG IA: CPT | Performed by: INTERNAL MEDICINE

## 2019-02-06 PROCEDURE — 86140 C-REACTIVE PROTEIN: CPT | Performed by: INTERNAL MEDICINE

## 2019-02-06 PROCEDURE — 87081 CULTURE SCREEN ONLY: CPT | Performed by: INTERNAL MEDICINE

## 2019-02-06 PROCEDURE — 80202 ASSAY OF VANCOMYCIN: CPT | Performed by: INTERNAL MEDICINE

## 2019-02-06 PROCEDURE — 0B9C8ZX DRAINAGE OF RIGHT UPPER LUNG LOBE, VIA NATURAL OR ARTIFICIAL OPENING ENDOSCOPIC, DIAGNOSTIC: ICD-10-PCS | Performed by: INTERNAL MEDICINE

## 2019-02-06 PROCEDURE — 87015 SPECIMEN INFECT AGNT CONCNTJ: CPT | Performed by: INTERNAL MEDICINE

## 2019-02-06 PROCEDURE — 87102 FUNGUS ISOLATION CULTURE: CPT | Performed by: INTERNAL MEDICINE

## 2019-02-06 PROCEDURE — 85025 COMPLETE CBC W/AUTO DIFF WBC: CPT | Performed by: INTERNAL MEDICINE

## 2019-02-06 PROCEDURE — 84100 ASSAY OF PHOSPHORUS: CPT | Performed by: INTERNAL MEDICINE

## 2019-02-06 PROCEDURE — 89051 BODY FLUID CELL COUNT: CPT | Performed by: INTERNAL MEDICINE

## 2019-02-06 PROCEDURE — 25000132 ZZH RX MED GY IP 250 OP 250 PS 637: Performed by: INTERNAL MEDICINE

## 2019-02-06 PROCEDURE — 87106 FUNGI IDENTIFICATION YEAST: CPT | Performed by: INTERNAL MEDICINE

## 2019-02-06 PROCEDURE — 88312 SPECIAL STAINS GROUP 1: CPT | Performed by: INTERNAL MEDICINE

## 2019-02-06 PROCEDURE — 87070 CULTURE OTHR SPECIMN AEROBIC: CPT | Performed by: INTERNAL MEDICINE

## 2019-02-06 PROCEDURE — 86038 ANTINUCLEAR ANTIBODIES: CPT | Performed by: INTERNAL MEDICINE

## 2019-02-06 PROCEDURE — 25000131 ZZH RX MED GY IP 250 OP 636 PS 637: Performed by: INTERNAL MEDICINE

## 2019-02-06 PROCEDURE — 85652 RBC SED RATE AUTOMATED: CPT | Performed by: INTERNAL MEDICINE

## 2019-02-06 RX ORDER — ALBUTEROL SULFATE 0.83 MG/ML
SOLUTION RESPIRATORY (INHALATION) PRN
Status: DISCONTINUED | OUTPATIENT
Start: 2019-02-06 | End: 2019-02-06 | Stop reason: HOSPADM

## 2019-02-06 RX ORDER — FENTANYL CITRATE 50 UG/ML
INJECTION, SOLUTION INTRAMUSCULAR; INTRAVENOUS PRN
Status: DISCONTINUED | OUTPATIENT
Start: 2019-02-06 | End: 2019-02-06 | Stop reason: HOSPADM

## 2019-02-06 RX ORDER — LIDOCAINE HYDROCHLORIDE 40 MG/ML
INJECTION, SOLUTION RETROBULBAR PRN
Status: DISCONTINUED | OUTPATIENT
Start: 2019-02-06 | End: 2019-02-06 | Stop reason: HOSPADM

## 2019-02-06 RX ORDER — MAGNESIUM HYDROXIDE 1200 MG/15ML
LIQUID ORAL PRN
Status: DISCONTINUED | OUTPATIENT
Start: 2019-02-06 | End: 2019-02-06 | Stop reason: HOSPADM

## 2019-02-06 RX ADMIN — VANCOMYCIN HYDROCHLORIDE 2000 MG: 10 INJECTION, POWDER, LYOPHILIZED, FOR SOLUTION INTRAVENOUS at 07:38

## 2019-02-06 RX ADMIN — RANITIDINE 150 MG: 150 TABLET ORAL at 07:40

## 2019-02-06 RX ADMIN — PILOCARPINE HYDROCHLORIDE 5 MG: 5 TABLET, FILM COATED ORAL at 07:40

## 2019-02-06 RX ADMIN — CYCLOSPORINE 25 MG: 25 CAPSULE, LIQUID FILLED ORAL at 20:52

## 2019-02-06 RX ADMIN — FLUCONAZOLE 100 MG: 100 TABLET ORAL at 07:40

## 2019-02-06 RX ADMIN — LEVOTHYROXINE SODIUM 150 MCG: 75 TABLET ORAL at 07:40

## 2019-02-06 RX ADMIN — METOPROLOL TARTRATE 25 MG: 25 TABLET, FILM COATED ORAL at 07:40

## 2019-02-06 RX ADMIN — CEFEPIME 2 G: 2 INJECTION, POWDER, FOR SOLUTION INTRAVENOUS at 05:43

## 2019-02-06 RX ADMIN — ACYCLOVIR 800 MG: 800 TABLET ORAL at 20:52

## 2019-02-06 RX ADMIN — CYCLOSPORINE 25 MG: 25 CAPSULE, LIQUID FILLED ORAL at 07:40

## 2019-02-06 RX ADMIN — PREDNISONE 20 MG: 20 TABLET ORAL at 07:40

## 2019-02-06 RX ADMIN — CEFEPIME 2 G: 2 INJECTION, POWDER, FOR SOLUTION INTRAVENOUS at 20:54

## 2019-02-06 RX ADMIN — RANITIDINE 150 MG: 150 TABLET ORAL at 20:52

## 2019-02-06 RX ADMIN — AZITHROMYCIN MONOHYDRATE 500 MG: 500 INJECTION, POWDER, LYOPHILIZED, FOR SOLUTION INTRAVENOUS at 23:26

## 2019-02-06 RX ADMIN — MAGNESIUM OXIDE TAB 400 MG (241.3 MG ELEMENTAL MG) 800 MG: 400 (241.3 MG) TAB at 07:39

## 2019-02-06 RX ADMIN — VANCOMYCIN HYDROCHLORIDE 2000 MG: 10 INJECTION, POWDER, LYOPHILIZED, FOR SOLUTION INTRAVENOUS at 20:55

## 2019-02-06 RX ADMIN — CEFEPIME 2 G: 2 INJECTION, POWDER, FOR SOLUTION INTRAVENOUS at 12:16

## 2019-02-06 RX ADMIN — ACYCLOVIR 800 MG: 800 TABLET ORAL at 07:39

## 2019-02-06 ASSESSMENT — ACTIVITIES OF DAILY LIVING (ADL)
ADLS_ACUITY_SCORE: 11

## 2019-02-06 ASSESSMENT — MIFFLIN-ST. JEOR: SCORE: 1899.94

## 2019-02-06 NOTE — PROGRESS NOTES
Oxygen saturations were completed at rest and with activity.     Resting saturation on RA: 91% HR   Resting saturation on O2: 92% on 2 LPM via NC.  Resting saturation on O2: 94% on 3 LPM via NC.  Resting saturation on O2: 95% on 4 LPM via NC.    Walking saturation on RA: 85% after 180ft,   Walking saturation on O2: 88% on 2 LPM via NC.   Walking saturation on O2:  90% on 4 LPM via NC.  Walking saturation on O2:  93% on 6 LPM via NC. , RR 24  Patient was able to walk 450+ ft, tolerated well, HR and RR rate increased.

## 2019-02-06 NOTE — OR NURSING
Procedure: Bronch with lavage  Sedation: conscious sedation   Specimens: sent to lab.   O2: 2-6L/NC  Tolerated procedure: well  Pt to recovery area in stable condition accompanied by RN.   Other:  Report called to 5C, pt to return to room when transport available,    Charline Duckworth RN

## 2019-02-06 NOTE — PLAN OF CARE
Afebrile, OVSS on RA w/ sats 91-93%. A&Ox4. Up ad constantine in room. Denies dyspnea, cough; RVP negative. Scheduled for BAL tomorrow, NPO @ midnight. Appetite very good this komal, denies N/V/D/C. Good UOP, BM x1. Denies pain. No new complaints. Will continue to monitor per POC.    Adult Inpatient Plan of Care  Plan of Care Review  2/5/2019 2223 - No Change by Jesi Haley RN     Adult Inpatient Plan of Care  Absence of Hospital-Acquired Illness or Injury  2/5/2019 2223 - No Change by Jesi Haley RN     Adult Inpatient Plan of Care  Optimal Comfort and Wellbeing  2/5/2019 2223 - No Change by Jesi Haley RN     Adult Inpatient Plan of Care  Readiness for Transition of Care  2/5/2019 2223 - No Change by Jesi Haley RN     Infection (Pneumonia)  Resolution of Infection Signs/Symptoms  Prevent Infection Progression  2/5/2019 2223 by Jesi Haley RN  Flowsheets  Taken 2/5/2019 1600   Infection Management  aseptic technique maintained  Isolation Precautions  protective environment maintained;contact precautions maintained;droplet precautions discontinued  Taken 2/5/2019 2223   Fever Reduction/Comfort Measures  medication administered     Respiratory Compromise (Pneumonia)  Effective Oxygenation and Ventilation  2/5/2019 2223 - No Change by Jesi Haley RN     Respiratory Compromise (Pneumonia)  Effective Oxygenation and Ventilation  Promote Airway Secretion Clearance  2/5/2019 2223 by Jesi Haley RN  Flowsheets  Taken 2/5/2019 1600   Cough And Deep Breathing  done independently per patient  Taken 2/5/2019 2223   Breathing Techniques/Airway Clearance  deep/controlled cough encouraged     Respiratory Compromise (Pneumonia)  Effective Oxygenation and Ventilation  Optimize Oxygenation and Ventilation  2/5/2019 2223 by Jesi Haley RN  Flowsheets  Taken 2/5/2019 2223   Airway/Ventilation Management  airway patency maintained  Head of Bed (HOB)  HOB at 30-45 degrees

## 2019-02-06 NOTE — PLAN OF CARE
AVSS. On complaints of pain or nausea. Slept well between cares. Possible bronch today. Pt has been NPO since midnight. Morning lab results still pending. Continue POC.     Respiratory Compromise (Pneumonia)  Effective Oxygenation and Ventilation  2/6/2019 0549 - No Change by Amena Cruz, RN

## 2019-02-06 NOTE — PLAN OF CARE
"/80   Pulse 86   Temp 98  F (36.7  C) (Oral)   Resp 18   Ht 1.778 m (5' 10\")   Wt 106.4 kg (234 lb 8 oz)   SpO2 96%   BMI 33.65 kg/m    VSS, AOx4, up ad constantine, using 3-4L O2 via NC to maintain greater than 92% while up, O2 while on room air varies greatly from 87-91%.  Pt showered this afternoon.  Walking sats obtained and documented in previous note.  Pt likely to discharge to home with O2 tomorrow.  Continue POC.  Adult Inpatient Plan of Care  Plan of Care Review  2/6/2019 1744 - No Change by Isaiah Ghosh RN  Flowsheets  Taken 2/6/2019 1744   Plan of Care Reviewed With  patient  Progress  no change     Infection (Pneumonia)  Resolution of Infection Signs/Symptoms  2/6/2019 1744 - No Change by Isaiah Ghosh, RN     Respiratory Compromise (Pneumonia)  Effective Oxygenation and Ventilation  2/6/2019 1744 - No Change by Isaiah Ghosh, RN     "

## 2019-02-06 NOTE — PROGRESS NOTES
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 Admitted for hypoxia in the setting of bilateral pulmonary infiltrates.      Diagnosis AMLU Acute myelogeneous leukemia, Unknown  HCT Type Allogeneic    Prep Regimen Cytoxan  Fludarabine  TBI   Donor Source Related PBSC    GVHD Prophylaxis Cyclosporine  Mycophenolate  Primary BMT Provider Uli Enciso      INTERVAL  HISTORY     No acute events overnight. No new medical complaints. Still with occasional dry cough but no SOB at rest. No oxygen requirements. No fevers, chills, diarrhea, vomiting, nausea, or rashes.     Review of Systems: 10 point ROS negative except as noted above.    PHYSICAL EXAM     Weight In/Out     Wt Readings from Last 3 Encounters:   02/06/19 106.4 kg (234 lb 8 oz)   11/14/18 105.3 kg (232 lb 1.6 oz)   09/12/18 108.1 kg (238 lb 4.8 oz)      I/O last 3 completed shifts:  In: 2540 [P.O.:800; I.V.:1740]  Out: 1950 [Urine:1950]       /77   Pulse 77   Temp 98.5  F (36.9  C) (Oral)   Resp 18   Ht 1.778 m (5' 10\")   Wt 106.4 kg (234 lb 8 oz)   SpO2 90%   BMI 33.65 kg/m         General: NAD   Eyes: JEREMY, sclera anicteric   Nose/Mouth/Throat: OP clear, buccal mucosa slightly dry. Receeding gum lines around teeth. No overt ulcerations or lichenoid changes.   Lungs: Crackles in bases bilaterally, breathing comfortable on room air, good air exchange.   Cardiovascular: RRR, no M/R/G   Abdominal/Rectal: +BS, soft, NT, ND  Lymphatics: no edema  Skin: no rashes or petechaie, nodular appearing lesion on left middle finger PIP joint. Non tender, non-mobile  Neuro: A&O   Additional Findings: PIV in left arm     LABS AND IMAGING - PAST 24 HOURS     Lab Results   Component Value Date    WBC 7.2 02/06/2019    ANEU 3.9 02/06/2019    HGB 16.8 02/06/2019    HCT 47.3 02/06/2019     02/06/2019     02/06/2019    " POTASSIUM 3.9 02/06/2019    CHLORIDE 109 02/06/2019    CO2 24 02/06/2019    GLC 84 02/06/2019    BUN 11 02/06/2019    CR 0.86 02/06/2019    MAG 2.1 02/05/2019    INR 1.11 02/04/2019         Outside Chest CT 2/4/19: Impression:   1. There are extensive areas of infiltrate/consolidation in both   lungs. There is a peripheral subpleural predominance, especially in   the lower lungs, but there are also areas of peribronchial vascular   consolidation, some of which appear more nodular. Associated air   bronchograms. Both lungs are involved fairly symmetrically. The   etiology is uncertain. Atypical infectious or inflammatory etiology   is a consideration, but findings are somewhat more organized   appearing than typical. This could represent a type of organized   pneumonia. Peripheral predominance can be seen in the setting of   cryptogenic organizing pneumonia. Other processes such as   eosinophilic pneumonia more frequently involves the upper lungs.   Peripheral predominance would also be uncommon in sarcoidosis or   hypersensitivity pneumonitis. Additional clinical and laboratory   workup is likely necessary. Ultimately, if these findings should   persist or progress, tissue diagnosis may be indicated.  2. Small amount of bilateral pleural fluid which appears to be at   least partially loculated on the left.  3. There is no clear evidence of lymphadenopathy.  4. Atherosclerosis which includes severe coronary artery calcification    ASSESSMENT BY JESSICA Russo is a 58 year old man, who is 32 months s/p non-myeloablative allogeneic sibling PBSCT for MDS RAEB2, whose course has been complicated with persistent chronic kinno-tevsrc-pfcz disease (oral, manifested with weight loss), most recently controlled with prednisone 20mg alternating with 10mg. He is now admitted for workup for finding of extensive bilateral pulmonary infiltrates.      1. 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 Heme: counts have been normal, will monitor for transfusion needs. No recent transfusions     3. GVHD- known cGVHD (dry mouth, dry eyes). Remains on prednisone 10mg/20mg.   Continue cyclosporine 25mg BID- no intention of keeping therapeutic.   Previously on oral dex swish and spit and artificial tears, not currently using.   Pilocarpine for dry mouth      4. ID: Afebrile and VSS  Hypoxia with pulmonary infiltrates:  CCT 2/4/19 at outside facility as above. Images available in PACS. Started empirically on cefepime, vancomycin, and azithromycin. Will hold off on antifungal escalation for now. RVP neg. Favor inflammatory process such as YESENIA (GVH of the lungs) or  due to lack of systemic symptoms and insidious onset.  - b-d-glucan, galactomannan, crypto antigen neg, and sputum cx pending.   - Pulm to perform BAL today. MARIA R, ANCA, pending. ESR and CRP elevated. May need surgical lung biopsy due to location of infiltrates- may pursue in the outpatient setting following BAL .   - Continue prophy ACV, fluconazole, and TMP-SMX. prophy levo on hold wile on IV abx   - History of VRE     5. Pulm  SOB/CUELLAR with infiltrates- see above. Sats are 91% on RA. Oxygen as needed to keep sats >91%     6. CV: Known CAD with 5 drug eluding stents   Hold ASA  Continue metoprolol  Hold Crestor for now     7. Endo  Continue levothyroxine 150 mcg daily     8. Renal/FEN  Regular diet as tolerated following BAL ( NPO until then)  Lytes repleted as per sliding scale   Continue magnesium supplementation     9. ENT  Continue PTA Pilocarpine daily for dry mouth     10. GI  Continue PTA zantac      11. Prophy: Consider lovenox prophylaxis if pt remains hospitalized for more than a few days.      12. Code: FULL    13. Dispo: Patient may discharge as soon as tomorrow with home O2 with activity (pending walking O2 sats) while we await cultures from BAL and arrange for possible  surgical lung biopsy in the outpatient setting.     Gerald Payne PA-C  [phone]      _______________________________________________    BMT ATTENDING NOTE    The patient was seen and examined by me separately from the midlevel provider. The note reflects our mutual assessment and plans and were approved by me personally.   I personally reviewed today's lab results vital signs and radiology results.    Each point of the assessment and plan were reviewed by the midlevel and me and either endorsed by me or were my added decisions.    My pertinent physical findings today are: Pleasant, interactive, sclerae anicteric, cranial nerves grossly intact, no oral mucosal lesions but dry OP and receding gums, normal respiratory effort, no JVD, normal perfusion, abdomen non-distended, skin without rash, no edema, normal affect.     My assessment and plan are:  Review with pulm with a request for BAL. Hydration while NPO. Oxygen support. Remainder of supportive care as above. Await results of BAL today done without biopsies. Will see what BAL shows. May need IR or wedge resection to R/O YESENIA or BOOP. Discussed this assessment and plan in detail with patient and team today.    Uli Enciso M.D.

## 2019-02-06 NOTE — PROGRESS NOTES
[facility]    Pulmonary, Allergy, Critical Care and Sleep Medicine    Consultation Follow Up Progress Note  02/06/2019    Byron Russo MRN# [id number]   Age: 58 year old YOB: 1960     Date of Admission: 2/4/2019  Reason for Consultation: SOB  Requesting Team: BMT    Primary care provider: Cole Duong     Assessment and Recommendations:    Subacute exertional dyspnea with abnormal CT.   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 His clinic picture and imaging not typical for infection, however given that he is immunosupressive agents will need do bronchoscopy with BAL. His CT-chest shows multiple small nodules, and peripherally located lesions, most consistent with pleuroparenchymal fibroelastosis. I have reviewed case with Dr Brady    Recommendations   - consider discharge after initial BAL results (cell count and diff, gram stain)  - follow up with PFTs and Dr Brady  - exertional oximetry to assess for oxygen needs prior to discharge      Louise Rogel MD    Chief Complaint and History of Present Illness:    No new complaints today. Bronchoscopy performed without incident.      Review of Systems:  Complete 12 point ROS negative unless mentioned in HPI  Histories, Prior to Admission Medications, Allergies:    Past Medical History:  Past Medical History:   Diagnosis Date     CAD (coronary artery disease) 2001     Hyperlipidemia      Hypothyroidism      Obesity      Pulmonary embolism (H) 2/2016     Varicose veins        Medications:    acyclovir  800 mg Oral BID     azithromycin  500 mg Intravenous Q24H     ceFEPIme (MAXIPIME) IV  2 g Intravenous Q8H     cycloSPORINE modified  25 mg Oral BID     fluconazole  100 mg Oral Daily     levothyroxine  150 mcg Oral Daily     magnesium oxide  800 mg Oral Daily     metoprolol tartrate  25 mg Oral Daily     pilocarpine  5 mg Oral Daily      predniSONE  10 mg Oral Every Other Day     predniSONE  20 mg Oral Every Other Day     ranitidine  150 mg Oral BID     sodium chloride (PF)  3 mL Intracatheter Q8H     sulfamethoxazole-trimethoprim  1 tablet Oral Q Mon Tues BID     vancomycin (VANCOCIN) IV  2,000 mg Intravenous Q12H     acetaminophen, acetaminophen, hypromellose-dextran, ipratropium - albuterol 0.5 mg/2.5 mg/3 mL, lidocaine 4%, lidocaine (buffered or not buffered), LORazepam **OR** LORazepam, magnesium sulfate, potassium chloride, potassium chloride with lidocaine, potassium chloride, potassium chloride, potassium chloride, potassium phosphate (KPHOS) in D5W IV, potassium phosphate (KPHOS) in D5W IV, potassium phosphate (KPHOS) in D5W IV, potassium phosphate (KPHOS) in D5W IV, prochlorperazine **OR** prochlorperazine, sodium chloride (PF)    Allergies:     Allergies   Allergen Reactions     Atorvastatin Other (See Comments)     Back pain  Tolerates atrovastatin     Docosahexaenoic Acid (Dha)      Other reaction(s): Intolerance-Can't Take - Omega 3 fish oil       Physical Exam:    Temp:  [97.9  F (36.6  C)-98.6  F (37  C)] 98.6  F (37  C)  Pulse:  [77-97] 86  Heart Rate:  [] 88  Resp:  [12-25] 14  BP: (103-138)/(77-96) 117/83  SpO2:  [87 %-95 %] 94 %      Intake/Output Summary (Last 24 hours) at 2/6/2019 1457  Last data filed at 2/6/2019 1200  Gross per 24 hour   Intake 1875 ml   Output 2950 ml   Net -1075 ml       General: laying in bed in NAD  HEENT: anicteric, moist mucosa  Neck: no palpable lymphadenopathy, no JVD noted  Chest: decrease breath sound lower left field.   Cardiac: RRR no murmurs  Abdomen: Soft, flat, non tender, active BS  Extremities: No LE Edema  Neuro: A&Ox3, no focal deficits     Laboratory, imaging, and microbiologic data:

## 2019-02-07 ENCOUNTER — CARE COORDINATION (OUTPATIENT)
Dept: ONCOLOGY | Facility: CLINIC | Age: 59
End: 2019-02-07

## 2019-02-07 ENCOUNTER — DOCUMENTATION ONLY (OUTPATIENT)
Dept: ONCOLOGY | Facility: CLINIC | Age: 59
End: 2019-02-07

## 2019-02-07 VITALS
OXYGEN SATURATION: 93 % | TEMPERATURE: 97.9 F | SYSTOLIC BLOOD PRESSURE: 121 MMHG | WEIGHT: 235.45 LBS | BODY MASS INDEX: 33.71 KG/M2 | HEART RATE: 86 BPM | DIASTOLIC BLOOD PRESSURE: 82 MMHG | HEIGHT: 70 IN | RESPIRATION RATE: 18 BRPM

## 2019-02-07 DIAGNOSIS — J18.9 PNEUMONIA: ICD-10-CM

## 2019-02-07 DIAGNOSIS — D89.811 CHRONIC GVHD (H): Primary | ICD-10-CM

## 2019-02-07 DIAGNOSIS — D46.9 MDS (MYELODYSPLASTIC SYNDROME) (H): ICD-10-CM

## 2019-02-07 DIAGNOSIS — J18.9 PNEUMONIA OF BOTH LUNGS DUE TO INFECTIOUS ORGANISM, UNSPECIFIED PART OF LUNG: ICD-10-CM

## 2019-02-07 DIAGNOSIS — R91.8 INFILTRATE OF LUNG PRESENT ON IMAGING OF CHEST: Primary | ICD-10-CM

## 2019-02-07 DIAGNOSIS — I42.4: ICD-10-CM

## 2019-02-07 DIAGNOSIS — D89.813 GVHD AS COMPLICATION OF BONE MARROW TRANSPLANT (H): ICD-10-CM

## 2019-02-07 DIAGNOSIS — T86.09 GVHD AS COMPLICATION OF BONE MARROW TRANSPLANT (H): ICD-10-CM

## 2019-02-07 DIAGNOSIS — R09.02 HYPOXIA: ICD-10-CM

## 2019-02-07 DIAGNOSIS — R91.8 INFILTRATE OF LUNG PRESENT ON IMAGING OF CHEST: ICD-10-CM

## 2019-02-07 DIAGNOSIS — D46.9 MDS (MYELODYSPLASTIC SYNDROME) (H): Primary | ICD-10-CM

## 2019-02-07 LAB
ABO + RH BLD: NORMAL
ABO + RH BLD: NORMAL
ANA PAT SER IF-IMP: ABNORMAL
ANA SER QL IF: POSITIVE
ANA TITR SER IF: ABNORMAL {TITER}
ANCA AB PATTERN SER IF-IMP: NORMAL
ANION GAP SERPL CALCULATED.3IONS-SCNC: 9 MMOL/L (ref 3–14)
ASPERGILLUS GALACTOMANNAN ANTIGEN BAL: NEGATIVE
BASOPHILS # BLD AUTO: 0.1 10E9/L (ref 0–0.2)
BASOPHILS NFR BLD AUTO: 1 %
BLD GP AB SCN SERPL QL: NORMAL
BLOOD BANK CMNT PATIENT-IMP: NORMAL
BUN SERPL-MCNC: 15 MG/DL (ref 7–30)
C-ANCA TITR SER IF: NORMAL {TITER}
CALCIUM SERPL-MCNC: 8.7 MG/DL (ref 8.5–10.1)
CHLORIDE SERPL-SCNC: 108 MMOL/L (ref 94–109)
CMV DNA SPEC NAA+PROBE-ACNC: NORMAL [IU]/ML
CMV DNA SPEC NAA+PROBE-ACNC: NORMAL [IU]/ML
CMV DNA SPEC NAA+PROBE-LOG#: NORMAL {LOG_IU}/ML
CMV DNA SPEC NAA+PROBE-LOG#: NORMAL {LOG_IU}/ML
CO2 SERPL-SCNC: 24 MMOL/L (ref 20–32)
CREAT SERPL-MCNC: 0.88 MG/DL (ref 0.66–1.25)
DIFFERENTIAL METHOD BLD: ABNORMAL
EOSINOPHIL # BLD AUTO: 0.2 10E9/L (ref 0–0.7)
EOSINOPHIL NFR BLD AUTO: 2.2 %
ERYTHROCYTE [DISTWIDTH] IN BLOOD BY AUTOMATED COUNT: 13.3 % (ref 10–15)
GALACTOMANNAN AG SERPL-ACNC: 0.06
GFR SERPL CREATININE-BSD FRML MDRD: >90 ML/MIN/{1.73_M2}
GLUCOSE SERPL-MCNC: 94 MG/DL (ref 70–99)
HCT VFR BLD AUTO: 47.1 % (ref 40–53)
HGB BLD-MCNC: 15.9 G/DL (ref 13.3–17.7)
IMM GRANULOCYTES # BLD: 0 10E9/L (ref 0–0.4)
IMM GRANULOCYTES NFR BLD: 0.6 %
LYMPHOCYTES # BLD AUTO: 1.8 10E9/L (ref 0.8–5.3)
LYMPHOCYTES NFR BLD AUTO: 26 %
MCH RBC QN AUTO: 34.2 PG (ref 26.5–33)
MCHC RBC AUTO-ENTMCNC: 33.8 G/DL (ref 31.5–36.5)
MCV RBC AUTO: 101 FL (ref 78–100)
MONOCYTES # BLD AUTO: 1.3 10E9/L (ref 0–1.3)
MONOCYTES NFR BLD AUTO: 18.3 %
NEUTROPHILS # BLD AUTO: 3.6 10E9/L (ref 1.6–8.3)
NEUTROPHILS NFR BLD AUTO: 51.9 %
NRBC # BLD AUTO: 0 10*3/UL
NRBC BLD AUTO-RTO: 0 /100
PLATELET # BLD AUTO: 232 10E9/L (ref 150–450)
POTASSIUM SERPL-SCNC: 4.3 MMOL/L (ref 3.4–5.3)
RBC # BLD AUTO: 4.65 10E12/L (ref 4.4–5.9)
SODIUM SERPL-SCNC: 141 MMOL/L (ref 133–144)
SPECIMEN EXP DATE BLD: NORMAL
SPECIMEN SOURCE: NORMAL
SPECIMEN SOURCE: NORMAL
WBC # BLD AUTO: 6.8 10E9/L (ref 4–11)

## 2019-02-07 PROCEDURE — 25000132 ZZH RX MED GY IP 250 OP 250 PS 637: Performed by: INTERNAL MEDICINE

## 2019-02-07 PROCEDURE — 86850 RBC ANTIBODY SCREEN: CPT | Performed by: INTERNAL MEDICINE

## 2019-02-07 PROCEDURE — 25000131 ZZH RX MED GY IP 250 OP 636 PS 637: Performed by: INTERNAL MEDICINE

## 2019-02-07 PROCEDURE — 86900 BLOOD TYPING SEROLOGIC ABO: CPT | Performed by: INTERNAL MEDICINE

## 2019-02-07 PROCEDURE — 25000128 H RX IP 250 OP 636: Performed by: INTERNAL MEDICINE

## 2019-02-07 PROCEDURE — 36415 COLL VENOUS BLD VENIPUNCTURE: CPT | Performed by: INTERNAL MEDICINE

## 2019-02-07 PROCEDURE — 25000125 ZZHC RX 250: Performed by: INTERNAL MEDICINE

## 2019-02-07 PROCEDURE — 80048 BASIC METABOLIC PNL TOTAL CA: CPT | Performed by: INTERNAL MEDICINE

## 2019-02-07 PROCEDURE — 86901 BLOOD TYPING SEROLOGIC RH(D): CPT | Performed by: INTERNAL MEDICINE

## 2019-02-07 PROCEDURE — 85025 COMPLETE CBC W/AUTO DIFF WBC: CPT | Performed by: INTERNAL MEDICINE

## 2019-02-07 RX ORDER — CYCLOSPORINE 25 MG/1
25 CAPSULE, LIQUID FILLED ORAL 2 TIMES DAILY
Qty: 60 CAPSULE | Refills: 0 | Status: SHIPPED | OUTPATIENT
Start: 2019-02-07 | End: 2019-03-11

## 2019-02-07 RX ORDER — ACETAMINOPHEN 325 MG/1
325-650 TABLET ORAL EVERY 4 HOURS PRN
COMMUNITY
Start: 2019-02-07

## 2019-02-07 RX ADMIN — RANITIDINE 150 MG: 150 TABLET ORAL at 09:52

## 2019-02-07 RX ADMIN — CEFEPIME 2 G: 2 INJECTION, POWDER, FOR SOLUTION INTRAVENOUS at 04:18

## 2019-02-07 RX ADMIN — MAGNESIUM OXIDE TAB 400 MG (241.3 MG ELEMENTAL MG) 800 MG: 400 (241.3 MG) TAB at 09:51

## 2019-02-07 RX ADMIN — PREDNISONE 10 MG: 10 TABLET ORAL at 09:52

## 2019-02-07 RX ADMIN — PILOCARPINE HYDROCHLORIDE 5 MG: 5 TABLET, FILM COATED ORAL at 09:51

## 2019-02-07 RX ADMIN — ACYCLOVIR 800 MG: 800 TABLET ORAL at 09:52

## 2019-02-07 RX ADMIN — CYCLOSPORINE 25 MG: 25 CAPSULE, LIQUID FILLED ORAL at 09:52

## 2019-02-07 RX ADMIN — FLUCONAZOLE 100 MG: 100 TABLET ORAL at 09:52

## 2019-02-07 RX ADMIN — LEVOTHYROXINE SODIUM 150 MCG: 75 TABLET ORAL at 09:52

## 2019-02-07 RX ADMIN — METOPROLOL TARTRATE 25 MG: 25 TABLET, FILM COATED ORAL at 09:51

## 2019-02-07 ASSESSMENT — ACTIVITIES OF DAILY LIVING (ADL)
ADLS_ACUITY_SCORE: 11

## 2019-02-07 ASSESSMENT — MIFFLIN-ST. JEOR: SCORE: 1904.25

## 2019-02-07 NOTE — PROGRESS NOTES
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 Admitted for hypoxia in the setting of bilateral pulmonary infiltrates.      Diagnosis AMLU Acute myelogeneous leukemia, Unknown  HCT Type Allogeneic    Prep Regimen Cytoxan  Fludarabine  TBI   Donor Source Related PBSC    GVHD Prophylaxis Cyclosporine  Mycophenolate  Primary BMT Provider Uli Enciso      INTERVAL  HISTORY     No acute events overnight. Underwent BAL yesterday without complications. Was on oxygen overnight but does not require oxygen at rest. He denies any worsening cough or SOB this AM and feels he is comfortable with going home today. No fevers, chills, diarrhea, abdominal pain, bleeding or rashes.Currently requiring oxygen with activity but not at rest.   Review of Systems: 10 point ROS negative except as noted above.    PHYSICAL EXAM     Weight In/Out     Wt Readings from Last 3 Encounters:   02/07/19 106.8 kg (235 lb 7.2 oz)   11/14/18 105.3 kg (232 lb 1.6 oz)   09/12/18 108.1 kg (238 lb 4.8 oz)      I/O last 3 completed shifts:  In: 1170 [P.O.:220; I.V.:950]  Out: 2575 [Urine:2575]       /86 (BP Location: Right arm)   Pulse 86   Temp 98.2  F (36.8  C) (Oral)   Resp 18   Ht 1.778 m (5' 10\")   Wt 106.8 kg (235 lb 7.2 oz)   SpO2 96%   BMI 33.78 kg/m         General: NAD   Eyes: JEREMY, sclera anicteric   Nose/Mouth/Throat: OP clear, buccal mucosa slightly dry. Receeding gum lines around teeth. No overt ulcerations or lichenoid changes.   Lungs: Crackles in bases bilaterally, breathing comfortable on room air, good air exchange.   Cardiovascular: RRR, no M/R/G   Abdominal/Rectal: +BS, soft, NT, ND  Lymphatics: no edema  Skin: no rashes or petechaie, nodular appearing lesion on left middle finger PIP joint. Non tender, non-mobile  Neuro: A&O   Additional Findings: PIV in left arm     LABS AND IMAGING - " PAST 24 HOURS     Lab Results   Component Value Date    WBC 6.8 02/07/2019    ANEU 3.6 02/07/2019    HGB 15.9 02/07/2019    HCT 47.1 02/07/2019     02/07/2019     02/07/2019    POTASSIUM 4.3 02/07/2019    CHLORIDE 108 02/07/2019    CO2 24 02/07/2019    GLC 94 02/07/2019    BUN 15 02/07/2019    CR 0.88 02/07/2019    MAG 2.1 02/05/2019    INR 1.11 02/04/2019         Outside Chest CT 2/4/19: Impression:   1. There are extensive areas of infiltrate/consolidation in both   lungs. There is a peripheral subpleural predominance, especially in   the lower lungs, but there are also areas of peribronchial vascular   consolidation, some of which appear more nodular. Associated air   bronchograms. Both lungs are involved fairly symmetrically. The   etiology is uncertain. Atypical infectious or inflammatory etiology   is a consideration, but findings are somewhat more organized   appearing than typical. This could represent a type of organized   pneumonia. Peripheral predominance can be seen in the setting of   cryptogenic organizing pneumonia. Other processes such as   eosinophilic pneumonia more frequently involves the upper lungs.   Peripheral predominance would also be uncommon in sarcoidosis or   hypersensitivity pneumonitis. Additional clinical and laboratory   workup is likely necessary. Ultimately, if these findings should   persist or progress, tissue diagnosis may be indicated.  2. Small amount of bilateral pleural fluid which appears to be at   least partially loculated on the left.  3. There is no clear evidence of lymphadenopathy.  4. Atherosclerosis which includes severe coronary artery calcification    ASSESSMENT BY JESSICA Russo is a 58 year old man, who is 32 months s/p non-myeloablative allogeneic sibling PBSCT for MDS RAEB2, whose course has been complicated with persistent chronic rnaor-urwtlo-thaz disease (oral, manifested with weight loss), most recently controlled with prednisone  20mg alternating with 10mg. He is now admitted for workup for finding of extensive bilateral pulmonary infiltrates.      1. 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 Heme: counts have been normal, will monitor for transfusion needs. No recent transfusions     3. GVHD- known cGVHD (dry mouth, dry eyes). Remains on prednisone 10mg/20mg.   Continue cyclosporine 25mg BID- no intention of keeping therapeutic.   Previously on oral dex swish and spit and artificial tears, not currently using.   Pilocarpine for dry mouth      4. 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  CCT 2/4/19 at outside facility as above. Images available in PACS. Started empirically on cefepime, vancomycin, and azithromycin.  BAL 2/6. Low concern for infectious etiology. Will have pt follow up with Dr. Brady with repeat CT and PFTs and consideration for surgical lung biopsy if indicated. Follow BAL cultures in the outpatient setting.   - RVP, b-d-glucan, galactomannan, and crypto antigen neg, and sputum cx pending. Discontinue IV abx 2/7.  - MARIA R, ANCA, pending. .   - Continue prophy ACV, fluconazole, and TMP-SMX. Re-start levo on discharge.   - History of VRE     5. Pulm  SOB/CUELLAR with infiltrates- see above. Sats are 91% on RA. Oxygen as needed to keep sats >90%. Walking O2 sats indicate pt needs ~ 4-6L O2 with activity.      6. CV: Known CAD with 5 drug eluding stents   Hold ASA  Continue metoprolol  Hold Crestor for now     7. Endo  Continue levothyroxine 150 mcg daily     8. Renal/FEN  Regular diet   Cr and Lytes wnl- replete per sliding scale.   Continue magnesium supplementation     9. ENT  Continue PTA Pilocarpine daily for dry mouth     10. GI  Continue PTA zantac      11. Prophy: Consider lovenox prophylaxis  if pt remains hospitalized for more than a few days.      12. Code: FULL    13. Dispo: Discharge patient today with follow up in BMT clinic and with Dr. Brady to follow BAL cultures, repeat CCT, and repeat PFTs. Oxygen given on discharge to keep O2 sats >90%    Gerald Payne PA-C  [phone]      _______________________________________________    BMT ATTENDING NOTE    The patient was seen and examined by me separately from the midlevel provider. The note reflects our mutual assessment and plans and were approved by me personally.   I personally reviewed today's lab results vital signs and radiology results.    Each point of the assessment and plan were reviewed by the midlevel and me and either endorsed by me or were my added decisions.    My pertinent physical findings today are: Pleasant, interactive, sclerae anicteric, cranial nerves grossly intact, no oral mucosal lesions but dry OP and receding gums, normal respiratory effort, no JVD, normal perfusion, abdomen non-distended, skin without rash, no edema, normal affect.     My assessment and plan are:  Review with pulm with a request for BAL. Hydration while NPO. Oxygen support. Remainder of supportive care as above. Await results of BAL today done without biopsies. Will see what BAL shows. May need IR or wedge resection to R/O YESENIA or BOOP.ready for discharge that I spent 45 min preparing today. Discussed this assessment and plan in detail with patient and team today.    Uli Enciso M.D.

## 2019-02-07 NOTE — PLAN OF CARE
Care Coordination     Home O2:  Orders faxed 2/7 to [facility] for home O2 (4LPM with activity). Confirmed via phone with [facility] that transport O2 will be delivered to pt's hospital room today for discharge.   [facility] [phone]   Fax [phone]    D/c location:  Home - [facility] medications needed at discharge:  None   Pharmacy concerns:  None    Medicare form required:  No    Caregiver:  (spouse) Latonia Mccarthy- [phone]

## 2019-02-07 NOTE — PROGRESS NOTES
Received intake call for home oxygen at 11:18AM.   02/07/2019 -  - EM CALLED YESTERDAY REGARDING PATIENTS INSURANCE COVERAGE FOR O2 FOR PNEUMONIA. CALLED INSURANCE AND SPOKE WITH EMA REF# [id number].  IS A BILLABLE CODE, NO PA REQUIRED, BASED ON MEDICAL NECESSITY, JUST NEED RX SIGNED BY PROVIDER.   11:47AM - SPOKE WITH EM TO CONFIRM PATIENT NEEDED 6LPM JUST WITH ACTIVITY. SHE CONFIRMED THAT IS CORRECT. DISCUSSED EQUIPMENT OPTIONS FOR PATIENT, TOLD HER WE CAN ONLY DUE TANK SYSTEM DUE TO HIGH LITER FLOW. TOLD HER I WILL CALL PATIENT TO DISCUSS.   11:59AM - CALLED PATIENT. [facility] DISCUSSED EQUIPMENT AND HE IS UNSURE IF [employer] WILL WORK FOR HIM BECAUSE OF HIS JOB. ALSO DID NOT SEEM TO LIKE THE IDEA OF HAVING A CONCENTRATOR AND FILL STATION AT HOME IN ADDITION TO THE TANKS. PATIENT WOULD LIKE TO TALK TO HIS WIFE ABOUT THE O2 AND THE NURSE (?) AND REQUESTED A CALL BACK IN A HOUR.  12:50PM - CALLED PATIENT. HE IS SPEAKING WITH EM ABOUT O2. SAID SHE WILL CALL ME WHEN THEY HAVE MADE A DECISION.   1:40PM - TRIED CALLING EM. NO ANSWER PHONE KEPT RINGING.   1:50PM - RECEIVED CALL FROM EM. PATIENT WOULD LIKE TO GO WITH [facility] BECAUSE THEY HAVE A POC THAT GOES UP TO 4 LPM AND THEY WILL CHANGE HIS RX. EM WOULD LIKE US TO HOLD ON UNTIL SHE GETS CONFIRMATION [facility] WILL TAKE PATIENT. SHE WILL CALL US ONCE SHE HAS A DEFINITIVE ANSWER WITHIN THE NEXT 30 MINS.   2:45PM - RECEIVED CALL FROM EM. PATIENT IS PLANNING ON [facility] BUT [facility] NEED[facility]RON[facility]. TOLD EM I SPOKE WITH PATIENTS INSURANCE YESTERDAY AND HE JUST NEEDS A RX. EM SAID [facility] IS CALLING INSURANCE. SHE WILL LET US KNOW WHAT [facility] SAYS.   3:20PM - CALLED EM, NO ANSWER.  3:37PM - CALLED EM, NO ASNWER.  3:50PM - NOTE IN PATIENTS CHART SAYS PATIENT IS GETTING O2 FROM [facility].

## 2019-02-07 NOTE — PROGRESS NOTES
Oxygen saturations were completed at rest and with activity.     Resting saturation on RA: 90%  Resting saturation on O2: 94 on 3 LPM via nasal cannula .    Walking saturation on RA 87%.  Walking saturation on 4LPM O2 nasal cannula: 88%.  Walking saturation on O2:  91% on 6 LPM via nasal cannula.     Patient was able to walk 580 ft and tolerated moderately. Some shortness of breath by the end of walk.

## 2019-02-07 NOTE — SUMMARY OF CARE
BMT Summary of Care    This note has data from a flowsheet    February 7, 2019 11:55 AM  Byron Russo  MRN: [id number]    Discharge Date: 2/7/2019    BMT Primary Physician: Uli Enciso    BMT Nurse Coordinator: Nicolas Jara    Discharge Diagnosis: S/P readmission for hypoxia secondary to pulmonary infiltrates, thought to be pleuroparenchymal fibroelastosis    Discharge To: Home    Activity: As tolerated, use oxygen with activity to keep O2 sats >88-90%    Catheter Care: None    IV Medications through home infusion: None    Nutrition: Regular diet as tolerated    Blood Transfusions:  Transfuse if Hemoglobin < or equal 8 g/dL  Red Blood Cell Order: 2 units, irradiated and leukoreduced   Transfuse if Platelet count < or equal 10,000 uL  Platelet order: 1 adult dose, irradiated and leukoreduced  Transfusion Pre-meds:  None    Intravenous Electrolyte Replacement:  Potassium  chloride (give only if serum creatinine < 2)     3-3.3  20mEq/hr  over 1 hour x 2 doses     <3      20mEq/hr  over 1 hour x 3 doses  Magnesium sulfate 1.3-1.7     2 grams over 1 hour x1 dose                                     <1.3         2 grams over 2 hours x1 dose      Laboratory Tests:  At next clinic appointment (date: 2/7/2019)  Hemogram (CBC) differential, platelet count  Complete Metabolic Panel  Magnesium    Support Services: Oxygen supply through [facility]. Pt to use oxygen as needed with activity to keep O2 sats >88-90. Pt to monitor O2 with home oximeter.     [facility]                Ph [phone]                Fax [phone]    Appointments:   - Follow up with Dr. Brady requested by Pulm team prior to discharge. Appointment day and time pending. YOHANA Luke aware to keep an eye out on these appointments. - Schedulers will call patient with time.   - 2/13/19- 2:30pm repeat PFTs, 3:30pm labs, 4:00 with Dr. Enciso.     Gerald Payne PA-C  [phone]

## 2019-02-07 NOTE — PHARMACY-VANCOMYCIN DOSING SERVICE
Pharmacy Vancomycin Note  Date of Service 2019  Patient's  1960   58 year old, male    Indication: Healthcare-Associated Pneumonia  Goal Trough Level: 15-20 mg/L  Day of Therapy: 4  Current Vancomycin regimen:  2000 mg IV q12h    Current estimated CrCl = Estimated Creatinine Clearance: 114.7 mL/min (based on SCr of 0.88 mg/dL).    Creatinine for last 3 days  2019:  8:50 PM Creatinine 0.83 mg/dL  2019:  4:56 AM Creatinine 0.76 mg/dL  2019:  6:05 AM Creatinine 0.86 mg/dL  2019:  4:10 AM Creatinine 0.88 mg/dL    Recent Vancomycin Levels (past 3 days)  2019:  7:36 PM Vancomycin Level 15.5 mg/L    Vancomycin IV Administrations (past 72 hours)                   vancomycin (VANCOCIN) 2,000 mg in sodium chloride 0.9 % 500 mL intermittent infusion (mg) 2,000 mg New Bag 19     2,000 mg New Bag  0738     2,000 mg New Bag 19 1950     2,000 mg New Bag  0734     2,000 mg New Bag 19                Nephrotoxins and other renal medications (From now, onward)    Start     Dose/Rate Route Frequency Ordered Stop    19  vancomycin (VANCOCIN) 2,250 mg in sodium chloride 0.9 % 500 mL intermittent infusion      2,250 mg  over 2 Hours Intravenous EVERY 12 HOURS 19 0805      19  acyclovir (ZOVIRAX) tablet 800 mg      800 mg Oral 2 TIMES DAILY 19 1820      19  cycloSPORINE modified (GENERIC EQUIVALENT) capsule 25 mg      25 mg Oral 2 TIMES DAILY 19 182               Contrast Orders - past 72 hours (72h ago, onward)    None          Interpretation of levels and current regimen:  Trough level is  Therapeutic, borderline will increase slightly to achieve trough closer to ~17.    Has serum creatinine changed > 50% in last 72 hours: No    Urine output:  good urine output    Renal Function: Stable    Plan:  1.  Increase Dose to 2250mg(21mg/kg) IV Q12h  2.  Pharmacy will check trough levels as appropriate in 1-3 Days.    3. Serum  creatinine levels will be ordered daily for the first week of therapy and at least twice weekly for subsequent weeks.      Andrew RobbinsD        .

## 2019-02-07 NOTE — PROGRESS NOTES
BMT SOCIAL WORK DISCHARGE NOTE:    Focus: Discharge Plan    Data: Pt is a 58 year old male with a hx of AML s/p Allo BMT per medical record 32 months ago.    Interventions: Per Med Team, pt is medically stable for discharge today. SW met with pt to assess coping, provide psychosocial support. The pt feels ready to discharge home today, he is worried about the O2 that he will need to be on and wonders how this will work for work if he needs to have tanks and is hopefull that he will get approved for a concentrator. He has been looking into concentrators online and SW did encourage the pt talk with someone regarding this before purchasing something to make sure it would work for him. The pt's wife plans to transport the pt home today. The pt does plan to return to work tomorrow (pending weather) and SW and the pt did talk about taking some time off to allow himself time to heal. The pt does feel some pressure to return to work as there is no one to do his job if he is not around, but does know the importance of healing as well.  SW provided empathetic listening, validation of concerns, and encouragement. SW encouraged pt to contact SW for support, questions and/or resources.     Education Provided: discharge support and Disability information pending his ability to return to work.    Assessment: Pt presented as friendly and open.  Pt appears to be coping well, although he is frustrated with the need for O2. Pt continues to be supported by his wife and family.    Plan: Pt to discharge home. SW will continue to provide psychosocial support and assistance with resources as needed. BETSY will continue to collaborate with multidisciplinary team regarding pt's plan of care.     YAANNA Melo, [facility]  Phone [phone]  Pager [phone]

## 2019-02-07 NOTE — PROGRESS NOTES
"Discharge SBAR:    Situation:  Byron Russo is a 58 year old being discharged to: Home  Admission reason: Shortness of Breath  Is this a readmission? No    Background:  Primary diagnosis: MDS, chronic GVHD of lungs  /82 (BP Location: Right arm)   Pulse 86   Temp 97.9  F (36.6  C) (Oral)   Resp 18   Ht 1.778 m (5' 10\")   Wt 106.8 kg (235 lb 7.2 oz)   SpO2 93%   BMI 33.78 kg/m    Type of donor: Allogeneic  Type of stem cells: PBSC  Relapsed? No  Falls Precautions? No  Isolation? Yes  DNR? No  DNI? No  Confidential Patient? No  Positive blood cultures? No    Assessment:  Discharge teaching    Review discharge medications and schedule: Yes    Set up pill box: No    Discharge instructions reviewed: Yes    Special considerations (think about previous reactions, issues with flushing CVC, premedication needs, etc): Yes: Pt discharging w/ home O2, educated on safe handling and use of O2     Patient Concerns: No    Recommendations:  Anticipated needs:    Daily infusions: No    Daily transfusions: No    G-CSF: No    Other: Not Applicable  Verbal report called to clinic: No      "

## 2019-02-07 NOTE — PHARMACY-VANCOMYCIN DOSING SERVICE
Pharmacy Vancomycin Note  Date of Service 2019  Patient's  1960   58 year old, male  Actual Body Weight: 106.4 kg    Indication: Healthcare-Associated Pneumonia  Goal Trough Level: 15-20 mg/L  Start Day of Therapy:   Current Vancomycin regimen:  2000 mg IV q12h    Current estimated CrCl = Estimated Creatinine Clearance: 117.2 mL/min (based on SCr of 0.86 mg/dL).    Creatinine for last 3 days  2019:  8:50 PM Creatinine 0.83 mg/dL  2019:  4:56 AM Creatinine 0.76 mg/dL  2019:  6:05 AM Creatinine 0.86 mg/dL    Recent Vancomycin Levels (past 3 days)  2019:  7:36 PM Vancomycin Level 15.5 mg/L    Vancomycin IV Administrations (past 72 hours)                   vancomycin (VANCOCIN) 2,000 mg in sodium chloride 0.9 % 500 mL intermittent infusion (mg) 2,000 mg New Bag 19 0738     2,000 mg New Bag 19 1950     2,000 mg New Bag  0734     2,000 mg New Bag 19                Nephrotoxins and other renal medications (From now, onward)    Start     Dose/Rate Route Frequency Ordered Stop    19  acyclovir (ZOVIRAX) tablet 800 mg      800 mg Oral 2 TIMES DAILY 19 18219  cycloSPORINE modified (GENERIC EQUIVALENT) capsule 25 mg      25 mg Oral 2 TIMES DAILY 19 18219  vancomycin (VANCOCIN) 2,000 mg in sodium chloride 0.9 % 500 mL intermittent infusion      2,000 mg  over 2 Hours Intravenous EVERY 12 HOURS 19 1832               Contrast Orders - past 72 hours (72h ago, onward)    None          Interpretation of levels and current regimen:  Trough level is Therapeutic    Has serum creatinine changed > 50% in last 72 hours: No  Urine output:  good urine output  Renal Function: Stable    Plan:  1.  Continue Current Dose  2.  Pharmacy will check trough levels as appropriate in 3-5 Days.    3. Serum creatinine levels will be ordered every other day for at least 10 days while on concomitant nephrotoxins.      Amena  Fair, PharmD  [phone] (text capable)

## 2019-02-07 NOTE — PROGRESS NOTES
Care Coordination - Discharge Note     Home O2:  Orders faxed 2/7 to [facility] for home O2 (4LPM with activity). Confirmed via phone with [facility] that transport O2 will be delivered to pt's hospital room today for discharge.              [facility] [phone]              Fax [phone]     D/c location:  Home - [facility] medications needed at discharge:  None   Pharmacy concerns:  None     Medicare form required:  No     Caregiver:  (spouse) Latonia Mccarthy- [phone]

## 2019-02-07 NOTE — SUMMARY OF CARE
Byron Russo   Home Medication Instructions TESS:[id number]    Printed on:02/07/19 7407   Medication Information                      acetaminophen (TYLENOL) 325 MG tablet  Take 1-2 tablets (325-650 mg) by mouth every 4 hours as needed for mild pain or fever             acyclovir (ZOVIRAX) 800 MG tablet  Take 1 tablet (800 mg) by mouth 2 times daily             amoxicillin (AMOXIL) 500 MG tablet  Take 4 tablets by mouth 1 hour before dental visit.             aspirin 81 MG EC tablet  Take 81 mg by mouth daily            cycloSPORINE modified (GENERIC EQUIVALENT) 25 MG capsule  Take 1 capsule (25 mg) by mouth 2 times daily             dexamethasone 0.1 MG/ML solution  Swish and spit 5-10 mLs (0.5-1 mg) in mouth 4 times daily             fluconazole (DIFLUCAN) 100 MG tablet  Take 1 tablet (100 mg) by mouth daily             levofloxacin (LEVAQUIN) 250 MG tablet  Take 1 tablet (250 mg) by mouth daily             levothyroxine (SYNTHROID/LEVOTHROID) 150 MCG tablet  Take 1 tablet (150 mcg) by mouth daily             magnesium oxide (MAG-OX) 400 (241.3 MG) MG tablet  Take 2 tablets daily             metoprolol tartrate (LOPRESSOR) 25 MG tablet  Take 1 tablet (25 mg) by mouth daily             NITROGLYCERIN ER PO  Take by mouth as needed Reported on 5/12/2017             order for DME  Equipment being ordered: Oxygen and fingertip pulse oximetry. Please provide patient with continuous flow oxygen at 4 LPM via nasal cannula. Patient will need pulse oximiter, and O2 concentrator, conserving device, and portable oxygen. Length of need up to 3 months; will reassess. Patient will need transportation oxygen delivered to the hospital for discharge today: [address] [address]. [address] room 54[address].             order for DME  Equipment being ordered: Oxygen, finger pulse oximeter. Please provide patient with continuous flow oxygen at 4 LPM via nasal cannula for use with activity. Will  need concentrator, conserving device, and portable oxygen. Length of need: up to 3 months; will continue to reassess. Will need transportation oxygen delivered to the hospital for patient discharge today: [address] [address]. [address] Room [address].             order for DME  Equipment being ordered: Oxygen. Please provide patient with continuous flow oxygen 6 LPM via nasal cannula, for use with activity. Patient will need concentrator, conserving device, and portable oxygen. Length of need up to 6 months; will reassess. Patient will need transportation oxygen delivered to the hospital for discharge today: [address] [address]. [address] room [address].             pilocarpine (SALAGEN) 5 MG tablet  Take 1 tablet (5 mg) by mouth 3 times daily             predniSONE (DELTASONE) 10 MG tablet  Take 20 mg by mouth daily Alternating with 10mg daily.             ranitidine (ZANTAC) 150 MG tablet  Take 1 tablet (150 mg) by mouth 2 times daily             rosuvastatin (CRESTOR) 5 MG tablet  Take 1 tablet (5 mg) by mouth daily             sulfamethoxazole-trimethoprim (BACTRIM DS/SEPTRA DS) 800-160 MG tablet  Take 1 tablet by mouth 2 times daily On Monday's and Tuesday's only

## 2019-02-07 NOTE — PROGRESS NOTES
[facility]    Pulmonary, Allergy, Critical Care and Sleep Medicine    Consultation Follow Up Progress Note  02/07/2019    Byron Russo MRN# [id number]   Age: 58 year old YOB: 1960     Date of Admission: 2/4/2019  Reason for Consultation: SOB  Requesting Team: BMT    Primary care provider: Cole Duong     Assessment and Recommendations:    Subacute exertional dyspnea with abnormal CT.   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 His clinic picture and imaging not typical for infection, however given that he is immunosupressive agents will need do bronchoscopy with BAL. His CT-chest shows multiple small nodules, and peripherally located lesions, most consistent with pleuroparenchymal fibroelastosis. Case was discussed with Dr. Brady, who will follow up with patient after discharge     Recommendations   - follow up with PFTs and Dr Brady as outpatient   - Supplemental O2 for home       Patient was seen with MD Elizabeth Anderson khalil MD       Chief Complaint and History of Present Illness:    No new complaints today. Bronchoscopy performed without incident.      Review of Systems:  Complete 12 point ROS negative unless mentioned in HPI  Histories, Prior to Admission Medications, Allergies:    Past Medical History:  Past Medical History:   Diagnosis Date     CAD (coronary artery disease) 2001     Hyperlipidemia      Hypothyroidism      Obesity      Pulmonary embolism (H) 2/2016     Varicose veins        Medications:    acyclovir  800 mg Oral BID     cycloSPORINE modified  25 mg Oral BID     fluconazole  100 mg Oral Daily     levothyroxine  150 mcg Oral Daily     magnesium oxide  800 mg Oral Daily     metoprolol tartrate  25 mg Oral Daily     pilocarpine  5 mg Oral Daily     predniSONE  10 mg Oral Every Other Day     predniSONE  20 mg Oral Every Other Day      "ranitidine  150 mg Oral BID     sodium chloride (PF)  3 mL Intracatheter Q8H     sulfamethoxazole-trimethoprim  1 tablet Oral Q Mon Tues BID     acetaminophen, acetaminophen, hypromellose-dextran, ipratropium - albuterol 0.5 mg/2.5 mg/3 mL, lidocaine 4%, lidocaine (buffered or not buffered), LORazepam **OR** LORazepam, magnesium sulfate, potassium chloride, potassium chloride with lidocaine, potassium chloride, potassium chloride, potassium chloride, potassium phosphate (KPHOS) in D5W IV, potassium phosphate (KPHOS) in D5W IV, potassium phosphate (KPHOS) in D5W IV, potassium phosphate (KPHOS) in D5W IV, prochlorperazine **OR** prochlorperazine, sodium chloride (PF)    Allergies:     Allergies   Allergen Reactions     Atorvastatin Other (See Comments)     Back pain  Tolerates atrovastatin     Docosahexaenoic Acid (Dha)      Other reaction(s): Intolerance-Can't Take - Omega 3 fish oil       Physical Exam:    /82 (BP Location: Right arm)   Pulse 86   Temp 97.9  F (36.6  C) (Oral)   Resp 18   Ht 1.778 m (5' 10\")   Wt 106.8 kg (235 lb 7.2 oz)   SpO2 93%   BMI 33.78 kg/m        Intake/Output Summary (Last 24 hours) at 2/6/2019 1457  Last data filed at 2/6/2019 1200  Gross per 24 hour   Intake 1875 ml   Output 2950 ml   Net -1075 ml       General: laying in bed in NAD  HEENT: anicteric, moist mucosa  Neck: no palpable lymphadenopathy, no JVD noted  Chest: decrease breath sound lower left field.   Cardiac: RRR no murmurs  Abdomen: Soft, flat, non tender, active BS  Extremities: No LE Edema  Neuro: A&Ox3, no focal deficits     Laboratory, imaging, and microbiologic data:        "

## 2019-02-07 NOTE — DISCHARGE SUMMARY
[facility] Discharge Summary   Byron Russo MRN# [id number]   Age: 58 year old  YOB: 1960   Date of Admission: 2/4/2019  Date of Discharge:  2/7/2019  Admitting Physician: Delbert Mendenhall MD  Discharge Physician:  Uli Enciso MD  Discharge Diagnoses:    1. Hypoxia with pulmonary infiltrates, secondary to pleuroparenchymal fibroelastosis  2. Status post allogeneic bone marrow transplant for MDS  3. Chronic GVHD   Discharge Medications:       Byron Russo   Home Medication Instructions TESS:[id number]    Printed on:02/07/19 8214   Medication Information                      acetaminophen (TYLENOL) 325 MG tablet  Take 1-2 tablets (325-650 mg) by mouth every 4 hours as needed for mild pain or fever             acyclovir (ZOVIRAX) 800 MG tablet  Take 1 tablet (800 mg) by mouth 2 times daily             amoxicillin (AMOXIL) 500 MG tablet  Take 4 tablets by mouth 1 hour before dental visit.             aspirin 81 MG EC tablet  Take 81 mg by mouth daily Reported on 5/12/2017             cycloSPORINE modified (GENERIC EQUIVALENT) 25 MG capsule  Take 1 capsule (25 mg) by mouth 2 times daily             dexamethasone 0.1 MG/ML solution  Swish and spit 5-10 mLs (0.5-1 mg) in mouth 4 times daily             fluconazole (DIFLUCAN) 100 MG tablet  Take 1 tablet (100 mg) by mouth daily             levofloxacin (LEVAQUIN) 250 MG tablet  Take 1 tablet (250 mg) by mouth daily             levothyroxine (SYNTHROID/LEVOTHROID) 150 MCG tablet  Take 1 tablet (150 mcg) by mouth daily             magnesium oxide (MAG-OX) 400 (241.3 MG) MG tablet  Take 2 tablets daily             metoprolol tartrate (LOPRESSOR) 25 MG tablet  Take 1 tablet (25 mg) by mouth daily             NITROGLYCERIN ER PO  Take by mouth as needed Reported on 5/12/2017             order for DME  Equipment being ordered: Oxygen and fingertip pulse oximetry. Please provide patient with continuous flow oxygen at 4 LPM via nasal cannula.  Patient will need pulse oximiter, and O2 concentrator, conserving device, and portable oxygen. Length of need up to 3 months; will reassess. Patient will need transportation oxygen delivered to the hospital for discharge today: [address] [address]. [address] room 54[address].             order for DME  Equipment being ordered: Oxygen, finger pulse oximeter. Please provide patient with continuous flow oxygen at 4 LPM via nasal cannula for use with activity. Will need concentrator, conserving device, and portable oxygen. Length of need: up to 3 months; will continue to reassess. Will need transportation oxygen delivered to the hospital for patient discharge today: [address] [address]. [address] Room [address].             order for DME  Equipment being ordered: Oxygen. Please provide patient with continuous flow oxygen 6 LPM via nasal cannula, for use with activity. Patient will need concentrator, conserving device, and portable oxygen. Length of need up to 6 months; will reassess. Patient will need transportation oxygen delivered to the hospital for discharge today: [place] [address]. [address] room 5423.             pilocarpine (SALAGEN) 5 MG tablet  Take 1 tablet (5 mg) by mouth 3 times daily             predniSONE (DELTASONE) 10 MG tablet  Take 20 mg by mouth daily Alternating with 10mg daily.             ranitidine (ZANTAC) 150 MG tablet  Take 1 tablet (150 mg) by mouth 2 times daily             rosuvastatin (CRESTOR) 5 MG tablet  Take 1 tablet (5 mg) by mouth daily             sulfamethoxazole-trimethoprim (BACTRIM DS/SEPTRA DS) 800-160 MG tablet  Take 1 tablet by mouth 2 times daily On Monday's and Tuesday's only               Brief History of Illness:    **Adopted from H&P  Byron Russo is a 58 year old man, who is 32 months s/p non-myeloablative allogeneic sibling PBSCT for MDS RAEB2, whose course has been  "complicated with persistent chronic gdhlk-yzeusl-isqh disease (oral, manifested with weight loss), most recently controlled with prednisone 20mg alternating with 10mg. He has been following with Dr. Enciso. Since this fall he has developed some shortness of breath on exertion. This has been worsening over the last few months - he notices it mostly with activities. He is still able to walk up stairs, carry groceries, etc, but he does noticed it with that. He was referred to pulmonology but not scheduled to see until later this month.      He has been having some cough (non productive), ear pain and sore throat for the last week, for which he went to urgent care on Saturday. Strep test negative and exam otherwise negative. However, he was found to have a low O2 sat. It was recommended that he go to the ED for further workup but he chose rather to go to his PMD today. At his PMD he had a CXR (outside our system) which by report showed bilateral consolidated infiltrates with some pleural effusions. He then had a CT chest done today which showed extensive areas of infiltrate/consolidation in the lungs, with a peripheral and subpleural prominence in the lower lungs. (Results from outside hospital copied below.      No myaligias, fevers, chills, lower extremity edema, or orthopnea. He has been having exertional chest pain which has resolved.      Appetite has been good with the prednisone and his weight has maintained.   Of note his eyes have been a bit more red recently. He has had a history of eye irritation with his GVHD for which he had seen ophthalmology and been on eye drops in the past.   He denies any rashes or diarrhea.   Physical Exam:    /86 (BP Location: Right arm)   Pulse 86   Temp 98.2  F (36.8  C) (Oral)   Resp 18   Ht 1.778 m (5' 10\")   Wt 106.8 kg (235 lb 7.2 oz)   SpO2 96%   BMI 33.78 kg/m    General: NAD   Eyes: JEREMY, sclera anicteric   Nose/Mouth/Throat: OP clear, buccal mucosa slightly " dry. Receeding gum lines around teeth. No overt ulcerations or lichenoid changes.   Lungs: Crackles in bases bilaterally, breathing comfortable, good air exchange.   Cardiovascular: RRR, no M/R/G   Abdominal/Rectal: +BS, soft, NT, ND  Lymphatics: no edema  Skin: no rashes or petechaie, nodular appearing lesion on left middle finger PIP joint. Non tender, non-mobile  Neuro: A&O   Additional Findings: PIV in left arm      Hospital Course:    Byron Russo is a 58 year old man, who is 32 months s/p non-myeloablative allogeneic sibling PBSCT for MDS RAEB2, whose course has been complicated with persistent chronic lhkuq-zwzoee-fjxx disease (oral, manifested with weight loss), most recently controlled with prednisone 20mg alternating with 10mg. He was admitted for workup for finding of extensive bilateral pulmonary infiltrates. Remained afebrile and on room air at rest while admitted. Underwent BAL and was followed by pulmonary. Favor inflammatory process such as pleuroparenchymal fibroelastosis. Per Pulm's recs, plan to discharge on oxygen as needed with activity - follow up with Dr. Brady with repeat PFTs, CT, and follow up in BMT clinic to monitor BAL cx. Please see below for system based list of hospital events.      1. 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 Heme: counts have been normal, will monitor for transfusion needs. No recent transfusions     3. GVHD- known cGVHD (dry mouth, dry eyes). Remains on prednisone 10mg/20mg.   Continue cyclosporine 25mg BID- no intention of keeping therapeutic.   Previously on oral dex swish and spit and artificial tears, not currently using.   Pilocarpine for dry mouth      4. ID: Afebrile and VSS  Hypoxia with pulmonary infiltrates likely secondary to pluroparenchymal fibroelastosis: Appreciate Pulm recs.  CCT 2/4/19 at outside facility as above. Images available in PACS. Started  empirically on cefepime, vancomycin, and azithromycin.  BAL 2/6. Low concern for infectious etiology. Will have pt follow up with Dr. Brady with repeat CT and PFTs and consideration for surgical lung biopsy if indicated. Follow BAL cultures in the outpatient setting.   - RVP, b-d-glucan, galactomannan, and crypto antigen neg, and sputum cx pending. Discontinue IV abx 2/7.  - MARIA R, ANCA, pending. .   - Continue prophy ACV, fluconazole, and TMP-SMX. Re-start levo on discharge.   - History of VRE     5. Pulm  SOB/CUELLAR with infiltrates- see above. Sats are 91% on RA. Oxygen as needed to keep sats >90%. Walking O2 sats indicate pt needs ~ 4-6L O2 with activity.      6. CV: Known CAD with 5 drug eluding stents   Hold ASA  Continue metoprolol  Hold Crestor for now- okay to resume on discharge (spoke with pharmacy. Dosing okay with ongoing CSA)     7. Endo  Continue levothyroxine 150 mcg daily     8. Renal/FEN  Regular diet   Cr and Lytes wnl- replete per sliding scale.   Continue magnesium supplementation     9. ENT  Continue PTA Pilocarpine daily for dry mouth     10. GI  Continue PTA zantac      11. Prophy: Consider lovenox prophylaxis if pt remains hospitalized for more than a few days.      12. Code: FULL     13. Dispo: Discharge patient today with follow up in BMT clinic and with Dr. Brady to follow BAL cultures, repeat CCT, and repeat PFTs. Oxygen given on discharge to keep O2 sats >90%    CODE STATUS: Full Code  Discharge Instructions and Follow-Up:    Discharge diet: Regular diet as tolerated  Discharge activity: Activity as tolerated   Discharge follow-up:   - Follow up with Dr. Brady requested by Pulm team prior to discharge. Appointment day and time pending. YOHANA Luke aware to keep an eye out on these appointments. - Schedulers will call patient with time.   2/13/19- 2:30pm repeat PFTs, 3:30pm labs, 4:00 with Dr. Enciso.     Discharge Disposition:    Discharged to home.    Gerald Payne,  SOFIE  [phone]

## 2019-02-07 NOTE — PLAN OF CARE
[id number]. Pt AVSS. A&Ox4. Denies any pain throughout my shift. Up independently. 3L NC stating at 96%. Vanco dosage changed. Peripheral IV in right lower arm infiltrated last night, removed without a problem and ice applied. Still has other right arm peripheral IV in place. No replacements needed. Possible discharge home today.    Adult Inpatient Plan of Care  Plan of Care Review  2/7/2019 0426 - No Change by Blayne Carrillo, RN     Infection (Pneumonia)  Resolution of Infection Signs/Symptoms  2/7/2019 0426 - No Change by Blayne Carrillo, RN     Respiratory Compromise (Pneumonia)  Effective Oxygenation and Ventilation  2/7/2019 0426 - No Change by Blayne Carrillo, RN

## 2019-02-08 ENCOUNTER — TELEPHONE (OUTPATIENT)
Dept: ONCOLOGY | Facility: CLINIC | Age: 59
End: 2019-02-08

## 2019-02-08 LAB
ACID FAST STN SPEC QL: NORMAL
APPEARANCE FLD: CLEAR
APPEARANCE FLD: NORMAL
BACTERIA SPEC CULT: ABNORMAL
BACTERIA SPEC CULT: NORMAL
BACTERIA SPEC CULT: NORMAL
COLOR FLD: COLORLESS
COLOR FLD: COLORLESS
EOSINOPHIL NFR FLD MANUAL: 2 %
EOSINOPHIL NFR FLD MANUAL: 4 %
LYMPHOCYTES NFR FLD MANUAL: 39 %
LYMPHOCYTES NFR FLD MANUAL: 65 %
Lab: ABNORMAL
MONOS+MACROS NFR FLD MANUAL: 27 %
NEUTS BAND NFR FLD MANUAL: 4 %
NEUTS BAND NFR FLD MANUAL: 8 %
OTHER CELLS FLD MANUAL: 51 %
SPECIMEN SOURCE FLD: NORMAL
SPECIMEN SOURCE FLD: NORMAL
SPECIMEN SOURCE: ABNORMAL
SPECIMEN SOURCE: NORMAL
WBC # FLD AUTO: 190 /UL
WBC # FLD AUTO: 290 /UL

## 2019-02-08 NOTE — TELEPHONE ENCOUNTER
POST HOSPITAL DISCHARGE FOLLOW-UP PHONE CALL    Follow-Up Type: Hospital Discharge - Follow-Up Call  Date of Admission:  2/4/19  Date of Discharge:  2/7/19  Discharge Diagnosis:  Hypoxia with pulmonary infiltrates, secondary to pleuroparenchymal fibroelastosis  Next BMT Follow-up Visit:  Wed 2/13: 230 PFTs, 330 labs, 4pm Fernie  Discharging Provider:  Uli Enciso  Primary BMT Physician:  Uli Enciso    Summary of Encounter:  Contacted patient via phone to conduct follow-up assessment post recent hospital discharge. Patient verbalized that he received and understands written post discharge care instructions, has a current medication list as well as contact phone numbers. Patient understands to call BMT office @ [phone] during office hours Mon-Fri 8am-4:30pm, and [phone] after hours to report symptoms. Patient verbalized that he has all necessary medications, has no questions regarding their administration instructions and is currently taking them without difficulty. Patient was able to verbalize next follow-up visit with BMT Provider Uli Enciso on 2/13 (see above). Patient states that he plans to call [facility] at [phone] to follow up regarding pending home O2 delivery that was planned for this AM. Patient states he plans to purchase a fingertip pulse oximeter at the store today. Patient does not report any new symptoms or concerns and has no further questions at this time.

## 2019-02-11 LAB
BACTERIA SPEC CULT: ABNORMAL
BACTERIA SPEC CULT: ABNORMAL
Lab: ABNORMAL
SPECIMEN SOURCE: ABNORMAL

## 2019-02-13 ENCOUNTER — APPOINTMENT (OUTPATIENT)
Dept: LAB | Facility: CLINIC | Age: 59
End: 2019-02-13
Attending: INTERNAL MEDICINE
Payer: COMMERCIAL

## 2019-02-13 ENCOUNTER — ONCOLOGY VISIT (OUTPATIENT)
Dept: TRANSPLANT | Facility: CLINIC | Age: 59
End: 2019-02-13
Attending: INTERNAL MEDICINE
Payer: COMMERCIAL

## 2019-02-13 VITALS
OXYGEN SATURATION: 92 % | SYSTOLIC BLOOD PRESSURE: 130 MMHG | DIASTOLIC BLOOD PRESSURE: 83 MMHG | HEART RATE: 103 BPM | RESPIRATION RATE: 18 BRPM | WEIGHT: 238.4 LBS | BODY MASS INDEX: 34.21 KG/M2 | TEMPERATURE: 98.2 F

## 2019-02-13 DIAGNOSIS — T86.09 GVHD AS COMPLICATION OF BONE MARROW TRANSPLANT (H): ICD-10-CM

## 2019-02-13 DIAGNOSIS — D89.813 GVHD AS COMPLICATION OF BONE MARROW TRANSPLANT (H): ICD-10-CM

## 2019-02-13 DIAGNOSIS — D89.811 CHRONIC GVHD (H): ICD-10-CM

## 2019-02-13 DIAGNOSIS — D46.22 RAEB-2 (REFRACTORY ANEMIA WITH EXCESS BLASTS-2) (H): ICD-10-CM

## 2019-02-13 LAB
ALBUMIN SERPL-MCNC: 3.5 G/DL (ref 3.4–5)
ALP SERPL-CCNC: 94 U/L (ref 40–150)
ALT SERPL W P-5'-P-CCNC: 37 U/L (ref 0–70)
ANION GAP SERPL CALCULATED.3IONS-SCNC: 7 MMOL/L (ref 3–14)
AST SERPL W P-5'-P-CCNC: 41 U/L (ref 0–45)
BASOPHILS # BLD AUTO: 0.1 10E9/L (ref 0–0.2)
BASOPHILS NFR BLD AUTO: 0.9 %
BILIRUB SERPL-MCNC: 0.4 MG/DL (ref 0.2–1.3)
BUN SERPL-MCNC: 15 MG/DL (ref 7–30)
CALCIUM SERPL-MCNC: 8.6 MG/DL (ref 8.5–10.1)
CHLORIDE SERPL-SCNC: 106 MMOL/L (ref 94–109)
CO2 SERPL-SCNC: 25 MMOL/L (ref 20–32)
CREAT SERPL-MCNC: 0.81 MG/DL (ref 0.66–1.25)
DIFFERENTIAL METHOD BLD: ABNORMAL
EOSINOPHIL # BLD AUTO: 0.1 10E9/L (ref 0–0.7)
EOSINOPHIL NFR BLD AUTO: 0.9 %
ERYTHROCYTE [DISTWIDTH] IN BLOOD BY AUTOMATED COUNT: 12.9 % (ref 10–15)
GFR SERPL CREATININE-BSD FRML MDRD: >90 ML/MIN/{1.73_M2}
GLUCOSE SERPL-MCNC: 94 MG/DL (ref 70–99)
HCT VFR BLD AUTO: 48.3 % (ref 40–53)
HGB BLD-MCNC: 16.5 G/DL (ref 13.3–17.7)
IMM GRANULOCYTES # BLD: 0.1 10E9/L (ref 0–0.4)
IMM GRANULOCYTES NFR BLD: 0.7 %
LYMPHOCYTES # BLD AUTO: 2.8 10E9/L (ref 0.8–5.3)
LYMPHOCYTES NFR BLD AUTO: 35.1 %
MAGNESIUM SERPL-MCNC: 2.1 MG/DL (ref 1.6–2.3)
MCH RBC QN AUTO: 33.1 PG (ref 26.5–33)
MCHC RBC AUTO-ENTMCNC: 34.2 G/DL (ref 31.5–36.5)
MCV RBC AUTO: 97 FL (ref 78–100)
MONOCYTES # BLD AUTO: 1.3 10E9/L (ref 0–1.3)
MONOCYTES NFR BLD AUTO: 15.8 %
NEUTROPHILS # BLD AUTO: 3.8 10E9/L (ref 1.6–8.3)
NEUTROPHILS NFR BLD AUTO: 46.6 %
NRBC # BLD AUTO: 0 10*3/UL
NRBC BLD AUTO-RTO: 0 /100
PLATELET # BLD AUTO: 227 10E9/L (ref 150–450)
POTASSIUM SERPL-SCNC: 3.7 MMOL/L (ref 3.4–5.3)
PROT SERPL-MCNC: 7.3 G/DL (ref 6.8–8.8)
RBC # BLD AUTO: 4.98 10E12/L (ref 4.4–5.9)
SODIUM SERPL-SCNC: 138 MMOL/L (ref 133–144)
WBC # BLD AUTO: 8.1 10E9/L (ref 4–11)

## 2019-02-13 PROCEDURE — 82784 ASSAY IGA/IGD/IGG/IGM EACH: CPT | Performed by: PHYSICIAN ASSISTANT

## 2019-02-13 PROCEDURE — 36415 COLL VENOUS BLD VENIPUNCTURE: CPT

## 2019-02-13 PROCEDURE — 83735 ASSAY OF MAGNESIUM: CPT | Performed by: PHYSICIAN ASSISTANT

## 2019-02-13 PROCEDURE — G0463 HOSPITAL OUTPT CLINIC VISIT: HCPCS | Mod: ZF

## 2019-02-13 PROCEDURE — 80053 COMPREHEN METABOLIC PANEL: CPT | Performed by: PHYSICIAN ASSISTANT

## 2019-02-13 PROCEDURE — 85025 COMPLETE CBC W/AUTO DIFF WBC: CPT | Performed by: PHYSICIAN ASSISTANT

## 2019-02-13 RX ORDER — POSACONAZOLE 100 MG/1
300 TABLET, DELAYED RELEASE ORAL EVERY MORNING
Qty: 90 TABLET | Refills: 0 | Status: SHIPPED | OUTPATIENT
Start: 2019-02-13 | End: 2019-07-29

## 2019-02-13 ASSESSMENT — PAIN SCALES - GENERAL: PAINLEVEL: NO PAIN (0)

## 2019-02-13 NOTE — PROGRESS NOTES
HISTORY OF PRESENT ILLNESS:  I saw Byron Russo today in clinic for followup from recent hospitalization for hypoxia with pulmonary infiltrates which was felt secondary to pleural parenchymal fibroelastosis.  He underwent bronchoscopy which was negative when he was hospitalized, but today the BAL specimen was positive for Candida glabrata.  Byron was discharged on supplemental oxygen 4 liters per minute based on hypoxia, especially on walking documented in the clinic.  He states that he has been able to return to work.  He brings the oxygen and he uses it at lunchtime only.  Other than that, he occasionally uses at home.  He does not sleep with oxygen.        REVIEW OF SYSTEMS:  No fevers, chills, nausea, vomiting, diarrhea, cough, occasional shortness of breath.  No hemoptysis, chest pain, abdominal pain, new rash or mouth sores.  The remainder of the 10-point review of systems is normal.      PHYSICAL EXAMINATION:   GENERAL:  Byron looked rather well.   VITAL SIGNS:  Unremarkable.  He was satting at 92% on room air.   HEENT:  Sclerae are anicteric and not injected.  Buccal mucosa was normal.   LUNGS:  Clear to auscultation.   CARDIAC:  Without S3, rub or murmur.   ABDOMEN:  Nondistended, bowel sounds, no organomegaly.   EXTREMITIES:  Trace pedal edema.   SKIN:  No skin rash.      LABORATORY DATA:  Today's labs include a white count 8100, hemoglobin 16.5, platelets 227,000.  Electrolyte panel was normal, as were his liver function tests.      ASSESSMENT AND PLAN:   1.  Approximately 3 years status post sibling transplant for MDS RAEB-2, currently in complete remission.   2.  History of chronic zzpcf-sqtzqw-bbnw disease for which he currently takes 10 alternating with 20 mg of prednisone daily as well as 25 mg of cyclosporine daily.  We will not change this.   3.  Recent history for increased pulmonary infiltrates and hypoxia.  Pulmonary feels this is possible pleural parenchymal fibroelastosis.  Dr. Brady will  be seeing Byron in 2 weeks with a repeat CT scan.  Because of the BAL growing Candida glabrata, I prescribed posaconazole 300 mg daily.  This can be discontinued by Dr. Brady if he feels that the Candida glabrata isolate might represent a contaminant because it took so long to grow.  I will plan on seeing him in 2 months.     Uli Enciso M.D.

## 2019-02-13 NOTE — NURSING NOTE
Chief Complaint   Patient presents with     Blood Draw     labs drawn via vpt by rn. vs taken      Blood drawn via vpt by RN in lab. VS taken. Pt checked into next appointment.   Christen Bowen RN

## 2019-02-13 NOTE — NURSING NOTE
"Oncology Rooming Note    February 13, 2019 3:56 PM   Byron Russo is a 58 year old male who presents for:    Chief Complaint   Patient presents with     Blood Draw     labs drawn via vpt by rn. vs taken      RECHECK     RTN- MDS     Initial Vitals: /83 (BP Location: Right arm, Patient Position: Sitting, Cuff Size: Adult Regular)   Pulse 103   Temp 98.2  F (36.8  C) (Oral)   Resp 18   Wt 108.1 kg (238 lb 6.4 oz)   SpO2 92%   BMI 34.21 kg/m   Estimated body mass index is 34.21 kg/m  as calculated from the following:    Height as of 2/4/19: 1.778 m (5' 10\").    Weight as of this encounter: 108.1 kg (238 lb 6.4 oz). Body surface area is 2.31 meters squared.  No Pain (0) Comment: Data Unavailable   No LMP for male patient.  Allergies reviewed: Yes  Medications reviewed: Yes    Medications: Medication refills not needed today.  Pharmacy name entered into [facility]:    [facility] [place], [place] - [id number]  [facility] [place], [place] - [address]    Clinical concerns: None         Erica Fuentes CMA              "

## 2019-02-13 NOTE — LETTER
2/13/2019    RE: Byron Russo  [address] [address]     HISTORY OF PRESENT ILLNESS:  I saw Byron Russo today in clinic for followup from recent hospitalization for hypoxia with pulmonary infiltrates which was felt secondary to pleural parenchymal fibroelastosis.  He underwent bronchoscopy which was negative when he was hospitalized, but today the BAL specimen was positive for Candida glabrata.  Byron was discharged on supplemental oxygen 4 liters per minute based on hypoxia, especially on walking documented in the clinic.  He states that he has been able to return to work.  He brings the oxygen and he uses it at lunchtime only.  Other than that, he occasionally uses at home.  He does not sleep with oxygen.        REVIEW OF SYSTEMS:  No fevers, chills, nausea, vomiting, diarrhea, cough, occasional shortness of breath.  No hemoptysis, chest pain, abdominal pain, new rash or mouth sores.  The remainder of the 10-point review of systems is normal.      PHYSICAL EXAMINATION:   GENERAL:  Byron looked rather well.   VITAL SIGNS:  Unremarkable.  He was satting at 92% on room air.   HEENT:  Sclerae are anicteric and not injected.  Buccal mucosa was normal.   LUNGS:  Clear to auscultation.   CARDIAC:  Without S3, rub or murmur.   ABDOMEN:  Nondistended, bowel sounds, no organomegaly.   EXTREMITIES:  Trace pedal edema.   SKIN:  No skin rash.      LABORATORY DATA:  Today's labs include a white count 8100, hemoglobin 16.5, platelets 227,000.  Electrolyte panel was normal, as were his liver function tests.      ASSESSMENT AND PLAN:   1.  Approximately 3 years status post sibling transplant for MDS RAEB-2, currently in complete remission.   2.  History of chronic mxsbp-dhmwht-mpmf disease for which he currently takes 10 alternating with 20 mg of prednisone daily as well as 25 mg of cyclosporine daily.  We will not change this.   3.  Recent history for increased pulmonary infiltrates and  hypoxia.  Pulmonary feels this is possible pleural parenchymal fibroelastosis.  Dr. Brady will be seeing Byron in 2 weeks with a repeat CT scan.  Because of the BAL growing Candida glabrata, I prescribed posaconazole 300 mg daily.  This can be discontinued by Dr. Brady if he feels that the Candida glabrata isolate might represent a contaminant because it took so long to grow.  I will plan on seeing him in 2 months.     Uli Enciso M.D.

## 2019-02-14 LAB — IGG SERPL-MCNC: 1330 MG/DL (ref 695–1620)

## 2019-02-17 LAB
DLCOCOR-%PRED-PRE: 61 %
DLCOCOR-PRE: 18.64 ML/MIN/MMHG
DLCOUNC-%PRED-PRE: 64 %
DLCOUNC-PRE: 19.29 ML/MIN/MMHG
DLCOUNC-PRED: 30.1 ML/MIN/MMHG
ERV-%PRED-PRE: 53 %
ERV-PRE: 0.61 L
ERV-PRED: 1.13 L
EXPTIME-PRE: 7.43 SEC
FEF2575-%PRED-POST: 77 %
FEF2575-%PRED-PRE: 77 %
FEF2575-POST: 2.61 L/SEC
FEF2575-PRE: 2.63 L/SEC
FEF2575-PRED: 3.38 L/SEC
FEFMAX-%PRED-PRE: 76 %
FEFMAX-PRE: 7.65 L/SEC
FEFMAX-PRED: 9.95 L/SEC
FEV1-%PRED-PRE: 62 %
FEV1-PRE: 2.5 L
FEV1FEV6-PRE: 84 %
FEV1FEV6-PRED: 80 %
FEV1FVC-PRE: 84 %
FEV1FVC-PRED: 78 %
FEV1SVC-PRE: 80 %
FEV1SVC-PRED: 73 %
FIFMAX-PRE: 6.14 L/SEC
FVC-%PRED-PRE: 57 %
FVC-PRE: 2.97 L
FVC-PRED: 5.13 L
IC-%PRED-PRE: 58 %
IC-PRE: 2.52 L
IC-PRED: 4.3 L
VA-%PRED-PRE: 59 %
VA-PRE: 4.21 L
VC-%PRED-PRE: 57 %
VC-PRE: 3.13 L
VC-PRED: 5.43 L

## 2019-02-26 ENCOUNTER — OFFICE VISIT (OUTPATIENT)
Dept: PULMONOLOGY | Facility: CLINIC | Age: 59
End: 2019-02-26
Attending: INTERNAL MEDICINE
Payer: COMMERCIAL

## 2019-02-26 VITALS
RESPIRATION RATE: 16 BRPM | HEIGHT: 70 IN | BODY MASS INDEX: 33.99 KG/M2 | WEIGHT: 237.4 LBS | OXYGEN SATURATION: 97 % | TEMPERATURE: 97.1 F | DIASTOLIC BLOOD PRESSURE: 84 MMHG | SYSTOLIC BLOOD PRESSURE: 134 MMHG | HEART RATE: 96 BPM

## 2019-02-26 DIAGNOSIS — R06.02 SOB (SHORTNESS OF BREATH): Primary | ICD-10-CM

## 2019-02-26 PROCEDURE — G0463 HOSPITAL OUTPT CLINIC VISIT: HCPCS | Mod: ZF

## 2019-02-26 ASSESSMENT — MIFFLIN-ST. JEOR: SCORE: 1913.09

## 2019-02-26 ASSESSMENT — PAIN SCALES - GENERAL: PAINLEVEL: MILD PAIN (3)

## 2019-02-26 NOTE — NURSING NOTE
"Oncology Rooming Note    February 26, 2019 4:39 PM   Byron Russo is a 58 year old male who presents for:    Chief Complaint   Patient presents with     RECHECK     Return: MDS (myelodysplastic syndrome)     Initial Vitals: /84 (BP Location: Right arm, Patient Position: Sitting, Cuff Size: Adult Large)   Pulse 96   Temp 97.1  F (36.2  C) (Oral)   Resp 16   Ht 1.778 m (5' 10\")   Wt 107.7 kg (237 lb 6.4 oz)   SpO2 97%   BMI 34.06 kg/m   Estimated body mass index is 34.06 kg/m  as calculated from the following:    Height as of this encounter: 1.778 m (5' 10\").    Weight as of this encounter: 107.7 kg (237 lb 6.4 oz). Body surface area is 2.31 meters squared.  Mild Pain (3) Comment: Data Unavailable   No LMP for male patient.  Allergies reviewed: Yes  Medications reviewed: Yes    Medications: Medication refills not needed today.  Pharmacy name entered into EPIC:    [facility] [place], [place] - [phone]  [facility] [address] [place] [address] [facility]    Clinical concerns: N/A      Marlin Summers CMA              "

## 2019-02-26 NOTE — PROGRESS NOTES
Reason for Visit  Byron Russo is a 58 year old year old male who is being seen for RECHECK (Return: MDS (myelodysplastic syndrome))    Pulmonary HPI  59 YO male with history of MDS RAEB-2 s/p Allo-sib HSCT 3 years ago who was recently hospitalized for increased SOB/CUELLAR and hypoxia.  Underwent CT chest on admission that showed peripheral findings with a few middle lung nodular infiltrates.  Underwent bronchoscopy with BAL showing only 190 and 290 WBC in a lymphocyte predominant pattern, only microbiology findings were Candida; was started on Posaconazole. Discharged on 4L of oxygen.  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  SOB and CUELLAR is improved since discharge.  States onset of symptoms was in 10-18, noticed increased SOB while doing yardwork.  States normal walking does not cause a problem, only if he is walking fast or carrying items. Denies any prior history of lung disease- no history of asthma, no pneumonia, no complications with chemotherapy.  Has a history of cGvHD, is on alternating prednisone of 10/20 mg and cyclosporin of 25 mg every day.  Review of chest CT pattern appears consistent with PPFE, cannot rule out nodular areas representing infection.       The patient was seen and examined by Travis Brady           Current Outpatient Medications   Medication     acetaminophen (TYLENOL) 325 MG tablet     acyclovir (ZOVIRAX) 800 MG tablet     amoxicillin (AMOXIL) 500 MG tablet     aspirin 81 MG EC tablet     cycloSPORINE modified (GENERIC EQUIVALENT) 25 MG capsule     dexamethasone 0.1 MG/ML solution     levofloxacin (LEVAQUIN) 250 MG tablet     levothyroxine (SYNTHROID/LEVOTHROID) 150 MCG tablet     magnesium oxide (MAG-OX) 400 (241.3 MG) MG tablet     metoprolol tartrate (LOPRESSOR) 25 MG tablet     NITROGLYCERIN ER PO     order for DME     order for DME     order for DME     order for DME     pilocarpine (SALAGEN) 5 MG tablet      posaconazole (NOXAFIL) 100 MG EC tablet     predniSONE (DELTASONE) 10 MG tablet     ranitidine (ZANTAC) 150 MG tablet     rosuvastatin (CRESTOR) 5 MG tablet     sulfamethoxazole-trimethoprim (BACTRIM DS/SEPTRA DS) 800-160 MG tablet     No current facility-administered medications for this visit.      Allergies   Allergen Reactions     Atorvastatin Other (See Comments)     Back pain  Tolerates atrovastatin     Docosahexaenoic Acid (Dha)      Other reaction(s): Intolerance-Can't Take - Omega 3 fish oil     Past Medical History:   Diagnosis Date     CAD (coronary artery disease) 2001     Hyperlipidemia      Hypothyroidism      Obesity      Pulmonary embolism (H) 2/2016     Varicose veins        Past Surgical History:   Procedure Laterality Date     APPENDECTOMY       ARTHROSCOPY KNEE WITH MEDIAL MENISCECTOMY  6/20/2011    Procedure:ARTHROSCOPY KNEE WITH MEDIAL MENISCECTOMY; Surgeon:SACHIN SAMANO; Location:[facility]     BRONCHOSCOPY (RIGID OR FLEXIBLE), DIAGNOSTIC N/A 2/6/2019    Procedure: COMBINED BRONCHOSCOPY (RIGID OR FLEXIBLE), LAVAGE;  Surgeon: Louise Rogel MD;  Location:  GI     coronary artery stents placed x 5  2001       Social History     Socioeconomic History     Marital status:      Spouse name: Not on file     Number of children: Not on file     Years of education: Not on file     Highest education level: Not on file   Occupational History     Not on file   Social Needs     Financial resource strain: Not on file     Food insecurity:     Worry: Not on file     Inability: Not on file     Transportation needs:     Medical: Not on file     Non-medical: Not on file   Tobacco Use     Smoking status: Never Smoker     Smokeless tobacco: Former User   Substance and Sexual Activity     Alcohol use: No     Alcohol/week: 0.0 oz     Drug use: No     Sexual activity: Not on file   Lifestyle     Physical activity:     Days per week: Not on file     Minutes per session: Not on file     Stress: Not on file  "  Relationships     Social connections:     Talks on phone: Not on file     Gets together: Not on file     Attends Spiritism service: Not on file     Active member of club or organization: Not on file     Attends meetings of clubs or organizations: Not on file     Relationship status: Not on file     Intimate partner violence:     Fear of current or ex partner: Not on file     Emotionally abused: Not on file     Physically abused: Not on file     Forced sexual activity: Not on file   Other Topics Concern     Parent/sibling w/ CABG, MI or angioplasty before 65F 55M? Not Asked   Social History Narrative     Not on file       FH:  CAD- Father and Brother; Glaucoma- mother  ROS Pulmonary  A complete ROS was otherwise negative except as noted in the HPI.  /84 (BP Location: Right arm, Patient Position: Sitting, Cuff Size: Adult Large)   Pulse 96   Temp 97.1  F (36.2  C) (Oral)   Resp 16   Ht 1.778 m (5' 10\")   Wt 107.7 kg (237 lb 6.4 oz)   SpO2 97%   BMI 34.06 kg/m    Exam:   GENERAL APPEARANCE: Well developed, well nourished, alert, and in no apparent distress.  EYES: PERRL, EOMI  HENT: Nasal mucosa with no edema and no hyperemia. No nasal polyps.  EARS: Canals clear, TMs normal  MOUTH: Oral mucosa is moist, without any lesions, no tonsillar enlargement, no oropharyngeal exudate.  NECK: supple, no masses, no thyromegaly.  LYMPHATICS: No significant axillary, cervical, or supraclavicular nodes.  RESP:  Good air flow throughout.  Crackles in bilateral bases and mid left lung posteriorly. No rhonchi. No wheezes.  CV: Normal S1, S2, regular rhythm, normal rate. No murmur.  No rub. No gallop. No LE edema.   ABDOMEN:  Bowel sounds normal, soft, nontender, no HSM or masses.   MS: extremities normal. No clubbing. No cyanosis.  SKIN: no rash on limited exam  NEURO: Mentation intact, speech normal, normal strength and tone, normal gait and stance  PSYCH: mentation appears normal. and affect " normal/bright  Results:  Spirometry from 2-13-19 was personally reviewed in clinic  FVC 2.97 (57%), FEV-1 2.50 (62%), FEV-1/FVC 84%.  No significant change after bronchodilator  cDLCO 61%  No airflow limitation.  No significant change after bronchodilator.  Suggestive of a restrictive process, need lung volumes for confirmation. Decreased corrected diffusion capacity.  Compared to 2-21-17 the FVC and FEV-1 are not significantly changed.  No results found for this or any previous visit (from the past 168 hour(s)).    Assessment and plan:   57 YO s/p HSCT post 3years with sub-acute presentation with SOB and CUELLAR with recent worsening.  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  Unclear of cause of acute worsening but may be related to concomitant viral infection, cannot fully exclude fungal infection but BAL cultures have remained negative. No effective treatment for PPFE, steroids do not seem to be effective, however in my experience the disease does not seem to progress.  Based on lack of treatment and seemingly no/slow progression, I would not proceed with biopsy to confirm the diagnosis. With impaired lung function he is at risk to develop respiratory complications during times of respiratory infections.    1. CT chest in one month  2. Lung volumes today or in one week  3. Oxygen titration study on room air at the time of next visit; will assess if can discontinue oxygen at that time  4. Ok to discontinue posaconazole  5. Continue prednisone and CYA as per BMT    RTC in one month with repeat CT chest.  Patient to call with any questions or concerns prior to his next clinic visit

## 2019-02-26 NOTE — LETTER
2/26/2019       RE: Byron Russo  [id number] [facility] [id number]     Dear Colleague,    Thank you for referring your patient, Byron Russo, to the [facility] at [facility]. Please see a copy of my visit note below.    Reason for Visit  Byron Russo is a 58 year old year old male who is being seen for RECHECK (Return: MDS (myelodysplastic syndrome))    Pulmonary HPI  57 YO male with history of MDS RAEB-2 s/p Allo-sib HSCT 3 years ago who was recently hospitalized for increased SOB/CUELLAR and hypoxia.  Underwent CT chest on admission that showed peripheral findings with a few middle lung nodular infiltrates.  Underwent bronchoscopy with BAL showing only 190 and 290 WBC in a lymphocyte predominant pattern, only microbiology findings were Candida; was started on Posaconazole. Discharged on 4L of oxygen.  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  SOB and CUELLAR is improved since discharge.  States onset of symptoms was in 10-18, noticed increased SOB while doing yardwork.  States normal walking does not cause a problem, only if he is walking fast or carrying items. Denies any prior history of lung disease- no history of asthma, no pneumonia, no complications with chemotherapy.  Has a history of cGvHD, is on alternating prednisone of 10/20 mg and cyclosporin of 25 mg every day.  Review of chest CT pattern appears consistent with PPFE, cannot rule out nodular areas representing infection.       The patient was seen and examined by Travis Brady           Current Outpatient Medications   Medication     acetaminophen (TYLENOL) 325 MG tablet     acyclovir (ZOVIRAX) 800 MG tablet     amoxicillin (AMOXIL) 500 MG tablet     aspirin 81 MG EC tablet     cycloSPORINE modified (GENERIC EQUIVALENT) 25 MG capsule     dexamethasone 0.1 MG/ML solution     levofloxacin (LEVAQUIN) 250 MG  tablet     levothyroxine (SYNTHROID/LEVOTHROID) 150 MCG tablet     magnesium oxide (MAG-OX) 400 (241.3 MG) MG tablet     metoprolol tartrate (LOPRESSOR) 25 MG tablet     NITROGLYCERIN ER PO     order for DME     order for DME     order for DME     order for DME     pilocarpine (SALAGEN) 5 MG tablet     posaconazole (NOXAFIL) 100 MG EC tablet     predniSONE (DELTASONE) 10 MG tablet     ranitidine (ZANTAC) 150 MG tablet     rosuvastatin (CRESTOR) 5 MG tablet     sulfamethoxazole-trimethoprim (BACTRIM DS/SEPTRA DS) 800-160 MG tablet     No current facility-administered medications for this visit.      Allergies   Allergen Reactions     Atorvastatin Other (See Comments)     Back pain  Tolerates atrovastatin     Docosahexaenoic Acid (Dha)      Other reaction(s): Intolerance-Can't Take - Omega 3 fish oil     Past Medical History:   Diagnosis Date     CAD (coronary artery disease) 2001     Hyperlipidemia      Hypothyroidism      Obesity      Pulmonary embolism (H) 2/2016     Varicose veins        Past Surgical History:   Procedure Laterality Date     APPENDECTOMY       ARTHROSCOPY KNEE WITH MEDIAL MENISCECTOMY  6/20/2011    Procedure:ARTHROSCOPY KNEE WITH MEDIAL MENISCECTOMY; Surgeon:SACHIN SAMANO; Location:[facility]     BRONCHOSCOPY (RIGID OR FLEXIBLE), DIAGNOSTIC N/A 2/6/2019    Procedure: COMBINED BRONCHOSCOPY (RIGID OR FLEXIBLE), LAVAGE;  Surgeon: Louise Rogel MD;  Location:  GI     coronary artery stents placed x 5  2001       Social History     Socioeconomic History     Marital status:      Spouse name: Not on file     Number of children: Not on file     Years of education: Not on file     Highest education level: Not on file   Occupational History     Not on file   Social Needs     Financial resource strain: Not on file     Food insecurity:     Worry: Not on file     Inability: Not on file     Transportation needs:     Medical: Not on file     Non-medical: Not on file   Tobacco Use     Smoking  "status: Never Smoker     Smokeless tobacco: Former User   Substance and Sexual Activity     Alcohol use: No     Alcohol/week: 0.0 oz     Drug use: No     Sexual activity: Not on file   Lifestyle     Physical activity:     Days per week: Not on file     Minutes per session: Not on file     Stress: Not on file   Relationships     Social connections:     Talks on phone: Not on file     Gets together: Not on file     Attends Yazidi service: Not on file     Active member of club or organization: Not on file     Attends meetings of clubs or organizations: Not on file     Relationship status: Not on file     Intimate partner violence:     Fear of current or ex partner: Not on file     Emotionally abused: Not on file     Physically abused: Not on file     Forced sexual activity: Not on file   Other Topics Concern     Parent/sibling w/ CABG, MI or angioplasty before 65F 55M? Not Asked   Social History Narrative     Not on file       ROS Pulmonary  A complete ROS was otherwise negative except as noted in the HPI.  /84 (BP Location: Right arm, Patient Position: Sitting, Cuff Size: Adult Large)   Pulse 96   Temp 97.1  F (36.2  C) (Oral)   Resp 16   Ht 1.778 m (5' 10\")   Wt 107.7 kg (237 lb 6.4 oz)   SpO2 97%   BMI 34.06 kg/m     Exam:   GENERAL APPEARANCE: Well developed, well nourished, alert, and in no apparent distress.  EYES: PERRL, EOMI  HENT: Nasal mucosa with no edema and no hyperemia. No nasal polyps.  EARS: Canals clear, TMs normal  MOUTH: Oral mucosa is moist, without any lesions, no tonsillar enlargement, no oropharyngeal exudate.  NECK: supple, no masses, no thyromegaly.  LYMPHATICS: No significant axillary, cervical, or supraclavicular nodes.  RESP:  Good air flow throughout.  Crackles in bilateral bases and mid left lung posteriorly. No rhonchi. No wheezes.  CV: Normal S1, S2, regular rhythm, normal rate. No murmur.  No rub. No gallop. No LE edema.   ABDOMEN:  Bowel sounds normal, soft, nontender, " no HSM or masses.   MS: extremities normal. No clubbing. No cyanosis.  SKIN: no rash on limited exam  NEURO: Mentation intact, speech normal, normal strength and tone, normal gait and stance  PSYCH: mentation appears normal. and affect normal/bright  Results:  Spirometry from 2-13-19 was personally reviewed in clinic  FVC 2.97 (57%), FEV-1 2.50 (62%), FEV-1/FVC 84%.  No significant change after bronchodilator  cDLCO 61%  No airflow limitation.  No significant change after bronchodilator.  Suggestive of a restrictive process, need lung volumes for confirmation. Decreased corrected diffusion capacity.  Compared to 2-21-17 the FVC and FEV-1 are not significantly changed.  No results found for this or any previous visit (from the past 168 hour(s)).    Assessment and plan:   57 YO s/p HSCT post 3years with sub-acute presentation with SOB and CUELLAR with recent worsening.  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  Unclear of cause of acute worsening but may be related to concomitant viral infection, cannot fully exclude fungal infection but BAL cultures have remained negative. No effective treatment for PPFE, steroids do not seem to be effective, however in my experience the disease does not seem to progress.  Based on lack of treatment and seemingly no/slow progression, I would not proceed with biopsy to confirm the diagnosis. With impaired lung function he is at risk to develop respiratory complications during times of respiratory infections.    1. CT chest in one month  2. Lung volumes today or in one week  3. Oxygen titration study on room air at the time of next visit; will assess if can discontinue oxygen at that time  4. Ok to discontinue posaconazole  5. Continue prednisone and CYA as per BMT    RTC in one month with repeat CT chest.  Patient to call with any questions or concerns prior to his next clinic visit      Travis Brady,  MD

## 2019-03-04 DIAGNOSIS — D46.22 RAEB-2 (REFRACTORY ANEMIA WITH EXCESS BLASTS-2) (H): Primary | ICD-10-CM

## 2019-03-05 LAB
DLCOUNC-%PRED-PRE: 87 %
DLCOUNC-PRE: 24.89 ML/MIN/MMHG
DLCOUNC-PRED: 28.33 ML/MIN/MMHG
ERV-%PRED-PRE: 63 %
ERV-PRE: 0.58 L
ERV-PRED: 0.92 L
EXPTIME-PRE: 7.19 SEC
FEF2575-%PRED-PRE: 82 %
FEF2575-PRE: 2.66 L/SEC
FEF2575-PRED: 3.23 L/SEC
FEFMAX-%PRED-PRE: 86 %
FEFMAX-PRE: 8.25 L/SEC
FEFMAX-PRED: 9.5 L/SEC
FEV1-%PRED-PRE: 68 %
FEV1-PRE: 2.57 L
FEV1FEV6-PRE: 82 %
FEV1FEV6-PRED: 80 %
FEV1FVC-PRE: 81 %
FEV1FVC-PRED: 78 %
FEV1SVC-PRE: 68 %
FEV1SVC-PRED: 73 %
FIFMAX-PRE: 7.47 L/SEC
FRCPLETH-%PRED-PRE: 60 %
FRCPLETH-PRE: 2.18 L
FRCPLETH-PRED: 3.57 L
FVC-%PRED-PRE: 66 %
FVC-PRE: 3.19 L
FVC-PRED: 4.79 L
IC-%PRED-PRE: 76 %
IC-PRE: 3.18 L
IC-PRED: 4.17 L
RVPLETH-%PRED-PRE: 68 %
RVPLETH-PRE: 1.6 L
RVPLETH-PRED: 2.33 L
TLCPLETH-%PRED-PRE: 75 %
TLCPLETH-PRE: 5.36 L
TLCPLETH-PRED: 7.13 L
VA-%PRED-PRE: 74 %
VA-PRE: 4.88 L
VC-%PRED-PRE: 73 %
VC-PRE: 3.76 L
VC-PRED: 5.08 L

## 2019-03-06 LAB
BACTERIA SPEC CULT: NORMAL
BACTERIA SPEC CULT: NORMAL
FUNGUS SPEC CULT: ABNORMAL
FUNGUS SPEC CULT: ABNORMAL
FUNGUS SPEC CULT: NORMAL
SPECIMEN SOURCE: ABNORMAL
SPECIMEN SOURCE: NORMAL

## 2019-03-11 DIAGNOSIS — D46.22 RAEB-2 (REFRACTORY ANEMIA WITH EXCESS BLASTS-2) (H): ICD-10-CM

## 2019-03-11 RX ORDER — CYCLOSPORINE 25 MG/1
25 CAPSULE, LIQUID FILLED ORAL 2 TIMES DAILY
Qty: 180 CAPSULE | Refills: 1 | Status: SHIPPED | OUTPATIENT
Start: 2019-03-11 | End: 2019-08-29

## 2019-03-26 ENCOUNTER — OFFICE VISIT (OUTPATIENT)
Dept: PULMONOLOGY | Facility: CLINIC | Age: 59
End: 2019-03-26
Attending: INTERNAL MEDICINE
Payer: COMMERCIAL

## 2019-03-26 ENCOUNTER — ANCILLARY PROCEDURE (OUTPATIENT)
Dept: CT IMAGING | Facility: CLINIC | Age: 59
End: 2019-03-26
Attending: INTERNAL MEDICINE
Payer: COMMERCIAL

## 2019-03-26 VITALS
WEIGHT: 237.2 LBS | OXYGEN SATURATION: 97 % | TEMPERATURE: 98.6 F | DIASTOLIC BLOOD PRESSURE: 85 MMHG | SYSTOLIC BLOOD PRESSURE: 117 MMHG | BODY MASS INDEX: 34.03 KG/M2 | HEART RATE: 88 BPM

## 2019-03-26 DIAGNOSIS — R06.02 SOB (SHORTNESS OF BREATH): ICD-10-CM

## 2019-03-26 DIAGNOSIS — R06.02 SOB (SHORTNESS OF BREATH): Primary | ICD-10-CM

## 2019-03-26 LAB
6 MIN WALK (FT): 1500 FT
6 MIN WALK (M): 457 M

## 2019-03-26 PROCEDURE — G0463 HOSPITAL OUTPT CLINIC VISIT: HCPCS | Mod: ZF

## 2019-03-26 ASSESSMENT — PAIN SCALES - GENERAL: PAINLEVEL: NO PAIN (0)

## 2019-03-26 NOTE — NURSING NOTE
"Oncology Rooming Note    March 26, 2019 3:59 PM   Byron Russo is a 58 year old male who presents for:    Chief Complaint   Patient presents with     RECHECK     RTN- MDS     Initial Vitals: /85 (BP Location: Right arm, Patient Position: Sitting, Cuff Size: Adult Large)   Pulse 88   Temp 98.6  F (37  C) (Oral)   Wt 107.6 kg (237 lb 3.2 oz)   SpO2 97%   BMI 34.03 kg/m   Estimated body mass index is 34.03 kg/m  as calculated from the following:    Height as of 2/26/19: 1.778 m (5' 10\").    Weight as of this encounter: 107.6 kg (237 lb 3.2 oz). Body surface area is 2.31 meters squared.  No Pain (0) Comment: Data Unavailable   No LMP for male patient.  Allergies reviewed: Yes  Medications reviewed: Yes    Medications: Medication refills not needed today.  Pharmacy name entered into EPIC:    [place], [place] - [address] [facility] [id number]  [facility] [address] [place] [id number] [facility]    Clinical concerns:  none    Lisbet Jarrett CMA              "

## 2019-03-26 NOTE — LETTER
3/26/2019       RE: Byron Russo  [address] [facility] [id number]     Dear Colleague,    Thank you for referring your patient, Byron Russo, to the [facility] at [facility]. Please see a copy of my visit note below.    Reason for Visit  Byron Russo is a 58 year old year old male who is being seen for RECHECK (RTN- MDS)    Pulmonary HPI  57 YO male with history of MDS RAEB-2 s/p Allo-sib HSCT 3 years ago who was recently hospitalized for increased SOB/CUELLAR and hypoxia.  Underwent CT chest on admission that showed peripheral findings with a few middle lung nodular infiltrates.  Underwent bronchoscopy with BAL showing only 190 and 290 WBC in a lymphocyte predominant pattern, only microbiology findings were Candida; was started on Posaconazole. Discharged on 4L of oxygen.  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  SOB and CUELLAR is improved since discharge.  States onset of symptoms was in 10-18, noticed increased SOB while doing yardwork.  States normal walking does not cause a problem, only if he is walking fast or carrying items. Denies any prior history of lung disease- no history of asthma, no pneumonia, no complications with chemotherapy.  Has a history of cGvHD, is on alternating prednisone of 10/20 mg and cyclosporin of 25 mg every day.  Review of chest CT pattern appears consistent with PPFE, cannot rule out nodular areas representing infection.    Last seen one month ago. Since his last visit he underwent repeat PFT with lung volumes for baseline and an exercise oxygen saturation study. Lung volumes showed restrictive disease, walk oxymetry did not reveal any desaturation.  Repeat CT chest performed today, on my comparison there has been no significant change.  Overall he feels unchanged compared to his last visit, actually feels like he has increased lung  capacity.  No SOB walking on a level surface but does feel SOB if he carries groceries up the stairs.  No cough.  Is back working, still exposed to fumes during work; trying to avoid with increased use of fans/vents.      The patient was seen and examined by Travis Brady           Current Outpatient Medications   Medication     acetaminophen (TYLENOL) 325 MG tablet     acyclovir (ZOVIRAX) 800 MG tablet     amoxicillin (AMOXIL) 500 MG tablet     aspirin 81 MG EC tablet     cycloSPORINE modified (GENERIC EQUIVALENT) 25 MG capsule     dexamethasone 0.1 MG/ML solution     levofloxacin (LEVAQUIN) 250 MG tablet     levothyroxine (SYNTHROID/LEVOTHROID) 150 MCG tablet     magnesium oxide (MAG-OX) 400 (241.3 MG) MG tablet     metoprolol tartrate (LOPRESSOR) 25 MG tablet     NITROGLYCERIN ER PO     order for DME     order for DME     order for DME     order for DME     pilocarpine (SALAGEN) 5 MG tablet     posaconazole (NOXAFIL) 100 MG EC tablet     predniSONE (DELTASONE) 10 MG tablet     ranitidine (ZANTAC) 150 MG tablet     rosuvastatin (CRESTOR) 5 MG tablet     sulfamethoxazole-trimethoprim (BACTRIM DS/SEPTRA DS) 800-160 MG tablet     No current facility-administered medications for this visit.      Allergies   Allergen Reactions     Atorvastatin Other (See Comments)     Back pain  Tolerates atrovastatin     Docosahexaenoic Acid (Dha)      Other reaction(s): Intolerance-Can't Take - Omega 3 fish oil     Past Medical History:   Diagnosis Date     CAD (coronary artery disease) 2001     Hyperlipidemia      Hypothyroidism      Obesity      Pulmonary embolism (H) 2/2016     Varicose veins        Past Surgical History:   Procedure Laterality Date     APPENDECTOMY       ARTHROSCOPY KNEE WITH MEDIAL MENISCECTOMY  6/20/2011    Procedure:ARTHROSCOPY KNEE WITH MEDIAL MENISCECTOMY; Surgeon:SACHIN SAMANO; Location:[facility] OR     BRONCHOSCOPY (RIGID OR FLEXIBLE), DIAGNOSTIC N/A 2/6/2019    Procedure: COMBINED BRONCHOSCOPY (RIGID OR  FLEXIBLE), LAVAGE;  Surgeon: Louise Rogel MD;  Location:  GI     coronary artery stents placed x 5  2001       Social History     Socioeconomic History     Marital status:      Spouse name: Not on file     Number of children: Not on file     Years of education: Not on file     Highest education level: Not on file   Occupational History     Not on file   Social Needs     Financial resource strain: Not on file     Food insecurity:     Worry: Not on file     Inability: Not on file     Transportation needs:     Medical: Not on file     Non-medical: Not on file   Tobacco Use     Smoking status: Never Smoker     Smokeless tobacco: Former User   Substance and Sexual Activity     Alcohol use: No     Alcohol/week: 0.0 oz     Drug use: No     Sexual activity: Not on file   Lifestyle     Physical activity:     Days per week: Not on file     Minutes per session: Not on file     Stress: Not on file   Relationships     Social connections:     Talks on phone: Not on file     Gets together: Not on file     Attends Congregation service: Not on file     Active member of club or organization: Not on file     Attends meetings of clubs or organizations: Not on file     Relationship status: Not on file     Intimate partner violence:     Fear of current or ex partner: Not on file     Emotionally abused: Not on file     Physically abused: Not on file     Forced sexual activity: Not on file   Other Topics Concern     Parent/sibling w/ CABG, MI or angioplasty before 65F 55M? Not Asked   Social History Narrative     Not on file       ROS Pulmonary  A complete ROS was otherwise negative except as noted in the HPI.  /85 (BP Location: Right arm, Patient Position: Sitting, Cuff Size: Adult Large)   Pulse 88   Temp 98.6  F (37  C) (Oral)   Wt 107.6 kg (237 lb 3.2 oz)   SpO2 97%   BMI 34.03 kg/m     Exam:   GENERAL APPEARANCE: Well developed, well nourished, alert, and in no apparent distress.  EYES: PERRL, EOMI  HENT:  Nasal mucosa with no edema and no hyperemia. No nasal polyps.  EARS: Canals clear, TMs normal  MOUTH: Oral mucosa is moist, without any lesions, no tonsillar enlargement, no oropharyngeal exudate.  NECK: supple, no masses, no thyromegaly.  LYMPHATICS: No significant axillary, cervical, or supraclavicular nodes.  RESP: Dereased air flow throughout.  Crackles in bilateral bases. No rhonchi. No wheezes.  CV: Normal S1, S2, regular rhythm, normal rate. No murmur.  No rub. No gallop. No LE edema.   ABDOMEN:  Bowel sounds normal, soft, nontender, no HSM or masses.   MS: extremities normal. No clubbing. No cyanosis.  SKIN: no rash on limited exam  NEURO: Mentation intact, speech normal, normal strength and tone, normal gait and stance  PSYCH: mentation appears normal. and affect normal/bright  Results:  Full PFT's from 3-4-19 were personally reviewed.  FVC 3.19 (66%), FEV-1 2.57 (68%), FEV-1/FVC 81%  FRC 60%, RV 68%, TLC 75%  DLCO 87%  No airflow limitation.  Restrictive lung volumes. Normal diffusion capacity.  Recent Results (from the past 168 hour(s))   6 minute walk test    Collection Time: 03/26/19 12:00 AM   Result Value Ref Range    6 min walk (FT) 1,500 ft    6 Min Walk (M) 457 m       Assessment and plan:   59 YO s/p HSCT post 3years with sub-acute presentation with SOB and CUELLAR with recent worsening.  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  Unclear of cause of acute worsening but may be related to concomitant viral infection, cannot fully exclude fungal infection but BAL cultures have remained negative. No effective treatment for PPFE, steroids do not seem to be effective, however in my experience the disease does not seem to progress.  Based on lack of treatment and seemingly no/slow progression, I would not proceed with biopsy to confirm the diagnosis. With impaired lung function he is at risk to develop respiratory  complications during times of respiratory infections.  Overall feels unchanged compared to his last visit; baseline PFT's with evidence of restriction.  Is considering filing for Social Security disability.  Trying to modify work environment.     1. Continue prednisone and CYA as per BMT  2. Repeat full PFT's at his next clinic visit  3. Based on oxygen saturation during six minute walk test with baseline oxygen level of 95% and lowest saturation during exercise of 92%, his supplemental oxygen can be discontinued.  No need for supplemental oxygen with ambulation.     RTC in 3 months.  Patient to call with any questions or concerns prior to his next clinic visit          Again, thank you for allowing me to participate in the care of your patient.      Sincerely,    Travis Brady MD

## 2019-03-26 NOTE — PROGRESS NOTES
Reason for Visit  Byron Russo is a 58 year old year old male who is being seen for RECHECK (RTN- MDS)    Pulmonary HPI  59 YO male with history of MDS RAEB-2 s/p Allo-sib HSCT 3 years ago who was recently hospitalized for increased SOB/CUELLAR and hypoxia.  Underwent CT chest on admission that showed peripheral findings with a few middle lung nodular infiltrates.  Underwent bronchoscopy with BAL showing only 190 and 290 WBC in a lymphocyte predominant pattern, only microbiology findings were Candida; was started on Posaconazole. Discharged on 4L of oxygen.  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  SOB and CUELLAR is improved since discharge.  States onset of symptoms was in 10-18, noticed increased SOB while doing yardwork.  States normal walking does not cause a problem, only if he is walking fast or carrying items. Denies any prior history of lung disease- no history of asthma, no pneumonia, no complications with chemotherapy.  Has a history of cGvHD, is on alternating prednisone of 10/20 mg and cyclosporin of 25 mg every day.  Review of chest CT pattern appears consistent with PPFE, cannot rule out nodular areas representing infection.    Last seen one month ago. Since his last visit he underwent repeat PFT with lung volumes for baseline and an exercise oxygen saturation study. Lung volumes showed restrictive disease, walk oxymetry did not reveal any desaturation.  Repeat CT chest performed today, on my comparison there has been no significant change.  Overall he feels unchanged compared to his last visit, actually feels like he has increased lung capacity.  No SOB walking on a level surface but does feel SOB if he carries groceries up the stairs.  No cough.  Is back working, still exposed to fumes during work; trying to avoid with increased use of fans/vents.      The patient was seen and examined by Travis Brady           Current Outpatient  Medications   Medication     acetaminophen (TYLENOL) 325 MG tablet     acyclovir (ZOVIRAX) 800 MG tablet     amoxicillin (AMOXIL) 500 MG tablet     aspirin 81 MG EC tablet     cycloSPORINE modified (GENERIC EQUIVALENT) 25 MG capsule     dexamethasone 0.1 MG/ML solution     levofloxacin (LEVAQUIN) 250 MG tablet     levothyroxine (SYNTHROID/LEVOTHROID) 150 MCG tablet     magnesium oxide (MAG-OX) 400 (241.3 MG) MG tablet     metoprolol tartrate (LOPRESSOR) 25 MG tablet     NITROGLYCERIN ER PO     order for DME     order for DME     order for DME     order for DME     pilocarpine (SALAGEN) 5 MG tablet     posaconazole (NOXAFIL) 100 MG EC tablet     predniSONE (DELTASONE) 10 MG tablet     ranitidine (ZANTAC) 150 MG tablet     rosuvastatin (CRESTOR) 5 MG tablet     sulfamethoxazole-trimethoprim (BACTRIM DS/SEPTRA DS) 800-160 MG tablet     No current facility-administered medications for this visit.      Allergies   Allergen Reactions     Atorvastatin Other (See Comments)     Back pain  Tolerates atrovastatin     Docosahexaenoic Acid (Dha)      Other reaction(s): Intolerance-Can't Take - Omega 3 fish oil     Past Medical History:   Diagnosis Date     CAD (coronary artery disease) 2001     Hyperlipidemia      Hypothyroidism      Obesity      Pulmonary embolism (H) 2/2016     Varicose veins        Past Surgical History:   Procedure Laterality Date     APPENDECTOMY       ARTHROSCOPY KNEE WITH MEDIAL MENISCECTOMY  6/20/2011    Procedure:ARTHROSCOPY KNEE WITH MEDIAL MENISCECTOMY; Surgeon:SACHIN SAMANO; Location:[facility] OR     BRONCHOSCOPY (RIGID OR FLEXIBLE), DIAGNOSTIC N/A 2/6/2019    Procedure: COMBINED BRONCHOSCOPY (RIGID OR FLEXIBLE), LAVAGE;  Surgeon: Louise Rogel MD;  Location:  GI     coronary artery stents placed x 5  2001       Social History     Socioeconomic History     Marital status:      Spouse name: Not on file     Number of children: Not on file     Years of education: Not on file     Highest  education level: Not on file   Occupational History     Not on file   Social Needs     Financial resource strain: Not on file     Food insecurity:     Worry: Not on file     Inability: Not on file     Transportation needs:     Medical: Not on file     Non-medical: Not on file   Tobacco Use     Smoking status: Never Smoker     Smokeless tobacco: Former User   Substance and Sexual Activity     Alcohol use: No     Alcohol/week: 0.0 oz     Drug use: No     Sexual activity: Not on file   Lifestyle     Physical activity:     Days per week: Not on file     Minutes per session: Not on file     Stress: Not on file   Relationships     Social connections:     Talks on phone: Not on file     Gets together: Not on file     Attends Anabaptism service: Not on file     Active member of club or organization: Not on file     Attends meetings of clubs or organizations: Not on file     Relationship status: Not on file     Intimate partner violence:     Fear of current or ex partner: Not on file     Emotionally abused: Not on file     Physically abused: Not on file     Forced sexual activity: Not on file   Other Topics Concern     Parent/sibling w/ CABG, MI or angioplasty before 65F 55M? Not Asked   Social History Narrative     Not on file       ROS Pulmonary  A complete ROS was otherwise negative except as noted in the HPI.  /85 (BP Location: Right arm, Patient Position: Sitting, Cuff Size: Adult Large)   Pulse 88   Temp 98.6  F (37  C) (Oral)   Wt 107.6 kg (237 lb 3.2 oz)   SpO2 97%   BMI 34.03 kg/m    Exam:   GENERAL APPEARANCE: Well developed, well nourished, alert, and in no apparent distress.  EYES: PERRL, EOMI  HENT: Nasal mucosa with no edema and no hyperemia. No nasal polyps.  EARS: Canals clear, TMs normal  MOUTH: Oral mucosa is moist, without any lesions, no tonsillar enlargement, no oropharyngeal exudate.  NECK: supple, no masses, no thyromegaly.  LYMPHATICS: No significant axillary, cervical, or supraclavicular  nodes.  RESP: Dereased air flow throughout.  Crackles in bilateral bases. No rhonchi. No wheezes.  CV: Normal S1, S2, regular rhythm, normal rate. No murmur.  No rub. No gallop. No LE edema.   ABDOMEN:  Bowel sounds normal, soft, nontender, no HSM or masses.   MS: extremities normal. No clubbing. No cyanosis.  SKIN: no rash on limited exam  NEURO: Mentation intact, speech normal, normal strength and tone, normal gait and stance  PSYCH: mentation appears normal. and affect normal/bright  Results:  Full PFT's from 3-4-19 were personally reviewed.  FVC 3.19 (66%), FEV-1 2.57 (68%), FEV-1/FVC 81%  FRC 60%, RV 68%, TLC 75%  DLCO 87%  No airflow limitation.  Restrictive lung volumes. Normal diffusion capacity.  Recent Results (from the past 168 hour(s))   6 minute walk test    Collection Time: 03/26/19 12:00 AM   Result Value Ref Range    6 min walk (FT) 1,500 ft    6 Min Walk (M) 457 m       Assessment and plan:   59 YO s/p HSCT post 3years with sub-acute presentation with SOB and CUELLAR with recent worsening.  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  Unclear of cause of acute worsening but may be related to concomitant viral infection, cannot fully exclude fungal infection but BAL cultures have remained negative. No effective treatment for PPFE, steroids do not seem to be effective, however in my experience the disease does not seem to progress.  Based on lack of treatment and seemingly no/slow progression, I would not proceed with biopsy to confirm the diagnosis. With impaired lung function he is at risk to develop respiratory complications during times of respiratory infections.  Overall feels unchanged compared to his last visit; baseline PFT's with evidence of restriction.  Is considering filing for Social Security disability.  Trying to modify work environment.     1. Continue prednisone and CYA as per BMT  2. Repeat full PFT's at his  next clinic visit  3. Based on oxygen saturation during six minute walk test with baseline oxygen level of 95% and lowest saturation during exercise of 92%, his supplemental oxygen can be discontinued.  No need for supplemental oxygen with ambulation.     RTC in 3 months.  Patient to call with any questions or concerns prior to his next clinic visit

## 2019-04-04 LAB
MYCOBACTERIUM SPEC CULT: NORMAL
SPECIMEN SOURCE: NORMAL
SPECIMEN SOURCE: NORMAL

## 2019-04-10 ENCOUNTER — APPOINTMENT (OUTPATIENT)
Dept: LAB | Facility: CLINIC | Age: 59
End: 2019-04-10
Attending: INTERNAL MEDICINE
Payer: COMMERCIAL

## 2019-04-10 ENCOUNTER — ONCOLOGY VISIT (OUTPATIENT)
Dept: TRANSPLANT | Facility: CLINIC | Age: 59
End: 2019-04-10
Attending: INTERNAL MEDICINE
Payer: COMMERCIAL

## 2019-04-10 VITALS
WEIGHT: 242 LBS | HEART RATE: 91 BPM | DIASTOLIC BLOOD PRESSURE: 83 MMHG | SYSTOLIC BLOOD PRESSURE: 143 MMHG | TEMPERATURE: 96.9 F | OXYGEN SATURATION: 96 % | RESPIRATION RATE: 16 BRPM | BODY MASS INDEX: 34.72 KG/M2

## 2019-04-10 DIAGNOSIS — D46.22 RAEB-2 (REFRACTORY ANEMIA WITH EXCESS BLASTS-2) (H): ICD-10-CM

## 2019-04-10 LAB
ALBUMIN SERPL-MCNC: 3.6 G/DL (ref 3.4–5)
ALP SERPL-CCNC: 86 U/L (ref 40–150)
ALT SERPL W P-5'-P-CCNC: 23 U/L (ref 0–70)
ANION GAP SERPL CALCULATED.3IONS-SCNC: 5 MMOL/L (ref 3–14)
AST SERPL W P-5'-P-CCNC: 28 U/L (ref 0–45)
BASOPHILS # BLD AUTO: 0.1 10E9/L (ref 0–0.2)
BASOPHILS NFR BLD AUTO: 1 %
BILIRUB SERPL-MCNC: 0.4 MG/DL (ref 0.2–1.3)
BUN SERPL-MCNC: 14 MG/DL (ref 7–30)
CALCIUM SERPL-MCNC: 8.8 MG/DL (ref 8.5–10.1)
CHLORIDE SERPL-SCNC: 105 MMOL/L (ref 94–109)
CO2 SERPL-SCNC: 28 MMOL/L (ref 20–32)
CREAT SERPL-MCNC: 0.83 MG/DL (ref 0.66–1.25)
DIFFERENTIAL METHOD BLD: ABNORMAL
EOSINOPHIL # BLD AUTO: 0.1 10E9/L (ref 0–0.7)
EOSINOPHIL NFR BLD AUTO: 1.3 %
ERYTHROCYTE [DISTWIDTH] IN BLOOD BY AUTOMATED COUNT: 13.4 % (ref 10–15)
GFR SERPL CREATININE-BSD FRML MDRD: >90 ML/MIN/{1.73_M2}
GLUCOSE SERPL-MCNC: 92 MG/DL (ref 70–99)
HCT VFR BLD AUTO: 49.5 % (ref 40–53)
HGB BLD-MCNC: 16.4 G/DL (ref 13.3–17.7)
IMM GRANULOCYTES # BLD: 0 10E9/L (ref 0–0.4)
IMM GRANULOCYTES NFR BLD: 0.4 %
LYMPHOCYTES # BLD AUTO: 1.9 10E9/L (ref 0.8–5.3)
LYMPHOCYTES NFR BLD AUTO: 28.2 %
MCH RBC QN AUTO: 32.7 PG (ref 26.5–33)
MCHC RBC AUTO-ENTMCNC: 33.1 G/DL (ref 31.5–36.5)
MCV RBC AUTO: 99 FL (ref 78–100)
MONOCYTES # BLD AUTO: 1.4 10E9/L (ref 0–1.3)
MONOCYTES NFR BLD AUTO: 20.8 %
NEUTROPHILS # BLD AUTO: 3.2 10E9/L (ref 1.6–8.3)
NEUTROPHILS NFR BLD AUTO: 48.3 %
NRBC # BLD AUTO: 0 10*3/UL
NRBC BLD AUTO-RTO: 0 /100
PLATELET # BLD AUTO: 241 10E9/L (ref 150–450)
PLATELET # BLD EST: ABNORMAL 10*3/UL
POTASSIUM SERPL-SCNC: 3.8 MMOL/L (ref 3.4–5.3)
PROT SERPL-MCNC: 7.6 G/DL (ref 6.8–8.8)
RBC # BLD AUTO: 5.02 10E12/L (ref 4.4–5.9)
SODIUM SERPL-SCNC: 137 MMOL/L (ref 133–144)
WBC # BLD AUTO: 6.7 10E9/L (ref 4–11)

## 2019-04-10 PROCEDURE — G0463 HOSPITAL OUTPT CLINIC VISIT: HCPCS

## 2019-04-10 PROCEDURE — 80053 COMPREHEN METABOLIC PANEL: CPT | Performed by: INTERNAL MEDICINE

## 2019-04-10 PROCEDURE — 85025 COMPLETE CBC W/AUTO DIFF WBC: CPT | Performed by: INTERNAL MEDICINE

## 2019-04-10 PROCEDURE — 36415 COLL VENOUS BLD VENIPUNCTURE: CPT

## 2019-04-10 ASSESSMENT — PAIN SCALES - GENERAL: PAINLEVEL: NO PAIN (0)

## 2019-04-10 NOTE — NURSING NOTE
"Oncology Rooming Note    April 10, 2019 4:06 PM   Byron Russo is a 58 year old male who presents for:    Chief Complaint   Patient presents with     RECHECK     post bmt for RAEB here for labs and md visit     Initial Vitals: /83   Pulse 91   Temp 96.9  F (36.1  C)   Resp 16   Wt 109.8 kg (242 lb)   SpO2 96%   BMI 34.72 kg/m   Estimated body mass index is 34.72 kg/m  as calculated from the following:    Height as of 2/26/19: 1.778 m (5' 10\").    Weight as of this encounter: 109.8 kg (242 lb). Body surface area is 2.33 meters squared.  No Pain (0) Comment: Data Unavailable   No LMP for male patient.  Allergies reviewed: Yes  Medications reviewed: Yes, by MD    Medications: Medication refills not needed today.  Pharmacy name entered into EPIC:    [facility] [place], [place] - [address] [facility] [phone]  [facility] [address] [place] [address] [facility]    Clinical concerns: none       Fely Henry RN              "

## 2019-04-10 NOTE — PROGRESS NOTES
I saw Byron Russo today 34 months status post allo sib transplant for RAEB-2 MDS.  He was hospitalized approximately 4 months ago with pulmonary hypoxia and pulmonary infiltrates, which were felt secondary to pleural parenchymal fibroelastosis, a rare form of lung chronic GVHD.  He currently is taking 20 alternating with 10 mg of prednisone every other day along with cyclosporine 25 mg p.o. b.i.d.      REVIEW OF SYSTEMS:  No fevers, chills, nausea, vomiting, diarrhea, cough, hemoptysis.  Occasional short of breath as described above.  No chest pain, abdominal pain, new rash or mouth sores.  The remainder of the 10 point review of systems is negative.      PHYSICAL EXAMINATION:   GENERAL:  He looked mildly cushingoid.   VITAL SIGNS:  Unremarkable.   HEENT:  Sclerae anicteric and non-injected.  Buccal mucosa was intact.   LUNGS:  Clear to auscultation.   CARDIAC:  Without S3, rub or murmur.   ABDOMEN:  Nondistended, active bowel sounds, no organomegaly.   EXTREMITIES:  Trace pedal edema.   SKIN:  No skin rash.      LABORATORY:  Labs today included a white count of 6700, hemoglobin 16.4, platelets 241,000.  Electrolyte panel was normal with a creatinine of 0.83, potassium of 3.8.  His liver function tests were normal.      ASSESSMENT AND PLAN:   1.  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   2.  History of chronic vxddo-gywpqb-besx disease.  I will not change his immunosuppression today because of concerns about the pulmonary disease.   3.  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  He is followed by Dr. Brady and will have a repeat CT scan of the lung performed in approximately 2-1/2 months.  I will plan on seeing him again in 3 months to reassess his chronic GVHD status.     Uli Enciso M.D.

## 2019-05-02 DIAGNOSIS — D46.22 RAEB-2 (REFRACTORY ANEMIA WITH EXCESS BLASTS-2) (H): ICD-10-CM

## 2019-05-02 DIAGNOSIS — D46.9 MDS (MYELODYSPLASTIC SYNDROME) (H): ICD-10-CM

## 2019-05-02 RX ORDER — METOPROLOL TARTRATE 25 MG/1
25 TABLET, FILM COATED ORAL DAILY
Qty: 90 TABLET | Refills: 3 | Status: SHIPPED | OUTPATIENT
Start: 2019-05-02 | End: 2019-07-29

## 2019-05-02 RX ORDER — PILOCARPINE HYDROCHLORIDE 5 MG/1
5 TABLET, FILM COATED ORAL DAILY
Qty: 90 TABLET | Refills: 11 | Status: SHIPPED | OUTPATIENT
Start: 2019-05-02 | End: 2019-07-29

## 2019-06-25 ENCOUNTER — APPOINTMENT (OUTPATIENT)
Dept: LAB | Facility: CLINIC | Age: 59
End: 2019-06-25
Attending: INTERNAL MEDICINE
Payer: COMMERCIAL

## 2019-06-25 ENCOUNTER — OFFICE VISIT (OUTPATIENT)
Dept: PULMONOLOGY | Facility: CLINIC | Age: 59
End: 2019-06-25
Attending: INTERNAL MEDICINE
Payer: COMMERCIAL

## 2019-06-25 ENCOUNTER — ONCOLOGY VISIT (OUTPATIENT)
Dept: TRANSPLANT | Facility: CLINIC | Age: 59
End: 2019-06-25
Attending: INTERNAL MEDICINE
Payer: COMMERCIAL

## 2019-06-25 VITALS
DIASTOLIC BLOOD PRESSURE: 79 MMHG | WEIGHT: 238.9 LBS | TEMPERATURE: 97.7 F | HEART RATE: 83 BPM | SYSTOLIC BLOOD PRESSURE: 118 MMHG | BODY MASS INDEX: 34.28 KG/M2 | RESPIRATION RATE: 16 BRPM | OXYGEN SATURATION: 96 %

## 2019-06-25 VITALS
TEMPERATURE: 97.7 F | SYSTOLIC BLOOD PRESSURE: 118 MMHG | OXYGEN SATURATION: 96 % | DIASTOLIC BLOOD PRESSURE: 79 MMHG | HEART RATE: 83 BPM

## 2019-06-25 DIAGNOSIS — R06.02 SOB (SHORTNESS OF BREATH): ICD-10-CM

## 2019-06-25 DIAGNOSIS — D46.22 RAEB-2 (REFRACTORY ANEMIA WITH EXCESS BLASTS-2) (H): ICD-10-CM

## 2019-06-25 DIAGNOSIS — R06.02 SOB (SHORTNESS OF BREATH): Primary | ICD-10-CM

## 2019-06-25 LAB
ALBUMIN SERPL-MCNC: 3.4 G/DL (ref 3.4–5)
ALP SERPL-CCNC: 82 U/L (ref 40–150)
ALT SERPL W P-5'-P-CCNC: 24 U/L (ref 0–70)
ANION GAP SERPL CALCULATED.3IONS-SCNC: 8 MMOL/L (ref 3–14)
AST SERPL W P-5'-P-CCNC: 24 U/L (ref 0–45)
BASOPHILS # BLD AUTO: 0 10E9/L (ref 0–0.2)
BASOPHILS NFR BLD AUTO: 0.4 %
BILIRUB SERPL-MCNC: 0.4 MG/DL (ref 0.2–1.3)
BUN SERPL-MCNC: 11 MG/DL (ref 7–30)
CALCIUM SERPL-MCNC: 8.7 MG/DL (ref 8.5–10.1)
CHLORIDE SERPL-SCNC: 107 MMOL/L (ref 94–109)
CO2 SERPL-SCNC: 23 MMOL/L (ref 20–32)
CREAT SERPL-MCNC: 0.85 MG/DL (ref 0.66–1.25)
DIFFERENTIAL METHOD BLD: ABNORMAL
DLCOCOR-%PRED-PRE: 62 %
DLCOCOR-PRE: 17.83 ML/MIN/MMHG
DLCOUNC-%PRED-PRE: 65 %
DLCOUNC-PRE: 18.54 ML/MIN/MMHG
DLCOUNC-PRED: 28.33 ML/MIN/MMHG
EOSINOPHIL # BLD AUTO: 0 10E9/L (ref 0–0.7)
EOSINOPHIL NFR BLD AUTO: 0.1 %
ERV-%PRED-PRE: 70 %
ERV-PRE: 0.64 L
ERV-PRED: 0.91 L
ERYTHROCYTE [DISTWIDTH] IN BLOOD BY AUTOMATED COUNT: 13.9 % (ref 10–15)
EXPTIME-PRE: 7.53 SEC
FEF2575-%PRED-PRE: 81 %
FEF2575-PRE: 2.63 L/SEC
FEF2575-PRED: 3.23 L/SEC
FEFMAX-%PRED-PRE: 85 %
FEFMAX-PRE: 8.14 L/SEC
FEFMAX-PRED: 9.5 L/SEC
FEV1-%PRED-PRE: 67 %
FEV1-PRE: 2.52 L
FEV1FEV6-PRE: 83 %
FEV1FEV6-PRED: 80 %
FEV1FVC-PRE: 83 %
FEV1FVC-PRED: 78 %
FEV1SVC-PRE: 81 %
FEV1SVC-PRED: 73 %
FIFMAX-PRE: 6.78 L/SEC
FIO2-PRE: 21 %
FRCPLETH-%PRED-PRE: 59 %
FRCPLETH-PRE: 2.12 L
FRCPLETH-PRED: 3.57 L
FVC-%PRED-PRE: 63 %
FVC-PRE: 3.03 L
FVC-PRED: 4.79 L
GFR SERPL CREATININE-BSD FRML MDRD: >90 ML/MIN/{1.73_M2}
GLUCOSE SERPL-MCNC: 119 MG/DL (ref 70–99)
HCT VFR BLD AUTO: 47 % (ref 40–53)
HGB BLD-MCNC: 16.1 G/DL (ref 13.3–17.7)
IC-%PRED-PRE: 59 %
IC-PRE: 2.49 L
IC-PRED: 4.18 L
IMM GRANULOCYTES # BLD: 0 10E9/L (ref 0–0.4)
IMM GRANULOCYTES NFR BLD: 0.6 %
LYMPHOCYTES # BLD AUTO: 1.5 10E9/L (ref 0.8–5.3)
LYMPHOCYTES NFR BLD AUTO: 20.8 %
MCH RBC QN AUTO: 33.3 PG (ref 26.5–33)
MCHC RBC AUTO-ENTMCNC: 34.3 G/DL (ref 31.5–36.5)
MCV RBC AUTO: 97 FL (ref 78–100)
MONOCYTES # BLD AUTO: 0.6 10E9/L (ref 0–1.3)
MONOCYTES NFR BLD AUTO: 9.1 %
NEUTROPHILS # BLD AUTO: 4.9 10E9/L (ref 1.6–8.3)
NEUTROPHILS NFR BLD AUTO: 69 %
NRBC # BLD AUTO: 0 10*3/UL
NRBC BLD AUTO-RTO: 0 /100
PLATELET # BLD AUTO: 220 10E9/L (ref 150–450)
POTASSIUM SERPL-SCNC: 4 MMOL/L (ref 3.4–5.3)
PROT SERPL-MCNC: 7.1 G/DL (ref 6.8–8.8)
RBC # BLD AUTO: 4.83 10E12/L (ref 4.4–5.9)
RVPLETH-%PRED-PRE: 63 %
RVPLETH-PRE: 1.49 L
RVPLETH-PRED: 2.33 L
SODIUM SERPL-SCNC: 138 MMOL/L (ref 133–144)
TLCPLETH-%PRED-PRE: 64 %
TLCPLETH-PRE: 4.61 L
TLCPLETH-PRED: 7.13 L
VA-%PRED-PRE: 64 %
VA-PRE: 4.23 L
VC-%PRED-PRE: 61 %
VC-PRE: 3.13 L
VC-PRED: 5.08 L
WBC # BLD AUTO: 7 10E9/L (ref 4–11)

## 2019-06-25 PROCEDURE — 85025 COMPLETE CBC W/AUTO DIFF WBC: CPT | Performed by: INTERNAL MEDICINE

## 2019-06-25 PROCEDURE — G0463 HOSPITAL OUTPT CLINIC VISIT: HCPCS | Mod: ZF

## 2019-06-25 PROCEDURE — 80053 COMPREHEN METABOLIC PANEL: CPT | Performed by: INTERNAL MEDICINE

## 2019-06-25 ASSESSMENT — PAIN SCALES - GENERAL: PAINLEVEL: NO PAIN (0)

## 2019-06-25 NOTE — NURSING NOTE
"Oncology Rooming Note    June 25, 2019 3:48 PM   Byron Russo is a 58 year old male who presents for:    Chief Complaint   Patient presents with     Blood Draw     Labs drawn via VPT by RN in lab. VS taken.      RECHECK     RTN- MDS     Initial Vitals: /79 (BP Location: Right arm, Patient Position: Sitting, Cuff Size: Adult Large)   Pulse 83   Temp 97.7  F (36.5  C) (Oral)   Resp 16   Wt 108.4 kg (238 lb 14.4 oz)   SpO2 96%   BMI 34.28 kg/m   Estimated body mass index is 34.28 kg/m  as calculated from the following:    Height as of 2/26/19: 1.778 m (5' 10\").    Weight as of this encounter: 108.4 kg (238 lb 14.4 oz). Body surface area is 2.31 meters squared.  No Pain (0) Comment: Data Unavailable   No LMP for male patient.  Allergies reviewed: Yes  Medications reviewed: Yes    Medications: Medication refills not needed today.  Pharmacy name entered into EPIC:    [facility] [place], [place] - [phone]  [facility] [place], [place] - [address] [facility]    Clinical concerns:  none    Lisbet Jarrett CMA              "

## 2019-06-25 NOTE — NURSING NOTE
Chief Complaint   Patient presents with     Blood Draw     Labs drawn via VPT by RN in lab. VS taken.      Labs collected from venipuncture by RN. Vitals taken.    Alyssia ROQUE RN PHN BSN  BMT/Oncology Lab

## 2019-06-25 NOTE — PROGRESS NOTES
HISTORY OF PRESENT ILLNESS:  I saw Byron Russo today in followup approximately 3 years status post allo-sib transplant for RAEG-2 MDS and evaluation of chronic zszmc-gtgght-poxb disease.  He was hospitalized about 7 months ago with pulmonary hypoxia and infiltrates which were felt to be secondary to pleural parenchymal fibroelastosis, a rare form of lung chronic GVHD.  He is currently taking 20 alternating with 10 mg of prednisone every other day along with cyclosporine 25 mg p.o. b.i.d.      REVIEW OF SYSTEMS:  No fevers, chills, nausea, vomiting, diarrhea, cough, hemoptysis, shortness of breath, hematuria, dysuria, bruising or bleeding.  He does have some slight sore throat from postnasal drip.  The remainder of the 10-point review of systems is negative.      PHYSICAL EXAMINATION:   GENERAL:  He looked robust.   VITAL SIGNS:  Unremarkable.   HEENT:  Sclerae were anicteric and non-injected.  Buccal mucosa was intact with the exception of a few rather subtle lichenoid changes on the center of the buccal surface bilaterally.  No open ulcerations.   LUNGS:  Clear to auscultation.   CARDIAC:  Without S3, rub or murmur.   ABDOMEN:  Nondistended, active bowel sounds, no organomegaly.   EXTREMITIES:  Trace pedal edema.   SKIN:  No skin rash.      LABORATORY DATA:  Labs today were white count 6700, hemoglobin 16.4, platelets 241,000.  Electrolyte panel was normal with a creatinine of 0.83.  Liver function tests were normal.      ASSESSMENT AND PLAN:   1.  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  There is no evidence of relapse.   2.  Chronic kmklx-uiyktf-zkan disease, currently well managed on 20 alternating with 10 mg of prednisone every other day along with cyclosporine 25 b.i.d.  We will continue this for now.  Byron is going to see Dr. Travis Brady in Pulmonary for followup of his lung disease.  He has had full PFTs, which to my eye do not show any significant change.  He  will also eventually have a CT scan.  I will plan on seeing Byron again in 3 months.     Uli Enciso M.D.

## 2019-06-25 NOTE — LETTER
6/25/2019       RE: Byron Russo  [address] [facility] [id number]     Dear Colleague,    Thank you for referring your patient, Byron Russo, to the [facility] at [facility]. Please see a copy of my visit note below.    Reason for Visit  Byron Russo is a 58 year old year old male who is being seen for RECHECK (RTN- MDS)    Pulmonary HPI  59 YO male with history of MDS RAEB-2 s/p Allo-sib HSCT 3 years ago who was recently hospitalized for increased SOB/CUELLAR and hypoxia.  Underwent CT chest on admission that showed peripheral findings with a few middle lung nodular infiltrates.  Underwent bronchoscopy with BAL showing only 190 and 290 WBC in a lymphocyte predominant pattern, only microbiology findings were Candida; was started on Posaconazole. Discharged on 4L of oxygen.  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  SOB and CUELLAR is improved since discharge.  States onset of symptoms was in 10-18, noticed increased SOB while doing yardwork.  States normal walking does not cause a problem, only if he is walking fast or carrying items. Denies any prior history of lung disease- no history of asthma, no pneumonia, no complications with chemotherapy.  Has a history of cGvHD, is on alternating prednisone of 10/20 mg and cyclosporin of 25 mg every day.  Review of chest CT pattern appears consistent with PPFE, cannot rule out nodular areas representing infection.    Last seen 3 months ago.  Since his last visit he has been doing well.  No change in exercise capacity, actually feels that he is doing better.  Is still working; has new HEPA filter for work area, states uses vacuum system all the time now at work.  No increased symptoms of SOB or cough at work; however is still exercise limited if exerts himself too much.    The patient was seen and examined by Travis Brady            Current Outpatient Medications   Medication     acetaminophen (TYLENOL) 325 MG tablet     acyclovir (ZOVIRAX) 800 MG tablet     amoxicillin (AMOXIL) 500 MG tablet     aspirin 81 MG EC tablet     cycloSPORINE modified (GENERIC EQUIVALENT) 25 MG capsule     dexamethasone 0.1 MG/ML solution     levofloxacin (LEVAQUIN) 250 MG tablet     levothyroxine (SYNTHROID/LEVOTHROID) 150 MCG tablet     magnesium oxide (MAG-OX) 400 (241.3 MG) MG tablet     metoprolol tartrate (LOPRESSOR) 25 MG tablet     NITROGLYCERIN ER PO     order for DME     order for DME     order for DME     order for DME     pilocarpine (SALAGEN) 5 MG tablet     posaconazole (NOXAFIL) 100 MG EC tablet     predniSONE (DELTASONE) 10 MG tablet     ranitidine (ZANTAC) 150 MG tablet     rosuvastatin (CRESTOR) 5 MG tablet     sulfamethoxazole-trimethoprim (BACTRIM DS/SEPTRA DS) 800-160 MG tablet     No current facility-administered medications for this visit.      Allergies   Allergen Reactions     Atorvastatin Other (See Comments)     Back pain  Tolerates atrovastatin     Docosahexaenoic Acid (Dha)      Other reaction(s): Intolerance-Can't Take - Omega 3 fish oil     Past Medical History:   Diagnosis Date     CAD (coronary artery disease) 2001     Hyperlipidemia      Hypothyroidism      Obesity      Pulmonary embolism (H) 2/2016     Varicose veins        Past Surgical History:   Procedure Laterality Date     APPENDECTOMY       ARTHROSCOPY KNEE WITH MEDIAL MENISCECTOMY  6/20/2011    Procedure:ARTHROSCOPY KNEE WITH MEDIAL MENISCECTOMY; Surgeon:SACHIN SAMANO; Location:[facility] OR     BRONCHOSCOPY (RIGID OR FLEXIBLE), DIAGNOSTIC N/A 2/6/2019    Procedure: COMBINED BRONCHOSCOPY (RIGID OR FLEXIBLE), LAVAGE;  Surgeon: Louise Rogel MD;  Location:  GI     coronary artery stents placed x 5  2001       Social History     Socioeconomic History     Marital status:      Spouse name: Not on file     Number of children: Not on file     Years of education:  Not on file     Highest education level: Not on file   Occupational History     Not on file   Social Needs     Financial resource strain: Not on file     Food insecurity:     Worry: Not on file     Inability: Not on file     Transportation needs:     Medical: Not on file     Non-medical: Not on file   Tobacco Use     Smoking status: Never Smoker     Smokeless tobacco: Former User   Substance and Sexual Activity     Alcohol use: No     Alcohol/week: 0.0 oz     Drug use: No     Sexual activity: Not on file   Lifestyle     Physical activity:     Days per week: Not on file     Minutes per session: Not on file     Stress: Not on file   Relationships     Social connections:     Talks on phone: Not on file     Gets together: Not on file     Attends Christian service: Not on file     Active member of club or organization: Not on file     Attends meetings of clubs or organizations: Not on file     Relationship status: Not on file     Intimate partner violence:     Fear of current or ex partner: Not on file     Emotionally abused: Not on file     Physically abused: Not on file     Forced sexual activity: Not on file   Other Topics Concern     Parent/sibling w/ CABG, MI or angioplasty before 65F 55M? Not Asked   Social History Narrative     Not on file       ROS Pulmonary  A complete ROS was otherwise negative except as noted in the HPI.  /79   Pulse 83   Temp 97.7  F (36.5  C) (Oral)   SpO2 96%   Exam:   GENERAL APPEARANCE: Well developed, well nourished, alert, and in no apparent distress.  EYES: PERRL, EOMI  HENT: Nasal mucosa with no edema and no hyperemia. No nasal polyps.  EARS: Canals clear, TMs normal  MOUTH: Oral mucosa is moist, without any lesions, no tonsillar enlargement, no oropharyngeal exudate.  NECK: supple, no masses, no thyromegaly.  LYMPHATICS: No significant axillary, cervical, or supraclavicular nodes.  RESP:  Mild decreased air flow throughout.  Few crackles bilateral bases. No rhonchi. No  wheezes.  CV: Normal S1, S2, regular rhythm, normal rate. No murmur.  No rub. No gallop. No LE edema.   ABDOMEN:  Bowel sounds normal, soft, nontender, no HSM or masses.   MS: extremities normal. No clubbing. No cyanosis.  SKIN: no rash on limited exam  NEURO: Mentation intact, speech normal, normal strength and tone, normal gait and stance  PSYCH: mentation appears normal. and affect normal/bright  Results:   Pulmonary function tests personally reviewed  FVC 3.03 (63%), FEV-1 2.52 (67%), FEV-1/FVC 3%  FRC 59%, RV 63%, TLC 64%  DLCO 65%  No airflow limitation.  Restrictive lung volumes. Decreased diffusion capacity.  Compared to 3-26-19, there is no significant change in FEV-1, FVC and FRC; the TLC is decreased but this decrease is less than 15%  Recent Results (from the past 168 hour(s))   Comprehensive metabolic panel    Collection Time: 06/25/19  1:46 PM   Result Value Ref Range    Sodium 138 133 - 144 mmol/L    Potassium 4.0 3.4 - 5.3 mmol/L    Chloride 107 94 - 109 mmol/L    Carbon Dioxide 23 20 - 32 mmol/L    Anion Gap 8 3 - 14 mmol/L    Glucose 119 (H) 70 - 99 mg/dL    Urea Nitrogen 11 7 - 30 mg/dL    Creatinine 0.85 0.66 - 1.25 mg/dL    GFR Estimate >90 >60 mL/min/[1.73_m2]    GFR Estimate If Black >90 >60 mL/min/[1.73_m2]    Calcium 8.7 8.5 - 10.1 mg/dL    Bilirubin Total 0.4 0.2 - 1.3 mg/dL    Albumin 3.4 3.4 - 5.0 g/dL    Protein Total 7.1 6.8 - 8.8 g/dL    Alkaline Phosphatase 82 40 - 150 U/L    ALT 24 0 - 70 U/L    AST 24 0 - 45 U/L   CBC with platelets differential    Collection Time: 06/25/19  1:46 PM   Result Value Ref Range    WBC 7.0 4.0 - 11.0 10e9/L    RBC Count 4.83 4.4 - 5.9 10e12/L    Hemoglobin 16.1 13.3 - 17.7 g/dL    Hematocrit 47.0 40.0 - 53.0 %    MCV 97 78 - 100 fl    MCH 33.3 (H) 26.5 - 33.0 pg    MCHC 34.3 31.5 - 36.5 g/dL    RDW 13.9 10.0 - 15.0 %    Platelet Count 220 150 - 450 10e9/L    Diff Method Automated Method     % Neutrophils 69.0 %    % Lymphocytes 20.8 %    % Monocytes 9.1  %    % Eosinophils 0.1 %    % Basophils 0.4 %    % Immature Granulocytes 0.6 %    Nucleated RBCs 0 0 /100    Absolute Neutrophil 4.9 1.6 - 8.3 10e9/L    Absolute Lymphocytes 1.5 0.8 - 5.3 10e9/L    Absolute Monocytes 0.6 0.0 - 1.3 10e9/L    Absolute Eosinophils 0.0 0.0 - 0.7 10e9/L    Absolute Basophils 0.0 0.0 - 0.2 10e9/L    Abs Immature Granulocytes 0.0 0 - 0.4 10e9/L    Absolute Nucleated RBC 0.0    General PFT Lab (Please always keep checked)    Collection Time: 06/25/19  2:11 PM   Result Value Ref Range    FVC-Pred 4.79 L    FVC-Pre 3.03 L    FVC-%Pred-Pre 63 %    FEV1-Pre 2.52 L    FEV1-%Pred-Pre 67 %    FEV1FVC-Pred 78 %    FEV1FVC-Pre 83 %    FEFMax-Pred 9.50 L/sec    FEFMax-Pre 8.14 L/sec    FEFMax-%Pred-Pre 85 %    FEF2575-Pred 3.23 L/sec    FEF2575-Pre 2.63 L/sec    OSH0718-%Pred-Pre 81 %    ExpTime-Pre 7.53 sec    FIFMax-Pre 6.78 L/sec    VC-Pred 5.08 L    VC-Pre 3.13 L    VC-%Pred-Pre 61 %    IC-Pred 4.18 L    IC-Pre 2.49 L    IC-%Pred-Pre 59 %    ERV-Pred 0.91 L    ERV-Pre 0.64 L    ERV-%Pred-Pre 70 %    FEV1FEV6-Pred 80 %    FEV1FEV6-Pre 83 %    FRCPleth-Pred 3.57 L    FRCPleth-Pre 2.12 L    FRCPleth-%Pred-Pre 59 %    RVPleth-Pred 2.33 L    RVPleth-Pre 1.49 L    RVPleth-%Pred-Pre 63 %    TLCPleth-Pred 7.13 L    TLCPleth-Pre 4.61 L    TLCPleth-%Pred-Pre 64 %    DLCOunc-Pred 28.33 ml/min/mmHg    DLCOunc-Pre 18.54 ml/min/mmHg    DLCOunc-%Pred-Pre 65 %    DLCOcor-Pre 17.83 ml/min/mmHg    DLCOcor-%Pred-Pre 62 %    VA-Pre 4.23 L    VA-%Pred-Pre 64 %    FIO2-Pre 21.00 %    FEV1SVC-Pred 73 %    FEV1SVC-Pre 81 %       Assessment and plan:   57 YO s/p HSCT post 3years with sub-acute presentation with SOB and CUELLAR with recent worsening.  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  Unclear of cause of acute worsening but may be related to concomitant viral infection, cannot fully exclude fungal infection but BAL cultures have  remained negative. No effective treatment for PPFE, steroids do not seem to be effective, however in my experience the disease does not seem to progress.  Based on lack of treatment and seemingly no/slow progression, I would not proceed with biopsy to confirm the diagnosis. With impaired lung function he is at risk to develop respiratory complications during times of respiratory infections.  Overall feels unchanged compared to his last visit; PFT's with evidence of restriction and are overall unchanged.   Still working, was able to modify work environment.     1. Continue prednisone and CYA as per BMT  2. Will plan repeat PFT's in one year       RTC in  6 months.  Patient to call with any questions or concerns prior to his next clinic visit      Again, thank you for allowing me to participate in the care of your patient.      Sincerely,    Travis Brady MD

## 2019-06-25 NOTE — NURSING NOTE
"Oncology Rooming Note    June 25, 2019 3:39 PM   Byron Russo is a 58 year old male who presents for:    Chief Complaint   Patient presents with     RECHECK     RTN- MDS     Initial Vitals: There were no vitals taken for this visit. Estimated body mass index is 34.28 kg/m  as calculated from the following:    Height as of 2/26/19: 1.778 m (5' 10\").    Weight as of an earlier encounter on 6/25/19: 108.4 kg (238 lb 14.4 oz). There is no height or weight on file to calculate BSA.  Data Unavailable Comment: Data Unavailable   No LMP for male patient.  Allergies reviewed: Yes  Medications reviewed: Yes    Medications: Medication refills not needed today.  Pharmacy name entered into [facility]:    [facility] [place], [place] - [address] [facility] [phone]  [facility] [address] [place] [id number] [facility]    Clinical concerns:  none    Lisbet Jarrett CMA              "

## 2019-06-25 NOTE — PROGRESS NOTES
Reason for Visit  Byron Russo is a 58 year old year old male who is being seen for RECHECK (RTN- MDS)    Pulmonary HPI  57 YO male with history of MDS RAEB-2 s/p Allo-sib HSCT 3 years ago who was recently hospitalized for increased SOB/CUELLAR and hypoxia.  Underwent CT chest on admission that showed peripheral findings with a few middle lung nodular infiltrates.  Underwent bronchoscopy with BAL showing only 190 and 290 WBC in a lymphocyte predominant pattern, only microbiology findings were Candida; was started on Posaconazole. Discharged on 4L of oxygen.  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  SOB and CUELLAR is improved since discharge.  States onset of symptoms was in 10-18, noticed increased SOB while doing yardwork.  States normal walking does not cause a problem, only if he is walking fast or carrying items. Denies any prior history of lung disease- no history of asthma, no pneumonia, no complications with chemotherapy.  Has a history of cGvHD, is on alternating prednisone of 10/20 mg and cyclosporin of 25 mg every day.  Review of chest CT pattern appears consistent with PPFE, cannot rule out nodular areas representing infection.    Last seen 3 months ago.  Since his last visit he has been doing well.  No change in exercise capacity, actually feels that he is doing better.  Is still working; has new HEPA filter for work area, states uses vacuum system all the time now at work.  No increased symptoms of SOB or cough at work; however is still exercise limited if exerts himself too much.    The patient was seen and examined by Travis Brady           Current Outpatient Medications   Medication     acetaminophen (TYLENOL) 325 MG tablet     acyclovir (ZOVIRAX) 800 MG tablet     amoxicillin (AMOXIL) 500 MG tablet     aspirin 81 MG EC tablet     cycloSPORINE modified (GENERIC EQUIVALENT) 25 MG capsule     dexamethasone 0.1 MG/ML solution      levofloxacin (LEVAQUIN) 250 MG tablet     levothyroxine (SYNTHROID/LEVOTHROID) 150 MCG tablet     magnesium oxide (MAG-OX) 400 (241.3 MG) MG tablet     metoprolol tartrate (LOPRESSOR) 25 MG tablet     NITROGLYCERIN ER PO     order for DME     order for DME     order for DME     order for DME     pilocarpine (SALAGEN) 5 MG tablet     posaconazole (NOXAFIL) 100 MG EC tablet     predniSONE (DELTASONE) 10 MG tablet     ranitidine (ZANTAC) 150 MG tablet     rosuvastatin (CRESTOR) 5 MG tablet     sulfamethoxazole-trimethoprim (BACTRIM DS/SEPTRA DS) 800-160 MG tablet     No current facility-administered medications for this visit.      Allergies   Allergen Reactions     Atorvastatin Other (See Comments)     Back pain  Tolerates atrovastatin     Docosahexaenoic Acid (Dha)      Other reaction(s): Intolerance-Can't Take - Omega 3 fish oil     Past Medical History:   Diagnosis Date     CAD (coronary artery disease) 2001     Hyperlipidemia      Hypothyroidism      Obesity      Pulmonary embolism (H) 2/2016     Varicose veins        Past Surgical History:   Procedure Laterality Date     APPENDECTOMY       ARTHROSCOPY KNEE WITH MEDIAL MENISCECTOMY  6/20/2011    Procedure:ARTHROSCOPY KNEE WITH MEDIAL MENISCECTOMY; Surgeon:SACHIN SAMANO; Location:[facility] OR     BRONCHOSCOPY (RIGID OR FLEXIBLE), DIAGNOSTIC N/A 2/6/2019    Procedure: COMBINED BRONCHOSCOPY (RIGID OR FLEXIBLE), LAVAGE;  Surgeon: Louise Rogel MD;  Location:  GI     coronary artery stents placed x 5  2001       Social History     Socioeconomic History     Marital status:      Spouse name: Not on file     Number of children: Not on file     Years of education: Not on file     Highest education level: Not on file   Occupational History     Not on file   Social Needs     Financial resource strain: Not on file     Food insecurity:     Worry: Not on file     Inability: Not on file     Transportation needs:     Medical: Not on file     Non-medical: Not on file    Tobacco Use     Smoking status: Never Smoker     Smokeless tobacco: Former User   Substance and Sexual Activity     Alcohol use: No     Alcohol/week: 0.0 oz     Drug use: No     Sexual activity: Not on file   Lifestyle     Physical activity:     Days per week: Not on file     Minutes per session: Not on file     Stress: Not on file   Relationships     Social connections:     Talks on phone: Not on file     Gets together: Not on file     Attends Catholic service: Not on file     Active member of club or organization: Not on file     Attends meetings of clubs or organizations: Not on file     Relationship status: Not on file     Intimate partner violence:     Fear of current or ex partner: Not on file     Emotionally abused: Not on file     Physically abused: Not on file     Forced sexual activity: Not on file   Other Topics Concern     Parent/sibling w/ CABG, MI or angioplasty before 65F 55M? Not Asked   Social History Narrative     Not on file       ROS Pulmonary  A complete ROS was otherwise negative except as noted in the HPI.  /79   Pulse 83   Temp 97.7  F (36.5  C) (Oral)   SpO2 96%   Exam:   GENERAL APPEARANCE: Well developed, well nourished, alert, and in no apparent distress.  EYES: PERRL, EOMI  HENT: Nasal mucosa with no edema and no hyperemia. No nasal polyps.  EARS: Canals clear, TMs normal  MOUTH: Oral mucosa is moist, without any lesions, no tonsillar enlargement, no oropharyngeal exudate.  NECK: supple, no masses, no thyromegaly.  LYMPHATICS: No significant axillary, cervical, or supraclavicular nodes.  RESP:  Mild decreased air flow throughout.  Few crackles bilateral bases. No rhonchi. No wheezes.  CV: Normal S1, S2, regular rhythm, normal rate. No murmur.  No rub. No gallop. No LE edema.   ABDOMEN:  Bowel sounds normal, soft, nontender, no HSM or masses.   MS: extremities normal. No clubbing. No cyanosis.  SKIN: no rash on limited exam  NEURO: Mentation intact, speech normal, normal  strength and tone, normal gait and stance  PSYCH: mentation appears normal. and affect normal/bright  Results:   Pulmonary function tests personally reviewed  FVC 3.03 (63%), FEV-1 2.52 (67%), FEV-1/FVC 3%  FRC 59%, RV 63%, TLC 64%  DLCO 65%  No airflow limitation.  Restrictive lung volumes. Decreased diffusion capacity.  Compared to 3-26-19, there is no significant change in FEV-1, FVC and FRC; the TLC is decreased but this decrease is less than 15%  Recent Results (from the past 168 hour(s))   Comprehensive metabolic panel    Collection Time: 06/25/19  1:46 PM   Result Value Ref Range    Sodium 138 133 - 144 mmol/L    Potassium 4.0 3.4 - 5.3 mmol/L    Chloride 107 94 - 109 mmol/L    Carbon Dioxide 23 20 - 32 mmol/L    Anion Gap 8 3 - 14 mmol/L    Glucose 119 (H) 70 - 99 mg/dL    Urea Nitrogen 11 7 - 30 mg/dL    Creatinine 0.85 0.66 - 1.25 mg/dL    GFR Estimate >90 >60 mL/min/[1.73_m2]    GFR Estimate If Black >90 >60 mL/min/[1.73_m2]    Calcium 8.7 8.5 - 10.1 mg/dL    Bilirubin Total 0.4 0.2 - 1.3 mg/dL    Albumin 3.4 3.4 - 5.0 g/dL    Protein Total 7.1 6.8 - 8.8 g/dL    Alkaline Phosphatase 82 40 - 150 U/L    ALT 24 0 - 70 U/L    AST 24 0 - 45 U/L   CBC with platelets differential    Collection Time: 06/25/19  1:46 PM   Result Value Ref Range    WBC 7.0 4.0 - 11.0 10e9/L    RBC Count 4.83 4.4 - 5.9 10e12/L    Hemoglobin 16.1 13.3 - 17.7 g/dL    Hematocrit 47.0 40.0 - 53.0 %    MCV 97 78 - 100 fl    MCH 33.3 (H) 26.5 - 33.0 pg    MCHC 34.3 31.5 - 36.5 g/dL    RDW 13.9 10.0 - 15.0 %    Platelet Count 220 150 - 450 10e9/L    Diff Method Automated Method     % Neutrophils 69.0 %    % Lymphocytes 20.8 %    % Monocytes 9.1 %    % Eosinophils 0.1 %    % Basophils 0.4 %    % Immature Granulocytes 0.6 %    Nucleated RBCs 0 0 /100    Absolute Neutrophil 4.9 1.6 - 8.3 10e9/L    Absolute Lymphocytes 1.5 0.8 - 5.3 10e9/L    Absolute Monocytes 0.6 0.0 - 1.3 10e9/L    Absolute Eosinophils 0.0 0.0 - 0.7 10e9/L    Absolute  Basophils 0.0 0.0 - 0.2 10e9/L    Abs Immature Granulocytes 0.0 0 - 0.4 10e9/L    Absolute Nucleated RBC 0.0    General PFT Lab (Please always keep checked)    Collection Time: 06/25/19  2:11 PM   Result Value Ref Range    FVC-Pred 4.79 L    FVC-Pre 3.03 L    FVC-%Pred-Pre 63 %    FEV1-Pre 2.52 L    FEV1-%Pred-Pre 67 %    FEV1FVC-Pred 78 %    FEV1FVC-Pre 83 %    FEFMax-Pred 9.50 L/sec    FEFMax-Pre 8.14 L/sec    FEFMax-%Pred-Pre 85 %    FEF2575-Pred 3.23 L/sec    FEF2575-Pre 2.63 L/sec    GHF1449-%Pred-Pre 81 %    ExpTime-Pre 7.53 sec    FIFMax-Pre 6.78 L/sec    VC-Pred 5.08 L    VC-Pre 3.13 L    VC-%Pred-Pre 61 %    IC-Pred 4.18 L    IC-Pre 2.49 L    IC-%Pred-Pre 59 %    ERV-Pred 0.91 L    ERV-Pre 0.64 L    ERV-%Pred-Pre 70 %    FEV1FEV6-Pred 80 %    FEV1FEV6-Pre 83 %    FRCPleth-Pred 3.57 L    FRCPleth-Pre 2.12 L    FRCPleth-%Pred-Pre 59 %    RVPleth-Pred 2.33 L    RVPleth-Pre 1.49 L    RVPleth-%Pred-Pre 63 %    TLCPleth-Pred 7.13 L    TLCPleth-Pre 4.61 L    TLCPleth-%Pred-Pre 64 %    DLCOunc-Pred 28.33 ml/min/mmHg    DLCOunc-Pre 18.54 ml/min/mmHg    DLCOunc-%Pred-Pre 65 %    DLCOcor-Pre 17.83 ml/min/mmHg    DLCOcor-%Pred-Pre 62 %    VA-Pre 4.23 L    VA-%Pred-Pre 64 %    FIO2-Pre 21.00 %    FEV1SVC-Pred 73 %    FEV1SVC-Pre 81 %       Assessment and plan:   59 YO s/p HSCT post 3years with sub-acute presentation with SOB and CUELLAR with recent worsening.  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  Unclear of cause of acute worsening but may be related to concomitant viral infection, cannot fully exclude fungal infection but BAL cultures have remained negative. No effective treatment for PPFE, steroids do not seem to be effective, however in my experience the disease does not seem to progress.  Based on lack of treatment and seemingly no/slow progression, I would not proceed with biopsy to confirm the diagnosis. With impaired lung  function he is at risk to develop respiratory complications during times of respiratory infections.  Overall feels unchanged compared to his last visit; PFT's with evidence of restriction and are overall unchanged.  Still working, was able to modify work environment.     1. Continue prednisone and CYA as per BMT  2. Will plan repeat PFT's in one year       RTC in 6 months.  Patient to call with any questions or concerns prior to his next clinic visit

## 2019-07-29 DIAGNOSIS — D89.813 GVHD AS COMPLICATION OF BONE MARROW TRANSPLANT (H): ICD-10-CM

## 2019-07-29 DIAGNOSIS — D46.9 MDS (MYELODYSPLASTIC SYNDROME) (H): ICD-10-CM

## 2019-07-29 DIAGNOSIS — D46.22 RAEB-2 (REFRACTORY ANEMIA WITH EXCESS BLASTS-2) (H): ICD-10-CM

## 2019-07-29 DIAGNOSIS — T86.09 GVHD AS COMPLICATION OF BONE MARROW TRANSPLANT (H): ICD-10-CM

## 2019-07-29 RX ORDER — PREDNISONE 10 MG/1
20 TABLET ORAL DAILY
Qty: 45 TABLET | Refills: 0 | Status: SHIPPED | OUTPATIENT
Start: 2019-07-29 | End: 2019-08-29

## 2019-07-29 RX ORDER — SULFAMETHOXAZOLE/TRIMETHOPRIM 800-160 MG
1 TABLET ORAL 2 TIMES DAILY
Qty: 16 TABLET | Refills: 0 | Status: SHIPPED | OUTPATIENT
Start: 2019-07-29 | End: 2019-08-29

## 2019-07-29 RX ORDER — LEVOTHYROXINE SODIUM 150 UG/1
150 TABLET ORAL DAILY
Qty: 30 TABLET | Refills: 0 | Status: SHIPPED | OUTPATIENT
Start: 2019-07-29 | End: 2019-08-29

## 2019-07-29 RX ORDER — PILOCARPINE HYDROCHLORIDE 5 MG/1
5 TABLET, FILM COATED ORAL DAILY
Qty: 30 TABLET | Refills: 0 | Status: SHIPPED | OUTPATIENT
Start: 2019-07-29 | End: 2019-08-29

## 2019-07-29 RX ORDER — ROSUVASTATIN CALCIUM 5 MG/1
5 TABLET, COATED ORAL DAILY
Qty: 30 TABLET | Refills: 0 | Status: SHIPPED | OUTPATIENT
Start: 2019-07-29 | End: 2019-08-29

## 2019-07-29 RX ORDER — FLUCONAZOLE 100 MG/1
100 TABLET ORAL DAILY
Qty: 30 TABLET | Refills: 0 | Status: SHIPPED | OUTPATIENT
Start: 2019-07-29 | End: 2019-08-29

## 2019-07-29 RX ORDER — METOPROLOL TARTRATE 25 MG/1
25 TABLET, FILM COATED ORAL DAILY
Qty: 30 TABLET | Refills: 0 | Status: SHIPPED | OUTPATIENT
Start: 2019-07-29 | End: 2019-08-29

## 2019-07-29 RX ORDER — ACYCLOVIR 800 MG/1
800 TABLET ORAL 2 TIMES DAILY
Qty: 60 TABLET | Refills: 0 | Status: SHIPPED | OUTPATIENT
Start: 2019-07-29 | End: 2019-08-29

## 2019-07-29 RX ORDER — LEVOFLOXACIN 250 MG/1
250 TABLET, FILM COATED ORAL DAILY
Qty: 30 TABLET | Refills: 0 | Status: SHIPPED | OUTPATIENT
Start: 2019-07-29 | End: 2019-08-29

## 2019-08-29 DIAGNOSIS — D46.22 RAEB-2 (REFRACTORY ANEMIA WITH EXCESS BLASTS-2) (H): ICD-10-CM

## 2019-08-29 DIAGNOSIS — D46.9 MDS (MYELODYSPLASTIC SYNDROME) (H): ICD-10-CM

## 2019-08-29 RX ORDER — LEVOFLOXACIN 250 MG/1
250 TABLET, FILM COATED ORAL DAILY
Qty: 30 TABLET | Refills: 3 | Status: SHIPPED | OUTPATIENT
Start: 2019-08-29 | End: 2019-12-18

## 2019-08-29 RX ORDER — PREDNISONE 10 MG/1
20 TABLET ORAL DAILY
Qty: 45 TABLET | Refills: 3 | Status: SHIPPED | OUTPATIENT
Start: 2019-08-29 | End: 2019-12-18

## 2019-08-29 RX ORDER — LEVOTHYROXINE SODIUM 150 UG/1
150 TABLET ORAL DAILY
Qty: 30 TABLET | Refills: 3 | Status: SHIPPED | OUTPATIENT
Start: 2019-08-29 | End: 2019-12-18

## 2019-08-29 RX ORDER — PILOCARPINE HYDROCHLORIDE 5 MG/1
5 TABLET, FILM COATED ORAL DAILY
Qty: 30 TABLET | Refills: 3 | Status: SHIPPED | OUTPATIENT
Start: 2019-08-29 | End: 2019-12-18

## 2019-08-29 RX ORDER — FLUCONAZOLE 100 MG/1
100 TABLET ORAL DAILY
Qty: 30 TABLET | Refills: 3 | Status: SHIPPED | OUTPATIENT
Start: 2019-08-29 | End: 2019-12-18

## 2019-08-29 RX ORDER — METOPROLOL TARTRATE 25 MG/1
25 TABLET, FILM COATED ORAL DAILY
Qty: 30 TABLET | Refills: 3 | Status: SHIPPED | OUTPATIENT
Start: 2019-08-29 | End: 2019-12-18

## 2019-08-29 RX ORDER — ACYCLOVIR 800 MG/1
800 TABLET ORAL 2 TIMES DAILY
Qty: 60 TABLET | Refills: 3 | Status: SHIPPED | OUTPATIENT
Start: 2019-08-29 | End: 2019-12-18

## 2019-08-29 RX ORDER — CYCLOSPORINE 25 MG/1
25 CAPSULE, LIQUID FILLED ORAL 2 TIMES DAILY
Qty: 60 CAPSULE | Refills: 3 | Status: SHIPPED | OUTPATIENT
Start: 2019-08-29 | End: 2019-12-18

## 2019-08-29 RX ORDER — SULFAMETHOXAZOLE/TRIMETHOPRIM 800-160 MG
1 TABLET ORAL 2 TIMES DAILY
Qty: 16 TABLET | Refills: 3 | Status: SHIPPED | OUTPATIENT
Start: 2019-08-29 | End: 2019-12-18

## 2019-08-29 RX ORDER — ROSUVASTATIN CALCIUM 5 MG/1
5 TABLET, COATED ORAL DAILY
Qty: 30 TABLET | Refills: 3 | Status: SHIPPED | OUTPATIENT
Start: 2019-08-29 | End: 2019-12-18

## 2019-09-25 ENCOUNTER — APPOINTMENT (OUTPATIENT)
Dept: LAB | Facility: CLINIC | Age: 59
End: 2019-09-25
Attending: INTERNAL MEDICINE
Payer: COMMERCIAL

## 2019-09-25 ENCOUNTER — ONCOLOGY VISIT (OUTPATIENT)
Dept: TRANSPLANT | Facility: CLINIC | Age: 59
End: 2019-09-25
Attending: INTERNAL MEDICINE
Payer: COMMERCIAL

## 2019-09-25 VITALS
BODY MASS INDEX: 34.82 KG/M2 | WEIGHT: 242.7 LBS | SYSTOLIC BLOOD PRESSURE: 124 MMHG | OXYGEN SATURATION: 94 % | DIASTOLIC BLOOD PRESSURE: 83 MMHG | TEMPERATURE: 97.8 F | HEART RATE: 96 BPM

## 2019-09-25 DIAGNOSIS — D46.9 MDS (MYELODYSPLASTIC SYNDROME) (H): Primary | ICD-10-CM

## 2019-09-25 LAB
ALBUMIN SERPL-MCNC: 3.5 G/DL (ref 3.4–5)
ALP SERPL-CCNC: 76 U/L (ref 40–150)
ALT SERPL W P-5'-P-CCNC: 23 U/L (ref 0–70)
ANION GAP SERPL CALCULATED.3IONS-SCNC: 7 MMOL/L (ref 3–14)
AST SERPL W P-5'-P-CCNC: 22 U/L (ref 0–45)
BASOPHILS # BLD AUTO: 0.1 10E9/L (ref 0–0.2)
BASOPHILS NFR BLD AUTO: 0.6 %
BILIRUB SERPL-MCNC: 0.4 MG/DL (ref 0.2–1.3)
BUN SERPL-MCNC: 13 MG/DL (ref 7–30)
CALCIUM SERPL-MCNC: 8.4 MG/DL (ref 8.5–10.1)
CHLORIDE SERPL-SCNC: 107 MMOL/L (ref 94–109)
CO2 SERPL-SCNC: 23 MMOL/L (ref 20–32)
CREAT SERPL-MCNC: 0.8 MG/DL (ref 0.66–1.25)
DIFFERENTIAL METHOD BLD: ABNORMAL
EOSINOPHIL # BLD AUTO: 0 10E9/L (ref 0–0.7)
EOSINOPHIL NFR BLD AUTO: 0.2 %
ERYTHROCYTE [DISTWIDTH] IN BLOOD BY AUTOMATED COUNT: 13.8 % (ref 10–15)
GFR SERPL CREATININE-BSD FRML MDRD: >90 ML/MIN/{1.73_M2}
GLUCOSE SERPL-MCNC: 97 MG/DL (ref 70–99)
HCT VFR BLD AUTO: 48 % (ref 40–53)
HGB BLD-MCNC: 16.8 G/DL (ref 13.3–17.7)
IMM GRANULOCYTES # BLD: 0.1 10E9/L (ref 0–0.4)
IMM GRANULOCYTES NFR BLD: 0.7 %
LYMPHOCYTES # BLD AUTO: 1.9 10E9/L (ref 0.8–5.3)
LYMPHOCYTES NFR BLD AUTO: 23.1 %
MCH RBC QN AUTO: 34.1 PG (ref 26.5–33)
MCHC RBC AUTO-ENTMCNC: 35 G/DL (ref 31.5–36.5)
MCV RBC AUTO: 97 FL (ref 78–100)
MONOCYTES # BLD AUTO: 1.3 10E9/L (ref 0–1.3)
MONOCYTES NFR BLD AUTO: 15.3 %
NEUTROPHILS # BLD AUTO: 5 10E9/L (ref 1.6–8.3)
NEUTROPHILS NFR BLD AUTO: 60.1 %
NRBC # BLD AUTO: 0 10*3/UL
NRBC BLD AUTO-RTO: 0 /100
PLATELET # BLD AUTO: 214 10E9/L (ref 150–450)
POTASSIUM SERPL-SCNC: 4 MMOL/L (ref 3.4–5.3)
PROT SERPL-MCNC: 7.4 G/DL (ref 6.8–8.8)
RBC # BLD AUTO: 4.93 10E12/L (ref 4.4–5.9)
SODIUM SERPL-SCNC: 137 MMOL/L (ref 133–144)
WBC # BLD AUTO: 8.2 10E9/L (ref 4–11)

## 2019-09-25 PROCEDURE — 80053 COMPREHEN METABOLIC PANEL: CPT | Performed by: INTERNAL MEDICINE

## 2019-09-25 PROCEDURE — 85025 COMPLETE CBC W/AUTO DIFF WBC: CPT | Performed by: INTERNAL MEDICINE

## 2019-09-25 PROCEDURE — 82784 ASSAY IGA/IGD/IGG/IGM EACH: CPT | Performed by: INTERNAL MEDICINE

## 2019-09-25 PROCEDURE — G0463 HOSPITAL OUTPT CLINIC VISIT: HCPCS

## 2019-09-25 ASSESSMENT — PAIN SCALES - GENERAL: PAINLEVEL: NO PAIN (0)

## 2019-09-25 NOTE — NURSING NOTE
"Oncology Rooming Note    September 25, 2019 3:43 PM   Byron Russo is a 58 year old male who presents for:    Chief Complaint   Patient presents with     Blood Draw     labs drawn via venipuncture by RN in lab     RECHECK     Pt is here for rtn for MDS     Initial Vitals: Blood Pressure 124/83 (BP Location: Left arm, Patient Position: Chair, Cuff Size: Adult Large)   Pulse 96   Temperature 97.8  F (36.6  C) (Oral)   Weight 110.1 kg (242 lb 11.2 oz)   Oxygen Saturation 94%   Body Mass Index 34.82 kg/m   Estimated body mass index is 34.82 kg/m  as calculated from the following:    Height as of 2/26/19: 1.778 m (5' 10\").    Weight as of this encounter: 110.1 kg (242 lb 11.2 oz). Body surface area is 2.33 meters squared.  No Pain (0) Comment: Data Unavailable   No LMP for male patient.  Allergies reviewed: Yes  Medications reviewed: Yes    Medications: Medication refills not needed today.  Pharmacy name entered into [facility]:    [facility] [place], [place] - [phone]  [facility] [address] [place] [address] - [id number] [facility]    Clinical concerns: none       Fe Aguillon MA              "

## 2019-09-25 NOTE — NURSING NOTE
Chief Complaint   Patient presents with     Blood Draw     labs drawn via venipuncture by RN in lab     /83 (BP Location: Left arm, Patient Position: Chair, Cuff Size: Adult Large)   Pulse 96   Temp 97.8  F (36.6  C) (Oral)   Wt 110.1 kg (242 lb 11.2 oz)   SpO2 94%   BMI 34.82 kg/m      Labs collected and sent from right antecubital venipuncture in lab by RN. Pt tolerated well. Pt checked in for next appointment.    Suzette Gomez RN

## 2019-09-25 NOTE — PROGRESS NOTES
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  He was hospitalized about a year ago with pulmonary hypoxia and infiltrates.  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  He is currently taking 20 alternating with 10 mg of prednisone every other day along with cyclosporine 25 mg p.o. b.i.d.      REVIEW OF SYSTEMS:  He says that he occasionally has problems with food sticking at the level of the Eliud's apple.  This is particularly common for pills, but also happens occasionally with food.  He also states that he has on the left buccal surface, a sore area which he attributes that he might have bitten himself inadvertently.  However, no fevers, chills, nausea, vomiting, diarrhea, cough, hemoptysis, shortness of breath, hematuria, dysuria, bruising or bleeding.      REVIEW OF SYSTEMS:  The remainder of the 10-point review of systems is negative.      PHYSICAL EXAMINATION:   GENERAL:  He looked robust, somewhat overweight.   VITAL SIGNS:  Unremarkable.   HEENT:  Sclerae were anicteric and non-injected.  Buccal mucosa showed 2 areas,one on the left and one on the right that wereraiserd and  erythematous and the left buccal also had a small shallow ulceration at the apex.  No clear lichenoid changes.   LUNGS:  Clear to auscultation.   CARDIAC:  Without S3, rub or murmur.   ABDOMEN:  Nondistended, active bowel sounds.  No organomegaly.   EXTREMITIES:  Trace pedal edema.   SKIN:  No skin rash.      LABORATORY DATA:  Today, white count 8200, hemoglobin 16.8, platelets 214,000.  Electrolyte battery was normal with a creatinine of 0.80 and potassium of 4.  His liver function tests were normal.      ASSESSMENT AND PLAN:   1.  Forty months status post myeloablative allo-sib transplant for RAEB MDS with no evidence of recurrence.   2.  Chronic yvitk-iqjxmp-cpmg  disease.  This seems to be currently quiescent with normal liver function tests.  The focal buccal lesions are unlikely to represent chronic jhquq-vblmfs-anod disease.  I will drop his prednisone to 15 alternating with 10 mg every other day for 1 month and then 10 mg every day thereafter until he returns to see me in 3 months.  I will make a referral to the ENT Clinic to evaluate both his buccal lesions and the problems with pills and occasionally food sticking at the level of the Eliud's apple.     Uli Enciso M.D.

## 2019-09-26 LAB — IGG SERPL-MCNC: 1150 MG/DL (ref 695–1620)

## 2019-09-26 NOTE — TELEPHONE ENCOUNTER
FUTURE VISIT INFORMATION      FUTURE VISIT INFORMATION:    Date: 10/24/2019    Time: 3:00 PM    Location: [facility]  REFERRAL INFORMATION:    Referring provider:  Dr. Uli Enciso    Referring providers clinic:  [facility]    Reason for visit/diagnosis  Difficulty Swallowing     RECORDS REQUESTED FROM:       Clinic name Comments Records Status Imaging Status   [facility] 9/25/19 Referral by Dr. Fernie Helton    [facility]  6/25/19 Office visit with Dr. Caitlyn Helton    [facility] Pulmonary Function Test PFT: 6/25/19, 3/26/19, 3/4/19, 2/13/19, 2/21/17, 4/18/19 [facility] 2/4/19 Office visit with Dr. Cole Duong  [facility]    [facility]  3/25/16 CT Neck Care Everywhere PACS   [facility] 2/4/19 ED notes with Dr. Uli Helton

## 2019-10-03 ENCOUNTER — HEALTH MAINTENANCE LETTER (OUTPATIENT)
Age: 59
End: 2019-10-03

## 2019-10-24 ENCOUNTER — PRE VISIT (OUTPATIENT)
Dept: OTOLARYNGOLOGY | Facility: CLINIC | Age: 59
End: 2019-10-24

## 2019-10-24 ENCOUNTER — OFFICE VISIT (OUTPATIENT)
Dept: OTOLARYNGOLOGY | Facility: CLINIC | Age: 59
End: 2019-10-24
Attending: INTERNAL MEDICINE
Payer: COMMERCIAL

## 2019-10-24 VITALS
BODY MASS INDEX: 35.01 KG/M2 | DIASTOLIC BLOOD PRESSURE: 84 MMHG | WEIGHT: 244 LBS | SYSTOLIC BLOOD PRESSURE: 123 MMHG | HEART RATE: 82 BPM

## 2019-10-24 DIAGNOSIS — K11.7 XEROSTOMIA: ICD-10-CM

## 2019-10-24 DIAGNOSIS — R49.0 DYSPHONIA: ICD-10-CM

## 2019-10-24 DIAGNOSIS — R13.14 PHARYNGOESOPHAGEAL DYSPHAGIA: Primary | ICD-10-CM

## 2019-10-24 DIAGNOSIS — Z92.3 HISTORY OF RADIATION EXPOSURE: ICD-10-CM

## 2019-10-24 ASSESSMENT — PAIN SCALES - GENERAL: PAINLEVEL: MILD PAIN (3)

## 2019-10-24 NOTE — PATIENT INSTRUCTIONS
You were seen in the ENT Clinic today by Dr. Silva  If you have any questions or concerns after your appointment, please call   -  ENT Clinic: [phone] scheduling option 1 and nurse advice option 3.    -  Recommend complete Video Swallow, bring pills or food that you have trouble eating to test.  We will call you with results and recommendations. Ask your pharmacist on which medication can be crushed and added to apple sauce if having trouble swallowing     -  Please follow up with Dr. Silva in approximately 6 months         Thank you for choosing [facility] and allowing us to be apart of your care team!  Flavia Miles LPN

## 2019-10-24 NOTE — LETTER
10/24/2019       RE: Byron Russo  [address] [address]     Dear Colleague,    Thank you for referring your patient, Byron Russo, to the [facility]. Please see a copy of my visit note below.    [facility]  Giancarlo Goldberg Jr., M.D., F.A.C.S.  María Hunt M.D., M.P.H.  Rozina Hernandez, Ph.D., CCC/SLP  Heavenly Lopez M.M. (voice), M.A., CCC/SLP  Yadiel Jasso M.M. (voice), M.A., CCC/SLP    Evaluation report    Clinician: Heavenly Lopez M.M. (voice), M.A., CCC/SLP  Seen in conjunction with: Dr. Minerva Silva  Referring physician:  Fernie  Patient: Byron Russo  Date of Visit: 10/24/2019    HISTORY  Chief complaint: Byron Russo is a 58 year old presenting today for evaluation of throat and swallow issues. 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  He was hospitalized about a year ago with pulmonary hypoxia and infiltrates.  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     CURRENT SYMPTOMS INCLUDE  VOICE    Denies significant voice issues    SWALLOWING    Gradually worsening swallow function    Current diet involves soft foods only and thin liquids.    Pain with swallow 3/10    Trouble swallowing food and pills, less so with liquids.    Additional  - Chronic dry mouth    Patient denies significant dyspnea and cough.     OTHER PERTINENT HISTORY    No PMHx or current pneumonia    No significant change in weight    Complex medical history: please also refer to Dr. Minerva Silva's dictation.     Past Medical History:   Diagnosis Date     CAD (coronary artery disease) 2001     Hyperlipidemia      Hypothyroidism      Obesity      Pulmonary embolism (H) 2/2016     Varicose veins   "    Past Surgical History:   Procedure Laterality Date     APPENDECTOMY       ARTHROSCOPY KNEE WITH MEDIAL MENISCECTOMY  6/20/2011    Procedure:ARTHROSCOPY KNEE WITH MEDIAL MENISCECTOMY; Surgeon:SACHIN SAMANO; Location:[facility] OR     BRONCHOSCOPY (RIGID OR FLEXIBLE), DIAGNOSTIC N/A 2/6/2019    Procedure: COMBINED BRONCHOSCOPY (RIGID OR FLEXIBLE), LAVAGE;  Surgeon: Louise Rogel MD;  Location:  GI     coronary artery stents placed x 5  2001       OBJECTIVE  PATIENT REPORTED MEASURES  Patient Supplied Answers To VHI Questionnaire  No flowsheet data found.    Patient Supplied Answers To CSI Questionnaire  No flowsheet data found.    Patient Supplied Answers To EAT Questionnaire  No flowsheet data found.    PERCEPTUAL EVALUATION (CPT 12338)  POSTURE / TENSION:     upper body    BREATHING:     appears within normal limits and adequate     LARYNGEAL PALPATION:     firm musculature    tenderness of the thyrohyoid area    reduced thyrohyoid space    VOICE:    Roughness: Mild; consistent    Breathiness: WNL    Strain: WNL     Voice is occasionally \"phlegmy\"    Loudness    Conversational speech:  WNL    Projected speech:  WNL    Pitch:    Conversational speech:  WNL    Pitch glide: neurologically normal    Resonance:    Conversational speech:  laryngeal pharyngeal resonance    Singing vs. Speech:  n/a    CAPE-V Overall Severity:  25/100    COUGH/THROAT CLEARING:    Occasional    Dry    LARYNGEAL FUNCTION STUDIES (CPT 26666)  Not warranted    LARYNGEAL EXAMINATION  Procedure: Flexible endoscopy with chip-tip technology with stroboscopy, right nostril; topical anesthesia with 3% Lidocaine and 0.25% phenylephrine was applied.   Performed by: Dr. Minerva Carrington   The laryngeal and pharyngeal structures were evaluated for gross appearance, mobility, function, and focal lesions / abnormalities of the associated mucosa.  Stroboscopy was warranted to evaluate closure, symmetry, and vibratory characteristics of the vocal " folds.  All findings were within normal limits with the exception of the following salient features:     Severe 4 way constriction during connected speech tasks    THERAPY PROBES: Improvement was elicited with use of forward resonant stimuli, coordination of respiration and phonation and use of yawn sigh    The addition of stroboscopy allowed evaluation of the mucosal wave:    Amplitude: right: mildly decreased; left: mildly decreased.     Symmetry: good symmetry.    Closure pattern: complete.     Closure plane: at glottic level.     Phase distribution: normal.       Abducted view.    CLINICAL SWALLOW EVALUATION (CPT 26647)  ORAL MOTOR EXAMINATION:  Face: Normal  Oral Musculature: generally intact  Structural Abnormalities: present  Dentition: present and adequate  Secretion Management: n/a  Mucosal Quality: good  Mandibular Strength and Mobility: intact  Oral Labial Strength and Mobility: WFL  Lingual Strength and Mobility: WFL  Velar Elevation: intact  Buccal Strength and Mobility: intact  Laryngeal Function: Cough, Throat Clear, Swallow, Voicing Initiated, Dry swallow palpated  and all appear to be WFL     FIBEROPTIC ENDOSCOPIC EVALUATION OF SWALLOWING (FEES)  Procedure: Flexible endoscopy with chip-tip technology with stroboscopy, right nostril; topical anesthesia with 3% Lidocaine and 0.25% phenylephrine was applied.  . Anesthetization spray was applied during laryngeal exam, but not reapplied during FEES.   Performed by: Dr. Ricardo Silva  Indications for FEES:  See above notes for complete history. Patient complains of dysphagia and/or there is suggestion on history of the presence of dysphagia causing medical complaints. Therefore, Dr. Silva deemed it medically necessary to proceed with the FEES screening protocol. PO trials were evaluated under fiberoptic endoscopy completed by Dr. Silva.    I provided the PO trials, the protocol of instructions, and therapy probes for the patient. I also provided skilled  evaluation of the swallow, and feedback/explanation to the patient.    Swallowing evaluation is being performed to assess the presence and degree of dysphagia, and to recommend a safe diet.    Pre-Swallow Secretions:    Location and Amount: minimal    Thin Liquid Trials:    Amount and Mode of Presentation: straw and cup    Premature Spillage/Delayed Swallow: WFL    Penetration/Aspiration: none    Residual Location and Amount: none    Therapy Probes and Efficacy: double swallow.    Puree Texture Trails:    Amount and Mode of Presentation: spoon    Premature Spillage/Delayed Swallow: none    Penetration/Aspiration:none    Residual Location and Amount: vallecula and piriform sinuses    Therapy Probes and Efficacy: double swallow    Solid Texture Trials:    Amount and Mode of Presentation: 1/2 cookie    Premature Spillage/Delayed Swallow: WN:    Penetration/Aspiration: none    Residual Location and Amount: mild in the vallecula and piriform sinuses.    Therapy Probes and Efficacy: double swallow; follow with liquid wash    Diet Recommendations: continue current diet. Crush pills,  Continue current diet.    Recommended Feeding/Eating Techniques: continue current diet.      The laryngeal exam was reviewed with Mr. Russo, and I provided pertinent explanations, as well as written and oral information.    ASSESSMENT / PLAN  IMPRESSIONS: Byron Russo is a 58, presenting today with R49.0 (Dysphonia) and R13.10 (Dysphagia) , as evidenced by evaluation, clinical swallow evaluation, and the results of the laryngeal function studies, as well as the laryngeal exam.      STIMULABILITY: results of therapy probes during perceptual and laryngeal evaluation demonstrate improvement with use of forward resonant stimuli, coordination of respiration and phonation and use of yawn sigh    RECOMMENDATIONS:     A course of speech therapy is recommended to promote a safe and independent swallow.    He demonstrates a Good prognosis for  improvement given adherence to therapeutic recommendations.     DURATION / FREQUENCY: 3 biweekly one-hour sessions.  A total of 6-8 sessions may be necessary.    Dr. Silva would like ot follow up in 6 months    GOALS:  Patient goal:   1. To understand the problem and fix it as much as possible  2. To have a normal and acceptable voice quality    Long-term goal(s): In 6 months, Mr. Russo will:  1. Report a week with no more than 3 episodes of coughing, that do not last more than 2 seconds  2. Report resolution of dysphonia, and use of optimal voice quality to meet personal, social, and professional needs, 90% of the time during a typical week of vocal activities    This treatment plan was developed with the patient who agreed with the recommendations.    TOTAL SERVICE TIME: 60 minutes  EVALUATION OF VOICE AND RESONANCE (13260)  NO CHARGE FACILITY FEE (40397)    Heavenly Lopez M.M. (voice), M.A., CCC/SLP  Speech-Language Pathologist  [facility] Trained Vocologist  [facility]  [phone]  [email or web address]  Prounouns: she/her              Psychology    Eyes    Ears, Nose, Throat Nasal congestion or drainage    Sore throat    Trouble swallowing   Cardiopulmonary    Gastrointestinal/Genitourinary    Musculoskeletal Sore or stiff joints   Allergy/Immunology    Hematologic    Endocrine    Skin    Other    Fv Mychart [facility] New Pt. Intake Questionnaire     Question 10/24/2019  2:54 PM CDT - Filed by Patient on 10/24/2019   Are you having any of these symptoms (even if they are not a major concern)? Throat irritation    Pain / ache in throat    Dry throat    Mucus in throat    Frequent throat-clearing   What healthcare professionals have you seen for these concerns? Who and when?    What testing / studies have you done? Not sure   Have you had any treatment for these concerns?    VOICE HANDICAP INDEX-10 (VHI-10) - Vianney etal., Development and validation of the Voice Handicap Index-10, Laryngoscope 2004  "(167): [phone].    How often do you have any of the following symptoms:  Indigestion, heartburn, chest pain, stomach acid coming up, and/or tasting acid in your mouth or throat? Never   My voice makes it difficult for people to hear me. Never   People have difficulty understanding me in a noisy room. Never   My voice difficulties restrict my personal and social life. Never   I feel left out of conversations because of my voice. Never   My voice problem causes me to lose income. Never   I feel as though I have to strain to produce voice. Never   The clarity of my voice is unpredictable. Almost Never   My voice problem upsets me. Never   My voice makes me feel handicapped. Never   People ask, \"What's wrong with your voice?\" Never   Health care providers who should receive a copy of today's note (please provide first and last name, and city)     Is anyone helping you complete this form? NA (no one is helping)   Please select your main concerns for this visit. We will ask you to tell us more about the concerns you select.  Swallowing    Cough / Throat clearing    Throat discomfort   How long have you had this swallowing problem? 60 days or more   What do you think caused this swallowing problem? I don't know   How has the swallowing problem changed over time? getting worse   How much does your swallowing problem bother you? very much   What is your current type of food intake? soft foods only   What is your current type of liquid intake? regular - thin liquids   Patient self-assessment: EATING ASSESSMENT TOOL (EAT-10)     My swallowing problem has caused me to lose weight. No problem   My swallowing problem interferes with my ability to go out for meals. 2   Swallowing liquids takes extra effort. 1   Swallowing solids takes extra effort. 3   Swallowing pills takes extra effort. Severe Problem   Swallowing is painful. 2   The pleasure of eating is affected by my swallowing. 3   When I swallow food sticks in my throat. " "Severe Problem   I cough when I eat. 1   Swallowing is stressful. 2   With your swallowing, what else do you notice? things feel like they go down the wrong tube    I feel a lump in my throat   How long have you had this cough / throat clearing problem? 30 days?   What do you think caused the cough / throat clearing problem? mucus   How has the cough / throat clearing problem changed over time? not changing   How much does your cough / throat clearing problem bother you? somewhat   Do any of the following trigger your cough / throat clearing  drinking    mucus   There are some symptoms that you may be feeling: Please provide     My cough is worse when I lie down. Never   My coughing problem causes me to restrict my personal and social life. Never   I tend to avoid places because of my cough problem. Never   I feel embarrassed because of my coughing problem. Never   People ask, \"What's wrong?\" because I cough a lot. Never   I run out of air when I cough. Never   My coughing problem affects my voice. Never   My coughing problem limits my physical activity. Never   My coughing problem upsets me. Never   People ask me if I am sick because I cough a lot. Never   With your cough / throat clearing what else do you notice?    How long have you had this throat discomfort problem? 30 to 60 days   What do you think caused this throat discomfort problem? not sure   How has the throat discomfort problem changed over time? getting worse   How much does your throat discomfort bother you? always   Please select all that apply when describing your throat discomfort issue? feeling of a lump in your throat    worse with meals    worse when swallowing your own saliva   If you have throat pain, how does it feel? something feels stuck               Again, thank you for allowing me to participate in the care of your patient.      Sincerely,    Heavenly Lopez, SLP      "

## 2019-10-24 NOTE — LETTER
10/24/2019       RE: Byron Russo  [address] [facility] [address]     Dear Colleague,    Thank you for referring your patient, Byron Russo, to the [facility] EAR NOSE AND THROAT at [facility]. Please see a copy of my visit note below.        [facility]           Patient: Byron Russo  MRN: [id number]    : 1960  Age/Gender: 58 year old male  Date of Service: 2019       Referring Provider   PCP: Cole Duong  Referring Physician: Uli Enciso    Reason for Consultation   Dysphagia  Dysphonia  Dypsnea    History     HISTORY OF PRESENT ILLNESS: Mr. Russo is a 58 year old male who presents to us today with dysphagia has been going on for the past few months especially with pills.  Is a very dry mouth and gingivitis.  He was recently treated for thrush. He has a history of myeloablative allo-sib transplant.  He was initially treated with chemotherapy and total body radiation.  He has a lot of phlegm.  Sometimes he has a cough.  He has a lot of trouble with coarse food.  States that soft foods like soup are okay.  Pills are giving him a lot of trouble he has to extend his neck and swallow liquids a lot to get pills down.  Denies any recent pneumonias.  He works with [employer] in [place].  He had a EGD many years ago. He also reports associated dysphonia.  Denies odynophagia.  Does report heartburn.      PAST MEDICAL HISTORY:   Past Medical History:   Diagnosis Date     CAD (coronary artery disease)      Hyperlipidemia      Hypothyroidism      Obesity      Pulmonary embolism (H) 2016     Varicose veins          PAST SURGICAL HISTORY:   Past Surgical History:   Procedure Laterality Date     APPENDECTOMY       ARTHROSCOPY KNEE WITH MEDIAL MENISCECTOMY  2011    Procedure:ARTHROSCOPY KNEE WITH MEDIAL MENISCECTOMY; Surgeon:SACHIN SAMANO;  Location:[facility] OR     BRONCHOSCOPY (RIGID OR FLEXIBLE), DIAGNOSTIC N/A 2/6/2019    Procedure: COMBINED BRONCHOSCOPY (RIGID OR FLEXIBLE), LAVAGE;  Surgeon: Louise Rogel MD;  Location:  GI     coronary artery stents placed x 5  2001         CURRENT MEDICATIONS:   Current Outpatient Medications:      acetaminophen (TYLENOL) 325 MG tablet, Take 1-2 tablets (325-650 mg) by mouth every 4 hours as needed for mild pain or fever, Disp: , Rfl:      acyclovir (ZOVIRAX) 800 MG tablet, Take 1 tablet (800 mg) by mouth 2 times daily, Disp: 60 tablet, Rfl: 3     amoxicillin (AMOXIL) 500 MG tablet, Take 4 tablets by mouth 1 hour before dental visit., Disp: 28 tablet, Rfl: 0     aspirin 81 MG EC tablet, Take 81 mg by mouth daily Reported on 5/12/2017, Disp: , Rfl:      cycloSPORINE modified (GENERIC EQUIVALENT) 25 MG capsule, Take 1 capsule (25 mg) by mouth 2 times daily, Disp: 60 capsule, Rfl: 3     fluconazole (DIFLUCAN) 100 MG tablet, Take 1 tablet (100 mg) by mouth daily, Disp: 30 tablet, Rfl: 3     levofloxacin (LEVAQUIN) 250 MG tablet, Take 1 tablet (250 mg) by mouth daily, Disp: 30 tablet, Rfl: 3     levothyroxine (SYNTHROID/LEVOTHROID) 150 MCG tablet, Take 1 tablet (150 mcg) by mouth daily, Disp: 30 tablet, Rfl: 3     magnesium oxide (MAG-OX) 400 (241.3 MG) MG tablet, Take 2 tablets daily, Disp: , Rfl:      metoprolol tartrate (LOPRESSOR) 25 MG tablet, Take 1 tablet (25 mg) by mouth daily, Disp: 30 tablet, Rfl: 3     NITROGLYCERIN ER PO, Take by mouth as needed Reported on 5/12/2017, Disp: , Rfl:      pilocarpine (SALAGEN) 5 MG tablet, Take 1 tablet (5 mg) by mouth daily, Disp: 30 tablet, Rfl: 3     predniSONE (DELTASONE) 10 MG tablet, Take 2 tablets (20 mg) by mouth daily Alternating with 10mg daily, Disp: 45 tablet, Rfl: 3     ranitidine (ZANTAC) 150 MG tablet, Take 1 tablet (150 mg) by mouth 2 times daily, Disp: 60 tablet, Rfl: 3     rosuvastatin (CRESTOR) 5 MG tablet, Take 1 tablet (5 mg) by mouth daily, Disp: 30  tablet, Rfl: 3     sulfamethoxazole-trimethoprim (BACTRIM DS/SEPTRA DS) 800-160 MG tablet, Take 1 tablet by mouth 2 times daily On Monday's and Tuesday's only, Disp: 16 tablet, Rfl: 3      ALLERGIES: Atorvastatin and Docosahexaenoic acid (dha)      SOCIAL HISTORY:    Social History     Socioeconomic History     Marital status:      Spouse name: Not on file     Number of children: Not on file     Years of education: Not on file     Highest education level: Not on file   Occupational History     Not on file   Social Needs     Financial resource strain: Not on file     Food insecurity:     Worry: Not on file     Inability: Not on file     Transportation needs:     Medical: Not on file     Non-medical: Not on file   Tobacco Use     Smoking status: Never Smoker     Smokeless tobacco: Former User   Substance and Sexual Activity     Alcohol use: No     Alcohol/week: 0.0 standard drinks     Drug use: No     Sexual activity: Not on file   Lifestyle     Physical activity:     Days per week: Not on file     Minutes per session: Not on file     Stress: Not on file   Relationships     Social connections:     Talks on phone: Not on file     Gets together: Not on file     Attends Christianity service: Not on file     Active member of club or organization: Not on file     Attends meetings of clubs or organizations: Not on file     Relationship status: Not on file     Intimate partner violence:     Fear of current or ex partner: Not on file     Emotionally abused: Not on file     Physically abused: Not on file     Forced sexual activity: Not on file   Other Topics Concern     Parent/sibling w/ CABG, MI or angioplasty before 65F 55M? Not Asked   Social History Narrative     Not on file           FAMILY HISTORY: Non-contributory for problems with anesthesia      REVIEW OF SYSTEMS:   The patient was asked a 14 point review of systems regarding constitutional symptoms, eye symptoms, ears, nose, mouth, throat symptoms, cardiovascular  symptoms, respiratory symptoms, gastrointestinal symptoms, genitourinary symptoms, musculoskeletal symptoms, integumentary symptoms, neurological symptoms, psychiatric symptoms, endocrine symptoms, hematologic/lymphatic symptoms, and allergic/ immunologic symptoms.   The pertinent factors have been included in the HPI and below.  Patient Supplied Answers to Review of Systems  [facility] ENT ROS 10/24/2019   Ears, Nose, Throat Nasal congestion or drainage, Sore throat, Trouble swallowing   Musculoskeletal Sore or stiff joints           Physical Examination     The patient underwent a physical examination as described below. The pertinent positive and negative findings are summarized after the description of the examination.    Constitutional: The patient's developmental and nutritional status was assessed. The patient's voice quality was assessed.  Head and Face: The head and face were inspected for deformities. The sinuses were palpated. The salivary glands were palpated. Facial muscle strength was assessed bilaterally.  Eyes: Extraocular movements and primary gaze alignment were assessed.  Ears, Nose, Mouth and Throat: The ears and nose were examined for deformities. The nasal septum, mucosa, and turbinates were inspected by anterior rhinoscopy. The lips, teeth, and gums were examined for abnormalities. The oral mucosa, tongue, palate, tonsils, lateral and posterior pharynx were inspected for the presence of asymmetry or mucosal lesions.    Neck: The tracheal position was noted, and the neck mass palpated to determine if there were any asymmetries, abnormal neck masses, thyromegally, or thyroid nodules.  Respiratory: The nature of the breathing and chest expansion/symmetry was observed.  Cardiovascular: The patient was examined to determine the presence of any edema or jugular venous distension.  Abdomen: The contour of the abdomen was noted.  Lymphatic: The patient was examined for infraclavicular  lymphadenopathy.  Musculoskeletal: The patient was inspected for the presence of skeletal deformities.  Extremities: The extremities were examined for any clubbing or cyanosis.  Skin: The skin was examined for inflammatory or neoplastic conditions.  Neurologic: The patient's orientation, mood, and affect were noted. The cranial nerve  functions were examined.    Other pertinent positive and negative findings on physical examination:   each ear: EAC clear, TM intact, no effusion  Anterior rhinoscopy: no lesions  OC/OP: no lesions, uvula midline, soft palate elevates symmetrically, FOM/BOT soft, left cheek with more erythema but no discrete lesion, very dry mucosa  Neck: no lesions, no TH tenderness to palpation, lymphedema    All other physical examination findings were within normal limits and noncontributory.    Procedures   Video Laryngoscopy with Stroboscopy (CPT 86801) and Behavioral & Qualitative Evaluation of Voice and Resonence     Preoperative Diagnosis:  Dysphonia and throat symptoms  Postoperative Diagnosis: Dysphonia and throat symptoms  Indication:  Patient has new or persistent dysphonia and throat symptoms.  This requires evaluation by stroboscopy to fully delineate the laryngeal functioning; especially mucosal wave assessment, ultrasharp visualization of lesions on the vocal folds, and overall functioning of the larynx.  Details of Procedure: A 70 degree rigid telescopic laryngoscope or a distal chip flexible scope was used. The lighting was obtained via a light cable connected to a stroboscopic unit. The telescope was inserted either transorally or transnasally until the vocal folds could be visualized. The patient was instructed to sustain the vowel  ee  at a comfortable pitch and loudness as the voice was monitored by a microphone connected to a fundamental frequency tracker. This circuit tracked vocal periodicity, allowing the light to flash in synchrony with the glottal cycles. A setting on the  stroboscope was set to change the phase of light strobing with relation to the vocal fundamental frequency, producing an image of 1 to 2 glottal cycles every second. The video images were recorded for analysis. Use of the variable speed, slow and stop scan allowed careful study of pertinent segments of laryngeal function to increase accuracy of clinical assessments of function and dysfunction.  In particular, features of glottal closure, mucosal wave on the vocal fold cover and laryngeal symmetry were analyzed. Lastly, the patient was asked to phonate speech samples and auditory/perceptual evaluation of voice and resonance were performed.  The vocal quality was carefully evaluated for hoarseness, breathiness, loudness, phrase length and intelligibility to determine the source of dysphonia.    Findings:   A. BEHAVIORAL & QUALITATIVE EVALUATION OF VOICE AND RESONENCE   Comments: F0 200 MPT: 10sec  Vocal Quality: raspy    Pitch Range:  Mildly reduced   Phrase Length:  Normal  Vocal Loudness: Normal  Dysarthria: No    B. LARYNGOVIDEOSTROBOSCOPY   Anatomic/Physiological Deviations:  Radiated larynx, dry caked secretions on the posterior wall, stubby epiglottis  Mucosal wave: Right:  No restriction     Left: No restriction  Bilateral Vocal Fold Vibration: Symmetric  Vocal Process: Right: No restriction    Left:  No restriction  Vocal Fold closure: Complete glottal closure    Complication(s): None  Disposition: Patient tolerated the procedure well              Fiberoptic Endoscopic Evaluation of Swallowing (CPT 40164)  and Interpretation of Swallowing (CPT 90630)    Indications: See above notes for complete history and physical. Patient complains of dysphagia to solids and/or there is suggestion on history and endoscopic exam of the presence of dysphagia causing medical complaints.  Swallowing evaluation is being performed to assess the presence and degree of dysphagia, and to recommend a safe diet.     Pulmonary Status:   No PNA   Current Diet:              regular                                        thin liquids      Consistency Amounts:  Thin Liquid: 1 tsp   Puree: 1 tsp  Solid: cookies            Positioning: upright in a chair  Oral Peripheral Exam: See physical exam section.  Anatomic Notes: See Videostroboscopy report for assessment of anatomy and laryngeal functioning  Pharyngeal secretions prior to administration of liquid or food: No  Oral Phase Abnormal Findings: No abnormal behavior observed  Behavioral Adaptations: No abnormal behavior observed  Pharyngeal Phase Abnormal Findings: vallecular residue, and pharyngeal wall residue, no penetration or aspiration, mostly cleared with water follow       Recommended Diet:  regular                                        thin liquids                Review of Relevant Clinical Data     Notes:   Uli Enciso 9/26/19         Radiology: CT chest 3/26/16  IMPRESSION:   1. Diffuse consolidations in a peripheral/subpleural and basilar  predominant distribution with associated areas of peripheral traction  bronchiolectasis and scattered peribronchovascular nodular opacities.  These findings are unchanged from outside CT dated 2/4/2019, and are  new from 2/21/2017. In this patient with history of bone marrow  transplant in 2016, differential includes organizing pneumonia,  rwjrm-wocvqg-mrbi disease, pleural-parenchymal fibroelastosis, and  drug toxicity. Superimposed infection is not excluded.  2. Severe coronary artery calcifications.    Procedures:   PFTs 6/25/19  Moderate Restriction   Mild diffusion defect.  The diffusing capacity was not corrected for the patient's hemoglobin.  Compared with testing from 3/4/2019 there has been no significant change in the FEV1 or FVC.    Labs:  Lab Results   Component Value Date    TSH 0.47 05/11/2018     Lab Results   Component Value Date     09/25/2019    CO2 23 09/25/2019    BUN 13 09/25/2019    PHOS 4.2 02/06/2019     Lab Results  "  Component Value Date    WBC 8.2 09/25/2019    HGB 16.8 09/25/2019    HCT 48.0 09/25/2019    MCV 97 09/25/2019     09/25/2019     No results found for: MARIA R  No components found for: RHEUMATOIDFACTOR,  RF  Lab Results   Component Value Date    CRP 8.2 (H) 02/06/2019     No components found for: CKTOT, URICACID  No components found for: C3, C4, DSDNAAB, NDNAABIFA  No results found for: MPOAB    Patient reported Quality of Life (QOL) Measures     Patient Supplied Answers To VHI Questionnaire  Voice Handicap Index (VHI-10) 10/24/2019   My voice makes it difficult for people to hear me 0   People have difficulty understanding me in a noisy room 0   My voice difficulties restrict my personal and social life.  0   I feel left out of conversations because of my voice 0   My voice problem causes me to lose income 0   I feel as though I have to strain to produce voice 0   The clarity of my voice is unpredictable 1   My voice problem upsets me 0   My voice makes me feel handicapped 0   People ask, \"What's wrong with your voice?\" 0   VHI-10 1         Patient Supplied Answers To EAT Questionnaire  Eating Assessment Tool (EAT-10) 10/24/2019   My swallowing problem has caused me to lose weight 0   My swallowing problem interferes with my ability to go out for meals 2   Swallowing liquids takes extra effort 1   Swallowing solids takes extra effort 3   Swallowing pills takes extra effort 4   Swallowing is painful 2   The pleasure of eating is affected by my swallowing 3   When I swallow food sticks in my throat 4   I cough when I eat 1   Swallowing is stressful 2   EAT-10 22         Impression & Plan     IMPRESSION: Mr. Russo is a 58 year old male who is being seen for for dysphagia especially with solids and pills with findings of a radiating to the larynx and vallecular and pharyngeal wall residue as well as severe dehydration and crusting in his pharynx.  No penetration or aspiration observed on exam today.  Given his " pulmonary status we will need to follow this closely.  We will proceed with Xray Video Swallow Exam and esophagram to evaluate the UES and any esophageal dysmotility or abnormality given his history of radiation.  His buccal cheek mucosa not have any lesions today that needed to be biopsied.  Given his comorbidities we will follow his complaints of dysphagia over the next couple years to monitor for aspiration.  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    RETURN VISIT: follow up in 6 months, or earlier as needed.     Thank you for the kind referral and for allowing me to share in the care of Mr. Russo. If you have any questions, please do not hesitate to contact me.        Minerva Silva MD    of Otolaryngology - Head and Neck Surgery   Voice, Airway, and Swallowing Disorders   [facility]    Clinics & Surgery [address] [address]  Phone: [phone]  Fax: [phone]    [place] [address]  Phone: [phone]  Fax: [phone]

## 2019-10-24 NOTE — PROGRESS NOTES
[facility] VOICE CLINIC  Giancarlo Goldberg Jr., M.D., F.A.C.S.  María Hunt M.D., M.P.H.  Rozina Hernandez, Ph.D., CCC/SLP  Heavenly Lopez M.M. (voice), M.A., CCC/SLP  Yadiel Jasso M.M. (voice), M.A., CCC/SLP    Evaluation report    Clinician: Heavenly Lopez M.M. (voice), M.A., CCC/SLP  Seen in conjunction with: Dr. Minerva Silva  Referring physician:  Fernie  Patient: Byron Russo  Date of Visit: 10/24/2019    HISTORY  Chief complaint: Byron Russo is a 58 year old presenting today for evaluation of throat and swallow issues. 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  He was hospitalized about a year ago with pulmonary hypoxia and infiltrates.  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     CURRENT SYMPTOMS INCLUDE  VOICE    Denies significant voice issues    SWALLOWING    Gradually worsening swallow function    Current diet involves soft foods only and thin liquids.    Pain with swallow 3/10    Trouble swallowing food and pills, less so with liquids.    Additional  - Chronic dry mouth    Patient denies significant dyspnea and cough.     OTHER PERTINENT HISTORY    No PMHx or current pneumonia    No significant change in weight    Complex medical history: please also refer to Dr. Minerva Silva's dictation.     Past Medical History:   Diagnosis Date     CAD (coronary artery disease) 2001     Hyperlipidemia      Hypothyroidism      Obesity      Pulmonary embolism (H) 2/2016     Varicose veins      Past Surgical History:   Procedure Laterality Date     APPENDECTOMY       ARTHROSCOPY KNEE WITH MEDIAL MENISCECTOMY  6/20/2011    Procedure:ARTHROSCOPY KNEE WITH MEDIAL MENISCECTOMY; Surgeon:SACHIN SAMANO; Location:[facility] OR     BRONCHOSCOPY (RIGID OR FLEXIBLE), DIAGNOSTIC N/A 2/6/2019     "Procedure: COMBINED BRONCHOSCOPY (RIGID OR FLEXIBLE), LAVAGE;  Surgeon: Louise Rogel MD;  Location:  GI     coronary artery stents placed x 5  2001       OBJECTIVE  PATIENT REPORTED MEASURES  Patient Supplied Answers To VHI Questionnaire  No flowsheet data found.    Patient Supplied Answers To CSI Questionnaire  No flowsheet data found.    Patient Supplied Answers To EAT Questionnaire  No flowsheet data found.    PERCEPTUAL EVALUATION (CPT 91440)  POSTURE / TENSION:     upper body    BREATHING:     appears within normal limits and adequate     LARYNGEAL PALPATION:     firm musculature    tenderness of the thyrohyoid area    reduced thyrohyoid space    VOICE:    Roughness: Mild; consistent    Breathiness: WNL    Strain: WNL     Voice is occasionally \"phlegmy\"    Loudness    Conversational speech:  WNL    Projected speech:  WNL    Pitch:    Conversational speech:  WNL    Pitch glide: neurologically normal    Resonance:    Conversational speech:  laryngeal pharyngeal resonance    Singing vs. Speech:  n/a    CAPE-V Overall Severity:  25/100    COUGH/THROAT CLEARING:    Occasional    Dry    LARYNGEAL FUNCTION STUDIES (CPT 82803)  Not warranted    LARYNGEAL EXAMINATION  Procedure: Flexible endoscopy with chip-tip technology with stroboscopy, right nostril; topical anesthesia with 3% Lidocaine and 0.25% phenylephrine was applied.   Performed by: Dr. Minerva Carrington   The laryngeal and pharyngeal structures were evaluated for gross appearance, mobility, function, and focal lesions / abnormalities of the associated mucosa.  Stroboscopy was warranted to evaluate closure, symmetry, and vibratory characteristics of the vocal folds.  All findings were within normal limits with the exception of the following salient features:     Severe 4 way constriction during connected speech tasks    THERAPY PROBES: Improvement was elicited with use of forward resonant stimuli, coordination of respiration and phonation and use of yawn " sigh    The addition of stroboscopy allowed evaluation of the mucosal wave:    Amplitude: right: mildly decreased; left: mildly decreased.     Symmetry: good symmetry.    Closure pattern: complete.     Closure plane: at glottic level.     Phase distribution: normal.       Abducted view.    CLINICAL SWALLOW EVALUATION (CPT 64766)  ORAL MOTOR EXAMINATION:  Face: Normal  Oral Musculature: generally intact  Structural Abnormalities: present  Dentition: present and adequate  Secretion Management: n/a  Mucosal Quality: good  Mandibular Strength and Mobility: intact  Oral Labial Strength and Mobility: WFL  Lingual Strength and Mobility: WFL  Velar Elevation: intact  Buccal Strength and Mobility: intact  Laryngeal Function: Cough, Throat Clear, Swallow, Voicing Initiated, Dry swallow palpated  and all appear to be WFL     FIBEROPTIC ENDOSCOPIC EVALUATION OF SWALLOWING (FEES)  Procedure: Flexible endoscopy with chip-tip technology with stroboscopy, right nostril; topical anesthesia with 3% Lidocaine and 0.25% phenylephrine was applied.  . Anesthetization spray was applied during laryngeal exam, but not reapplied during FEES.   Performed by: Dr. Ricardo Silva  Indications for FEES:  See above notes for complete history. Patient complains of dysphagia and/or there is suggestion on history of the presence of dysphagia causing medical complaints. Therefore, Dr. Silva deemed it medically necessary to proceed with the FEES screening protocol. PO trials were evaluated under fiberoptic endoscopy completed by Dr. Silva.    I provided the PO trials, the protocol of instructions, and therapy probes for the patient. I also provided skilled evaluation of the swallow, and feedback/explanation to the patient.    Swallowing evaluation is being performed to assess the presence and degree of dysphagia, and to recommend a safe diet.    Pre-Swallow Secretions:    Location and Amount: minimal    Thin Liquid Trials:    Amount and Mode of  Presentation: straw and cup    Premature Spillage/Delayed Swallow: WFL    Penetration/Aspiration: none    Residual Location and Amount: none    Therapy Probes and Efficacy: double swallow.    Puree Texture Trails:    Amount and Mode of Presentation: spoon    Premature Spillage/Delayed Swallow: none    Penetration/Aspiration:none    Residual Location and Amount: vallecula and piriform sinuses    Therapy Probes and Efficacy: double swallow    Solid Texture Trials:    Amount and Mode of Presentation: 1/2 cookie    Premature Spillage/Delayed Swallow: WN:    Penetration/Aspiration: none    Residual Location and Amount: mild in the vallecula and piriform sinuses.    Therapy Probes and Efficacy: double swallow; follow with liquid wash    Diet Recommendations: continue current diet. Crush pills,  Continue current diet.    Recommended Feeding/Eating Techniques: continue current diet.      The laryngeal exam was reviewed with Mr. Russo, and I provided pertinent explanations, as well as written and oral information.    ASSESSMENT / PLAN  IMPRESSIONS: Byron Russo is a 58, presenting today with R49.0 (Dysphonia) and R13.10 (Dysphagia) , as evidenced by evaluation, clinical swallow evaluation, and the results of the laryngeal function studies, as well as the laryngeal exam.      STIMULABILITY: results of therapy probes during perceptual and laryngeal evaluation demonstrate improvement with use of forward resonant stimuli, coordination of respiration and phonation and use of yawn sigh    RECOMMENDATIONS:     A course of speech therapy is recommended to promote a safe and independent swallow.    He demonstrates a Good prognosis for improvement given adherence to therapeutic recommendations.     DURATION / FREQUENCY: 3 biweekly one-hour sessions.  A total of 6-8 sessions may be necessary.    Dr. Silva would like ot follow up in 6 months    GOALS:  Patient goal:   1. To understand the problem and fix it as much as possible  2. To  have a normal and acceptable voice quality    Long-term goal(s): In 6 months, Mr. Russo will:  1. Report a week with no more than 3 episodes of coughing, that do not last more than 2 seconds  2. Report resolution of dysphonia, and use of optimal voice quality to meet personal, social, and professional needs, 90% of the time during a typical week of vocal activities    This treatment plan was developed with the patient who agreed with the recommendations.    TOTAL SERVICE TIME: 60 minutes  EVALUATION OF VOICE AND RESONANCE (79427)  NO CHARGE FACILITY FEE (17508)    Heavenly Lopez M.M. (voice), M.A., CCC/SLP  Speech-Language Pathologist  [facility] Trained Vocologist  [facility]  [phone]  [email or web address]  Prounouns: she/her              Psychology    Eyes    Ears, Nose, Throat Nasal congestion or drainage    Sore throat    Trouble swallowing   Cardiopulmonary    Gastrointestinal/Genitourinary    Musculoskeletal Sore or stiff joints   Allergy/Immunology    Hematologic    Endocrine    Skin    Other    Fv Mychart [facility] Voice New Pt. Intake Questionnaire     Question 10/24/2019  2:54 PM CDT - Filed by Patient on 10/24/2019   Are you having any of these symptoms (even if they are not a major concern)? Throat irritation    Pain / ache in throat    Dry throat    Mucus in throat    Frequent throat-clearing   What healthcare professionals have you seen for these concerns? Who and when?    What testing / studies have you done? Not sure   Have you had any treatment for these concerns?    VOICE HANDICAP INDEX-10 (VHI-10) - Vianney meléndezl., Development and validation of the Voice Handicap Index-10, Laryngoscope 2004 (114): 5311-9437.    How often do you have any of the following symptoms:  Indigestion, heartburn, chest pain, stomach acid coming up, and/or tasting acid in your mouth or throat? Never   My voice makes it difficult for people to hear me. Never   People have difficulty understanding me in a noisy  "room. Never   My voice difficulties restrict my personal and social life. Never   I feel left out of conversations because of my voice. Never   My voice problem causes me to lose income. Never   I feel as though I have to strain to produce voice. Never   The clarity of my voice is unpredictable. Almost Never   My voice problem upsets me. Never   My voice makes me feel handicapped. Never   People ask, \"What's wrong with your voice?\" Never   Health care providers who should receive a copy of today's note (please provide first and last name, and city)     Is anyone helping you complete this form? NA (no one is helping)   Please select your main concerns for this visit. We will ask you to tell us more about the concerns you select.  Swallowing    Cough / Throat clearing    Throat discomfort   How long have you had this swallowing problem? 60 days or more   What do you think caused this swallowing problem? I don't know   How has the swallowing problem changed over time? getting worse   How much does your swallowing problem bother you? very much   What is your current type of food intake? soft foods only   What is your current type of liquid intake? regular - thin liquids   Patient self-assessment: EATING ASSESSMENT TOOL (EAT-10)     My swallowing problem has caused me to lose weight. No problem   My swallowing problem interferes with my ability to go out for meals. 2   Swallowing liquids takes extra effort. 1   Swallowing solids takes extra effort. 3   Swallowing pills takes extra effort. Severe Problem   Swallowing is painful. 2   The pleasure of eating is affected by my swallowing. 3   When I swallow food sticks in my throat. Severe Problem   I cough when I eat. 1   Swallowing is stressful. 2   With your swallowing, what else do you notice? things feel like they go down the wrong tube    I feel a lump in my throat   How long have you had this cough / throat clearing problem? 30 days?   What do you think caused the " "cough / throat clearing problem? mucus   How has the cough / throat clearing problem changed over time? not changing   How much does your cough / throat clearing problem bother you? somewhat   Do any of the following trigger your cough / throat clearing  drinking    mucus   There are some symptoms that you may be feeling: Please provide     My cough is worse when I lie down. Never   My coughing problem causes me to restrict my personal and social life. Never   I tend to avoid places because of my cough problem. Never   I feel embarrassed because of my coughing problem. Never   People ask, \"What's wrong?\" because I cough a lot. Never   I run out of air when I cough. Never   My coughing problem affects my voice. Never   My coughing problem limits my physical activity. Never   My coughing problem upsets me. Never   People ask me if I am sick because I cough a lot. Never   With your cough / throat clearing what else do you notice?    How long have you had this throat discomfort problem? 30 to 60 days   What do you think caused this throat discomfort problem? not sure   How has the throat discomfort problem changed over time? getting worse   How much does your throat discomfort bother you? always   Please select all that apply when describing your throat discomfort issue? feeling of a lump in your throat    worse with meals    worse when swallowing your own saliva   If you have throat pain, how does it feel? something feels stuck             "

## 2019-10-24 NOTE — PATIENT INSTRUCTIONS
"    Psychology    Eyes    Ears, Nose, Throat Nasal congestion or drainage    Sore throat    Trouble swallowing   Cardiopulmonary    Gastrointestinal/Genitourinary    Musculoskeletal Sore or stiff joints   Allergy/Immunology    Hematologic    Endocrine    Skin    Other    Fv Mychart [facility] Voice New Pt. Intake Questionnaire     Question 10/24/2019  2:54 PM CDT - Filed by Patient on 10/24/2019   Are you having any of these symptoms (even if they are not a major concern)? Throat irritation    Pain / ache in throat    Dry throat    Mucus in throat    Frequent throat-clearing   What healthcare professionals have you seen for these concerns? Who and when?    What testing / studies have you done? Not sure   Have you had any treatment for these concerns?    VOICE HANDICAP INDEX-10 (VHI-10) - Vianney meléndezl., Development and validation of the Voice Handicap Index-10, Laryngoscope 2004 (114): 7807-8814.    How often do you have any of the following symptoms:  Indigestion, heartburn, chest pain, stomach acid coming up, and/or tasting acid in your mouth or throat? Never   My voice makes it difficult for people to hear me. Never   People have difficulty understanding me in a noisy room. Never   My voice difficulties restrict my personal and social life. Never   I feel left out of conversations because of my voice. Never   My voice problem causes me to lose income. Never   I feel as though I have to strain to produce voice. Never   The clarity of my voice is unpredictable. Almost Never   My voice problem upsets me. Never   My voice makes me feel handicapped. Never   People ask, \"What's wrong with your voice?\" Never   Health care providers who should receive a copy of today's note (please provide first and last name, and city)     Is anyone helping you complete this form? NA (no one is helping)   Please select your main concerns for this visit. We will ask you to tell us more about the concerns you select.  Swallowing    Cough / Throat " "clearing    Throat discomfort   How long have you had this swallowing problem? 60 days or more   What do you think caused this swallowing problem? I don't know   How has the swallowing problem changed over time? getting worse   How much does your swallowing problem bother you? very much   What is your current type of food intake? soft foods only   What is your current type of liquid intake? regular - thin liquids   Patient self-assessment: EATING ASSESSMENT TOOL (EAT-10)     My swallowing problem has caused me to lose weight. No problem   My swallowing problem interferes with my ability to go out for meals. 2   Swallowing liquids takes extra effort. 1   Swallowing solids takes extra effort. 3   Swallowing pills takes extra effort. Severe Problem   Swallowing is painful. 2   The pleasure of eating is affected by my swallowing. 3   When I swallow food sticks in my throat. Severe Problem   I cough when I eat. 1   Swallowing is stressful. 2   With your swallowing, what else do you notice? things feel like they go down the wrong tube    I feel a lump in my throat   How long have you had this cough / throat clearing problem? 30 days?   What do you think caused the cough / throat clearing problem? mucus   How has the cough / throat clearing problem changed over time? not changing   How much does your cough / throat clearing problem bother you? somewhat   Do any of the following trigger your cough / throat clearing  drinking    mucus   There are some symptoms that you may be feeling: Please provide     My cough is worse when I lie down. Never   My coughing problem causes me to restrict my personal and social life. Never   I tend to avoid places because of my cough problem. Never   I feel embarrassed because of my coughing problem. Never   People ask, \"What's wrong?\" because I cough a lot. Never   I run out of air when I cough. Never   My coughing problem affects my voice. Never   My coughing problem limits my physical " activity. Never   My coughing problem upsets me. Never   People ask me if I am sick because I cough a lot. Never   With your cough / throat clearing what else do you notice?    How long have you had this throat discomfort problem? 30 to 60 days   What do you think caused this throat discomfort problem? not sure   How has the throat discomfort problem changed over time? getting worse   How much does your throat discomfort bother you? always   Please select all that apply when describing your throat discomfort issue? feeling of a lump in your throat    worse with meals    worse when swallowing your own saliva   If you have throat pain, how does it feel? something feels stuck

## 2019-10-24 NOTE — PROGRESS NOTES
[facility]           Patient: Byron Russo  MRN: [id number]    : 1960  Age/Gender: 58 year old male  Date of Service: 2019       Referring Provider   PCP: Cole Duong  Referring Physician: Uli Enciso    Reason for Consultation   Dysphagia  Dysphonia  Dypsnea    History     HISTORY OF PRESENT ILLNESS: Mr. Russo is a 58 year old male who presents to us today with dysphagia has been going on for the past few months especially with pills.  Is a very dry mouth and gingivitis.  He was recently treated for thrush. He has a history of myeloablative allo-sib transplant.  He was initially treated with chemotherapy and total body radiation.  He has a lot of phlegm.  Sometimes he has a cough.  He has a lot of trouble with coarse food.  States that soft foods like soup are okay.  Pills are giving him a lot of trouble he has to extend his neck and swallow liquids a lot to get pills down.  Denies any recent pneumonias.  He works with laser [employer] in [place].  He had a EGD many years ago. He also reports associated dysphonia.  Denies odynophagia.  Does report heartburn.      PAST MEDICAL HISTORY:   Past Medical History:   Diagnosis Date     CAD (coronary artery disease)      Hyperlipidemia      Hypothyroidism      Obesity      Pulmonary embolism (H) 2016     Varicose veins          PAST SURGICAL HISTORY:   Past Surgical History:   Procedure Laterality Date     APPENDECTOMY       ARTHROSCOPY KNEE WITH MEDIAL MENISCECTOMY  2011    Procedure:ARTHROSCOPY KNEE WITH MEDIAL MENISCECTOMY; Surgeon:SACHIN SAMANO; Location:[facility]     BRONCHOSCOPY (RIGID OR FLEXIBLE), DIAGNOSTIC N/A 2019    Procedure: COMBINED BRONCHOSCOPY (RIGID OR FLEXIBLE), LAVAGE;  Surgeon: Louise Rogel MD;  Location: [facility]     coronary artery stents placed x 5           CURRENT MEDICATIONS:   Current Outpatient Medications:       acetaminophen (TYLENOL) 325 MG tablet, Take 1-2 tablets (325-650 mg) by mouth every 4 hours as needed for mild pain or fever, Disp: , Rfl:      acyclovir (ZOVIRAX) 800 MG tablet, Take 1 tablet (800 mg) by mouth 2 times daily, Disp: 60 tablet, Rfl: 3     amoxicillin (AMOXIL) 500 MG tablet, Take 4 tablets by mouth 1 hour before dental visit., Disp: 28 tablet, Rfl: 0     aspirin 81 MG EC tablet, Take 81 mg by mouth daily Reported on 5/12/2017, Disp: , Rfl:      cycloSPORINE modified (GENERIC EQUIVALENT) 25 MG capsule, Take 1 capsule (25 mg) by mouth 2 times daily, Disp: 60 capsule, Rfl: 3     fluconazole (DIFLUCAN) 100 MG tablet, Take 1 tablet (100 mg) by mouth daily, Disp: 30 tablet, Rfl: 3     levofloxacin (LEVAQUIN) 250 MG tablet, Take 1 tablet (250 mg) by mouth daily, Disp: 30 tablet, Rfl: 3     levothyroxine (SYNTHROID/LEVOTHROID) 150 MCG tablet, Take 1 tablet (150 mcg) by mouth daily, Disp: 30 tablet, Rfl: 3     magnesium oxide (MAG-OX) 400 (241.3 MG) MG tablet, Take 2 tablets daily, Disp: , Rfl:      metoprolol tartrate (LOPRESSOR) 25 MG tablet, Take 1 tablet (25 mg) by mouth daily, Disp: 30 tablet, Rfl: 3     NITROGLYCERIN ER PO, Take by mouth as needed Reported on 5/12/2017, Disp: , Rfl:      pilocarpine (SALAGEN) 5 MG tablet, Take 1 tablet (5 mg) by mouth daily, Disp: 30 tablet, Rfl: 3     predniSONE (DELTASONE) 10 MG tablet, Take 2 tablets (20 mg) by mouth daily Alternating with 10mg daily, Disp: 45 tablet, Rfl: 3     ranitidine (ZANTAC) 150 MG tablet, Take 1 tablet (150 mg) by mouth 2 times daily, Disp: 60 tablet, Rfl: 3     rosuvastatin (CRESTOR) 5 MG tablet, Take 1 tablet (5 mg) by mouth daily, Disp: 30 tablet, Rfl: 3     sulfamethoxazole-trimethoprim (BACTRIM DS/SEPTRA DS) 800-160 MG tablet, Take 1 tablet by mouth 2 times daily On Monday's and Tuesday's only, Disp: 16 tablet, Rfl: 3      ALLERGIES: Atorvastatin and Docosahexaenoic acid (dha)      SOCIAL HISTORY:    Social History     Socioeconomic  History     Marital status:      Spouse name: Not on file     Number of children: Not on file     Years of education: Not on file     Highest education level: Not on file   Occupational History     Not on file   Social Needs     Financial resource strain: Not on file     Food insecurity:     Worry: Not on file     Inability: Not on file     Transportation needs:     Medical: Not on file     Non-medical: Not on file   Tobacco Use     Smoking status: Never Smoker     Smokeless tobacco: Former User   Substance and Sexual Activity     Alcohol use: No     Alcohol/week: 0.0 standard drinks     Drug use: No     Sexual activity: Not on file   Lifestyle     Physical activity:     Days per week: Not on file     Minutes per session: Not on file     Stress: Not on file   Relationships     Social connections:     Talks on phone: Not on file     Gets together: Not on file     Attends [employer] service: Not on file     Active member of club or organization: Not on file     Attends meetings of clubs or organizations: Not on file     Relationship status: Not on file     Intimate partner violence:     Fear of current or ex partner: Not on file     Emotionally abused: Not on file     Physically abused: Not on file     Forced sexual activity: Not on file   Other Topics Concern     Parent/sibling w/ CABG, MI or angioplasty before 65F 55M? Not Asked   Social History Narrative     Not on file           FAMILY HISTORY: Non-contributory for problems with anesthesia      REVIEW OF SYSTEMS:   The patient was asked a 14 point review of systems regarding constitutional symptoms, eye symptoms, ears, nose, mouth, throat symptoms, cardiovascular symptoms, respiratory symptoms, gastrointestinal symptoms, genitourinary symptoms, musculoskeletal symptoms, integumentary symptoms, neurological symptoms, psychiatric symptoms, endocrine symptoms, hematologic/lymphatic symptoms, and allergic/ immunologic symptoms.   The pertinent factors have been  included in the HPI and below.  Patient Supplied Answers to Review of Systems   ENT ROS 10/24/2019   Ears, Nose, Throat Nasal congestion or drainage, Sore throat, Trouble swallowing   Musculoskeletal Sore or stiff joints           Physical Examination     The patient underwent a physical examination as described below. The pertinent positive and negative findings are summarized after the description of the examination.    Constitutional: The patient's developmental and nutritional status was assessed. The patient's voice quality was assessed.  Head and Face: The head and face were inspected for deformities. The sinuses were palpated. The salivary glands were palpated. Facial muscle strength was assessed bilaterally.  Eyes: Extraocular movements and primary gaze alignment were assessed.  Ears, Nose, Mouth and Throat: The ears and nose were examined for deformities. The nasal septum, mucosa, and turbinates were inspected by anterior rhinoscopy. The lips, teeth, and gums were examined for abnormalities. The oral mucosa, tongue, palate, tonsils, lateral and posterior pharynx were inspected for the presence of asymmetry or mucosal lesions.    Neck: The tracheal position was noted, and the neck mass palpated to determine if there were any asymmetries, abnormal neck masses, thyromegally, or thyroid nodules.  Respiratory: The nature of the breathing and chest expansion/symmetry was observed.  Cardiovascular: The patient was examined to determine the presence of any edema or jugular venous distension.  Abdomen: The contour of the abdomen was noted.  Lymphatic: The patient was examined for infraclavicular lymphadenopathy.  Musculoskeletal: The patient was inspected for the presence of skeletal deformities.  Extremities: The extremities were examined for any clubbing or cyanosis.  Skin: The skin was examined for inflammatory or neoplastic conditions.  Neurologic: The patient's orientation, mood, and affect were noted. The  cranial nerve  functions were examined.    Other pertinent positive and negative findings on physical examination:   each ear: EAC clear, TM intact, no effusion  Anterior rhinoscopy: no lesions  OC/OP: no lesions, uvula midline, soft palate elevates symmetrically, FOM/BOT soft, left cheek with more erythema but no discrete lesion, very dry mucosa  Neck: no lesions, no TH tenderness to palpation, lymphedema    All other physical examination findings were within normal limits and noncontributory.    Procedures   Video Laryngoscopy with Stroboscopy (CPT 82846) and Behavioral & Qualitative Evaluation of Voice and Resonence     Preoperative Diagnosis:  Dysphonia and throat symptoms  Postoperative Diagnosis: Dysphonia and throat symptoms  Indication:  Patient has new or persistent dysphonia and throat symptoms.  This requires evaluation by stroboscopy to fully delineate the laryngeal functioning; especially mucosal wave assessment, ultrasharp visualization of lesions on the vocal folds, and overall functioning of the larynx.  Details of Procedure: A 70 degree rigid telescopic laryngoscope or a distal chip flexible scope was used. The lighting was obtained via a light cable connected to a stroboscopic unit. The telescope was inserted either transorally or transnasally until the vocal folds could be visualized. The patient was instructed to sustain the vowel  ee  at a comfortable pitch and loudness as the voice was monitored by a microphone connected to a fundamental frequency tracker. This circuit tracked vocal periodicity, allowing the light to flash in synchrony with the glottal cycles. A setting on the stroboscope was set to change the phase of light strobing with relation to the vocal fundamental frequency, producing an image of 1 to 2 glottal cycles every second. The video images were recorded for analysis. Use of the variable speed, slow and stop scan allowed careful study of pertinent segments of laryngeal function  to increase accuracy of clinical assessments of function and dysfunction.  In particular, features of glottal closure, mucosal wave on the vocal fold cover and laryngeal symmetry were analyzed. Lastly, the patient was asked to phonate speech samples and auditory/perceptual evaluation of voice and resonance were performed.  The vocal quality was carefully evaluated for hoarseness, breathiness, loudness, phrase length and intelligibility to determine the source of dysphonia.    Findings:   A. BEHAVIORAL & QUALITATIVE EVALUATION OF VOICE AND RESONENCE   Comments: F0 200 MPT: 10sec  Vocal Quality: raspy    Pitch Range:  Mildly reduced   Phrase Length:  Normal  Vocal Loudness: Normal  Dysarthria: No    B. LARYNGOVIDEOSTROBOSCOPY   Anatomic/Physiological Deviations:  Radiated larynx, dry caked secretions on the posterior wall, stubby epiglottis  Mucosal wave: Right:  No restriction     Left: No restriction  Bilateral Vocal Fold Vibration: Symmetric  Vocal Process: Right: No restriction    Left:  No restriction  Vocal Fold closure: Complete glottal closure    Complication(s): None  Disposition: Patient tolerated the procedure well              Fiberoptic Endoscopic Evaluation of Swallowing (CPT 24352)  and Interpretation of Swallowing (CPT 28562)    Indications: See above notes for complete history and physical. Patient complains of dysphagia to solids and/or there is suggestion on history and endoscopic exam of the presence of dysphagia causing medical complaints.  Swallowing evaluation is being performed to assess the presence and degree of dysphagia, and to recommend a safe diet.     Pulmonary Status:  No PNA   Current Diet:              regular                                        thin liquids      Consistency Amounts:  Thin Liquid: 1 tsp   Puree: 1 tsp  Solid: cookies            Positioning: upright in a chair  Oral Peripheral Exam: See physical exam section.  Anatomic Notes: See Videostroboscopy report for  assessment of anatomy and laryngeal functioning  Pharyngeal secretions prior to administration of liquid or food: No  Oral Phase Abnormal Findings: No abnormal behavior observed  Behavioral Adaptations: No abnormal behavior observed  Pharyngeal Phase Abnormal Findings: vallecular residue, and pharyngeal wall residue, no penetration or aspiration, mostly cleared with water follow       Recommended Diet:  regular                                        thin liquids                Review of Relevant Clinical Data     Notes:   Uli Enciso 9/26/19         Radiology: CT chest 3/26/16  IMPRESSION:   1. Diffuse consolidations in a peripheral/subpleural and basilar  predominant distribution with associated areas of peripheral traction  bronchiolectasis and scattered peribronchovascular nodular opacities.  These findings are unchanged from outside CT dated 2/4/2019, and are  new from 2/21/2017. In this patient with history of bone marrow  transplant in 2016, differential includes organizing pneumonia,  cxgvv-jbtkwd-swyr disease, pleural-parenchymal fibroelastosis, and  drug toxicity. Superimposed infection is not excluded.  2. Severe coronary artery calcifications.    Procedures:   PFTs 6/25/19  Moderate Restriction   Mild diffusion defect.  The diffusing capacity was not corrected for the patient's hemoglobin.  Compared with testing from 3/4/2019 there has been no significant change in the FEV1 or FVC.    Labs:  Lab Results   Component Value Date    TSH 0.47 05/11/2018     Lab Results   Component Value Date     09/25/2019    CO2 23 09/25/2019    BUN 13 09/25/2019    PHOS 4.2 02/06/2019     Lab Results   Component Value Date    WBC 8.2 09/25/2019    HGB 16.8 09/25/2019    HCT 48.0 09/25/2019    MCV 97 09/25/2019     09/25/2019     No results found for: MARIA R  No components found for: RHEUMATOIDFACTOR,  RF  Lab Results   Component Value Date    CRP 8.2 (H) 02/06/2019     No components found for: CKTOT,  "URICACID  No components found for: C3, C4, DSDNAAB, NDNAABIFA  No results found for: MPOAB    Patient reported Quality of Life (QOL) Measures     Patient Supplied Answers To VHI Questionnaire  Voice Handicap Index (VHI-10) 10/24/2019   My voice makes it difficult for people to hear me 0   People have difficulty understanding me in a noisy room 0   My voice difficulties restrict my personal and social life.  0   I feel left out of conversations because of my voice 0   My voice problem causes me to lose income 0   I feel as though I have to strain to produce voice 0   The clarity of my voice is unpredictable 1   My voice problem upsets me 0   My voice makes me feel handicapped 0   People ask, \"What's wrong with your voice?\" 0   VHI-10 1         Patient Supplied Answers To EAT Questionnaire  Eating Assessment Tool (EAT-10) 10/24/2019   My swallowing problem has caused me to lose weight 0   My swallowing problem interferes with my ability to go out for meals 2   Swallowing liquids takes extra effort 1   Swallowing solids takes extra effort 3   Swallowing pills takes extra effort 4   Swallowing is painful 2   The pleasure of eating is affected by my swallowing 3   When I swallow food sticks in my throat 4   I cough when I eat 1   Swallowing is stressful 2   EAT-10 22         Impression & Plan     IMPRESSION: Mr. Russo is a 58 year old male who is being seen for for dysphagia especially with solids and pills with findings of a radiating to the larynx and vallecular and pharyngeal wall residue as well as severe dehydration and crusting in his pharynx.  No penetration or aspiration observed on exam today.  Given his pulmonary status we will need to follow this closely.  We will proceed with Xray Video Swallow Exam and esophagram to evaluate the UES and any esophageal dysmotility or abnormality given his history of radiation.  His buccal cheek mucosa not have any lesions today that needed to be biopsied.  Given his " comorbidities we will follow his complaints of dysphagia over the next couple years to monitor for aspiration.  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    RETURN VISIT: follow up in 6 months, or earlier as needed.     Thank you for the kind referral and for allowing me to share in the care of Mr. Russo. If you have any questions, please do not hesitate to contact me.        Minerva Silva MD    of Otolaryngology - Head and Neck Surgery   Voice, Airway, and Swallowing Disorders   [facility]    Clinics & Surgery [address] [address]  Phone: [phone]  Fax: [phone]    [place] [address]  Phone: [phone]  Fax: [phone]

## 2019-12-05 ENCOUNTER — TELEPHONE (OUTPATIENT)
Dept: TRANSPLANT | Facility: CLINIC | Age: 59
End: 2019-12-05

## 2019-12-05 NOTE — TELEPHONE ENCOUNTER
Received a call from Yg this morning with new reports of worsening shortness of breath. He has missed the last two days of work due to fatigue and SOB. His daughter who lives with him has also been diagnosed with a respiratory infection. Pt intermittently sees Dr. Brady for lung cGVHD. Request sent to Dr. Brady to move patient up if possible. Triage BRENDA recommends pt see PCP or urgent care today for nasal swab. This was communicated to Yg who had no further questions or concerns at this time.

## 2019-12-18 DIAGNOSIS — D46.9 MDS (MYELODYSPLASTIC SYNDROME) (H): ICD-10-CM

## 2019-12-18 DIAGNOSIS — D46.22 RAEB-2 (REFRACTORY ANEMIA WITH EXCESS BLASTS-2) (H): ICD-10-CM

## 2019-12-18 RX ORDER — SULFAMETHOXAZOLE/TRIMETHOPRIM 800-160 MG
1 TABLET ORAL 2 TIMES DAILY
Qty: 16 TABLET | Refills: 2 | Status: SHIPPED | OUTPATIENT
Start: 2019-12-18 | End: 2020-03-23

## 2019-12-18 RX ORDER — ACYCLOVIR 800 MG/1
800 TABLET ORAL 2 TIMES DAILY
Qty: 60 TABLET | Refills: 2 | Status: SHIPPED | OUTPATIENT
Start: 2019-12-18 | End: 2020-03-23

## 2019-12-18 RX ORDER — PREDNISONE 10 MG/1
20 TABLET ORAL DAILY
Qty: 45 TABLET | Refills: 2 | Status: ON HOLD | OUTPATIENT
Start: 2019-12-18 | End: 2020-01-15

## 2019-12-18 RX ORDER — LEVOFLOXACIN 250 MG/1
250 TABLET, FILM COATED ORAL DAILY
Qty: 30 TABLET | Refills: 2 | Status: SHIPPED | OUTPATIENT
Start: 2019-12-18 | End: 2020-03-23

## 2019-12-18 RX ORDER — PILOCARPINE HYDROCHLORIDE 5 MG/1
5 TABLET, FILM COATED ORAL 2 TIMES DAILY
Qty: 60 TABLET | Refills: 2 | Status: SHIPPED | OUTPATIENT
Start: 2019-12-18 | End: 2020-03-23

## 2019-12-18 RX ORDER — FLUCONAZOLE 100 MG/1
100 TABLET ORAL DAILY
Qty: 30 TABLET | Refills: 2 | Status: SHIPPED | OUTPATIENT
Start: 2019-12-18 | End: 2020-03-23

## 2019-12-18 RX ORDER — LEVOTHYROXINE SODIUM 150 UG/1
150 TABLET ORAL DAILY
Qty: 30 TABLET | Refills: 2 | Status: SHIPPED | OUTPATIENT
Start: 2019-12-18 | End: 2020-03-23

## 2019-12-18 RX ORDER — ROSUVASTATIN CALCIUM 5 MG/1
5 TABLET, COATED ORAL DAILY
Qty: 30 TABLET | Refills: 2 | Status: SHIPPED | OUTPATIENT
Start: 2019-12-18 | End: 2020-03-23

## 2019-12-18 RX ORDER — METOPROLOL TARTRATE 25 MG/1
25 TABLET, FILM COATED ORAL DAILY
Qty: 30 TABLET | Refills: 2 | Status: SHIPPED | OUTPATIENT
Start: 2019-12-18 | End: 2020-03-23

## 2019-12-18 RX ORDER — CYCLOSPORINE 25 MG/1
25 CAPSULE, LIQUID FILLED ORAL 2 TIMES DAILY
Qty: 60 CAPSULE | Refills: 2 | Status: SHIPPED | OUTPATIENT
Start: 2019-12-18 | End: 2020-03-23

## 2019-12-19 ENCOUNTER — ANCILLARY PROCEDURE (OUTPATIENT)
Dept: CT IMAGING | Facility: CLINIC | Age: 59
End: 2019-12-19
Attending: INTERNAL MEDICINE
Payer: COMMERCIAL

## 2019-12-19 ENCOUNTER — OFFICE VISIT (OUTPATIENT)
Dept: PULMONOLOGY | Facility: CLINIC | Age: 59
End: 2019-12-19
Attending: INTERNAL MEDICINE
Payer: COMMERCIAL

## 2019-12-19 ENCOUNTER — TELEPHONE (OUTPATIENT)
Dept: PULMONOLOGY | Facility: CLINIC | Age: 59
End: 2019-12-19

## 2019-12-19 VITALS
HEIGHT: 70 IN | WEIGHT: 229.1 LBS | HEART RATE: 95 BPM | OXYGEN SATURATION: 93 % | DIASTOLIC BLOOD PRESSURE: 75 MMHG | TEMPERATURE: 98.8 F | SYSTOLIC BLOOD PRESSURE: 119 MMHG | BODY MASS INDEX: 32.8 KG/M2

## 2019-12-19 DIAGNOSIS — R06.02 SOB (SHORTNESS OF BREATH): Primary | ICD-10-CM

## 2019-12-19 DIAGNOSIS — R09.02 OXYGEN DESATURATION: ICD-10-CM

## 2019-12-19 DIAGNOSIS — R06.02 SOB (SHORTNESS OF BREATH): ICD-10-CM

## 2019-12-19 DIAGNOSIS — R06.00 DYSPNEA: Primary | ICD-10-CM

## 2019-12-19 LAB
ALBUMIN SERPL-MCNC: 3.4 G/DL (ref 3.4–5)
ALP SERPL-CCNC: 90 U/L (ref 40–150)
ALT SERPL W P-5'-P-CCNC: 22 U/L (ref 0–70)
ANION GAP SERPL CALCULATED.3IONS-SCNC: 5 MMOL/L (ref 3–14)
AST SERPL W P-5'-P-CCNC: 28 U/L (ref 0–45)
BASOPHILS # BLD AUTO: 0.1 10E9/L (ref 0–0.2)
BASOPHILS NFR BLD AUTO: 0.6 %
BILIRUB SERPL-MCNC: 0.6 MG/DL (ref 0.2–1.3)
BUN SERPL-MCNC: 10 MG/DL (ref 7–30)
CALCIUM SERPL-MCNC: 9.1 MG/DL (ref 8.5–10.1)
CHLORIDE SERPL-SCNC: 104 MMOL/L (ref 94–109)
CO2 SERPL-SCNC: 27 MMOL/L (ref 20–32)
CREAT SERPL-MCNC: 0.82 MG/DL (ref 0.66–1.25)
DIFFERENTIAL METHOD BLD: ABNORMAL
EOSINOPHIL # BLD AUTO: 0.1 10E9/L (ref 0–0.7)
EOSINOPHIL NFR BLD AUTO: 0.6 %
ERYTHROCYTE [DISTWIDTH] IN BLOOD BY AUTOMATED COUNT: 13.6 % (ref 10–15)
GFR SERPL CREATININE-BSD FRML MDRD: >90 ML/MIN/{1.73_M2}
GLUCOSE SERPL-MCNC: 91 MG/DL (ref 70–99)
HCT VFR BLD AUTO: 50.9 % (ref 40–53)
HGB BLD-MCNC: 17.1 G/DL (ref 13.3–17.7)
IMM GRANULOCYTES # BLD: 0.1 10E9/L (ref 0–0.4)
IMM GRANULOCYTES NFR BLD: 0.6 %
LYMPHOCYTES # BLD AUTO: 2 10E9/L (ref 0.8–5.3)
LYMPHOCYTES NFR BLD AUTO: 23.5 %
MCH RBC QN AUTO: 32.6 PG (ref 26.5–33)
MCHC RBC AUTO-ENTMCNC: 33.6 G/DL (ref 31.5–36.5)
MCV RBC AUTO: 97 FL (ref 78–100)
MONOCYTES # BLD AUTO: 1.4 10E9/L (ref 0–1.3)
MONOCYTES NFR BLD AUTO: 16.2 %
NEUTROPHILS # BLD AUTO: 4.9 10E9/L (ref 1.6–8.3)
NEUTROPHILS NFR BLD AUTO: 58.5 %
NRBC # BLD AUTO: 0 10*3/UL
NRBC BLD AUTO-RTO: 0 /100
PLATELET # BLD AUTO: 284 10E9/L (ref 150–450)
POTASSIUM SERPL-SCNC: 4.9 MMOL/L (ref 3.4–5.3)
PROT SERPL-MCNC: 7.8 G/DL (ref 6.8–8.8)
RBC # BLD AUTO: 5.24 10E12/L (ref 4.4–5.9)
SODIUM SERPL-SCNC: 136 MMOL/L (ref 133–144)
WBC # BLD AUTO: 8.4 10E9/L (ref 4–11)

## 2019-12-19 PROCEDURE — 87581 M.PNEUMON DNA AMP PROBE: CPT | Performed by: INTERNAL MEDICINE

## 2019-12-19 PROCEDURE — 87633 RESP VIRUS 12-25 TARGETS: CPT | Performed by: INTERNAL MEDICINE

## 2019-12-19 PROCEDURE — 85025 COMPLETE CBC W/AUTO DIFF WBC: CPT | Performed by: INTERNAL MEDICINE

## 2019-12-19 PROCEDURE — 87486 CHLMYD PNEUM DNA AMP PROBE: CPT | Performed by: INTERNAL MEDICINE

## 2019-12-19 PROCEDURE — 80053 COMPREHEN METABOLIC PANEL: CPT | Performed by: INTERNAL MEDICINE

## 2019-12-19 PROCEDURE — 87798 DETECT AGENT NOS DNA AMP: CPT | Performed by: INTERNAL MEDICINE

## 2019-12-19 PROCEDURE — G0463 HOSPITAL OUTPT CLINIC VISIT: HCPCS | Mod: ZF

## 2019-12-19 ASSESSMENT — MIFFLIN-ST. JEOR: SCORE: 1870.44

## 2019-12-19 ASSESSMENT — PAIN SCALES - GENERAL: PAINLEVEL: NO PAIN (0)

## 2019-12-19 NOTE — TELEPHONE ENCOUNTER
"Contacted by Dr Brady to order oxygen for patient. Called [facility] and they need different diagnosis then \"SOB as that is a symptom and not a diagnosis\". Left message with patient that will work on getting oxygen tomorrow. Explained to Dr Brady that not able to obtain oxygen tonight.   "

## 2019-12-19 NOTE — PROGRESS NOTES
Reason for Visit  Byron Russo is a 59 year old year old male who is being seen for Oncology Clinic Visit ([facility] RETURN; MDS (myelodysplastic syndrome))    Pulmonary HPI  58 YO male with history of MDS RAEB-2 s/p Allo-sib HSCT 3 years ago who was recently hospitalized for increased SOB/CUELLAR and hypoxia.  Underwent CT chest on admission that showed peripheral findings with a few middle lung nodular infiltrates.  Underwent bronchoscopy with BAL showing only 190 and 290 WBC in a lymphocyte predominant pattern, only microbiology findings were Candida; was started on Posaconazole. Discharged on 4L of oxygen.  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  SOB and CUELLAR is improved since discharge.  States onset of symptoms was in 10-18, noticed increased SOB while doing yardwork.  States normal walking does not cause a problem, only if he is walking fast or carrying items. Denies any prior history of lung disease- no history of asthma, no pneumonia, no complications with chemotherapy.  Has a history of cGvHD, is on alternating prednisone of 10/20 mg and cyclosporin of 25 mg every day.  Review of chest CT pattern appears consistent with PPFE, cannot rule out nodular areas representing infection.    Last seen 6 months ago. Since his last visit he has had increased SOB over the last 2 months- decreased exercise capacity at work, difficult to keep up. Also noticed increased SOB/CUELLAR while doing tasks at home such as shoveling snow.  Some cough but not productive, no hemoptysis.  Had an increase in symptoms at the start of December. Saw Urgent care at outside facility, CXR done that showed per report diffuse infiltrates and vascular congestion. Denies PND or orthopnea.  No lower extremity edema.  Given course of doxycycline and an albuterol inhaler.  Subsequent nasal swab for influenza was negative.  States steroid dose was decreased to 10 mg per day end of September.   Has also noticed weight loss of 20 pounds in the last 2 months.   Also with onset of dysphagia with swallowing pills 2 months ago, has noticed some aspiration. Seen by ENT with laryngoscopy performed, sounds like having problems with epiglottis.  Plan for formal swallow study.    The patient was seen and examined by Travis Brady MD           Current Outpatient Medications   Medication     acyclovir (ZOVIRAX) 800 MG tablet     amoxicillin (AMOXIL) 500 MG tablet     aspirin 81 MG EC tablet     cycloSPORINE modified (GENERIC EQUIVALENT) 25 MG capsule     fluconazole (DIFLUCAN) 100 MG tablet     levofloxacin (LEVAQUIN) 250 MG tablet     levothyroxine (SYNTHROID/LEVOTHROID) 150 MCG tablet     magnesium oxide (MAG-OX) 400 (241.3 MG) MG tablet     metoprolol tartrate (LOPRESSOR) 25 MG tablet     NITROGLYCERIN ER PO     pilocarpine (SALAGEN) 5 MG tablet     predniSONE (DELTASONE) 10 MG tablet     rosuvastatin (CRESTOR) 5 MG tablet     sulfamethoxazole-trimethoprim (BACTRIM DS/SEPTRA DS) 800-160 MG tablet     acetaminophen (TYLENOL) 325 MG tablet     No current facility-administered medications for this visit.      Allergies   Allergen Reactions     Atorvastatin Other (See Comments)     Back pain  Tolerates atrovastatin     Docosahexaenoic Acid (Dha)      Other reaction(s): Intolerance-Can't Take - Omega 3 fish oil     Liquid Adhesive Rash     Past Medical History:   Diagnosis Date     CAD (coronary artery disease) 2001     Hyperlipidemia      Hypothyroidism      Obesity      Pulmonary embolism (H) 2/2016     Varicose veins        Past Surgical History:   Procedure Laterality Date     APPENDECTOMY       ARTHROSCOPY KNEE WITH MEDIAL MENISCECTOMY  6/20/2011    Procedure:ARTHROSCOPY KNEE WITH MEDIAL MENISCECTOMY; Surgeon:SACHIN SAMANO; Location:[facility]     BRONCHOSCOPY (RIGID OR FLEXIBLE), DIAGNOSTIC N/A 2/6/2019    Procedure: COMBINED BRONCHOSCOPY (RIGID OR FLEXIBLE), LAVAGE;  Surgeon: Louise Rogel MD;  Location:  "[facility]     coronary artery stents placed x 5  2001       Social History     Socioeconomic History     Marital status:      Spouse name: Not on file     Number of children: Not on file     Years of education: Not on file     Highest education level: Not on file   Occupational History     Not on file   Social Needs     Financial resource strain: Not on file     Food insecurity:     Worry: Not on file     Inability: Not on file     Transportation needs:     Medical: Not on file     Non-medical: Not on file   Tobacco Use     Smoking status: Never Smoker     Smokeless tobacco: Former User   Substance and Sexual Activity     Alcohol use: No     Alcohol/week: 0.0 standard drinks     Drug use: No     Sexual activity: Not on file   Lifestyle     Physical activity:     Days per week: Not on file     Minutes per session: Not on file     Stress: Not on file   Relationships     Social connections:     Talks on phone: Not on file     Gets together: Not on file     Attends Church service: Not on file     Active member of club or organization: Not on file     Attends meetings of clubs or organizations: Not on file     Relationship status: Not on file     Intimate partner violence:     Fear of current or ex partner: Not on file     Emotionally abused: Not on file     Physically abused: Not on file     Forced sexual activity: Not on file   Other Topics Concern     Parent/sibling w/ CABG, MI or angioplasty before 65F 55M? Not Asked   Social History Narrative     Not on file       ROS Pulmonary  A complete ROS was otherwise negative except as noted in the HPI.  /75   Pulse 95   Temp 98.8  F (37.1  C) (Oral)   Ht 1.778 m (5' 10\")   Wt 103.9 kg (229 lb 1.6 oz)   SpO2 93%   BMI 32.87 kg/m    Exam:   GENERAL APPEARANCE: Well developed, well nourished, alert, and in no apparent distress.  EYES: PERRL, EOMI  HENT: Nasal mucosa with no edema and no hyperemia. No nasal polyps.  EARS: Canals clear, TMs normal  MOUTH: Oral " mucosa is moist, without any lesions, no tonsillar enlargement, no oropharyngeal exudate.  NECK: supple, no masses, no thyromegaly.  LYMPHATICS: No significant axillary, cervical, or supraclavicular nodes.  RESP: Decreased air flow throughout.  Crackles bilaterally posteriorly mid lung. No rhonchi. No wheezes.  CV: Normal S1, S2, regular rhythm, normal rate. No murmur.  No rub. No gallop. No LE edema.   ABDOMEN:  Bowel sounds normal, soft, nontender, no HSM or masses.   MS: extremities normal. No clubbing. No cyanosis.  SKIN: no rash on limited exam  NEURO: Mentation intact, speech normal, normal strength and tone, normal gait and stance  PSYCH: mentation appears normal. and affect normal/bright  Results:  No results found for this or any previous visit (from the past 168 hour(s)).    Assessment and plan:   59 YO s/p HSCT post 3years with sub-acute presentation with SOB and CUELLAR with recent worsening.  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  Unclear of cause of acute worsening but may be related to concomitant viral infection, cannot fully exclude fungal infection but BAL cultures have remained negative. No effective treatment for PPFE, steroids do not seem to be effective, however in my experience the disease does not seem to progress.  Based on lack of treatment and seemingly no/slow progression, I would not proceed with biopsy to confirm the diagnosis. With impaired lung function he is at risk to develop respiratory complications during times of respiratory infections.  Overall feels unchanged compared to his last visit; PFT's with evidence of restriction and are overall unchanged.  Still working, was able to modify work environment.     1.CT chest to evaluate for cause of worsening  2.Full PFT's   3.Oxygen titration study. Pre-walk oxygen saturation was 93%.  Saturation decreased to 86% while walking in hallway, will start on  2L/min nasal until results from walk test are reviewed with additional oxygen titration based on that result. 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 Nasal swab for full viral panel and Bordetella  5. CBC with diff and CMP  6. ECHO    Further treatment will be based on above results.  May need bronchoscopy.     RTC in 1 month.  Patient to call with any questions or concerns prior to his next clinic visit

## 2019-12-19 NOTE — LETTER
12/19/2019       RE: Byron Russo  [id number] [address] [facility] [id number]     Dear Colleague,    Thank you for referring your patient, Byron Russo, to the [facility] at [facility]. Please see a copy of my visit note below.    Reason for Visit  Byron Russo is a 59 year old year old male who is being seen for Oncology Clinic Visit ([facility] RETURN; MDS (myelodysplastic syndrome))    Pulmonary HPI  60 YO male with history of MDS RAEB-2 s/p Allo-sib HSCT 3 years ago who was recently hospitalized for increased SOB/CUELLAR and hypoxia.  Underwent CT chest on admission that showed peripheral findings with a few middle lung nodular infiltrates.  Underwent bronchoscopy with BAL showing only 190 and 290 WBC in a lymphocyte predominant pattern, only microbiology findings were Candida; was started on Posaconazole. Discharged on 4L of oxygen.  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  SOB and CUELLAR is improved since discharge.  States onset of symptoms was in 10-18, noticed increased SOB while doing yardwork.  States normal walking does not cause a problem, only if he is walking fast or carrying items. Denies any prior history of lung disease- no history of asthma, no pneumonia, no complications with chemotherapy.  Has a history of cGvHD, is on alternating prednisone of 10/20 mg and cyclosporin of 25 mg every day.  Review of chest CT pattern appears consistent with PPFE, cannot rule out nodular areas representing infection.    Last seen 6 months ago. Since his last visit he has had increased SOB over the last 2 months- decreased exercise capacity at work, difficult to keep up. Also noticed increased SOB/CUELLAR while doing tasks at home such as shoveling snow.  Some cough but not productive, no hemoptysis.  Had an increase in symptoms at the start of December. Saw Urgent care at outside facility,  CXR done that showed per report diffuse infiltrates and vascular congestion. Denies PND or orthopnea.  No lower extremity edema.  Given course of doxycycline and an albuterol inhaler.  Subsequent nasal swab for influenza was negative.  States steroid dose was decreased to 10 mg per day end of September.  Has also noticed weight loss of 20 pounds in the last 2 months.   Also with onset of dysphagia with swallowing pills 2 months ago, has noticed some aspiration. Seen by ENT with laryngoscopy performed, sounds like having problems with epiglottis.  Plan for formal swallow study.    The patient was seen and examined by Travis Brady MD           Current Outpatient Medications   Medication     acyclovir (ZOVIRAX) 800 MG tablet     amoxicillin (AMOXIL) 500 MG tablet     aspirin 81 MG EC tablet     cycloSPORINE modified (GENERIC EQUIVALENT) 25 MG capsule     fluconazole (DIFLUCAN) 100 MG tablet     levofloxacin (LEVAQUIN) 250 MG tablet     levothyroxine (SYNTHROID/LEVOTHROID) 150 MCG tablet     magnesium oxide (MAG-OX) 400 (241.3 MG) MG tablet     metoprolol tartrate (LOPRESSOR) 25 MG tablet     NITROGLYCERIN ER PO     pilocarpine (SALAGEN) 5 MG tablet     predniSONE (DELTASONE) 10 MG tablet     rosuvastatin (CRESTOR) 5 MG tablet     sulfamethoxazole-trimethoprim (BACTRIM DS/SEPTRA DS) 800-160 MG tablet     acetaminophen (TYLENOL) 325 MG tablet     No current facility-administered medications for this visit.      Allergies   Allergen Reactions     Atorvastatin Other (See Comments)     Back pain  Tolerates atrovastatin     Docosahexaenoic Acid (Dha)      Other reaction(s): Intolerance-Can't Take - Omega 3 fish oil     Liquid Adhesive Rash     Past Medical History:   Diagnosis Date     CAD (coronary artery disease) 2001     Hyperlipidemia      Hypothyroidism      Obesity      Pulmonary embolism (H) 2/2016     Varicose veins        Past Surgical History:   Procedure Laterality Date     APPENDECTOMY       ARTHROSCOPY KNEE  WITH MEDIAL MENISCECTOMY  6/20/2011    Procedure:ARTHROSCOPY KNEE WITH MEDIAL MENISCECTOMY; Surgeon:SACHIN SAMANO; Location:[facility]     BRONCHOSCOPY (RIGID OR FLEXIBLE), DIAGNOSTIC N/A 2/6/2019    Procedure: COMBINED BRONCHOSCOPY (RIGID OR FLEXIBLE), LAVAGE;  Surgeon: Louise Rogel MD;  Location: [facility]     coronary artery stents placed x 5  2001       Social History     Socioeconomic History     Marital status:      Spouse name: Not on file     Number of children: Not on file     Years of education: Not on file     Highest education level: Not on file   Occupational History     Not on file   Social Needs     Financial resource strain: Not on file     Food insecurity:     Worry: Not on file     Inability: Not on file     Transportation needs:     Medical: Not on file     Non-medical: Not on file   Tobacco Use     Smoking status: Never Smoker     Smokeless tobacco: Former User   Substance and Sexual Activity     Alcohol use: No     Alcohol/week: 0.0 standard drinks     Drug use: No     Sexual activity: Not on file   Lifestyle     Physical activity:     Days per week: Not on file     Minutes per session: Not on file     Stress: Not on file   Relationships     Social connections:     Talks on phone: Not on file     Gets together: Not on file     Attends Anabaptist service: Not on file     Active member of club or organization: Not on file     Attends meetings of clubs or organizations: Not on file     Relationship status: Not on file     Intimate partner violence:     Fear of current or ex partner: Not on file     Emotionally abused: Not on file     Physically abused: Not on file     Forced sexual activity: Not on file   Other Topics Concern     Parent/sibling w/ CABG, MI or angioplasty before 65F 55M? Not Asked   Social History Narrative     Not on file       ROS Pulmonary  A complete ROS was otherwise negative except as noted in the HPI.  /75   Pulse 95   Temp 98.8  F (37.1  C) (Oral)   Ht 1.778  "m (5' 10\")   Wt 103.9 kg (229 lb 1.6 oz)   SpO2 93%   BMI 32.87 kg/m     Exam:   GENERAL APPEARANCE: Well developed, well nourished, alert, and in no apparent distress.  EYES: PERRL, EOMI  HENT: Nasal mucosa with no edema and no hyperemia. No nasal polyps.  EARS: Canals clear, TMs normal  MOUTH: Oral mucosa is moist, without any lesions, no tonsillar enlargement, no oropharyngeal exudate.  NECK: supple, no masses, no thyromegaly.  LYMPHATICS: No significant axillary, cervical, or supraclavicular nodes.  RESP: Decreased air flow throughout.  Crackles bilaterally posteriorly mid lung. No rhonchi. No wheezes.  CV: Normal S1, S2, regular rhythm, normal rate. No murmur.  No rub. No gallop. No LE edema.   ABDOMEN:  Bowel sounds normal, soft, nontender, no HSM or masses.   MS: extremities normal. No clubbing. No cyanosis.  SKIN: no rash on limited exam  NEURO: Mentation intact, speech normal, normal strength and tone, normal gait and stance  PSYCH: mentation appears normal. and affect normal/bright  Results:  No results found for this or any previous visit (from the past 168 hour(s)).    Assessment and plan:   59 YO s/p HSCT post 3years with sub-acute presentation with SOB and CUELLAR with recent worsening.  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  Unclear of cause of acute worsening but may be related to concomitant viral infection, cannot fully exclude fungal infection but BAL cultures have remained negative. No effective treatment for PPFE, steroids do not seem to be effective, however in my experience the disease does not seem to progress.  Based on lack of treatment and seemingly no/slow progression, I would not proceed with biopsy to confirm the diagnosis. With impaired lung function he is at risk to develop respiratory complications during times of respiratory infections.  Overall feels unchanged compared to his last visit; PFT's " with evidence of restriction and are overall unchanged.  Still working, was able to modify work environment.     1.CT chest to evaluate for cause of worsening  2.Full PFT's   3.Oxygen titration study. Pre-walk oxygen saturation was 93%.  Saturation decreased to 86% while walking in hallway, will start on 2L/min nasal until results from walk test are reviewed with additional oxygen titration based on that result.  4. Nasal swab for full viral panel and Bordetella  5. CBC with diff and CMP  6. ECHO    Further treatment will be based on above results.  May need bronchoscopy.     RTC in 1 month.  Patient to call with any questions or concerns prior to his next clinic visit           Again, thank you for allowing me to participate in the care of your patient.      Sincerely,    Travis Brady MD

## 2019-12-20 ENCOUNTER — TELEPHONE (OUTPATIENT)
Dept: PULMONOLOGY | Facility: CLINIC | Age: 59
End: 2019-12-20

## 2019-12-20 DIAGNOSIS — J18.9 PNEUMONIA: Primary | ICD-10-CM

## 2019-12-20 DIAGNOSIS — R09.02 HYPOXIA: ICD-10-CM

## 2019-12-20 LAB
B PARAPERT DNA SPEC QL NAA+PROBE: NOT DETECTED
B PERT DNA SPEC QL NAA+PROBE: NOT DETECTED
BORDETELLA COMMENT: NORMAL
C PNEUM DNA SPEC QL NAA+PROBE: NOT DETECTED
FLUAV H1 2009 PAND RNA SPEC QL NAA+PROBE: NOT DETECTED
FLUAV H1 RNA SPEC QL NAA+PROBE: NOT DETECTED
FLUAV H3 RNA SPEC QL NAA+PROBE: NOT DETECTED
FLUAV RNA SPEC QL NAA+PROBE: NOT DETECTED
FLUBV RNA SPEC QL NAA+PROBE: NOT DETECTED
HADV DNA SPEC QL NAA+PROBE: NOT DETECTED
HCOV PNL SPEC NAA+PROBE: NOT DETECTED
HMPV RNA SPEC QL NAA+PROBE: NOT DETECTED
HPIV1 RNA SPEC QL NAA+PROBE: NOT DETECTED
HPIV2 RNA SPEC QL NAA+PROBE: NOT DETECTED
HPIV3 RNA SPEC QL NAA+PROBE: NOT DETECTED
HPIV4 RNA SPEC QL NAA+PROBE: NOT DETECTED
M PNEUMO DNA SPEC QL NAA+PROBE: NOT DETECTED
MICROBIOLOGIST REVIEW: NORMAL
RSV RNA SPEC QL NAA+PROBE: NOT DETECTED
RSV RNA SPEC QL NAA+PROBE: NOT DETECTED
RV+EV RNA SPEC QL NAA+PROBE: NOT DETECTED

## 2019-12-20 NOTE — TELEPHONE ENCOUNTER
Pt was supposed to schedule a 6 min walk and pft yesterday after appt with Dr. Brady and forgot to. Called pt and schedule 6 min walk and pft for 12/23/2019. Pt is aware of appt and agreed with day and time.

## 2019-12-23 DIAGNOSIS — R06.02 SOB (SHORTNESS OF BREATH): ICD-10-CM

## 2019-12-23 LAB
6 MIN WALK (FT): 510 FT
6 MIN WALK (M): 155 M

## 2019-12-24 ENCOUNTER — TELEPHONE (OUTPATIENT)
Dept: PULMONOLOGY | Facility: CLINIC | Age: 59
End: 2019-12-24

## 2019-12-25 LAB
DLCOCOR-%PRED-PRE: 54 %
DLCOCOR-PRE: 15.26 ML/MIN/MMHG
DLCOUNC-%PRED-PRE: 57 %
DLCOUNC-PRE: 16.23 ML/MIN/MMHG
DLCOUNC-PRED: 28.16 ML/MIN/MMHG
ERV-%PRED-PRE: 62 %
ERV-PRE: 0.62 L
ERV-PRED: 0.99 L
EXPTIME-PRE: 7.02 SEC
FEF2575-%PRED-PRE: 60 %
FEF2575-PRE: 1.91 L/SEC
FEF2575-PRED: 3.17 L/SEC
FEFMAX-%PRED-PRE: 83 %
FEFMAX-PRE: 7.83 L/SEC
FEFMAX-PRED: 9.43 L/SEC
FEV1-%PRED-PRE: 55 %
FEV1-PRE: 2.04 L
FEV1FEV6-PRE: 81 %
FEV1FEV6-PRED: 79 %
FEV1FVC-PRE: 83 %
FEV1FVC-PRED: 78 %
FEV1SVC-PRE: 83 %
FEV1SVC-PRED: 73 %
FIFMAX-PRE: 6.96 L/SEC
FIO2-PRE: 28 %
FRCPLETH-%PRED-PRE: 71 %
FRCPLETH-PRE: 2.55 L
FRCPLETH-PRED: 3.58 L
FVC-%PRED-PRE: 51 %
FVC-PRE: 2.47 L
FVC-PRED: 4.76 L
IC-%PRED-PRE: 45 %
IC-PRE: 1.84 L
IC-PRED: 4.07 L
RVPLETH-%PRED-PRE: 81 %
RVPLETH-PRE: 1.93 L
RVPLETH-PRED: 2.35 L
TLCPLETH-%PRED-PRE: 61 %
TLCPLETH-PRE: 4.39 L
TLCPLETH-PRED: 7.13 L
VA-%PRED-PRE: 53 %
VA-PRE: 3.5 L
VC-%PRED-PRE: 48 %
VC-PRE: 2.46 L
VC-PRED: 5.06 L

## 2019-12-26 NOTE — TELEPHONE ENCOUNTER
[facility] Call Center    Phone Message    May a detailed message be left on voicemail: no    Reason for Call: Other: Liliya needs a different order for the oxygen tank as it has to be a diagnosis and not symptons in order for insurance to cover it. Contact [place] with further question     Action Taken: Message routed to:  [facility] ([facility] Pulm

## 2019-12-31 ENCOUNTER — ANCILLARY PROCEDURE (OUTPATIENT)
Dept: GENERAL RADIOLOGY | Facility: CLINIC | Age: 59
End: 2019-12-31
Attending: OTOLARYNGOLOGY
Payer: COMMERCIAL

## 2019-12-31 ENCOUNTER — THERAPY VISIT (OUTPATIENT)
Dept: SPEECH THERAPY | Facility: CLINIC | Age: 59
End: 2019-12-31
Attending: OTOLARYNGOLOGY
Payer: COMMERCIAL

## 2019-12-31 DIAGNOSIS — R49.0 DYSPHONIA: ICD-10-CM

## 2019-12-31 DIAGNOSIS — Z92.3 HISTORY OF RADIATION EXPOSURE: ICD-10-CM

## 2019-12-31 DIAGNOSIS — R13.14 PHARYNGOESOPHAGEAL DYSPHAGIA: ICD-10-CM

## 2019-12-31 DIAGNOSIS — K11.7 XEROSTOMIA: ICD-10-CM

## 2019-12-31 DIAGNOSIS — R13.12 OROPHARYNGEAL DYSPHAGIA: ICD-10-CM

## 2019-12-31 RX ORDER — BARIUM SULFATE 400 MG/ML
30 SUSPENSION ORAL ONCE
Status: COMPLETED | OUTPATIENT
Start: 2019-12-31 | End: 2019-12-31

## 2019-12-31 RX ADMIN — BARIUM SULFATE 70 ML: 400 SUSPENSION ORAL at 14:33

## 2019-12-31 NOTE — PROGRESS NOTES
"   12/31/19 1100   General Information   Type Of Visit Initial   Start Of Care Date 12/31/19   Referring Physician Dr. 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  Per chart, he was initially treated with chemotherapy and total body radiation therapy.  He was referred to ENT due to report of dysphagia especially with pills and coarse solid foods.  He was evaluated by ENT in 10/2019 and FEES completed which demonstrated residuals from puree and cookie textures in the valleculae and pyriforms.  He was referred for a VFSS with esophagram for further evaluation.  He also reported a history of dysphonia when seen by ENT.  Upon clinical interview today, patient reported history of swallowing difficulties for some time.  Stated he has difficulties with both food and liquid.  Endorsed he has been talking his pills and this is been helping them to not sticking his throat as much.  Stated with his pills, he usually takes big sip, throws the pill into his mouth, and throws his head backwards.  Stated 95% of the time the pills go down his throat without issue and the other 5% time the pills seem to stick in his throat.  Stated when he was seen in ENT clinic, he was told that his \"flapper\" is taller than normal and does not close the entire way when he swallows.  Stated he feels like he is liquid going to his lungs 2-3 times " per day which causes him to cough.  Endorsed he notices more coughing when drinking liquids when he takes a big breath prior to swallowing or takes a big gasp of air prior to swallowing.  He reported he feels like some dry foods stick in his throat and he consistently has to wash food down his throat.  Stated he does have a history of prior radiation and has subsequent dry eyes, mouth and throat.  He stated he avoids foods such as peanuts, hamburger, and chips as they are drying tend to stick in his mouth.  He denied odynophagia or recent pneumonias.  Stated he is lost approximately 20 pounds in weight over the last month or 2 but he wonders if this is related to the prednisone that his doctor has been adjusting.  He denied a history of acid reflux.  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  Stated he recently underwent a pulmonary function test and was told he needs to use supplemental oxygen of 2 LPM when he is exerting a lot of energy/exercising.    Respiratory Status Room air   Prior Level Of Function Swallowing   Prior Level Of Function Comment Most regular textures, thin liquids; avoids some dry foods such a peanuts, hamburger, chips   Patient Role/employment History Employed   General Observations Patient presenting cooperative throughout evaluation   Patient/family Goals To understand the cause of his difficulties with swallowing   Fall Risk Screen   Fall screen completed by SLP   Have you fallen 2 or more times in the past year? No   Have you fallen and had an injury in the past year? No   Is patient a fall risk? No   Abuse Screen (yes response referral indicated)   Feels Unsafe at Home or Work/School no   Feels Threatened by Someone no   Does Anyone Try to Keep You From Having Contact with Others or Doing Things Outside Your Home? no   Physical Signs of Abuse Present no    Clinical Swallow Evaluation   Oral Musculature generally intact   Structural Abnormalities none present   Dentition present and adequate   Mucosal Quality dry  (per patient report)   Mandibular Strength and Mobility intact   Oral Labial Strength and Mobility WFL   Lingual Strength and Mobility impaired anterior elevation  (minimally reduced)   Velar Elevation intact   Buccal Strength and Mobility intact   Laryngeal Function Cough;Swallow;Voicing initiated   Oral Musculature Comments Strength, ROM, and coordination generally within functional limits.  Questionable minimal deviation of tongue to right upon protrusion.    Additional evaluation(s) completed today Yes;Recommended   Rationale for completing additional evaluation VFSS recommended to further evaluate pharyngeal phase given report of concern of aspiration and pharyngeal residuals.    VFSS Evaluation   VFSS Additional Documentation Yes   VFSS Eval: Radiology   Radiologist Resident   Views Taken left lateral;A/P   Physical Location of Procedure [facility]   VFSS Eval: Thin Liquid Texture Trial   Mode of Presentation, Thin Liquid cup;self-fed   Order of Presentation 1, 2, 3, 4, 7, 9, 11, 12, 13   Preparatory Phase WFL   Oral Phase, Thin Liquid Premature pharyngeal entry  (to level of pyriforms at times)   Pharyngeal Phase, Thin Liquid Delayed swallow reflex;other (see comments)  (trace pharyngeal residuals)   Rosenbek's Penetration Aspiration Scale: Thin Liquid Trial Results 8 - contrast passes glottis, visible subglottic residue remains, absent patient response (aspiration)   Response to Aspiration nonproductive volitional cough following clinician cue   Diagnostic Statement Initially patient demonstrated consistent flash penetration. After trials of solid textures, patient demonstrated multiple episodes of silent aspiration despite use of strategies including small sips and chin tuck technique.    VFSS Eval: Nectar Thick Liquid Texture Trial    Mode of Presentation, Nectar cup;self-fed   Order of Presentation 14, 15, 16-barium tablet, 17-still, 18-attempt to clear barium tablet, 19, 21   Preparatory Phase WFL   Oral Phase, Nectar Premature pharyngeal entry  (intermittent to valleculae)   Pharyngeal Phase, Nectar Delayed swallow reflex  (inconsistent delay)   Rosenbek's Penetration Aspiration Scale: Nectar-Thick Liquid Trial Results 6 - contrast passes glottis, no subglottic residue remains (aspiration)   Response to Aspiration, Nectar other (see comments)  (productive reflexive cough/throat clear)   Diagnostic Statement No penetration or aspiration on single swallows.  However, penetration and aspiration present when patient attempting to clear barium tablet that became lodged in the valleculae and then the pyriform sinuses.  Aspirated material was cleared spontaneously by patient.     VFSS Eval: Puree Solid Texture Trial   Mode of Presentation, Puree spoon;self-fed   Order of Presentation 5, 6, 20   Preparatory Phase WFL   Oral Phase, Puree WFL   Pharyngeal Phase, Puree Delayed swallow reflex;Residue in valleculae   Rosenbek's Penetration Aspiration Scale: Puree Food Trial Results 1 - no aspiration, contrast does not enter airway   Successful Strategies Trialed During Procedure, Puree other (see comments)  (liquid wash, subsequent dry swallow)   Diagnostic Statement Swallow triggered at level of valleculae.  Mild vallecular residuals present after the swallow that were mostly cleared with a liquid wash and subsequent dry swallow.  Effortful swallow was not significantly beneficial in clearing residuals.    VFSS Eval: Solid Food Texture Trial   Mode of Presentation, Solid self-fed   Order of Presentation 8, 10   Preparatory Phase WFL   Oral Phase, Solid WFL   Pharyngeal Phase, Solid Delayed swallow reflex;Residue in valleculae   Rosenbek's Penetration Aspiration Scale: Solid Food Trial Results 1 - no aspiration, contrast does not enter airway    Successful Strategies Trialed During Procedure, Solid other (see comments)  (liquid wash, subsequent dry swallow)   Diagnostic Statement Swallow triggered at level of valleculae.  On initial swallow, patient had minimal amount of bolus pass the valleculae.  On second trial with use of chin tuck, patient exhibited improved bolus clearance but ongoing moderate residuals in valleculae.  Residuals were mostly cleared with use of liquid wash and subsequent dry swallow.    Swallow Compensations   Swallow Compensations Alternate viscosity of consistencies;Chin tuck;Effortful swallow;Reduce amounts;Multiple swallow   Educational Assessment   Barriers to Learning No barriers   Esophageal Phase of Swallow   Patient reports or presents with symptoms of esophageal dysphagia No   Esophageal sweep performed during today s vidofluoroscopic exam  Yes;Please refer to radiologist's report for details   Esophageal comments Brief esophagram completed after VFSS that was WFL per radiologist.  See radiology report for details.    General Therapy Interventions   Planned Therapy Interventions Dysphagia Treatment   Dysphagia treatment Oropharyngeal exercise training;Modified diet education;Instruction of safe swallow strategies;Compensatory strategies for swallowing   Swallow Eval: Clinical Impressions   Skilled Criteria for Therapy Intervention Skilled criteria met.  Treatment indicated.   Dysphagia Outcome Severity Scale (KAYLYNN) Level 3 - KAYLYNN   Treatment Diagnosis Moderate oropharyngeal dysphagia   Diet texture recommendations Dysphagia diet level 3;Nectar thick liquids  (thin liquids between meals)   Recommended Feeding/Eating Techniques alternate between small bites and sips of food/liquid;hard swallow w/ each bite or sip;maintain upright posture during/after eating for 30 mins;small sips/bites;other (see comments)  (double swallow, oral careas, crush/cut pills, slow pace)   Rehab Potential good, to achieve stated therapy goals    Therapy Frequency other (see comments)  (2x/month x 3 months)   Anticipated Discharge Disposition home w/ outpatient services   Risks and Benefits of Treatment have been explained. Yes   Patient, family and/or staff in agreement with Plan of Care Yes   Clinical Impression Comments Patient presents with moderate oropharyngeal dysphagia.  Oral phase is marked by reduced bolus control with liquid consistencies resulting in premature spillage.  Premature spillage tends to be more prominent after trials of solid textures when patient has residuals in the valleculae and is deeper with sips of thin liquids.  Pharyngeal phase is characterized by delay in swallow response and delay in epiglottic inversion.  This is addition to premature spillage results in penetration and aspiration during the swallow.  Patient initially exhibits flash penetration of thin liquids; however, as the study progressed, he began to demonstrate multiple episodes of silent aspiration of thin liquids.  Episodes of silent aspiration occurred after trials of dry solid textures both when residuals were present in valleculae and after residuals had been cleared.  Chin tuck strategy and use of smaller bolus size were not beneficial in improving airway protection.  Patient was unable to clear aspirated material from his airway despite attempts with volitional cough.  Patient exhibits generally improved timing of swallow and airway protection with use of nectar-thick liquids.  1x episode of aspiration with reflexive throat clear/cough response present when patient was using nectar-thick liquids to attempt to clear lodged barium tablet from his throat.  Aspirated material was spontaneously cleared by patient.  Patient demonstrates reduced pharyngeal constriction resulting in vallecular residuals from swallows of puree textures and cookie textures.  Residuals were most significant with dry solid texture and were mostly cleared with use of liquid wash and  subsequent dry swallow.  Barium tablet was swallowed with nectar-thick liquids became lodged in valleculae and then pyriform sinuses before it was cleared into esophagus with additional sips of nectar-thick liquids.      Given results of today's VFSS, patient is at increased risk of aspiration/aspiration pneumonia and choking before, during, and after the swallow.  Risks can be reduced with modified diet textures and use of swallowing strategies as listed above.  Suspect patient's report of reduced pulmonary function likely impacts his swallow and ability to adequately protect his airway.  Recommend ongoing SLP services to train diet modifications, train swallowing strategies, assess diet tolerance, and train pharyngeal strengthening exercises.  Recommend repeat VFSS in ~6-8 weeks after participation in swallowing treatment to re-assess pharyngeal swallow especially given noted silent aspiration during today's study.    Swallow Goals   SLP Swallow Goals 1;2   Swallow Goal 1   Goal Identifier Diet   Goal Description 1.  Patient with tolerate least restrictive diet with independent use of swallowing strategies in at least 90% of the p.o. trials.   Target Date 03/30/20   Swallow Goal 2   Goal Identifier Exercises   Goal Description 2.  Patient will improve pharyngeal strength and range of motion in order to improve airway protection and pharyngeal clearance by completing pharyngeal strengthening exercises, 10 reps per exercise at least daily, when given minimal written/verbal cues.    Target Date 03/30/20   Total Session Time   SLP Eval: oral/pharyngeal swallow function, clinical minutes (84853) 15   SLP Eval: VideoFluoroscopic Swallow function Minutes (31050) 20   Total Evaluation Time 35     Thank you for the referral of Byron Russo. If you have any questions about this report, please contact me using the information below.     Denisha Rock MA, CCC-SLP  Speech-Language Pathologist   [facility]  [facility] [place]  Department of Otolaryngology/D&T - 4th floor  Pager: [phone]  Phone: [phone]  Email: [email or web address]

## 2020-01-01 ENCOUNTER — HOSPITAL ENCOUNTER (INPATIENT)
Facility: CLINIC | Age: 60
LOS: 7 days | Discharge: HOME | DRG: 187 | End: 2020-10-29
Attending: EMERGENCY MEDICINE | Admitting: INTERNAL MEDICINE
Payer: COMMERCIAL

## 2020-01-01 ENCOUNTER — ANCILLARY PROCEDURE (OUTPATIENT)
Dept: RADIOLOGY | Facility: AMBULATORY SURGERY CENTER | Age: 60
End: 2020-01-01
Attending: INTERNAL MEDICINE
Payer: COMMERCIAL

## 2020-01-01 ENCOUNTER — ONCOLOGY VISIT (OUTPATIENT)
Dept: TRANSPLANT | Facility: CLINIC | Age: 60
End: 2020-01-01
Attending: STUDENT IN AN ORGANIZED HEALTH CARE EDUCATION/TRAINING PROGRAM
Payer: COMMERCIAL

## 2020-01-01 ENCOUNTER — APPOINTMENT (OUTPATIENT)
Dept: LAB | Facility: CLINIC | Age: 60
End: 2020-01-01
Attending: INTERNAL MEDICINE
Payer: COMMERCIAL

## 2020-01-01 ENCOUNTER — TELEPHONE (OUTPATIENT)
Dept: TRANSPLANT | Facility: CLINIC | Age: 60
End: 2020-01-01

## 2020-01-01 ENCOUNTER — HOSPITAL ENCOUNTER (OUTPATIENT)
Facility: AMBULATORY SURGERY CENTER | Age: 60
Discharge: HOME | End: 2020-11-27
Attending: RADIOLOGY | Admitting: RADIOLOGY
Payer: COMMERCIAL

## 2020-01-01 ENCOUNTER — APPOINTMENT (OUTPATIENT)
Dept: LAB | Facility: CLINIC | Age: 60
End: 2020-01-01
Attending: STUDENT IN AN ORGANIZED HEALTH CARE EDUCATION/TRAINING PROGRAM
Payer: COMMERCIAL

## 2020-01-01 ENCOUNTER — ANCILLARY PROCEDURE (OUTPATIENT)
Dept: GENERAL RADIOLOGY | Facility: CLINIC | Age: 60
End: 2020-01-01
Attending: PHYSICIAN ASSISTANT
Payer: COMMERCIAL

## 2020-01-01 ENCOUNTER — CARE COORDINATION (OUTPATIENT)
Dept: ONCOLOGY | Facility: CLINIC | Age: 60
End: 2020-01-01

## 2020-01-01 ENCOUNTER — APPOINTMENT (OUTPATIENT)
Dept: LAB | Facility: CLINIC | Age: 60
End: 2020-01-01
Attending: PHYSICIAN ASSISTANT
Payer: COMMERCIAL

## 2020-01-01 ENCOUNTER — ANCILLARY PROCEDURE (OUTPATIENT)
Dept: GENERAL RADIOLOGY | Facility: CLINIC | Age: 60
End: 2020-01-01
Attending: NURSE PRACTITIONER
Payer: COMMERCIAL

## 2020-01-01 ENCOUNTER — TELEPHONE (OUTPATIENT)
Dept: ONCOLOGY | Facility: CLINIC | Age: 60
End: 2020-01-01

## 2020-01-01 ENCOUNTER — VIRTUAL VISIT (OUTPATIENT)
Dept: PULMONOLOGY | Facility: CLINIC | Age: 60
End: 2020-01-01
Attending: INTERNAL MEDICINE
Payer: COMMERCIAL

## 2020-01-01 ENCOUNTER — ANCILLARY PROCEDURE (OUTPATIENT)
Dept: CT IMAGING | Facility: CLINIC | Age: 60
End: 2020-01-01
Attending: INTERNAL MEDICINE
Payer: COMMERCIAL

## 2020-01-01 ENCOUNTER — ONCOLOGY VISIT (OUTPATIENT)
Dept: TRANSPLANT | Facility: CLINIC | Age: 60
End: 2020-01-01
Attending: NURSE PRACTITIONER
Payer: COMMERCIAL

## 2020-01-01 ENCOUNTER — ANCILLARY PROCEDURE (OUTPATIENT)
Dept: GENERAL RADIOLOGY | Facility: CLINIC | Age: 60
End: 2020-01-01
Attending: INTERNAL MEDICINE
Payer: COMMERCIAL

## 2020-01-01 ENCOUNTER — ONCOLOGY VISIT (OUTPATIENT)
Dept: TRANSPLANT | Facility: CLINIC | Age: 60
End: 2020-01-01
Attending: INTERNAL MEDICINE
Payer: COMMERCIAL

## 2020-01-01 ENCOUNTER — APPOINTMENT (OUTPATIENT)
Dept: LAB | Facility: CLINIC | Age: 60
End: 2020-01-01
Attending: NURSE PRACTITIONER
Payer: COMMERCIAL

## 2020-01-01 ENCOUNTER — APPOINTMENT (OUTPATIENT)
Dept: GENERAL RADIOLOGY | Facility: CLINIC | Age: 60
DRG: 187 | End: 2020-01-01
Attending: PHYSICIAN ASSISTANT
Payer: COMMERCIAL

## 2020-01-01 ENCOUNTER — APPOINTMENT (OUTPATIENT)
Dept: GENERAL RADIOLOGY | Facility: CLINIC | Age: 60
DRG: 187 | End: 2020-01-01
Attending: INTERNAL MEDICINE
Payer: COMMERCIAL

## 2020-01-01 ENCOUNTER — HOSPITAL ENCOUNTER (OUTPATIENT)
Facility: AMBULATORY SURGERY CENTER | Age: 60
Discharge: HOME | End: 2020-12-18
Attending: RADIOLOGY | Admitting: RADIOLOGY
Payer: COMMERCIAL

## 2020-01-01 ENCOUNTER — HOSPITAL ENCOUNTER (OUTPATIENT)
Facility: AMBULATORY SURGERY CENTER | Age: 60
Discharge: HOME | End: 2020-12-23
Attending: RADIOLOGY | Admitting: RADIOLOGY
Payer: COMMERCIAL

## 2020-01-01 ENCOUNTER — ANCILLARY PROCEDURE (OUTPATIENT)
Dept: RADIOLOGY | Facility: AMBULATORY SURGERY CENTER | Age: 60
End: 2020-01-01
Attending: STUDENT IN AN ORGANIZED HEALTH CARE EDUCATION/TRAINING PROGRAM
Payer: COMMERCIAL

## 2020-01-01 ENCOUNTER — ANCILLARY PROCEDURE (OUTPATIENT)
Dept: GENERAL RADIOLOGY | Facility: CLINIC | Age: 60
End: 2020-01-01
Attending: STUDENT IN AN ORGANIZED HEALTH CARE EDUCATION/TRAINING PROGRAM
Payer: COMMERCIAL

## 2020-01-01 ENCOUNTER — ONCOLOGY VISIT (OUTPATIENT)
Dept: TRANSPLANT | Facility: CLINIC | Age: 60
End: 2020-01-01
Attending: PHYSICIAN ASSISTANT
Payer: COMMERCIAL

## 2020-01-01 ENCOUNTER — APPOINTMENT (OUTPATIENT)
Dept: CT IMAGING | Facility: CLINIC | Age: 60
DRG: 187 | End: 2020-01-01
Attending: INTERNAL MEDICINE
Payer: COMMERCIAL

## 2020-01-01 ENCOUNTER — HOSPITAL ENCOUNTER (OUTPATIENT)
Facility: AMBULATORY SURGERY CENTER | Age: 60
End: 2020-01-01
Attending: RADIOLOGY
Payer: COMMERCIAL

## 2020-01-01 ENCOUNTER — ANCILLARY PROCEDURE (OUTPATIENT)
Dept: ULTRASOUND IMAGING | Facility: CLINIC | Age: 60
End: 2020-01-01
Attending: STUDENT IN AN ORGANIZED HEALTH CARE EDUCATION/TRAINING PROGRAM
Payer: COMMERCIAL

## 2020-01-01 VITALS
BODY MASS INDEX: 29.99 KG/M2 | WEIGHT: 221.4 LBS | DIASTOLIC BLOOD PRESSURE: 78 MMHG | HEART RATE: 90 BPM | TEMPERATURE: 98.3 F | OXYGEN SATURATION: 96 % | HEIGHT: 72 IN | SYSTOLIC BLOOD PRESSURE: 133 MMHG | RESPIRATION RATE: 16 BRPM

## 2020-01-01 VITALS
DIASTOLIC BLOOD PRESSURE: 98 MMHG | RESPIRATION RATE: 16 BRPM | SYSTOLIC BLOOD PRESSURE: 137 MMHG | TEMPERATURE: 98.2 F | BODY MASS INDEX: 32.73 KG/M2 | WEIGHT: 234.7 LBS | HEART RATE: 86 BPM | OXYGEN SATURATION: 95 %

## 2020-01-01 VITALS
HEART RATE: 101 BPM | BODY MASS INDEX: 29.43 KG/M2 | RESPIRATION RATE: 20 BRPM | TEMPERATURE: 97.9 F | WEIGHT: 217 LBS | OXYGEN SATURATION: 93 % | DIASTOLIC BLOOD PRESSURE: 85 MMHG | SYSTOLIC BLOOD PRESSURE: 123 MMHG

## 2020-01-01 VITALS
BODY MASS INDEX: 30.6 KG/M2 | RESPIRATION RATE: 18 BRPM | HEART RATE: 92 BPM | TEMPERATURE: 98.2 F | WEIGHT: 225.6 LBS | DIASTOLIC BLOOD PRESSURE: 85 MMHG | OXYGEN SATURATION: 94 % | SYSTOLIC BLOOD PRESSURE: 129 MMHG

## 2020-01-01 VITALS
RESPIRATION RATE: 16 BRPM | DIASTOLIC BLOOD PRESSURE: 87 MMHG | BODY MASS INDEX: 29.54 KG/M2 | SYSTOLIC BLOOD PRESSURE: 134 MMHG | TEMPERATURE: 98.1 F | HEART RATE: 84 BPM | OXYGEN SATURATION: 94 % | WEIGHT: 217.8 LBS

## 2020-01-01 VITALS
OXYGEN SATURATION: 94 % | SYSTOLIC BLOOD PRESSURE: 115 MMHG | WEIGHT: 233.3 LBS | TEMPERATURE: 97.3 F | BODY MASS INDEX: 32.54 KG/M2 | HEART RATE: 88 BPM | RESPIRATION RATE: 20 BRPM | DIASTOLIC BLOOD PRESSURE: 83 MMHG

## 2020-01-01 VITALS
HEART RATE: 73 BPM | WEIGHT: 211.4 LBS | DIASTOLIC BLOOD PRESSURE: 83 MMHG | RESPIRATION RATE: 16 BRPM | BODY MASS INDEX: 28.63 KG/M2 | TEMPERATURE: 97.5 F | SYSTOLIC BLOOD PRESSURE: 120 MMHG | HEIGHT: 72 IN | OXYGEN SATURATION: 90 %

## 2020-01-01 VITALS
WEIGHT: 215 LBS | OXYGEN SATURATION: 100 % | SYSTOLIC BLOOD PRESSURE: 137 MMHG | DIASTOLIC BLOOD PRESSURE: 103 MMHG | RESPIRATION RATE: 16 BRPM | TEMPERATURE: 98.4 F | HEART RATE: 100 BPM | BODY MASS INDEX: 30.1 KG/M2 | HEIGHT: 71 IN

## 2020-01-01 VITALS
WEIGHT: 236.3 LBS | HEART RATE: 84 BPM | TEMPERATURE: 98.3 F | DIASTOLIC BLOOD PRESSURE: 93 MMHG | SYSTOLIC BLOOD PRESSURE: 130 MMHG | OXYGEN SATURATION: 95 % | BODY MASS INDEX: 32.96 KG/M2 | RESPIRATION RATE: 16 BRPM

## 2020-01-01 VITALS
HEART RATE: 96 BPM | DIASTOLIC BLOOD PRESSURE: 80 MMHG | BODY MASS INDEX: 32.2 KG/M2 | RESPIRATION RATE: 22 BRPM | TEMPERATURE: 97.4 F | OXYGEN SATURATION: 100 % | HEIGHT: 71 IN | WEIGHT: 230 LBS | SYSTOLIC BLOOD PRESSURE: 136 MMHG

## 2020-01-01 VITALS
SYSTOLIC BLOOD PRESSURE: 136 MMHG | TEMPERATURE: 97.3 F | RESPIRATION RATE: 20 BRPM | DIASTOLIC BLOOD PRESSURE: 101 MMHG | HEART RATE: 94 BPM | OXYGEN SATURATION: 98 %

## 2020-01-01 VITALS
BODY MASS INDEX: 33.03 KG/M2 | DIASTOLIC BLOOD PRESSURE: 76 MMHG | TEMPERATURE: 97.2 F | HEART RATE: 110 BPM | SYSTOLIC BLOOD PRESSURE: 129 MMHG | RESPIRATION RATE: 20 BRPM | OXYGEN SATURATION: 95 % | WEIGHT: 230.2 LBS

## 2020-01-01 VITALS
TEMPERATURE: 98 F | BODY MASS INDEX: 32.09 KG/M2 | OXYGEN SATURATION: 96 % | RESPIRATION RATE: 18 BRPM | SYSTOLIC BLOOD PRESSURE: 135 MMHG | WEIGHT: 230.1 LBS | DIASTOLIC BLOOD PRESSURE: 92 MMHG | HEART RATE: 89 BPM

## 2020-01-01 VITALS
TEMPERATURE: 98.1 F | OXYGEN SATURATION: 95 % | SYSTOLIC BLOOD PRESSURE: 132 MMHG | HEART RATE: 89 BPM | BODY MASS INDEX: 31.79 KG/M2 | WEIGHT: 227.9 LBS | DIASTOLIC BLOOD PRESSURE: 89 MMHG

## 2020-01-01 DIAGNOSIS — D89.813 GVHD AS COMPLICATION OF BONE MARROW TRANSPLANT (H): ICD-10-CM

## 2020-01-01 DIAGNOSIS — T86.09 GVHD AS COMPLICATION OF BONE MARROW TRANSPLANT (H): ICD-10-CM

## 2020-01-01 DIAGNOSIS — D46.9 MDS (MYELODYSPLASTIC SYNDROME) (H): ICD-10-CM

## 2020-01-01 DIAGNOSIS — D46.22 RAEB-2 (REFRACTORY ANEMIA WITH EXCESS BLASTS-2) (H): ICD-10-CM

## 2020-01-01 DIAGNOSIS — D89.811 CHRONIC GVHD (H): ICD-10-CM

## 2020-01-01 DIAGNOSIS — J90 PLEURAL EFFUSION: ICD-10-CM

## 2020-01-01 DIAGNOSIS — T86.09 GVHD AS COMPLICATION OF BONE MARROW TRANSPLANT (H): Primary | ICD-10-CM

## 2020-01-01 DIAGNOSIS — D46.9 MDS (MYELODYSPLASTIC SYNDROME) (H): Primary | ICD-10-CM

## 2020-01-01 DIAGNOSIS — J90 PLEURAL EFFUSION: Primary | ICD-10-CM

## 2020-01-01 DIAGNOSIS — D89.813 GVHD AS COMPLICATION OF BONE MARROW TRANSPLANT (H): Primary | ICD-10-CM

## 2020-01-01 DIAGNOSIS — Z11.59 ENCOUNTER FOR SCREENING FOR OTHER VIRAL DISEASES: Primary | ICD-10-CM

## 2020-01-01 DIAGNOSIS — J96.21 ACUTE ON CHRONIC RESPIRATORY FAILURE WITH HYPOXIA (H): ICD-10-CM

## 2020-01-01 DIAGNOSIS — Z94.81 STATUS POST BONE MARROW TRANSPLANT (H): Primary | ICD-10-CM

## 2020-01-01 DIAGNOSIS — Z11.59 ENCOUNTER FOR SCREENING FOR OTHER VIRAL DISEASES: ICD-10-CM

## 2020-01-01 DIAGNOSIS — R06.09 DYSPNEA ON EXERTION: ICD-10-CM

## 2020-01-01 DIAGNOSIS — R09.02 HYPOXIA: ICD-10-CM

## 2020-01-01 DIAGNOSIS — K65.8 SEROSITIS (H): Primary | ICD-10-CM

## 2020-01-01 DIAGNOSIS — R06.09 DYSPNEA ON EXERTION: Primary | ICD-10-CM

## 2020-01-01 DIAGNOSIS — Z20.828 EXPOSURE TO SARS-ASSOCIATED CORONAVIRUS: ICD-10-CM

## 2020-01-01 LAB
6 MIN WALK (FT): 800 FT
6 MIN WALK (M): 244 M
ALBUMIN SERPL-MCNC: 2.4 G/DL (ref 3.4–5)
ALBUMIN SERPL-MCNC: 2.6 G/DL (ref 3.4–5)
ALBUMIN SERPL-MCNC: 2.6 G/DL (ref 3.4–5)
ALBUMIN SERPL-MCNC: 2.7 G/DL (ref 3.4–5)
ALBUMIN SERPL-MCNC: 3 G/DL (ref 3.4–5)
ALBUMIN SERPL-MCNC: 3.2 G/DL (ref 3.4–5)
ALBUMIN SERPL-MCNC: 3.3 G/DL (ref 3.4–5)
ALBUMIN SERPL-MCNC: 3.3 G/DL (ref 3.4–5)
ALBUMIN SERPL-MCNC: 3.4 G/DL (ref 3.4–5)
ALP SERPL-CCNC: 100 U/L (ref 40–150)
ALP SERPL-CCNC: 110 U/L (ref 40–150)
ALP SERPL-CCNC: 115 U/L (ref 40–150)
ALP SERPL-CCNC: 139 U/L (ref 40–150)
ALP SERPL-CCNC: 148 U/L (ref 40–150)
ALP SERPL-CCNC: 152 U/L (ref 40–150)
ALP SERPL-CCNC: 164 U/L (ref 40–150)
ALP SERPL-CCNC: 165 U/L (ref 40–150)
ALP SERPL-CCNC: 182 U/L (ref 40–150)
ALP SERPL-CCNC: 191 U/L (ref 40–150)
ALP SERPL-CCNC: 81 U/L (ref 40–150)
ALP SERPL-CCNC: 82 U/L (ref 40–150)
ALP SERPL-CCNC: 84 U/L (ref 40–150)
ALP SERPL-CCNC: 85 U/L (ref 40–150)
ALP SERPL-CCNC: 87 U/L (ref 40–150)
ALP SERPL-CCNC: 91 U/L (ref 40–150)
ALT SERPL W P-5'-P-CCNC: 24 U/L (ref 0–70)
ALT SERPL W P-5'-P-CCNC: 36 U/L (ref 0–70)
ALT SERPL W P-5'-P-CCNC: 40 U/L (ref 0–70)
ALT SERPL W P-5'-P-CCNC: 44 U/L (ref 0–70)
ALT SERPL W P-5'-P-CCNC: 49 U/L (ref 0–70)
ALT SERPL W P-5'-P-CCNC: 50 U/L (ref 0–70)
ALT SERPL W P-5'-P-CCNC: 50 U/L (ref 0–70)
ALT SERPL W P-5'-P-CCNC: 51 U/L (ref 0–70)
ALT SERPL W P-5'-P-CCNC: 55 U/L (ref 0–70)
ALT SERPL W P-5'-P-CCNC: 55 U/L (ref 0–70)
ALT SERPL W P-5'-P-CCNC: 57 U/L (ref 0–70)
AMYLASE FLD-CCNC: 18 U/L
ANION GAP SERPL CALCULATED.3IONS-SCNC: 3 MMOL/L (ref 3–14)
ANION GAP SERPL CALCULATED.3IONS-SCNC: 4 MMOL/L (ref 3–14)
ANION GAP SERPL CALCULATED.3IONS-SCNC: 5 MMOL/L (ref 3–14)
ANION GAP SERPL CALCULATED.3IONS-SCNC: 6 MMOL/L (ref 3–14)
ANION GAP SERPL CALCULATED.3IONS-SCNC: 7 MMOL/L (ref 3–14)
ANION GAP SERPL CALCULATED.3IONS-SCNC: 7 MMOL/L (ref 3–14)
ANION GAP SERPL CALCULATED.3IONS-SCNC: 8 MMOL/L (ref 3–14)
ANION GAP SERPL CALCULATED.3IONS-SCNC: 8 MMOL/L (ref 3–14)
APPEARANCE FLD: NORMAL
APTT PPP: 40 SEC (ref 22–37)
AST SERPL W P-5'-P-CCNC: 22 U/L (ref 0–45)
AST SERPL W P-5'-P-CCNC: 25 U/L (ref 0–45)
AST SERPL W P-5'-P-CCNC: 26 U/L (ref 0–45)
AST SERPL W P-5'-P-CCNC: 27 U/L (ref 0–45)
AST SERPL W P-5'-P-CCNC: 32 U/L (ref 0–45)
AST SERPL W P-5'-P-CCNC: 32 U/L (ref 0–45)
AST SERPL W P-5'-P-CCNC: 34 U/L (ref 0–45)
AST SERPL W P-5'-P-CCNC: 35 U/L (ref 0–45)
AST SERPL W P-5'-P-CCNC: 36 U/L (ref 0–45)
AST SERPL W P-5'-P-CCNC: 37 U/L (ref 0–45)
AST SERPL W P-5'-P-CCNC: 39 U/L (ref 0–45)
AST SERPL W P-5'-P-CCNC: 40 U/L (ref 0–45)
AST SERPL W P-5'-P-CCNC: 41 U/L (ref 0–45)
AST SERPL W P-5'-P-CCNC: 41 U/L (ref 0–45)
AST SERPL W P-5'-P-CCNC: 42 U/L (ref 0–45)
AST SERPL W P-5'-P-CCNC: 54 U/L (ref 0–45)
BACTERIA SPEC CULT: NO GROWTH
BACTERIA SPEC CULT: NORMAL
BASOPHILS # BLD AUTO: 0.1 10E9/L (ref 0–0.2)
BASOPHILS NFR BLD AUTO: 0.4 %
BASOPHILS NFR BLD AUTO: 0.5 %
BASOPHILS NFR BLD AUTO: 0.6 %
BASOPHILS NFR BLD AUTO: 0.7 %
BASOPHILS NFR BLD AUTO: 0.8 %
BASOPHILS NFR BLD AUTO: 0.9 %
BASOPHILS NFR FLD MANUAL: 1 %
BILIRUB SERPL-MCNC: 0.3 MG/DL (ref 0.2–1.3)
BILIRUB SERPL-MCNC: 0.3 MG/DL (ref 0.2–1.3)
BILIRUB SERPL-MCNC: 0.4 MG/DL (ref 0.2–1.3)
BILIRUB SERPL-MCNC: 0.5 MG/DL (ref 0.2–1.3)
BILIRUB SERPL-MCNC: 0.5 MG/DL (ref 0.2–1.3)
BILIRUB SERPL-MCNC: 0.6 MG/DL (ref 0.2–1.3)
BILIRUB SERPL-MCNC: 0.6 MG/DL (ref 0.2–1.3)
BILIRUB SERPL-MCNC: 0.8 MG/DL (ref 0.2–1.3)
BUN SERPL-MCNC: 11 MG/DL (ref 7–30)
BUN SERPL-MCNC: 12 MG/DL (ref 7–30)
BUN SERPL-MCNC: 13 MG/DL (ref 7–30)
BUN SERPL-MCNC: 14 MG/DL (ref 7–30)
BUN SERPL-MCNC: 15 MG/DL (ref 7–30)
BUN SERPL-MCNC: 16 MG/DL (ref 7–30)
BUN SERPL-MCNC: 17 MG/DL (ref 7–30)
BUN SERPL-MCNC: 18 MG/DL (ref 7–30)
BUN SERPL-MCNC: 19 MG/DL (ref 7–30)
BUN SERPL-MCNC: 20 MG/DL (ref 7–30)
BUN SERPL-MCNC: 20 MG/DL (ref 7–30)
CALCIUM SERPL-MCNC: 8.4 MG/DL (ref 8.5–10.1)
CALCIUM SERPL-MCNC: 8.6 MG/DL (ref 8.5–10.1)
CALCIUM SERPL-MCNC: 8.8 MG/DL (ref 8.5–10.1)
CALCIUM SERPL-MCNC: 8.9 MG/DL (ref 8.5–10.1)
CALCIUM SERPL-MCNC: 9 MG/DL (ref 8.5–10.1)
CALCIUM SERPL-MCNC: 9.1 MG/DL (ref 8.5–10.1)
CALCIUM SERPL-MCNC: 9.3 MG/DL (ref 8.5–10.1)
CALCIUM SERPL-MCNC: 9.4 MG/DL (ref 8.5–10.1)
CALCIUM SERPL-MCNC: 9.5 MG/DL (ref 8.5–10.1)
CALCIUM SERPL-MCNC: 9.5 MG/DL (ref 8.5–10.1)
CALCIUM SERPL-MCNC: 9.8 MG/DL (ref 8.5–10.1)
CHLORIDE SERPL-SCNC: 102 MMOL/L (ref 94–109)
CHLORIDE SERPL-SCNC: 103 MMOL/L (ref 94–109)
CHLORIDE SERPL-SCNC: 104 MMOL/L (ref 94–109)
CHLORIDE SERPL-SCNC: 105 MMOL/L (ref 94–109)
CHLORIDE SERPL-SCNC: 106 MMOL/L (ref 94–109)
CHLORIDE SERPL-SCNC: 107 MMOL/L (ref 94–109)
CHOLEST FLD-MCNC: 98 MG/DL
CMV DNA SPEC NAA+PROBE-ACNC: NORMAL [IU]/ML
CMV DNA SPEC NAA+PROBE-LOG#: NORMAL {LOG_IU}/ML
CO2 SERPL-SCNC: 24 MMOL/L (ref 20–32)
CO2 SERPL-SCNC: 26 MMOL/L (ref 20–32)
CO2 SERPL-SCNC: 28 MMOL/L (ref 20–32)
CO2 SERPL-SCNC: 29 MMOL/L (ref 20–32)
CO2 SERPL-SCNC: 30 MMOL/L (ref 20–32)
CO2 SERPL-SCNC: 31 MMOL/L (ref 20–32)
CO2 SERPL-SCNC: 32 MMOL/L (ref 20–32)
COLOR FLD: NORMAL
COPATH REPORT: NORMAL
CREAT SERPL-MCNC: 0.66 MG/DL (ref 0.66–1.25)
CREAT SERPL-MCNC: 0.68 MG/DL (ref 0.66–1.25)
CREAT SERPL-MCNC: 0.69 MG/DL (ref 0.66–1.25)
CREAT SERPL-MCNC: 0.7 MG/DL (ref 0.66–1.25)
CREAT SERPL-MCNC: 0.7 MG/DL (ref 0.66–1.25)
CREAT SERPL-MCNC: 0.75 MG/DL (ref 0.66–1.25)
CREAT SERPL-MCNC: 0.76 MG/DL (ref 0.66–1.25)
CREAT SERPL-MCNC: 0.78 MG/DL (ref 0.66–1.25)
CREAT SERPL-MCNC: 0.79 MG/DL (ref 0.66–1.25)
CREAT SERPL-MCNC: 0.8 MG/DL (ref 0.66–1.25)
CREAT SERPL-MCNC: 0.81 MG/DL (ref 0.66–1.25)
CREAT SERPL-MCNC: 0.82 MG/DL (ref 0.66–1.25)
CREAT SERPL-MCNC: 0.84 MG/DL (ref 0.66–1.25)
CREAT SERPL-MCNC: 0.86 MG/DL (ref 0.66–1.25)
CREAT SERPL-MCNC: 0.86 MG/DL (ref 0.66–1.25)
CREAT SERPL-MCNC: 0.98 MG/DL (ref 0.66–1.25)
CYCLOSPORINE BLD LC/MS/MS-MCNC: 167 UG/L (ref 50–400)
CYCLOSPORINE BLD LC/MS/MS-MCNC: 37 UG/L (ref 50–400)
CYCLOSPORINE BLD LC/MS/MS-MCNC: 37 UG/L (ref 50–400)
CYCLOSPORINE BLD LC/MS/MS-MCNC: 45 UG/L (ref 50–400)
CYCLOSPORINE BLD LC/MS/MS-MCNC: 71 UG/L (ref 50–400)
DIFFERENTIAL METHOD BLD: ABNORMAL
DLCOUNC-%PRED-PRE: 52 %
DLCOUNC-PRE: 14.68 ML/MIN/MMHG
DLCOUNC-PRED: 28.16 ML/MIN/MMHG
EOSINOPHIL # BLD AUTO: 0 10E9/L (ref 0–0.7)
EOSINOPHIL # BLD AUTO: 0.1 10E9/L (ref 0–0.7)
EOSINOPHIL # BLD AUTO: 0.2 10E9/L (ref 0–0.7)
EOSINOPHIL NFR BLD AUTO: 0 %
EOSINOPHIL NFR BLD AUTO: 0 %
EOSINOPHIL NFR BLD AUTO: 0.1 %
EOSINOPHIL NFR BLD AUTO: 0.5 %
EOSINOPHIL NFR BLD AUTO: 0.5 %
EOSINOPHIL NFR BLD AUTO: 0.6 %
EOSINOPHIL NFR BLD AUTO: 1.4 %
EOSINOPHIL NFR BLD AUTO: 1.4 %
EOSINOPHIL NFR BLD AUTO: 1.8 %
EOSINOPHIL NFR BLD AUTO: 1.8 %
EOSINOPHIL NFR BLD AUTO: 2.1 %
EOSINOPHIL NFR BLD AUTO: 2.3 %
EOSINOPHIL NFR FLD MANUAL: 1 %
ERV-%PRED-PRE: 31 %
ERV-PRE: 0.31 L
ERV-PRED: 0.98 L
ERYTHROCYTE [DISTWIDTH] IN BLOOD BY AUTOMATED COUNT: 14.1 % (ref 10–15)
ERYTHROCYTE [DISTWIDTH] IN BLOOD BY AUTOMATED COUNT: 14.2 % (ref 10–15)
ERYTHROCYTE [DISTWIDTH] IN BLOOD BY AUTOMATED COUNT: 14.2 % (ref 10–15)
ERYTHROCYTE [DISTWIDTH] IN BLOOD BY AUTOMATED COUNT: 14.3 % (ref 10–15)
ERYTHROCYTE [DISTWIDTH] IN BLOOD BY AUTOMATED COUNT: 14.3 % (ref 10–15)
ERYTHROCYTE [DISTWIDTH] IN BLOOD BY AUTOMATED COUNT: 14.4 % (ref 10–15)
ERYTHROCYTE [DISTWIDTH] IN BLOOD BY AUTOMATED COUNT: 14.5 % (ref 10–15)
ERYTHROCYTE [DISTWIDTH] IN BLOOD BY AUTOMATED COUNT: 14.6 % (ref 10–15)
ERYTHROCYTE [DISTWIDTH] IN BLOOD BY AUTOMATED COUNT: 14.6 % (ref 10–15)
ERYTHROCYTE [DISTWIDTH] IN BLOOD BY AUTOMATED COUNT: 15 % (ref 10–15)
ERYTHROCYTE [DISTWIDTH] IN BLOOD BY AUTOMATED COUNT: 15.2 % (ref 10–15)
ERYTHROCYTE [DISTWIDTH] IN BLOOD BY AUTOMATED COUNT: 16.3 % (ref 10–15)
ERYTHROCYTE [DISTWIDTH] IN BLOOD BY AUTOMATED COUNT: 16.8 % (ref 10–15)
ERYTHROCYTE [DISTWIDTH] IN BLOOD BY AUTOMATED COUNT: 16.9 % (ref 10–15)
ERYTHROCYTE [DISTWIDTH] IN BLOOD BY AUTOMATED COUNT: 17.1 % (ref 10–15)
ERYTHROCYTE [DISTWIDTH] IN BLOOD BY AUTOMATED COUNT: 17.2 % (ref 10–15)
ERYTHROCYTE [DISTWIDTH] IN BLOOD BY AUTOMATED COUNT: 18.1 % (ref 10–15)
ERYTHROCYTE [DISTWIDTH] IN BLOOD BY AUTOMATED COUNT: 18.4 % (ref 10–15)
EXPTIME-PRE: 5.72 SEC
FEF2575-%PRED-POST: 43 %
FEF2575-%PRED-PRE: 30 %
FEF2575-POST: 1.38 L/SEC
FEF2575-PRE: 0.95 L/SEC
FEF2575-PRED: 3.17 L/SEC
FEFMAX-%PRED-PRE: 29 %
FEFMAX-PRE: 2.82 L/SEC
FEFMAX-PRED: 9.43 L/SEC
FEV1-%PRED-PRE: 27 %
FEV1-PRE: 1.03 L
FEV1FEV6-PRE: 80 %
FEV1FEV6-PRED: 79 %
FEV1FVC-PRE: 80 %
FEV1FVC-PRED: 78 %
FEV1SVC-PRE: 74 %
FEV1SVC-PRED: 73 %
FIFMAX-PRE: 2.89 L/SEC
FIO2-PRE: 21 %
FRCPLETH-%PRED-PRE: 38 %
FRCPLETH-PRE: 1.37 L
FRCPLETH-PRED: 3.58 L
FUNGUS SPEC CULT: NORMAL
FVC-%PRED-PRE: 27 %
FVC-PRE: 1.28 L
FVC-PRED: 4.76 L
GFR SERPL CREATININE-BSD FRML MDRD: 84 ML/MIN/{1.73_M2}
GFR SERPL CREATININE-BSD FRML MDRD: >90 ML/MIN/{1.73_M2}
GLUCOSE FLD-MCNC: 34 MG/DL
GLUCOSE SERPL-MCNC: 100 MG/DL (ref 70–99)
GLUCOSE SERPL-MCNC: 107 MG/DL (ref 70–99)
GLUCOSE SERPL-MCNC: 108 MG/DL (ref 70–99)
GLUCOSE SERPL-MCNC: 108 MG/DL (ref 70–99)
GLUCOSE SERPL-MCNC: 109 MG/DL (ref 70–99)
GLUCOSE SERPL-MCNC: 114 MG/DL (ref 70–99)
GLUCOSE SERPL-MCNC: 115 MG/DL (ref 70–99)
GLUCOSE SERPL-MCNC: 115 MG/DL (ref 70–99)
GLUCOSE SERPL-MCNC: 119 MG/DL (ref 70–99)
GLUCOSE SERPL-MCNC: 77 MG/DL (ref 70–99)
GLUCOSE SERPL-MCNC: 79 MG/DL (ref 70–99)
GLUCOSE SERPL-MCNC: 81 MG/DL (ref 70–99)
GLUCOSE SERPL-MCNC: 81 MG/DL (ref 70–99)
GLUCOSE SERPL-MCNC: 92 MG/DL (ref 70–99)
GLUCOSE SERPL-MCNC: 92 MG/DL (ref 70–99)
GLUCOSE SERPL-MCNC: 95 MG/DL (ref 70–99)
GLUCOSE SERPL-MCNC: 98 MG/DL (ref 70–99)
GLUCOSE SERPL-MCNC: 99 MG/DL (ref 70–99)
GRAM STN SPEC: NORMAL
HCT VFR BLD AUTO: 44.5 % (ref 40–53)
HCT VFR BLD AUTO: 44.9 % (ref 40–53)
HCT VFR BLD AUTO: 45.2 % (ref 40–53)
HCT VFR BLD AUTO: 47.4 % (ref 40–53)
HCT VFR BLD AUTO: 47.7 % (ref 40–53)
HCT VFR BLD AUTO: 47.7 % (ref 40–53)
HCT VFR BLD AUTO: 48.2 % (ref 40–53)
HCT VFR BLD AUTO: 48.2 % (ref 40–53)
HCT VFR BLD AUTO: 48.8 % (ref 40–53)
HCT VFR BLD AUTO: 50.1 % (ref 40–53)
HCT VFR BLD AUTO: 50.3 % (ref 40–53)
HCT VFR BLD AUTO: 50.9 % (ref 40–53)
HCT VFR BLD AUTO: 51.1 % (ref 40–53)
HCT VFR BLD AUTO: 51.1 % (ref 40–53)
HCT VFR BLD AUTO: 51.8 % (ref 40–53)
HCT VFR BLD AUTO: 52.1 % (ref 40–53)
HCT VFR BLD AUTO: 53.2 % (ref 40–53)
HCT VFR BLD AUTO: 53.7 % (ref 40–53)
HGB BLD-MCNC: 14.1 G/DL (ref 13.3–17.7)
HGB BLD-MCNC: 14.3 G/DL (ref 13.3–17.7)
HGB BLD-MCNC: 14.7 G/DL (ref 13.3–17.7)
HGB BLD-MCNC: 14.8 G/DL (ref 13.3–17.7)
HGB BLD-MCNC: 15.3 G/DL (ref 13.3–17.7)
HGB BLD-MCNC: 15.5 G/DL (ref 13.3–17.7)
HGB BLD-MCNC: 15.5 G/DL (ref 13.3–17.7)
HGB BLD-MCNC: 15.6 G/DL (ref 13.3–17.7)
HGB BLD-MCNC: 15.8 G/DL (ref 13.3–17.7)
HGB BLD-MCNC: 16.3 G/DL (ref 13.3–17.7)
HGB BLD-MCNC: 16.6 G/DL (ref 13.3–17.7)
HGB BLD-MCNC: 17.1 G/DL (ref 13.3–17.7)
HGB BLD-MCNC: 17.2 G/DL (ref 13.3–17.7)
HGB BLD-MCNC: 17.3 G/DL (ref 13.3–17.7)
HGB BLD-MCNC: 17.5 G/DL (ref 13.3–17.7)
HGB BLD-MCNC: 17.6 G/DL (ref 13.3–17.7)
IC-%PRED-PRE: 26 %
IC-PRE: 1.09 L
IC-PRED: 4.08 L
IMM GRANULOCYTES # BLD: 0.1 10E9/L (ref 0–0.4)
IMM GRANULOCYTES # BLD: 0.2 10E9/L (ref 0–0.4)
IMM GRANULOCYTES # BLD: 0.3 10E9/L (ref 0–0.4)
IMM GRANULOCYTES NFR BLD: 0.7 %
IMM GRANULOCYTES NFR BLD: 0.8 %
IMM GRANULOCYTES NFR BLD: 1 %
IMM GRANULOCYTES NFR BLD: 1.1 %
IMM GRANULOCYTES NFR BLD: 1.2 %
IMM GRANULOCYTES NFR BLD: 1.4 %
IMM GRANULOCYTES NFR BLD: 1.5 %
IMM GRANULOCYTES NFR BLD: 1.6 %
IMM GRANULOCYTES NFR BLD: 1.7 %
INR PPP: 1.04 (ref 0.86–1.14)
INR PPP: 1.28 (ref 0.86–1.14)
INTERPRETATION ECG - MUSE: NORMAL
LABORATORY COMMENT REPORT: NORMAL
LDH FLD L TO P-CCNC: 521 U/L
LDH SERPL L TO P-CCNC: 232 U/L (ref 85–227)
LYMPHOCYTES # BLD AUTO: 0.9 10E9/L (ref 0.8–5.3)
LYMPHOCYTES # BLD AUTO: 0.9 10E9/L (ref 0.8–5.3)
LYMPHOCYTES # BLD AUTO: 1 10E9/L (ref 0.8–5.3)
LYMPHOCYTES # BLD AUTO: 1.1 10E9/L (ref 0.8–5.3)
LYMPHOCYTES # BLD AUTO: 1.2 10E9/L (ref 0.8–5.3)
LYMPHOCYTES # BLD AUTO: 1.4 10E9/L (ref 0.8–5.3)
LYMPHOCYTES # BLD AUTO: 1.5 10E9/L (ref 0.8–5.3)
LYMPHOCYTES # BLD AUTO: 1.5 10E9/L (ref 0.8–5.3)
LYMPHOCYTES # BLD AUTO: 1.6 10E9/L (ref 0.8–5.3)
LYMPHOCYTES # BLD AUTO: 2 10E9/L (ref 0.8–5.3)
LYMPHOCYTES # BLD AUTO: 2 10E9/L (ref 0.8–5.3)
LYMPHOCYTES # BLD AUTO: 2.1 10E9/L (ref 0.8–5.3)
LYMPHOCYTES # BLD AUTO: 2.2 10E9/L (ref 0.8–5.3)
LYMPHOCYTES # BLD AUTO: 2.3 10E9/L (ref 0.8–5.3)
LYMPHOCYTES # BLD AUTO: 2.5 10E9/L (ref 0.8–5.3)
LYMPHOCYTES NFR BLD AUTO: 10.7 %
LYMPHOCYTES NFR BLD AUTO: 12.7 %
LYMPHOCYTES NFR BLD AUTO: 13.9 %
LYMPHOCYTES NFR BLD AUTO: 14.2 %
LYMPHOCYTES NFR BLD AUTO: 17.2 %
LYMPHOCYTES NFR BLD AUTO: 18.9 %
LYMPHOCYTES NFR BLD AUTO: 19.9 %
LYMPHOCYTES NFR BLD AUTO: 20.9 %
LYMPHOCYTES NFR BLD AUTO: 22 %
LYMPHOCYTES NFR BLD AUTO: 6.9 %
LYMPHOCYTES NFR BLD AUTO: 7 %
LYMPHOCYTES NFR BLD AUTO: 7.4 %
LYMPHOCYTES NFR BLD AUTO: 7.4 %
LYMPHOCYTES NFR BLD AUTO: 7.7 %
LYMPHOCYTES NFR BLD AUTO: 7.8 %
LYMPHOCYTES NFR BLD AUTO: 8 %
LYMPHOCYTES NFR BLD AUTO: 8.2 %
LYMPHOCYTES NFR FLD MANUAL: 54 %
Lab: NORMAL
MAGNESIUM SERPL-MCNC: 2 MG/DL (ref 1.6–2.3)
MAGNESIUM SERPL-MCNC: 2.2 MG/DL (ref 1.6–2.3)
MCH RBC QN AUTO: 30.1 PG (ref 26.5–33)
MCH RBC QN AUTO: 30.1 PG (ref 26.5–33)
MCH RBC QN AUTO: 30.2 PG (ref 26.5–33)
MCH RBC QN AUTO: 30.3 PG (ref 26.5–33)
MCH RBC QN AUTO: 30.5 PG (ref 26.5–33)
MCH RBC QN AUTO: 30.7 PG (ref 26.5–33)
MCH RBC QN AUTO: 30.7 PG (ref 26.5–33)
MCH RBC QN AUTO: 30.8 PG (ref 26.5–33)
MCH RBC QN AUTO: 30.9 PG (ref 26.5–33)
MCH RBC QN AUTO: 31 PG (ref 26.5–33)
MCH RBC QN AUTO: 31 PG (ref 26.5–33)
MCH RBC QN AUTO: 31.1 PG (ref 26.5–33)
MCH RBC QN AUTO: 31.3 PG (ref 26.5–33)
MCH RBC QN AUTO: 31.7 PG (ref 26.5–33)
MCH RBC QN AUTO: 31.8 PG (ref 26.5–33)
MCH RBC QN AUTO: 32.2 PG (ref 26.5–33)
MCHC RBC AUTO-ENTMCNC: 31 G/DL (ref 31.5–36.5)
MCHC RBC AUTO-ENTMCNC: 31.4 G/DL (ref 31.5–36.5)
MCHC RBC AUTO-ENTMCNC: 32.1 G/DL (ref 31.5–36.5)
MCHC RBC AUTO-ENTMCNC: 32.2 G/DL (ref 31.5–36.5)
MCHC RBC AUTO-ENTMCNC: 32.2 G/DL (ref 31.5–36.5)
MCHC RBC AUTO-ENTMCNC: 32.3 G/DL (ref 31.5–36.5)
MCHC RBC AUTO-ENTMCNC: 32.4 G/DL (ref 31.5–36.5)
MCHC RBC AUTO-ENTMCNC: 32.4 G/DL (ref 31.5–36.5)
MCHC RBC AUTO-ENTMCNC: 32.5 G/DL (ref 31.5–36.5)
MCHC RBC AUTO-ENTMCNC: 32.5 G/DL (ref 31.5–36.5)
MCHC RBC AUTO-ENTMCNC: 32.7 G/DL (ref 31.5–36.5)
MCHC RBC AUTO-ENTMCNC: 32.8 G/DL (ref 31.5–36.5)
MCHC RBC AUTO-ENTMCNC: 33 G/DL (ref 31.5–36.5)
MCHC RBC AUTO-ENTMCNC: 33.1 G/DL (ref 31.5–36.5)
MCHC RBC AUTO-ENTMCNC: 33.6 G/DL (ref 31.5–36.5)
MCHC RBC AUTO-ENTMCNC: 33.8 G/DL (ref 31.5–36.5)
MCHC RBC AUTO-ENTMCNC: 33.9 G/DL (ref 31.5–36.5)
MCHC RBC AUTO-ENTMCNC: 33.9 G/DL (ref 31.5–36.5)
MCV RBC AUTO: 91 FL (ref 78–100)
MCV RBC AUTO: 93 FL (ref 78–100)
MCV RBC AUTO: 94 FL (ref 78–100)
MCV RBC AUTO: 95 FL (ref 78–100)
MCV RBC AUTO: 96 FL (ref 78–100)
MCV RBC AUTO: 97 FL (ref 78–100)
MONOCYTES # BLD AUTO: 0.5 10E9/L (ref 0–1.3)
MONOCYTES # BLD AUTO: 0.6 10E9/L (ref 0–1.3)
MONOCYTES # BLD AUTO: 0.6 10E9/L (ref 0–1.3)
MONOCYTES # BLD AUTO: 0.7 10E9/L (ref 0–1.3)
MONOCYTES # BLD AUTO: 1.1 10E9/L (ref 0–1.3)
MONOCYTES # BLD AUTO: 1.2 10E9/L (ref 0–1.3)
MONOCYTES # BLD AUTO: 1.4 10E9/L (ref 0–1.3)
MONOCYTES # BLD AUTO: 1.5 10E9/L (ref 0–1.3)
MONOCYTES # BLD AUTO: 1.5 10E9/L (ref 0–1.3)
MONOCYTES # BLD AUTO: 1.6 10E9/L (ref 0–1.3)
MONOCYTES # BLD AUTO: 1.7 10E9/L (ref 0–1.3)
MONOCYTES # BLD AUTO: 1.7 10E9/L (ref 0–1.3)
MONOCYTES # BLD AUTO: 1.8 10E9/L (ref 0–1.3)
MONOCYTES NFR BLD AUTO: 12.6 %
MONOCYTES NFR BLD AUTO: 13.4 %
MONOCYTES NFR BLD AUTO: 14 %
MONOCYTES NFR BLD AUTO: 14 %
MONOCYTES NFR BLD AUTO: 15.6 %
MONOCYTES NFR BLD AUTO: 15.7 %
MONOCYTES NFR BLD AUTO: 15.9 %
MONOCYTES NFR BLD AUTO: 3.8 %
MONOCYTES NFR BLD AUTO: 4.2 %
MONOCYTES NFR BLD AUTO: 4.4 %
MONOCYTES NFR BLD AUTO: 4.7 %
MONOCYTES NFR BLD AUTO: 5 %
MONOCYTES NFR BLD AUTO: 5.5 %
MONOCYTES NFR BLD AUTO: 8 %
MONOCYTES NFR BLD AUTO: 8.2 %
MONOCYTES NFR BLD AUTO: 8.9 %
MONOCYTES NFR BLD AUTO: 9.9 %
MONOS+MACROS NFR FLD MANUAL: 17 %
NEUTROPHILS # BLD AUTO: 10.2 10E9/L (ref 1.6–8.3)
NEUTROPHILS # BLD AUTO: 11 10E9/L (ref 1.6–8.3)
NEUTROPHILS # BLD AUTO: 12 10E9/L (ref 1.6–8.3)
NEUTROPHILS # BLD AUTO: 12.5 10E9/L (ref 1.6–8.3)
NEUTROPHILS # BLD AUTO: 12.6 10E9/L (ref 1.6–8.3)
NEUTROPHILS # BLD AUTO: 12.9 10E9/L (ref 1.6–8.3)
NEUTROPHILS # BLD AUTO: 13 10E9/L (ref 1.6–8.3)
NEUTROPHILS # BLD AUTO: 14.4 10E9/L (ref 1.6–8.3)
NEUTROPHILS # BLD AUTO: 6 10E9/L (ref 1.6–8.3)
NEUTROPHILS # BLD AUTO: 6.3 10E9/L (ref 1.6–8.3)
NEUTROPHILS # BLD AUTO: 7.1 10E9/L (ref 1.6–8.3)
NEUTROPHILS # BLD AUTO: 7.1 10E9/L (ref 1.6–8.3)
NEUTROPHILS # BLD AUTO: 7.2 10E9/L (ref 1.6–8.3)
NEUTROPHILS # BLD AUTO: 8.4 10E9/L (ref 1.6–8.3)
NEUTROPHILS # BLD AUTO: 8.4 10E9/L (ref 1.6–8.3)
NEUTROPHILS # BLD AUTO: 9.7 10E9/L (ref 1.6–8.3)
NEUTROPHILS # BLD AUTO: 9.7 10E9/L (ref 1.6–8.3)
NEUTROPHILS NFR BLD AUTO: 58.1 %
NEUTROPHILS NFR BLD AUTO: 61.2 %
NEUTROPHILS NFR BLD AUTO: 61.3 %
NEUTROPHILS NFR BLD AUTO: 62.7 %
NEUTROPHILS NFR BLD AUTO: 66.9 %
NEUTROPHILS NFR BLD AUTO: 68.4 %
NEUTROPHILS NFR BLD AUTO: 72.7 %
NEUTROPHILS NFR BLD AUTO: 73 %
NEUTROPHILS NFR BLD AUTO: 78.6 %
NEUTROPHILS NFR BLD AUTO: 81.6 %
NEUTROPHILS NFR BLD AUTO: 82.3 %
NEUTROPHILS NFR BLD AUTO: 85.1 %
NEUTROPHILS NFR BLD AUTO: 85.1 %
NEUTROPHILS NFR BLD AUTO: 85.2 %
NEUTROPHILS NFR BLD AUTO: 85.9 %
NEUTROPHILS NFR BLD AUTO: 86 %
NEUTROPHILS NFR BLD AUTO: 87.1 %
NEUTS BAND NFR FLD MANUAL: 27 %
NRBC # BLD AUTO: 0 10*3/UL
NRBC BLD AUTO-RTO: 0 /100
PH FLD: 7.4 PH
PLATELET # BLD AUTO: 185 10E9/L (ref 150–450)
PLATELET # BLD AUTO: 205 10E9/L (ref 150–450)
PLATELET # BLD AUTO: 223 10E9/L (ref 150–450)
PLATELET # BLD AUTO: 226 10E9/L (ref 150–450)
PLATELET # BLD AUTO: 232 10E9/L (ref 150–450)
PLATELET # BLD AUTO: 233 10E9/L (ref 150–450)
PLATELET # BLD AUTO: 235 10E9/L (ref 150–450)
PLATELET # BLD AUTO: 236 10E9/L (ref 150–450)
PLATELET # BLD AUTO: 281 10E9/L (ref 150–450)
PLATELET # BLD AUTO: 289 10E9/L (ref 150–450)
PLATELET # BLD AUTO: 298 10E9/L (ref 150–450)
PLATELET # BLD AUTO: 312 10E9/L (ref 150–450)
PLATELET # BLD AUTO: 312 10E9/L (ref 150–450)
PLATELET # BLD AUTO: 343 10E9/L (ref 150–450)
PLATELET # BLD AUTO: 351 10E9/L (ref 150–450)
PLATELET # BLD AUTO: 357 10E9/L (ref 150–450)
PLATELET # BLD AUTO: 369 10E9/L (ref 150–450)
PLATELET # BLD AUTO: 369 10E9/L (ref 150–450)
POTASSIUM SERPL-SCNC: 3.9 MMOL/L (ref 3.4–5.3)
POTASSIUM SERPL-SCNC: 4 MMOL/L (ref 3.4–5.3)
POTASSIUM SERPL-SCNC: 4 MMOL/L (ref 3.4–5.3)
POTASSIUM SERPL-SCNC: 4.1 MMOL/L (ref 3.4–5.3)
POTASSIUM SERPL-SCNC: 4.2 MMOL/L (ref 3.4–5.3)
POTASSIUM SERPL-SCNC: 4.3 MMOL/L (ref 3.4–5.3)
POTASSIUM SERPL-SCNC: 4.4 MMOL/L (ref 3.4–5.3)
POTASSIUM SERPL-SCNC: 4.4 MMOL/L (ref 3.4–5.3)
POTASSIUM SERPL-SCNC: 4.6 MMOL/L (ref 3.4–5.3)
POTASSIUM SERPL-SCNC: 5.2 MMOL/L (ref 3.4–5.3)
PROCALCITONIN SERPL-MCNC: 0.1 NG/ML
PROT FLD-MCNC: 4.4 G/DL
PROT SERPL-MCNC: 6.9 G/DL (ref 6.8–8.8)
PROT SERPL-MCNC: 7.1 G/DL (ref 6.8–8.8)
PROT SERPL-MCNC: 7.1 G/DL (ref 6.8–8.8)
PROT SERPL-MCNC: 7.2 G/DL (ref 6.8–8.8)
PROT SERPL-MCNC: 7.4 G/DL (ref 6.8–8.8)
PROT SERPL-MCNC: 7.5 G/DL (ref 6.8–8.8)
PROT SERPL-MCNC: 7.6 G/DL (ref 6.8–8.8)
PROT SERPL-MCNC: 8 G/DL (ref 6.8–8.8)
PROT SERPL-MCNC: 8.3 G/DL (ref 6.8–8.8)
RADIOLOGIST FLAGS: ABNORMAL
RADIOLOGIST FLAGS: ABNORMAL
RBC # BLD AUTO: 4.68 10E12/L (ref 4.4–5.9)
RBC # BLD AUTO: 4.69 10E12/L (ref 4.4–5.9)
RBC # BLD AUTO: 4.74 10E12/L (ref 4.4–5.9)
RBC # BLD AUTO: 4.91 10E12/L (ref 4.4–5.9)
RBC # BLD AUTO: 4.93 10E12/L (ref 4.4–5.9)
RBC # BLD AUTO: 5.02 10E12/L (ref 4.4–5.9)
RBC # BLD AUTO: 5.04 10E12/L (ref 4.4–5.9)
RBC # BLD AUTO: 5.11 10E12/L (ref 4.4–5.9)
RBC # BLD AUTO: 5.15 10E12/L (ref 4.4–5.9)
RBC # BLD AUTO: 5.3 10E12/L (ref 4.4–5.9)
RBC # BLD AUTO: 5.38 10E12/L (ref 4.4–5.9)
RBC # BLD AUTO: 5.39 10E12/L (ref 4.4–5.9)
RBC # BLD AUTO: 5.44 10E12/L (ref 4.4–5.9)
RBC # BLD AUTO: 5.47 10E12/L (ref 4.4–5.9)
RBC # BLD AUTO: 5.52 10E12/L (ref 4.4–5.9)
RBC # BLD AUTO: 5.59 10E12/L (ref 4.4–5.9)
RBC # BLD AUTO: 5.6 10E12/L (ref 4.4–5.9)
RBC # BLD AUTO: 5.62 10E12/L (ref 4.4–5.9)
RVPLETH-%PRED-PRE: 45 %
RVPLETH-PRE: 1.06 L
RVPLETH-PRED: 2.35 L
SARS-COV-2 RNA SPEC QL NAA+PROBE: NEGATIVE
SARS-COV-2 RNA SPEC QL NAA+PROBE: NORMAL
SARS-COV-2 RNA SPEC QL NAA+PROBE: NOT DETECTED
SARS-COV-2 RNA SPEC QL NAA+PROBE: NOT DETECTED
SODIUM SERPL-SCNC: 136 MMOL/L (ref 133–144)
SODIUM SERPL-SCNC: 136 MMOL/L (ref 133–144)
SODIUM SERPL-SCNC: 137 MMOL/L (ref 133–144)
SODIUM SERPL-SCNC: 138 MMOL/L (ref 133–144)
SODIUM SERPL-SCNC: 139 MMOL/L (ref 133–144)
SODIUM SERPL-SCNC: 140 MMOL/L (ref 133–144)
SODIUM SERPL-SCNC: 141 MMOL/L (ref 133–144)
SODIUM SERPL-SCNC: 141 MMOL/L (ref 133–144)
SPECIMEN SOURCE FLD: NORMAL
SPECIMEN SOURCE: NORMAL
TLCPLETH-%PRED-PRE: 34 %
TLCPLETH-PRE: 2.46 L
TLCPLETH-PRED: 7.13 L
TME LAST DOSE: ABNORMAL H
TME LAST DOSE: NORMAL H
TME LAST DOSE: NORMAL H
TRIGL FLD-MCNC: 79 MG/DL
VA-%PRED-PRE: 34 %
VA-PRE: 2.25 L
VC-%PRED-PRE: 27 %
VC-PRE: 1.4 L
VC-PRED: 5.06 L
WBC # BLD AUTO: 10 10E9/L (ref 4–11)
WBC # BLD AUTO: 10.3 10E9/L (ref 4–11)
WBC # BLD AUTO: 10.5 10E9/L (ref 4–11)
WBC # BLD AUTO: 11.5 10E9/L (ref 4–11)
WBC # BLD AUTO: 11.5 10E9/L (ref 4–11)
WBC # BLD AUTO: 11.6 10E9/L (ref 4–11)
WBC # BLD AUTO: 11.7 10E9/L (ref 4–11)
WBC # BLD AUTO: 11.7 10E9/L (ref 4–11)
WBC # BLD AUTO: 11.8 10E9/L (ref 4–11)
WBC # BLD AUTO: 12.2 10E9/L (ref 4–11)
WBC # BLD AUTO: 12.9 10E9/L (ref 4–11)
WBC # BLD AUTO: 13.9 10E9/L (ref 4–11)
WBC # BLD AUTO: 14.5 10E9/L (ref 4–11)
WBC # BLD AUTO: 14.8 10E9/L (ref 4–11)
WBC # BLD AUTO: 14.8 10E9/L (ref 4–11)
WBC # BLD AUTO: 15.1 10E9/L (ref 4–11)
WBC # BLD AUTO: 15.2 10E9/L (ref 4–11)
WBC # BLD AUTO: 17.6 10E9/L (ref 4–11)
WBC # FLD AUTO: 121 /UL

## 2020-01-01 PROCEDURE — 99233 SBSQ HOSP IP/OBS HIGH 50: CPT | Mod: GC | Performed by: INTERNAL MEDICINE

## 2020-01-01 PROCEDURE — 99233 SBSQ HOSP IP/OBS HIGH 50: CPT | Performed by: INTERNAL MEDICINE

## 2020-01-01 PROCEDURE — 250N000011 HC RX IP 250 OP 636: Performed by: PHYSICIAN ASSISTANT

## 2020-01-01 PROCEDURE — 85025 COMPLETE CBC W/AUTO DIFF WBC: CPT | Performed by: NURSE PRACTITIONER

## 2020-01-01 PROCEDURE — 71045 X-RAY EXAM CHEST 1 VIEW: CPT

## 2020-01-01 PROCEDURE — 85025 COMPLETE CBC W/AUTO DIFF WBC: CPT | Performed by: PHYSICIAN ASSISTANT

## 2020-01-01 PROCEDURE — 999N001018 HC STATISTIC H-CELL BLOCK W/STAIN: Performed by: INTERNAL MEDICINE

## 2020-01-01 PROCEDURE — 36415 COLL VENOUS BLD VENIPUNCTURE: CPT

## 2020-01-01 PROCEDURE — 80053 COMPREHEN METABOLIC PANEL: CPT | Performed by: PHYSICIAN ASSISTANT

## 2020-01-01 PROCEDURE — 99214 OFFICE O/P EST MOD 30 MIN: CPT

## 2020-01-01 PROCEDURE — 250N000013 HC RX MED GY IP 250 OP 250 PS 637: Performed by: INTERNAL MEDICINE

## 2020-01-01 PROCEDURE — 250N000013 HC RX MED GY IP 250 OP 250 PS 637: Performed by: PHYSICIAN ASSISTANT

## 2020-01-01 PROCEDURE — 87070 CULTURE OTHR SPECIMN AEROBIC: CPT | Performed by: INTERNAL MEDICINE

## 2020-01-01 PROCEDURE — 71045 X-RAY EXAM CHEST 1 VIEW: CPT | Mod: 26 | Performed by: RADIOLOGY

## 2020-01-01 PROCEDURE — 84145 PROCALCITONIN (PCT): CPT | Performed by: EMERGENCY MEDICINE

## 2020-01-01 PROCEDURE — G0463 HOSPITAL OUTPT CLINIC VISIT: HCPCS

## 2020-01-01 PROCEDURE — 85610 PROTHROMBIN TIME: CPT | Performed by: EMERGENCY MEDICINE

## 2020-01-01 PROCEDURE — 82945 GLUCOSE OTHER FLUID: CPT | Performed by: INTERNAL MEDICINE

## 2020-01-01 PROCEDURE — 99239 HOSP IP/OBS DSCHRG MGMT >30: CPT | Performed by: INTERNAL MEDICINE

## 2020-01-01 PROCEDURE — 250N000012 HC RX MED GY IP 250 OP 636 PS 637: Performed by: INTERNAL MEDICINE

## 2020-01-01 PROCEDURE — 87205 SMEAR GRAM STAIN: CPT | Performed by: INTERNAL MEDICINE

## 2020-01-01 PROCEDURE — 85025 COMPLETE CBC W/AUTO DIFF WBC: CPT | Performed by: STUDENT IN AN ORGANIZED HEALTH CARE EDUCATION/TRAINING PROGRAM

## 2020-01-01 PROCEDURE — 89051 BODY FLUID CELL COUNT: CPT | Performed by: INTERNAL MEDICINE

## 2020-01-01 PROCEDURE — 80158 DRUG ASSAY CYCLOSPORINE: CPT | Performed by: STUDENT IN AN ORGANIZED HEALTH CARE EDUCATION/TRAINING PROGRAM

## 2020-01-01 PROCEDURE — 99214 OFFICE O/P EST MOD 30 MIN: CPT | Performed by: INTERNAL MEDICINE

## 2020-01-01 PROCEDURE — 71046 X-RAY EXAM CHEST 2 VIEWS: CPT | Performed by: RADIOLOGY

## 2020-01-01 PROCEDURE — 80158 DRUG ASSAY CYCLOSPORINE: CPT | Performed by: NURSE PRACTITIONER

## 2020-01-01 PROCEDURE — 36415 COLL VENOUS BLD VENIPUNCTURE: CPT | Performed by: PHYSICIAN ASSISTANT

## 2020-01-01 PROCEDURE — 88305 TISSUE EXAM BY PATHOLOGIST: CPT | Mod: TC | Performed by: INTERNAL MEDICINE

## 2020-01-01 PROCEDURE — 71046 X-RAY EXAM CHEST 2 VIEWS: CPT

## 2020-01-01 PROCEDURE — 32555 ASPIRATE PLEURA W/ IMAGING: CPT | Mod: LT | Performed by: RADIOLOGY

## 2020-01-01 PROCEDURE — 83615 LACTATE (LD) (LDH) ENZYME: CPT | Performed by: INTERNAL MEDICINE

## 2020-01-01 PROCEDURE — 83735 ASSAY OF MAGNESIUM: CPT | Performed by: PHYSICIAN ASSISTANT

## 2020-01-01 PROCEDURE — 99221 1ST HOSP IP/OBS SF/LOW 40: CPT | Mod: GC | Performed by: THORACIC SURGERY (CARDIOTHORACIC VASCULAR SURGERY)

## 2020-01-01 PROCEDURE — 84157 ASSAY OF PROTEIN OTHER: CPT | Performed by: INTERNAL MEDICINE

## 2020-01-01 PROCEDURE — 80053 COMPREHEN METABOLIC PANEL: CPT | Performed by: PATHOLOGY

## 2020-01-01 PROCEDURE — 88112 CYTOPATH CELL ENHANCE TECH: CPT | Mod: TC | Performed by: INTERNAL MEDICINE

## 2020-01-01 PROCEDURE — 250N000012 HC RX MED GY IP 250 OP 636 PS 637: Performed by: PHYSICIAN ASSISTANT

## 2020-01-01 PROCEDURE — 206N000001 HC R&B BMT UMMC

## 2020-01-01 PROCEDURE — 82150 ASSAY OF AMYLASE: CPT | Performed by: INTERNAL MEDICINE

## 2020-01-01 PROCEDURE — 999N000065 XR CHEST PORT 1 VW

## 2020-01-01 PROCEDURE — 87102 FUNGUS ISOLATION CULTURE: CPT | Performed by: INTERNAL MEDICINE

## 2020-01-01 PROCEDURE — 99214 OFFICE O/P EST MOD 30 MIN: CPT | Mod: 25

## 2020-01-01 PROCEDURE — 93005 ELECTROCARDIOGRAM TRACING: CPT | Performed by: EMERGENCY MEDICINE

## 2020-01-01 PROCEDURE — 32555 ASPIRATE PLEURA W/ IMAGING: CPT | Performed by: RADIOLOGY

## 2020-01-01 PROCEDURE — 85027 COMPLETE CBC AUTOMATED: CPT | Performed by: INTERNAL MEDICINE

## 2020-01-01 PROCEDURE — 36415 COLL VENOUS BLD VENIPUNCTURE: CPT | Performed by: INTERNAL MEDICINE

## 2020-01-01 PROCEDURE — 80053 COMPREHEN METABOLIC PANEL: CPT | Performed by: EMERGENCY MEDICINE

## 2020-01-01 PROCEDURE — U0003 INFECTIOUS AGENT DETECTION BY NUCLEIC ACID (DNA OR RNA); SEVERE ACUTE RESPIRATORY SYNDROME CORONAVIRUS 2 (SARS-COV-2) (CORONAVIRUS DISEASE [COVID-19]), AMPLIFIED PROBE TECHNIQUE, MAKING USE OF HIGH THROUGHPUT TECHNOLOGIES AS DESCRIBED BY CMS-2020-01-R: HCPCS | Performed by: INTERNAL MEDICINE

## 2020-01-01 PROCEDURE — 80053 COMPREHEN METABOLIC PANEL: CPT | Performed by: NURSE PRACTITIONER

## 2020-01-01 PROCEDURE — 71046 X-RAY EXAM CHEST 2 VIEWS: CPT | Mod: GC | Performed by: RADIOLOGY

## 2020-01-01 PROCEDURE — 84478 ASSAY OF TRIGLYCERIDES: CPT | Performed by: INTERNAL MEDICINE

## 2020-01-01 PROCEDURE — 999N000128 HC STATISTIC PERIPHERAL IV START W/O US GUIDANCE

## 2020-01-01 PROCEDURE — 87075 CULTR BACTERIA EXCEPT BLOOD: CPT | Performed by: INTERNAL MEDICINE

## 2020-01-01 PROCEDURE — 94060 EVALUATION OF WHEEZING: CPT | Performed by: INTERNAL MEDICINE

## 2020-01-01 PROCEDURE — 999N001014 HC STATISTIC CYTO WRIGHT STAIN TC: Performed by: INTERNAL MEDICINE

## 2020-01-01 PROCEDURE — 99213 OFFICE O/P EST LOW 20 MIN: CPT

## 2020-01-01 PROCEDURE — 80158 DRUG ASSAY CYCLOSPORINE: CPT | Performed by: PHYSICIAN ASSISTANT

## 2020-01-01 PROCEDURE — 99214 OFFICE O/P EST MOD 30 MIN: CPT | Mod: 95 | Performed by: INTERNAL MEDICINE

## 2020-01-01 PROCEDURE — 85730 THROMBOPLASTIN TIME PARTIAL: CPT | Performed by: EMERGENCY MEDICINE

## 2020-01-01 PROCEDURE — 94726 PLETHYSMOGRAPHY LUNG VOLUMES: CPT | Performed by: INTERNAL MEDICINE

## 2020-01-01 PROCEDURE — 71045 X-RAY EXAM CHEST 1 VIEW: CPT | Mod: 26

## 2020-01-01 PROCEDURE — 80158 DRUG ASSAY CYCLOSPORINE: CPT | Mod: 90 | Performed by: PATHOLOGY

## 2020-01-01 PROCEDURE — 99223 1ST HOSP IP/OBS HIGH 75: CPT | Mod: 25 | Performed by: INTERNAL MEDICINE

## 2020-01-01 PROCEDURE — 71250 CT THORAX DX C-: CPT | Mod: 26

## 2020-01-01 PROCEDURE — 85025 COMPLETE CBC W/AUTO DIFF WBC: CPT | Performed by: PATHOLOGY

## 2020-01-01 PROCEDURE — 80053 COMPREHEN METABOLIC PANEL: CPT | Performed by: STUDENT IN AN ORGANIZED HEALTH CARE EDUCATION/TRAINING PROGRAM

## 2020-01-01 PROCEDURE — 71250 CT THORAX DX C-: CPT | Mod: GC | Performed by: RADIOLOGY

## 2020-01-01 PROCEDURE — 32551 INSERTION OF CHEST TUBE: CPT | Mod: GC | Performed by: INTERNAL MEDICINE

## 2020-01-01 PROCEDURE — 99000 SPECIMEN HANDLING OFFICE-LAB: CPT | Performed by: PATHOLOGY

## 2020-01-01 PROCEDURE — 36415 COLL VENOUS BLD VENIPUNCTURE: CPT | Performed by: PATHOLOGY

## 2020-01-01 PROCEDURE — 85025 COMPLETE CBC W/AUTO DIFF WBC: CPT | Performed by: EMERGENCY MEDICINE

## 2020-01-01 PROCEDURE — 71046 X-RAY EXAM CHEST 2 VIEWS: CPT | Mod: 26 | Performed by: RADIOLOGY

## 2020-01-01 PROCEDURE — 82465 ASSAY BLD/SERUM CHOLESTEROL: CPT | Performed by: INTERNAL MEDICINE

## 2020-01-01 PROCEDURE — 99207 PR DOWN CODE DUE TO INITIAL EXAM: CPT | Performed by: INTERNAL MEDICINE

## 2020-01-01 PROCEDURE — 88305 TISSUE EXAM BY PATHOLOGIST: CPT | Mod: 26 | Performed by: PATHOLOGY

## 2020-01-01 PROCEDURE — U0003 INFECTIOUS AGENT DETECTION BY NUCLEIC ACID (DNA OR RNA); SEVERE ACUTE RESPIRATORY SYNDROME CORONAVIRUS 2 (SARS-COV-2) (CORONAVIRUS DISEASE [COVID-19]), AMPLIFIED PROBE TECHNIQUE, MAKING USE OF HIGH THROUGHPUT TECHNOLOGIES AS DESCRIBED BY CMS-2020-01-R: HCPCS | Performed by: EMERGENCY MEDICINE

## 2020-01-01 PROCEDURE — 85025 COMPLETE CBC W/AUTO DIFF WBC: CPT | Performed by: INTERNAL MEDICINE

## 2020-01-01 PROCEDURE — 88112 CYTOPATH CELL ENHANCE TECH: CPT | Mod: 26 | Performed by: PATHOLOGY

## 2020-01-01 PROCEDURE — 32555 ASPIRATE PLEURA W/ IMAGING: CPT | Mod: LT

## 2020-01-01 PROCEDURE — 93010 ELECTROCARDIOGRAM REPORT: CPT | Performed by: EMERGENCY MEDICINE

## 2020-01-01 PROCEDURE — 99231 SBSQ HOSP IP/OBS SF/LOW 25: CPT | Performed by: PHYSICIAN ASSISTANT

## 2020-01-01 PROCEDURE — 94729 DIFFUSING CAPACITY: CPT | Performed by: INTERNAL MEDICINE

## 2020-01-01 PROCEDURE — 94618 PULMONARY STRESS TESTING: CPT | Performed by: INTERNAL MEDICINE

## 2020-01-01 PROCEDURE — 99233 SBSQ HOSP IP/OBS HIGH 50: CPT | Mod: 25 | Performed by: INTERNAL MEDICINE

## 2020-01-01 PROCEDURE — 84155 ASSAY OF PROTEIN SERUM: CPT | Performed by: INTERNAL MEDICINE

## 2020-01-01 PROCEDURE — C9803 HOPD COVID-19 SPEC COLLECT: HCPCS | Performed by: EMERGENCY MEDICINE

## 2020-01-01 PROCEDURE — 80048 BASIC METABOLIC PNL TOTAL CA: CPT | Performed by: INTERNAL MEDICINE

## 2020-01-01 PROCEDURE — 99285 EMERGENCY DEPT VISIT HI MDM: CPT | Mod: 25 | Performed by: EMERGENCY MEDICINE

## 2020-01-01 PROCEDURE — 71250 CT THORAX DX C-: CPT

## 2020-01-01 PROCEDURE — U0003 INFECTIOUS AGENT DETECTION BY NUCLEIC ACID (DNA OR RNA); SEVERE ACUTE RESPIRATORY SYNDROME CORONAVIRUS 2 (SARS-COV-2) (CORONAVIRUS DISEASE [COVID-19]), AMPLIFIED PROBE TECHNIQUE, MAKING USE OF HIGH THROUGHPUT TECHNOLOGIES AS DESCRIBED BY CMS-2020-01-R: HCPCS | Performed by: RADIOLOGY

## 2020-01-01 PROCEDURE — 99221 1ST HOSP IP/OBS SF/LOW 40: CPT | Mod: AI | Performed by: INTERNAL MEDICINE

## 2020-01-01 PROCEDURE — 83735 ASSAY OF MAGNESIUM: CPT | Performed by: NURSE PRACTITIONER

## 2020-01-01 PROCEDURE — 83986 ASSAY PH BODY FLUID NOS: CPT | Performed by: INTERNAL MEDICINE

## 2020-01-01 PROCEDURE — 80048 BASIC METABOLIC PNL TOTAL CA: CPT | Performed by: NURSE PRACTITIONER

## 2020-01-01 PROCEDURE — 0W9B30Z DRAINAGE OF LEFT PLEURAL CAVITY WITH DRAINAGE DEVICE, PERCUTANEOUS APPROACH: ICD-10-PCS | Performed by: INTERNAL MEDICINE

## 2020-01-01 PROCEDURE — 80053 COMPREHEN METABOLIC PANEL: CPT | Performed by: INTERNAL MEDICINE

## 2020-01-01 RX ORDER — LEVOTHYROXINE SODIUM 150 UG/1
150 TABLET ORAL DAILY
Qty: 90 TABLET | Refills: 1 | Status: SHIPPED | OUTPATIENT
Start: 2020-01-01 | End: 2021-01-01

## 2020-01-01 RX ORDER — ONDANSETRON 2 MG/ML
4 INJECTION INTRAMUSCULAR; INTRAVENOUS EVERY 6 HOURS PRN
Status: DISCONTINUED | OUTPATIENT
Start: 2020-01-01 | End: 2020-01-01 | Stop reason: HOSPADM

## 2020-01-01 RX ORDER — NALOXONE HYDROCHLORIDE 0.4 MG/ML
.1-.4 INJECTION, SOLUTION INTRAMUSCULAR; INTRAVENOUS; SUBCUTANEOUS
Status: DISCONTINUED | OUTPATIENT
Start: 2020-01-01 | End: 2020-01-01 | Stop reason: HOSPADM

## 2020-01-01 RX ORDER — CYCLOSPORINE 25 MG/1
50 CAPSULE, LIQUID FILLED ORAL 2 TIMES DAILY
Qty: 360 CAPSULE | Refills: 1 | Status: SHIPPED | OUTPATIENT
Start: 2020-01-01 | End: 2020-01-01

## 2020-01-01 RX ORDER — MORPHINE SULFATE 2 MG/ML
1 INJECTION, SOLUTION INTRAMUSCULAR; INTRAVENOUS
Status: DISCONTINUED | OUTPATIENT
Start: 2020-01-01 | End: 2020-01-01 | Stop reason: HOSPADM

## 2020-01-01 RX ORDER — PROCHLORPERAZINE MALEATE 5 MG
10 TABLET ORAL EVERY 6 HOURS PRN
Status: DISCONTINUED | OUTPATIENT
Start: 2020-01-01 | End: 2020-01-01 | Stop reason: HOSPADM

## 2020-01-01 RX ORDER — FLUOROMETHOLONE 0.1 %
1 SUSPENSION, DROPS(FINAL DOSAGE FORM)(ML) OPHTHALMIC (EYE) 4 TIMES DAILY
Status: DISCONTINUED | OUTPATIENT
Start: 2020-01-01 | End: 2020-01-01 | Stop reason: HOSPADM

## 2020-01-01 RX ORDER — CARBOXYMETHYLCELLULOSE SODIUM 5 MG/ML
1 SOLUTION/ DROPS OPHTHALMIC 2 TIMES DAILY
Status: DISCONTINUED | OUTPATIENT
Start: 2020-01-01 | End: 2020-01-01 | Stop reason: HOSPADM

## 2020-01-01 RX ORDER — DEXTROSE MONOHYDRATE 25 G/50ML
25-50 INJECTION, SOLUTION INTRAVENOUS
Status: DISCONTINUED | OUTPATIENT
Start: 2020-01-01 | End: 2020-01-01 | Stop reason: HOSPADM

## 2020-01-01 RX ORDER — CYCLOSPORINE 100 MG/1
CAPSULE, LIQUID FILLED ORAL
Qty: 100 CAPSULE | Refills: 1 | COMMUNITY
Start: 2020-01-01 | End: 2020-01-01

## 2020-01-01 RX ORDER — PILOCARPINE HYDROCHLORIDE 5 MG/1
5 TABLET, FILM COATED ORAL 2 TIMES DAILY
Qty: 180 TABLET | Refills: 1 | Status: SHIPPED | OUTPATIENT
Start: 2020-01-01 | End: 2020-01-01

## 2020-01-01 RX ORDER — OXYCODONE AND ACETAMINOPHEN 5; 325 MG/1; MG/1
1 TABLET ORAL EVERY 4 HOURS PRN
Status: DISCONTINUED | OUTPATIENT
Start: 2020-01-01 | End: 2020-01-01 | Stop reason: HOSPADM

## 2020-01-01 RX ORDER — NICOTINE POLACRILEX 4 MG
15-30 LOZENGE BUCCAL
Status: DISCONTINUED | OUTPATIENT
Start: 2020-01-01 | End: 2020-01-01 | Stop reason: HOSPADM

## 2020-01-01 RX ORDER — FLUCONAZOLE 100 MG/1
100 TABLET ORAL DAILY
Status: DISCONTINUED | OUTPATIENT
Start: 2020-01-01 | End: 2020-01-01 | Stop reason: HOSPADM

## 2020-01-01 RX ORDER — METOPROLOL TARTRATE 25 MG/1
25 TABLET, FILM COATED ORAL DAILY
Status: DISCONTINUED | OUTPATIENT
Start: 2020-01-01 | End: 2020-01-01 | Stop reason: HOSPADM

## 2020-01-01 RX ORDER — LIDOCAINE 40 MG/G
CREAM TOPICAL
Status: DISCONTINUED | OUTPATIENT
Start: 2020-01-01 | End: 2020-01-01 | Stop reason: HOSPADM

## 2020-01-01 RX ORDER — ACYCLOVIR 800 MG/1
800 TABLET ORAL 2 TIMES DAILY
Status: DISCONTINUED | OUTPATIENT
Start: 2020-01-01 | End: 2020-01-01 | Stop reason: HOSPADM

## 2020-01-01 RX ORDER — CYCLOSPORINE 25 MG/1
25 CAPSULE, LIQUID FILLED ORAL 2 TIMES DAILY
Status: DISCONTINUED | OUTPATIENT
Start: 2020-01-01 | End: 2020-01-01 | Stop reason: HOSPADM

## 2020-01-01 RX ORDER — PREDNISONE 10 MG/1
15 TABLET ORAL DAILY
Qty: 180 TABLET | Refills: 1 | Status: ON HOLD | OUTPATIENT
Start: 2020-01-01 | End: 2020-01-01

## 2020-01-01 RX ORDER — CYCLOSPORINE 100 MG/1
200 CAPSULE, LIQUID FILLED ORAL 2 TIMES DAILY
Qty: 100 CAPSULE | Refills: 1 | COMMUNITY
Start: 2020-01-01 | End: 2021-01-01

## 2020-01-01 RX ORDER — CYCLOSPORINE 25 MG/1
CAPSULE, LIQUID FILLED ORAL
Qty: 120 CAPSULE | Refills: 1 | Status: SHIPPED | OUTPATIENT
Start: 2020-01-01 | End: 2020-01-01

## 2020-01-01 RX ORDER — PILOCARPINE HYDROCHLORIDE 5 MG/1
5 TABLET, FILM COATED ORAL 2 TIMES DAILY
Status: DISCONTINUED | OUTPATIENT
Start: 2020-01-01 | End: 2020-01-01 | Stop reason: HOSPADM

## 2020-01-01 RX ORDER — SALIVA STIMULANT COMB. NO.3
2 SPRAY, NON-AEROSOL (ML) MUCOUS MEMBRANE
Status: DISCONTINUED | OUTPATIENT
Start: 2020-01-01 | End: 2020-01-01 | Stop reason: HOSPADM

## 2020-01-01 RX ORDER — LIDOCAINE HYDROCHLORIDE 10 MG/ML
30 INJECTION, SOLUTION EPIDURAL; INFILTRATION; INTRACAUDAL; PERINEURAL ONCE
Status: DISCONTINUED | OUTPATIENT
Start: 2020-01-01 | End: 2020-01-01 | Stop reason: HOSPADM

## 2020-01-01 RX ORDER — ASPIRIN 81 MG/1
81 TABLET ORAL DAILY
Status: DISCONTINUED | OUTPATIENT
Start: 2020-01-01 | End: 2020-01-01 | Stop reason: HOSPADM

## 2020-01-01 RX ORDER — FLUCONAZOLE 100 MG/1
100 TABLET ORAL DAILY
Qty: 90 TABLET | Refills: 1 | Status: SHIPPED | OUTPATIENT
Start: 2020-01-01 | End: 2021-01-01

## 2020-01-01 RX ORDER — LEVOFLOXACIN 250 MG/1
250 TABLET, FILM COATED ORAL DAILY
Status: DISCONTINUED | OUTPATIENT
Start: 2020-01-01 | End: 2020-01-01 | Stop reason: HOSPADM

## 2020-01-01 RX ORDER — MAGNESIUM OXIDE 400 MG/1
800 TABLET ORAL DAILY
Status: DISCONTINUED | OUTPATIENT
Start: 2020-01-01 | End: 2020-01-01 | Stop reason: HOSPADM

## 2020-01-01 RX ORDER — ROSUVASTATIN CALCIUM 5 MG/1
5 TABLET, COATED ORAL DAILY
Status: DISCONTINUED | OUTPATIENT
Start: 2020-01-01 | End: 2020-01-01 | Stop reason: HOSPADM

## 2020-01-01 RX ORDER — CYCLOSPORINE 25 MG/1
25 CAPSULE, LIQUID FILLED ORAL 2 TIMES DAILY
Qty: 180 CAPSULE | Refills: 1 | Status: SHIPPED | OUTPATIENT
Start: 2020-01-01 | End: 2020-01-01

## 2020-01-01 RX ORDER — PREDNISONE 20 MG/1
40 TABLET ORAL DAILY
Status: DISCONTINUED | OUTPATIENT
Start: 2020-01-01 | End: 2020-01-01 | Stop reason: HOSPADM

## 2020-01-01 RX ORDER — LEVOTHYROXINE SODIUM 150 UG/1
150 TABLET ORAL DAILY
Status: DISCONTINUED | OUTPATIENT
Start: 2020-01-01 | End: 2020-01-01 | Stop reason: HOSPADM

## 2020-01-01 RX ORDER — CYCLOSPORINE 25 MG/1
CAPSULE, LIQUID FILLED ORAL
Qty: 120 CAPSULE | Refills: 1 | COMMUNITY
Start: 2020-01-01 | End: 2021-01-01

## 2020-01-01 RX ORDER — SULFAMETHOXAZOLE/TRIMETHOPRIM 800-160 MG
1 TABLET ORAL
Status: DISCONTINUED | OUTPATIENT
Start: 2020-01-01 | End: 2020-01-01 | Stop reason: HOSPADM

## 2020-01-01 RX ORDER — ACETAMINOPHEN 325 MG/1
325-650 TABLET ORAL EVERY 4 HOURS PRN
Status: DISCONTINUED | OUTPATIENT
Start: 2020-01-01 | End: 2020-01-01 | Stop reason: HOSPADM

## 2020-01-01 RX ORDER — PROCHLORPERAZINE 25 MG
25 SUPPOSITORY, RECTAL RECTAL EVERY 12 HOURS PRN
Status: DISCONTINUED | OUTPATIENT
Start: 2020-01-01 | End: 2020-01-01 | Stop reason: HOSPADM

## 2020-01-01 RX ORDER — ACYCLOVIR 800 MG/1
800 TABLET ORAL 2 TIMES DAILY
Qty: 180 TABLET | Refills: 1 | Status: SHIPPED | OUTPATIENT
Start: 2020-01-01 | End: 2021-01-01

## 2020-01-01 RX ORDER — LEVOFLOXACIN 250 MG/1
250 TABLET, FILM COATED ORAL DAILY
Qty: 90 TABLET | Refills: 1 | Status: SHIPPED | OUTPATIENT
Start: 2020-01-01 | End: 2021-01-01

## 2020-01-01 RX ORDER — METOPROLOL TARTRATE 25 MG/1
25 TABLET, FILM COATED ORAL DAILY
Qty: 90 TABLET | Refills: 1 | Status: SHIPPED | OUTPATIENT
Start: 2020-01-01 | End: 2021-01-01

## 2020-01-01 RX ORDER — MAGNESIUM OXIDE 400 MG/1
400 TABLET ORAL DAILY
Status: DISCONTINUED | OUTPATIENT
Start: 2020-01-01 | End: 2020-01-01

## 2020-01-01 RX ORDER — CYCLOSPORINE 100 MG/1
100 CAPSULE, LIQUID FILLED ORAL 2 TIMES DAILY
Qty: 100 CAPSULE | Refills: 1 | Status: SHIPPED | OUTPATIENT
Start: 2020-01-01 | End: 2020-01-01

## 2020-01-01 RX ORDER — SULFAMETHOXAZOLE/TRIMETHOPRIM 800-160 MG
1 TABLET ORAL 2 TIMES DAILY
Qty: 48 TABLET | Refills: 1 | Status: SHIPPED | OUTPATIENT
Start: 2020-01-01 | End: 2021-01-01

## 2020-01-01 RX ORDER — PIPERACILLIN SODIUM, TAZOBACTAM SODIUM 3; .375 G/15ML; G/15ML
3.38 INJECTION, POWDER, LYOPHILIZED, FOR SOLUTION INTRAVENOUS EVERY 6 HOURS
Status: DISCONTINUED | OUTPATIENT
Start: 2020-01-01 | End: 2020-01-01

## 2020-01-01 RX ORDER — ONDANSETRON 4 MG/1
4 TABLET, ORALLY DISINTEGRATING ORAL EVERY 6 HOURS PRN
Status: DISCONTINUED | OUTPATIENT
Start: 2020-01-01 | End: 2020-01-01 | Stop reason: HOSPADM

## 2020-01-01 RX ORDER — ROSUVASTATIN CALCIUM 5 MG/1
5 TABLET, COATED ORAL DAILY
Qty: 90 TABLET | Refills: 1 | Status: SHIPPED | OUTPATIENT
Start: 2020-01-01 | End: 2021-01-01

## 2020-01-01 RX ORDER — PREDNISONE 20 MG/1
40 TABLET ORAL DAILY
Qty: 60 TABLET | Refills: 1 | Status: SHIPPED | OUTPATIENT
Start: 2020-01-01 | End: 2021-01-01

## 2020-01-01 RX ORDER — OXYCODONE AND ACETAMINOPHEN 5; 325 MG/1; MG/1
1-2 TABLET ORAL EVERY 4 HOURS PRN
Status: DISCONTINUED | OUTPATIENT
Start: 2020-01-01 | End: 2020-01-01

## 2020-01-01 RX ADMIN — LEVOTHYROXINE SODIUM 150 MCG: 0.15 TABLET ORAL at 08:21

## 2020-01-01 RX ADMIN — ACYCLOVIR 800 MG: 800 TABLET ORAL at 20:12

## 2020-01-01 RX ADMIN — ASPIRIN 81 MG: 81 TABLET, COATED ORAL at 08:21

## 2020-01-01 RX ADMIN — PILOCARPINE HYDROCHLORIDE 5 MG: 5 TABLET, FILM COATED ORAL at 20:12

## 2020-01-01 RX ADMIN — ACYCLOVIR 800 MG: 800 TABLET ORAL at 08:36

## 2020-01-01 RX ADMIN — PREDNISONE 40 MG: 20 TABLET ORAL at 08:36

## 2020-01-01 RX ADMIN — FLUCONAZOLE 100 MG: 100 TABLET ORAL at 08:59

## 2020-01-01 RX ADMIN — FLUCONAZOLE 100 MG: 100 TABLET ORAL at 08:40

## 2020-01-01 RX ADMIN — ACYCLOVIR 800 MG: 800 TABLET ORAL at 20:43

## 2020-01-01 RX ADMIN — CARBOXYMETHYLCELLULOSE SODIUM 1 DROP: 5 SOLUTION/ DROPS OPHTHALMIC at 09:00

## 2020-01-01 RX ADMIN — ASPIRIN 81 MG: 81 TABLET, COATED ORAL at 08:35

## 2020-01-01 RX ADMIN — CYCLOSPORINE 25 MG: 25 CAPSULE, LIQUID FILLED ORAL at 20:20

## 2020-01-01 RX ADMIN — PILOCARPINE HYDROCHLORIDE 5 MG: 5 TABLET, FILM COATED ORAL at 07:58

## 2020-01-01 RX ADMIN — FLUCONAZOLE 100 MG: 100 TABLET ORAL at 08:36

## 2020-01-01 RX ADMIN — PIPERACILLIN AND TAZOBACTAM 3.38 G: 3; .375 INJECTION, POWDER, FOR SOLUTION INTRAVENOUS at 04:01

## 2020-01-01 RX ADMIN — ACYCLOVIR 800 MG: 800 TABLET ORAL at 08:21

## 2020-01-01 RX ADMIN — PIPERACILLIN AND TAZOBACTAM 3.38 G: 3; .375 INJECTION, POWDER, FOR SOLUTION INTRAVENOUS at 03:46

## 2020-01-01 RX ADMIN — CYCLOSPORINE 25 MG: 25 CAPSULE, LIQUID FILLED ORAL at 09:04

## 2020-01-01 RX ADMIN — ASPIRIN 81 MG: 81 TABLET, COATED ORAL at 08:41

## 2020-01-01 RX ADMIN — METOPROLOL TARTRATE 25 MG: 25 TABLET, FILM COATED ORAL at 09:05

## 2020-01-01 RX ADMIN — ROSUVASTATIN CALCIUM 5 MG: 5 TABLET, FILM COATED ORAL at 08:25

## 2020-01-01 RX ADMIN — CARBOXYMETHYLCELLULOSE SODIUM 1 DROP: 5 SOLUTION/ DROPS OPHTHALMIC at 08:24

## 2020-01-01 RX ADMIN — FLUCONAZOLE 100 MG: 100 TABLET ORAL at 08:06

## 2020-01-01 RX ADMIN — PILOCARPINE HYDROCHLORIDE 5 MG: 5 TABLET, FILM COATED ORAL at 20:20

## 2020-01-01 RX ADMIN — METOPROLOL TARTRATE 25 MG: 25 TABLET, FILM COATED ORAL at 08:36

## 2020-01-01 RX ADMIN — ACYCLOVIR 800 MG: 800 TABLET ORAL at 22:26

## 2020-01-01 RX ADMIN — SULFAMETHOXAZOLE AND TRIMETHOPRIM 1 TABLET: 800; 160 TABLET ORAL at 21:02

## 2020-01-01 RX ADMIN — OXYCODONE HYDROCHLORIDE AND ACETAMINOPHEN 2 TABLET: 5; 325 TABLET ORAL at 23:12

## 2020-01-01 RX ADMIN — ACYCLOVIR 800 MG: 800 TABLET ORAL at 19:51

## 2020-01-01 RX ADMIN — LEVOFLOXACIN 250 MG: 250 TABLET, FILM COATED ORAL at 08:36

## 2020-01-01 RX ADMIN — FLUCONAZOLE 100 MG: 100 TABLET ORAL at 09:05

## 2020-01-01 RX ADMIN — ACYCLOVIR 800 MG: 800 TABLET ORAL at 21:02

## 2020-01-01 RX ADMIN — FLUOROMETHOLONE 1 DROP: 1 SOLUTION/ DROPS OPHTHALMIC at 13:29

## 2020-01-01 RX ADMIN — PILOCARPINE HYDROCHLORIDE 5 MG: 5 TABLET, FILM COATED ORAL at 08:20

## 2020-01-01 RX ADMIN — PIPERACILLIN AND TAZOBACTAM 3.38 G: 3; .375 INJECTION, POWDER, FOR SOLUTION INTRAVENOUS at 21:53

## 2020-01-01 RX ADMIN — CYCLOSPORINE 25 MG: 25 CAPSULE, LIQUID FILLED ORAL at 08:21

## 2020-01-01 RX ADMIN — MAGNESIUM OXIDE 800 MG: 400 TABLET ORAL at 12:18

## 2020-01-01 RX ADMIN — PILOCARPINE HYDROCHLORIDE 5 MG: 5 TABLET, FILM COATED ORAL at 08:40

## 2020-01-01 RX ADMIN — CARBOXYMETHYLCELLULOSE SODIUM 1 DROP: 5 SOLUTION/ DROPS OPHTHALMIC at 08:44

## 2020-01-01 RX ADMIN — LEVOTHYROXINE SODIUM 150 MCG: 0.15 TABLET ORAL at 08:59

## 2020-01-01 RX ADMIN — METOPROLOL TARTRATE 25 MG: 25 TABLET, FILM COATED ORAL at 08:59

## 2020-01-01 RX ADMIN — LEVOTHYROXINE SODIUM 150 MCG: 0.15 TABLET ORAL at 08:20

## 2020-01-01 RX ADMIN — PILOCARPINE HYDROCHLORIDE 5 MG: 5 TABLET, FILM COATED ORAL at 19:54

## 2020-01-01 RX ADMIN — ASPIRIN 81 MG: 81 TABLET, COATED ORAL at 08:20

## 2020-01-01 RX ADMIN — LEVOTHYROXINE SODIUM 150 MCG: 0.15 TABLET ORAL at 08:41

## 2020-01-01 RX ADMIN — PILOCARPINE HYDROCHLORIDE 5 MG: 5 TABLET, FILM COATED ORAL at 22:26

## 2020-01-01 RX ADMIN — CYCLOSPORINE 25 MG: 25 CAPSULE, LIQUID FILLED ORAL at 20:43

## 2020-01-01 RX ADMIN — CARBOXYMETHYLCELLULOSE SODIUM 1 DROP: 5 SOLUTION/ DROPS OPHTHALMIC at 08:37

## 2020-01-01 RX ADMIN — MAGNESIUM OXIDE 800 MG: 400 TABLET ORAL at 12:55

## 2020-01-01 RX ADMIN — SULFAMETHOXAZOLE AND TRIMETHOPRIM 1 TABLET: 800; 160 TABLET ORAL at 20:20

## 2020-01-01 RX ADMIN — CYCLOSPORINE 25 MG: 25 CAPSULE, LIQUID FILLED ORAL at 08:40

## 2020-01-01 RX ADMIN — PIPERACILLIN AND TAZOBACTAM 3.38 G: 3; .375 INJECTION, POWDER, FOR SOLUTION INTRAVENOUS at 16:24

## 2020-01-01 RX ADMIN — PILOCARPINE HYDROCHLORIDE 5 MG: 5 TABLET, FILM COATED ORAL at 08:41

## 2020-01-01 RX ADMIN — ASPIRIN 81 MG: 81 TABLET, COATED ORAL at 09:04

## 2020-01-01 RX ADMIN — LEVOFLOXACIN 250 MG: 250 TABLET, FILM COATED ORAL at 08:21

## 2020-01-01 RX ADMIN — MAGNESIUM OXIDE 400 MG: 400 TABLET ORAL at 08:40

## 2020-01-01 RX ADMIN — CYCLOSPORINE 25 MG: 25 CAPSULE, LIQUID FILLED ORAL at 21:02

## 2020-01-01 RX ADMIN — PILOCARPINE HYDROCHLORIDE 5 MG: 5 TABLET, FILM COATED ORAL at 09:04

## 2020-01-01 RX ADMIN — CYCLOSPORINE 25 MG: 25 CAPSULE, LIQUID FILLED ORAL at 20:12

## 2020-01-01 RX ADMIN — PILOCARPINE HYDROCHLORIDE 5 MG: 5 TABLET, FILM COATED ORAL at 19:50

## 2020-01-01 RX ADMIN — LEVOFLOXACIN 250 MG: 250 TABLET, FILM COATED ORAL at 08:06

## 2020-01-01 RX ADMIN — CYCLOSPORINE 25 MG: 25 CAPSULE, LIQUID FILLED ORAL at 08:59

## 2020-01-01 RX ADMIN — ACYCLOVIR 800 MG: 800 TABLET ORAL at 20:20

## 2020-01-01 RX ADMIN — ASPIRIN 81 MG: 81 TABLET, COATED ORAL at 08:59

## 2020-01-01 RX ADMIN — METOPROLOL TARTRATE 25 MG: 25 TABLET, FILM COATED ORAL at 08:41

## 2020-01-01 RX ADMIN — PREDNISONE 15 MG: 5 TABLET ORAL at 07:59

## 2020-01-01 RX ADMIN — ROSUVASTATIN CALCIUM 5 MG: 5 TABLET, FILM COATED ORAL at 08:40

## 2020-01-01 RX ADMIN — FLUCONAZOLE 100 MG: 100 TABLET ORAL at 08:21

## 2020-01-01 RX ADMIN — PIPERACILLIN AND TAZOBACTAM 3.38 G: 3; .375 INJECTION, POWDER, FOR SOLUTION INTRAVENOUS at 16:10

## 2020-01-01 RX ADMIN — ROSUVASTATIN CALCIUM 5 MG: 5 TABLET, FILM COATED ORAL at 08:20

## 2020-01-01 RX ADMIN — PIPERACILLIN AND TAZOBACTAM 3.38 G: 3; .375 INJECTION, POWDER, FOR SOLUTION INTRAVENOUS at 11:06

## 2020-01-01 RX ADMIN — OXYCODONE HYDROCHLORIDE AND ACETAMINOPHEN 1 TABLET: 5; 325 TABLET ORAL at 21:07

## 2020-01-01 RX ADMIN — PREDNISONE 15 MG: 5 TABLET ORAL at 09:04

## 2020-01-01 RX ADMIN — OXYCODONE HYDROCHLORIDE AND ACETAMINOPHEN 1 TABLET: 5; 325 TABLET ORAL at 08:31

## 2020-01-01 RX ADMIN — METOPROLOL TARTRATE 25 MG: 25 TABLET, FILM COATED ORAL at 08:20

## 2020-01-01 RX ADMIN — PREDNISONE 40 MG: 20 TABLET ORAL at 08:20

## 2020-01-01 RX ADMIN — CYCLOSPORINE 25 MG: 25 CAPSULE, LIQUID FILLED ORAL at 08:36

## 2020-01-01 RX ADMIN — CYCLOSPORINE 25 MG: 25 CAPSULE, LIQUID FILLED ORAL at 08:20

## 2020-01-01 RX ADMIN — LEVOTHYROXINE SODIUM 150 MCG: 0.15 TABLET ORAL at 08:36

## 2020-01-01 RX ADMIN — OXYCODONE HYDROCHLORIDE AND ACETAMINOPHEN 1 TABLET: 5; 325 TABLET ORAL at 17:45

## 2020-01-01 RX ADMIN — METOPROLOL TARTRATE 25 MG: 25 TABLET, FILM COATED ORAL at 08:29

## 2020-01-01 RX ADMIN — LEVOFLOXACIN 250 MG: 250 TABLET, FILM COATED ORAL at 08:20

## 2020-01-01 RX ADMIN — PILOCARPINE HYDROCHLORIDE 5 MG: 5 TABLET, FILM COATED ORAL at 08:59

## 2020-01-01 RX ADMIN — LEVOFLOXACIN 250 MG: 250 TABLET, FILM COATED ORAL at 09:11

## 2020-01-01 RX ADMIN — CARBOXYMETHYLCELLULOSE SODIUM 1 DROP: 5 SOLUTION/ DROPS OPHTHALMIC at 07:57

## 2020-01-01 RX ADMIN — CYCLOSPORINE 25 MG: 25 CAPSULE, LIQUID FILLED ORAL at 19:54

## 2020-01-01 RX ADMIN — MAGNESIUM OXIDE 800 MG: 400 TABLET ORAL at 11:51

## 2020-01-01 RX ADMIN — ACYCLOVIR 800 MG: 800 TABLET ORAL at 19:54

## 2020-01-01 RX ADMIN — ROSUVASTATIN CALCIUM 5 MG: 5 TABLET, FILM COATED ORAL at 07:58

## 2020-01-01 RX ADMIN — PREDNISONE 15 MG: 5 TABLET ORAL at 08:41

## 2020-01-01 RX ADMIN — CARBOXYMETHYLCELLULOSE SODIUM 1 DROP: 5 SOLUTION/ DROPS OPHTHALMIC at 20:14

## 2020-01-01 RX ADMIN — PILOCARPINE HYDROCHLORIDE 5 MG: 5 TABLET, FILM COATED ORAL at 08:25

## 2020-01-01 RX ADMIN — LEVOFLOXACIN 250 MG: 250 TABLET, FILM COATED ORAL at 08:40

## 2020-01-01 RX ADMIN — PIPERACILLIN AND TAZOBACTAM 3.38 G: 3; .375 INJECTION, POWDER, FOR SOLUTION INTRAVENOUS at 23:09

## 2020-01-01 RX ADMIN — LEVOTHYROXINE SODIUM 150 MCG: 0.15 TABLET ORAL at 09:05

## 2020-01-01 RX ADMIN — MAGNESIUM OXIDE 800 MG: 400 TABLET ORAL at 13:28

## 2020-01-01 RX ADMIN — ACYCLOVIR 800 MG: 800 TABLET ORAL at 07:57

## 2020-01-01 RX ADMIN — CARBOXYMETHYLCELLULOSE SODIUM 1 DROP: 5 SOLUTION/ DROPS OPHTHALMIC at 19:52

## 2020-01-01 RX ADMIN — ROSUVASTATIN CALCIUM 5 MG: 5 TABLET, FILM COATED ORAL at 09:05

## 2020-01-01 RX ADMIN — ROSUVASTATIN CALCIUM 5 MG: 5 TABLET, FILM COATED ORAL at 09:51

## 2020-01-01 RX ADMIN — CYCLOSPORINE 25 MG: 25 CAPSULE, LIQUID FILLED ORAL at 07:57

## 2020-01-01 RX ADMIN — ROSUVASTATIN CALCIUM 5 MG: 5 TABLET, FILM COATED ORAL at 08:41

## 2020-01-01 RX ADMIN — MAGNESIUM OXIDE 800 MG: 400 TABLET ORAL at 12:35

## 2020-01-01 RX ADMIN — OXYCODONE HYDROCHLORIDE AND ACETAMINOPHEN 1 TABLET: 5; 325 TABLET ORAL at 19:51

## 2020-01-01 RX ADMIN — PREDNISONE 15 MG: 5 TABLET ORAL at 08:21

## 2020-01-01 RX ADMIN — OXYCODONE HYDROCHLORIDE AND ACETAMINOPHEN 1 TABLET: 5; 325 TABLET ORAL at 13:00

## 2020-01-01 RX ADMIN — CARBOXYMETHYLCELLULOSE SODIUM 1 DROP: 5 SOLUTION/ DROPS OPHTHALMIC at 21:03

## 2020-01-01 RX ADMIN — LEVOFLOXACIN 250 MG: 250 TABLET, FILM COATED ORAL at 09:00

## 2020-01-01 RX ADMIN — PREDNISONE 25 MG: 5 TABLET ORAL at 12:55

## 2020-01-01 RX ADMIN — ACYCLOVIR 800 MG: 800 TABLET ORAL at 09:00

## 2020-01-01 RX ADMIN — SULFAMETHOXAZOLE AND TRIMETHOPRIM 1 TABLET: 800; 160 TABLET ORAL at 08:59

## 2020-01-01 RX ADMIN — ACETAMINOPHEN 650 MG: 325 TABLET, FILM COATED ORAL at 00:38

## 2020-01-01 RX ADMIN — CYCLOSPORINE 25 MG: 25 CAPSULE, LIQUID FILLED ORAL at 19:50

## 2020-01-01 RX ADMIN — CARBOXYMETHYLCELLULOSE SODIUM 1 DROP: 5 SOLUTION/ DROPS OPHTHALMIC at 08:21

## 2020-01-01 RX ADMIN — METOPROLOL TARTRATE 25 MG: 25 TABLET, FILM COATED ORAL at 07:58

## 2020-01-01 RX ADMIN — PREDNISONE 15 MG: 5 TABLET ORAL at 08:59

## 2020-01-01 RX ADMIN — OXYCODONE HYDROCHLORIDE AND ACETAMINOPHEN 1 TABLET: 5; 325 TABLET ORAL at 16:13

## 2020-01-01 RX ADMIN — ACYCLOVIR 800 MG: 800 TABLET ORAL at 09:04

## 2020-01-01 RX ADMIN — LEVOTHYROXINE SODIUM 150 MCG: 0.15 TABLET ORAL at 07:59

## 2020-01-01 RX ADMIN — OXYCODONE HYDROCHLORIDE AND ACETAMINOPHEN 1 TABLET: 5; 325 TABLET ORAL at 08:40

## 2020-01-01 RX ADMIN — ACYCLOVIR 800 MG: 800 TABLET ORAL at 08:40

## 2020-01-01 RX ADMIN — PILOCARPINE HYDROCHLORIDE 5 MG: 5 TABLET, FILM COATED ORAL at 20:42

## 2020-01-01 RX ADMIN — CARBOXYMETHYLCELLULOSE SODIUM 1 DROP: 5 SOLUTION/ DROPS OPHTHALMIC at 09:18

## 2020-01-01 RX ADMIN — SULFAMETHOXAZOLE AND TRIMETHOPRIM 1 TABLET: 800; 160 TABLET ORAL at 08:21

## 2020-01-01 RX ADMIN — PILOCARPINE HYDROCHLORIDE 5 MG: 5 TABLET, FILM COATED ORAL at 21:02

## 2020-01-01 RX ADMIN — FLUCONAZOLE 100 MG: 100 TABLET ORAL at 08:20

## 2020-01-01 RX ADMIN — ACYCLOVIR 800 MG: 800 TABLET ORAL at 08:20

## 2020-01-01 RX ADMIN — MAGNESIUM OXIDE 400 MG: 400 TABLET ORAL at 07:57

## 2020-01-01 RX ADMIN — CYCLOSPORINE 25 MG: 25 CAPSULE, LIQUID FILLED ORAL at 22:26

## 2020-01-01 ASSESSMENT — ACTIVITIES OF DAILY LIVING (ADL)
ADLS_ACUITY_SCORE: 10
ADLS_ACUITY_SCORE: 10
ADLS_ACUITY_SCORE: 12
ADLS_ACUITY_SCORE: 10
ADLS_ACUITY_SCORE: 10
ADLS_ACUITY_SCORE: 12
ADLS_ACUITY_SCORE: 10
ADLS_ACUITY_SCORE: 12
ADLS_ACUITY_SCORE: 10
ADLS_ACUITY_SCORE: 12
ADLS_ACUITY_SCORE: 13
ADLS_ACUITY_SCORE: 10
ADLS_ACUITY_SCORE: 12
ADLS_ACUITY_SCORE: 12
ADLS_ACUITY_SCORE: 10
ADLS_ACUITY_SCORE: 12
ADLS_ACUITY_SCORE: 10
ADLS_ACUITY_SCORE: 12
ADLS_ACUITY_SCORE: 12
ADLS_ACUITY_SCORE: 13
ADLS_ACUITY_SCORE: 10
ADLS_ACUITY_SCORE: 10

## 2020-01-01 ASSESSMENT — MIFFLIN-ST. JEOR
SCORE: 1885.4
SCORE: 1820.54
SCORE: 1888.13
SCORE: 1862.26
SCORE: 1855.92
SCORE: 1885.86
SCORE: 1855.02
SCORE: 1821.45
SCORE: 1891
SCORE: 1817.36
SCORE: 1821.9

## 2020-01-01 ASSESSMENT — ENCOUNTER SYMPTOMS
VOMITING: 0
RHINORRHEA: 1
FEVER: 0
NAUSEA: 0
ABDOMINAL PAIN: 0
SHORTNESS OF BREATH: 1
COUGH: 1

## 2020-01-01 ASSESSMENT — PAIN SCALES - GENERAL
PAINLEVEL: NO PAIN (0)
PAINLEVEL: NO PAIN (0)
PAINLEVEL: NO PAIN (1)
PAINLEVEL: MODERATE PAIN (4)
PAINLEVEL: NO PAIN (0)

## 2020-01-07 ENCOUNTER — TELEPHONE (OUTPATIENT)
Dept: TRANSPLANT | Facility: CLINIC | Age: 60
End: 2020-01-07

## 2020-01-07 DIAGNOSIS — J84.9 ILD (INTERSTITIAL LUNG DISEASE) (H): Primary | ICD-10-CM

## 2020-01-07 NOTE — TELEPHONE ENCOUNTER
Received a telephone call from Yg this morning that it has been approximately 2 weeks since he has seen Dr. Brady, and he has yet to receive his oxygen. He is significantly dyspneic on exertion and was noticeably winded speaking on the phone. He reports he was not given contact information for pulmonary and didn't know who to call. Verified with Yg that a new Rx was faxed to [facility] on 12/26/19 because SOB was not a qualifying diagnosis.     Spoke with [facility] who reports not finding any documentation that a fax was received, or any other communication. Advised that we have documentation that someone in pulmonary spoke with a Nicki at [facility] and that pneumonia was also not a qualifying diagnosis.     Called [facility] and attempted to speak with Dr. Brady's coordinator, Rach. She is out of the office today, but another coordinator acknowledged that she would follow up. Expressed urgency of the matter as pt is quite SOB.    Follow up with Yg to inform him that Pulmonary will be in touch to arrange for Oxygen delivery.

## 2020-01-08 ENCOUNTER — TELEPHONE (OUTPATIENT)
Dept: PULMONOLOGY | Facility: CLINIC | Age: 60
End: 2020-01-08

## 2020-01-08 ENCOUNTER — APPOINTMENT (OUTPATIENT)
Dept: CT IMAGING | Facility: CLINIC | Age: 60
DRG: 177 | End: 2020-01-08
Attending: INTERNAL MEDICINE
Payer: COMMERCIAL

## 2020-01-08 ENCOUNTER — HOSPITAL ENCOUNTER (INPATIENT)
Facility: CLINIC | Age: 60
LOS: 7 days | Discharge: HOME | DRG: 177 | End: 2020-01-15
Attending: INTERNAL MEDICINE | Admitting: INTERNAL MEDICINE
Payer: COMMERCIAL

## 2020-01-08 ENCOUNTER — ONCOLOGY VISIT (OUTPATIENT)
Dept: TRANSPLANT | Facility: CLINIC | Age: 60
DRG: 177 | End: 2020-01-08
Attending: INTERNAL MEDICINE
Payer: COMMERCIAL

## 2020-01-08 ENCOUNTER — APPOINTMENT (OUTPATIENT)
Dept: LAB | Facility: CLINIC | Age: 60
DRG: 177 | End: 2020-01-08
Attending: INTERNAL MEDICINE
Payer: COMMERCIAL

## 2020-01-08 VITALS
WEIGHT: 218 LBS | OXYGEN SATURATION: 88 % | HEART RATE: 62 BPM | SYSTOLIC BLOOD PRESSURE: 121 MMHG | RESPIRATION RATE: 18 BRPM | DIASTOLIC BLOOD PRESSURE: 74 MMHG | TEMPERATURE: 98.2 F | BODY MASS INDEX: 31.28 KG/M2

## 2020-01-08 DIAGNOSIS — D46.9 MDS (MYELODYSPLASTIC SYNDROME) (H): Primary | ICD-10-CM

## 2020-01-08 DIAGNOSIS — D46.22 RAEB-2 (REFRACTORY ANEMIA WITH EXCESS BLASTS-2) (H): ICD-10-CM

## 2020-01-08 DIAGNOSIS — J18.9 PNEUMONIA OF BOTH LUNGS DUE TO INFECTIOUS ORGANISM, UNSPECIFIED PART OF LUNG: ICD-10-CM

## 2020-01-08 DIAGNOSIS — D46.9 MDS (MYELODYSPLASTIC SYNDROME) (H): ICD-10-CM

## 2020-01-08 DIAGNOSIS — D89.811 CHRONIC GVHD (H): ICD-10-CM

## 2020-01-08 DIAGNOSIS — J96.21 ACUTE ON CHRONIC RESPIRATORY FAILURE WITH HYPOXIA (H): ICD-10-CM

## 2020-01-08 PROBLEM — J84.9 ILD (INTERSTITIAL LUNG DISEASE) (H): Status: ACTIVE | Noted: 2020-01-08

## 2020-01-08 PROBLEM — J96.01 ACUTE RESPIRATORY FAILURE WITH HYPOXIA (H): Status: ACTIVE | Noted: 2020-01-08

## 2020-01-08 PROBLEM — R13.12 OROPHARYNGEAL DYSPHAGIA: Status: ACTIVE | Noted: 2020-01-08

## 2020-01-08 PROBLEM — T17.908A ASPIRATION INTO AIRWAY: Status: ACTIVE | Noted: 2020-01-08

## 2020-01-08 LAB
ALBUMIN SERPL-MCNC: 2.8 G/DL (ref 3.4–5)
ALP SERPL-CCNC: 110 U/L (ref 40–150)
ALT SERPL W P-5'-P-CCNC: 24 U/L (ref 0–70)
ANION GAP SERPL CALCULATED.3IONS-SCNC: 8 MMOL/L (ref 3–14)
APTT PPP: 42 SEC (ref 22–37)
AST SERPL W P-5'-P-CCNC: 38 U/L (ref 0–45)
BASE DEFICIT BLDV-SCNC: 1.2 MMOL/L
BASOPHILS # BLD AUTO: 0.1 10E9/L (ref 0–0.2)
BASOPHILS NFR BLD AUTO: 0.4 %
BILIRUB SERPL-MCNC: 1.1 MG/DL (ref 0.2–1.3)
BUN SERPL-MCNC: 14 MG/DL (ref 7–30)
CALCIUM SERPL-MCNC: 8.8 MG/DL (ref 8.5–10.1)
CHLORIDE SERPL-SCNC: 105 MMOL/L (ref 94–109)
CO2 SERPL-SCNC: 22 MMOL/L (ref 20–32)
CREAT SERPL-MCNC: 0.89 MG/DL (ref 0.66–1.25)
DIFFERENTIAL METHOD BLD: ABNORMAL
EOSINOPHIL # BLD AUTO: 0 10E9/L (ref 0–0.7)
EOSINOPHIL NFR BLD AUTO: 0.2 %
ERYTHROCYTE [DISTWIDTH] IN BLOOD BY AUTOMATED COUNT: 13.8 % (ref 10–15)
FIBRINOGEN PPP-MCNC: 860 MG/DL (ref 200–420)
GFR SERPL CREATININE-BSD FRML MDRD: >90 ML/MIN/{1.73_M2}
GLUCOSE SERPL-MCNC: 101 MG/DL (ref 70–99)
HCO3 BLDV-SCNC: 23 MMOL/L (ref 21–28)
HCT VFR BLD AUTO: 51.4 % (ref 40–53)
HGB BLD-MCNC: 17.2 G/DL (ref 13.3–17.7)
IMM GRANULOCYTES # BLD: 0.1 10E9/L (ref 0–0.4)
IMM GRANULOCYTES NFR BLD: 0.9 %
INR PPP: 1.46 (ref 0.86–1.14)
L PNEUMO1 AG UR QL IA: NORMAL
LACTATE BLD-SCNC: 1.8 MMOL/L (ref 0.7–2)
LYMPHOCYTES # BLD AUTO: 1.1 10E9/L (ref 0.8–5.3)
LYMPHOCYTES NFR BLD AUTO: 8.5 %
MAGNESIUM SERPL-MCNC: 2.1 MG/DL (ref 1.6–2.3)
MCH RBC QN AUTO: 32.1 PG (ref 26.5–33)
MCHC RBC AUTO-ENTMCNC: 33.5 G/DL (ref 31.5–36.5)
MCV RBC AUTO: 96 FL (ref 78–100)
MONOCYTES # BLD AUTO: 1.1 10E9/L (ref 0–1.3)
MONOCYTES NFR BLD AUTO: 8.5 %
MRSA DNA SPEC QL NAA+PROBE: NEGATIVE
NEUTROPHILS # BLD AUTO: 10.4 10E9/L (ref 1.6–8.3)
NEUTROPHILS NFR BLD AUTO: 81.5 %
NRBC # BLD AUTO: 0 10*3/UL
NRBC BLD AUTO-RTO: 0 /100
NT-PROBNP SERPL-MCNC: 220 PG/ML (ref 0–900)
O2/TOTAL GAS SETTING VFR VENT: 80 %
PCO2 BLDV: 37 MM HG (ref 40–50)
PH BLDV: 7.41 PH (ref 7.32–7.43)
PHOSPHATE SERPL-MCNC: 3.8 MG/DL (ref 2.5–4.5)
PLATELET # BLD AUTO: 238 10E9/L (ref 150–450)
PO2 BLDV: 61 MM HG (ref 25–47)
POTASSIUM SERPL-SCNC: 4.4 MMOL/L (ref 3.4–5.3)
PROCALCITONIN SERPL-MCNC: 0.38 NG/ML
PROT SERPL-MCNC: 7.6 G/DL (ref 6.8–8.8)
RBC # BLD AUTO: 5.35 10E12/L (ref 4.4–5.9)
S PNEUM AG SPEC QL: NORMAL
SODIUM SERPL-SCNC: 135 MMOL/L (ref 133–144)
SPECIMEN SOURCE: NORMAL
WBC # BLD AUTO: 12.8 10E9/L (ref 4–11)

## 2020-01-08 PROCEDURE — 87486 CHLMYD PNEUM DNA AMP PROBE: CPT | Performed by: INTERNAL MEDICINE

## 2020-01-08 PROCEDURE — 20600000 ZZH R&B BMT

## 2020-01-08 PROCEDURE — 25000128 H RX IP 250 OP 636: Performed by: INTERNAL MEDICINE

## 2020-01-08 PROCEDURE — 25800030 ZZH RX IP 258 OP 636: Performed by: INTERNAL MEDICINE

## 2020-01-08 PROCEDURE — 82784 ASSAY IGA/IGD/IGG/IGM EACH: CPT | Performed by: INTERNAL MEDICINE

## 2020-01-08 PROCEDURE — 40000141 ZZH STATISTIC PERIPHERAL IV START W/O US GUIDANCE

## 2020-01-08 PROCEDURE — 85730 THROMBOPLASTIN TIME PARTIAL: CPT | Performed by: INTERNAL MEDICINE

## 2020-01-08 PROCEDURE — 87899 AGENT NOS ASSAY W/OPTIC: CPT | Performed by: INTERNAL MEDICINE

## 2020-01-08 PROCEDURE — 84145 PROCALCITONIN (PCT): CPT | Performed by: INTERNAL MEDICINE

## 2020-01-08 PROCEDURE — 87640 STAPH A DNA AMP PROBE: CPT | Performed by: INTERNAL MEDICINE

## 2020-01-08 PROCEDURE — 87581 M.PNEUMON DNA AMP PROBE: CPT | Performed by: INTERNAL MEDICINE

## 2020-01-08 PROCEDURE — 85610 PROTHROMBIN TIME: CPT | Performed by: INTERNAL MEDICINE

## 2020-01-08 PROCEDURE — 85384 FIBRINOGEN ACTIVITY: CPT | Performed by: INTERNAL MEDICINE

## 2020-01-08 PROCEDURE — 84100 ASSAY OF PHOSPHORUS: CPT | Performed by: INTERNAL MEDICINE

## 2020-01-08 PROCEDURE — 85025 COMPLETE CBC W/AUTO DIFF WBC: CPT | Performed by: INTERNAL MEDICINE

## 2020-01-08 PROCEDURE — 25000131 ZZH RX MED GY IP 250 OP 636 PS 637: Performed by: INTERNAL MEDICINE

## 2020-01-08 PROCEDURE — 87641 MR-STAPH DNA AMP PROBE: CPT | Performed by: INTERNAL MEDICINE

## 2020-01-08 PROCEDURE — 87633 RESP VIRUS 12-25 TARGETS: CPT | Performed by: INTERNAL MEDICINE

## 2020-01-08 PROCEDURE — 99223 1ST HOSP IP/OBS HIGH 75: CPT | Mod: AI | Performed by: INTERNAL MEDICINE

## 2020-01-08 PROCEDURE — 82803 BLOOD GASES ANY COMBINATION: CPT | Performed by: INTERNAL MEDICINE

## 2020-01-08 PROCEDURE — 71275 CT ANGIOGRAPHY CHEST: CPT

## 2020-01-08 PROCEDURE — 25000132 ZZH RX MED GY IP 250 OP 250 PS 637: Performed by: INTERNAL MEDICINE

## 2020-01-08 PROCEDURE — 83880 ASSAY OF NATRIURETIC PEPTIDE: CPT | Performed by: INTERNAL MEDICINE

## 2020-01-08 PROCEDURE — 83605 ASSAY OF LACTIC ACID: CPT | Performed by: INTERNAL MEDICINE

## 2020-01-08 PROCEDURE — 83735 ASSAY OF MAGNESIUM: CPT | Performed by: INTERNAL MEDICINE

## 2020-01-08 PROCEDURE — 80053 COMPREHEN METABOLIC PANEL: CPT | Performed by: INTERNAL MEDICINE

## 2020-01-08 RX ORDER — GUAIFENESIN/DEXTROMETHORPHAN 100-10MG/5
5 SYRUP ORAL EVERY 4 HOURS PRN
Status: DISCONTINUED | OUTPATIENT
Start: 2020-01-08 | End: 2020-01-15 | Stop reason: HOSPADM

## 2020-01-08 RX ORDER — POTASSIUM CL/LIDO/0.9 % NACL 10MEQ/0.1L
10 INTRAVENOUS SOLUTION, PIGGYBACK (ML) INTRAVENOUS
Status: DISCONTINUED | OUTPATIENT
Start: 2020-01-08 | End: 2020-01-15 | Stop reason: HOSPADM

## 2020-01-08 RX ORDER — POLYETHYLENE GLYCOL 3350 17 G/17G
17 POWDER, FOR SOLUTION ORAL DAILY PRN
Status: DISCONTINUED | OUTPATIENT
Start: 2020-01-08 | End: 2020-01-15 | Stop reason: HOSPADM

## 2020-01-08 RX ORDER — POTASSIUM CHLORIDE 750 MG/1
20-40 TABLET, EXTENDED RELEASE ORAL
Status: DISCONTINUED | OUTPATIENT
Start: 2020-01-08 | End: 2020-01-15 | Stop reason: HOSPADM

## 2020-01-08 RX ORDER — CYCLOSPORINE 25 MG/1
25 CAPSULE, LIQUID FILLED ORAL 2 TIMES DAILY
Status: DISCONTINUED | OUTPATIENT
Start: 2020-01-08 | End: 2020-01-15 | Stop reason: HOSPADM

## 2020-01-08 RX ORDER — POTASSIUM CHLORIDE 7.45 MG/ML
10 INJECTION INTRAVENOUS
Status: DISCONTINUED | OUTPATIENT
Start: 2020-01-08 | End: 2020-01-15 | Stop reason: HOSPADM

## 2020-01-08 RX ORDER — ACETAMINOPHEN 325 MG/1
650 TABLET ORAL EVERY 4 HOURS PRN
Status: DISCONTINUED | OUTPATIENT
Start: 2020-01-08 | End: 2020-01-15 | Stop reason: HOSPADM

## 2020-01-08 RX ORDER — ROSUVASTATIN CALCIUM 5 MG/1
5 TABLET, COATED ORAL DAILY
Status: DISCONTINUED | OUTPATIENT
Start: 2020-01-08 | End: 2020-01-15 | Stop reason: HOSPADM

## 2020-01-08 RX ORDER — FLUORIDE TOOTHPASTE
15 TOOTHPASTE DENTAL 4 TIMES DAILY PRN
Status: DISCONTINUED | OUTPATIENT
Start: 2020-01-08 | End: 2020-01-15 | Stop reason: HOSPADM

## 2020-01-08 RX ORDER — FLUCONAZOLE 100 MG/1
100 TABLET ORAL DAILY
Status: DISCONTINUED | OUTPATIENT
Start: 2020-01-08 | End: 2020-01-15 | Stop reason: HOSPADM

## 2020-01-08 RX ORDER — PROCHLORPERAZINE MALEATE 5 MG
5 TABLET ORAL EVERY 6 HOURS PRN
Status: DISCONTINUED | OUTPATIENT
Start: 2020-01-08 | End: 2020-01-15 | Stop reason: HOSPADM

## 2020-01-08 RX ORDER — METOPROLOL TARTRATE 25 MG/1
25 TABLET, FILM COATED ORAL DAILY
Status: DISCONTINUED | OUTPATIENT
Start: 2020-01-08 | End: 2020-01-15 | Stop reason: HOSPADM

## 2020-01-08 RX ORDER — SULFAMETHOXAZOLE/TRIMETHOPRIM 800-160 MG
1 TABLET ORAL
Status: DISCONTINUED | OUTPATIENT
Start: 2020-01-13 | End: 2020-01-15 | Stop reason: HOSPADM

## 2020-01-08 RX ORDER — ONDANSETRON 8 MG/1
8 TABLET, FILM COATED ORAL EVERY 8 HOURS PRN
Status: DISCONTINUED | OUTPATIENT
Start: 2020-01-08 | End: 2020-01-15 | Stop reason: HOSPADM

## 2020-01-08 RX ORDER — AMOXICILLIN 250 MG
2 CAPSULE ORAL 2 TIMES DAILY
Status: DISCONTINUED | OUTPATIENT
Start: 2020-01-08 | End: 2020-01-15 | Stop reason: HOSPADM

## 2020-01-08 RX ORDER — LIDOCAINE 40 MG/G
CREAM TOPICAL
Status: DISCONTINUED | OUTPATIENT
Start: 2020-01-08 | End: 2020-01-15 | Stop reason: HOSPADM

## 2020-01-08 RX ORDER — POTASSIUM CHLORIDE 29.8 MG/ML
20 INJECTION INTRAVENOUS
Status: DISCONTINUED | OUTPATIENT
Start: 2020-01-08 | End: 2020-01-15 | Stop reason: HOSPADM

## 2020-01-08 RX ORDER — LORAZEPAM 2 MG/ML
.5-1 INJECTION INTRAMUSCULAR EVERY 6 HOURS PRN
Status: DISCONTINUED | OUTPATIENT
Start: 2020-01-08 | End: 2020-01-15 | Stop reason: HOSPADM

## 2020-01-08 RX ORDER — MAGNESIUM SULFATE HEPTAHYDRATE 40 MG/ML
4 INJECTION, SOLUTION INTRAVENOUS EVERY 4 HOURS PRN
Status: DISCONTINUED | OUTPATIENT
Start: 2020-01-08 | End: 2020-01-15 | Stop reason: HOSPADM

## 2020-01-08 RX ORDER — LORAZEPAM 0.5 MG/1
.5-1 TABLET ORAL EVERY 6 HOURS PRN
Status: DISCONTINUED | OUTPATIENT
Start: 2020-01-08 | End: 2020-01-15 | Stop reason: HOSPADM

## 2020-01-08 RX ORDER — SALIVA STIMULANT COMB. NO.3
2 SPRAY, NON-AEROSOL (ML) MUCOUS MEMBRANE
Status: DISCONTINUED | OUTPATIENT
Start: 2020-01-08 | End: 2020-01-15 | Stop reason: HOSPADM

## 2020-01-08 RX ORDER — LIDOCAINE 40 MG/G
CREAM TOPICAL
Status: DISCONTINUED | OUTPATIENT
Start: 2020-01-08 | End: 2020-01-08

## 2020-01-08 RX ORDER — PREDNISONE 10 MG/1
10 TABLET ORAL DAILY
Status: DISCONTINUED | OUTPATIENT
Start: 2020-01-08 | End: 2020-01-09

## 2020-01-08 RX ORDER — BENZONATATE 100 MG/1
100 CAPSULE ORAL 3 TIMES DAILY PRN
Status: DISCONTINUED | OUTPATIENT
Start: 2020-01-08 | End: 2020-01-15 | Stop reason: HOSPADM

## 2020-01-08 RX ORDER — POTASSIUM CHLORIDE 1.5 G/1.58G
20-40 POWDER, FOR SOLUTION ORAL
Status: DISCONTINUED | OUTPATIENT
Start: 2020-01-08 | End: 2020-01-15 | Stop reason: HOSPADM

## 2020-01-08 RX ORDER — ONDANSETRON 8 MG/1
8 TABLET, ORALLY DISINTEGRATING ORAL EVERY 8 HOURS PRN
Status: DISCONTINUED | OUTPATIENT
Start: 2020-01-08 | End: 2020-01-15 | Stop reason: HOSPADM

## 2020-01-08 RX ORDER — LEVOTHYROXINE SODIUM 150 UG/1
150 TABLET ORAL DAILY
Status: DISCONTINUED | OUTPATIENT
Start: 2020-01-08 | End: 2020-01-15 | Stop reason: HOSPADM

## 2020-01-08 RX ORDER — ONDANSETRON 2 MG/ML
8 INJECTION INTRAMUSCULAR; INTRAVENOUS EVERY 8 HOURS PRN
Status: DISCONTINUED | OUTPATIENT
Start: 2020-01-08 | End: 2020-01-15 | Stop reason: HOSPADM

## 2020-01-08 RX ORDER — ACYCLOVIR 800 MG/1
800 TABLET ORAL 2 TIMES DAILY
Status: DISCONTINUED | OUTPATIENT
Start: 2020-01-08 | End: 2020-01-15 | Stop reason: HOSPADM

## 2020-01-08 RX ORDER — PIPERACILLIN SODIUM, TAZOBACTAM SODIUM 3; .375 G/15ML; G/15ML
3.38 INJECTION, POWDER, LYOPHILIZED, FOR SOLUTION INTRAVENOUS EVERY 6 HOURS
Status: DISCONTINUED | OUTPATIENT
Start: 2020-01-08 | End: 2020-01-09

## 2020-01-08 RX ORDER — PILOCARPINE HYDROCHLORIDE 5 MG/1
5 TABLET, FILM COATED ORAL 2 TIMES DAILY
Status: DISCONTINUED | OUTPATIENT
Start: 2020-01-08 | End: 2020-01-15 | Stop reason: HOSPADM

## 2020-01-08 RX ORDER — MAGNESIUM OXIDE 400 MG/1
800 TABLET ORAL DAILY
Status: DISCONTINUED | OUTPATIENT
Start: 2020-01-08 | End: 2020-01-15 | Stop reason: HOSPADM

## 2020-01-08 RX ORDER — IOPAMIDOL 755 MG/ML
71 INJECTION, SOLUTION INTRAVASCULAR ONCE
Status: COMPLETED | OUTPATIENT
Start: 2020-01-08 | End: 2020-01-08

## 2020-01-08 RX ORDER — AMOXICILLIN 250 MG
1 CAPSULE ORAL 2 TIMES DAILY
Status: DISCONTINUED | OUTPATIENT
Start: 2020-01-08 | End: 2020-01-15 | Stop reason: HOSPADM

## 2020-01-08 RX ADMIN — VANCOMYCIN HYDROCHLORIDE 2000 MG: 10 INJECTION, POWDER, LYOPHILIZED, FOR SOLUTION INTRAVENOUS at 19:33

## 2020-01-08 RX ADMIN — ACYCLOVIR 800 MG: 800 TABLET ORAL at 20:49

## 2020-01-08 RX ADMIN — Medication 400 MG: at 20:49

## 2020-01-08 RX ADMIN — CYCLOSPORINE 25 MG: 25 CAPSULE, LIQUID FILLED ORAL at 20:49

## 2020-01-08 RX ADMIN — IOPAMIDOL 71 ML: 755 INJECTION, SOLUTION INTRAVENOUS at 18:10

## 2020-01-08 RX ADMIN — ROSUVASTATIN CALCIUM 5 MG: 5 TABLET, FILM COATED ORAL at 20:49

## 2020-01-08 RX ADMIN — PIPERACILLIN AND TAZOBACTAM 3.38 G: 3; .375 INJECTION, POWDER, FOR SOLUTION INTRAVENOUS at 18:52

## 2020-01-08 RX ADMIN — PIPERACILLIN AND TAZOBACTAM 3.38 G: 3; .375 INJECTION, POWDER, FOR SOLUTION INTRAVENOUS at 23:55

## 2020-01-08 ASSESSMENT — ACTIVITIES OF DAILY LIVING (ADL)
RETIRED_COMMUNICATION: 0-->UNDERSTANDS/COMMUNICATES WITHOUT DIFFICULTY
COGNITION: 0 - NO COGNITION ISSUES REPORTED
TRANSFERRING: 0-->INDEPENDENT
FALL_HISTORY_WITHIN_LAST_SIX_MONTHS: NO
TOILETING: 0-->INDEPENDENT
BATHING: 0-->INDEPENDENT
FALL_HISTORY_WITHIN_LAST_SIX_MONTHS: NO
RETIRED_EATING: 0-->INDEPENDENT
SWALLOWING: 0-->SWALLOWS FOODS/LIQUIDS WITHOUT DIFFICULTY
DRESS: 0-->INDEPENDENT
ADLS_ACUITY_SCORE: 11
AMBULATION: 0-->INDEPENDENT

## 2020-01-08 ASSESSMENT — PAIN SCALES - GENERAL: PAINLEVEL: NO PAIN (0)

## 2020-01-08 NOTE — NURSING NOTE
"Chief Complaint   Patient presents with     Blood Draw     Right AC butterfly  X 1, labs drawn and sent, vitals completed, pt with O2 at @L per [place] - O2 sta 88% , will message cininc, pt states he is here today \"because of my breathing\". Vitals completed, checked into next appointment.   Vidya Marin, RN  "

## 2020-01-08 NOTE — PROGRESS NOTES
HISTORY OF PRESENT ILLNESS:  I saw Byron today for evaluation of shortness of breath and hypoxia.  Byron had an allotransplant 3 years ago for MDS and is in complete remission.  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  He has not ever had a lung biopsy to confirm this.  He also has a history of chronic safkk-pvkddq-pcbr disease, and has been on and off prednisone, most recently on 10 mg of prednisone every other day.  He called yesterday and said that he had gone to the Pulmonary Clinic on 12/19/2019, and was found to sat 92% on room air but when he walked he desaturated to 87.  Oxygen was ordered, but apparently was not delivered until yesterday when he called the [facility] office saying that he had not yet received his oxygen and he was getting progressively more short of breath.  We obtained oxygen for him.  He started at 2 liters per minute of oxygen.  He comes to the clinic today complaining of progressive shortness of breath even on the 2 liters per minute of oxygen.  He was found to be satting 86% on 2 liters of oxygen but we turned the oxygen up to 4 liters.  He still was only in the 87-88 range when we turned it to 6 liters.  He satted intermittently into the high 80s, but sitting in a chair was at 90%.  He had a recent low-grade fever to 100.  There have been some viral illnesses in his family.  He himself; however, does not currently feel like he has a URI.  No pleuritic pain.      REVIEW OF SYSTEMS:  Otherwise, he has no measured fevers, chills, nausea, vomiting, diarrhea, cough.  He does have shortness of breath, hematuria, dysuria, bruising or bleeding.  No new skin rash.      PHYSICAL EXAMINATION:   GENERAL:  He looked pale, a little bit gray.   VITAL SIGNS:  His blood pressure was 121/74, his pulse 62 and respiratory rate was 18.  Temperature 98.2.   HEENT:  Oropharynx was clear.   LUNGS:   Clear to auscultation.  I could hear no rub or wheezing.   CARDIAC:  Without S3, rub or murmur.  He seems to be perfusing his extremities well, which are all warm.        LABORATORY DATA:  His blood counts show a white count of 12,800, hemoglobin 17.2, platelet count of 238,000.  His electrolyte panel and liver function tests were within normal limits.      ASSESSMENT AND PLAN:  Because of the rapidly progressing hypoxia, I believe it is in Byron's best interest and safety to be hospitalized for urgent further evaluation.  He needs a repeat CT scan of the lung.  The last CT scan performed on 12/19/2019 showed progressive infiltrates.  His lung function now clearly further deteriorated.  He may need a lung biopsy to make a definitive diagnosis, which is more urgent now since the process is rapidly progressing.  He is able to maintain sats of 90 with 6 liters per minute.  I told him that the safest thing for him would to be transported to the hospital in an ambulance in case he desaturated on the way over.  The patient, however, is aware that ambulances can cost several thousand dollars and refuses to take an ambulance and said he would be willing to sign a release, releasing us from liability.  I did not have him sign such a form and he will take the shuttle over to the hospital and be directly admitted.  I communicated this to the hospitalist, Dr. Benitez.     Uli Enciso M.D.

## 2020-01-08 NOTE — NURSING NOTE
"Oncology Rooming Note    January 8, 2020 3:37 PM   Byron Russo is a 59 year old male who presents for:    Chief Complaint   Patient presents with     Blood Draw     Right AC butterfly  X 1, labs drawn and sent, vitals completed, pt with O2 at @L per [place] - O2 sta 88% , will message cininc, pt states he is here today \"because of my breathing\". Vitals completed, checked into next appointment.     RECHECK     Pt is here for a rtn for MDS     Initial Vitals: Blood Pressure 121/74 (BP Location: Right arm, Patient Position: Sitting, Cuff Size: Adult Regular)   Pulse 62   Temperature 98.2  F (36.8  C) (Oral)   Respiration 18   Weight 98.9 kg (218 lb)   Oxygen Saturation (Abnormal) 88%   Body Mass Index 31.28 kg/m   Estimated body mass index is 31.28 kg/m  as calculated from the following:    Height as of 12/19/19: 1.778 m (5' 10\").    Weight as of this encounter: 98.9 kg (218 lb). Body surface area is 2.21 meters squared.  No Pain (0) Comment: Data Unavailable   No LMP for male patient.  Allergies reviewed: Yes  Medications reviewed: Yes    Medications: Medication refills not needed today.  Pharmacy name entered into [employer]:    [facility] [place], [place] - [phone]  [facility] [address] [place] [phone] [facility]    Clinical concerns: Pt is here for his lung issues. Since his visit from Dec. 23 his breathing has gotten worse.    Any activity creating  A lot of issues.         Efjulia Aguillon MA            "

## 2020-01-08 NOTE — H&P
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     CC: hypoxia    HPI: Byron was seen in BMT clinic today. He had been seen by Dr. Brady from pulmonology on 12/19/19, where he was noted to have had increased SOB/CUELLAR over the last 2 months, affecting ability to do tasks at work and also at home including shoveling snow. In the beginning of December he had a CXR at an outside urgent care which per report showed diffuse infiltrates and vascular congestion, and was prescribed a course of doxycycline and an albuterol inhaler at that time. Flu swab negative. He also had noted weight loss of 20 lb over 2 months, as well as dysphagia with swallowing pills and had noticed aspiration. ENT saw him and performed a laryngoscopy and was concerned about epiglottis function, with plan for a formal swallow study. Sats were 93% on RA, but 86% with ambulation. He was ordered a CT chest, PFTs, oxygen titration study to set up home oxygen, RVP, bordetella, and echo.     CT chest showed increased consolidative and ground glass opacities with a predominantly basilar peripheral/subpleural distribution with associated traction bronchiectasis and peribronchial vascular nodular opacities, slightly increased from 3/26/19, with differential diagnosis of organizing pneumonia, trlno-fsrtem-fyag disease, pleural parenchymal fibroelastosis, and drug reaction, unable to exclude superimposed infection.     PFTs 12/23 showed worsening FVC (63->51%), FEV1 (67->55%), increased FRC, RV, and decreased DLCO (65->57%). Bordetella testing negative.     Echo,  RVP not done yet. O2 was only delivered yesterday.     He presented to BMT clinic today for follow and was found to be hypoxic, 85% on 2L O2, and only 87-88% on 6L. Worse with ambulation. He has been short of breath with this. Otherwise denies URI symptoms, no chest pain, nausea, vomiting, cough, fevers, chills, hematuria, urinary symptoms, bruising, or bleeding, or rashes. He is feeling better now with the HFNC although it is drying out his mouth and eyes.     Family History:   Family History   Problem Relation Age of Onset     Myocardial Infarction Father 58     Coronary Artery Disease Father      Heart Failure Mother      Glaucoma Mother      Coronary Artery Disease Brother      Coronary Artery Disease Brother      Cancer Brother         GIST     Skin Cancer No family hx of      Melanoma No family hx of      Macular Degeneration No family hx of        Social History:   Social History     Socioeconomic History     Marital status:      Spouse name: Not on file     Number of children: Not on file     Years of education: Not on file     Highest education level: Not on file   Occupational History     Not on file   Social Needs     Financial resource strain: Not on file     Food insecurity:     Worry: Not on file     Inability: Not on file     Transportation needs:     Medical: Not on file     Non-medical: Not on file   Tobacco Use     Smoking status: Never Smoker     Smokeless tobacco: Former User   Substance and Sexual Activity     Alcohol use: No     Alcohol/week: 0.0 standard drinks     Drug use: No     Sexual activity: Not on file   Lifestyle     Physical activity:     Days per week: Not on file     Minutes per session: Not on file     Stress: Not on file   Relationships     Social connections:     Talks on phone: Not on file     Gets together: Not on file     Attends Nondenominational service: Not on file     Active member of club or organization: Not on file     Attends meetings of clubs or organizations: Not on  file     Relationship status: Not on file     Intimate partner violence:     Fear of current or ex partner: Not on file     Emotionally abused: Not on file     Physically abused: Not on file     Forced sexual activity: Not on file   Other Topics Concern     Parent/sibling w/ CABG, MI or angioplasty before 65F 55M? Not Asked   Social History Narrative     Not on file       Past Medical History:   Past Medical History:   Diagnosis Date     CAD (coronary artery disease) 2001     Hyperlipidemia      Hypothyroidism      Obesity      Pulmonary embolism (H) 2/2016     Varicose veins         Past Surgical History:   Past Surgical History:   Procedure Laterality Date     APPENDECTOMY       ARTHROSCOPY KNEE WITH MEDIAL MENISCECTOMY  6/20/2011    Procedure:ARTHROSCOPY KNEE WITH MEDIAL MENISCECTOMY; Surgeon:SACHIN SAMANO; Location:[facility]     BRONCHOSCOPY (RIGID OR FLEXIBLE), DIAGNOSTIC N/A 2/6/2019    Procedure: COMBINED BRONCHOSCOPY (RIGID OR FLEXIBLE), LAVAGE;  Surgeon: Louise Rogel MD;  Location: [facility]     coronary artery stents placed x 5  2001       Allergies:   Allergies   Allergen Reactions     Atorvastatin Other (See Comments)     Back pain  Tolerates atrovastatin     Docosahexaenoic Acid (Dha)      Other reaction(s): Intolerance-Can't Take - Omega 3 fish oil     Liquid Adhesive Rash       Home Medications      Prior to Admission medications    Medication Sig Start Date End Date Taking? Authorizing Provider   acetaminophen (TYLENOL) 325 MG tablet Take 1-2 tablets (325-650 mg) by mouth every 4 hours as needed for mild pain or fever 2/7/19   Gerald Doty PA-C   acyclovir (ZOVIRAX) 800 MG tablet Take 1 tablet (800 mg) by mouth 2 times daily 12/18/19   Uli Enciso MD   amoxicillin (AMOXIL) 500 MG tablet Take 4 tablets by mouth 1 hour before dental visit. 8/23/18   Uli Enciso MD   aspirin 81 MG EC tablet Take 81 mg by mouth daily Reported on 5/12/2017 7/27/16   Aleta Donovan PA-C   cycloSPORINE  modified (GENERIC EQUIVALENT) 25 MG capsule Take 1 capsule (25 mg) by mouth 2 times daily 12/18/19   Uli Enciso MD   fluconazole (DIFLUCAN) 100 MG tablet Take 1 tablet (100 mg) by mouth daily 12/18/19   Uli Enciso MD   levofloxacin (LEVAQUIN) 250 MG tablet Take 1 tablet (250 mg) by mouth daily 12/18/19   Uli Enciso MD   levothyroxine (SYNTHROID/LEVOTHROID) 150 MCG tablet Take 1 tablet (150 mcg) by mouth daily 12/18/19   Uli Enciso MD   magnesium oxide (MAG-OX) 400 (241.3 MG) MG tablet Take 2 tablets daily 12/7/16   Aleta Donovan PA-C   metoprolol tartrate (LOPRESSOR) 25 MG tablet Take 1 tablet (25 mg) by mouth daily 12/18/19   Uli Enciso MD   NITROGLYCERIN ER PO Take by mouth as needed Reported on 5/12/2017    Reported, Patient   order for DME Equipment being ordered: Please provide portable oxygen at 2 LPM with activity and with sleep via nasal canula. Patient requesting small tanks he can wear with back pack for his work. 1/7/20   Travis Brady MD   order for DME Equipment being ordered: Please order portable oxygen at 2 LPM with activity and with sleep via nasal canula. Patient requesting small tanks he can wear with back pack for his work. 12/20/19   Travis Brady MD   order for DME Please provide oxygen with activity at 2 LPM via nasal canula. 12/19/19   Travis Bardy MD   pilocarpine (SALAGEN) 5 MG tablet Take 1 tablet (5 mg) by mouth 2 times daily 12/18/19   Uli Enciso MD   predniSONE (DELTASONE) 10 MG tablet Take 2 tablets (20 mg) by mouth daily Alternating with 10mg daily 12/18/19   Uli Enciso MD   rosuvastatin (CRESTOR) 5 MG tablet Take 1 tablet (5 mg) by mouth daily 12/18/19   Uli Enciso MD   sulfamethoxazole-trimethoprim (BACTRIM DS/SEPTRA DS) 800-160 MG tablet Take 1 tablet by mouth 2 times daily On Monday's and Tuesday's only 12/18/19   Uli Enciso MD       Review of Systems    Review of Systems:  Is as above in HPI, otherwise negative  for 10 systems    PHYSICAL EXAM      Weight     Wt Readings from Last 3 Encounters:   01/08/20 99.1 kg (218 lb 8 oz)   01/08/20 98.9 kg (218 lb)   12/19/19 103.9 kg (229 lb 1.6 oz)          /74 (BP Location: Right arm)   Pulse 112   Temp 97.7  F (36.5  C) (Oral)   Resp 18   Wt 99.1 kg (218 lb 8 oz)   SpO2 93%   BMI 31.35 kg/m       General: NAD, sitting up and talkative  Eyes: JEREMY, sclera anicteric   Nose/Mouth/Throat: OP clear, buccal mucosa moist, no ulcerations   Lungs: bilateral dry crackles custodial up, no wheezes, rhonchi, or rales or areas of decreased breath sounds  Cardiovascular: RRR, no M/R/G   Abdominal/Rectal: +BS, soft, NT, ND, No HSM   Lymphatics: No edema  Skin: No rashes or petechaie  Neuro: A&O   Additional Findings: PIV in place    ASSESSMENT BY SYSTEMS   Byron Anayair is a 59 year old man with a 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     1.  BMT: 3.5 years s/p BMT for MDS RAEB-2. Re-stage per protocol.    2.  HEME: Has not needed transfusions in a long time. Follow CBC.  Leukocytosis 12.8k, may or may not be related to infection.     3. PULM:   Acute hypoxic respiratory failure - differential diagnosis includes possible aspiration pneumonia vs worsening pleural parenchymal fibroelastosis (PPFE), known complication after BMT.   Reviewed last CT 12/19 which shows possibly slightly worsened disease. Will recheck CT Chest at admission. Pulmonology consult. Check echo as per previous pulm recs from last visit. Check ABG. Support with supplemental O2, may need HFNC or BiPAP to keep sats > 90%. NPO p MN in case of bronch or lung biopsy.                             3.  ID: Afebrile.   Given possible aspiration pneumonia (known swallowing dysfunction) will start vancomycin and pip-tazo. Check procalcitonin, sputum  culture, gram stain, RVP, U leg, U strep, MRSA swab, bordetella (was recommended at last pulm visit)  Continue ppx with ACV, Fluconazole, TMP-SMX. Hold levoflox while on broad spec abx.                               4.  GI:   - Swallow dysfunction: Had video swallow study 12/31 which showed multiple episodes of aspiration with thin and thick liquids. SLP recommended DD3 with nectar thick liquids. Has not started thickening his liquids at home or done any of the oromotor exercises recommended. Will reevaluate here. Also takes pilocarpine for dry mouth.    5.  GVH: Continue cyclosporine, continue prednisone 10mg for now.     6.  FEN/Renal: Monitor creat and lytes. Replete lytes PRN per SS. Monitor weight, I+O, lytes.  7. CV. Continue metoprolol and rosuvastatin. Hold ASA.   8. ENDO. Continue levothyroxine for hypothyroidism  Dispo: Inpatient > 2 nights for management of acute hypoxic respiratory failure requiring high rates of O2.   Delbert Mendenhall

## 2020-01-08 NOTE — TELEPHONE ENCOUNTER
Spoke with [facility]ili[facility] Gallegos). They have received the new order and set him up with oxygen on 1/7/2020.

## 2020-01-08 NOTE — PHARMACY-VANCOMYCIN DOSING SERVICE
Pharmacy Vancomycin Initial Note  Date of Service 2020  Patient's  1960  59 year old, male  Actual Body Weight: 99.1 kg    Indication: Aspiration Pneumonia    Current estimated CrCl = Estimated Creatinine Clearance: 108 mL/min (based on SCr of 0.89 mg/dL).    Creatinine for last 3 days  2020:  3:26 PM Creatinine 0.89 mg/dL    Recent Vancomycin Level(s) for last 3 days  No results found for requested labs within last 72 hours.      Vancomycin IV Administrations (past 72 hours)      No vancomycin orders with administrations in past 72 hours.                Nephrotoxins and other renal medications (From now, onward)    Start     Dose/Rate Route Frequency Ordered Stop    20  acyclovir (ZOVIRAX) tablet 800 mg      800 mg Oral 2 TIMES DAILY 20  cycloSPORINE modified (GENERIC EQUIVALENT) capsule 25 mg      25 mg Oral 2 TIMES DAILY 20 17120 171  piperacillin-tazobactam (ZOSYN) 3.375 g vial to attach to  mL bag      3.375 g  over 30 Minutes Intravenous EVERY 6 HOURS 20 171            Contrast Orders - past 72 hours (72h ago, onward)    None              Plan:  1.  Start vancomycin  2000 mg IV q12h.   2.  Goal Trough Level: 15-20 mg/L   3.  Pharmacy will check trough levels as appropriate in 1-3 Days.    4.  Serum creatinine levels will be ordered daily for the first week of therapy and at least twice weekly for subsequent weeks.    5.  Mona method utilized to dose vancomycin therapy: Method 2    Andrew SpearD  [phone] (text capable)

## 2020-01-09 ENCOUNTER — APPOINTMENT (OUTPATIENT)
Dept: SPEECH THERAPY | Facility: CLINIC | Age: 60
DRG: 177 | End: 2020-01-09
Attending: INTERNAL MEDICINE
Payer: COMMERCIAL

## 2020-01-09 ENCOUNTER — APPOINTMENT (OUTPATIENT)
Dept: CARDIOLOGY | Facility: CLINIC | Age: 60
DRG: 177 | End: 2020-01-09
Attending: INTERNAL MEDICINE
Payer: COMMERCIAL

## 2020-01-09 LAB
ANION GAP SERPL CALCULATED.3IONS-SCNC: 5 MMOL/L (ref 3–14)
BASE DEFICIT BLDA-SCNC: 0.8 MMOL/L
BASOPHILS # BLD AUTO: 0 10E9/L (ref 0–0.2)
BASOPHILS NFR BLD AUTO: 0.4 %
BUN SERPL-MCNC: 15 MG/DL (ref 7–30)
C PNEUM DNA SPEC QL NAA+PROBE: NOT DETECTED
CALCIUM SERPL-MCNC: 8.5 MG/DL (ref 8.5–10.1)
CHLORIDE SERPL-SCNC: 106 MMOL/L (ref 94–109)
CO2 SERPL-SCNC: 27 MMOL/L (ref 20–32)
CREAT SERPL-MCNC: 0.77 MG/DL (ref 0.66–1.25)
DIFFERENTIAL METHOD BLD: NORMAL
EOSINOPHIL # BLD AUTO: 0.1 10E9/L (ref 0–0.7)
EOSINOPHIL NFR BLD AUTO: 1.5 %
ERYTHROCYTE [DISTWIDTH] IN BLOOD BY AUTOMATED COUNT: 14 % (ref 10–15)
FLUAV H1 2009 PAND RNA SPEC QL NAA+PROBE: NOT DETECTED
FLUAV H1 RNA SPEC QL NAA+PROBE: NOT DETECTED
FLUAV H3 RNA SPEC QL NAA+PROBE: NOT DETECTED
FLUAV RNA SPEC QL NAA+PROBE: NOT DETECTED
FLUAV+FLUBV AG SPEC QL: NEGATIVE
FLUAV+FLUBV AG SPEC QL: NEGATIVE
FLUBV RNA SPEC QL NAA+PROBE: NOT DETECTED
GFR SERPL CREATININE-BSD FRML MDRD: >90 ML/MIN/{1.73_M2}
GLUCOSE SERPL-MCNC: 95 MG/DL (ref 70–99)
GRAM STN SPEC: NORMAL
HADV DNA SPEC QL NAA+PROBE: NOT DETECTED
HCO3 BLD-SCNC: 24 MMOL/L (ref 21–28)
HCOV PNL SPEC NAA+PROBE: ABNORMAL
HCT VFR BLD AUTO: 48 % (ref 40–53)
HGB BLD-MCNC: 15.6 G/DL (ref 13.3–17.7)
HMPV RNA SPEC QL NAA+PROBE: NOT DETECTED
HPIV1 RNA SPEC QL NAA+PROBE: NOT DETECTED
HPIV2 RNA SPEC QL NAA+PROBE: NOT DETECTED
HPIV3 RNA SPEC QL NAA+PROBE: NOT DETECTED
HPIV4 RNA SPEC QL NAA+PROBE: NOT DETECTED
IGG SERPL-MCNC: 1228 MG/DL (ref 610–1616)
IMM GRANULOCYTES # BLD: 0.1 10E9/L (ref 0–0.4)
IMM GRANULOCYTES NFR BLD: 1 %
LYMPHOCYTES # BLD AUTO: 1.4 10E9/L (ref 0.8–5.3)
LYMPHOCYTES NFR BLD AUTO: 15.2 %
Lab: NORMAL
M PNEUMO DNA SPEC QL NAA+PROBE: NOT DETECTED
MCH RBC QN AUTO: 31.9 PG (ref 26.5–33)
MCHC RBC AUTO-ENTMCNC: 32.5 G/DL (ref 31.5–36.5)
MCV RBC AUTO: 98 FL (ref 78–100)
MICROBIOLOGIST REVIEW: ABNORMAL
MONOCYTES # BLD AUTO: 1 10E9/L (ref 0–1.3)
MONOCYTES NFR BLD AUTO: 10.7 %
NEUTROPHILS # BLD AUTO: 6.5 10E9/L (ref 1.6–8.3)
NEUTROPHILS NFR BLD AUTO: 71.2 %
NRBC # BLD AUTO: 0 10*3/UL
NRBC BLD AUTO-RTO: 0 /100
O2/TOTAL GAS SETTING VFR VENT: 90 %
OXYHGB MFR BLD: 95 % (ref 92–100)
PCO2 BLD: 40 MM HG (ref 35–45)
PH BLD: 7.39 PH (ref 7.35–7.45)
PLATELET # BLD AUTO: 232 10E9/L (ref 150–450)
PO2 BLD: 79 MM HG (ref 80–105)
POTASSIUM SERPL-SCNC: 4 MMOL/L (ref 3.4–5.3)
RBC # BLD AUTO: 4.89 10E12/L (ref 4.4–5.9)
RSV AG SPEC QL: NEGATIVE
RSV RNA SPEC QL NAA+PROBE: NOT DETECTED
RSV RNA SPEC QL NAA+PROBE: NOT DETECTED
RV+EV RNA SPEC QL NAA+PROBE: NOT DETECTED
SODIUM SERPL-SCNC: 139 MMOL/L (ref 133–144)
SPECIMEN SOURCE: NORMAL
WBC # BLD AUTO: 9.2 10E9/L (ref 4–11)

## 2020-01-09 PROCEDURE — 87186 SC STD MICRODIL/AGAR DIL: CPT | Performed by: INTERNAL MEDICINE

## 2020-01-09 PROCEDURE — 85025 COMPLETE CBC W/AUTO DIFF WBC: CPT | Performed by: INTERNAL MEDICINE

## 2020-01-09 PROCEDURE — 87807 RSV ASSAY W/OPTIC: CPT | Performed by: PHYSICIAN ASSISTANT

## 2020-01-09 PROCEDURE — 87798 DETECT AGENT NOS DNA AMP: CPT | Performed by: PHYSICIAN ASSISTANT

## 2020-01-09 PROCEDURE — 25000132 ZZH RX MED GY IP 250 OP 250 PS 637: Performed by: PHYSICIAN ASSISTANT

## 2020-01-09 PROCEDURE — 87449 NOS EACH ORGANISM AG IA: CPT | Performed by: PHYSICIAN ASSISTANT

## 2020-01-09 PROCEDURE — 82803 BLOOD GASES ANY COMBINATION: CPT | Performed by: INTERNAL MEDICINE

## 2020-01-09 PROCEDURE — 80048 BASIC METABOLIC PNL TOTAL CA: CPT | Performed by: INTERNAL MEDICINE

## 2020-01-09 PROCEDURE — 25800030 ZZH RX IP 258 OP 636: Performed by: INTERNAL MEDICINE

## 2020-01-09 PROCEDURE — 36415 COLL VENOUS BLD VENIPUNCTURE: CPT | Performed by: INTERNAL MEDICINE

## 2020-01-09 PROCEDURE — 25000128 H RX IP 250 OP 636: Performed by: PHYSICIAN ASSISTANT

## 2020-01-09 PROCEDURE — 87305 ASPERGILLUS AG IA: CPT | Performed by: INTERNAL MEDICINE

## 2020-01-09 PROCEDURE — 92526 ORAL FUNCTION THERAPY: CPT | Mod: GN | Performed by: SPEECH-LANGUAGE PATHOLOGIST

## 2020-01-09 PROCEDURE — 25000128 H RX IP 250 OP 636: Performed by: INTERNAL MEDICINE

## 2020-01-09 PROCEDURE — 87070 CULTURE OTHR SPECIMN AEROBIC: CPT | Performed by: INTERNAL MEDICINE

## 2020-01-09 PROCEDURE — 87205 SMEAR GRAM STAIN: CPT | Performed by: INTERNAL MEDICINE

## 2020-01-09 PROCEDURE — 92610 EVALUATE SWALLOWING FUNCTION: CPT | Mod: GN | Performed by: SPEECH-LANGUAGE PATHOLOGIST

## 2020-01-09 PROCEDURE — 87077 CULTURE AEROBIC IDENTIFY: CPT | Performed by: INTERNAL MEDICINE

## 2020-01-09 PROCEDURE — 93306 TTE W/DOPPLER COMPLETE: CPT

## 2020-01-09 PROCEDURE — 87804 INFLUENZA ASSAY W/OPTIC: CPT | Performed by: PHYSICIAN ASSISTANT

## 2020-01-09 PROCEDURE — 25000131 ZZH RX MED GY IP 250 OP 636 PS 637: Performed by: INTERNAL MEDICINE

## 2020-01-09 PROCEDURE — 25000132 ZZH RX MED GY IP 250 OP 250 PS 637: Performed by: INTERNAL MEDICINE

## 2020-01-09 PROCEDURE — 20600000 ZZH R&B BMT

## 2020-01-09 PROCEDURE — 82810 BLOOD GASES O2 SAT ONLY: CPT | Performed by: INTERNAL MEDICINE

## 2020-01-09 PROCEDURE — 93306 TTE W/DOPPLER COMPLETE: CPT | Mod: 26 | Performed by: INTERNAL MEDICINE

## 2020-01-09 PROCEDURE — 40000983 ZZH STATISTIC HFNC ADULT NON-CPAP

## 2020-01-09 RX ORDER — METHYLPREDNISOLONE SODIUM SUCCINATE 125 MG/2ML
100 INJECTION, POWDER, LYOPHILIZED, FOR SOLUTION INTRAMUSCULAR; INTRAVENOUS ONCE
Status: COMPLETED | OUTPATIENT
Start: 2020-01-09 | End: 2020-01-09

## 2020-01-09 RX ORDER — AZITHROMYCIN 250 MG/1
250 TABLET, FILM COATED ORAL DAILY
Status: DISCONTINUED | OUTPATIENT
Start: 2020-01-10 | End: 2020-01-10

## 2020-01-09 RX ORDER — AZITHROMYCIN 250 MG/1
500 TABLET, FILM COATED ORAL ONCE
Status: COMPLETED | OUTPATIENT
Start: 2020-01-09 | End: 2020-01-09

## 2020-01-09 RX ADMIN — ROSUVASTATIN CALCIUM 5 MG: 5 TABLET, FILM COATED ORAL at 08:18

## 2020-01-09 RX ADMIN — PIPERACILLIN AND TAZOBACTAM 3.38 G: 3; .375 INJECTION, POWDER, FOR SOLUTION INTRAVENOUS at 05:51

## 2020-01-09 RX ADMIN — SENNOSIDES AND DOCUSATE SODIUM 1 TABLET: 8.6; 5 TABLET ORAL at 08:16

## 2020-01-09 RX ADMIN — Medication 2 SPRAY: at 08:16

## 2020-01-09 RX ADMIN — METHYLPREDNISOLONE SODIUM SUCCINATE 100 MG: 125 INJECTION, POWDER, FOR SOLUTION INTRAMUSCULAR; INTRAVENOUS at 09:56

## 2020-01-09 RX ADMIN — METOPROLOL TARTRATE 25 MG: 25 TABLET ORAL at 08:16

## 2020-01-09 RX ADMIN — PREDNISONE 10 MG: 10 TABLET ORAL at 08:16

## 2020-01-09 RX ADMIN — VANCOMYCIN HYDROCHLORIDE 2000 MG: 10 INJECTION, POWDER, LYOPHILIZED, FOR SOLUTION INTRAVENOUS at 06:36

## 2020-01-09 RX ADMIN — Medication 400 MG: at 08:16

## 2020-01-09 RX ADMIN — LEVOTHYROXINE SODIUM 150 MCG: 150 TABLET ORAL at 08:17

## 2020-01-09 RX ADMIN — PILOCARPINE HYDROCHLORIDE 5 MG: 5 TABLET, FILM COATED ORAL at 19:53

## 2020-01-09 RX ADMIN — CYCLOSPORINE 25 MG: 25 CAPSULE, LIQUID FILLED ORAL at 19:53

## 2020-01-09 RX ADMIN — ACYCLOVIR 800 MG: 800 TABLET ORAL at 08:16

## 2020-01-09 RX ADMIN — FLUCONAZOLE 100 MG: 100 TABLET ORAL at 08:16

## 2020-01-09 RX ADMIN — CEFEPIME HYDROCHLORIDE 2 G: 2 INJECTION, POWDER, FOR SOLUTION INTRAVENOUS at 10:27

## 2020-01-09 RX ADMIN — CYCLOSPORINE 25 MG: 25 CAPSULE, LIQUID FILLED ORAL at 08:16

## 2020-01-09 RX ADMIN — AZITHROMYCIN MONOHYDRATE 500 MG: 250 TABLET ORAL at 14:58

## 2020-01-09 RX ADMIN — CEFEPIME HYDROCHLORIDE 2 G: 2 INJECTION, POWDER, FOR SOLUTION INTRAVENOUS at 22:55

## 2020-01-09 RX ADMIN — PILOCARPINE HYDROCHLORIDE 5 MG: 5 TABLET, FILM COATED ORAL at 08:18

## 2020-01-09 RX ADMIN — ACYCLOVIR 800 MG: 800 TABLET ORAL at 19:52

## 2020-01-09 ASSESSMENT — ACTIVITIES OF DAILY LIVING (ADL)
ADLS_ACUITY_SCORE: 11

## 2020-01-09 NOTE — PROGRESS NOTES
Attending Summary  The patient was seen and examined by me separate from the midlevel provider.The note above reflects my assessment and plan. I have personally reviewed today's labs,vital and radiology results. The points of care that were added by me are:     History and physical  Admitted for hypoxia and respiratory failure    On exam:  Head/mouth/neck: Oropharynx clear.  No lymphadenopathy.  Heart: Regular rate and rhythm.  No murmurs rubs or gallops.  Lungs: Rales bilaterally.  Abdomen: Abdomen is soft nontender nondistended.  Intact bowel sounds.  Ext: No edema.  Skin: No rash.    Chronic GVHD:  Pulm:  Severe hypoxia with peribronchial thickening.  Sicca:  Moderate dry mouth  GI:  Moderate esophageal dysmotility    Overall score:  Severe (based on pulmonary severity)    Pertinent Labs:  Lab Results   Component Value Date    WBC 9.2 01/09/2020     Lab Results   Component Value Date    RBC 4.89 01/09/2020     Lab Results   Component Value Date    HGB 15.6 01/09/2020     Lab Results   Component Value Date    HCT 48.0 01/09/2020     No components found for: MCT  Lab Results   Component Value Date    MCV 98 01/09/2020     Lab Results   Component Value Date    MCH 31.9 01/09/2020     Lab Results   Component Value Date    MCHC 32.5 01/09/2020     Lab Results   Component Value Date    RDW 14.0 01/09/2020     Lab Results   Component Value Date     01/09/2020     ASSESSMENT AND PLAN  1.  MDS:  3 years s/p alloHCT  2.  Hypoxia:  Diagnosed with pleural parenchymal fibroelastosis.  RVP and infectious disease work up pending.  Started glucocorticoids for new onset hypoxia.  Suspect a component of chronic GVHD.  3.  ID:  Continue current Abx.  4.  Esophageal dysmotility:  Likely secondary to chronic GVHD.  Swallow study pending.    Nicolas Mosher MD

## 2020-01-09 NOTE — PROGRESS NOTES
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  He has a history of cGVHD of the lungs and recently was diagnosed with dysphagia causing aspiration.        Diagnosis AMLU Acute myelogeneous leukemia, Unknown  HCT Type Allogeneic    Prep Regimen Cytoxan  Fludarabine  TBI   Donor Source Related [facility]SC    GVHD Prophylaxis Cyclosporine  Mycophenolate  Primary BMT Provider Fernie     Byron was admitted on 1/8/2020 for acute hypoxic respiratory failure.    INTERVAL  HISTORY   Yg continues to work hard to breathe.  He is on high flow nasal cannula with an FiO2 of 80%, maintaining sats in the mid 90's; however, he de-sats to the upper 80's with any activity. Even sitting up in bed leaves him with one-word dyspnea.    He notes that he was on 15mg prednisone per day until early November, when it was dropped to 10mg daily.  Since then, he feels his lungs have gradually declined.  He was seen by the pulmonary team on 12/19 and at that time was instructed to start home oxygen at 2L/minute due to dropping to 87% on room air while walking; however, it appears that this was never delivered to his house.  He then got in touch with his BMT nurse coordinator on 1/7 because his SOB had worsened significantly.  Per his note (1/7; Nicolas Jara), Liliya was unable to locate any documentation about the oxygen prescription.   Yg was then seen by Dr. Enciso in the BMT clinic yesterday afternoon.  He was hypoxic to 87-88% on 6L oxygen.  Recently had a temp of 100F, he reports.  His daughter had a cough earlier in December; he isn't sure what she was diagnosed with, but says she was given some prednisone.     Review of Systems: 10 point ROS negative except as noted above.  # Pain Assessment:  Current Pain Score 1/8/2020   Patient currently in pain? denies   Pain score (0-10) -   Pain location -   Pain descriptors -    Byron s pain level was assessed and he currently denies pain.        Scheduled Medications    acyclovir  800 mg Oral BID     ceFEPIme (MAXIPIME) IV  2 g Intravenous Q12H     cycloSPORINE modified  25 mg Oral BID     fluconazole  100 mg Oral Daily     levothyroxine  150 mcg Oral Daily     magnesium oxide  800 mg Oral Daily     metoprolol tartrate  25 mg Oral Daily     pilocarpine  5 mg Oral BID     rosuvastatin  5 mg Oral Daily     senna-docusate  1 tablet Oral BID    Or     senna-docusate  2 tablet Oral BID     sodium chloride (PF)  3 mL Intracatheter Q8H     [START ON 1/13/2020] sulfamethoxazole-trimethoprim  1 tablet Oral Q Mon Tues BID     Current Facility-Administered Medications   Medication     acetaminophen (TYLENOL) tablet 650 mg     acyclovir (ZOVIRAX) tablet 800 mg     artificial saliva (BIOTENE DRY MOUTHWASH) liquid 15 mL     artificial saliva (BIOTENE MT) solution 2 spray     benzonatate (TESSALON) capsule 100 mg     ceFEPIme (MAXIPIME) 2 g vial to attach to  ml bag for ADULTS or 50 ml bag for PEDS     cycloSPORINE modified (GENERIC EQUIVALENT) capsule 25 mg     fluconazole (DIFLUCAN) tablet 100 mg     guaiFENesin-dextromethorphan (ROBITUSSIN DM) 100-10 MG/5ML syrup 5 mL     hypromellose-dextran (ARTIFICAL TEARS) 0.1-0.3 % ophthalmic solution 1 drop     levothyroxine (SYNTHROID/LEVOTHROID) tablet 150 mcg     lidocaine (LMX4) cream     lidocaine 1 % 0.1-1 mL     LORazepam (ATIVAN) tablet 0.5-1 mg    Or     LORazepam (ATIVAN) injection 0.5-1 mg     magnesium oxide (MAG-OX) tablet 800 mg     magnesium sulfate 2 g in NS intermittent infusion ([facility] or [facility])     magnesium sulfate 4 g in 100 mL sterile water (premade)     Medication Instruction     metoprolol tartrate (LOPRESSOR) tablet 25 mg     ondansetron (ZOFRAN) injection 8 mg    Or     ondansetron (ZOFRAN-ODT) ODT tab 8 mg    Or     ondansetron (ZOFRAN) tablet 8 mg     pilocarpine (SALAGEN) tablet 5 mg     polyethylene glycol  (MIRALAX/GLYCOLAX) Packet 17 g     potassium chloride (KLOR-CON) Packet 20-40 mEq     potassium chloride 10 mEq in 100 mL intermittent infusion with 10 mg lidocaine     potassium chloride 10 mEq in 100 mL sterile water intermittent infusion (premix)     potassium chloride 20 mEq in 50 mL intermittent infusion     potassium chloride ER (K-DUR/KLOR-CON M) CR tablet 20-40 mEq     potassium phosphate 15 mmol in D5W 250 mL intermittent infusion     potassium phosphate 20 mmol in D5W 250 mL intermittent infusion     potassium phosphate 20 mmol in D5W 500 mL intermittent infusion     potassium phosphate 25 mmol in D5W 500 mL intermittent infusion     prochlorperazine (COMPAZINE) tablet 5 mg    Or     prochlorperazine (COMPAZINE) injection 5 mg     rosuvastatin (CRESTOR) tablet 5 mg     senna-docusate (SENOKOT-S/PERICOLACE) 8.6-50 MG per tablet 1 tablet    Or     senna-docusate (SENOKOT-S/PERICOLACE) 8.6-50 MG per tablet 2 tablet     sodium chloride (PF) 0.9% PF flush 3 mL     sodium chloride (PF) 0.9% PF flush 3 mL     [START ON 1/13/2020] sulfamethoxazole-trimethoprim (BACTRIM DS/SEPTRA DS) 800-160 MG per tablet 1 tablet       PHYSICAL EXAM     Weight In/Out     Wt Readings from Last 3 Encounters:   01/09/20 99.1 kg (218 lb 8 oz)   01/08/20 98.9 kg (218 lb)   12/19/19 103.9 kg (229 lb 1.6 oz)      I/O last 3 completed shifts:  In: 1440 [I.V.:1440]  Out: 525 [Urine:525]       KPS:  30    BP 91/68 (BP Location: Left arm)   Pulse 97   Temp 98.3  F (36.8  C) (Oral)   Resp 22   Wt 99.1 kg (218 lb 8 oz)   SpO2 92%   BMI 31.35 kg/m         General: mild to moderate distress based on hypoxia  Eyes: : JEREMY, sclera anicteric   Nose/Mouth/Throat: OP clear, buccal mucosa moist, no ulcerations   Lungs: crackles throughout; sats 93% on 80% high flow nasal cannula; 1 word dyspnea after moving from supine to sitting on edge of bed.   Cardiovascular: tachycardic but regular, no M/R/G   Abdominal/Rectal: +BS, soft, NT, ND; did not  lie back down for abdominal exam due to severe CUELLAR with any position changes  Lymphatics: no edema  Skin: no rashes or petechaie  Neuro: A&O   Additional Findings: PIV    LABS AND IMAGING - PAST 24 HOURS     Results for orders placed or performed during the hospital encounter of 01/08/20 (from the past 24 hour(s))   Methicillin Resistant Staph Aureus PCR   Result Value Ref Range    Specimen Description Nares     Methicillin Resist/Sens S. aureus PCR Negative NEG^Negative   Magnesium   Result Value Ref Range    Magnesium 2.1 1.6 - 2.3 mg/dL   Phosphorus   Result Value Ref Range    Phosphorus 3.8 2.5 - 4.5 mg/dL   Procalcitonin   Result Value Ref Range    Procalcitonin 0.38 ng/ml   Blood gas venous   Result Value Ref Range    Ph Venous 7.41 7.32 - 7.43 pH    PCO2 Venous 37 (L) 40 - 50 mm Hg    PO2 Venous 61 (H) 25 - 47 mm Hg    Bicarbonate Venous 23 21 - 28 mmol/L    Base Deficit Venous 1.2 mmol/L    FIO2 80    INR   Result Value Ref Range    INR 1.46 (H) 0.86 - 1.14   Fibrinogen activity   Result Value Ref Range    Fibrinogen 860 (H) 200 - 420 mg/dL   Partial thromboplastin time   Result Value Ref Range    PTT 42 (H) 22 - 37 sec   Lactic acid whole blood   Result Value Ref Range    Lactic Acid 1.8 0.7 - 2.0 mmol/L   Nt probnp inpatient   Result Value Ref Range    N-Terminal Pro BNP Inpatient 220 0 - 900 pg/mL   CT Chest Pulmonary Embolism w Contrast    Narrative    EXAMINATION: CTA pulmonary angiogram, 1/8/2020 6:15 PM     COMPARISON: CT 12/19/2019    HISTORY: Shortness of breath, PE suspected, high pretest probability.  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     TECHNIQUE: Volumetric helical acquisition of CT images of the chest  from the lung apices to the kidneys were acquired after  the  administration of 71 mL of Isovue-370 IV contrast. Flash technique  with free breathing acquisition.  Post-processed multiplanar and/or  MIP reformations were obtained, archived to PACS and used in  interpretation of this study.     FINDINGS:  Contrast bolus is: adequate.  Exam is negative for acute  pulmonary embolism.    Partially visualized thyroid is unremarkable. There is no axillary,  mediastinal, or hilar lymphadenopathy. Heart size is normal. There is  no pericardial effusion. Normal configuration diameter of the great  vessels. Main pulmonary artery is enlarged and measures 3.4 cm.  Ascending aorta is nonaneurysmal. Heavy coronary artery  calcifications. The central tracheobronchial tree is patent. Thoracic  esophagus is mildly patulous.    Bilateral small-to-moderate pleural effusions. Diffuse fibrotic  changes with significantly increased diffusely scattered groundglass  opacities. Bronchial wall thickening most predominant in the mid and  upper lungs. Relatively stable scattered consolidative nodular  opacities. Overall decreased aeration of the left lung. No  pneumothorax.    Limited evaluation of the upper abdomen. Visualized portions of the  liver and pancreas are unremarkable. Hyperdense material within the  gallbladder. Spleen is overall small in size, this is of uncertain  significance. Adrenal glands are normal. Perinephric stranding  bilaterally.    Bones and soft tissue: Soft tissues unremarkable. No suspicious  osseous lesion. Stable depression of the superior endplate of L1.  Moderate degenerative change of the visualized spine with flowing  syndesmophytes within the low thoracic spine.         Impression    IMPRESSION:   1. Exam is negative for acute pulmonary embolism.     2. Increased diffuse groundglass and consolidative opacities on a  background of fibrotic change and bronchiectasis/bronchial wall  thickening. Differential remains unchanged, including progressive  organizing  pneumonia/shgdj-fidlhx-yncv disease, pleural-parenchymal  fibroelastosis, and/or drug reaction with possible superimposed  infection.   3. Slightly increased small to moderate bilateral pleural effusions.  4. Large main pulmonary artery measuring up to 3.4 cm, can be seen in  pulmonary hypertension.    In the event of a positive result for acute pulmonary embolism  resulting in right heart strain, please activate the [facility]  Multidisciplinary group for consultation by paging [phone]  [phone]).     [facility] -- Pulmonary Embolism Response Team (Multidisciplinary team  including cardiology, interventional radiology, critical care,  hematology)    I have personally reviewed the examination and initial interpretation  and I agree with the findings.    EDUARDO BRUCE, DO   Legionella pneumonia antigen urine   Result Value Ref Range    Specimen Description Catheterized Urine     L Pneumo Urine Antigen       Presumptive negative for Legionella pneumophilia serogroup 1 antigen in urine, suggesting   no recent or current infection.  Infection due to Legionella cannot be ruled out, since   other serogroups and species may cause disease, antigen may not be present in urine in   early infection, and the level of antigen present in the urine may be below detectable   limits of the test.     Strep pneumo Agn Urine or CSF   Result Value Ref Range    Specimen Description Urine     S Pneumoniae Antigen       Negative, no Streptococcus pneumoniae antigen detected by immunochromatographic membrane   assay. A negative Streptococcus pneumoniae antigen result does not rule out infection with   Streptococcus pneumoniae.     Basic metabolic panel   Result Value Ref Range    Sodium 139 133 - 144 mmol/L    Potassium 4.0 3.4 - 5.3 mmol/L    Chloride 106 94 - 109 mmol/L    Carbon Dioxide 27 20 - 32 mmol/L    Anion Gap 5 3 - 14 mmol/L    Glucose 95 70 - 99 mg/dL    Urea Nitrogen 15 7 - 30 mg/dL    Creatinine 0.77 0.66 - 1.25 mg/dL    GFR Estimate  >90 >60 mL/min/[1.73_m2]    GFR Estimate If Black >90 >60 mL/min/[1.73_m2]    Calcium 8.5 8.5 - 10.1 mg/dL   CBC with platelets differential   Result Value Ref Range    WBC 9.2 4.0 - 11.0 10e9/L    RBC Count 4.89 4.4 - 5.9 10e12/L    Hemoglobin 15.6 13.3 - 17.7 g/dL    Hematocrit 48.0 40.0 - 53.0 %    MCV 98 78 - 100 fl    MCH 31.9 26.5 - 33.0 pg    MCHC 32.5 31.5 - 36.5 g/dL    RDW 14.0 10.0 - 15.0 %    Platelet Count 232 150 - 450 10e9/L    Diff Method Automated Method     % Neutrophils 71.2 %    % Lymphocytes 15.2 %    % Monocytes 10.7 %    % Eosinophils 1.5 %    % Basophils 0.4 %    % Immature Granulocytes 1.0 %    Nucleated RBCs 0 0 /100    Absolute Neutrophil 6.5 1.6 - 8.3 10e9/L    Absolute Lymphocytes 1.4 0.8 - 5.3 10e9/L    Absolute Monocytes 1.0 0.0 - 1.3 10e9/L    Absolute Eosinophils 0.1 0.0 - 0.7 10e9/L    Absolute Basophils 0.0 0.0 - 0.2 10e9/L    Abs Immature Granulocytes 0.1 0 - 0.4 10e9/L    Absolute Nucleated RBC 0.0    Blood gas arterial   Result Value Ref Range    pH Arterial 7.39 7.35 - 7.45 pH    pCO2 Arterial 40 35 - 45 mm Hg    pO2 Arterial 79 (L) 80 - 105 mm Hg    Bicarbonate Arterial 24 21 - 28 mmol/L    Base Deficit Art 0.8 mmol/L    FIO2 90.0    Oxyhemoglobin   Result Value Ref Range    Oxyhemoglobin Arterial 95 92 - 100 %   Gram stain   Result Value Ref Range    Specimen Description Sputum     Special Requests Screen     Gram Stain PENDING          OVERALL PLAN     Byron Russo is a 59 year old man with a 3.5 years s/p allogeneic sibling transplant for RAEB-2 MDS, complicated by chronic ufcpn-hukjdp-qicd disease and pleural parenchymal fibroelastosis who presents with worsening acute hypoxic respiratory failure.      1.  BMT: 3.5 years s/p BMT for MDS RAEB-2. Re-stage per protocol.     2.  HEME: Has not needed transfusions in a long time. Follow CBC.  Leukocytosis resolved.     3. PULM:   Acute hypoxic respiratory failure - differential diagnosis includes possible aspiration  pneumonia vs worsening pleural parenchymal fibroelastosis (PPFE), known complication after BMT.   CT as above.   Pulm consult: believe origin is infection. Unable to pursue invasive testing due to high oxygen support needs.                            3.  ID:   Concern for infectious pulmonary process causing acute hypoxic respiratory failure on top of underlying structural lung changes related to cGVHD.  Broad work-up in process and ID consulted.  Empiric cefepime.   The following testing is positive:  Coronavirus (rest of RVP is negative).    The following testing is negative:  Rapid flu/rsv; legionella; strep pneumo; MRSA screen.  The following testing is in process: Sputum cx, gram stain, PJP PCR,  aspergillus galactomannan, beta-d glucan, pertussis, parapertussis, urine histo/blasto.    Continue ppx with ACV, Fluconazole, TMP-SMX. Hold levoflox while on broad spec abx.                               4.  GI:   - Swallow dysfunction with aspiration on 12/31 video swallow study. Speech assessed here and recommend DD3 with nectar thick liquids.    - Pilocarpine for dry mouth.     5.  GVH: Continue cyclosporine 25mg PO bid.  Steroid decreased from 15mg to 10 mg in early November, patient believes his SOB was gradually worsening since that time.  Methylpred 100mg bolus 1/9; will discuss future doing in rounds with BMT attending 1/10.      6.  FEN/Renal:   DD3 with nectar thick liquids.    7. CV. Continue metoprolol and rosuvastatin. Hold ASA. Echo with LVEF of 55-60%; mild decrease in right ventricular function when compared to last echo in 4/2016.    8. ENDO. Continue levothyroxine for hypothyroidism    Meera HILL. Carrier   1/10/2020

## 2020-01-09 NOTE — PLAN OF CARE
Afebrile, normotensive pt reports blood pressure is lower than his baseline, on 80% FiO2 High Flow nasal cannula           Problem: ARDS (Acute Respiratory Distress Syndrome)  Goal: Effective Oxygenation  Outcome: Declining

## 2020-01-09 NOTE — CONSULTS
"[facility]  Pulmonary Consult     Patient:  Byron Russo, Date of birth 1960, Medical record number [id number]  Date of Visit:  01/09/2020    Reason for Consult: \"acute hypoxic resp failure, history of BMT with PPFE, followed by Dr. 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 Unfortunately, the patient's oxygen requirement is too high to pursue bronchoscopy/BAL. In the setting of chronic immunosuppression he is also at risk for fungal infection (aspergillus, histo, blasto) which could explain his scattered consolidative opacities. He takes PJP ppx which decreases the likelihood of PJP pneumonia, however, is still possible/on the differential. Given the severity of his illness and the inability to perform bronchoscopy 2/2 high O2 requirements we would recommend ID consultation to assist with evaluation for possible fungal etiology which will 1) help guide whether or not anti-fungals should be used empirically, 2) help determine if treatment dose bactrim should be used empirically for possible PJP pneumonia, and 3) help guide whether or not high-dose steroid treatment will be safe if there turns out to be a low concern for fungal etiology. No overt evidence of other bacterial pneumonia, however, given the severity of illness we agree with empiric antibiotics. Aspiration pneumonitis could contribute to his CT findings/hypoxia given the fact that he just " failed a swallow study on 12/31/19. Agree with evaluation for underlying heart failure/pulmonary hypertension with TTE. Low concern for progression of PPFE. Strep pneumo and legionella negative.    - Supportive cares with oxygen  - ID consultation for reasons listed above; appreciate recs  - For now would continue PTA steroid regimen until other infectious/fungal etiology is ruled out  - Agree with broad spectrum antibiotics  - Follow 1,3 Beta D glucan, galactomannan, pertussis  - Sputum culture: please send PCP PCR  - Test for histo, blasto  - LDH  - Follow TTE    Patient staffed with Dr. Webster.    Ryan Daniels MD  Internal Medicine PGY-3        History of Present Illness     HPI: Byron Russo is a 59 yom w/ hx of MDS RAEB-2 s/p Allo-sib HSCT 3 years ago, significant aspiration on recent swallow study, chronic GVHD, and PPFE (lung associated cGvHD) admitted on 1/8/2020 w/ SOB/CUELLAR FT acute hypoxic respiratory failure.    He presented to BMT clinic on 1/8 and was notably hypoxic to 85% on 2L O2 with minimal improvement to 87-88% on 6L. Hypoxia was worse on exertion. Associated SOB/CUELLAR. He denies cough, fevers, chills, rhinorrhea, sore throat, CP, N/V. He ultimately was admitted and started on HFNC which improved his oxygenation and dyspnea.    The patient was last seen in pulmonary clinic by Dr. Brady who noted sub-acute SOB/CUELLAR in the setting of 12/2019 CXR w/ diffuse infiltrates/vascular congestion s/p albuterol and doxycycline w/ no significiant improvement (flu negative). He also had 20lb weight loss over 2 month period, pill dysphagia, and intermittent aspiration (ENT noted epiglottis dysfunction and swallow study ordered). CT evidence fo PPFE, there was some concern for viral respiratory infection at that time, fungal infection was considered by BAL cultures were negative, restrictive PFTs with no change, and based on lack of treatment options and no/slow progression Dr. Brady decided against biopsy to  confirm diagnosis. Walking test notable for hypoxia to 86% s/p initiation of 2L of O2 via NC (attributed to PPFE), viral panel and bordetella. PFTs 12/23 showed worsening FVC (63->51%), FEV1 (67->55%), increased FRC, RV, and decreased DLCO (65->57%). Bordetella testing negative. Echo, RVP not done yet. O2 was only delivered yesterday.    Review of Systems:  10 point ROS completed and negative except for above.    Past Medical History:   Diagnosis Date     CAD (coronary artery disease) 2001     Hyperlipidemia      Hypothyroidism      Obesity      Pulmonary embolism (H) 2/2016     Varicose veins        Past Surgical History:   Procedure Laterality Date     APPENDECTOMY       ARTHROSCOPY KNEE WITH MEDIAL MENISCECTOMY  6/20/2011    Procedure:ARTHROSCOPY KNEE WITH MEDIAL MENISCECTOMY; Surgeon:SACHIN SAMANO; Location:[facility]     BRONCHOSCOPY (RIGID OR FLEXIBLE), DIAGNOSTIC N/A 2/6/2019    Procedure: COMBINED BRONCHOSCOPY (RIGID OR FLEXIBLE), LAVAGE;  Surgeon: Louise Rogel MD;  Location: [facility]     coronary artery stents placed x 5  2001       Family History   Problem Relation Age of Onset     Myocardial Infarction Father 58     Coronary Artery Disease Father      Heart Failure Mother      Glaucoma Mother      Coronary Artery Disease Brother      Coronary Artery Disease Brother      Cancer Brother         GIST     Skin Cancer No family hx of      Melanoma No family hx of      Macular Degeneration No family hx of        Social History     Social History Narrative     Not on file     Social History     Tobacco Use     Smoking status: Never Smoker     Smokeless tobacco: Former User   Substance Use Topics     Alcohol use: No     Alcohol/week: 0.0 standard drinks     Drug use: No       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     Acute myeloid leukemia (H)     MDS (myelodysplastic syndrome) (H)     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)     Chronic GVHD  (H)     Pneumonia     Acute respiratory failure with hypoxia (H)     Aspiration into airway     Oropharyngeal dysphagia     ILD (interstitial lung disease) (H)            Current Medications & Allergies:       acyclovir  800 mg Oral BID     cycloSPORINE modified  25 mg Oral BID     fluconazole  100 mg Oral Daily     levothyroxine  150 mcg Oral Daily     magnesium oxide  800 mg Oral Daily     metoprolol tartrate  25 mg Oral Daily     pilocarpine  5 mg Oral BID     piperacillin-tazobactam  3.375 g Intravenous Q6H     predniSONE  10 mg Oral Daily     rosuvastatin  5 mg Oral Daily     senna-docusate  1 tablet Oral BID    Or     senna-docusate  2 tablet Oral BID     sodium chloride (PF)  3 mL Intracatheter Q8H     [START ON 1/13/2020] sulfamethoxazole-trimethoprim  1 tablet Oral Q Mon Tues BID     vancomycin (VANCOCIN) IV  2,000 mg Intravenous Q12H       Infusions/Drips:    - MEDICATION INSTRUCTIONS -         Allergies   Allergen Reactions     Atorvastatin Other (See Comments)     Back pain  Tolerates atrovastatin     Docosahexaenoic Acid (Dha)      Other reaction(s): Intolerance-Can't Take - Omega 3 fish oil     Liquid Adhesive Rash            Physical Exam:   Ranges for vital signs:  Temp:  [97.5  F (36.4  C)-98.2  F (36.8  C)] 97.5  F (36.4  C)  Pulse:  [] 79  Resp:  [18-24] 24  BP: ()/(68-81) 112/74  FiO2 (%):  [60 %-100 %] 80 %  SpO2:  [88 %-96 %] 95 %  Vitals:    01/08/20 1708   Weight: 99.1 kg (218 lb 8 oz)       Physical Examination:  GENERAL:  Lying in bed, no acute distress  HEAD:  Head is normocephalic, atraumatic   EYES:  Eyes have anicteric sclerae   LUNGS:  Increased respiratory effort, B/L inspiratory crackles in throughout lung fields (worst in lower lobes)  CARDIOVASCULAR:  Regular rate and rhythm   ABDOMEN:  Soft, nontender, nondistended  EXT:  No edema.    NEUROLOGIC:  Awake and alert, answering questions appropriately.  PSYCH: Normal affect.         Laboratory Data:     Immune Globulin  Studies     Recent Labs   Lab Test 09/25/19  1522 02/13/19  1525 11/14/18  1515 07/18/18  1539 11/15/17  1502 02/21/17  1625 08/19/16  1017   IGG 1,150 1,330 1,110 1,230 742 359* 1,080   IGM  --   --   --   --   --  60 72   IGE  --   --   --   --   --   --  3   IGA  --   --   --   --   --   --  261       Metabolic Studies       Recent Labs   Lab Test 01/09/20  0507 01/08/20  1743 01/08/20  1526  02/04/19 2050 05/21/16  0405     --  135   < > 142   < > 140   POTASSIUM 4.0  --  4.4   < > 4.2   < > 4.4   CHLORIDE 106  --  105   < > 106   < > 106   CO2 27  --  22   < > 27   < > 26   ANIONGAP 5  --  8   < > 9   < > 8   BUN 15  --  14   < > 16   < > 10   CR 0.77  --  0.89   < > 0.83   < > 0.72   GFRESTIMATED >90  --  >90   < > >90   < > >90  Non  GFR Calc     GLC 95  --  101*   < > 131*   < > 105*   TRACE 8.5  --  8.8   < > 8.8   < > 8.2*   PHOS  --  3.8  --    < > 4.7*  --   --    MAG  --  2.1  --    < > 2.2   < > 1.4*   LACT  --  1.8  --   --   --   --  2.1*   PCAL  --  0.38  --    < >  --   --   --    FGTL  --   --   --   --  <31  --   --     < > = values in this interval not displayed.       Hepatic Studies    Recent Labs   Lab Test 01/08/20  1526 12/19/19  1623 09/25/19  1522 02/04/19 2050 11/16/16  0948   BILITOTAL 1.1 0.6 0.4   < > 0.5   < > 0.5   DBIL  --   --   --   --   --   --  0.2   ALKPHOS 110 90 76   < > 87   < > 128   PROTTOTAL 7.6 7.8 7.4   < > 7.8   < > 7.0   ALBUMIN 2.8* 3.4 3.5   < > 3.6   < > 3.2*   AST 38 28 22   < > 28   < > 132*   ALT 24 22 23   < > 25   < > 141*   LDH  --   --   --   --  224  --   --     < > = values in this interval not displayed.       Hematology Studies      Recent Labs   Lab Test 01/09/20  0507 01/08/20  1526 12/19/19  1623 09/25/19  1522 06/25/19  1346 04/10/19  1602   WBC 9.2 12.8* 8.4 8.2 7.0 6.7   ANEU 6.5 10.4* 4.9 5.0 4.9 3.2   ALYM 1.4 1.1 2.0 1.9 1.5 1.9   SARA 1.0 1.1 1.4* 1.3 0.6 1.4*   AEOS 0.1 0.0 0.1 0.0 0.0 0.1   HGB 15.6 17.2 17.1 16.8  16.1 16.4   HCT 48.0 51.4 50.9 48.0 47.0 49.5    238 284 214 220 241       Clotting Studies    Recent Labs   Lab Test 01/08/20  1743 02/04/19  2050 06/29/16  1016 05/16/16  0424   INR 1.46* 1.11 1.1 1.09   PTT 42* 31  --   --        Arterial Blood Gas Testing    Recent Labs   Lab Test 01/09/20  0518 01/08/20  1743   PH 7.39  --    PCO2 40  --    PO2 79*  --    HCO3 24  --    O2PER 90.0 80        Urine Studies     Recent Labs   Lab Test 03/13/17  1624 03/10/17  1526 04/18/16  1132   URINEPH 5.0 5.0 5.0   NITRITE Negative Negative Negative   LEUKEST Negative Negative Negative   WBCU 4* 5* 10*       Microbiology:  Last 6 Culture results with specimen source  Culture Micro   Date Value Ref Range Status   02/06/2019 (A)  Final    Light growth  Actinomyces odontolyticus  This organism is part of normal karl, but on occasion, may be a true pathogen. Clinical   correlation must be applied to interpreting this microbiology result.     02/06/2019 Light growth  Normal respiratory karl    Final   02/06/2019 Susceptibility testing not routinely done  Final   02/06/2019 Culture negative for acid fast bacilli  Final   02/06/2019   Final    Assayed at [facility] [address] [place] [id number] [phone]   02/06/2019 Candida glabrata  isolated   (A)  Final   02/06/2019   Final    No additional fungi cultured after 4 weeks incubation   02/06/2019 No growth after 4 weeks  Final   02/06/2019 Light growth  Normal respiratory karl    Final    Specimen Description   Date Value Ref Range Status   01/08/2020 Catheterized Urine  Final   01/08/2020 Urine  Final   01/08/2020 Nares  Final   02/06/2019 Bronchial lavage Right upper lobe  Final   02/06/2019 Bronchial lavage Right upper lobe  Final   02/06/2019 Bronchial lavage Right upper lobe  Final   02/06/2019 Bronchial lavage Right upper lobe  Final   02/06/2019 Bronchial lavage Right upper lobe  Final   02/06/2019 Bronchial lavage Right upper lobe  Final   02/06/2019  Bronchial lavage Right upper lobe  Final        Infectious Disease Testing     Recent Labs   Lab Test 04/18/16  1205   TREPAB Negative     No lab results found.    Imaging:  Recent Results (from the past 48 hour(s))   CT Chest Pulmonary Embolism w Contrast    Narrative    EXAMINATION: CTA pulmonary angiogram, 1/8/2020 6:15 PM     COMPARISON: CT 12/19/2019    HISTORY: Shortness of breath, PE suspected, high pretest probability.  History of pleural-parenchymal fibroelastosis.3.5 years s/p allogeneic  sibling transplant for RAEB-2 MDS, complicated by chronic  dayft-kyfrld-nqlu disease and pleural parenchymal fibroelastosis who  presents with worsening acute hypoxic respiratory failure.     TECHNIQUE: Volumetric helical acquisition of CT images of the chest  from the lung apices to the kidneys were acquired after the  administration of 71 mL of Isovue-370 IV contrast. Flash technique  with free breathing acquisition.  Post-processed multiplanar and/or  MIP reformations were obtained, archived to PACS and used in  interpretation of this study.     FINDINGS:  Contrast bolus is: adequate.  Exam is negative for acute  pulmonary embolism.    Partially visualized thyroid is unremarkable. There is no axillary,  mediastinal, or hilar lymphadenopathy. Heart size is normal. There is  no pericardial effusion. Normal configuration diameter of the great  vessels. Main pulmonary artery is enlarged and measures 3.4 cm.  Ascending aorta is nonaneurysmal. Heavy coronary artery  calcifications. The central tracheobronchial tree is patent. Thoracic  esophagus is mildly patulous.    Bilateral small-to-moderate pleural effusions. Diffuse fibrotic  changes with significantly increased diffusely scattered groundglass  opacities. Bronchial wall thickening most predominant in the mid and  upper lungs. Relatively stable scattered consolidative nodular  opacities. Overall decreased aeration of the left lung. No  pneumothorax.    Limited  evaluation of the upper abdomen. Visualized portions of the  liver and pancreas are unremarkable. Hyperdense material within the  gallbladder. Spleen is overall small in size, this is of uncertain  significance. Adrenal glands are normal. Perinephric stranding  bilaterally.    Bones and soft tissue: Soft tissues unremarkable. No suspicious  osseous lesion. Stable depression of the superior endplate of L1.  Moderate degenerative change of the visualized spine with flowing  syndesmophytes within the low thoracic spine.         Impression    IMPRESSION:   1. Exam is negative for acute pulmonary embolism.     2. Increased diffuse groundglass and consolidative opacities on a  background of fibrotic change and bronchiectasis/bronchial wall  thickening. Differential remains unchanged, including progressive  organizing pneumonia/zthmm-apjtbn-pvxi disease, pleural-parenchymal  fibroelastosis, and/or drug reaction with possible superimposed  infection.   3. Slightly increased small to moderate bilateral pleural effusions.  4. Large main pulmonary artery measuring up to 3.4 cm, can be seen in  pulmonary hypertension.    In the event of a positive result for acute pulmonary embolism  resulting in right heart strain, please activate the [facility]  Multidisciplinary group for consultation by paging [phone]  ([phone]).     [facility] -- Pulmonary Embolism Response Team (Multidisciplinary team  including cardiology, interventional radiology, critical care,  hematology)    I have personally reviewed the examination and initial interpretation  and I agree with the findings.    EDUARDO BRUCE, DO

## 2020-01-09 NOTE — PLAN OF CARE
Afebrile,vitals are stable,SOB,oxygen st 92-96% on HF NC  80% FiO2,bilateral lung are diminished..Patient reported breathing is easier today after receiving 100 mg methyl prednisone .NP swab sent for pertussis result pending.Pulmonary team saw pt,no note yet.Lab called that one of patient test positive for Corona virus,provider was informed,pt was started on  po Zithromax.,more lab tests  on urine,sputum were ordered..NPO most of day for swallow study,study done this afternoon and dysphagia diet with nectar thick fluid recommended.Continue per plan of care.

## 2020-01-09 NOTE — PROGRESS NOTES
"CLINICAL NUTRITION SERVICES - ASSESSMENT NOTE     Nutrition Prescription    RECOMMENDATIONS FOR MDs/PROVIDERS TO ORDER:  Diet adv v nutrition support within 2-3 days.  --Diet per SLP    Malnutrition Status:    Unable to determine due to pt busy with other cares x 3 attempted visits    Recommendations already ordered by Registered Dietitian (RD):  None with current NPO    Future/Additional Recommendations:  Diet as tolerated per SLP    If pt unsafe for po, rec: Nutren 1.5 @ 60 mL/hr to provide 2160 kcals (27 kcal/kg/day), 98 g PRO (1.2 g/kg/day), 1094 mL H2O, 253 g CHO and no fiber daily.    Consider supplements and calorie counts when diet advanced - pt with significant weight loss in months preceeding admission     REASON FOR ASSESSMENT  Byron Russo is a/an 59 year old male assessed by the dietitian for Admission Nutrition Risk Screen for unintentional loss of 10# or more in the past two months    PMH significant for: allogeneic sibling transplant for RAEB-2 MDS, complicated by chronic woavi-nbcffl-oyyl disease and pleural parenchymal fibroelastosis, CAD, HLD, hypothyroid, appendectomy,  --presents with worsening acute hypoxic respiratory failure.     Of note per H&P:  ''Swallow dysfunction: Had video swallow study 12/31 which showed multiple episodes of aspiration with thin and thick liquids. SLP recommended DD3 with nectar thick liquids. Has not started thickening his liquids at home or done any of the oromotor exercises recommended. Will reevaluate here. Also takes pilocarpine for dry mouth.''    NUTRITION HISTORY  Attempted to visit with pt x3 today, however busy with other providers during each attempted visit.    CURRENT NUTRITION ORDERS  Diet: NPO  Intake/Tolerance: N/A    LABS  Labs reviewed  Lytes WNL  Euglycemia    MEDICATIONS  Medications reviewed  Mag-ox daily  Prednisone  Doc-senna    ANTHROPOMETRICS  Height:   Ht Readings from Last 2 Encounters:   12/19/19 1.778 m (5' 10\")   02/26/19 1.778 m (5' " "10\")     Most Recent Weight: 99.1 kg (218 lb 8 oz)    IBW: 75.5 kg  BMI: 31.4 --  Obesity Grade I BMI 30-34.9  Weight History: 10.5% wt loss x ~2.5 months - significant  Wt Readings from Last 10 Encounters:   01/08/20 99.1 kg (218 lb 8 oz)   01/08/20 98.9 kg (218 lb)   12/19/19 103.9 kg (229 lb 1.6 oz)   10/24/19 110.7 kg (244 lb)   09/25/19 110.1 kg (242 lb 11.2 oz)   06/25/19 108.4 kg (238 lb 14.4 oz)   04/10/19 109.8 kg (242 lb)   03/26/19 107.6 kg (237 lb 3.2 oz)   02/26/19 107.7 kg (237 lb 6.4 oz)   02/13/19 108.1 kg (238 lb 6.4 oz)     Dosing Weight: 81 kg (adjusted with admit wt of 99.1 kg and IBW of 75.5 kg)    ASSESSED NUTRITION NEEDS  Estimated Energy Needs: 8658-2462 kcals/day (25 - 30 kcals/kg)  Justification: Repletion  Estimated Protein Needs:  grams protein/day (1.2 - 1.5 grams of pro/kg)  Justification: Maintenance of LBM  Estimated Fluid Needs: (1 mL/kcal)   Justification: Maintenance and Per provider pending fluid status    PHYSICAL FINDINGS  See malnutrition section below.    MALNUTRITION  % Intake: Unable to assess  % Weight Loss: > 5% in 1 month (severe)  Subcutaneous Fat Loss: Unable to assess  Muscle Loss: Unable to assess  Fluid Accumulation/Edema: None noted  Malnutrition Diagnosis: Unable to determine due to pt busy with other cares x 3 attempted visits    NUTRITION DIAGNOSIS  Unintended weight loss related to likely contributing role of dysphagia vs respiratory failure as evidenced by 10.5% wt loss x ~2.5 months - significant      INTERVENTIONS  Implementation  Nutrition Education: Unable to complete due to pt busy with other cares     Goals  Diet adv v nutrition support within 2-3 days.     Monitoring/Evaluation  Progress toward goals will be monitored and evaluated per protocol.    Charity Vieyra MS, RD, , LD.  5C/BMT Pager:[phone]    "

## 2020-01-09 NOTE — PROGRESS NOTES
"   01/09/20 1303   General Information   Onset Date 01/08/20   Start of Care Date 01/09/20   Referring Physician Delbert Mendenhall MD   Patient Profile Review/OT: Additional Occupational Profile Info See Profile for full history and prior level of function   Patient/Family Goals Statement Patient would like to eat/drink.   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 CT chest showed increased consolidative and ground glass opacities with a predominantly basilar peripheral/subpleural distribution with associated traction bronchiectasis and peribronchial vascular nodular opacities, slightly increased from 3/26/19, with differential diagnosis of organizing pneumonia, pkama-vudrwg-rhac disease, pleural parenchymal fibroelastosis, and drug reaction, unable to exclude superimposed infection.\" Video swallow study completed 12/31/2019 as [facility] which revealed silent aspiration of thin liquids and aspiration of nectar thick liquids (no aspiration with single sips of nectar thick liquid) with recommendation for dysphagia diet level 3 with nectar thick liquids given swallow strategies, swallow exercise program and repeat video swallow study in 6-8 weeks. Patient had not begun thickening liquids at home or completing swallow exercises per MD note and patient report. Patient alert, seated at edge of bed, interactive and did not appear in any distress throughout evaluation. Patient reported xerostomia and " prefers moistened soft foods.   Clinical Swallow Evaluation   Oral Musculature generally intact   Structural Abnormalities none present   Dentition present and adequate   Mucosal Quality dry   Mandibular Strength and Mobility intact   Oral Labial Strength and Mobility WFL   Lingual Strength and Mobility WFL   Velar Elevation intact   Buccal Strength and Mobility intact   Laryngeal Function Cough;Throat clear;Swallow;Voicing initiated;Dry swallow palpated   Clinical Swallow Eval: Thin Liquid Texture Trial   Mode of Presentation, Thin Liquids spoon;fed by clinician   Volume of Liquid or Food Presented 2 ice chips   Oral Phase of Swallow Poor AP movement  (mild)   Pharyngeal Phase of Swallow feeling of something stuck in throat  (x1 which cleared with subsequent swallow)   Clinical Swallow Eval: Nectar Thick Liquid Texture Trial   Mode of Presentation, Nectar spoon;cup;fed by clinician;self-fed   Volume of Nectar Presented 4 oz   Oral Phase, [place] WFL   Pharyngeal Phase, Nectar intact   Clinical Swallow Eval: Puree Solid Texture Trial   Mode of Presentation, Puree spoon;self-fed   Volume of Puree Presented 3 bites pudding   Oral Phase, Puree WFL   Pharyngeal Phase, Puree intact   Clinical Swallow Eval: Semisolid Texture Trial   Mode of Presentation, Semisolid spoon;self-fed   Volume of Semisolid Food Presented 4 bites   Oral Phase, Semisolid WFL   Pharyngeal Phase, Semisolid intact   Clinical Swallow Eval: Solid Food Texture Trial   Mode of Presentation, Solid spoon;self-fed   Volume of Solid Food Presented 2 bites (moistened with pudding)   Oral Phase, Solid WFL   Pharyngeal Phase, Solid intact   Esophageal Phase of Swallow   Esophageal comments Patient reported occasional food sticks in pharynx but this clears with subsequent swallows of liquid.   General Therapy Interventions   Planned Therapy Interventions Dysphagia Treatment   Dysphagia treatment Oropharyngeal exercise training;Modified diet education;Instruction  of safe swallow strategies   Swallow Eval: Clinical Impressions   Skilled Criteria for Therapy Intervention Skilled criteria met.  Treatment indicated.   Functional Assessment Scale (FAS) 3   Treatment Diagnosis moderate dysphagia   Diet texture recommendations Dysphagia diet level 3;Nectar thick liquids   Recommended Feeding/Eating Techniques maintain upright posture during/after eating for 30 mins;no straws;small sips/bites;alternate between small bites and sips of food/liquid;hard swallow w/ each bite or sip  (rest breaks; slow rate)   Demonstrates Need for Referral to Another Service   (involved)   Therapy Frequency 5x/week   Predicted Duration of Therapy Intervention (days/wks) 1 week   Anticipated Discharge Disposition home w/ outpatient services   Risks and Benefits of Treatment have been explained. Yes   Patient, family and/or staff in agreement with Plan of Care Yes   Clinical Impression Comments Patient presents with moderate oropharyngeal dysphagia characterized by mildly reduced oral bolus control with thinner liquids and occasional globus sensation with drier solid textures. No overt aspiration signs occurred across consistencies this date, however patient with hx of silent aspiration of thin liquid and instances of aspiration of nectar thick liquid (when attempting to clear pill) based on recent video swallow study 12/31/2019 (no aspiration of nectar thick liquid with single sips during recent study). Note functional mastication of moistened soft solid textures and no oral residue remained. Patient c/o globus sensation x1 at start of PO intake but this cleared with subsequent swallows. Recommend cautiously initiate dysphagia diet level 3 with nectar thick liquids given consistent use of swallow strategies (fully upright position, no straws, small single sips/bites, alternate consistencies, rest breaks as needed, slow rate) and patient to be breathing comfortably. Recommend additional sauces/gravy for  moisture. Monitor closely for increased signs/symptoms of aspiration or worsening respiratory status and hold PO if occur.   Total Evaluation Time   Total Evaluation Time (Minutes) 20

## 2020-01-09 NOTE — PLAN OF CARE
OT 5C: cancel, pt with other providers upon AM check in then working with speech this PM, will reschedule OT eval for 1/10.

## 2020-01-09 NOTE — PLAN OF CARE
Discharge Planner SLP   Patient plan for discharge: not discussed  Current status: Clinical swallow evaluation completed per MD order. Patient presents with moderate oropharyngeal dysphagia characterized by mildly reduced oral bolus control with thinner liquids and occasional globus sensation with drier solid textures. No overt aspiration signs occurred across consistencies this date, however patient with hx of silent aspiration of thin liquid and instances of aspiration of nectar thick liquid (when attempting to clear pill) based on recent video swallow study 12/31/2019 (no aspiration of nectar thick liquid with single sips during recent study). Note functional mastication of moistened soft solid textures and no oral residue remained. Patient c/o globus sensation x1 at start of PO intake but this cleared with subsequent swallows.     Recommend cautiously initiate dysphagia diet level 3 with nectar thick liquids given consistent use of swallow strategies (fully upright position, no straws, small single sips/bites, alternate consistencies, rest breaks as needed, slow rate) and patient to be breathing comfortably. Recommend additional sauces/gravy for moisture. Monitor closely for increased signs/symptoms of aspiration or worsening respiratory status and hold PO if occur.    Barriers to return to prior living situation: medical needs  Recommendations for discharge: defer to medical team  Rationale for recommendations: continued SLP for dysphagia       Entered by: Pearl Keith 01/09/2020 1:33 PM

## 2020-01-09 NOTE — PLAN OF CARE
PT 5C: PT order for evaluation and treatment acknowledged. Unable to see pt due to his schedule with other providers. Rescheduled for tomorrow.

## 2020-01-09 NOTE — PLAN OF CARE
Afebrile. Pt on HFNC between 60%-100% FiO2. OVSS. RR WDL at rest, but pt gets CUELLAR and desats with any activity, including changing position and sitting up. Desatted to upper 70s when going to the bathroom. Pt offers no complaints beyond SOB. Legionella and strep urine labs sent last evening. Resulted negative. Pt diet of dysphagia level 3 nectar thick liquids d/t aspiration as seen on swallow study last month. He has been NPO since midnight for possible procedure today. ABG sent and resulted WDL. No replacements needed. Continue to monitor.    Problem: ARDS (Acute Respiratory Distress Syndrome)  Goal: Effective Oxygenation  1/9/2020 0617 by Zeynep Quigley RN  Outcome: No Change     Problem: Adult Inpatient Plan of Care  Goal: Plan of Care Review  Outcome: No Change     Problem: Adult Inpatient Plan of Care  Goal: Absence of Hospital-Acquired Illness or Injury  Outcome: No Change     Problem: Adult Inpatient Plan of Care  Goal: Optimal Comfort and Wellbeing  Outcome: No Change

## 2020-01-10 ENCOUNTER — APPOINTMENT (OUTPATIENT)
Dept: SPEECH THERAPY | Facility: CLINIC | Age: 60
DRG: 177 | End: 2020-01-10
Attending: INTERNAL MEDICINE
Payer: COMMERCIAL

## 2020-01-10 ENCOUNTER — APPOINTMENT (OUTPATIENT)
Dept: OCCUPATIONAL THERAPY | Facility: CLINIC | Age: 60
DRG: 177 | End: 2020-01-10
Attending: INTERNAL MEDICINE
Payer: COMMERCIAL

## 2020-01-10 LAB
ANION GAP SERPL CALCULATED.3IONS-SCNC: 8 MMOL/L (ref 3–14)
B PARAPERT DNA SPEC QL NAA+PROBE: NOT DETECTED
B PERT DNA SPEC QL NAA+PROBE: NOT DETECTED
BASOPHILS # BLD AUTO: 0 10E9/L (ref 0–0.2)
BASOPHILS NFR BLD AUTO: 0.1 %
BORDETELLA COMMENT: NORMAL
BUN SERPL-MCNC: 17 MG/DL (ref 7–30)
CALCIUM SERPL-MCNC: 8.9 MG/DL (ref 8.5–10.1)
CHLORIDE SERPL-SCNC: 109 MMOL/L (ref 94–109)
CMV DNA SPEC NAA+PROBE-ACNC: NORMAL [IU]/ML
CMV DNA SPEC NAA+PROBE-LOG#: NORMAL {LOG_IU}/ML
CO2 SERPL-SCNC: 20 MMOL/L (ref 20–32)
CREAT SERPL-MCNC: 0.64 MG/DL (ref 0.66–1.25)
DIFFERENTIAL METHOD BLD: NORMAL
EOSINOPHIL # BLD AUTO: 0 10E9/L (ref 0–0.7)
EOSINOPHIL NFR BLD AUTO: 0 %
ERYTHROCYTE [DISTWIDTH] IN BLOOD BY AUTOMATED COUNT: 14.1 % (ref 10–15)
GFR SERPL CREATININE-BSD FRML MDRD: >90 ML/MIN/{1.73_M2}
GLUCOSE SERPL-MCNC: 124 MG/DL (ref 70–99)
HCT VFR BLD AUTO: 49.5 % (ref 40–53)
HGB BLD-MCNC: 16.2 G/DL (ref 13.3–17.7)
IMM GRANULOCYTES # BLD: 0.1 10E9/L (ref 0–0.4)
IMM GRANULOCYTES NFR BLD: 0.8 %
LDH SERPL L TO P-CCNC: 291 U/L (ref 85–227)
LYMPHOCYTES # BLD AUTO: 0.8 10E9/L (ref 0.8–5.3)
LYMPHOCYTES NFR BLD AUTO: 10.8 %
MAGNESIUM SERPL-MCNC: 2.2 MG/DL (ref 1.6–2.3)
MCH RBC QN AUTO: 31.9 PG (ref 26.5–33)
MCHC RBC AUTO-ENTMCNC: 32.7 G/DL (ref 31.5–36.5)
MCV RBC AUTO: 97 FL (ref 78–100)
MONOCYTES # BLD AUTO: 0.4 10E9/L (ref 0–1.3)
MONOCYTES NFR BLD AUTO: 4.9 %
NEUTROPHILS # BLD AUTO: 6 10E9/L (ref 1.6–8.3)
NEUTROPHILS NFR BLD AUTO: 83.4 %
NRBC # BLD AUTO: 0 10*3/UL
NRBC BLD AUTO-RTO: 0 /100
PHOSPHATE SERPL-MCNC: 4.4 MG/DL (ref 2.5–4.5)
PLATELET # BLD AUTO: 256 10E9/L (ref 150–450)
POTASSIUM SERPL-SCNC: 4.4 MMOL/L (ref 3.4–5.3)
RBC # BLD AUTO: 5.08 10E12/L (ref 4.4–5.9)
SODIUM SERPL-SCNC: 138 MMOL/L (ref 133–144)
SPECIMEN SOURCE: NORMAL
WBC # BLD AUTO: 7.2 10E9/L (ref 4–11)

## 2020-01-10 PROCEDURE — 87449 NOS EACH ORGANISM AG IA: CPT | Performed by: INTERNAL MEDICINE

## 2020-01-10 PROCEDURE — 83615 LACTATE (LD) (LDH) ENZYME: CPT | Performed by: INTERNAL MEDICINE

## 2020-01-10 PROCEDURE — 83735 ASSAY OF MAGNESIUM: CPT | Performed by: INTERNAL MEDICINE

## 2020-01-10 PROCEDURE — 25000132 ZZH RX MED GY IP 250 OP 250 PS 637: Performed by: PHYSICIAN ASSISTANT

## 2020-01-10 PROCEDURE — 85025 COMPLETE CBC W/AUTO DIFF WBC: CPT | Performed by: INTERNAL MEDICINE

## 2020-01-10 PROCEDURE — 25000131 ZZH RX MED GY IP 250 OP 636 PS 637: Performed by: INTERNAL MEDICINE

## 2020-01-10 PROCEDURE — 97165 OT EVAL LOW COMPLEX 30 MIN: CPT | Mod: GO

## 2020-01-10 PROCEDURE — 87305 ASPERGILLUS AG IA: CPT | Performed by: INTERNAL MEDICINE

## 2020-01-10 PROCEDURE — 80048 BASIC METABOLIC PNL TOTAL CA: CPT | Performed by: INTERNAL MEDICINE

## 2020-01-10 PROCEDURE — 97110 THERAPEUTIC EXERCISES: CPT | Mod: GO

## 2020-01-10 PROCEDURE — 92526 ORAL FUNCTION THERAPY: CPT | Mod: GN

## 2020-01-10 PROCEDURE — 25000128 H RX IP 250 OP 636: Performed by: PHYSICIAN ASSISTANT

## 2020-01-10 PROCEDURE — 20600000 ZZH R&B BMT

## 2020-01-10 PROCEDURE — 25000132 ZZH RX MED GY IP 250 OP 250 PS 637: Performed by: INTERNAL MEDICINE

## 2020-01-10 PROCEDURE — 36415 COLL VENOUS BLD VENIPUNCTURE: CPT | Performed by: INTERNAL MEDICINE

## 2020-01-10 PROCEDURE — 84100 ASSAY OF PHOSPHORUS: CPT | Performed by: INTERNAL MEDICINE

## 2020-01-10 RX ORDER — METHYLPREDNISOLONE SODIUM SUCCINATE 125 MG/2ML
100 INJECTION, POWDER, LYOPHILIZED, FOR SOLUTION INTRAMUSCULAR; INTRAVENOUS EVERY 24 HOURS
Status: DISCONTINUED | OUTPATIENT
Start: 2020-01-10 | End: 2020-01-11

## 2020-01-10 RX ORDER — AMPICILLIN AND SULBACTAM 2; 1 G/1; G/1
3 INJECTION, POWDER, FOR SOLUTION INTRAMUSCULAR; INTRAVENOUS EVERY 6 HOURS
Status: DISCONTINUED | OUTPATIENT
Start: 2020-01-10 | End: 2020-01-14

## 2020-01-10 RX ADMIN — AMPICILLIN SODIUM AND SULBACTAM SODIUM 3 G: 2; 1 INJECTION, POWDER, FOR SOLUTION INTRAMUSCULAR; INTRAVENOUS at 15:14

## 2020-01-10 RX ADMIN — METHYLPREDNISOLONE SODIUM SUCCINATE 100 MG: 125 INJECTION, POWDER, FOR SOLUTION INTRAMUSCULAR; INTRAVENOUS at 10:35

## 2020-01-10 RX ADMIN — PILOCARPINE HYDROCHLORIDE 5 MG: 5 TABLET, FILM COATED ORAL at 19:46

## 2020-01-10 RX ADMIN — CYCLOSPORINE 25 MG: 25 CAPSULE, LIQUID FILLED ORAL at 19:45

## 2020-01-10 RX ADMIN — METOPROLOL TARTRATE 25 MG: 25 TABLET ORAL at 08:05

## 2020-01-10 RX ADMIN — ROSUVASTATIN CALCIUM 5 MG: 5 TABLET, FILM COATED ORAL at 08:05

## 2020-01-10 RX ADMIN — CYCLOSPORINE 25 MG: 25 CAPSULE, LIQUID FILLED ORAL at 08:05

## 2020-01-10 RX ADMIN — Medication 800 MG: at 08:05

## 2020-01-10 RX ADMIN — AMPICILLIN SODIUM AND SULBACTAM SODIUM 3 G: 2; 1 INJECTION, POWDER, FOR SOLUTION INTRAMUSCULAR; INTRAVENOUS at 22:40

## 2020-01-10 RX ADMIN — AZITHROMYCIN MONOHYDRATE 250 MG: 250 TABLET ORAL at 08:05

## 2020-01-10 RX ADMIN — LEVOTHYROXINE SODIUM 150 MCG: 150 TABLET ORAL at 08:05

## 2020-01-10 RX ADMIN — CEFEPIME HYDROCHLORIDE 2 G: 2 INJECTION, POWDER, FOR SOLUTION INTRAVENOUS at 10:35

## 2020-01-10 RX ADMIN — ACYCLOVIR 800 MG: 800 TABLET ORAL at 08:05

## 2020-01-10 RX ADMIN — FLUCONAZOLE 100 MG: 100 TABLET ORAL at 08:05

## 2020-01-10 RX ADMIN — PILOCARPINE HYDROCHLORIDE 5 MG: 5 TABLET, FILM COATED ORAL at 08:05

## 2020-01-10 RX ADMIN — ACYCLOVIR 800 MG: 800 TABLET ORAL at 19:45

## 2020-01-10 ASSESSMENT — ACTIVITIES OF DAILY LIVING (ADL)
ADLS_ACUITY_SCORE: 13
ADLS_ACUITY_SCORE: 11
ADLS_ACUITY_SCORE: 13
ADLS_ACUITY_SCORE: 11
PREVIOUS_RESPONSIBILITIES: MEAL PREP;HOUSEKEEPING;LAUNDRY;SHOPPING;YARDWORK;MEDICATION MANAGEMENT;FINANCES;DRIVING;WORK
ADLS_ACUITY_SCORE: 13
ADLS_ACUITY_SCORE: 11

## 2020-01-10 NOTE — PLAN OF CARE
PT 5C - Cancel: PT orders acknowledged and appreciated. Pt with bloody nose this morning and continued high O2 needs with very little activity. Will re-schedule for 1/11.

## 2020-01-10 NOTE — PROGRESS NOTES
CLINICAL NUTRITION SERVICES - BRIEF NOTE      Nutrition Prescription     RECOMMENDATIONS FOR MDs/PROVIDERS TO ORDER:  Consider starting calorie counts if appetite decreases with recent significant weight loss    Malnutrition   Non-severe malnutrition in the context of chronic illness     Recommendations already ordered by Registered Dietitian (RD):  Nutrition Education - discussed goal of small and frequent po of nutrient dense foods  To help increase oral intakes and optimize nutrition    Supplements: magic cups TID w/ meals      *Please see full assessment note from 1/10/20    New Findings:  Visited with pt who reports that weight loss PTA was related to decrease in steroid dose and associated decrease in appetite.  He has continued to eat daily but is eating less. Since discharge from facility ~1 week PTA did not follow recommended DD3 diet or thickening of liquids d/t SOB and lack of interest in po for week duration prior to admit. Since admission over past several meals, pt feels that po is improving and has been eating good portion sizes of food. Able to eat full omelet this morning and 2/3 of pancake.  He is interested in trying magic cups.  He has DD3 menu and ordering off of this without issue - declined need for room service with assist or any education needs at this time. Reiterated role of altered texture diet along with goal of po intakes to optimize nutrition and stop weight loss. Pt does note current wt is the same as when he was previously on a reduced dose of steroids. Weight loss in part likely d/t some fluid losses - pt feels much less puffy now that dose has been decreased.    MALNUTRITION  Limited with street clothes  % Intake: < 75% for > 7 days (non-severe)  % Weight Loss: > 5% in 1 month (severe)  Subcutaneous Fat Loss: Upper arm:  Mild  Muscle Loss: Thoracic region (clavicle, acromium bone, deltoid, trapezius, pectoral):  Mild and Upper arm (bicep, tricep):  moderate  Fluid  Accumulation/Edema: None noted  Malnutrition Diagnosis: Non-severe malnutrition in the context of chronic illness      Interventions  Collaboration with other providers - 5C rounds  Medical food supplement therapy - per above  Nutrition Education - per above    RD to follow per protocol.    Charity Vieyra MS, RD, , LD.  5C/BMT Pager:[phone]

## 2020-01-10 NOTE — PROGRESS NOTES
01/10/20 1700   Quick Adds   Type of Visit Initial Occupational Therapy Evaluation   Living Environment   Lives With child(bridgette), adult;spouse   Living Arrangements house   Home Accessibility stairs to enter home;stairs within home   Number of Stairs, Within Home, Primary other (see comments)  (split level, 8 up and 8 down )   Stair Railings, Within Home, Primary railings safe and in good condition   Transportation Anticipated car, drives self;family or friend will provide   Living Environment Comment Pt lives in a split level home with his wife and 2 adult children.    Self-Care   Usual Activity Tolerance moderate   Current Activity Tolerance fair   Regular Exercise Yes   Activity/Exercise Type walking   Equipment Currently Used at Home none   Activity/Exercise/Self-Care Comment Pt does not report using any AE at baseline, used to walk regularly but this has been limited 2/2 SOB/CUELLAR with all activity.    Functional Level   Ambulation 0-->independent   Transferring 0-->independent   Toileting 0-->independent   Bathing 0-->independent   Dressing 0-->independent   Eating 0-->independent   Communication 0-->understands/communicates without difficulty   Swallowing 0-->swallows foods/liquids without difficulty   Cognition 0 - no cognition issues reported   Fall history within last six months no   Which of the above functional risks had a recent onset or change? none   Prior Functional Level Comment Pt is independent in all functional mobility and ADLs at baseline, reports increased difficulty with all acitivty 2/2 SOB and limited activity tolerance.    General Information   Onset of Illness/Injury or Date of Surgery - Date 01/08/20   Referring Physician Delbert Mendenhall MD   Patient/Family Goals Statement return home    Additional Occupational Profile Info/Pertinent History of Current Problem  Byron Russo is a 59 year old male, currently 3y7m s/p his allogeneic sibling stem cell transplant for MDS.  He has a  history of cGVHD of the lungs and recently was diagnosed with dysphagia causing aspiration.    Precautions/Limitations oxygen therapy device and L/min   Weight-Bearing Status - LUE full weight-bearing   Weight-Bearing Status - RUE full weight-bearing   Weight-Bearing Status - LLE full weight-bearing   Weight-Bearing Status - RLE full weight-bearing   Cognitive Status Examination   Orientation orientation to person, place and time   Level of Consciousness alert   Follows Commands (Cognition) WNL   Memory intact   Attention No deficits were identified   Cognitive Comment No cognition concerns noted.    Visual Perception   Visual Perception No deficits were identified;Wears glasses   Sensory Examination   Sensory Quick Adds No deficits were identified   Pain Assessment   Patient Currently in Pain No   Integumentary/Edema   Integumentary/Edema no deficits were identifed   Posture   Posture not impaired   Range of Motion (ROM)   ROM Comment BUEs WFL    Hand Strength   Hand Strength Comments Bilateral  strength WFL    Coordination   Upper Extremity Coordination No deficits were identified   Gross Motor Coordination No deficits identified   Fine Motor Coordination WFL    Mobility   Bed Mobility Comments SBA    Transfer Skill: Bed to Chair/Chair to Bed   Level of [place]: Bed to Chair stand-by assist   Transfer Skill: Sit to Stand   Level of [place]: Sit/Stand stand-by assist   Balance   Balance Comments No balance deficits identified with OOB activity.    Instrumental Activities of Daily Living (IADL)   Previous Responsibilities meal prep;housekeeping;laundry;shopping;yardwork;medication management;finances;driving;work   IADL Comments Pt works at a [employer] company. Pt reports significantly increased difficulty with heavier IADLs, has been receiving more assist for these from wife and adult kids.    Activities of Daily Living Analysis   Impairments Contributing to Impaired Activities of Daily Living  "strength decreased  (fatigue, O2 needs )   General Therapy Interventions   Planned Therapy Interventions home program guidelines;progressive activity/exercise;strengthening;IADL retraining   Clinical Impression   Criteria for Skilled Therapeutic Interventions Met yes, treatment indicated   OT Diagnosis Decreased IADL-I   Influenced by the following impairments general deconditioning, fatigue, and limited activity tolerance   Assessment of Occupational Performance 1-3 Performance Deficits   Identified Performance Deficits home management, work, community mobility, ambulation   Clinical Decision Making (Complexity) Low complexity   Therapy Frequency 5x/week   Predicted Duration of Therapy Intervention (days/wks) 1/17/2020   Anticipated Discharge Disposition Home with Assist;Home with Outpatient Therapy   Risks and Benefits of Treatment have been explained. Yes   Patient, Family & other staff in agreement with plan of care Yes   Clinical Impression Comments Pt presents to OT today with general deconditioning, fatigue, and limited activity tolerance, all leading to decreased ADL and IADL-I. Pt to benefit from skilled OT services to address the following problem list.    [facility] AM-PAC  \"6 Clicks\" Daily Activity Inpatient Short Form   1. Putting on and taking off regular lower body clothing? 4 - None   2. Bathing (including washing, rinsing, drying)? 4 - None   3. Toileting, which includes using toilet, bedpan or urinal? 4 - None   4. Putting on and taking off regular upper body clothing? 4 - None   5. Taking care of personal grooming such as brushing teeth? 4 - None   6. Eating meals? 4 - None   Daily Activity Raw Score (Score out of 24.Lower scores equate to lower levels of function) 24     "

## 2020-01-10 NOTE — CONSULTS
[facility]  Transplant Infectious Disease Consult Note - New Patient     Patient:  Byron Russo, Date of birth 1960, Medical record number [id number]  Date of Visit:  01/10/2020  Consult requested by Dr. Nicolas Moshre for evaluation of possible pneumonia s/p BMT with chronic GVHD.         Assessment and Recommendations:   Recommendations:  - Would switch cefepime to Unasyn 3.1 grams IV q 6 hours to cover a potential community-acquired bacterial pneumonia and, specifically, cover the 1/9/20 sputum E faecalis isolate in case it is a real pneumonic pathogen.  (Should check the Enterococcus' susceptibilities over the weekend to make certain it is ampicillin-susceptible, as expected -- may need to switch to vancomycin or linezolid, if not.)  Would treat through 1/17/20 (one week of Enterococcus coverage) for a full pneumonia treatment course.  If he is doing well with substantially decreased hypoxia / dyspnea and ready for discharge toward the end of the week of antibiotic, can switch the Unasyn to PO Augmentin 875 mg BID for the final two to several days, if desired. At discharge.  - There is no therapy available against coronavirus respiratory infection except tincture of time.  - Can discontinue azithromycin, since he was on levofloxacin pre-admission, so an atypical bacterial pneumonia is highly unlikely.  - Continue the ongoing pre-admission TMP-SMX, fluconazole, and acyclovir prophylaxis.  - The need for resuming levofloxacin bacterial prophylaxis after the conclusion of the acute antibiotic course is debatable, but will defer to Dr. Brady and the Pulmonary service on that question.    - Apparently due for Tdap vaccination (per the [facility] registry).  - Consider giving pneumococcal and Shingrix vaccine series soon (either inpatient or outpatient).  - Continue VRE contact isolation from 2016 carriage), but consider obtaining three rectal VRE screening cultures to show it is cleared  "and thereby discontinue his contact isolation mandate.    Thanks for the consult on this complex patient. The case was discussed with the BMT service.  Transplant ID will not follow with you, but please page if additional ID questions or concerns arise.    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  He was also recently diagnosed with recurrent dysphagia and aspiration on a 12/31/19 swallow study.  He was now admitted to the [facility] BMT service on 1/8/20 evening with two months of progressive exertional dyspnea / hypoxia and recent acutely worsened resting / exertional dyspnea with marked admission hypoxia (O2 saturation 86% on six liters of oxygen), accompanied by a moment of acute slight low-grade fever (100.0 degrees F) and mild leukocytosis (12.8) but no subjective fever or chills, no cough, and no recollected EENT symptoms.  Investigations have been remarkable for an equivocal procalcitonin, a chest CT with increased \"diffuse groundglass and consolidative opacities on a background of fibrotic change and bronchiectasis/bronchial wall thickening\", detection of coronavirus on nares respiratory virus PCR panel, and heavy growth of Enterococcus faecalis in 1/9/20 sputum culture.    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  The prednisone dose was decreased from 15 mg to 10 mg daily in early 11/19 and his increased exertional dyspnea " started soon thereafter, suggesting that decrease may be contributing to his present pulmonary insufficiency.  In addition, he now has a coronavirus respiratory infection detected on nares respiratory virus PCR, so may have a component of coronavirus pneumonitis, and he has heavy growth of Enterococcus faecalis on 1/9/20 sputum culture, so may have a secondary bacterial pneumonia.  There is no evidence of other viral infection (including a negative 1/9/20 plasma CMV PCR assay and otherwise negative respiratory virus PCR panel) and he was on levofloxacin prophylaxis pre-admission, so an atypical bacterial pneumonia is unlikely.  This would be an atypical presentation for a fungal pneumonia so would not give empiric increased (over his usual fluconazole prophylaxis), but fungal serologies are still pending, to be sure.    - Coronavirus infection:  This does not seem very symptomatic currently, without upper respiratory symptoms to speak of, but may nevertheless be contributing to his current respiratory insufficiency.  There is no available treatment.  His serum IgG is normal, so no exogenous IVIG is indicated.    - Heavy growth Enterococcus in 1/9/20 sputum culture; possible aspiration / community-acquired pneumonia:  Enterococcus is often a respiratory colonizer rather than an actual pneumonic pathogen, so this isolate may not be clinically relevant.  But the growth is heavy in the culture, he had a slight pre-admission fever and an admission leukocytosis with an equivocal procalcitonin, and no other bacterial pathogen has been isolated, so it merits treatment as a possible pneumonia, just in case.  As an E faecalis, it is likely not a VRE as he carried in 2016, and has a reasonable chance of being ampicillin-susceptible, which would then be the agent of choice.  Unasyn will provide somewhat broader coverage for an aspiration (given his propensity to aspiration recently found on 12/31/19 video swallow study) and  community-acquired pneumonia in general above ampicillin itself alone.  So, assuming the Enterococcus is eventually reported as ampicillin-susceptible, would treat with a week of Unasyn (Augmentin PO can be used toward the end of the course if he is doing well), to make sure he has been treated in case of an Enterococcus / aspiration.    - Pleural parenchymal fibroelastosis / pulmonary GVHD:  Agree with increased steroid immunosuppression -- likely this is contributing a substantial portion to his acute respiratory decompensation.    Other ID issues:  - QTc interval:  444 msec on 4/19/16 EKG.  - Bacterial prophylaxis:  On pre-admission levofloxacin prophylaxis.  - Pneumocystis prophylaxis:  On TMP-SMX pre-admission.  - Viral serostatus & prophylaxis:  On acyclovir pre-admission.  - Fungal prophylaxis:  On fluconazole pre-admission.  - Immunization status:  Influenza up to date.  Perhaps due for Tdap vaccination -- last given 4/22/08 per the [facility] registry.  Would also strongly consider giving pneumococcal and Shingrix vaccine series soon.  - Gamma globulin status:  Serum IgG normal at 1,228 on 1/8/20.  - Isolation status: On contact isolation for a history of VRE shedding in 2016 (although may likely now have cleared that).  Also on respiratory virus droplet isolation for coronavirus infection.         History of Infectious Disease Illness:   Mr. 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 Brady in Pulmonary Clinic.  He was also recently diagnosed with recurrent dysphagia and aspiration on a 12/31/19 swallow study.  When last seen by Dr. Brady there on  12/19/19, he complained of two months of increased exertional dyspnea, along with pill dysphagia and weight loss.  His room air oxygen saturation was 87%.  A chest x-ray from elsewhere obtained in early 12/19 reportedly showed diffuse infiltrates with vascular congestion. Chest CT showed increased, mostly bibasilar / peribronchial consolidative / ground glass opacities (mildly worse versus the prior 3/26/19 CT scan) and 12/23/19 PFTs showed a decreasing FVC, FEV1, and DLCO.  He was prescribed home oxygen (which ws not delivered until 1/7/20), an albuterol MDI and empiric doxycycline.  He was seen for follow-up in BMT Clinic on 1/8/20 and complained that his dyspnea was progressively worsening  and his finger oximeter oxygen saturations at rest were 86 - 88%, even on 2 - 4 liters of supplemental oxygen, requiring 6 liters to get up to 90% at rest, and desaturating further with exertion.  He had a low-grade fever of 100.0 degrees without recent other feverishness, chills or sweats.  He reported an unspecified viral-like URI(s) in his family (daughter had a cough in early 12/19) although he lacked current ENT viral symptoms nor cough or pleurisy, and also lacked nausea, diarrhea, or other GI or skin symptoms.  He was admitted for acute hypoxia from the B[facility] Clinic to [facility] on 1/8/20 evening.  A procalcitonin was equivocal at 0.38.  On high flow nasal cannula oxygen he felt better with resolution of his dyspnea and malaise.  He was placed initially on empiric Zosyn plus IV vancomycin for the possibility of an aspiration pneumonia, but those antibiotics were switched to cefepime plus azithromycin on 1/9/20 midday.  His pre-admission TMP-SMX, acyclovir, and fluconazole prophylaxis were continued but pre-admission levofloxacin prophylaxis was held.  Because it was felt part of his acute syndrome was an exacerbation of his pulmonary GVHD (in the setting of a chronic steroid dose decrease in 11/19), he was also  given a bolus of methylprednisolone 100 mg on 1/9/20, but the prednisone was then resumed today at the pre-admission dose.  He was seen by Pulmonary Consult who declined to perform bronchoscopy because his hypoxia was too severe and the procedure would be expected to result in intubation.  1/9/20 midday, the admission nares respiratory virus PCR panel was reported as detecting coronavirus ([place] not traveled to [place]  Other studies including blood CMV PCR, Legionella and pneumococcus urine antigen assays, pertussis PCR, NTpBNP, and serum LDH (291) were negative / unremarkable.  Screens for PJP, histoplasmosis, blastomycosis, and Fungitell / galactomannan assays are pending.  Sputum culture from 1/9/20 has now grown Enterococcus faecalis (susceptibilities pending).  He has remained afebrile (T max 98.3 degrees F) since admission and his initial mild 1/8/20 leukocytosis of 12.8 quickly normalized on the antibiotics to 9.2 on 1/9/20 AM and 7.2 today.  Today, he feels considerably better but continues to have dyspnea and desaturations on exertion despite still requiring high-flow nasal cannula supplemental oxygen at FiO2 of 0.55 or oximizer mask at 15 liters.  He lacks new symptoms or issues today except for dry mouth and nose with some transient epistaxis caused by the oxygen.      Transplants:  Mid-2016 allogeneic sibling stem cell transplant for MDS / RAEB-2.    Review of Systems:  CONSTITUTIONAL:  No fever, chills, or sweats.  Recent subacute fatigue and mild anorexia; improving respiratory malaise.  EYES: No eye pain, visual changes, or scleral icterus.  ENT:  Transient epistaxis from dry nose today.  No rhinorrhea, sinus pain, otalgia, hearing loss, tinnitus, sore throat, or oral pain.  Compromised swallow prone to aspiration on thick liquids.  LYMPH:  No edema.  RESPIRATORY:  Resting / exertional dyspnea / hypoxia.  Minimal cough, no increased sputum.  Underlying PPFE.  CARDIOVASCULAR:  No chest  pain, palpitations.  GASTROINTESTINAL:  No abdominal pain, nausea, vomiting, diarrhea or constipation.  GENITOURINARY:  No dysuria.  HEME:  No anemia.  Resolved leukocytosis.  Mild lymphopenia of immunosuppression.  No easy bruising.  ENDOCRINE:  Hypothyroidism on Synthroid.  MUSCULOSKELETAL:  No myalgias / arthralgias.  SKIN:  No rash or pruritus.  NEURO:  No headache.  PSYCH:  Negative.    Past Medical History:   Diagnosis Date     CAD (coronary artery disease) 2001     Hyperlipidemia      Hypothyroidism      Obesity      Pulmonary embolism (H) 2/2016     Varicose veins      Past Surgical History:   Procedure Laterality Date     APPENDECTOMY       ARTHROSCOPY KNEE WITH MEDIAL MENISCECTOMY  6/20/2011    Procedure:ARTHROSCOPY KNEE WITH MEDIAL MENISCECTOMY; Surgeon:SACHIN SAMANO; Location:[facility]     BRONCHOSCOPY (RIGID OR FLEXIBLE), DIAGNOSTIC N/A 2/6/2019    Procedure: COMBINED BRONCHOSCOPY (RIGID OR FLEXIBLE), LAVAGE;  Surgeon: Louise Rogel MD;  Location: [facility]     coronary artery stents placed x 5  2001     Family History   Problem Relation Age of Onset     Myocardial Infarction Father 58     Coronary Artery Disease Father      Heart Failure Mother      Glaucoma Mother      Coronary Artery Disease Brother      Coronary Artery Disease Brother      Cancer Brother         GIST     Skin Cancer No family hx of      Melanoma No family hx of      Macular Degeneration No family hx of      Social History     Tobacco Use     Smoking status: Never Smoker     Smokeless tobacco: Former User   Substance Use Topics     Alcohol use: No     Alcohol/week: 0.0 standard drinks     Drug use: No     Immunization History   Administered Date(s) Administered     Influenza Vaccine IM > 6 months Valent IIV4 12/07/2016, 11/15/2017, 11/14/2018     Influenza Vaccine, 3 YRS +, IM (QUADRIVALENT W/PRESERVATIVES) 09/17/2019     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     Acute  myeloid leukemia (H)     MDS (myelodysplastic syndrome) (H)     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)     Chronic GVHD (H)     Pneumonia     Acute respiratory failure with hypoxia (H)     Aspiration into airway     Oropharyngeal dysphagia     ILD (interstitial lung disease) (H)          Current Medications & Allergies:       acyclovir  800 mg Oral BID     azithromycin  250 mg Oral Daily     ceFEPIme (MAXIPIME) IV  2 g Intravenous Q12H     cycloSPORINE modified  25 mg Oral BID     fluconazole  100 mg Oral Daily     levothyroxine  150 mcg Oral Daily     magnesium oxide  800 mg Oral Daily     methylPREDNISolone  100 mg Intravenous Q24H     metoprolol tartrate  25 mg Oral Daily     pilocarpine  5 mg Oral BID     rosuvastatin  5 mg Oral Daily     senna-docusate  1 tablet Oral BID    Or     senna-docusate  2 tablet Oral BID     sodium chloride (PF)  3 mL Intracatheter Q8H     [START ON 1/13/2020] sulfamethoxazole-trimethoprim  1 tablet Oral Q Mon Tues BID     Infusions/Drips:      - MEDICATION INSTRUCTIONS -       Allergies   Allergen Reactions     Atorvastatin Other (See Comments)     Back pain  Tolerates atrovastatin     Docosahexaenoic Acid (Dha)      Other reaction(s): Intolerance-Can't Take - Omega 3 fish oil     Liquid Adhesive Rash          Physical Exam:     Patient Vitals for the past 24 hrs:   BP Temp Temp src Pulse Resp SpO2 Weight   01/10/20 1144 129/80 97.3  F (36.3  C) Axillary 102 20 97 % --   01/10/20 0815 112/84 97.1  F (36.2  C) Axillary 97 22 93 % 98.7 kg (217 lb 9.6 oz)   01/10/20 0430 99/62 97.5  F (36.4  C) Oral 74 20 96 % --   01/09/20 2252 95/68 97.9  F (36.6  C) Axillary 91 20 94 % --   01/09/20 2014 121/85 98  F (36.7  C) Axillary 97 22 95 % --   01/09/20 1611 119/79 98  F (36.7  C) Axillary -- 22 94 % --   01/09/20 1248 125/86 97.7  F (36.5  C) Oral 99 24 93 % --     Ranges for vital signs over the past 24 hours:    Temp:  [97.1  F (36.2  C)-98  F (36.7  C)] 97.3  F (36.3   C)  Pulse:  [] 102  Resp:  [20-24] 20  BP: ()/(62-86) 129/80  FiO2 (%):  [55 %-100 %] 100 %  SpO2:  [93 %-97 %] 97 %  Vitals:    01/08/20 1708 01/09/20 0911 01/10/20 0815   Weight: 99.1 kg (218 lb 8 oz) 99.1 kg (218 lb 8 oz) 98.7 kg (217 lb 9.6 oz)     Intake/Output Summary (Last 24 hours) at 1/10/2020 1237  Last data filed at 1/10/2020 1100  Gross per 24 hour   Intake 560 ml   Output 690 ml   Net -130 ml     Physical Examination:  GENERAL:  Pleasant, conversant, well-developed, well-nourished, 59 year old man in bed in no acute distress.  HEAD:  Normocephalic, atraumatic.  EYES:  EOMI, PERRL, anicteric sclerae without conjunctival injection.  ENT:  External auditory canals patent without discharge.  High flow nasal cannula present.  Oropharynx is moist without exudates or ulcers.  NECK:  Supple.  LYMPH:  No cervical or supraclavicular lymphadenopathy  LUNGS:  Few scattered posterior coarse rhonchi; anterior clear to auscultation bilaterally.  CARDIOVASCULAR:  Regular rate and rhythm, tachycardic, normal S1, S2, without murmur, gallop, or rub.  ABDOMEN:  Normal bowel sounds, soft, nontender, no hepatosplenomegaly.  BACK:  No CVA tenderness.  :  No Desai.  EXTREMITIES:  Distally warm, no edema.  SKIN:  No acute rash or lesion.  Peripheral IV line lacks inflammation.  NEUROLOGIC:  Alert, oriented, moves extremities x 4.         Laboratory Data:     Absolute CD4   Date Value Ref Range Status   05/12/2017 679 441 - 2,156 cells/uL Final   11/16/2016 288 (L) 441 - 2,156 cells/uL Final   08/19/2016 491 441 - 2,156 cells/uL Final     Inflammatory Markers    Recent Labs   Lab Test 02/06/19  0605   SED 66*   CRP 8.2*     Immune Globulin Studies     Recent Labs   Lab Test 01/08/20  1526 09/25/19  1522 02/13/19  1525 11/14/18  1515 07/18/18  1539 11/15/17  1502 02/21/17  1625 08/19/16  1017   IGG 1,228 1,150 1,330 1,110 1,230 742 359* 1,080   IGM  --   --   --   --   --   --  60 72   IGE  --   --   --   --   --    --   --  3   IGA  --   --   --   --   --   --   --  261     Metabolic Studies       Recent Labs   Lab Test 01/10/20  0336 01/09/20  0507 01/08/20  1743  02/04/19 2050 05/21/16  0405    139  --    < > 142   < > 140   POTASSIUM 4.4 4.0  --    < > 4.2   < > 4.4   CHLORIDE 109 106  --    < > 106   < > 106   CO2 20 27  --    < > 27   < > 26   ANIONGAP 8 5  --    < > 9   < > 8   BUN 17 15  --    < > 16   < > 10   CR 0.64* 0.77  --    < > 0.83   < > 0.72   GFRESTIMATED >90 >90  --    < > >90   < > >90  Non  GFR Calc     * 95  --    < > 131*   < > 105*   TRACE 8.9 8.5  --    < > 8.8   < > 8.2*   PHOS 4.4  --  3.8   < > 4.7*  --   --    MAG 2.2  --  2.1   < > 2.2   < > 1.4*   LACT  --   --  1.8  --   --   --  2.1*   PCAL  --   --  0.38   < >  --   --   --    FGTL  --   --   --   --  <31  --   --     < > = values in this interval not displayed.     Hepatic Studies    Recent Labs   Lab Test 01/10/20  0336 01/08/20  1526 12/19/19  1623 09/25/19  1522 02/04/19 2050 11/16/16  0948   BILITOTAL  --  1.1 0.6 0.4   < > 0.5   < > 0.5   DBIL  --   --   --   --   --   --   --  0.2   ALKPHOS  --  110 90 76   < > 87   < > 128   PROTTOTAL  --  7.6 7.8 7.4   < > 7.8   < > 7.0   ALBUMIN  --  2.8* 3.4 3.5   < > 3.6   < > 3.2*   AST  --  38 28 22   < > 28   < > 132*   ALT  --  24 22 23   < > 25   < > 141*   *  --   --   --   --  224  --   --     < > = values in this interval not displayed.     Pancreatitis testing    Recent Labs   Lab Test 08/10/16  1239   TRIG 435*     Gout Labs      Recent Labs   Lab Test 05/04/16  1129 04/18/16  1205   URIC 6.6 6.9     Hematology Studies      Recent Labs   Lab Test 01/10/20  0336 01/09/20  0507 01/08/20  1526 12/19/19  1623 09/25/19  1522 06/25/19  1346   WBC 7.2 9.2 12.8* 8.4 8.2 7.0   ANEU 6.0 6.5 10.4* 4.9 5.0 4.9   ALYM 0.8 1.4 1.1 2.0 1.9 1.5   SARA 0.4 1.0 1.1 1.4* 1.3 0.6   AEOS 0.0 0.1 0.0 0.1 0.0 0.0   HGB 16.2 15.6 17.2 17.1 16.8 16.1   HCT 49.5 48.0 51.4  50.9 48.0 47.0    232 238 284 214 220     Clotting Studies    Recent Labs   Lab Test 01/08/20  1743 02/04/19  2050 06/29/16  1016 05/16/16  0424   INR 1.46* 1.11 1.1 1.09   PTT 42* 31  --   --      Iron Testing    Recent Labs   Lab Test 01/10/20  0336   MCV 97     Arterial Blood Gas Testing    Recent Labs   Lab Test 01/09/20  0518 01/08/20  1743   PH 7.39  --    PCO2 40  --    PO2 79*  --    HCO3 24  --    O2PER 90.0 80     Thyroid Studies     Recent Labs   Lab Test 05/11/18  1130 05/12/17  1126   TSH 0.47 0.43   T4 1.39 1.15     Urine Studies     Recent Labs   Lab Test 03/13/17  1624 03/10/17  1526 04/18/16  1132   URINEPH 5.0 5.0 5.0   NITRITE Negative Negative Negative   LEUKEST Negative Negative Negative   WBCU 4* 5* 10*     Medication levels    Recent Labs   Lab Test 02/06/19  1936 02/05/19  0456  01/11/17  1419   VANCOMYCIN 15.5  --   --   --    CYCLSP  --  <25*   < >  --    RAPAMY  --   --   --  5.5    < > = values in this interval not displayed.     CSF testing     Recent Labs   Lab Test 04/22/16  1120   CWBC 1   CRBC 7*   CGLU 55   CTP 47     Body fluid stats    Recent Labs   Lab Test 01/09/20  0821 02/06/19  1123 02/06/19  1122   FTYP  --  Bronchial lavage Bronchial lavage   FCOL  --  Colorless Colorless   FAPR  --  Clear Slightly Cloudy   FWBC  --  290 190   FNEU  --  8 4   FLYM  --  39 65   FMONO  --   --  27   GS <10 Squamous epithelial cells/low power field  >25 PMNs/low power field  Many  Mixed gram positive karl   <25 PMNs/low power field  No organisms seen  Gram stain and culture performed on concentrated specimen  --      Microbiology:  1/10 Fungitell assay pending  1/10 Galactomannan Ag assay pending  1/9 Ur Blastomyces Ag assay pending  1/9 B pertussis PCR negative  1/9 Influenza / RSV rapid Ag assays negative  1/9 sp cx :  Heavy growth Enterococcus faecalis.  Gram stain:  > 25 PMNs/lpf, many mixed Gram positive karl.  1/8 Ur Strep pneumoniae / Legionella Ag assays negative  1/8  Nares respiratory virus PCR panel positive for coronavirus, otherwise negative  1/8 Nares MRSA PCR screen negative    Fungal testing  Recent Labs   Lab Test 02/06/19  1122 02/04/19  2050   FGTL  --  <31   ASPGAI 0.06 0.05   ASPAG Negative  --    ASPGAA  --  Negative     Last Culture results with specimen source  Culture Micro   Date Value Ref Range Status   01/09/2020 Heavy growth  Normal karl to date    Preliminary   01/09/2020 Culture in progress  Preliminary   02/06/2019 (A)  Final    Light growth  Actinomyces odontolyticus  This organism is part of normal karl, but on occasion, may be a true pathogen. Clinical   correlation must be applied to interpreting this microbiology result.     02/06/2019 Light growth  Normal respiratory karl    Final   02/06/2019 Susceptibility testing not routinely done  Final   02/06/2019 Culture negative for acid fast bacilli  Final   02/06/2019   Final    Assayed at [facility] [address] [place] [address] [phone]   02/06/2019 Candida glabrata  isolated   (A)  Final   02/06/2019   Final    No additional fungi cultured after 4 weeks incubation   02/06/2019 No growth after 4 weeks  Final   02/06/2019 Light growth  Normal respiratory karl    Final   02/06/2019 Culture negative for acid fast bacilli  Final   02/06/2019   Final    Assayed at [facility] [address] [place] [address] [phone]   02/06/2019 (A)  Preliminary    Light growth  Actinomyces odontolyticus  This organism is part of normal karl, but on occasion, may be a true pathogen. Clinical   correlation must be applied to interpreting this microbiology result.     02/06/2019 Light growth  Normal respiratory karl    Preliminary   02/06/2019 Culture negative after 4 weeks  Final   02/06/2019 No growth after 4 weeks  Final   02/06/2019 Light growth  Normal respiratory karl    Final   02/05/2019 Canceled, Test credited  Final   02/05/2019 (A)  Final    >10 Squamous epithelial cells/low  power field indicates oral contamination. Please   recollect.     02/05/2019   Final    Notification of test cancellation was given to  Isaiah Ghosh RN at 1204 [id number]      Specimen Description   Date Value Ref Range Status   01/09/2020 Sputum  Final   01/09/2020 Sputum  Final   01/08/2020 Catheterized Urine  Final   01/08/2020 Urine  Final   01/08/2020 Nares  Final   02/06/2019 Bronchial lavage Right upper lobe  Final   02/06/2019 Bronchial lavage Right upper lobe  Final   02/06/2019 Bronchial lavage Right upper lobe  Final   02/06/2019 Bronchial lavage Right upper lobe  Final   02/06/2019 Bronchial lavage Right upper lobe  Final   02/06/2019 Bronchial lavage Right upper lobe  Final   02/06/2019 Bronchial lavage Right upper lobe  Final   02/06/2019 Bronchial aspirate Right lower lobe  Final   02/06/2019 Bronchial aspirate Right lower lobe  Final   02/06/2019 Bronchoalveolar Lavage Right lower lobe  Final   02/06/2019 Bronchoalveolar Lavage Right lower lobe  Final   02/06/2019 Bronchoalveolar Lavage Right lower lobe  Final   02/06/2019 Bronchoalveolar Lavage Right lower lobe  Final   02/06/2019 Bronchoalveolar Lavage Right lower lobe  Final        Last check of C difficile  C Diff Toxin B PCR   Date Value Ref Range Status   05/13/2016 (A) NEG Final    Positive  Positive: Toxin producing Clostridium difficile target DNA sequences detected,   presumed positive for Clostridium difficile toxin B.   Clostridium difficile (Requires Enteric Isolation)   FDA approved assay performed using [employer] GeneXpert real-time PCR.   Critical Value/Significant Value called to and read back by  Michelle Haley RN [email or web address], @[id number] on 5/13/16 HM       Syphilis Testing    Treponema pallidum Antibody   Date Value Ref Range Status   04/18/2016 Negative NEG Final     Quantiferon testing   Recent Labs   Lab Test 02/06/19  1123 02/06/19  1122   AFBSMS Negative for acid fast bacteria  Assayed at [facility] [address] [place] [address],  [place] [address] [phone] Negative for acid fast bacteria  Less than 5ml of specimen received.  A minimum of 5 mL of sputum or fluid is recommended for recovery of acid fast bacilli   (AFB).  Volumes less than 5 mL are suboptimal and may compromise recovery of AFB from   culture.    Assayed at [email or web address] [address] [facility], [place] [id number] [phone]     Virology:    CMV viral loads    Recent Labs   Lab Test 01/09/20  0507 02/06/19  1123 03/21/18  1501 01/17/18  1502 09/13/17  1534   CSPEC EDTA PLASMA Bronchial lavage  Bronchial lavage Plasma, EDTA anticoagulant Plasma, EDTA anticoagulant EDTA PLASMA   CMVLOG Not Calculated Not Calculated  Not Calculated Not Calculated Not Calculated Not Calculated     Log IU/mL of CMVQNT   Date Value Ref Range Status   01/09/2020 Not Calculated <2.1 [Log_IU]/mL Final   02/06/2019 Not Calculated <2.1 [Log_IU]/mL Final   02/06/2019 Not Calculated <2.1 [Log_IU]/mL Final   03/21/2018 Not Calculated <2.1 [Log_IU]/mL Final   01/17/2018 Not Calculated <2.1 [Log_IU]/mL Final   09/13/2017 Not Calculated <2.1 [Log_IU]/mL Final   07/12/2017 Not Calculated <2.1 [Log_IU]/mL Final   04/19/2017 Not Calculated <2.1 [Log_IU]/mL Final   03/08/2017 Not Calculated <2.1 [Log_IU]/mL Final   02/24/2017 Not Calculated <2.1 [Log_IU]/mL Final   02/15/2017 Not Calculated <2.1 [Log_IU]/mL Final   02/01/2017 Not Calculated <2.1 [Log_IU]/mL Final   01/13/2017 Not Calculated <2.1 [Log_IU]/mL Final   01/04/2017 Not Calculated <2.1 [Log_IU]/mL Final   12/14/2016 Not Calculated <2.1 [Log_IU]/mL Final   12/07/2016 Not Calculated <2.1 [Log_IU]/mL Final   11/16/2016 Not Calculated <2.1 [Log_IU]/mL Final   09/14/2016 Not Calculated <2.1 [Log_IU]/mL Final   08/19/2016 Not Calculated <2.1 [Log_IU]/mL Final   08/10/2016 Not Calculated <2.1 [Log_IU]/mL Final   07/27/2016 Not Calculated <2.1 [Log_IU]/mL Final   07/20/2016 Not Calculated <2.1 [Log_IU]/mL Final   07/13/2016 Not Calculated <2.1 [Log_IU]/mL Final    07/06/2016 Not Calculated <2.1 [Log_IU]/mL Final   06/29/2016 Not Calculated <2.1 [Log_IU]/mL Final   06/13/2016 Not Calculated <2.1 [Log_IU]/mL Final   06/08/2016 Not Calculated <2.1 [Log_IU]/mL Final   05/27/2016 Not Calculated <2.1 [Log_IU]/mL Final   05/18/2016 Not Calculated <2.1 [Log_IU]/mL Final   05/11/2016 Not Calculated <2.1 [Log_IU]/mL Final     HHV6 DNA Result   Date Value Ref Range Status   05/31/2016 No HHV6 DNA detected Copies/mL Final     EBV DNA Copies/mL   Date Value Ref Range Status   02/01/2017 EBV DNA Not Detected EBVNEG [Copies]/mL Final   01/04/2017 585 (A) EBVNEG [Copies]/mL Final   11/30/2016 2,452 (A) EBVNEG [Copies]/mL Final   11/16/2016 742 (A) EBVNEG [Copies]/mL Final     Adenovirus Testing    Recent Labs   Lab Test 11/16/16  0948 05/13/16  1101 05/11/16  2046   ADRES No Adenovirus DNA detected.  --   --    ADENOVIRUSAG  --  Negative Canceled, Test credited   Specimen not labeled   Notification of test cancellation was given to  LEIGH IVORY RN. 05.11.16 2100 GJS  *     Hepatitis B Testing     Recent Labs   Lab Test 05/12/17  1126 04/18/16  1205   AUSAB  --  2.14   HBCAB Nonreactive Nonreactive   HEPBANG Nonreactive Nonreactive     Hepatitis C Antibody   Date Value Ref Range Status   05/12/2017  NR Final    Nonreactive   Assay performance characteristics have not been established for newborns,   infants, and children     04/18/2016  NR Final    Nonreactive   Assay performance characteristics have not been established for newborns,   infants, and children       CMV Antibody IgG   Date Value Ref Range Status   04/18/2016 0.8 0.0 - 0.8 AI Final     Comment:     Negative   Antibody index (AI) values reflect qualitative changes in antibody   concentration that cannot be directly associated with clinical condition or   disease state.       Varicella Zoster Virus Antibody IgG   Date Value Ref Range Status   04/18/2016 4.2 (H) 0.0 - 0.8 AI Final     Comment:     Positive, suggests prev.  exposure and probable immunity   Antibody index (AI) values reflect qualitative changes in antibody   concentration that cannot be directly associated with clinical condition or   disease state.       EBV Capsid Antibody IgG   Date Value Ref Range Status   02/15/2017 7.2 (H) 0.0 - 0.8 AI Final     Comment:     Positive, suggests recent or past exposure   Antibody index (AI) values reflect qualitative changes in antibody   concentration that cannot be directly associated with clinical condition or   disease state.     04/18/2016 (H) 0.0 - 0.8 AI Final    >8.0  Positive, suggests recent or past exposure   Antibody index (AI) values reflect qualitative changes in antibody   concentration that cannot be directly associated with clinical condition or   disease state.       Herpes Simplex Virus Type 1 IgG   Date Value Ref Range Status   04/18/2016 (H) 0.0 - 0.8 AI Final    >8.0  Positive.  IgG antibody to HSV-1 detected.   Antibody index (AI) values reflect qualitative changes in antibody   concentration that cannot be directly associated with clinical condition or   disease state.       Herpes Simplex Virus Type 2 IgG   Date Value Ref Range Status   04/18/2016  0.0 - 0.8 AI Final    <0.2  No HSV-2 IgG antibodies detected.   Antibody index (AI) values reflect qualitative changes in antibody   concentration that cannot be directly associated with clinical condition or   disease state.       Imaging:  Recent Results (from the past 48 hour(s))   CT Chest Pulmonary Embolism w Contrast    Narrative    EXAMINATION: CTA pulmonary angiogram, 1/8/2020 6:15 PM     COMPARISON: CT 12/19/2019    HISTORY: Shortness of breath, PE suspected, high pretest probability.  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     TECHNIQUE:  Volumetric helical acquisition of CT images of the chest  from the lung apices to the kidneys were acquired after the  administration of 71 mL of Isovue-370 IV contrast. Flash technique  with free breathing acquisition.  Post-processed multiplanar and/or  MIP reformations were obtained, archived to PACS and used in  interpretation of this study.     FINDINGS:  Contrast bolus is: adequate.  Exam is negative for acute  pulmonary embolism.    Partially visualized thyroid is unremarkable. There is no axillary,  mediastinal, or hilar lymphadenopathy. Heart size is normal. There is  no pericardial effusion. Normal configuration diameter of the great  vessels. Main pulmonary artery is enlarged and measures 3.4 cm.  Ascending aorta is nonaneurysmal. Heavy coronary artery  calcifications. The central tracheobronchial tree is patent. Thoracic  esophagus is mildly patulous.    Bilateral small-to-moderate pleural effusions. Diffuse fibrotic  changes with significantly increased diffusely scattered groundglass  opacities. Bronchial wall thickening most predominant in the mid and  upper lungs. Relatively stable scattered consolidative nodular  opacities. Overall decreased aeration of the left lung. No  pneumothorax.    Limited evaluation of the upper abdomen. Visualized portions of the  liver and pancreas are unremarkable. Hyperdense material within the  gallbladder. Spleen is overall small in size, this is of uncertain  significance. Adrenal glands are normal. Perinephric stranding  bilaterally.    Bones and soft tissue: Soft tissues unremarkable. No suspicious  osseous lesion. Stable depression of the superior endplate of L1.  Moderate degenerative change of the visualized spine with flowing  syndesmophytes within the low thoracic spine.         Impression    IMPRESSION:   1. Exam is negative for acute pulmonary embolism.     2. Increased diffuse groundglass and consolidative opacities on a  background of fibrotic change and  bronchiectasis/bronchial wall  thickening. Differential remains unchanged, including progressive  organizing pneumonia/kaqgv-deqalk-gcqu disease, pleural-parenchymal  fibroelastosis, and/or drug reaction with possible superimposed  infection.   3. Slightly increased small to moderate bilateral pleural effusions.  4. Large main pulmonary artery measuring up to 3.4 cm, can be seen in  pulmonary hypertension.    In the event of a positive result for acute pulmonary embolism  resulting in right heart strain, please activate the PERT  Multidisciplinary group for consultation by paging [phone]).     PERT -- Pulmonary Embolism Response Team (Multidisciplinary team  including cardiology, interventional radiology, critical care,  hematology)    I have personally reviewed the examination and initial interpretation  and I agree with the findings.    EDUARDO BRUCE, DO   Echo Complete    Narrative    [id number]  [id number]  [id number]  [id number]^JULIETA^EDINSON^NOEMI HILL           [facility],[place]  Echocardiography Laboratory  [address] [place] [id number]     Name: BRIGITTE JUNIOR  MRN: [id number]  : 1960  Study Date: 2020 10:17 AM  Age: 59 yrs  Gender: Male  Patient Location: [facility]  Reason For Study: CUELLAR  Ordering Physician: EDINSON RENDON  Referring Physician: AMAURI STOVALL  Performed By: Morris May RDCS     BSA: 2.2 m2  Height: 70 in  Weight: 218 lb  BP: 91/68 mmHg  _____________________________________________________________________________  __        Procedure  Complete Portable Echo Adult.  _____________________________________________________________________________  __        Interpretation Summary  Left ventricular function, chamber size, wall motion, and wall thickness are  normal.The EF is 55-60%.  The right ventricle is normal size. Global right ventricular function is  mildly reduced. RVFAC 32%.  No significant valvular abnormalities were noted.  Right  ventricular systolic pressure is 27mmHg above the right atrial pressure.  No pericardial effusion is present.  This study was compared with the study from 4/20/16 (stress): RV function has  decreased slightly compared with the resting images on the prior stress study.  _____________________________________________________________________________  __        Left Ventricle  Left ventricular function, chamber size, wall motion, and wall thickness are  normal.The EF is 55-60%. Left ventricular diastolic function is not  assessable. Abnormal non-specific septal motion is present.     Right Ventricle  The right ventricle is normal size. Right ventricular wall thickness is  normal. Global right ventricular function is mildly reduced. RVFAC 32%.     Atria  Both atria appear normal. The atrial septum is intact as assessed by color  Doppler .     Aortic Valve  Mild aortic valve calcification is present. On Doppler interrogation, there is  no significant stenosis or regurgitation.        Tricuspid Valve  The tricuspid valve is normal. Trace tricuspid insufficiency is present. Right  ventricular systolic pressure is 27mmHg above the right atrial pressure.     Pulmonic Valve  The pulmonic valve is normal.     Vessels  The aorta root is normal. The thoracic aorta is normal. The pulmonary artery  cannot be assessed. The inferior vena cava was normal in size with preserved  respiratory variability. IVC diameter <2.1 cm collapsing >50% with sniff  suggests a normal RA pressure of 3 mmHg.     Pericardium  No pericardial effusion is present.     Miscellaneous  A left pleural effusion is present.        Compared to Previous Study  This study was compared with the study from 4/20/16 (stress) . RV function has  decreased slightly compared with the resting images on the prior stress study.  _____________________________________________________________________________  __  MMode/2D Measurements & Calculations     IVSd: 0.79 cm  LVIDd: 5.1  cm  LVIDs: 3.0 cm  LVPWd: 0.88 cm  FS: 41.4 %  LV mass(C)d: 149.2 grams  LV mass(C)dI: 68.9 grams/m2  Ao root diam: 4.2 cm  asc Aorta Diam: 3.9 cm  LVOT diam: 2.5 cm  LVOT area: 5.1 cm2  LA Volume (BP): 63.6 ml  LA Volume Index (BP): 29.3 ml/m2  RWT: 0.35           Doppler Measurements & Calculations  MV E max john: 61.4 cm/sec  MV A max john: 62.7 cm/sec  MV E/A: 0.98  MV dec slope: 221.0 cm/sec2  MV dec time: 0.28 sec  PA acc time: 0.06 sec  TR max john: 260.0 cm/sec  TR max P.1 mmHg  E/E' av.6  Lateral E/e': 6.9  Medial E/e': 6.3     _____________________________________________________________________________  __           Report approved by: Jah Peterson 2020 03:01 PM         TTE:  Left ventricular function, chamber size, wall motion, and wall thickness are normal.The EF is 55-60%.  The right ventricle is normal size. Global right ventricular function is mildly reduced. RVFAC 32%.  No significant valvular abnormalities were noted.  Right ventricular systolic pressure is 27mmHg above the right atrial pressure.  No pericardial effusion is present.  This study was compared with the study from 16 (stress): RV function has decreased slightly compared with the resting images on the prior stress study.   Chest CT:  1. Exam is negative for acute pulmonary embolism.  2. Increased diffuse groundglass and consolidative opacities on a background of fibrotic change and bronchiectasis/bronchial wall thickening. Differential remains unchanged, including progressive organizing pneumonia/iwoxq-njpcmn-lciy disease, pleural-parenchymal fibroelastosis, and/or drug reaction with possible superimposed infection.  3. Slightly increased small to moderate bilateral pleural effusions.  4. Large main pulmonary artery measuring up to 3.4 cm, can be seen in pulmonary hypertension.  19 Video swallow:  1. Multiple episodes of aspiration with thin barium which did not consistently improved with a chin  tuck maneuver. Patient also had difficulty swallowing a barium tablet, and aspirated nectar thick barium while trying to wash it down. Please see the speech pathologist report for further details.  2. Normal esophagram.

## 2020-01-10 NOTE — PLAN OF CARE
B/P: 119/79, T: 98, P: 99, R: 22   [id number]:  VSS. A&Ox4. Denies pain. Up independently. On high flow 80%. Sats 93-98%, changed to ear probe. Urine sample received. Still waiting for sputum. Patient has not been able to clear anything to spit from throat. No other new concerns.      Problem: ARDS (Acute Respiratory Distress Syndrome)  Goal: Effective Oxygenation  1/9/2020 1849 by Carla Rooney RN  Outcome: No Change     Problem: Adult Inpatient Plan of Care  Goal: Plan of Care Review  1/9/2020 1849 by Carla Rooney RN  Outcome: No Change     Problem: Adult Inpatient Plan of Care  Goal: Patient-Specific Goal (Individualization)  1/9/2020 1849 by Carla Rooney RN  Outcome: No Change     Problem: Adult Inpatient Plan of Care  Goal: Absence of Hospital-Acquired Illness or Injury  1/9/2020 1849 by Carla Rooney RN  Outcome: No Change     Problem: Adult Inpatient Plan of Care  Goal: Optimal Comfort and Wellbeing  1/9/2020 1849 by Carla Rooney RN  Outcome: No Change

## 2020-01-10 NOTE — PLAN OF CARE
Discharge Planner OT   Patient plan for discharge: home with assist from family   Current status: Eval completed and tx initiated. Pt performed all mobility with SBA. Pt completed ambulation in room with SBA pushing IV pole forward and backward ~10 feet x 6 bouts 2/2 limited slack on HFNC tubing. Pt took seated rest break to recover from fatigue and SOB, provided VCs to use PLB techniques. Pt then completed 3x 2 minute bouts on leg ergometer while sitting upright at EOB with rest breaks taken in between. Pt motivated to participate in therapy and tolerated activity well, O2 92-95% on 80% FiO2 via HFNC.   Barriers to return to prior living situation: deconditioning, fatigue, medical status   Recommendations for discharge: home with assist from family once medically stable, pt is currently SBA to independent in all functional mobility and ADLs, limited by O2 needs  and SOB  Rationale for recommendations: See above.        Entered by: Starr Hernandez 01/10/2020 5:29 PM

## 2020-01-10 NOTE — PROGRESS NOTES
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     Coronavirus + on respiratory viral panel, which is the most likely etiology of the patient's acutely worsening hypoxia, URI sx, CUELLAR, and diffuse B/L GGOs on chest CT. The patient's oxygen requirements have been too high to pursue bronchoscopy/BAL, however, he is now improving from a sx and oxygenation perspective. In the setting of chronic immunosuppression he is also at risk for fungal infection (aspergillus, histo, blasto) which could explain his scattered consolidative opacities, however, it will be difficult to rule in/out fungal infection at this time given his contraindications to bronchoscopy and poor sensitivity of the available BAL fungal studies. Low concern for progression/exacerbation of pulmonary cGvHD given the presence of alternative explanation (coronavirus pneumonitis) and lack of other organ involvement. No overt evidence of other bacterial pneumonia, however, given the severity of illness we agree with empiric course of CAP antibiotics. Low concern for progression of PPFE. Strep pneumo and legionella negative.    - Supportive cares with oxygen  - For now would continue PTA steroid regimen  - Can complete  empiric course of CAP antibiotics given high O2 requirements  - Follow 1,3 Beta D glucan, galactomannan, pertussis, sputum culture, PCP PCR, histo, blasto  - Pulmonary will signoff at this time; please contact pulmonary team with any additional questions/concerns.    Patient staffed with Dr. Webster.    Ryan Daniels MD  Internal Medicine PGY-3        History of Present Illness   - No acute events overnight  - Patient reports significant improvement in respiratory sx, but still has some CUELLAR and O2 requirements.  - Afebrile, O2 sats nl on Fi55 hi flow (80), otherwise vitally stable  - azithromycin/cefepime for CAP coverage, acyclovir, fluconazole, bactrim ppx, acyclovir, no steroids ordered but did get 100 solumed yesterday  - BMP nl,  (), WBC 7.2, Hgb 16.2, coronavirus +, flu negative, CMV -  - TTE normal LVEF, mild reduction in RV function, RV pressure 27mmHg    Review of Systems:  10 point ROS completed and negative except for above.         Current Medications & Allergies:       acyclovir  800 mg Oral BID     azithromycin  250 mg Oral Daily     ceFEPIme (MAXIPIME) IV  2 g Intravenous Q12H     cycloSPORINE modified  25 mg Oral BID     fluconazole  100 mg Oral Daily     levothyroxine  150 mcg Oral Daily     magnesium oxide  800 mg Oral Daily     metoprolol tartrate  25 mg Oral Daily     pilocarpine  5 mg Oral BID     rosuvastatin  5 mg Oral Daily     senna-docusate  1 tablet Oral BID    Or     senna-docusate  2 tablet Oral BID     sodium chloride (PF)  3 mL Intracatheter Q8H     [START ON 1/13/2020] sulfamethoxazole-trimethoprim  1 tablet Oral Q Mon Tues BID          Physical Exam:   Ranges for vital signs:  Temp:  [97.5  F (36.4  C)-98.3  F (36.8  C)] 97.5  F (36.4  C)  Pulse:  [74-99] 74  Resp:  [20-24] 20  BP: ()/(62-86) 99/62  FiO2 (%):  [55 %-80 %] 55 %  SpO2:  [92 %-96 %] 96 %  Vitals:    01/08/20 1708 01/09/20 0911   Weight: 99.1 kg (218 lb 8 oz) 99.1 kg (218 lb 8 oz)     Physical  Examination:  GENERAL:  Sitting up in bed, no acute distress  HEAD:  Head is normocephalic, atraumatic   EYES:  Eyes have anicteric sclerae   LUNGS:  Mild increase in respiratory effort, but improved compared to yesterday. B/L inspiratory crackles that are improved compared to yesterday.  CARDIOVASCULAR:  Regular rate and rhythm   ABDOMEN:  Soft, nontender, nondistended  EXT:  No edema.    NEUROLOGIC:  Awake and alert, answering questions appropriately.  PSYCH: Normal affect.         Laboratory and Imaging Data:   Reviewed and discussed above.    Ryan Daniels MD

## 2020-01-10 NOTE — PLAN OF CARE
/80 (BP Location: Left arm)   Pulse 102   Temp 97.3  F (36.3  C) (Axillary)   Resp 20   Wt 98.7 kg (217 lb 9.6 oz)   SpO2 97%   BMI 31.22 kg/m  . PIV is patent and infusing. No c/o pain or n/v during this shift. Patient is on 15 L Oxi Plus alternating HFNC at % FIO2, with physical movement or talking patient desats into the low 80's. Patient has dry cough and will need sputum culture send. Patient had 2 soft-loose stools today, please do not give the senna. Patient ate good breakfast and trying to increase his oral intake. Continue to encourage patient to do good mouth cares, cough, deep breath and use the incentive spirometer often. Continue with plan of care and notify MD for status changes.    Problem: ARDS (Acute Respiratory Distress Syndrome)  Goal: Effective Oxygenation  1/10/2020 1429 by Caitlyn Landin, GIGI  Outcome: No Change     Problem: Adult Inpatient Plan of Care  Goal: Plan of Care Review  1/10/2020 1429 by Caitlyn Landin RN  Outcome: No Change     Problem: Adult Inpatient Plan of Care  Goal: Patient-Specific Goal (Individualization)  Outcome: No Change     Problem: Adult Inpatient Plan of Care  Goal: Absence of Hospital-Acquired Illness or Injury  Outcome: No Change     Problem: Adult Inpatient Plan of Care  Goal: Optimal Comfort and Wellbeing  Outcome: No Change     Problem: Adult Inpatient Plan of Care  Goal: Readiness for Transition of Care  Outcome: No Change     Problem: Adult Inpatient Plan of Care  Goal: Rounds/Family Conference  Outcome: No Change

## 2020-01-10 NOTE — PLAN OF CARE
Discharge Planner SLP   Patient plan for discharge: Did not discuss  Current status: Pt tolerated current diet recs, which per recent video swallow are most appropriate. Pt able to verbalize and demonstrate safe swallow and compensatory strategies as well as pharyngeal exercises with complete independence. Reviewed guidelines for free water protocol     Continue dysphagia diet 3 and nectar thick liquids. Full upright position, no straws, small single sips/bites, alternate consistencies, rest breaks as needed, slow rate. Recommend additional sauces/gravy for moisture.     Barriers to return to prior living situation: none per SLP  Recommendations for discharge: Home with OP SLP  Rationale for recommendations: OP SLP upon discharge - Ongoing need for skilled services given swallow function is below baseline and continued education and re assessment are required          Entered by: Abbey Etienne 01/10/2020 11:40 AM

## 2020-01-10 NOTE — PLAN OF CARE
PT 5C: PT HOLDING- Per discussion with OT, pt up SBA-independent, limited by low endurance and O2 needs. PT HOLDING at this time, OT to notify PT if needs arise.

## 2020-01-10 NOTE — PROGRESS NOTES
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  He has a history of cGVHD of the lungs and recently was diagnosed with dysphagia causing aspiration.        Diagnosis AMLU Acute myelogeneous leukemia, Unknown  HCT Type Allogeneic    Prep Regimen Cytoxan  Fludarabine  TBI   Donor Source Related PBSC    GVHD Prophylaxis Cyclosporine  Mycophenolate  Primary BMT Provider Fernie     Byron was admitted on 1/8/2020 for acute hypoxic respiratory failure.    INTERVAL  HISTORY   Yg tolerated decrease in FiO2 from 80% to 50% overnight, though needed it turned up to recover after going to the bathroom.  He states that it took him about 45 seconds to feel he had recovered.  He feels that his lungs are less crackly.  He has no ankle swelling, rash or chest pain.  Mouth is dry, but biotene spray is helping a lot. Developed a nosebleed this morning while we were talking; held pressure to soft part of his nose and got it to stop.  In the meantime, changed it to oxymizer mask at 15L and his O2 sats stayed at 92%.    Review of Systems: 6 point ROS negative except as noted above.  # Pain Assessment:  Current Pain Score 1/10/2020   Patient currently in pain? denies   Pain score (0-10) -   Pain location -   Pain descriptors -   Byron edmonds pain level was assessed and he currently denies pain.        Scheduled Medications    acyclovir  800 mg Oral BID     azithromycin  250 mg Oral Daily     ceFEPIme (MAXIPIME) IV  2 g Intravenous Q12H     cycloSPORINE modified  25 mg Oral BID     fluconazole  100 mg Oral Daily     levothyroxine  150 mcg Oral Daily     magnesium oxide  800 mg Oral Daily     methylPREDNISolone  100 mg Intravenous Q24H     metoprolol tartrate  25 mg Oral Daily     pilocarpine  5 mg Oral BID     rosuvastatin  5 mg Oral Daily     senna-docusate  1 tablet Oral BID    Or     senna-docusate  2 tablet Oral BID      sodium chloride (PF)  3 mL Intracatheter Q8H     [START ON 1/13/2020] sulfamethoxazole-trimethoprim  1 tablet Oral Q Mon Tues BID     Current Facility-Administered Medications   Medication     acetaminophen (TYLENOL) tablet 650 mg     acyclovir (ZOVIRAX) tablet 800 mg     artificial saliva (BIOTENE DRY MOUTHWASH) liquid 15 mL     artificial saliva (BIOTENE MT) solution 2 spray     azithromycin (ZITHROMAX) tablet 250 mg     benzonatate (TESSALON) capsule 100 mg     ceFEPIme (MAXIPIME) 2 g vial to attach to  ml bag for ADULTS or 50 ml bag for PEDS     cycloSPORINE modified (GENERIC EQUIVALENT) capsule 25 mg     fluconazole (DIFLUCAN) tablet 100 mg     guaiFENesin-dextromethorphan (ROBITUSSIN DM) 100-10 MG/5ML syrup 5 mL     hypromellose-dextran (ARTIFICAL TEARS) 0.1-0.3 % ophthalmic solution 1 drop     levothyroxine (SYNTHROID/LEVOTHROID) tablet 150 mcg     lidocaine (LMX4) cream     lidocaine 1 % 0.1-1 mL     LORazepam (ATIVAN) tablet 0.5-1 mg    Or     LORazepam (ATIVAN) injection 0.5-1 mg     magnesium oxide (MAG-OX) tablet 800 mg     magnesium sulfate 2 g in NS intermittent infusion (PharMEDium or [facility] Cmpd)     magnesium sulfate 4 g in 100 mL sterile water (premade)     Medication Instruction     methylPREDNISolone sodium succinate (solu-MEDROL) injection 100 mg     metoprolol tartrate (LOPRESSOR) tablet 25 mg     ondansetron (ZOFRAN) injection 8 mg    Or     ondansetron (ZOFRAN-ODT) ODT tab 8 mg    Or     ondansetron (ZOFRAN) tablet 8 mg     pilocarpine (SALAGEN) tablet 5 mg     polyethylene glycol (MIRALAX/GLYCOLAX) Packet 17 g     potassium chloride (KLOR-CON) Packet 20-40 mEq     potassium chloride 10 mEq in 100 mL intermittent infusion with 10 mg lidocaine     potassium chloride 10 mEq in 100 mL sterile water intermittent infusion (premix)     potassium chloride 20 mEq in 50 mL intermittent infusion     potassium chloride ER (K-DUR/KLOR-CON M) CR tablet 20-40 mEq     potassium phosphate 15 mmol in D5W  250 mL intermittent infusion     potassium phosphate 20 mmol in D5W 250 mL intermittent infusion     potassium phosphate 20 mmol in D5W 500 mL intermittent infusion     potassium phosphate 25 mmol in D5W 500 mL intermittent infusion     prochlorperazine (COMPAZINE) tablet 5 mg    Or     prochlorperazine (COMPAZINE) injection 5 mg     rosuvastatin (CRESTOR) tablet 5 mg     senna-docusate (SENOKOT-S/PERICOLACE) 8.6-50 MG per tablet 1 tablet    Or     senna-docusate (SENOKOT-S/PERICOLACE) 8.6-50 MG per tablet 2 tablet     sodium chloride (PF) 0.9% PF flush 3 mL     sodium chloride (PF) 0.9% PF flush 3 mL     [START ON 1/13/2020] sulfamethoxazole-trimethoprim (BACTRIM DS/SEPTRA DS) 800-160 MG per tablet 1 tablet       PHYSICAL EXAM     Weight In/Out     Wt Readings from Last 3 Encounters:   01/10/20 98.7 kg (217 lb 9.6 oz)   01/08/20 98.9 kg (218 lb)   12/19/19 103.9 kg (229 lb 1.6 oz)      I/O last 3 completed shifts:  In: -   Out: 420 [Urine:420]       KPS:  30    /84 (BP Location: Left arm)   Pulse 97   Temp 97.1  F (36.2  C) (Axillary)   Resp 22   Wt 98.7 kg (217 lb 9.6 oz)   SpO2 93%   BMI 31.22 kg/m         General: sitting at bedside, breathing comfortably with high flow nasal cannula in place  Eyes: : JEREMY, sclera anicteric   Nose/Mouth/Throat: OP clear, buccal mucosa moist, no ulcerations   Lungs: scattered crackles in based, which is an improvement from 1/9.  Wearing oxygen as per above in hpi.   Cardiovascular: rrr, no M/R/G   Abdominal/Rectal: +BS, soft, NT, ND   Lymphatics: no edema  Skin: no rashes or petechaie  Neuro: A&O   Heme: right nare nosebleed  Additional Findings: PIV RUE    LABS AND IMAGING - PAST 24 HOURS     Results for orders placed or performed during the hospital encounter of 01/08/20 (from the past 24 hour(s))   Echo Complete    Narrative    [id number]  [id number]  [id number]  [id number]^JULIETA^EDINSON^NOEMI HILL           [facility]  Laboratory  [address] [place] [address]     Name: BRIGITTE JUNIOR  MRN: [id number]  : 1960  Study Date: 2020 10:17 AM  Age: 59 yrs  Gender: Male  Patient Location: [facility]  Reason For Study: CUELLAR  Ordering Physician: EDINSON RENDON  Referring Physician: AMAURI STOVALL  Performed By: Morris May RDCS     BSA: 2.2 m2  Height: 70 in  Weight: 218 lb  BP: 91/68 mmHg  _____________________________________________________________________________  __        Procedure  Complete Portable Echo Adult.  _____________________________________________________________________________  __        Interpretation Summary  Left ventricular function, chamber size, wall motion, and wall thickness are  normal.The EF is 55-60%.  The right ventricle is normal size. Global right ventricular function is  mildly reduced. RVFAC 32%.  No significant valvular abnormalities were noted.  Right ventricular systolic pressure is 27mmHg above the right atrial pressure.  No pericardial effusion is present.  This study was compared with the study from 16 (stress): RV function has  decreased slightly compared with the resting images on the prior stress study.  _____________________________________________________________________________  __        Left Ventricle  Left ventricular function, chamber size, wall motion, and wall thickness are  normal.The EF is 55-60%. Left ventricular diastolic function is not  assessable. Abnormal non-specific septal motion is present.     Right Ventricle  The right ventricle is normal size. Right ventricular wall thickness is  normal. Global right ventricular function is mildly reduced. RVFAC 32%.     Atria  Both atria appear normal. The atrial septum is intact as assessed by color  Doppler .     Aortic Valve  Mild aortic valve calcification is present. On Doppler interrogation, there is  no significant stenosis or regurgitation.        Tricuspid Valve  The tricuspid valve is normal. Trace  tricuspid insufficiency is present. Right  ventricular systolic pressure is 27mmHg above the right atrial pressure.     Pulmonic Valve  The pulmonic valve is normal.     Vessels  The aorta root is normal. The thoracic aorta is normal. The pulmonary artery  cannot be assessed. The inferior vena cava was normal in size with preserved  respiratory variability. IVC diameter <2.1 cm collapsing >50% with sniff  suggests a normal RA pressure of 3 mmHg.     Pericardium  No pericardial effusion is present.     Miscellaneous  A left pleural effusion is present.        Compared to Previous Study  This study was compared with the study from 16 (stress) . RV function has  decreased slightly compared with the resting images on the prior stress study.  _____________________________________________________________________________  __  MMode/2D Measurements & Calculations     IVSd: 0.79 cm  LVIDd: 5.1 cm  LVIDs: 3.0 cm  LVPWd: 0.88 cm  FS: 41.4 %  LV mass(C)d: 149.2 grams  LV mass(C)dI: 68.9 grams/m2  Ao root diam: 4.2 cm  asc Aorta Diam: 3.9 cm  LVOT diam: 2.5 cm  LVOT area: 5.1 cm2  LA Volume (BP): 63.6 ml  LA Volume Index (BP): 29.3 ml/m2  RWT: 0.35           Doppler Measurements & Calculations  MV E max john: 61.4 cm/sec  MV A max john: 62.7 cm/sec  MV E/A: 0.98  MV dec slope: 221.0 cm/sec2  MV dec time: 0.28 sec  PA acc time: 0.06 sec  TR max john: 260.0 cm/sec  TR max P.1 mmHg  E/E' av.6  Lateral E/e': 6.9  Medial E/e': 6.3     _____________________________________________________________________________  __           Report approved by: Jah Peterson 2020 03:01 PM      Magnesium   Result Value Ref Range    Magnesium 2.2 1.6 - 2.3 mg/dL   Phosphorus   Result Value Ref Range    Phosphorus 4.4 2.5 - 4.5 mg/dL   Basic metabolic panel   Result Value Ref Range    Sodium 138 133 - 144 mmol/L    Potassium 4.4 3.4 - 5.3 mmol/L    Chloride 109 94 - 109 mmol/L    Carbon Dioxide 20 20 - 32 mmol/L    Anion Gap 8  3 - 14 mmol/L    Glucose 124 (H) 70 - 99 mg/dL    Urea Nitrogen 17 7 - 30 mg/dL    Creatinine 0.64 (L) 0.66 - 1.25 mg/dL    GFR Estimate >90 >60 mL/min/[1.73_m2]    GFR Estimate If Black >90 >60 mL/min/[1.73_m2]    Calcium 8.9 8.5 - 10.1 mg/dL   CBC with platelets differential   Result Value Ref Range    WBC 7.2 4.0 - 11.0 10e9/L    RBC Count 5.08 4.4 - 5.9 10e12/L    Hemoglobin 16.2 13.3 - 17.7 g/dL    Hematocrit 49.5 40.0 - 53.0 %    MCV 97 78 - 100 fl    MCH 31.9 26.5 - 33.0 pg    MCHC 32.7 31.5 - 36.5 g/dL    RDW 14.1 10.0 - 15.0 %    Platelet Count 256 150 - 450 10e9/L    Diff Method Automated Method     % Neutrophils 83.4 %    % Lymphocytes 10.8 %    % Monocytes 4.9 %    % Eosinophils 0.0 %    % Basophils 0.1 %    % Immature Granulocytes 0.8 %    Nucleated RBCs 0 0 /100    Absolute Neutrophil 6.0 1.6 - 8.3 10e9/L    Absolute Lymphocytes 0.8 0.8 - 5.3 10e9/L    Absolute Monocytes 0.4 0.0 - 1.3 10e9/L    Absolute Eosinophils 0.0 0.0 - 0.7 10e9/L    Absolute Basophils 0.0 0.0 - 0.2 10e9/L    Abs Immature Granulocytes 0.1 0 - 0.4 10e9/L    Absolute Nucleated RBC 0.0    Lactate Dehydrogenase   Result Value Ref Range    Lactate Dehydrogenase 291 (H) 85 - 227 U/L         OVERALL PLAN     Byron Russo is a 59 year old man ~ 3.5 years s/p 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      1.  BMT: 3.5 years s/p BMT for MDS RAEB-2. Re-stage per protocol.     2.  HEME: Has not needed transfusions in a long time. Follow CBC.  Leukocytosis resolved.     3. PULM:   Acute hypoxic respiratory failure - likely secondary to coronavirus on underlying cGVHD of the lungs.  Steroids increased from pred 10mg daily to methylpred 100mg daily on 1/9.   CT as above.   Pulm consult: believe origin is infection. Unable to pursue invasive testing due to high oxygen support needs.                             3.  ID:   Concern for infectious pulmonary process causing acute hypoxic respiratory failure on top of underlying structural lung changes related to cGVHD.  Broad work-up in process and ID consulted.  Empiric cefepime.   The following testing is positive:  Coronavirus (rest of RVP is negative).  Continue supportive cares.   The following testing is negative:  Rapid flu/rsv; legionella; strep pneumo; MRSA screen.  The following testing is in process: Sputum cx, gram stain, PJP PCR,  aspergillus galactomannan, beta-d glucan, pertussis, parapertussis, urine histo/blasto.  ADDENDUM: Sputum culture + for heavy growth enterococcus faecalis.  Known h/o VRE.  Discussed with Dr. Rushing from ID, who recommends stopping cefepime/azithro and adding Unasyn for a 7 day course.  Note: must continue to watch sputum culture to ensure this is not vanco resistant.  If it is VRE, change him to linezolid.     Continue ppx with ACV, Fluconazole, TMP-SMX. Hold levoflox while on broad spec abx.                               4.  GI:   - Swallow dysfunction with aspiration on 12/31 video swallow study. Speech assessed here and recommend DD3 with nectar thick liquids.    - Pilocarpine for dry mouth.     5.  GVH: Continue cyclosporine 25mg PO bid.  Steroid decreased from 15mg to 10 mg in early November, patient believes his SOB was gradually worsening since that time.  Methylpred 100mg daily at this time.      6.  FEN/Renal:   DD3 with nectar thick liquids.    7. CV. Continue metoprolol and rosuvastatin. Hold ASA. Echo with LVEF of 55-60%; mild decrease in right ventricular function when compared to last echo in 4/2016.    8. ENDO. Continue levothyroxine for hypothyroidism    Meera HILL. Carrier   1/10/2020  Attending Summary  The patient was seen and examined by me separate from the midlevel provider.The note above reflects my assessment and plan. I have personally reviewed today's labs,vital and radiology results. The points  of care that were added by me are:     History and physical  Stable hypoxia, afebrile.    On exam:  Head/mouth/neck: Oropharynx clear.  No lymphadenopathy.  Heart: Regular rate and rhythm.  No murmurs rubs or gallops.  Lungs: Bilateral rales.  Abdomen: Abdomen is soft nontender nondistended.  Intact bowel sounds.  Ext: No edema.  Skin: No rash.    Chronic GVHD:  Pulm:  Severe hypoxia with peribronchial thickening.  Sicca:  Moderate dry mouth  GI:  Moderate esophageal dysmotility     Overall score:  Severe (based on pulmonary severity)    Pertinent Labs:  Lab Results   Component Value Date    WBC 7.2 01/10/2020     Lab Results   Component Value Date    RBC 5.08 01/10/2020     Lab Results   Component Value Date    HGB 16.2 01/10/2020     Lab Results   Component Value Date    HCT 49.5 01/10/2020     No components found for: MCT  Lab Results   Component Value Date    MCV 97 01/10/2020     Lab Results   Component Value Date    MCH 31.9 01/10/2020     Lab Results   Component Value Date    MCHC 32.7 01/10/2020     Lab Results   Component Value Date    RDW 14.1 01/10/2020     Lab Results   Component Value Date     01/10/2020     ASSESSMENT AND PLAN  1.  MDS:  3 years s/p alloHCT  2.  Hypoxia:  Diagnosed with pleural parenchymal fibroelastosis.  RVP positive for coronavirus.  Continue glucocorticoids for new onset hypoxia.  Suspect a component of chronic GVHD.  3.  ID:  Continue current Abx.  4.  Esophageal dysmotility:  Likely secondary to chronic GVHD.    Nicolas Mosher MD

## 2020-01-10 NOTE — PLAN OF CARE
[id number]  BP 99/62 (BP Location: Right arm)   Pulse 74   Temp 97.5  F (36.4  C) (Oral)   Resp 20   Wt 99.1 kg (218 lb 8 oz)   SpO2 96%   BMI 31.35 kg/m      Pt continues to be on high flow nasal canula. Decrease FiO2 from 80 to 55% over night- stats 96%. Pt continues to feel SOB when getting up and moving but feels like it's improving. One instance last night pt destated to low 80s and was put on 100% FiO2 - took 2-3 minutes for stats to improve. Dysphagia level 3 - soft foods and nectar thick liquids followed. No replacements needed this AM. Continue plan of care.    Problem: ARDS (Acute Respiratory Distress Syndrome)  Goal: Effective Oxygenation  1/10/2020 0605 by Blayne Carrillo, RN  Outcome: Improving     Problem: Adult Inpatient Plan of Care  Goal: Plan of Care Review  1/10/2020 0605 by Blayne Carrillo, RN  Outcome: Improving        Problem: Adult Inpatient Plan of Care  Goal: Plan of Care Review  1/10/2020 0605 by Blayne Carrillo, RN  Outcome: Improving     Problem: ARDS (Acute Respiratory Distress Syndrome)  Goal: Effective Oxygenation  1/10/2020 0605 by Blayne Carrillo, RN  Outcome: Improving

## 2020-01-11 LAB
ANION GAP SERPL CALCULATED.3IONS-SCNC: 4 MMOL/L (ref 3–14)
BASOPHILS # BLD AUTO: 0 10E9/L (ref 0–0.2)
BASOPHILS NFR BLD AUTO: 0.2 %
BUN SERPL-MCNC: 24 MG/DL (ref 7–30)
C DIFF TOX B STL QL: NEGATIVE
CALCIUM SERPL-MCNC: 8.4 MG/DL (ref 8.5–10.1)
CHLORIDE SERPL-SCNC: 109 MMOL/L (ref 94–109)
CO2 SERPL-SCNC: 28 MMOL/L (ref 20–32)
CREAT SERPL-MCNC: 0.61 MG/DL (ref 0.66–1.25)
DIFFERENTIAL METHOD BLD: ABNORMAL
EOSINOPHIL # BLD AUTO: 0 10E9/L (ref 0–0.7)
EOSINOPHIL NFR BLD AUTO: 0 %
ERYTHROCYTE [DISTWIDTH] IN BLOOD BY AUTOMATED COUNT: 13.9 % (ref 10–15)
GALACTOMANNAN AG SERPL QL IA: NEGATIVE
GALACTOMANNAN AG SERPL-ACNC: 0.07
GFR SERPL CREATININE-BSD FRML MDRD: >90 ML/MIN/{1.73_M2}
GLUCOSE SERPL-MCNC: 160 MG/DL (ref 70–99)
HCT VFR BLD AUTO: 44.3 % (ref 40–53)
HGB BLD-MCNC: 14.4 G/DL (ref 13.3–17.7)
IMM GRANULOCYTES # BLD: 0.1 10E9/L (ref 0–0.4)
IMM GRANULOCYTES NFR BLD: 1.3 %
LYMPHOCYTES # BLD AUTO: 0.5 10E9/L (ref 0.8–5.3)
LYMPHOCYTES NFR BLD AUTO: 4.6 %
MCH RBC QN AUTO: 31.8 PG (ref 26.5–33)
MCHC RBC AUTO-ENTMCNC: 32.5 G/DL (ref 31.5–36.5)
MCV RBC AUTO: 98 FL (ref 78–100)
MONOCYTES # BLD AUTO: 0.7 10E9/L (ref 0–1.3)
MONOCYTES NFR BLD AUTO: 6.6 %
NEUTROPHILS # BLD AUTO: 9.6 10E9/L (ref 1.6–8.3)
NEUTROPHILS NFR BLD AUTO: 87.3 %
NRBC # BLD AUTO: 0 10*3/UL
NRBC BLD AUTO-RTO: 0 /100
PLATELET # BLD AUTO: 267 10E9/L (ref 150–450)
POTASSIUM SERPL-SCNC: 4.4 MMOL/L (ref 3.4–5.3)
RBC # BLD AUTO: 4.53 10E12/L (ref 4.4–5.9)
SODIUM SERPL-SCNC: 141 MMOL/L (ref 133–144)
SPECIMEN SOURCE: NORMAL
WBC # BLD AUTO: 11 10E9/L (ref 4–11)

## 2020-01-11 PROCEDURE — 80048 BASIC METABOLIC PNL TOTAL CA: CPT | Performed by: INTERNAL MEDICINE

## 2020-01-11 PROCEDURE — 25000131 ZZH RX MED GY IP 250 OP 636 PS 637: Performed by: INTERNAL MEDICINE

## 2020-01-11 PROCEDURE — 25000132 ZZH RX MED GY IP 250 OP 250 PS 637: Performed by: INTERNAL MEDICINE

## 2020-01-11 PROCEDURE — 87493 C DIFF AMPLIFIED PROBE: CPT | Performed by: PHYSICIAN ASSISTANT

## 2020-01-11 PROCEDURE — 25000128 H RX IP 250 OP 636: Performed by: PHYSICIAN ASSISTANT

## 2020-01-11 PROCEDURE — 20600000 ZZH R&B BMT

## 2020-01-11 PROCEDURE — 85025 COMPLETE CBC W/AUTO DIFF WBC: CPT | Performed by: INTERNAL MEDICINE

## 2020-01-11 PROCEDURE — 36415 COLL VENOUS BLD VENIPUNCTURE: CPT | Performed by: INTERNAL MEDICINE

## 2020-01-11 RX ORDER — LOPERAMIDE HCL 2 MG
2 CAPSULE ORAL 4 TIMES DAILY PRN
Status: DISCONTINUED | OUTPATIENT
Start: 2020-01-11 | End: 2020-01-15 | Stop reason: HOSPADM

## 2020-01-11 RX ORDER — METHYLPREDNISOLONE SODIUM SUCCINATE 40 MG/ML
25 INJECTION, POWDER, LYOPHILIZED, FOR SOLUTION INTRAMUSCULAR; INTRAVENOUS EVERY 12 HOURS
Status: DISCONTINUED | OUTPATIENT
Start: 2020-01-12 | End: 2020-01-15 | Stop reason: HOSPADM

## 2020-01-11 RX ADMIN — AMPICILLIN SODIUM AND SULBACTAM SODIUM 3 G: 2; 1 INJECTION, POWDER, FOR SOLUTION INTRAMUSCULAR; INTRAVENOUS at 10:11

## 2020-01-11 RX ADMIN — Medication 800 MG: at 08:47

## 2020-01-11 RX ADMIN — CYCLOSPORINE 25 MG: 25 CAPSULE, LIQUID FILLED ORAL at 19:55

## 2020-01-11 RX ADMIN — FLUCONAZOLE 100 MG: 100 TABLET ORAL at 08:47

## 2020-01-11 RX ADMIN — AMPICILLIN SODIUM AND SULBACTAM SODIUM 3 G: 2; 1 INJECTION, POWDER, FOR SOLUTION INTRAMUSCULAR; INTRAVENOUS at 16:04

## 2020-01-11 RX ADMIN — CYCLOSPORINE 25 MG: 25 CAPSULE, LIQUID FILLED ORAL at 08:46

## 2020-01-11 RX ADMIN — AMPICILLIN SODIUM AND SULBACTAM SODIUM 3 G: 2; 1 INJECTION, POWDER, FOR SOLUTION INTRAMUSCULAR; INTRAVENOUS at 03:56

## 2020-01-11 RX ADMIN — PILOCARPINE HYDROCHLORIDE 5 MG: 5 TABLET, FILM COATED ORAL at 08:46

## 2020-01-11 RX ADMIN — AMPICILLIN SODIUM AND SULBACTAM SODIUM 3 G: 2; 1 INJECTION, POWDER, FOR SOLUTION INTRAMUSCULAR; INTRAVENOUS at 22:46

## 2020-01-11 RX ADMIN — METHYLPREDNISOLONE SODIUM SUCCINATE 100 MG: 125 INJECTION, POWDER, FOR SOLUTION INTRAMUSCULAR; INTRAVENOUS at 10:11

## 2020-01-11 RX ADMIN — METOPROLOL TARTRATE 25 MG: 25 TABLET ORAL at 08:46

## 2020-01-11 RX ADMIN — ACYCLOVIR 800 MG: 800 TABLET ORAL at 08:47

## 2020-01-11 RX ADMIN — PILOCARPINE HYDROCHLORIDE 5 MG: 5 TABLET, FILM COATED ORAL at 19:55

## 2020-01-11 RX ADMIN — LEVOTHYROXINE SODIUM 150 MCG: 150 TABLET ORAL at 08:46

## 2020-01-11 RX ADMIN — ROSUVASTATIN CALCIUM 5 MG: 5 TABLET, FILM COATED ORAL at 08:46

## 2020-01-11 RX ADMIN — ACYCLOVIR 800 MG: 800 TABLET ORAL at 19:55

## 2020-01-11 ASSESSMENT — ACTIVITIES OF DAILY LIVING (ADL)
ADLS_ACUITY_SCORE: 13
ADLS_ACUITY_SCORE: 11
ADLS_ACUITY_SCORE: 11
ADLS_ACUITY_SCORE: 13
ADLS_ACUITY_SCORE: 11
ADLS_ACUITY_SCORE: 13

## 2020-01-11 NOTE — PLAN OF CARE
Patient remains on supplemental oxygen, using high flow nasal cannula at 60-65% . Sats do drop some with activity but patient feels he is doing better and tolerating it.. Intermittent cough. Denies pain. Afebrile. Heart rate occasionally bradycardic in 50's. BP stable. Up to bathroom independently to void. One loose stool. Patient doesn't want any more Senna. Antibiotic coverage with Unasyn. Peripheral IV at TKO between meds. Venipuncture blood draw. Labs ok; no replacement necessary.

## 2020-01-11 NOTE — PROGRESS NOTES
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  He has a history of cGVHD of the lungs and recently was diagnosed with dysphagia causing aspiration.        Diagnosis AMLU Acute myelogeneous leukemia, Unknown  HCT Type Allogeneic    Prep Regimen Cytoxan  Fludarabine  TBI   Donor Source Related PBSC    GVHD Prophylaxis Cyclosporine  Mycophenolate  Primary BMT Provider Fernie Matthews was admitted on 1/8/2020 for acute hypoxic respiratory failure.    INTERVAL  HISTORY   Yg feels much better this morning. He is on 60% FiO2 and is requesting to be decreased to oxymask/nasal cannula. Breathing is more comfortable and he is not coughing as much. Notes new onset of diarrhea overnight. Stooling with every episode of urination. Continues to have dry mouth and dry eyes.     Review of Systems: 6 point ROS negative except as noted above.  # Pain Assessment:  Current Pain Score 1/11/2020   Patient currently in pain? denies   Pain score (0-10) -   Pain location -   Pain descriptors -   Byron edmonds pain level was assessed and he currently denies pain.        Scheduled Medications    acyclovir  800 mg Oral BID     ampicillin-sulbactam (UNASYN) IV  3 g Intravenous Q6H     cycloSPORINE modified  25 mg Oral BID     fluconazole  100 mg Oral Daily     levothyroxine  150 mcg Oral Daily     magnesium oxide  800 mg Oral Daily     [START ON 1/12/2020] methylPREDNISolone  25 mg Intravenous Q12H     metoprolol tartrate  25 mg Oral Daily     pilocarpine  5 mg Oral BID     rosuvastatin  5 mg Oral Daily     senna-docusate  1 tablet Oral BID    Or     senna-docusate  2 tablet Oral BID     sodium chloride (PF)  3 mL Intracatheter Q8H     [START ON 1/13/2020] sulfamethoxazole-trimethoprim  1 tablet Oral Q Mon Tues BID     Current Facility-Administered Medications   Medication     acetaminophen (TYLENOL) tablet 650 mg     acyclovir  (ZOVIRAX) tablet 800 mg     ampicillin-sulbactam (UNASYN) 3 g vial to attach to  mL bag     artificial saliva (BIOTENE DRY MOUTHWASH) liquid 15 mL     artificial saliva (BIOTENE MT) solution 2 spray     benzonatate (TESSALON) capsule 100 mg     cycloSPORINE modified (GENERIC EQUIVALENT) capsule 25 mg     fluconazole (DIFLUCAN) tablet 100 mg     guaiFENesin-dextromethorphan (ROBITUSSIN DM) 100-10 MG/5ML syrup 5 mL     hypromellose-dextran (ARTIFICAL TEARS) 0.1-0.3 % ophthalmic solution 1 drop     levothyroxine (SYNTHROID/LEVOTHROID) tablet 150 mcg     lidocaine (LMX4) cream     lidocaine 1 % 0.1-1 mL     loperamide (IMODIUM) capsule 2 mg     LORazepam (ATIVAN) tablet 0.5-1 mg    Or     LORazepam (ATIVAN) injection 0.5-1 mg     magnesium oxide (MAG-OX) tablet 800 mg     magnesium sulfate 2 g in NS intermittent infusion ([facility] or [facility])     magnesium sulfate 4 g in 100 mL sterile water (premade)     Medication Instruction     [START ON 1/12/2020] methylPREDNISolone sodium succinate (solu-MEDROL) injection 25 mg     metoprolol tartrate (LOPRESSOR) tablet 25 mg     ondansetron (ZOFRAN) injection 8 mg    Or     ondansetron (ZOFRAN-ODT) ODT tab 8 mg    Or     ondansetron (ZOFRAN) tablet 8 mg     pilocarpine (SALAGEN) tablet 5 mg     polyethylene glycol (MIRALAX/GLYCOLAX) Packet 17 g     potassium chloride (KLOR-CON) Packet 20-40 mEq     potassium chloride 10 mEq in 100 mL intermittent infusion with 10 mg lidocaine     potassium chloride 10 mEq in 100 mL sterile water intermittent infusion (premix)     potassium chloride 20 mEq in 50 mL intermittent infusion     potassium chloride ER (K-DUR/KLOR-CON M) CR tablet 20-40 mEq     potassium phosphate 15 mmol in D5W 250 mL intermittent infusion     potassium phosphate 20 mmol in D5W 250 mL intermittent infusion     potassium phosphate 20 mmol in D5W 500 mL intermittent infusion     potassium phosphate 25 mmol in D5W 500 mL intermittent infusion     prochlorperazine  (COMPAZINE) tablet 5 mg    Or     prochlorperazine (COMPAZINE) injection 5 mg     rosuvastatin (CRESTOR) tablet 5 mg     senna-docusate (SENOKOT-S/PERICOLACE) 8.6-50 MG per tablet 1 tablet    Or     senna-docusate (SENOKOT-S/PERICOLACE) 8.6-50 MG per tablet 2 tablet     sodium chloride (PF) 0.9% PF flush 3 mL     sodium chloride (PF) 0.9% PF flush 3 mL     [START ON 1/13/2020] sulfamethoxazole-trimethoprim (BACTRIM DS/SEPTRA DS) 800-160 MG per tablet 1 tablet       PHYSICAL EXAM     Weight In/Out     Wt Readings from Last 3 Encounters:   01/11/20 100.6 kg (221 lb 11.2 oz)   01/08/20 98.9 kg (218 lb)   12/19/19 103.9 kg (229 lb 1.6 oz)      I/O last 3 completed shifts:  In: 1240 [P.O.:720; I.V.:520]  Out: 970 [Urine:970]       KPS:  30    /78 (BP Location: Left arm, Cuff Size: Adult Regular)   Pulse 96   Temp 97  F (36.1  C) (Axillary)   Resp 22   Wt 100.6 kg (221 lb 11.2 oz)   SpO2 94%   BMI 31.81 kg/m         General: sitting at bedside, breathing comfortably with high flow nasal cannula in place  Eyes: JEREMY, sclera anicteric   Nose/Mouth/Throat: OP clear, buccal mucosa dry, no ulcerations   Lungs: scattered crackles in bases along with coarse breath sounds, overall improving.  Wearing oxygen as per above in hpi.   Cardiovascular: rrr, no M/R/G   Abdominal/Rectal: +BS, soft, NT, ND   Lymphatics: no edema  Skin: no rashes or petechaie  Neuro: A&O   Additional Findings: PIV RUE    LABS AND IMAGING - PAST 24 HOURS     Results for orders placed or performed during the hospital encounter of 01/08/20 (from the past 24 hour(s))   Basic metabolic panel   Result Value Ref Range    Sodium 141 133 - 144 mmol/L    Potassium 4.4 3.4 - 5.3 mmol/L    Chloride 109 94 - 109 mmol/L    Carbon Dioxide 28 20 - 32 mmol/L    Anion Gap 4 3 - 14 mmol/L    Glucose 160 (H) 70 - 99 mg/dL    Urea Nitrogen 24 7 - 30 mg/dL    Creatinine 0.61 (L) 0.66 - 1.25 mg/dL    GFR Estimate >90 >60 mL/min/[1.73_m2]    GFR Estimate If Black >90  >60 mL/min/[1.73_m2]    Calcium 8.4 (L) 8.5 - 10.1 mg/dL   CBC with platelets differential   Result Value Ref Range    WBC 11.0 4.0 - 11.0 10e9/L    RBC Count 4.53 4.4 - 5.9 10e12/L    Hemoglobin 14.4 13.3 - 17.7 g/dL    Hematocrit 44.3 40.0 - 53.0 %    MCV 98 78 - 100 fl    MCH 31.8 26.5 - 33.0 pg    MCHC 32.5 31.5 - 36.5 g/dL    RDW 13.9 10.0 - 15.0 %    Platelet Count 267 150 - 450 10e9/L    Diff Method Automated Method     % Neutrophils 87.3 %    % Lymphocytes 4.6 %    % Monocytes 6.6 %    % Eosinophils 0.0 %    % Basophils 0.2 %    % Immature Granulocytes 1.3 %    Nucleated RBCs 0 0 /100    Absolute Neutrophil 9.6 (H) 1.6 - 8.3 10e9/L    Absolute Lymphocytes 0.5 (L) 0.8 - 5.3 10e9/L    Absolute Monocytes 0.7 0.0 - 1.3 10e9/L    Absolute Eosinophils 0.0 0.0 - 0.7 10e9/L    Absolute Basophils 0.0 0.0 - 0.2 10e9/L    Abs Immature Granulocytes 0.1 0 - 0.4 10e9/L    Absolute Nucleated RBC 0.0          OVERALL PLAN     Byron Russo is a 59 year old man ~ 3.5 years s/p 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      1.  BMT: 3.5 years s/p BMT for MDS RAEB-2. Re-stage per protocol.     2.  HEME: Has not needed transfusions in a long time. Follow CBC.  Leukocytosis resolved.     3. PULM:   Acute hypoxic respiratory failure - likely secondary to coronavirus on underlying cGVHD of the lungs.  Steroids increased from pred 10mg daily to methylpred 100mg daily on 1/9. Oxygen needs improving, decrease MP to 25mg BID 1/11.   CT as above.   Pulm consult: believe origin is infection. Unable to pursue invasive testing due to high oxygen support needs.                            3.  ID:   Concern for infectious pulmonary process causing acute hypoxic respiratory failure on top of underlying structural lung changes related to cGVHD.  Broad work-up in process and ID consulted.   Empirically placed on cefepime. Now changed to Unasyn d/t possible enterococcus faecalis (see below).   The following testing is positive:  Coronavirus (rest of RVP is negative).  Continue supportive cares.   The following testing is negative:  Rapid flu/rsv; legionella; strep pneumo; MRSA screen, pertussis.  The following testing is in process: Sputum cx, gram stain, PJP PCR,  aspergillus galactomannan, beta-d glucan,, parapertussis, urine histo/blasto.  ADDENDUM: Sputum culture + for heavy growth enterococcus faecalis.  Known h/o VRE.  Discussed with Dr. Rushing from ID, who recommends stopping cefepime/azithro and switching to Unasyn for a 7 day course.  Note: must continue to watch sputum culture to ensure this is not vanco resistant.  If it is VRE, change him to linezolid.     Continue ppx with ACV, Fluconazole, TMP-SMX. Hold levoflox while on broad spec abx.                               4.  GI:   - Diarrhea: possibly from antibiotics. Check c-diff- if neg okay for imodium.   - Swallow dysfunction with aspiration on 12/31 video swallow study. Speech assessed here and recommend DD3 with nectar thick liquids.    - Pilocarpine for dry mouth.     5.  GVH: Continue cyclosporine 25mg PO bid.  Steroid decreased from 15mg to 10 mg in early November, patient believes his SOB was gradually worsening since that time.  Methylpred 100mg daily at this time. Decrease as above.      6.  FEN/Renal:   DD3 with nectar thick liquids.    7. CV. Continue metoprolol and rosuvastatin. Hold ASA. Echo with LVEF of 55-60%; mild decrease in right ventricular function when compared to last echo in 4/2016.    8. ENDO. Continue levothyroxine for hypothyroidism    Dispo: Pt will remain admitted for management of hypoxia.     Gerald Doty PA-C  [phone]    Attending Summary  The patient was seen and examined by me separate from the midlevel provider.The note above reflects my assessment and plan. I have personally reviewed today's labs,vital and  radiology results. The points of care that were added by me are:     History and physical  Imporved hypoxia, afebrile.    On exam:  Head/mouth/neck: Oropharynx clear.  No lymphadenopathy.  Heart: Regular rate and rhythm.  No murmurs rubs or gallops.  Lungs: Few bilateral rales, mostly in the bases.  Abdomen: Abdomen is soft nontender nondistended.  Intact bowel sounds.  Ext: No edema.  Skin: No rash.    Chronic GVHD:  Pulm:  Hypoxia with peribronchial thickening.  Sicca:  Moderate dry mouth  GI:  Moderate esophageal dysmotility     Overall score:  Severe (based on pulmonary severity)    Pertinent Labs:  Lab Results   Component Value Date    WBC 11.0 01/11/2020     Lab Results   Component Value Date    RBC 4.53 01/11/2020     Lab Results   Component Value Date    HGB 14.4 01/11/2020     Lab Results   Component Value Date    HCT 44.3 01/11/2020     No components found for: MCT  Lab Results   Component Value Date    MCV 98 01/11/2020     Lab Results   Component Value Date    MCH 31.8 01/11/2020     Lab Results   Component Value Date    MCHC 32.5 01/11/2020     Lab Results   Component Value Date    RDW 13.9 01/11/2020     Lab Results   Component Value Date     01/11/2020     ASSESSMENT AND PLAN  1.  MDS:  3 years s/p alloHCT  2.  Hypoxia:  Diagnosed with pleural parenchymal fibroelastosis.  RVP positive for coronavirus.  Reduce glucocorticoids today (25mg BID).  Suspect a component of chronic GVHD.  3.  ID:  Continue current Abx.  ID following.  4.  Esophageal dysmotility:  Likely secondary to chronic GVHD.    Nicolas Mosher MD

## 2020-01-11 NOTE — PLAN OF CARE
Pt was on 70% high flow and sat 97-98%; 02 was decreased to 60% but sats were dropping and pt was short of breath so 02 was increased to 65% currently; afebrile,  tachy and RR was 28; pt is independent to the bath room; monitor pt closely and continue plan of care.     Problem: ARDS (Acute Respiratory Distress Syndrome)  Goal: Effective Oxygenation  1/10/2020 2339 by Angie Warren, RN  Outcome: No Change     Problem: Adult Inpatient Plan of Care  Goal: Plan of Care Review  1/10/2020 2339 by Angie Warren, RN  Outcome: No Change

## 2020-01-11 NOTE — PLAN OF CARE
/78 (BP Location: Left arm)   Pulse 89   Temp 97.5  F (36.4  C) (Axillary)   Resp 22   Wt 100.6 kg (221 lb 11.2 oz)   SpO2 96%   BMI 31.81 kg/m  . Patient is on 6 L NC and SpO2 is 91-97% with less CUELLAR today. Patient was on the NC all day and did not needed for the HFNC. PIV is patent and infusing. Patient is A & O x 4. No c/o pain or n/v during this shift. Patient is eating and drinking good. Stool culture was send and waiting for the result. Up to bathroom independently to void. Continue with plan of care and notify MD for status changes.    Problem: Adult Inpatient Plan of Care  Goal: Patient-Specific Goal (Individualization)  Outcome: No Change     Problem: Adult Inpatient Plan of Care  Goal: Absence of Hospital-Acquired Illness or Injury  Outcome: No Change     Problem: Adult Inpatient Plan of Care  Goal: Readiness for Transition of Care  Outcome: No Change     Problem: ARDS (Acute Respiratory Distress Syndrome)  Goal: Effective Oxygenation  Outcome: Improving     Problem: Adult Inpatient Plan of Care  Goal: Plan of Care Review  Outcome: Improving     Problem: Adult Inpatient Plan of Care  Goal: Optimal Comfort and Wellbeing  Outcome: Improving

## 2020-01-12 ENCOUNTER — APPOINTMENT (OUTPATIENT)
Dept: SPEECH THERAPY | Facility: CLINIC | Age: 60
DRG: 177 | End: 2020-01-12
Attending: INTERNAL MEDICINE
Payer: COMMERCIAL

## 2020-01-12 LAB
1,3 BETA GLUCAN SER-MCNC: <31 PG/ML
ANION GAP SERPL CALCULATED.3IONS-SCNC: 4 MMOL/L (ref 3–14)
B-D GLUCAN INTERPRETATION (1,3): NEGATIVE
BASOPHILS # BLD AUTO: 0 10E9/L (ref 0–0.2)
BASOPHILS NFR BLD AUTO: 0 %
BUN SERPL-MCNC: 22 MG/DL (ref 7–30)
BURR CELLS BLD QL SMEAR: ABNORMAL
CALCIUM SERPL-MCNC: 8.7 MG/DL (ref 8.5–10.1)
CHLORIDE SERPL-SCNC: 109 MMOL/L (ref 94–109)
CO2 SERPL-SCNC: 27 MMOL/L (ref 20–32)
CREAT SERPL-MCNC: 0.63 MG/DL (ref 0.66–1.25)
DIFFERENTIAL METHOD BLD: ABNORMAL
EOSINOPHIL # BLD AUTO: 0 10E9/L (ref 0–0.7)
EOSINOPHIL NFR BLD AUTO: 0 %
ERYTHROCYTE [DISTWIDTH] IN BLOOD BY AUTOMATED COUNT: 14.3 % (ref 10–15)
GFR SERPL CREATININE-BSD FRML MDRD: >90 ML/MIN/{1.73_M2}
GLUCOSE SERPL-MCNC: 124 MG/DL (ref 70–99)
HCT VFR BLD AUTO: 47.1 % (ref 40–53)
HGB BLD-MCNC: 15.1 G/DL (ref 13.3–17.7)
HOWELL-JOLLY BOD BLD QL SMEAR: PRESENT
LYMPHOCYTES # BLD AUTO: 0.1 10E9/L (ref 0.8–5.3)
LYMPHOCYTES NFR BLD AUTO: 0.9 %
MCH RBC QN AUTO: 31.3 PG (ref 26.5–33)
MCHC RBC AUTO-ENTMCNC: 32.1 G/DL (ref 31.5–36.5)
MCV RBC AUTO: 98 FL (ref 78–100)
MONOCYTES # BLD AUTO: 0.6 10E9/L (ref 0–1.3)
MONOCYTES NFR BLD AUTO: 5.2 %
NEUTROPHILS # BLD AUTO: 10.4 10E9/L (ref 1.6–8.3)
NEUTROPHILS NFR BLD AUTO: 93.9 %
PLATELET # BLD AUTO: 267 10E9/L (ref 150–450)
PLATELET # BLD EST: ABNORMAL 10*3/UL
POIKILOCYTOSIS BLD QL SMEAR: ABNORMAL
POLYCHROMASIA BLD QL SMEAR: SLIGHT
POTASSIUM SERPL-SCNC: 4.5 MMOL/L (ref 3.4–5.3)
RBC # BLD AUTO: 4.83 10E12/L (ref 4.4–5.9)
SODIUM SERPL-SCNC: 141 MMOL/L (ref 133–144)
TARGETS BLD QL SMEAR: SLIGHT
WBC # BLD AUTO: 11.1 10E9/L (ref 4–11)

## 2020-01-12 PROCEDURE — 25000131 ZZH RX MED GY IP 250 OP 636 PS 637: Performed by: INTERNAL MEDICINE

## 2020-01-12 PROCEDURE — 85025 COMPLETE CBC W/AUTO DIFF WBC: CPT | Performed by: INTERNAL MEDICINE

## 2020-01-12 PROCEDURE — 25000132 ZZH RX MED GY IP 250 OP 250 PS 637: Performed by: INTERNAL MEDICINE

## 2020-01-12 PROCEDURE — 80048 BASIC METABOLIC PNL TOTAL CA: CPT | Performed by: INTERNAL MEDICINE

## 2020-01-12 PROCEDURE — 20600000 ZZH R&B BMT

## 2020-01-12 PROCEDURE — 36415 COLL VENOUS BLD VENIPUNCTURE: CPT | Performed by: INTERNAL MEDICINE

## 2020-01-12 PROCEDURE — 25000128 H RX IP 250 OP 636: Performed by: PHYSICIAN ASSISTANT

## 2020-01-12 PROCEDURE — 92526 ORAL FUNCTION THERAPY: CPT | Mod: GN

## 2020-01-12 RX ADMIN — Medication 800 MG: at 08:19

## 2020-01-12 RX ADMIN — METHYLPREDNISOLONE SODIUM SUCCINATE 25 MG: 40 INJECTION, POWDER, LYOPHILIZED, FOR SOLUTION INTRAMUSCULAR; INTRAVENOUS at 19:56

## 2020-01-12 RX ADMIN — LEVOTHYROXINE SODIUM 150 MCG: 150 TABLET ORAL at 08:19

## 2020-01-12 RX ADMIN — PILOCARPINE HYDROCHLORIDE 5 MG: 5 TABLET, FILM COATED ORAL at 19:56

## 2020-01-12 RX ADMIN — ACYCLOVIR 800 MG: 800 TABLET ORAL at 08:19

## 2020-01-12 RX ADMIN — AMPICILLIN SODIUM AND SULBACTAM SODIUM 3 G: 2; 1 INJECTION, POWDER, FOR SOLUTION INTRAMUSCULAR; INTRAVENOUS at 04:14

## 2020-01-12 RX ADMIN — METHYLPREDNISOLONE SODIUM SUCCINATE 25 MG: 40 INJECTION, POWDER, LYOPHILIZED, FOR SOLUTION INTRAMUSCULAR; INTRAVENOUS at 08:19

## 2020-01-12 RX ADMIN — METOPROLOL TARTRATE 25 MG: 25 TABLET ORAL at 08:19

## 2020-01-12 RX ADMIN — AMPICILLIN SODIUM AND SULBACTAM SODIUM 3 G: 2; 1 INJECTION, POWDER, FOR SOLUTION INTRAMUSCULAR; INTRAVENOUS at 09:40

## 2020-01-12 RX ADMIN — CYCLOSPORINE 25 MG: 25 CAPSULE, LIQUID FILLED ORAL at 19:56

## 2020-01-12 RX ADMIN — CYCLOSPORINE 25 MG: 25 CAPSULE, LIQUID FILLED ORAL at 08:19

## 2020-01-12 RX ADMIN — ROSUVASTATIN CALCIUM 5 MG: 5 TABLET, FILM COATED ORAL at 08:19

## 2020-01-12 RX ADMIN — ACYCLOVIR 800 MG: 800 TABLET ORAL at 19:56

## 2020-01-12 RX ADMIN — FLUCONAZOLE 100 MG: 100 TABLET ORAL at 08:19

## 2020-01-12 RX ADMIN — PILOCARPINE HYDROCHLORIDE 5 MG: 5 TABLET, FILM COATED ORAL at 08:19

## 2020-01-12 RX ADMIN — AMPICILLIN SODIUM AND SULBACTAM SODIUM 3 G: 2; 1 INJECTION, POWDER, FOR SOLUTION INTRAMUSCULAR; INTRAVENOUS at 22:07

## 2020-01-12 RX ADMIN — AMPICILLIN SODIUM AND SULBACTAM SODIUM 3 G: 2; 1 INJECTION, POWDER, FOR SOLUTION INTRAMUSCULAR; INTRAVENOUS at 15:50

## 2020-01-12 ASSESSMENT — ACTIVITIES OF DAILY LIVING (ADL)
ADLS_ACUITY_SCORE: 11

## 2020-01-12 ASSESSMENT — PAIN DESCRIPTION - DESCRIPTORS: DESCRIPTORS: ACHING;DISCOMFORT

## 2020-01-12 NOTE — PROGRESS NOTES
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  He has a history of cGVHD of the lungs and recently was diagnosed with dysphagia causing aspiration.        Diagnosis AMLU Acute myelogeneous leukemia, Unknown  HCT Type Allogeneic    Prep Regimen Cytoxan  Fludarabine  TBI   Donor Source Related PBSC    GVHD Prophylaxis Cyclosporine  Mycophenolate  Primary BMT Provider Fernie     Byron was admitted on 1/8/2020 for acute hypoxic respiratory failure.    INTERVAL  HISTORY   Yg continues to feel better. Remained on 6L NC all day and night yesterday. Still with diarrhea but slowly improving. Appetite stable, ate 3600 calories yesterday. No other new medical complaints.     Review of Systems: 6 point ROS negative except as noted above.  # Pain Assessment:  Current Pain Score 1/12/2020   Patient currently in pain? denies   Pain score (0-10) -   Pain location -   Pain descriptors -   Byron s pain level was assessed and he currently denies pain.        Scheduled Medications    acyclovir  800 mg Oral BID     ampicillin-sulbactam (UNASYN) IV  3 g Intravenous Q6H     cycloSPORINE modified  25 mg Oral BID     fluconazole  100 mg Oral Daily     levothyroxine  150 mcg Oral Daily     magnesium oxide  800 mg Oral Daily     methylPREDNISolone  25 mg Intravenous Q12H     metoprolol tartrate  25 mg Oral Daily     pilocarpine  5 mg Oral BID     rosuvastatin  5 mg Oral Daily     senna-docusate  1 tablet Oral BID    Or     senna-docusate  2 tablet Oral BID     sodium chloride (PF)  3 mL Intracatheter Q8H     [START ON 1/13/2020] sulfamethoxazole-trimethoprim  1 tablet Oral Q Mon Tues BID     Current Facility-Administered Medications   Medication     acetaminophen (TYLENOL) tablet 650 mg     acyclovir (ZOVIRAX) tablet 800 mg     ampicillin-sulbactam (UNASYN) 3 g vial to attach to  mL bag     artificial saliva  (BIOTENE DRY MOUTHWASH) liquid 15 mL     artificial saliva (BIOTENE MT) solution 2 spray     benzonatate (TESSALON) capsule 100 mg     cycloSPORINE modified (GENERIC EQUIVALENT) capsule 25 mg     fluconazole (DIFLUCAN) tablet 100 mg     guaiFENesin-dextromethorphan (ROBITUSSIN DM) 100-10 MG/5ML syrup 5 mL     hypromellose-dextran (ARTIFICAL TEARS) 0.1-0.3 % ophthalmic solution 1 drop     levothyroxine (SYNTHROID/LEVOTHROID) tablet 150 mcg     lidocaine (LMX4) cream     lidocaine 1 % 0.1-1 mL     loperamide (IMODIUM) capsule 2 mg     LORazepam (ATIVAN) tablet 0.5-1 mg    Or     LORazepam (ATIVAN) injection 0.5-1 mg     magnesium oxide (MAG-OX) tablet 800 mg     magnesium sulfate 2 g in NS intermittent infusion ([facility]ar[facility] or [facility] Cmpd)     magnesium sulfate 4 g in 100 mL sterile water (premade)     Medication Instruction     methylPREDNISolone sodium succinate (solu-MEDROL) injection 25 mg     metoprolol tartrate (LOPRESSOR) tablet 25 mg     ondansetron (ZOFRAN) injection 8 mg    Or     ondansetron (ZOFRAN-ODT) ODT tab 8 mg    Or     ondansetron (ZOFRAN) tablet 8 mg     pilocarpine (SALAGEN) tablet 5 mg     polyethylene glycol (MIRALAX/GLYCOLAX) Packet 17 g     potassium chloride (KLOR-CON) Packet 20-40 mEq     potassium chloride 10 mEq in 100 mL intermittent infusion with 10 mg lidocaine     potassium chloride 10 mEq in 100 mL sterile water intermittent infusion (premix)     potassium chloride 20 mEq in 50 mL intermittent infusion     potassium chloride ER (K-DUR/KLOR-CON M) CR tablet 20-40 mEq     potassium phosphate 15 mmol in D5W 250 mL intermittent infusion     potassium phosphate 20 mmol in D5W 250 mL intermittent infusion     potassium phosphate 20 mmol in D5W 500 mL intermittent infusion     potassium phosphate 25 mmol in D5W 500 mL intermittent infusion     prochlorperazine (COMPAZINE) tablet 5 mg    Or     prochlorperazine (COMPAZINE) injection 5 mg     rosuvastatin (CRESTOR) tablet 5 mg     senna-docusate  (SENOKOT-S/PERICOLACE) 8.6-50 MG per tablet 1 tablet    Or     senna-docusate (SENOKOT-S/PERICOLACE) 8.6-50 MG per tablet 2 tablet     sodium chloride (PF) 0.9% PF flush 3 mL     sodium chloride (PF) 0.9% PF flush 3 mL     [START ON 1/13/2020] sulfamethoxazole-trimethoprim (BACTRIM DS/SEPTRA DS) 800-160 MG per tablet 1 tablet       PHYSICAL EXAM     Weight In/Out     Wt Readings from Last 3 Encounters:   01/12/20 101.5 kg (223 lb 12.8 oz)   01/08/20 98.9 kg (218 lb)   12/19/19 103.9 kg (229 lb 1.6 oz)      I/O last 3 completed shifts:  In: 2080 [P.O.:1260; I.V.:820]  Out: 1550 [Urine:1550]       KPS:  30    /83 (BP Location: Left arm)   Pulse 62   Temp 96.7  F (35.9  C) (Axillary)   Resp 18   Wt 101.5 kg (223 lb 12.8 oz)   SpO2 93%   BMI 32.11 kg/m         General: laying in bed, breathing comfortably on O2 via NC   Eyes: JEREMY, sclera anicteric   Lungs: scattered crackles in bases along with coarse breath sounds, overall improving.  Wearing oxygen as above.   Cardiovascular: rrr, no M/R/G   Abdominal/Rectal: +BS, soft, NT, ND   Lymphatics: no edema  Skin: no rashes or petechaie  Neuro: A&O   Additional Findings: PIV RUE    LABS AND IMAGING - PAST 24 HOURS     Results for orders placed or performed during the hospital encounter of 01/08/20 (from the past 24 hour(s))   Clostridium difficile toxin B PCR   Result Value Ref Range    Specimen Description Feces     C Diff Toxin B PCR Negative NEG^Negative   Basic metabolic panel   Result Value Ref Range    Sodium 141 133 - 144 mmol/L    Potassium 4.5 3.4 - 5.3 mmol/L    Chloride 109 94 - 109 mmol/L    Carbon Dioxide 27 20 - 32 mmol/L    Anion Gap 4 3 - 14 mmol/L    Glucose 124 (H) 70 - 99 mg/dL    Urea Nitrogen 22 7 - 30 mg/dL    Creatinine 0.63 (L) 0.66 - 1.25 mg/dL    GFR Estimate >90 >60 mL/min/[1.73_m2]    GFR Estimate If Black >90 >60 mL/min/[1.73_m2]    Calcium 8.7 8.5 - 10.1 mg/dL   CBC with platelets differential   Result Value Ref Range    WBC 11.1 (H)  4.0 - 11.0 10e9/L    RBC Count 4.83 4.4 - 5.9 10e12/L    Hemoglobin 15.1 13.3 - 17.7 g/dL    Hematocrit 47.1 40.0 - 53.0 %    MCV 98 78 - 100 fl    MCH 31.3 26.5 - 33.0 pg    MCHC 32.1 31.5 - 36.5 g/dL    RDW 14.3 10.0 - 15.0 %    Platelet Count 267 150 - 450 10e9/L    Diff Method Manual Differential     % Neutrophils 93.9 %    % Lymphocytes 0.9 %    % Monocytes 5.2 %    % Eosinophils 0.0 %    % Basophils 0.0 %    Absolute Neutrophil 10.4 (H) 1.6 - 8.3 10e9/L    Absolute Lymphocytes 0.1 (L) 0.8 - 5.3 10e9/L    Absolute Monocytes 0.6 0.0 - 1.3 10e9/L    Absolute Eosinophils 0.0 0.0 - 0.7 10e9/L    Absolute Basophils 0.0 0.0 - 0.2 10e9/L    Poikilocytosis Moderate     Polychromasia Slight     Tracy Cells Moderate     Target Cells Slight     Anguiano [place] Bodies Present     Platelet Estimate Confirming automated cell count          OVERALL PLAN     Byron Russo is a 59 year old man ~ 3.5 years s/p 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      1.  BMT: 3.5 years s/p BMT for MDS RAEB-2. Re-stage per protocol.     2.  HEME: Has not needed transfusions in a long time. Follow CBC.  Leukocytosis resolved.     3. PULM:   Acute hypoxic respiratory failure - likely secondary to coronavirus on underlying cGVHD of the lungs.  Steroids increased from pred 10mg daily to methylpred 100mg daily on 1/9. Oxygen needs improving, decrease MP to 25mg BID 1/11.   CT as above.   Pulm consult: believe origin is infection. Unable to pursue invasive testing due to high oxygen support needs.                            3.  ID:   Concern for infectious pulmonary process causing acute hypoxic respiratory failure on top of underlying structural lung changes related to cGVHD.  Broad work-up in process and ID consulted.  Empirically placed on cefepime. Now changed to Unasyn 1/10 d/t possible  enterococcus faecalis (see below).   The following testing is positive:  Coronavirus (rest of RVP is negative).  Continue supportive cares.   The following testing is negative:  Rapid flu/rsv; legionella; strep pneumo; MRSA screen, pertussis, aspergillus galactomannan, beta-d-glucan.  The following testing is in process: PJP PCR, parapertussis, urine histo/blasto.  ADDENDUM: Sputum culture + for heavy growth enterococcus faecalis.  Known h/o VRE.  Discussed with Dr. Rushing from ID, who recommends stopping cefepime/azithro and switching to Unasyn for a 7 day course (started 1/10).  Note: must continue to watch sputum culture to ensure this is not vanco resistant.  If it is VRE, change him to linezolid.     Continue ppx with ACV, Fluconazole, TMP-SMX. Hold levoflox while on broad spec abx.                               4.  GI:   - Diarrhea: possibly from antibiotics. c-diff neg 1/11. okay for imodium prn.   - Swallow dysfunction with aspiration on 12/31 video swallow study. Speech assessed here and recommend DD3 with nectar thick liquids.    - Pilocarpine for dry mouth.     5.  GVH: Continue cyclosporine 25mg PO bid.  Steroid decreased from 15mg to 10 mg in early November, patient believes his SOB was gradually worsening since that time.  Methylpred 100mg daily at this time. Decrease as above.      6.  FEN/Renal:   DD3 with nectar thick liquids. Per SLP, change to regular solids and nectar thick liquids 1/12.     7. CV. Continue metoprolol and rosuvastatin. Hold ASA. Echo with LVEF of 55-60%; mild decrease in right ventricular function when compared to last echo in 4/2016.    8. ENDO. Continue levothyroxine for hypothyroidism    Dispo: Pt will remain admitted for management of hypoxia.     Gerald Doty PA-C  [phone]    Attending Summary  The patient was seen and examined by me separate from the midlevel provider.The note above reflects my assessment and plan. I have personally reviewed today's labs,vital and radiology  results. The points of care that were added by me are:     History and physical  Imporved hypoxia, afebrile.    On exam:  Head/mouth/neck: Oropharynx clear.  No lymphadenopathy.  Heart: Regular rate and rhythm.  No murmurs rubs or gallops.  Lungs: Few bilateral rales, mostly in the bases.  Abdomen: Abdomen is soft nontender nondistended.  Intact bowel sounds.  Ext: No edema.  Skin: No rash.    Chronic GVHD:  Pulm:  Hypoxia with peribronchial thickening.  Sicca:  Moderate dry mouth  GI:  Moderate esophageal dysmotility     Overall score:  Severe (based on pulmonary severity)    Pertinent Labs:  Lab Results   Component Value Date    WBC 11.1 01/12/2020     Lab Results   Component Value Date    RBC 4.83 01/12/2020     Lab Results   Component Value Date    HGB 15.1 01/12/2020     Lab Results   Component Value Date    HCT 47.1 01/12/2020     No components found for: MCT  Lab Results   Component Value Date    MCV 98 01/12/2020     Lab Results   Component Value Date    MCH 31.3 01/12/2020     Lab Results   Component Value Date    MCHC 32.1 01/12/2020     Lab Results   Component Value Date    RDW 14.3 01/12/2020     Lab Results   Component Value Date     01/12/2020     ASSESSMENT AND PLAN  1.  MDS:  3 years s/p alloHCT  2.  Hypoxia:  Diagnosed with pleural parenchymal fibroelastosis.  RVP positive for coronavirus.  Reduce glucocorticoids 1/11/2020 (25mg BID).  Suspect a component of chronic GVHD.  3.  ID:  Continue current Abx.  ID following.  4.  Esophageal dysmotility:  Likely secondary to chronic GVHD.    Nicolas Mosher MD

## 2020-01-12 NOTE — PLAN OF CARE
1530-1930: Pt on 5 L O2 NC satting in the mid-upper 90s. OVSS. Pt denies pain, N/V. Pt went on a walk on the unit with his family, did report some CUELLAR but recovered quickly. No Bms within 4 hours. No further complaints.  Problem: Adult Inpatient Plan of Care  Goal: Plan of Care Review  1/12/2020 1757 by Rita Brown, RN  Outcome: No Change     Problem: ARDS (Acute Respiratory Distress Syndrome)  Goal: Effective Oxygenation  1/12/2020 1757 by Rita Brown, RN  Outcome: Improving     Problem: Adult Inpatient Plan of Care  Goal: Optimal Comfort and Wellbeing  1/12/2020 1757 by Rita Brown, RN  Outcome: Improving

## 2020-01-12 NOTE — PROGRESS NOTES
Calorie Count    Intake recorded for: 1/11/20  Total Kcals: 3690 Total Protein: 123g    Kcals from Hospital Food: 3690   Protein: 123g    Kcals from Outside Food (average):0 Protein: 0g    # Meals Recorded: 3 meals (first - 100% omelet w/ American cheese & 3 ketchups, deli sliced ham, half a pancake w/ syrup, 3 nectar thick cranberry juice)  (second - 100% 2 sandwiches on white bread w/ peanut butter, dada crackers, 3 nectar thick cranberry juice)  (third - 100% hot roast beef w/ extra gravy, mashed potatoes w/ extra gravy, banana bread w/ extra butter, white bread w/ extra butter, 2 1% nectar thick milk, nectar thick cranberry juice)    # Supplements Recorded: 100% 2 Magic Cup

## 2020-01-12 NOTE — PLAN OF CARE
Discharge Planner SLP   Patient plan for discharge: Did not discuss  Current status: Pt tolerated regular solids and nectar thick liquids during tx session. He is independently using compensatory strategies (effortful, double swallow, etc). Reviewed free water protocol guidelines and exercises (baron x 10, effortful x 15).     Recommend regular solid and nectar thick liquids. Fully upright position, no straws, small single sips/bites, effortful swallow, double swallow, alternate consistencies, rest breaks as needed, slow rate.     Barriers to return to prior living situation: None per SLP  Recommendations for discharge: Home with OP SLP  Rationale for recommendations: OP SLP scheduled per pt report. This is a required service  for ongoing evaluation and tx given swallow function remains below baseline          Entered by: Abbey Etienne 01/12/2020 9:17 AM

## 2020-01-12 NOTE — PLAN OF CARE
BP 97/75 (BP Location: Left arm, Cuff Size: Adult Regular)   Pulse 78   Temp 97.2  F (36.2  C) (Axillary)   Resp 18   Wt 101.5 kg (223 lb 12.8 oz)   SpO2 95%   BMI 32.11 kg/m  . Patient is on 6 L NC and SpO2 is upper 90's with mild CUELLAR.   PIV is patent and saline locked. Patient is A /O x 4. No c/o pain or n/v during this shift. Patient is eating and drinking good. Patient showered and walks in the room independently. Continue to encourage pt to do good mouth cares, cough, deep breath and use the incentive spirometer often. Continue with plan of care and notify MD for status changes.     Problem: Adult Inpatient Plan of Care  Goal: Patient-Specific Goal (Individualization)  1/12/2020 1428 by Caitlyn Landin RN  Outcome: No Change     Problem: Adult Inpatient Plan of Care  Goal: Absence of Hospital-Acquired Illness or Injury  1/12/2020 1428 by Caitlyn Landin RN  Outcome: No Change     Problem: Adult Inpatient Plan of Care  Goal: Optimal Comfort and Wellbeing  1/12/2020 1428 by Caitlyn Landin RN  Outcome: No Change     Problem: Adult Inpatient Plan of Care  Goal: Readiness for Transition of Care  1/12/2020 1428 by Caitlyn Landin RN  Outcome: No Change     Problem: Adult Inpatient Plan of Care  Goal: Rounds/Family Conference  1/12/2020 1428 by Caitlyn Landin RN  Outcome: No Change     Problem: ARDS (Acute Respiratory Distress Syndrome)  Goal: Effective Oxygenation  1/12/2020 1428 by Caitlyn Landin RN  Outcome: Improving     Problem: Adult Inpatient Plan of Care  Goal: Plan of Care Review  1/12/2020 1428 by Caitlyn Landin RN  Outcome: Improving

## 2020-01-12 NOTE — PLAN OF CARE
Afebrile. VSS on 6L NC. Denies pain and nausea. PIV infusing. Complaints this morning of feeling congested and declined intervention.Up independent to the bathroom. No other complaints overnight. Continue with plan of care.     Problem: ARDS (Acute Respiratory Distress Syndrome)  Goal: Effective Oxygenation  1/12/2020 0739 by María Kulkarni, RN  Outcome: No Change     Problem: Adult Inpatient Plan of Care  Goal: Plan of Care Review  1/12/2020 0739 by María Kulkarni RN  Outcome: No Change     Problem: Adult Inpatient Plan of Care  Goal: Patient-Specific Goal (Individualization)  1/12/2020 0739 by María Kulkarni, RN  Outcome: No Change     Problem: Adult Inpatient Plan of Care  Goal: Absence of Hospital-Acquired Illness or Injury  1/12/2020 0739 by María Kulkarni, RN  Outcome: No Change

## 2020-01-13 ENCOUNTER — APPOINTMENT (OUTPATIENT)
Dept: OCCUPATIONAL THERAPY | Facility: CLINIC | Age: 60
DRG: 177 | End: 2020-01-13
Attending: INTERNAL MEDICINE
Payer: COMMERCIAL

## 2020-01-13 LAB
ANION GAP SERPL CALCULATED.3IONS-SCNC: 4 MMOL/L (ref 3–14)
BACTERIA SPEC CULT: ABNORMAL
BACTERIA SPEC CULT: ABNORMAL
BASOPHILS # BLD AUTO: 0 10E9/L (ref 0–0.2)
BASOPHILS NFR BLD AUTO: 0.2 %
BUN SERPL-MCNC: 20 MG/DL (ref 7–30)
CALCIUM SERPL-MCNC: 8.4 MG/DL (ref 8.5–10.1)
CHLORIDE SERPL-SCNC: 108 MMOL/L (ref 94–109)
CO2 SERPL-SCNC: 29 MMOL/L (ref 20–32)
CREAT SERPL-MCNC: 0.54 MG/DL (ref 0.66–1.25)
DIFFERENTIAL METHOD BLD: ABNORMAL
EOSINOPHIL # BLD AUTO: 0 10E9/L (ref 0–0.7)
EOSINOPHIL NFR BLD AUTO: 0 %
ERYTHROCYTE [DISTWIDTH] IN BLOOD BY AUTOMATED COUNT: 14.2 % (ref 10–15)
GFR SERPL CREATININE-BSD FRML MDRD: >90 ML/MIN/{1.73_M2}
GLUCOSE SERPL-MCNC: 123 MG/DL (ref 70–99)
HCT VFR BLD AUTO: 46 % (ref 40–53)
HGB BLD-MCNC: 14.9 G/DL (ref 13.3–17.7)
IMM GRANULOCYTES # BLD: 0.1 10E9/L (ref 0–0.4)
IMM GRANULOCYTES NFR BLD: 0.7 %
LYMPHOCYTES # BLD AUTO: 0.6 10E9/L (ref 0.8–5.3)
LYMPHOCYTES NFR BLD AUTO: 5.8 %
MAGNESIUM SERPL-MCNC: 2.4 MG/DL (ref 1.6–2.3)
MCH RBC QN AUTO: 31.8 PG (ref 26.5–33)
MCHC RBC AUTO-ENTMCNC: 32.4 G/DL (ref 31.5–36.5)
MCV RBC AUTO: 98 FL (ref 78–100)
MONOCYTES # BLD AUTO: 0.9 10E9/L (ref 0–1.3)
MONOCYTES NFR BLD AUTO: 8.5 %
NEUTROPHILS # BLD AUTO: 8.8 10E9/L (ref 1.6–8.3)
NEUTROPHILS NFR BLD AUTO: 84.8 %
NRBC # BLD AUTO: 0 10*3/UL
NRBC BLD AUTO-RTO: 0 /100
PHOSPHATE SERPL-MCNC: 3.3 MG/DL (ref 2.5–4.5)
PLATELET # BLD AUTO: 258 10E9/L (ref 150–450)
POTASSIUM SERPL-SCNC: 5.1 MMOL/L (ref 3.4–5.3)
RBC # BLD AUTO: 4.68 10E12/L (ref 4.4–5.9)
SODIUM SERPL-SCNC: 141 MMOL/L (ref 133–144)
SPECIMEN SOURCE: ABNORMAL
WBC # BLD AUTO: 10.4 10E9/L (ref 4–11)

## 2020-01-13 PROCEDURE — 80048 BASIC METABOLIC PNL TOTAL CA: CPT | Performed by: INTERNAL MEDICINE

## 2020-01-13 PROCEDURE — 25000132 ZZH RX MED GY IP 250 OP 250 PS 637: Performed by: INTERNAL MEDICINE

## 2020-01-13 PROCEDURE — 83735 ASSAY OF MAGNESIUM: CPT | Performed by: INTERNAL MEDICINE

## 2020-01-13 PROCEDURE — 97530 THERAPEUTIC ACTIVITIES: CPT | Mod: GO

## 2020-01-13 PROCEDURE — 25000131 ZZH RX MED GY IP 250 OP 636 PS 637: Performed by: INTERNAL MEDICINE

## 2020-01-13 PROCEDURE — 84100 ASSAY OF PHOSPHORUS: CPT | Performed by: INTERNAL MEDICINE

## 2020-01-13 PROCEDURE — 36415 COLL VENOUS BLD VENIPUNCTURE: CPT | Performed by: INTERNAL MEDICINE

## 2020-01-13 PROCEDURE — 97535 SELF CARE MNGMENT TRAINING: CPT | Mod: GO

## 2020-01-13 PROCEDURE — 97110 THERAPEUTIC EXERCISES: CPT | Mod: GO

## 2020-01-13 PROCEDURE — 85025 COMPLETE CBC W/AUTO DIFF WBC: CPT | Performed by: INTERNAL MEDICINE

## 2020-01-13 PROCEDURE — 25000128 H RX IP 250 OP 636: Performed by: PHYSICIAN ASSISTANT

## 2020-01-13 PROCEDURE — 20600000 ZZH R&B BMT

## 2020-01-13 RX ADMIN — Medication 800 MG: at 08:35

## 2020-01-13 RX ADMIN — FLUCONAZOLE 100 MG: 100 TABLET ORAL at 08:35

## 2020-01-13 RX ADMIN — METOPROLOL TARTRATE 25 MG: 25 TABLET ORAL at 08:35

## 2020-01-13 RX ADMIN — ROSUVASTATIN CALCIUM 5 MG: 5 TABLET, FILM COATED ORAL at 08:45

## 2020-01-13 RX ADMIN — AMPICILLIN SODIUM AND SULBACTAM SODIUM 3 G: 2; 1 INJECTION, POWDER, FOR SOLUTION INTRAMUSCULAR; INTRAVENOUS at 21:37

## 2020-01-13 RX ADMIN — SULFAMETHOXAZOLE AND TRIMETHOPRIM 1 TABLET: 800; 160 TABLET ORAL at 08:35

## 2020-01-13 RX ADMIN — CYCLOSPORINE 25 MG: 25 CAPSULE, LIQUID FILLED ORAL at 19:33

## 2020-01-13 RX ADMIN — ACYCLOVIR 800 MG: 800 TABLET ORAL at 08:33

## 2020-01-13 RX ADMIN — AMPICILLIN SODIUM AND SULBACTAM SODIUM 3 G: 2; 1 INJECTION, POWDER, FOR SOLUTION INTRAMUSCULAR; INTRAVENOUS at 15:57

## 2020-01-13 RX ADMIN — PILOCARPINE HYDROCHLORIDE 5 MG: 5 TABLET, FILM COATED ORAL at 19:34

## 2020-01-13 RX ADMIN — CYCLOSPORINE 25 MG: 25 CAPSULE, LIQUID FILLED ORAL at 08:35

## 2020-01-13 RX ADMIN — SULFAMETHOXAZOLE AND TRIMETHOPRIM 1 TABLET: 800; 160 TABLET ORAL at 19:33

## 2020-01-13 RX ADMIN — LEVOTHYROXINE SODIUM 150 MCG: 150 TABLET ORAL at 08:35

## 2020-01-13 RX ADMIN — METHYLPREDNISOLONE SODIUM SUCCINATE 25 MG: 40 INJECTION, POWDER, LYOPHILIZED, FOR SOLUTION INTRAMUSCULAR; INTRAVENOUS at 08:32

## 2020-01-13 RX ADMIN — AMPICILLIN SODIUM AND SULBACTAM SODIUM 3 G: 2; 1 INJECTION, POWDER, FOR SOLUTION INTRAMUSCULAR; INTRAVENOUS at 04:18

## 2020-01-13 RX ADMIN — AMPICILLIN SODIUM AND SULBACTAM SODIUM 3 G: 2; 1 INJECTION, POWDER, FOR SOLUTION INTRAMUSCULAR; INTRAVENOUS at 09:45

## 2020-01-13 RX ADMIN — ACYCLOVIR 800 MG: 800 TABLET ORAL at 19:33

## 2020-01-13 RX ADMIN — METHYLPREDNISOLONE SODIUM SUCCINATE 25 MG: 40 INJECTION, POWDER, LYOPHILIZED, FOR SOLUTION INTRAMUSCULAR; INTRAVENOUS at 19:33

## 2020-01-13 RX ADMIN — PILOCARPINE HYDROCHLORIDE 5 MG: 5 TABLET, FILM COATED ORAL at 08:36

## 2020-01-13 ASSESSMENT — ACTIVITIES OF DAILY LIVING (ADL)
ADLS_ACUITY_SCORE: 11

## 2020-01-13 NOTE — PLAN OF CARE
OT 5C:  Discharge Planner OT   Patient plan for discharge: home  Current status: Pt eager to ambulate in [place].  Pt dropping to 87% with activity on RA, needs 6L O2 via nc to maintain O2 sats >90% while ambulating.  Pt able to ambulate ind or pushing IV pole but will need A with set-up of lines.  Pt having 1 episode of desatting to 88% on 6L with ambulating, recovers quickly with seated rest.    Barriers to return to prior living situation: medical, endurance  Recommendations for discharge: home with A, may benefit from OP [place]  Rationale for recommendations: Pt progressing toward OT goals and safe to return home with assist for heavy IADL.  Pt may benefit from OP [place] for continued work on endurance.         Entered by: Pearl Frye 01/13/2020 5:11 PM

## 2020-01-13 NOTE — PROGRESS NOTES
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  He has a history of cGVHD of the lungs and recently was diagnosed with dysphagia causing aspiration.        Diagnosis AMLU Acute myelogeneous leukemia, Unknown  HCT Type Allogeneic    Prep Regimen Cytoxan  Fludarabine  TBI   Donor Source Related PBSC    GVHD Prophylaxis Cyclosporine  Mycophenolate  Primary BMT Provider Fernie Matthews was admitted on 1/8/2020 for acute hypoxic respiratory failure.    INTERVAL  HISTORY   Yg continues to feel better. Was down to 5L oxygen by nasal cannula overnight; dropped to 88% using ergometer and moving around room, so bumped back up to 6L and he is recovering.  Some loose stool.    Review of Systems: 6 point ROS negative except as noted above.  # Pain Assessment:  Current Pain Score 1/13/2020   Patient currently in pain? denies   Pain score (0-10) -   Pain location -   Pain descriptors -   Byron edmonds pain level was assessed and he currently denies pain.      Scheduled Medications    acyclovir  800 mg Oral BID     ampicillin-sulbactam (UNASYN) IV  3 g Intravenous Q6H     cycloSPORINE modified  25 mg Oral BID     fluconazole  100 mg Oral Daily     levothyroxine  150 mcg Oral Daily     magnesium oxide  800 mg Oral Daily     methylPREDNISolone  25 mg Intravenous Q12H     metoprolol tartrate  25 mg Oral Daily     pilocarpine  5 mg Oral BID     rosuvastatin  5 mg Oral Daily     senna-docusate  1 tablet Oral BID    Or     senna-docusate  2 tablet Oral BID     sodium chloride (PF)  3 mL Intracatheter Q8H     sulfamethoxazole-trimethoprim  1 tablet Oral Q Mon Tues BID     Current Facility-Administered Medications   Medication     acetaminophen (TYLENOL) tablet 650 mg     acyclovir (ZOVIRAX) tablet 800 mg     ampicillin-sulbactam (UNASYN) 3 g vial to attach to  mL bag     artificial saliva (BIOTENE DRY MOUTHWASH)  liquid 15 mL     artificial saliva (BIOTENE MT) solution 2 spray     benzonatate (TESSALON) capsule 100 mg     cycloSPORINE modified (GENERIC EQUIVALENT) capsule 25 mg     fluconazole (DIFLUCAN) tablet 100 mg     guaiFENesin-dextromethorphan (ROBITUSSIN DM) 100-10 MG/5ML syrup 5 mL     hypromellose-dextran (ARTIFICAL TEARS) 0.1-0.3 % ophthalmic solution 1 drop     levothyroxine (SYNTHROID/LEVOTHROID) tablet 150 mcg     lidocaine (LMX4) cream     lidocaine 1 % 0.1-1 mL     loperamide (IMODIUM) capsule 2 mg     LORazepam (ATIVAN) tablet 0.5-1 mg    Or     LORazepam (ATIVAN) injection 0.5-1 mg     magnesium oxide (MAG-OX) tablet 800 mg     magnesium sulfate 2 g in NS intermittent infusion ([facility] or [facility])     magnesium sulfate 4 g in 100 mL sterile water (premade)     Medication Instruction     methylPREDNISolone sodium succinate (solu-MEDROL) injection 25 mg     metoprolol tartrate (LOPRESSOR) tablet 25 mg     ondansetron (ZOFRAN) injection 8 mg    Or     ondansetron (ZOFRAN-ODT) ODT tab 8 mg    Or     ondansetron (ZOFRAN) tablet 8 mg     pilocarpine (SALAGEN) tablet 5 mg     polyethylene glycol (MIRALAX/GLYCOLAX) Packet 17 g     potassium chloride (KLOR-CON) Packet 20-40 mEq     potassium chloride 10 mEq in 100 mL intermittent infusion with 10 mg lidocaine     potassium chloride 10 mEq in 100 mL sterile water intermittent infusion (premix)     potassium chloride 20 mEq in 50 mL intermittent infusion     potassium chloride ER (K-DUR/KLOR-CON M) CR tablet 20-40 mEq     potassium phosphate 15 mmol in D5W 250 mL intermittent infusion     potassium phosphate 20 mmol in D5W 250 mL intermittent infusion     potassium phosphate 20 mmol in D5W 500 mL intermittent infusion     potassium phosphate 25 mmol in D5W 500 mL intermittent infusion     prochlorperazine (COMPAZINE) tablet 5 mg    Or     prochlorperazine (COMPAZINE) injection 5 mg     rosuvastatin (CRESTOR) tablet 5 mg     senna-docusate (SENOKOT-S/PERICOLACE)  8.6-50 MG per tablet 1 tablet    Or     senna-docusate (SENOKOT-S/PERICOLACE) 8.6-50 MG per tablet 2 tablet     sodium chloride (PF) 0.9% PF flush 3 mL     sodium chloride (PF) 0.9% PF flush 3 mL     sulfamethoxazole-trimethoprim (BACTRIM DS/SEPTRA DS) 800-160 MG per tablet 1 tablet       PHYSICAL EXAM     Weight In/Out     Wt Readings from Last 3 Encounters:   01/12/20 101.5 kg (223 lb 12.8 oz)   01/08/20 98.9 kg (218 lb)   12/19/19 103.9 kg (229 lb 1.6 oz)      I/O last 3 completed shifts:  In: 1460 [P.O.:960; I.V.:500]  Out: 1445 [Urine:1445]       KPS:  30    BP (!) 133/91 (BP Location: Left arm)   Pulse 72   Temp 97.7  F (36.5  C) (Axillary)   Resp 18   Wt 101.5 kg (223 lb 12.8 oz)   SpO2 94%   BMI 32.11 kg/m         General: sitting up on side of bed, breathing comfortably on O2 via NC   Eyes: JEREMY, sclera anicteric   Lungs: scattered crackles in bases along with coarse breath sounds, overall improving.  Wearing oxygen as above.   Cardiovascular: rrr, no M/R/G   Abdominal/Rectal: +BS, soft, NT, ND   Lymphatics: no edema  Skin: no rashes or petechaie  Neuro: A&O   Additional Findings: PIV RUE    LABS AND IMAGING - PAST 24 HOURS     Results for orders placed or performed during the hospital encounter of 01/08/20 (from the past 24 hour(s))   Magnesium   Result Value Ref Range    Magnesium 2.4 (H) 1.6 - 2.3 mg/dL   Phosphorus   Result Value Ref Range    Phosphorus 3.3 2.5 - 4.5 mg/dL   Basic metabolic panel   Result Value Ref Range    Sodium 141 133 - 144 mmol/L    Potassium 5.1 3.4 - 5.3 mmol/L    Chloride 108 94 - 109 mmol/L    Carbon Dioxide 29 20 - 32 mmol/L    Anion Gap 4 3 - 14 mmol/L    Glucose 123 (H) 70 - 99 mg/dL    Urea Nitrogen 20 7 - 30 mg/dL    Creatinine 0.54 (L) 0.66 - 1.25 mg/dL    GFR Estimate >90 >60 mL/min/[1.73_m2]    GFR Estimate If Black >90 >60 mL/min/[1.73_m2]    Calcium 8.4 (L) 8.5 - 10.1 mg/dL   CBC with platelets differential   Result Value Ref Range    WBC 10.4 4.0 - 11.0 10e9/L     RBC Count 4.68 4.4 - 5.9 10e12/L    Hemoglobin 14.9 13.3 - 17.7 g/dL    Hematocrit 46.0 40.0 - 53.0 %    MCV 98 78 - 100 fl    MCH 31.8 26.5 - 33.0 pg    MCHC 32.4 31.5 - 36.5 g/dL    RDW 14.2 10.0 - 15.0 %    Platelet Count 258 150 - 450 10e9/L    Diff Method Automated Method     % Neutrophils 84.8 %    % Lymphocytes 5.8 %    % Monocytes 8.5 %    % Eosinophils 0.0 %    % Basophils 0.2 %    % Immature Granulocytes 0.7 %    Nucleated RBCs 0 0 /100    Absolute Neutrophil 8.8 (H) 1.6 - 8.3 10e9/L    Absolute Lymphocytes 0.6 (L) 0.8 - 5.3 10e9/L    Absolute Monocytes 0.9 0.0 - 1.3 10e9/L    Absolute Eosinophils 0.0 0.0 - 0.7 10e9/L    Absolute Basophils 0.0 0.0 - 0.2 10e9/L    Abs Immature Granulocytes 0.1 0 - 0.4 10e9/L    Absolute Nucleated RBC 0.0          OVERALL PLAN     Byron Russo is a 59 year old man ~ 3.5 years s/p 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      1.  BMT: 3.5 years s/p BMT for MDS RAEB-2. Re-stage per protocol.     2.  HEME: Has not needed transfusions in a long time. Follow CBC.  Leukocytosis resolved.     3. PULM:   Acute hypoxic respiratory failure - likely secondary to coronavirus on underlying cGVHD of the lungs.  Steroids increased from pred 10mg daily to methylpred 100mg daily on 1/9. Oxygen needs improving, decreased MP to 25mg BID 1/11.   CT as above.   Pulm consult: believe origin is infection. Unable to pursue invasive testing due to high oxygen support needs.                            3.  ID:   Concern for infectious pulmonary process causing acute hypoxic respiratory failure on top of underlying structural lung changes related to cGVHD.  Broad work-up in process and ID consulted.  Empirically placed on cefepime. Now changed to Unasyn 1/10 d/t possible enterococcus faecalis (see below); sensitivities pending.    The  following testing is positive:  Coronavirus (rest of RVP is negative).  Continue supportive cares.   The following testing is negative:  Rapid flu/rsv; legionella; strep pneumo; MRSA screen, pertussis, aspergillus galactomannan, beta-d-glucan.  The following testing is in process: PJP PCR, parapertussis, urine histo/blasto.  ADDENDUM: Sputum culture + for heavy growth enterococcus faecalis.  Known h/o VRE.  Discussed with Dr. Rushing from ID, who recommends stopping cefepime/azithro and switching to Unasyn for a 7 day course (started 1/10).  Note: must continue to watch sputum culture to ensure this is not vanco resistant.  If it is VRE, change him to linezolid.     Continue ppx with ACV, Fluconazole, TMP-SMX. Hold levoflox while on broad spec abx.                               4.  GI:   - Diarrhea: possibly from antibiotics. c-diff neg 1/11. okay for imodium prn.   - Swallow dysfunction with aspiration on 12/31 video swallow study. Speech assessed here and recommend DD3 with nectar thick liquids.    - Pilocarpine for dry mouth.     5.  GVH: Continue cyclosporine 25mg PO bid.  Steroid decreased from 15mg to 10 mg in early November, patient believes his SOB was gradually worsening since that time.  Methylpred 100mg daily at this time. Decrease as above.      6.  FEN/Renal:   DD3 with nectar thick liquids. Per SLP, change to regular solids and nectar thick liquids 1/12.     7. CV. Continue metoprolol and rosuvastatin. Hold ASA. Echo with LVEF of 55-60%; mild decrease in right ventricular function when compared to last echo in 4/2016.    8. ENDO. Continue levothyroxine for hypothyroidism    Dispo: Pt will remain admitted for management of hypoxia and while awaiting antibiotic sensitivities.     Meera Kern PA-C  1/13/2020      Attending Summary  The patient was seen and examined by me separate from the midlevel provider.The note above reflects my assessment and plan. I have personally reviewed today's labs,vital  and radiology results. The points of care that were added by me are:     History and physical  Hypoxia improved.  Afebrile.  Tolerating ambulation.    On exam:  Head/mouth/neck: Oropharynx clear.  No lymphadenopathy.  Heart: Regular rate and rhythm.  No murmurs rubs or gallops.  Lungs: Few bilateral rales, mostly in the bases.  Abdomen: Abdomen is soft nontender nondistended.  Intact bowel sounds.  Ext: No edema.  Skin: No rash.    Chronic GVHD:  Pulm:  Hypoxia with peribronchial thickening.  Sicca:  Moderate dry mouth  GI:  Moderate esophageal dysmotility     Overall score:  Severe (based on pulmonary severity)    Pertinent Labs:  Lab Results   Component Value Date    WBC 10.4 01/13/2020     Lab Results   Component Value Date    RBC 4.68 01/13/2020     Lab Results   Component Value Date    HGB 14.9 01/13/2020     Lab Results   Component Value Date    HCT 46.0 01/13/2020     No components found for: MCT  Lab Results   Component Value Date    MCV 98 01/13/2020     Lab Results   Component Value Date    MCH 31.8 01/13/2020     Lab Results   Component Value Date    MCHC 32.4 01/13/2020     Lab Results   Component Value Date    RDW 14.2 01/13/2020     Lab Results   Component Value Date     01/13/2020     ASSESSMENT AND PLAN  1.  MDS:  3 years s/p alloHCT  2.  Hypoxia:  Diagnosed with pleural parenchymal fibroelastosis.  RVP positive for coronavirus.  Reduce glucocorticoids 1/11/2020 (25mg BID).  Suspect a component of chronic GVHD.  3.  ID:  Continue current Abx.  ID following.  4.  Esophageal dysmotility:  Likely secondary to chronic GVHD.    5.  Dispo:  Anticipate discharge tomorrow.    Nicolas Mosher MD

## 2020-01-13 NOTE — PROGRESS NOTES
Calorie Count  Intake recorded for: 1/12  Total Kcals: 2324 Total Protein: 73g  Kcals from Hospital Food: 2324  Protein: 73g  Kcals from Outside Food (average):0 Protein: 0g  # Meals Recorded: 100% omelet w/ sausage & cheese, 2 pancakes w/ syrup, cranberry juice, 2 servings of cheerios w/ 4 milks   # Supplements Recorded: 100% 2 Magic Cups

## 2020-01-13 NOTE — PLAN OF CARE
Discharge Planner PT   PT 5C                                     PT Deferral  Patient plan for discharge: Home with assist  Current status: Reviewed pt status with OT following OT treatment today. Pt is independent with functional mobility with good balance. No PT needs are identified. OT will follow for conditioning program and PT will now defer and complete the order.  Barriers to return to prior living situation: Medical condition  Recommendations for discharge: Home with assist  Rationale for recommendations: No PT needs identified       Entered by: Rashad Iraheta 01/13/2020 5:03 PM

## 2020-01-13 NOTE — PLAN OF CARE
VSS. O2 sats mid to upper 90's on 5L NC. Denies pain and N/V.  PIV patent, scheduled Unasyn given. Up independent in room. Offers no other complaints, continue with plan of care.       Problem: ARDS (Acute Respiratory Distress Syndrome)  Goal: Effective Oxygenation  1/13/2020 0639 by María Kulkarni, RN  Outcome: No Change     Problem: Adult Inpatient Plan of Care  Goal: Plan of Care Review  1/13/2020 0639 by María Kulkarni, RN  Outcome: No Change     Problem: Adult Inpatient Plan of Care  Goal: Patient-Specific Goal (Individualization)  Outcome: No Change     Problem: Adult Inpatient Plan of Care  Goal: Absence of Hospital-Acquired Illness or Injury  Outcome: No Change      0 = independent

## 2020-01-13 NOTE — PLAN OF CARE
/81 (BP Location: Left arm, Cuff Size: Adult Regular)   Pulse 84   Temp 98.5  F (36.9  C) (Axillary)   Resp 18   Wt 102.1 kg (225 lb)   SpO2 94%   BMI 32.28 kg/m  . Patient is on 4-6 L NC and SpO2 is upper 90's, but drops into 80's sometimes during physical movements. PIV is patent and saline locked. Patient is A & O x 4. No c/o pain or n/v during this shift. Patient is eating and drinking well. Continue with the plan of care and notify MD for status changes.    Problem: Adult Inpatient Plan of Care  Goal: Patient-Specific Goal (Individualization)  1/13/2020 1425 by Caitlyn Landin RN  Outcome: No Change     Problem: Adult Inpatient Plan of Care  Goal: Absence of Hospital-Acquired Illness or Injury  1/13/2020 1425 by Caitlyn Landin RN  Outcome: No Change     Problem: Adult Inpatient Plan of Care  Goal: Optimal Comfort and Wellbeing  1/13/2020 1425 by Caitlyn Landin RN  Outcome: No Change     Problem: Adult Inpatient Plan of Care  Goal: Readiness for Transition of Care  1/13/2020 1425 by Caitlyn Landin RN  Outcome: No Change     Problem: Adult Inpatient Plan of Care  Goal: Rounds/Family Conference  1/13/2020 1425 by Caitlyn Landin RN  Outcome: No Change     Problem: ARDS (Acute Respiratory Distress Syndrome)  Goal: Effective Oxygenation  1/13/2020 1425 by Caitlyn Landin RN  Outcome: Improving     Problem: Adult Inpatient Plan of Care  Goal: Plan of Care Review  1/13/2020 1425 by Caitlyn Landin RN  Outcome: Improving

## 2020-01-14 ENCOUNTER — APPOINTMENT (OUTPATIENT)
Dept: SPEECH THERAPY | Facility: CLINIC | Age: 60
DRG: 177 | End: 2020-01-14
Attending: INTERNAL MEDICINE
Payer: COMMERCIAL

## 2020-01-14 ENCOUNTER — APPOINTMENT (OUTPATIENT)
Dept: OCCUPATIONAL THERAPY | Facility: CLINIC | Age: 60
DRG: 177 | End: 2020-01-14
Attending: INTERNAL MEDICINE
Payer: COMMERCIAL

## 2020-01-14 LAB
ANION GAP SERPL CALCULATED.3IONS-SCNC: 3 MMOL/L (ref 3–14)
BASOPHILS # BLD AUTO: 0 10E9/L (ref 0–0.2)
BASOPHILS NFR BLD AUTO: 0 %
BUN SERPL-MCNC: 19 MG/DL (ref 7–30)
CALCIUM SERPL-MCNC: 8.4 MG/DL (ref 8.5–10.1)
CHLORIDE SERPL-SCNC: 106 MMOL/L (ref 94–109)
CO2 SERPL-SCNC: 28 MMOL/L (ref 20–32)
CREAT SERPL-MCNC: 0.54 MG/DL (ref 0.66–1.25)
DIFFERENTIAL METHOD BLD: ABNORMAL
EOSINOPHIL # BLD AUTO: 0 10E9/L (ref 0–0.7)
EOSINOPHIL NFR BLD AUTO: 0 %
ERYTHROCYTE [DISTWIDTH] IN BLOOD BY AUTOMATED COUNT: 14.1 % (ref 10–15)
GFR SERPL CREATININE-BSD FRML MDRD: >90 ML/MIN/{1.73_M2}
GLUCOSE SERPL-MCNC: 123 MG/DL (ref 70–99)
HCT VFR BLD AUTO: 45.7 % (ref 40–53)
HGB BLD-MCNC: 14.9 G/DL (ref 13.3–17.7)
LAB SCANNED RESULT: NORMAL
LYMPHOCYTES # BLD AUTO: 0.6 10E9/L (ref 0.8–5.3)
LYMPHOCYTES NFR BLD AUTO: 5.1 %
MCH RBC QN AUTO: 31.9 PG (ref 26.5–33)
MCHC RBC AUTO-ENTMCNC: 32.6 G/DL (ref 31.5–36.5)
MCV RBC AUTO: 98 FL (ref 78–100)
MONOCYTES # BLD AUTO: 0.4 10E9/L (ref 0–1.3)
MONOCYTES NFR BLD AUTO: 3.4 %
NEUTROPHILS # BLD AUTO: 10.4 10E9/L (ref 1.6–8.3)
NEUTROPHILS NFR BLD AUTO: 91.5 %
NRBC # BLD AUTO: 0.1 10*3/UL
NRBC BLD AUTO-RTO: 1 /100
PLATELET # BLD AUTO: 262 10E9/L (ref 150–450)
PLATELET # BLD EST: ABNORMAL 10*3/UL
POIKILOCYTOSIS BLD QL SMEAR: SLIGHT
POTASSIUM SERPL-SCNC: 4.8 MMOL/L (ref 3.4–5.3)
RBC # BLD AUTO: 4.67 10E12/L (ref 4.4–5.9)
SODIUM SERPL-SCNC: 138 MMOL/L (ref 133–144)
TARGETS BLD QL SMEAR: SLIGHT
WBC # BLD AUTO: 11.4 10E9/L (ref 4–11)

## 2020-01-14 PROCEDURE — 85025 COMPLETE CBC W/AUTO DIFF WBC: CPT | Performed by: INTERNAL MEDICINE

## 2020-01-14 PROCEDURE — 92526 ORAL FUNCTION THERAPY: CPT | Mod: GN | Performed by: SPEECH-LANGUAGE PATHOLOGIST

## 2020-01-14 PROCEDURE — 25000131 ZZH RX MED GY IP 250 OP 636 PS 637: Performed by: INTERNAL MEDICINE

## 2020-01-14 PROCEDURE — 25000128 H RX IP 250 OP 636: Performed by: PHYSICIAN ASSISTANT

## 2020-01-14 PROCEDURE — 25000132 ZZH RX MED GY IP 250 OP 250 PS 637: Performed by: INTERNAL MEDICINE

## 2020-01-14 PROCEDURE — 20600000 ZZH R&B BMT

## 2020-01-14 PROCEDURE — 36415 COLL VENOUS BLD VENIPUNCTURE: CPT | Performed by: INTERNAL MEDICINE

## 2020-01-14 PROCEDURE — 97110 THERAPEUTIC EXERCISES: CPT | Mod: GO

## 2020-01-14 PROCEDURE — 80048 BASIC METABOLIC PNL TOTAL CA: CPT | Performed by: INTERNAL MEDICINE

## 2020-01-14 PROCEDURE — 25000132 ZZH RX MED GY IP 250 OP 250 PS 637: Performed by: PHYSICIAN ASSISTANT

## 2020-01-14 RX ADMIN — ACYCLOVIR 800 MG: 800 TABLET ORAL at 08:22

## 2020-01-14 RX ADMIN — METHYLPREDNISOLONE SODIUM SUCCINATE 25 MG: 40 INJECTION, POWDER, LYOPHILIZED, FOR SOLUTION INTRAMUSCULAR; INTRAVENOUS at 19:55

## 2020-01-14 RX ADMIN — AMOXICILLIN AND CLAVULANATE POTASSIUM 1 TABLET: 875; 125 TABLET, FILM COATED ORAL at 11:27

## 2020-01-14 RX ADMIN — METHYLPREDNISOLONE SODIUM SUCCINATE 25 MG: 40 INJECTION, POWDER, LYOPHILIZED, FOR SOLUTION INTRAMUSCULAR; INTRAVENOUS at 08:23

## 2020-01-14 RX ADMIN — ACYCLOVIR 800 MG: 800 TABLET ORAL at 19:53

## 2020-01-14 RX ADMIN — METOPROLOL TARTRATE 25 MG: 25 TABLET ORAL at 08:23

## 2020-01-14 RX ADMIN — CYCLOSPORINE 25 MG: 25 CAPSULE, LIQUID FILLED ORAL at 08:37

## 2020-01-14 RX ADMIN — Medication 800 MG: at 08:22

## 2020-01-14 RX ADMIN — SULFAMETHOXAZOLE AND TRIMETHOPRIM 1 TABLET: 800; 160 TABLET ORAL at 19:53

## 2020-01-14 RX ADMIN — FLUCONAZOLE 100 MG: 100 TABLET ORAL at 08:23

## 2020-01-14 RX ADMIN — ROSUVASTATIN CALCIUM 5 MG: 5 TABLET, FILM COATED ORAL at 08:23

## 2020-01-14 RX ADMIN — AMPICILLIN SODIUM AND SULBACTAM SODIUM 3 G: 2; 1 INJECTION, POWDER, FOR SOLUTION INTRAMUSCULAR; INTRAVENOUS at 09:26

## 2020-01-14 RX ADMIN — LEVOTHYROXINE SODIUM 150 MCG: 150 TABLET ORAL at 08:23

## 2020-01-14 RX ADMIN — SULFAMETHOXAZOLE AND TRIMETHOPRIM 1 TABLET: 800; 160 TABLET ORAL at 08:23

## 2020-01-14 RX ADMIN — AMOXICILLIN AND CLAVULANATE POTASSIUM 1 TABLET: 875; 125 TABLET, FILM COATED ORAL at 19:53

## 2020-01-14 RX ADMIN — AMPICILLIN SODIUM AND SULBACTAM SODIUM 3 G: 2; 1 INJECTION, POWDER, FOR SOLUTION INTRAMUSCULAR; INTRAVENOUS at 04:15

## 2020-01-14 RX ADMIN — PILOCARPINE HYDROCHLORIDE 5 MG: 5 TABLET, FILM COATED ORAL at 19:53

## 2020-01-14 RX ADMIN — PILOCARPINE HYDROCHLORIDE 5 MG: 5 TABLET, FILM COATED ORAL at 08:22

## 2020-01-14 RX ADMIN — CYCLOSPORINE 25 MG: 25 CAPSULE, LIQUID FILLED ORAL at 19:53

## 2020-01-14 ASSESSMENT — ACTIVITIES OF DAILY LIVING (ADL)
ADLS_ACUITY_SCORE: 11

## 2020-01-14 NOTE — PLAN OF CARE
VSS. Patient offers no complaints. 6L NC with sats in the upper 90s. Dyspnea on exertion. Antibiotics administered as ordered. Patient reports 4 small loose stools today, denies need for imodium. Patient eating well, on calorie counts. Continue plan of care.     Problem: ARDS (Acute Respiratory Distress Syndrome)  Goal: Effective Oxygenation  1/13/2020 2219 by Winnie Shelby, RN  Outcome: No Change     Problem: Adult Inpatient Plan of Care  Goal: Absence of Hospital-Acquired Illness or Injury  1/13/2020 2219 by Winnie Shelby, RN  Outcome: No Change     Problem: Adult Inpatient Plan of Care  Goal: Optimal Comfort and Wellbeing  1/13/2020 2219 by Winnie Shelby, RN  Outcome: No Change

## 2020-01-14 NOTE — PROGRESS NOTES
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  He has a history of cGVHD of the lungs and recently was diagnosed with dysphagia causing aspiration.        Diagnosis AMLU Acute myelogeneous leukemia, Unknown  HCT Type Allogeneic    Prep Regimen Cytoxan  Fludarabine  TBI   Donor Source Related PBSC    GVHD Prophylaxis Cyclosporine  Mycophenolate  Primary BMT Provider Fernie     Byron was admitted on 1/8/2020 for acute hypoxic respiratory failure.    INTERVAL  HISTORY   Yg feels good, but still drops saturations to 83% when walking on 3L oxygen; required turn up to 8L to recover.  He has some ongoing diarrhea that is mild.  He has no new symptoms.  Eating/drinking well and has plenty of energy.     Review of Systems: 6 point ROS negative except as noted above.  # Pain Assessment:  Current Pain Score 1/13/2020   Patient currently in pain? denies   Pain score (0-10) -   Pain location -   Pain descriptors -   Byron edmonds pain level was assessed and he currently denies pain.      Scheduled Medications    acyclovir  800 mg Oral BID     ampicillin-sulbactam (UNASYN) IV  3 g Intravenous Q6H     cycloSPORINE modified  25 mg Oral BID     fluconazole  100 mg Oral Daily     levothyroxine  150 mcg Oral Daily     magnesium oxide  800 mg Oral Daily     methylPREDNISolone  25 mg Intravenous Q12H     metoprolol tartrate  25 mg Oral Daily     pilocarpine  5 mg Oral BID     rosuvastatin  5 mg Oral Daily     senna-docusate  1 tablet Oral BID    Or     senna-docusate  2 tablet Oral BID     sodium chloride (PF)  3 mL Intracatheter Q8H     sulfamethoxazole-trimethoprim  1 tablet Oral Q Mon Tues BID     Current Facility-Administered Medications   Medication     acetaminophen (TYLENOL) tablet 650 mg     acyclovir (ZOVIRAX) tablet 800 mg     ampicillin-sulbactam (UNASYN) 3 g vial to attach to  mL bag     artificial  saliva (BIOTENE DRY MOUTHWASH) liquid 15 mL     artificial saliva (BIOTENE MT) solution 2 spray     benzonatate (TESSALON) capsule 100 mg     cycloSPORINE modified (GENERIC EQUIVALENT) capsule 25 mg     fluconazole (DIFLUCAN) tablet 100 mg     guaiFENesin-dextromethorphan (ROBITUSSIN DM) 100-10 MG/5ML syrup 5 mL     hypromellose-dextran (ARTIFICAL TEARS) 0.1-0.3 % ophthalmic solution 1 drop     levothyroxine (SYNTHROID/LEVOTHROID) tablet 150 mcg     lidocaine (LMX4) cream     lidocaine 1 % 0.1-1 mL     loperamide (IMODIUM) capsule 2 mg     LORazepam (ATIVAN) tablet 0.5-1 mg    Or     LORazepam (ATIVAN) injection 0.5-1 mg     magnesium oxide (MAG-OX) tablet 800 mg     magnesium sulfate 2 g in NS intermittent infusion ([facility] or [facility])     magnesium sulfate 4 g in 100 mL sterile water (premade)     Medication Instruction     methylPREDNISolone sodium succinate (solu-MEDROL) injection 25 mg     metoprolol tartrate (LOPRESSOR) tablet 25 mg     ondansetron (ZOFRAN) injection 8 mg    Or     ondansetron (ZOFRAN-ODT) ODT tab 8 mg    Or     ondansetron (ZOFRAN) tablet 8 mg     pilocarpine (SALAGEN) tablet 5 mg     polyethylene glycol (MIRALAX/GLYCOLAX) Packet 17 g     potassium chloride (KLOR-CON) Packet 20-40 mEq     potassium chloride 10 mEq in 100 mL intermittent infusion with 10 mg lidocaine     potassium chloride 10 mEq in 100 mL sterile water intermittent infusion (premix)     potassium chloride 20 mEq in 50 mL intermittent infusion     potassium chloride ER (K-DUR/KLOR-CON M) CR tablet 20-40 mEq     potassium phosphate 15 mmol in D5W 250 mL intermittent infusion     potassium phosphate 20 mmol in D5W 250 mL intermittent infusion     potassium phosphate 20 mmol in D5W 500 mL intermittent infusion     potassium phosphate 25 mmol in D5W 500 mL intermittent infusion     prochlorperazine (COMPAZINE) tablet 5 mg    Or     prochlorperazine (COMPAZINE) injection 5 mg     rosuvastatin (CRESTOR) tablet 5 mg      senna-docusate (SENOKOT-S/PERICOLACE) 8.6-50 MG per tablet 1 tablet    Or     senna-docusate (SENOKOT-S/PERICOLACE) 8.6-50 MG per tablet 2 tablet     sodium chloride (PF) 0.9% PF flush 3 mL     sodium chloride (PF) 0.9% PF flush 3 mL     sulfamethoxazole-trimethoprim (BACTRIM DS/SEPTRA DS) 800-160 MG per tablet 1 tablet       PHYSICAL EXAM     Weight In/Out     Wt Readings from Last 3 Encounters:   01/13/20 102.1 kg (225 lb)   01/08/20 98.9 kg (218 lb)   12/19/19 103.9 kg (229 lb 1.6 oz)      I/O last 3 completed shifts:  In: 1230 [P.O.:720; I.V.:510]  Out: 2400 [Urine:2400]       KPS:  30    BP (!) 134/92 (BP Location: Left arm)   Pulse 84   Temp 97  F (36.1  C) (Axillary)   Resp 20   Wt 102.1 kg (225 lb)   SpO2 97%   BMI 32.28 kg/m         General: sitting up on side of bed, breathing comfortably on O2 via NC   Eyes: JEREMY, sclera anicteric   Lungs: coarse crackles left upper lung  Cardiovascular: rrr, no M/R/G   Abdominal/Rectal: +BS, soft, NT, ND   Lymphatics: no edema  Skin: no rashes or petechaie  Neuro: A&O   Additional Findings: PIV RUE    LABS AND IMAGING - PAST 24 HOURS     Results for orders placed or performed during the hospital encounter of 01/08/20 (from the past 24 hour(s))   Basic metabolic panel   Result Value Ref Range    Sodium 138 133 - 144 mmol/L    Potassium 4.8 3.4 - 5.3 mmol/L    Chloride 106 94 - 109 mmol/L    Carbon Dioxide 28 20 - 32 mmol/L    Anion Gap 3 3 - 14 mmol/L    Glucose 123 (H) 70 - 99 mg/dL    Urea Nitrogen 19 7 - 30 mg/dL    Creatinine 0.54 (L) 0.66 - 1.25 mg/dL    GFR Estimate >90 >60 mL/min/[1.73_m2]    GFR Estimate If Black >90 >60 mL/min/[1.73_m2]    Calcium 8.4 (L) 8.5 - 10.1 mg/dL   CBC with platelets differential   Result Value Ref Range    WBC 11.4 (H) 4.0 - 11.0 10e9/L    RBC Count 4.67 4.4 - 5.9 10e12/L    Hemoglobin 14.9 13.3 - 17.7 g/dL    Hematocrit 45.7 40.0 - 53.0 %    MCV 98 78 - 100 fl    MCH 31.9 26.5 - 33.0 pg    MCHC 32.6 31.5 - 36.5 g/dL    RDW 14.1  10.0 - 15.0 %    Platelet Count 262 150 - 450 10e9/L    Diff Method Manual Differential     % Neutrophils 91.5 %    % Lymphocytes 5.1 %    % Monocytes 3.4 %    % Eosinophils 0.0 %    % Basophils 0.0 %    Nucleated RBCs 1 (H) 0 /100    Absolute Neutrophil 10.4 (H) 1.6 - 8.3 10e9/L    Absolute Lymphocytes 0.6 (L) 0.8 - 5.3 10e9/L    Absolute Monocytes 0.4 0.0 - 1.3 10e9/L    Absolute Eosinophils 0.0 0.0 - 0.7 10e9/L    Absolute Basophils 0.0 0.0 - 0.2 10e9/L    Absolute Nucleated RBC 0.1     Poikilocytosis Slight     Target Cells Slight     Platelet Estimate Confirming automated cell count          OVERALL PLAN     Byron Russo is a 59 year old man ~ 3.5 years s/p 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      1.  BMT: 3.5 years s/p BMT for MDS RAEB-2. Re-stage per protocol.     2.  HEME: Has not needed transfusions in a long time. Follow CBC.  Leukocytosis resolved.     3. PULM:   Acute hypoxic respiratory failure - likely secondary to coronavirus on underlying cGVHD of the lungs.  Steroids increased from pred 10mg daily to methylpred 100mg daily on 1/9. Oxygen needs are slowly improving, decreased MP to 25mg BID 1/11.   CT as above.   Pulm consult: believe origin is infection. Unable to pursue invasive testing due to high oxygen support needs.                            3.  ID:   Concern for infectious pulmonary process causing acute hypoxic respiratory failure on top of underlying structural lung changes related to cGVHD.  Broad work-up in process and ID consulted.  Empirically placed on cefepime. Now changed to Unasyn 1/10 d/t possible enterococcus faecalis (see below); sensitive to ampicillin, so change to Augmentin 1/14 in preparation for discharge.   The following testing is positive:  Coronavirus (rest of RVP is negative).  Continue supportive cares.    The following testing is negative:  Rapid flu/rsv; legionella; strep pneumo; MRSA screen, pertussis, aspergillus galactomannan, beta-d-glucan.  The following testing is in process: PJP PCR, parapertussis, urine histo/blasto.  ADDENDUM: Sputum culture + for heavy growth enterococcus faecalis.  Known h/o VRE.  Discussed with Dr. Rushing from ID, who recommends stopping cefepime/azithro and switching to Unasyn (now Augmentin) for a 7 day course (started 1/10).     Continue ppx with ACV, Fluconazole, TMP-SMX. Hold levoflox while on broad spec abx.                               4.  GI:   - Diarrhea: possibly from antibiotics. C-diff neg 1/11. Okay for imodium prn.   - Swallow dysfunction with aspiration on 12/31 video swallow study. Speech assessed here and recommend DD3 with nectar thick liquids.  Now approved for regular diet with nectar thick liquids.   - Pilocarpine for dry mouth.     5.  GVH: Continue cyclosporine 25mg PO bid.  Steroid decreased from 15mg to 10 mg in early November, patient believes his SOB was gradually worsening since that time.  Methylpred 25mg IV bid; plan to reduce by 20% per week IF he is improving.      6.  FEN/Renal:   Regular diet with nectar thick liquids, per SLP.    7. CV. Continue metoprolol and rosuvastatin. Hold ASA. Echo with LVEF of 55-60%; mild decrease in right ventricular function when compared to last echo in 4/2016.    8. ENDO. Continue levothyroxine for hypothyroidism    Dispo: Pt will remain admitted for management of hypoxia; when he improved to needing </=4L, we can safely send him home.     Meera Kern PA-C  1/14/2020       Attending Summary  The patient was seen and examined by me separate from the midlevel provider.The note above reflects my assessment and plan. I have personally reviewed today's labs,vital and radiology results. The points of care that were added by me are:     History and physical  Hypoxia improved.  Afebrile.  Tolerating ambulation, though  still requiring up to 6L O2.    On exam:  Head/mouth/neck: Oropharynx clear.  No lymphadenopathy.  Heart: Regular rate and rhythm.  No murmurs rubs or gallops.  Lungs: Few bilateral rales, mostly in the bases.  Abdomen: Abdomen is soft nontender nondistended.  Intact bowel sounds.  Ext: No edema.  Skin: No rash.    Chronic GVHD:  Pulm:  Hypoxia with peribronchial thickening.  Sicca:  Moderate dry mouth  GI:  Moderate esophageal dysmotility     Overall score:  Severe (based on pulmonary severity)    Pertinent Labs:  Lab Results   Component Value Date    WBC 11.4 01/14/2020     Lab Results   Component Value Date    RBC 4.67 01/14/2020     Lab Results   Component Value Date    HGB 14.9 01/14/2020     Lab Results   Component Value Date    HCT 45.7 01/14/2020     No components found for: MCT  Lab Results   Component Value Date    MCV 98 01/14/2020     Lab Results   Component Value Date    MCH 31.9 01/14/2020     Lab Results   Component Value Date    MCHC 32.6 01/14/2020     Lab Results   Component Value Date    RDW 14.1 01/14/2020     Lab Results   Component Value Date     01/14/2020     ASSESSMENT AND PLAN  1.  MDS:  3 years s/p alloHCT  2.  Hypoxia:  Diagnosed with pleural parenchymal fibroelastosis.  RVP positive for coronavirus.  Reduce glucocorticoids 1/11/2020 (25mg BID).  Suspect a component of chronic GVHD.  3.  ID:  Continue current Abx.  ID following.  4.  Esophageal dysmotility:  Likely secondary to chronic GVHD.    5.  Dispo:  Anticipate discharge once tolerating <4L O2 during activity.    Nicolas Mosher MD

## 2020-01-14 NOTE — PLAN OF CARE
Patient slept at intervals. Using supplemental oxygen at 6 L per nasal cannula. On continuous oximetry monitoring. Sats ok with oxygen in use. Intermittent cough. Lungs diminished bases. Encouraged patient to raise head of bed, but he declined.   Afebrile. Vitals stable. Patient voiding ok; yellow urine. Antibiotic coverage with Unasyn. Denies pain/nausea. IV fluid at TKO per peripheral IV.

## 2020-01-14 NOTE — PLAN OF CARE
Discharge Planner SLP   Patient plan for discharge: home  Current status: Patient seen for dysphagia treatment. No overt aspiration signs occurred with PO intake of nectar thick liquid and regular texture solids. No oral residue remained and mastication WFL when pudding added for moisture. Patient completed oropharyngeal swallow exercises independently during session and is tracking completion of exercises in room. Patient independently thickened 4 oz of liquid to nectar consistency. Provided further written and verbal instruction about free water protocol.    Recommend continue regular diet with nectar thick liquids given swallow and reflux precautions (fully upright position, no straws, small single sips/bites, alternate consistencies, rest breaks as needed, slow rate). Patient may have thin water only (no other thin liquids) in between meals and away from other food after thorough oral cares. Patient should take medications with nectar thick liquids.    Barriers to return to prior living situation: None per SLP  Recommendations for discharge: Home with OP SLP  Rationale for recommendations: OP SLP scheduled per pt report. This is a required service  for ongoing evaluation and tx given swallow function remains below baseline        Entered by: Pearl Keith 01/14/2020 12:43 PM

## 2020-01-14 NOTE — PLAN OF CARE
OT 5C:  Discharge Planner OT   Patient plan for discharge: home    Current status: Pt on 3 L O2 at start of session, O2 sats at 94%. /89.  Pt requiring 6 L O2 for hallway ambulation, O2 sats dropped to 86%. Pt completed 9 stairs IND with use of railings, O2 turned up to 8 L and O2 sats at 87%. Pt returned to 92% within 30 seconds. Pt ambulated on treadmill at 1 MPH x 5 minutes on 8L, O2 sats at 86%- 88%. Pt reports he does not feel SOB despite O2 readings. OT facilitated BLE proximal leg, Pt completed 2 sets of exercises on 7L O2 sats at 88-89% and recovered to 92% with seated rest break. Pt turned back down to 4 L post session and O2 sats at 93-94%. /97 post session.      Barriers to return to prior living situation: medical, endurance    Recommendations for discharge: home with A for higher level IADL's as needed, may benefit from OP Pulmonary rehab     Rationale for recommendations: pt limited by increased O2 needs however pt reports he does not necessarily feel SOB however O2 sats in the mid 80's with activity on 6-8 L. Anticipate pt will progress with therapy for safe discharge home with family A as need once O2 needs more controlled.        Entered by: Brittany Fisher 01/14/2020 9:39 AM         Patient has been assessed for Home Oxygen by a credentialed professional during activity/exercise and in a chronic stable state.    (Activity/Exercise should mimic patient s home activity/exercise and ADLs)     1) Resting saturation on Room Air: 90   2) Resting saturation on O2: 94% on 3 LPM       4) Activity/Exercise saturation on Room Air: Not tested as pt not appropriate to remove O2 with activity   5) Activity/Exercise saturation on O2: 86% on 8 LPM   6) Activity/Exercise saturation on 4 LPM O2: not completed as pt needing 8 L to keep sats at 86% and above.

## 2020-01-14 NOTE — PROGRESS NOTES
Calorie Count  Intake recorded for: 1/13  Total Kcals: 1126 Total Protein: 47g  Kcals from Hospital Food: 1126  Protein: 47g  Kcals from Outside Food (average):0 Protein: 0g  # Meals Recorded: 100% 2 milks, omelet w/ sausage & cheese, pancake w/ syrup, hash browns, 2 alves strips  # Supplements Recorded: 100% 1 Magic Cup

## 2020-01-14 NOTE — PLAN OF CARE
AVSS.  On 3-6L NC in room.  Sats 92-95% at rest. Getting up to the BR will drop to the upper 80s.  Will increase his O2 temporarily if needed to walk in there, but otherwise recovers within a minute. With activity with PT needed 8L to remain above 90%.  No pain, no nausea.  Showered.  Eating and drinking.  IV antibiotic changed to PO.  Continue to monitor, continue plan of care.    Update:  Walked 3 laps around the unit on 6L NC sats were good until the very end when they were 89% and he c/o being SOB.      Problem: Adult Inpatient Plan of Care  Goal: Plan of Care Review  Outcome: No Change  Flowsheets (Taken 1/14/2020 3386)  Plan of Care Reviewed With: patient     Problem: ARDS (Acute Respiratory Distress Syndrome)  Goal: Effective Oxygenation  Outcome: Improving     Problem: ARDS (Acute Respiratory Distress Syndrome)  Goal: Effective Oxygenation  Intervention: Optimize Oxygenation, Ventilation and Perfusion  Flowsheets (Taken 1/14/2020 1532)  Lung Protection Measures: fluid excess minimized  Airway/Ventilation Management: calming measures promoted

## 2020-01-15 ENCOUNTER — APPOINTMENT (OUTPATIENT)
Dept: OCCUPATIONAL THERAPY | Facility: CLINIC | Age: 60
DRG: 177 | End: 2020-01-15
Attending: INTERNAL MEDICINE
Payer: COMMERCIAL

## 2020-01-15 ENCOUNTER — CARE COORDINATION (OUTPATIENT)
Dept: ONCOLOGY | Facility: CLINIC | Age: 60
End: 2020-01-15

## 2020-01-15 ENCOUNTER — APPOINTMENT (OUTPATIENT)
Dept: SPEECH THERAPY | Facility: CLINIC | Age: 60
DRG: 177 | End: 2020-01-15
Attending: INTERNAL MEDICINE
Payer: COMMERCIAL

## 2020-01-15 VITALS
RESPIRATION RATE: 16 BRPM | WEIGHT: 223.2 LBS | HEART RATE: 67 BPM | SYSTOLIC BLOOD PRESSURE: 129 MMHG | DIASTOLIC BLOOD PRESSURE: 87 MMHG | BODY MASS INDEX: 32.03 KG/M2 | OXYGEN SATURATION: 96 % | TEMPERATURE: 97.6 F

## 2020-01-15 DIAGNOSIS — B34.2 CORONAVIRUS INFECTION: ICD-10-CM

## 2020-01-15 DIAGNOSIS — T86.09: Primary | ICD-10-CM

## 2020-01-15 DIAGNOSIS — T86.09: ICD-10-CM

## 2020-01-15 DIAGNOSIS — R53.81 PHYSICAL DECONDITIONING: ICD-10-CM

## 2020-01-15 DIAGNOSIS — D89.811: ICD-10-CM

## 2020-01-15 DIAGNOSIS — Z94.81 STATUS POST BONE MARROW TRANSPLANT (H): ICD-10-CM

## 2020-01-15 DIAGNOSIS — J96.21 ACUTE ON CHRONIC RESPIRATORY FAILURE WITH HYPOXIA (H): Primary | ICD-10-CM

## 2020-01-15 DIAGNOSIS — D89.811 CHRONIC GRAFT-VERSUS-HOST DISEASE COMPLICATING BONE MARROW TRANSPLANT (H): ICD-10-CM

## 2020-01-15 DIAGNOSIS — D46.9 MDS (MYELODYSPLASTIC SYNDROME) (H): ICD-10-CM

## 2020-01-15 DIAGNOSIS — T86.09 CHRONIC GRAFT-VERSUS-HOST DISEASE COMPLICATING BONE MARROW TRANSPLANT (H): ICD-10-CM

## 2020-01-15 DIAGNOSIS — D89.811: Primary | ICD-10-CM

## 2020-01-15 LAB
ANION GAP SERPL CALCULATED.3IONS-SCNC: 6 MMOL/L (ref 3–14)
BASOPHILS # BLD AUTO: 0.1 10E9/L (ref 0–0.2)
BASOPHILS NFR BLD AUTO: 0.4 %
BUN SERPL-MCNC: 19 MG/DL (ref 7–30)
CALCIUM SERPL-MCNC: 8.6 MG/DL (ref 8.5–10.1)
CHLORIDE SERPL-SCNC: 106 MMOL/L (ref 94–109)
CO2 SERPL-SCNC: 27 MMOL/L (ref 20–32)
CREAT SERPL-MCNC: 0.61 MG/DL (ref 0.66–1.25)
DIFFERENTIAL METHOD BLD: ABNORMAL
EOSINOPHIL # BLD AUTO: 0 10E9/L (ref 0–0.7)
EOSINOPHIL NFR BLD AUTO: 0 %
ERYTHROCYTE [DISTWIDTH] IN BLOOD BY AUTOMATED COUNT: 14.2 % (ref 10–15)
GFR SERPL CREATININE-BSD FRML MDRD: >90 ML/MIN/{1.73_M2}
GLUCOSE SERPL-MCNC: 101 MG/DL (ref 70–99)
HCT VFR BLD AUTO: 49.3 % (ref 40–53)
HGB BLD-MCNC: 16.1 G/DL (ref 13.3–17.7)
IMM GRANULOCYTES # BLD: 0.2 10E9/L (ref 0–0.4)
IMM GRANULOCYTES NFR BLD: 1.4 %
LYMPHOCYTES # BLD AUTO: 0.6 10E9/L (ref 0.8–5.3)
LYMPHOCYTES NFR BLD AUTO: 5.6 %
MAGNESIUM SERPL-MCNC: 2.2 MG/DL (ref 1.6–2.3)
MCH RBC QN AUTO: 32.1 PG (ref 26.5–33)
MCHC RBC AUTO-ENTMCNC: 32.7 G/DL (ref 31.5–36.5)
MCV RBC AUTO: 98 FL (ref 78–100)
MONOCYTES # BLD AUTO: 0.9 10E9/L (ref 0–1.3)
MONOCYTES NFR BLD AUTO: 7.9 %
NEUTROPHILS # BLD AUTO: 9.7 10E9/L (ref 1.6–8.3)
NEUTROPHILS NFR BLD AUTO: 84.7 %
NRBC # BLD AUTO: 0 10*3/UL
NRBC BLD AUTO-RTO: 0 /100
PHOSPHATE SERPL-MCNC: 3.9 MG/DL (ref 2.5–4.5)
PLATELET # BLD AUTO: 303 10E9/L (ref 150–450)
POTASSIUM SERPL-SCNC: 4.8 MMOL/L (ref 3.4–5.3)
RBC # BLD AUTO: 5.02 10E12/L (ref 4.4–5.9)
SODIUM SERPL-SCNC: 138 MMOL/L (ref 133–144)
WBC # BLD AUTO: 11.4 10E9/L (ref 4–11)

## 2020-01-15 PROCEDURE — 25000132 ZZH RX MED GY IP 250 OP 250 PS 637: Performed by: PHYSICIAN ASSISTANT

## 2020-01-15 PROCEDURE — 25000131 ZZH RX MED GY IP 250 OP 636 PS 637: Performed by: INTERNAL MEDICINE

## 2020-01-15 PROCEDURE — 80048 BASIC METABOLIC PNL TOTAL CA: CPT | Performed by: INTERNAL MEDICINE

## 2020-01-15 PROCEDURE — 85025 COMPLETE CBC W/AUTO DIFF WBC: CPT | Performed by: INTERNAL MEDICINE

## 2020-01-15 PROCEDURE — 97535 SELF CARE MNGMENT TRAINING: CPT | Mod: GO

## 2020-01-15 PROCEDURE — 84100 ASSAY OF PHOSPHORUS: CPT | Performed by: INTERNAL MEDICINE

## 2020-01-15 PROCEDURE — 36415 COLL VENOUS BLD VENIPUNCTURE: CPT | Performed by: INTERNAL MEDICINE

## 2020-01-15 PROCEDURE — 83735 ASSAY OF MAGNESIUM: CPT | Performed by: INTERNAL MEDICINE

## 2020-01-15 PROCEDURE — 25000128 H RX IP 250 OP 636: Performed by: PHYSICIAN ASSISTANT

## 2020-01-15 PROCEDURE — 25000132 ZZH RX MED GY IP 250 OP 250 PS 637: Performed by: INTERNAL MEDICINE

## 2020-01-15 PROCEDURE — 92526 ORAL FUNCTION THERAPY: CPT | Mod: GN

## 2020-01-15 RX ORDER — PREDNISONE 10 MG/1
10 TABLET ORAL DAILY
COMMUNITY
Start: 2020-01-15 | End: 2020-01-28

## 2020-01-15 RX ORDER — LOPERAMIDE HCL 2 MG
2 CAPSULE ORAL 4 TIMES DAILY PRN
Qty: 30 CAPSULE | Refills: 0 | Status: SHIPPED | OUTPATIENT
Start: 2020-01-15 | End: 2020-01-28

## 2020-01-15 RX ORDER — SALIVA STIMULANT COMB. NO.3
2 SPRAY, NON-AEROSOL (ML) MUCOUS MEMBRANE
Qty: 1 BOTTLE | Refills: 3 | Status: SHIPPED | OUTPATIENT
Start: 2020-01-15 | End: 2020-01-01

## 2020-01-15 RX ADMIN — METHYLPREDNISOLONE SODIUM SUCCINATE 25 MG: 40 INJECTION, POWDER, LYOPHILIZED, FOR SOLUTION INTRAMUSCULAR; INTRAVENOUS at 08:05

## 2020-01-15 RX ADMIN — ACYCLOVIR 800 MG: 800 TABLET ORAL at 08:04

## 2020-01-15 RX ADMIN — ROSUVASTATIN CALCIUM 5 MG: 5 TABLET, FILM COATED ORAL at 08:04

## 2020-01-15 RX ADMIN — CYCLOSPORINE 25 MG: 25 CAPSULE, LIQUID FILLED ORAL at 08:04

## 2020-01-15 RX ADMIN — Medication 800 MG: at 08:04

## 2020-01-15 RX ADMIN — AMOXICILLIN AND CLAVULANATE POTASSIUM 1 TABLET: 875; 125 TABLET, FILM COATED ORAL at 08:04

## 2020-01-15 RX ADMIN — METOPROLOL TARTRATE 25 MG: 25 TABLET ORAL at 08:05

## 2020-01-15 RX ADMIN — FLUCONAZOLE 100 MG: 100 TABLET ORAL at 08:05

## 2020-01-15 RX ADMIN — LEVOTHYROXINE SODIUM 150 MCG: 150 TABLET ORAL at 08:05

## 2020-01-15 RX ADMIN — PILOCARPINE HYDROCHLORIDE 5 MG: 5 TABLET, FILM COATED ORAL at 08:04

## 2020-01-15 ASSESSMENT — ACTIVITIES OF DAILY LIVING (ADL)
ADLS_ACUITY_SCORE: 11

## 2020-01-15 NOTE — PLAN OF CARE
VSS. O2 sats in the upper 90's overnight on 3L NC. Denies pain and N/V. Eating well. Up independent in room and voiding well. Offers no complaints overnight. Continue to monitor per POC.     Problem: ARDS (Acute Respiratory Distress Syndrome)  Goal: Effective Oxygenation  Outcome: No Change     Problem: Adult Inpatient Plan of Care  Goal: Plan of Care Review  Outcome: No Change     Problem: Adult Inpatient Plan of Care  Goal: Patient-Specific Goal (Individualization)  Outcome: No Change     Problem: Adult Inpatient Plan of Care  Goal: Absence of Hospital-Acquired Illness or Injury  Outcome: No Change     Problem: Adult Inpatient Plan of Care  Goal: Optimal Comfort and Wellbeing  Outcome: No Change

## 2020-01-15 NOTE — DISCHARGE SUMMARY
[facility] Discharge Summary   Byron Russo MRN# [id number]   Age: 59 year old  YOB: 1960   Date of Admission: 1/8/2020  Date of Discharge:  1/15/2020   Admitting Physician: Delbert Mendenhall MD  Discharge Physician:  Eduardo Chan MD  Discharge Diagnoses:    1. Acute on chronic respiratory failure with hypoxia  2. Coronovirus pneumonia  3. Enterococcal pneumonia  4. Chronic GVHD of the lungs    Discharge Medications:       Yg Russo   Home Medication Instructions TESS:[id number]    Printed on:01/15/20 1251   Medication Information                      acetaminophen (TYLENOL) 325 MG tablet  Take 1-2 tablets (325-650 mg) by mouth every 4 hours as needed for mild pain or fever             acyclovir (ZOVIRAX) 800 MG tablet  Take 1 tablet (800 mg) by mouth 2 times daily             amoxicillin (AMOXIL) 500 MG tablet  Take 4 tablets by mouth 1 hour before dental visit.             amoxicillin-clavulanate (AUGMENTIN) 875-125 MG tablet  Take 1 tablet by mouth every 12 hours Treatment course ends after both doses on 1/16/2020.             artificial saliva (BIOTENE MT) SOLN solution  Swish and spit 2 mLs (2 sprays) in mouth every hour as needed for dry mouth             aspirin 81 MG EC tablet  Take 81 mg by mouth daily Reported on 5/12/2017             cycloSPORINE modified (GENERIC EQUIVALENT) 25 MG capsule  Take 1 capsule (25 mg) by mouth 2 times daily             fluconazole (DIFLUCAN) 100 MG tablet  Take 1 tablet (100 mg) by mouth daily             levofloxacin (LEVAQUIN) 250 MG tablet  Take 1 tablet (250 mg) by mouth daily             levothyroxine (SYNTHROID/LEVOTHROID) 150 MCG tablet  Take 1 tablet (150 mcg) by mouth daily             loperamide (IMODIUM) 2 MG capsule  Take 1 capsule (2 mg) by mouth 4 times daily as needed for diarrhea             magnesium oxide (MAG-OX) 400 (241.3 MG) MG tablet  Take 2 tablets daily             metoprolol tartrate (LOPRESSOR) 25 MG tablet  Take 1  tablet (25 mg) by mouth daily             NITROGLYCERIN ER PO  Take by mouth as needed Reported on 5/12/2017             order for DME  Equipment being ordered: Oxygen. Please provide patient with continuous flow oxygen at 3 LPM via nasal cannula. Patient will need concentrator, conserving device, and portable oxygen. Length of need is up to 6 months; will continue to reassess. Patient will need transportation oxygen delivered to the [facility], [facility], [address] [place] [address]. [address], Room 5429.             pilocarpine (SALAGEN) 5 MG tablet  Take 1 tablet (5 mg) by mouth 2 times daily             predniSONE (DELTASONE) 10 MG tablet  Take 2.5 tablets (25 mg) by mouth 2 times daily Alternating with 10mg daily             rosuvastatin (CRESTOR) 5 MG tablet  Take 1 tablet (5 mg) by mouth daily             sulfamethoxazole-trimethoprim (BACTRIM DS/SEPTRA DS) 800-160 MG tablet  Take 1 tablet by mouth 2 times daily On Monday's and Tuesday's only               Brief History of Illness:    **Adopted from H&P  Byron was seen in BMT clinic today. He had been seen by Dr. Brady from pulmonology on 12/19/19, where he was noted to have had increased SOB/CUELLAR over the last 2 months, affecting ability to do tasks at work and also at home including shoveling snow. In the beginning of December he had a CXR at an outside urgent care which per report showed diffuse infiltrates and vascular congestion, and was prescribed a course of doxycycline and an albuterol inhaler at that time. Flu swab negative. He also had noted weight loss of 20 lb over 2 months, as well as dysphagia with swallowing pills and had noticed aspiration. ENT saw him and performed a laryngoscopy and was concerned about epiglottis function, with plan for a formal swallow study. Sats were 93% on RA, but 86% with ambulation. He was ordered a CT chest, PFTs, oxygen titration study to set up home oxygen, RVP, bordetella, and echo.      CT chest  showed increased consolidative and ground glass opacities with a predominantly basilar peripheral/subpleural distribution with associated traction bronchiectasis and peribronchial vascular nodular opacities, slightly increased from 3/26/19, with differential diagnosis of organizing pneumonia, jfdzx-xrybhj-lnuk disease, pleural parenchymal fibroelastosis, and drug reaction, unable to exclude superimposed infection.      PFTs 12/23 showed worsening FVC (63->51%), FEV1 (67->55%), increased FRC, RV, and decreased DLCO (65->57%). Bordetella testing negative.      Echo, RVP not done yet. O2 was only delivered yesterday.      He presented to BMT clinic today for follow and was found to be hypoxic, 85% on 2L O2, and only 87-88% on 6L. Worse with ambulation. He has been short of breath with this. Otherwise denies URI symptoms, no chest pain, nausea, vomiting, cough, fevers, chills, hematuria, urinary symptoms, bruising, or bleeding, or rashes. He is feeling better now with the HFNC although it is drying out his mouth and eyes.      Physical Exam:    /87 (BP Location: Left arm)   Pulse 67   Temp 97.6  F (36.4  C) (Axillary)   Resp 16   Wt 101.2 kg (223 lb 3.2 oz)   SpO2 96%   BMI 32.03 kg/m    # Discharge Pain Plan:    - Patient currently has NO PAIN and is not being prescribed pain medications on discharge.    General Appearance: well appearing, NAD.   HEENT: sclera anicteric. Moist mucus membranes, no ulcerations or thrush.   CV: RRR, no murmur or rub.   RESP: Rare ronchi left upper lung, otherwise clear but decreased sounds; wearing nasal cannula supplying oxygen at 3L/minute  GI: +BS, soft, nontender, no masses   EXT: no edema rita LE's  SKIN:  No rash or lesions on exposed areas.  NEURO: A&O x3; CN II-XII grossly intact.  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      1.  BMT: 3.5 years s/p BMT for MDS RAEB-2.       2.  HEME: Resolving leukocytosis since admission.  WBC 11.4 on discharge.      3. PULM:   Acute on chronic hypoxic respiratory failure - secondary to coronavirus and enterococcus faecalis pneumonias on underlying cGVHD of the lungs.    Steroids increased from pred 10mg daily to methylpred 100mg daily on 1/9;now weaned to 25mg bid.  Will discharge on prednisone 25mg PO bid with a plan to taper as clinically able (20% taper every 2 weeks if improving, back to his pre-admission dose of 10mg daily; this would make his next drop to 20mg on 1/24, if he has improved).    Note that his oxygen was originally ordered at 2L/minute after he failed a 6 minute walk test on 12/23, so he may require chronic oxygen after getting past this acute infectious exacerbation.      3.  ID:   Coronavirus pneumonia: supportive cares  Enterococcus faecalis pneumonia (sputum 1/9).  Rx with unasyn 1/10-1/14; then Augmenting through 1/16, per ID recommendations.  Continue ppx with ACV, Fluconazole, TMP-SMX. Hold levoflox while on broad spec abx; please remind him at 1/16 clinic visit to resume levaquin on 1/17 after Augmentin therapy is complete.      4.  GI:   - Diarrhea: possibly from antibiotics. C-diff neg 1/11. Okay for imodium prn.   - Swallow dysfunction with aspiration on 12/31 video swallow study. Speech assessed here:  regular diet with nectar thick liquids.  Has f/u with speech   - Pilocarpine for dry mouth.     5.  GVH: Continue cyclosporine 25mg PO bid.  Steroid decreased from 15mg to 10 mg in early November, patient believes his SOB was gradually worsening since that time.  Steroids as above this admission.      6.  FEN/Renal:   Regular diet with nectar thick liquids, per SLP.     7. CV. Continue metoprolol and rosuvastatin. Hold ASA. Echo with LVEF of  55-60%; mild decrease in right ventricular function when compared to last echo in 4/2016.     8. ENDO. Continue levothyroxine for hypothyroidism     Dispo: Discharge to his home with nasal cannula oxygen at 3L/minute.      CODE STATUS: FULL CODE  Discharge Instructions and Follow-Up:    Discharge diet: Regular diet as tolerated  Discharge activity: Activity as tolerated   Discharge follow-up: Follow up with BMT Clinic as scheduled on 1/16/2020.    Discharge Disposition:    Discharged to home.    Meera Kern PA-C  1/15/2020

## 2020-01-15 NOTE — PROGRESS NOTES
Care Coordination - Discharge Note      IV medications needed at discharge:  None   Pharmacy concerns:  None    Home O2:  3L continuous O2 via nasal cannula (for dx: acute on chronic hypoxic respiratory failure, and cGVHD of lungs) - ordered on 1/15/20, through:   [facility] [phone]   Fax [phone]   **Patient already owns a fingertip pulse oximeter    PT/OT recommended:  Outpatient SLP and Pulmonary Rehab  Referral made for PT/OT:  Pulm Rehab Referral placed 1/15; Pt has Outpatient SLP appt on 1/28    D/c location:  Home - [place] Medicare form required:  No    Caregiver:  (spouse) Latonia Fabio- [phone]    Outpatient Nurse Coordinator notified of patient discharge.      Brianda Gnun, RN, BSN  RN Care Coordinator - inpatient BMT, [address]   [phone]   [phone]

## 2020-01-15 NOTE — PROGRESS NOTES
BMT SOCIAL WORK DISCHARGE NOTE:    Focus: Discharge Plan    Data: Pt is a 59 year old male with a hx of AML s/p allo BMT 3 years ago per medical record.    Interventions: Per Med Team, pt is medically stable for discharge today. SW met with pt to assess coping, and provide psychosocial support. Pt shared that overall he is feeling much better and ready to to discharge today. The pt discussed that he has his home O2 at home already and would like to get set up for pulmonary rehab as close to home as possible. This information was passed onto RN CC Em. SW provided empathetic listening, validation of concerns, and encouragement. SW encouraged pt to contact SW for support, questions and/or resources.     Education Provided: Discharge Resource and Expected Emotional Responses to Transition Home.    Assessment: Pt presented as friendly and open. Pt appears to be coping well. Pt continues to be supported by his wife and family.    Plan: Pt to discharge home. SW will continue to provide psychosocial support and assistance with resources as needed. SW will continue to collaborate with multidisciplinary team regarding pt's plan of care.     AYANNA Melo, [facility]  Phone [phone]  Pager [phone]

## 2020-01-15 NOTE — PLAN OF CARE
Patient discharged to home accompanied by his daughter.. His daughter brought his oxygen from home.  Discharged paperwork given and explained to patient.  Walking saturation, oxygen needs evaluated before going home.  All questions answered.    Problem: ARDS (Acute Respiratory Distress Syndrome)  Goal: Effective Oxygenation  1/15/2020 1402 by Libby Walters RN  Outcome: Adequate for Discharge     Problem: Adult Inpatient Plan of Care  Goal: Plan of Care Review  1/15/2020 1402 by Libby Walters RN  Outcome: Adequate for Discharge     Problem: Adult Inpatient Plan of Care  Goal: Patient-Specific Goal (Individualization)  1/15/2020 1402 by Libby Walters RN  Outcome: Adequate for Discharge     Problem: Adult Inpatient Plan of Care  Goal: Absence of Hospital-Acquired Illness or Injury  1/15/2020 1402 by Libby Walters RN  Outcome: Adequate for Discharge     Problem: Adult Inpatient Plan of Care  Goal: Optimal Comfort and Wellbeing  1/15/2020 1402 by Libby Walters RN  Outcome: Adequate for Discharge     Problem: Adult Inpatient Plan of Care  Goal: Readiness for Transition of Care  1/15/2020 1402 by Libby Walters RN  Outcome: Adequate for Discharge

## 2020-01-15 NOTE — PLAN OF CARE
Discharge Planner OT   Patient plan for discharge: Home with A and OP OH.   Current status: Pt seated upright at EOB. Therapist educated pt on EC/WS and fatigue management for application to ADLs/IADLs. Pt verbalized understanding and pt able to report application to daily life activity. VSS.   Barriers to return to prior living situation: Decreased functional endurance/strength.   Recommendations for discharge: Home with A and OP OH.   Rationale for recommendations: current level of function.     Occupational Therapy Discharge Summary    Reason for therapy discharge:    Discharged to home with outpatient therapy.    Progress towards therapy goal(s). See goals on Care Plan in [facility] electronic health record for goal details.  Goals partially met.  Barriers to achieving goals:   discharge from facility.    Therapy recommendation(s):    Continued therapy is recommended.  Rationale/Recommendations:  Home with A and OP OH.           Entered by: Aquiles Pennington 01/15/2020 4:55 PM

## 2020-01-15 NOTE — SUMMARY OF CARE
BMT Hospital Discharge Summary of Care  Treatment Team:  Patient Care Team:  Cole Duong MD as PCP - General  Missy Lorenz MD as Referring Physician (Oncology)  Uli Enciso MD as BMT Physician (Internal Medicine)  Praveen Nation MD as MD (Dermatology)  Nicolas Jara, GIGI as BMT Nurse Coordinator  Charity Ken LICSW as  ( - Clinical)  Discharge Diagnosis: S/P readmission for acute on chronic respiratory failure with hypoxia    Hospital Discharge on 01/15/20  Discharge Location Home   Activity at Discharge Ad constantine.  Diet restriction of regular diet with nectar thick liquids; okay to drink non-thickened water separate from food and after good oral hygiene.    Nutrition Regular diet as tolerated with nectar thickened liquids as above.      Blood Transfusion Parameters Transfuse if Hemoglobin < or equal 8 mg/dL  Red Blood Cell Order: 2 units, irradiated and leukoreduced   Transfuse if Platelet count < or equal 10,000 uL  Platelet order: 1 adult dose, irradiated and leukoreduced     Blood Counts Recent Labs   Lab Test 01/15/20  0623   WBC 11.4*   ANEU 9.7*   ALYM 0.6*   SARA 0.9   AEOS 0.0   HGB 16.1   HCT 49.3         Electrolyte Replacement  Parameters in Clinic Intravenous Electrolyte Replacement:    Potassium Chloride  Give only if serum creatinine <2. Reference range 3.4-5.3 mmol/L        3.0 - 3.3 mmol/L Oral: 40 mEq by mouth x 1  PIV/CVC on TPN: 30 mEq over 1 hour x 1 doses & 20 mEq by mouth  CVC NOT on TPN: 40 mEq IV x 1   2.5-2.9 mmol/L  Oral: 40 mEq by mouth every 2 hrs x 2  PIV/CVC on TPN: 30 mEq IV & 40 mEq by mouth   CVC NOT on TPN: 60 mEq IV        <2.5 PIV/CVC on TPN: 30 mEq IV & 40 mEq by mouth   CVC NOT on TPN: 60 mEq IV      Magnesium Sulfate  Reference range 1.6-2.3 mg/dl       1.2-1.5 mg/dl  Oral:400 mg by mouth twice daily x 2 days   IV: 2 gm over 1 hour        0.9-1.1 IV Only: 4 gm IV over 2 hours   < 0.9  Discretion of provider  and pharmacist        Home Oxygen 3L continuous O2 via nasal cannula (for dx: acute on chronic hypoxic respiratory failure, and cGVHD of lungs) - ordered on 1/15/20, through:                [facility] [phone]                Fax [phone]   Physical Therapy & Support Services Speech Therapy Consult (Evaluate and Treat) - separate order must be sent to department  Pulmonary Rehab     Laboratory Tests at Next Clinic Visit Hemogram (CBC) differential, platelet count  Comprehensive Metabolic Panel     Healthy Lifestyle Follow general guidelines for physical activity as recommended by the Office of Disease Prevention & Health Promotion (2017) www.health.gov/paguidelines:    Avoid Inactivity  Some physical activity is better than none -- and any amount has health benefits.    Do Aerobic Activity  For substantial health benefits, work up to:    150 minutes (2 hours and 30 minutes) each week of moderate-intensity aerobic physical activity (such as brisk walking or tennis)    Do aerobic physical activity in episodes of at least 10 minutes and, if possible, spread it out through the week.  For even greater health benefits, do one of the following:    Doing more will lead to even greater health benefits.    Strengthen Muscles  Do muscle-strengthening activities that involve all major muscle groups on 2 or more days a week.    Healthy Lifestyle    Eat a healthy diet with a wide variety of foods    Don t smoke (passive or active exposure), chew tobacco, or use illegal drugs.     Discuss drinking alcohol with your provider. If cleared to use alcohol, do so in moderation, generally less than two drinks per day.     Maintain a healthy weight    Avoid excessive sun exposure and wear sunscreen protection for anticipated periods of long exposure.    Information above directly cited from these sources:  1. PERFECTO Gibbs., LIBORIO Rebolledo, CHANELLE Orantes, JUDE Butt, SETH Velez, SETH Harvey.,   Christina, KOrtega M. (2013).  Prevalence of Hematopoietic Cell Transplant Survivors in the [place]. Biology of Blood and Marrow Transplantation?: Journal of the American Society for Blood and Marrow Transplantation, 19(10), 9960-4715. doi   10.1016/j.bbmt.2013.07.020  2. Office of Disease Prevention and Health Promotion. (2017). Physical activity   guidelines. Retrieved from https://health.gov/paguidelines/.     When to Call  (Refer to Discharge Teaching Materials)   Fever > 100.5 F    Bleeding that does not stop after a few minutes    Severe nausea, vomiting, or diarrhea     New fall or injury    Change in designated caregiver    Medication question    Any other urgent concern   Follow Up Visits BMT Clinic Appointment Date/Time:   BMT Clinic (date, time, provider): 1/16/2020  BMT schedulers will call you with appointment times for labs and provider visit on 1/16/2020.       BMT Contact Information  For issues including fevers 100.5 or more:  Please call during the week: Monday through Friday between hours of 8:00 am and 4:30 pm- Call BMT office- [phone]  After hours/Weekends: Please call [facility]  and ask for BMT physician on call and the  will have the BMT Fellow Physician call you back: [phone]     Meera Kern

## 2020-01-15 NOTE — PROGRESS NOTES
Patient has been assessed for Home Oxygen by a credentialed professional during activity/exercise and in a chronic stable state.    (Activity/Exercise should mimic patient s home activity/exercise and ADLs)     1) Resting saturation on Room Air: 84%  2) Resting saturation on O2: 94% on 3LPM   3)   4) Activity/Exercise saturation on Room Air: 84   5) Activity/Exercise saturation on O2: 91%on 3 LPM       Please notify patient s RN Care Coordinator when you ve completed this dot phrase.

## 2020-01-15 NOTE — INTERIM SUMMARY
Yg Russo   Home Medication Instructions TESS:[id number]    Printed on:01/15/20 9214   Medication Information                      acetaminophen (TYLENOL) 325 MG tablet  Take 1-2 tablets (325-650 mg) by mouth every 4 hours as needed for mild pain or fever             acyclovir (ZOVIRAX) 800 MG tablet  Take 1 tablet (800 mg) by mouth 2 times daily             amoxicillin (AMOXIL) 500 MG tablet  Take 4 tablets by mouth 1 hour before dental visit.             amoxicillin-clavulanate (AUGMENTIN) 875-125 MG tablet  Take 1 tablet by mouth every 12 hours Treatment course ends after both doses on 1/16/2020.             artificial saliva (BIOTENE MT) SOLN solution  Swish and spit 2 mLs (2 sprays) in mouth every hour as needed for dry mouth             aspirin 81 MG EC tablet  Take 81 mg by mouth daily Reported on 5/12/2017             cycloSPORINE modified (GENERIC EQUIVALENT) 25 MG capsule  Take 1 capsule (25 mg) by mouth 2 times daily             fluconazole (DIFLUCAN) 100 MG tablet  Take 1 tablet (100 mg) by mouth daily             levofloxacin (LEVAQUIN) 250 MG tablet  Take 1 tablet (250 mg) by mouth daily             levothyroxine (SYNTHROID/LEVOTHROID) 150 MCG tablet  Take 1 tablet (150 mcg) by mouth daily             loperamide (IMODIUM) 2 MG capsule  Take 1 capsule (2 mg) by mouth 4 times daily as needed for diarrhea             magnesium oxide (MAG-OX) 400 (241.3 MG) MG tablet  Take 2 tablets daily             metoprolol tartrate (LOPRESSOR) 25 MG tablet  Take 1 tablet (25 mg) by mouth daily             NITROGLYCERIN ER PO  Take by mouth as needed for chest pain             order for DME  Equipment being ordered: Oxygen. Please provide patient with continuous flow oxygen at 3 LPM via nasal cannula. Patient will need concentrator, conserving device, and portable oxygen. Length of need is up to 6 months; will continue to reassess. Patient will need transportation oxygen delivered to the [facility]  [facility], [address] [place] [address]. [address], Room 5429.             pilocarpine (SALAGEN) 5 MG tablet  Take 1 tablet (5 mg) by mouth 2 times daily             predniSONE (DELTASONE) 10 MG tablet  Take 2.5 tablets (25 mg) by mouth 2 times daily               rosuvastatin (CRESTOR) 5 MG tablet  Take 1 tablet (5 mg) by mouth daily             sulfamethoxazole-trimethoprim (BACTRIM DS/SEPTRA DS) 800-160 MG tablet  Take 1 tablet by mouth 2 times daily On Monday's and Tuesday's only

## 2020-01-16 ENCOUNTER — ONCOLOGY VISIT (OUTPATIENT)
Dept: TRANSPLANT | Facility: CLINIC | Age: 60
DRG: 177 | End: 2020-01-16
Attending: PHYSICIAN ASSISTANT
Payer: COMMERCIAL

## 2020-01-16 ENCOUNTER — APPOINTMENT (OUTPATIENT)
Dept: LAB | Facility: CLINIC | Age: 60
DRG: 177 | End: 2020-01-16
Attending: PHYSICIAN ASSISTANT
Payer: COMMERCIAL

## 2020-01-16 VITALS
RESPIRATION RATE: 16 BRPM | DIASTOLIC BLOOD PRESSURE: 79 MMHG | SYSTOLIC BLOOD PRESSURE: 120 MMHG | HEART RATE: 80 BPM | BODY MASS INDEX: 31.02 KG/M2 | TEMPERATURE: 98.6 F | WEIGHT: 216.2 LBS | OXYGEN SATURATION: 94 %

## 2020-01-16 DIAGNOSIS — J96.21 ACUTE ON CHRONIC RESPIRATORY FAILURE WITH HYPOXIA (H): ICD-10-CM

## 2020-01-16 LAB
ALBUMIN SERPL-MCNC: 2.8 G/DL (ref 3.4–5)
ALP SERPL-CCNC: 91 U/L (ref 40–150)
ALT SERPL W P-5'-P-CCNC: 126 U/L (ref 0–70)
ANION GAP SERPL CALCULATED.3IONS-SCNC: 4 MMOL/L (ref 3–14)
AST SERPL W P-5'-P-CCNC: 90 U/L (ref 0–45)
BASOPHILS # BLD AUTO: 0.1 10E9/L (ref 0–0.2)
BASOPHILS NFR BLD AUTO: 0.4 %
BILIRUB SERPL-MCNC: 0.6 MG/DL (ref 0.2–1.3)
BUN SERPL-MCNC: 23 MG/DL (ref 7–30)
CALCIUM SERPL-MCNC: 8.3 MG/DL (ref 8.5–10.1)
CHLORIDE SERPL-SCNC: 105 MMOL/L (ref 94–109)
CO2 SERPL-SCNC: 29 MMOL/L (ref 20–32)
CREAT SERPL-MCNC: 0.65 MG/DL (ref 0.66–1.25)
DIFFERENTIAL METHOD BLD: ABNORMAL
EOSINOPHIL # BLD AUTO: 0 10E9/L (ref 0–0.7)
EOSINOPHIL NFR BLD AUTO: 0.2 %
ERYTHROCYTE [DISTWIDTH] IN BLOOD BY AUTOMATED COUNT: 14.1 % (ref 10–15)
GFR SERPL CREATININE-BSD FRML MDRD: >90 ML/MIN/{1.73_M2}
GLUCOSE SERPL-MCNC: 91 MG/DL (ref 70–99)
HCT VFR BLD AUTO: 53 % (ref 40–53)
HGB BLD-MCNC: 17.7 G/DL (ref 13.3–17.7)
IMM GRANULOCYTES # BLD: 0.3 10E9/L (ref 0–0.4)
IMM GRANULOCYTES NFR BLD: 1.7 %
LYMPHOCYTES # BLD AUTO: 0.9 10E9/L (ref 0.8–5.3)
LYMPHOCYTES NFR BLD AUTO: 4.8 %
MCH RBC QN AUTO: 32.3 PG (ref 26.5–33)
MCHC RBC AUTO-ENTMCNC: 33.4 G/DL (ref 31.5–36.5)
MCV RBC AUTO: 97 FL (ref 78–100)
MONOCYTES # BLD AUTO: 1.5 10E9/L (ref 0–1.3)
MONOCYTES NFR BLD AUTO: 8.1 %
NEUTROPHILS # BLD AUTO: 15.7 10E9/L (ref 1.6–8.3)
NEUTROPHILS NFR BLD AUTO: 84.8 %
NRBC # BLD AUTO: 0 10*3/UL
NRBC BLD AUTO-RTO: 0 /100
PLATELET # BLD AUTO: 283 10E9/L (ref 150–450)
POTASSIUM SERPL-SCNC: 4.6 MMOL/L (ref 3.4–5.3)
PROT SERPL-MCNC: 6.9 G/DL (ref 6.8–8.8)
RBC # BLD AUTO: 5.48 10E12/L (ref 4.4–5.9)
SODIUM SERPL-SCNC: 138 MMOL/L (ref 133–144)
WBC # BLD AUTO: 18.6 10E9/L (ref 4–11)

## 2020-01-16 PROCEDURE — 85025 COMPLETE CBC W/AUTO DIFF WBC: CPT | Performed by: PHYSICIAN ASSISTANT

## 2020-01-16 PROCEDURE — 80053 COMPREHEN METABOLIC PANEL: CPT | Performed by: PHYSICIAN ASSISTANT

## 2020-01-16 PROCEDURE — G0463 HOSPITAL OUTPT CLINIC VISIT: HCPCS | Mod: ZF

## 2020-01-16 ASSESSMENT — PAIN SCALES - GENERAL: PAINLEVEL: NO PAIN (0)

## 2020-01-16 NOTE — NURSING NOTE
Chief Complaint   Patient presents with     Blood Draw     labs drawn by venipuncture by rn.  vital signs taken.     Labs drawn by venipuncture by RN in lab.  Vital signs taken.  Pt checked in to next appointment.    Tala Cortes RN

## 2020-01-16 NOTE — NURSING NOTE
"Oncology Rooming Note    January 16, 2020 2:32 PM   Byron Russo is a 59 year old male who presents for:    Chief Complaint   Patient presents with     Blood Draw     labs drawn by venipuncture by rn.  vital signs taken.     Oncology Clinic Visit     Patient with MDS here for provider visit and lab review      Initial Vitals: /79 (BP Location: Right arm, Patient Position: Sitting, Cuff Size: Adult Regular)   Pulse 80   Temp 98.6  F (37  C) (Oral)   Resp 16   Wt 98.1 kg (216 lb 3.2 oz)   SpO2 94%   BMI 31.02 kg/m   Estimated body mass index is 31.02 kg/m  as calculated from the following:    Height as of 12/19/19: 1.778 m (5' 10\").    Weight as of this encounter: 98.1 kg (216 lb 3.2 oz). Body surface area is 2.2 meters squared.  No Pain (0) Comment: Data Unavailable   No LMP for male patient.  Allergies reviewed: Yes  Medications reviewed: Yes    Medications: Medication refills not needed today.  Pharmacy name entered into EPIC:    [facility] [place], [place] - [address] [facility] [phone]  [facility] [address] [place] [phone] [facility]    Clinical concerns:       Johanny Moser CMA              "

## 2020-01-16 NOTE — PROGRESS NOTES
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     HPI: Seen for follow up in clinic after hospital discharge yesterday. In good spirits. Very talkative. He is on room air and his sats are 92-94% at rest. He is only using O2 with activity. Cough and dyspnea resolved. We reviewed plan for antibiotics and steroid. He needs short term disability paperwork completed as well as a referral to pulmonary rehab    ROS: Negative except as stated above in HPI    Physical Exam  /79 (BP Location: Right arm, Patient Position: Sitting, Cuff Size: Adult Regular)   Pulse 80   Temp 98.6  F (37  C) (Oral)   Resp 16   Wt 98.1 kg (216 lb 3.2 oz)   SpO2 94%   BMI 31.02 kg/m    General Appearance: well appearing, NAD.   HEENT: sclera anicteric. Moist mucus membranes, no ulcerations or thrush.   CV: RRR, no murmur or rub.   RESP: Breathing comfortably on room air. Decreased air movement LLL, fine crackles R bsae, upper airways clear  GI: +BS, soft, nontender, no masses   EXT: no edema rita LE's  SKIN:  No rash or lesions on exposed areas.  NEURO: A&O x3; CN II-XII grossly intact.  PSYCH: Appropriate affect    Labs  Lab Results   Component Value Date    WBC 18.6 (H) 01/16/2020    ANEU 15.7 (H) 01/16/2020    HGB 17.7 01/16/2020    HCT 53.0 01/16/2020     01/16/2020     01/16/2020    POTASSIUM 4.6 01/16/2020    CHLORIDE 105 01/16/2020    CO2 29 01/16/2020    GLC 91 01/16/2020    BUN 23 01/16/2020    CR 0.65 (L) 01/16/2020    MAG 2.2 01/15/2020    INR 1.46 (H) 01/08/2020    BILITOTAL 0.6 01/16/2020    AST 90 (H) 01/16/2020     (H) 01/16/2020    ALKPHOS 91 01/16/2020    PROTTOTAL 6.9 01/16/2020    ALBUMIN 2.8 (L) 01/16/2020     A/P:  Byron Russo is a 59 year  old man ~ 3.5 years 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      1.  BMT: 3.5 years s/p BMT for MDS RAEB-2.       2.  HEME:  - Leukocytosis with infection and also on steroids     3. PULM:   Acute on chronic hypoxic respiratory failure - secondary to coronavirus and enterococcus faecalis pneumonias on underlying cGVHD of the lungs.    Steroids increased from pred 10mg daily to methylpred 100mg daily on 1/9; now weaned to 25mg bid. Discharged on prednisone 25mg PO bid with a plan to taper as clinically able (20% taper every 2 weeks if improving, back to his pre-admission dose of 10mg daily; this would make his next drop to 20mg on 1/24, if he has improved).    Note that his oxygen was originally ordered at 2L/minute after he failed a 6 minute walk test on 12/23, so he may require chronic oxygen after getting past this acute infectious exacerbation.      3.  ID:   Coronavirus pneumonia: supportive cares  Enterococcus faecalis pneumonia (sputum 1/9).  Rx with unasyn 1/10-1/14; then Augmenting through 1/16, per ID recommendations.  Continue ppx with ACV, Fluconazole, TMP-SMX. Hold levoflox while on broad spec abx; please remind him at 1/16 clinic visit to resume levaquin on 1/17 after Augmentin therapy is complete.      4.  GI:   - Diarrhea: possibly from antibiotics. C-diff neg 1/11. Okay for imodium prn.   - Swallow dysfunction with aspiration on 12/31 video swallow study. Speech assessed here:  regular diet with nectar thick liquids.  Has f/u with speech   - Pilocarpine for dry mouth.  - mild transaminitis 1/16. Repeat LFTs next visit     5.  GVH: Continue cyclosporine 25mg PO bid.  Steroid decreased from 15mg to 10 mg in early November, patient believes his SOB was gradually worsening since that time.  Steroids as above this admission.      6.   FEN/Renal:   Regular diet with nectar thick liquids, per SLP.      7. CV. Continue metoprolol and rosuvastatin. Hold ASA. Echo with LVEF of 55-60%; mild decrease in right ventricular function when compared to last echo in 4/2016.     8. ENDO. Continue levothyroxine for hypothyroidism     RTC  - has speech appt on 1/28 so prefers to come to BMT the same day. If improved, would be due to taper his pred  - gave short term disability ppwk to Nicolas Jara to fill out and return to his employer  - made referral to pulm rehab, he prefers to go to [place] or [facility] if possible      Maranda Mayes PA-C  [phone]

## 2020-01-17 NOTE — PLAN OF CARE
Speech Language Therapy Discharge Summary    Reason for therapy discharge:    Discharged to home.    Progress towards therapy goal(s). See goals on Care Plan in [facility] electronic health record for goal details.  Goals partially met.  Barriers to achieving goals:   discharge from facility.    Therapy recommendation(s):    Continued therapy is recommended.  Rationale/Recommendations:  Recommend continue regular diet with nectar-thick liquids as tolerated. Pt with h/o silent aspiration on VFSS (12/31/2019), so will require repeat imaging prior to advance of liquid textures.

## 2020-01-28 ENCOUNTER — APPOINTMENT (OUTPATIENT)
Dept: LAB | Facility: CLINIC | Age: 60
End: 2020-01-28
Attending: INTERNAL MEDICINE
Payer: COMMERCIAL

## 2020-01-28 ENCOUNTER — THERAPY VISIT (OUTPATIENT)
Dept: SPEECH THERAPY | Facility: CLINIC | Age: 60
End: 2020-01-28
Payer: COMMERCIAL

## 2020-01-28 ENCOUNTER — ONCOLOGY VISIT (OUTPATIENT)
Dept: TRANSPLANT | Facility: CLINIC | Age: 60
End: 2020-01-28
Attending: INTERNAL MEDICINE
Payer: COMMERCIAL

## 2020-01-28 VITALS
TEMPERATURE: 97.3 F | WEIGHT: 222 LBS | BODY MASS INDEX: 31.78 KG/M2 | SYSTOLIC BLOOD PRESSURE: 117 MMHG | OXYGEN SATURATION: 93 % | HEART RATE: 89 BPM | RESPIRATION RATE: 20 BRPM | HEIGHT: 70 IN | DIASTOLIC BLOOD PRESSURE: 79 MMHG

## 2020-01-28 DIAGNOSIS — Z92.3 HISTORY OF RADIATION EXPOSURE: ICD-10-CM

## 2020-01-28 DIAGNOSIS — R49.0 DYSPHONIA: ICD-10-CM

## 2020-01-28 DIAGNOSIS — J96.21 ACUTE ON CHRONIC RESPIRATORY FAILURE WITH HYPOXIA (H): ICD-10-CM

## 2020-01-28 DIAGNOSIS — D46.22 RAEB-2 (REFRACTORY ANEMIA WITH EXCESS BLASTS-2) (H): ICD-10-CM

## 2020-01-28 DIAGNOSIS — R13.12 OROPHARYNGEAL DYSPHAGIA: Primary | ICD-10-CM

## 2020-01-28 LAB
ALBUMIN SERPL-MCNC: 3 G/DL (ref 3.4–5)
ALBUMIN UR-MCNC: NEGATIVE MG/DL
ALP SERPL-CCNC: 100 U/L (ref 40–150)
ALT SERPL W P-5'-P-CCNC: 103 U/L (ref 0–70)
ANION GAP SERPL CALCULATED.3IONS-SCNC: 6 MMOL/L (ref 3–14)
APPEARANCE UR: CLEAR
AST SERPL W P-5'-P-CCNC: 70 U/L (ref 0–45)
BASOPHILS # BLD AUTO: 0.1 10E9/L (ref 0–0.2)
BASOPHILS NFR BLD AUTO: 0.3 %
BILIRUB SERPL-MCNC: 0.8 MG/DL (ref 0.2–1.3)
BILIRUB UR QL STRIP: NEGATIVE
BUN SERPL-MCNC: 18 MG/DL (ref 7–30)
CALCIUM SERPL-MCNC: 8.5 MG/DL (ref 8.5–10.1)
CHLORIDE SERPL-SCNC: 104 MMOL/L (ref 94–109)
CO2 SERPL-SCNC: 28 MMOL/L (ref 20–32)
COLOR UR AUTO: YELLOW
CREAT SERPL-MCNC: 0.82 MG/DL (ref 0.66–1.25)
DIFFERENTIAL METHOD BLD: ABNORMAL
EOSINOPHIL # BLD AUTO: 0.1 10E9/L (ref 0–0.7)
EOSINOPHIL NFR BLD AUTO: 0.3 %
ERYTHROCYTE [DISTWIDTH] IN BLOOD BY AUTOMATED COUNT: 17.7 % (ref 10–15)
GFR SERPL CREATININE-BSD FRML MDRD: >90 ML/MIN/{1.73_M2}
GLUCOSE SERPL-MCNC: 114 MG/DL (ref 70–99)
GLUCOSE UR STRIP-MCNC: NEGATIVE MG/DL
HCT VFR BLD AUTO: 50.9 % (ref 40–53)
HGB BLD-MCNC: 17.4 G/DL (ref 13.3–17.7)
HGB UR QL STRIP: NEGATIVE
HYALINE CASTS #/AREA URNS LPF: 7 /LPF (ref 0–2)
IMM GRANULOCYTES # BLD: 0.3 10E9/L (ref 0–0.4)
IMM GRANULOCYTES NFR BLD: 1.9 %
KETONES UR STRIP-MCNC: NEGATIVE MG/DL
LEUKOCYTE ESTERASE UR QL STRIP: NEGATIVE
LYMPHOCYTES # BLD AUTO: 0.9 10E9/L (ref 0.8–5.3)
LYMPHOCYTES NFR BLD AUTO: 5.4 %
MCH RBC QN AUTO: 32.9 PG (ref 26.5–33)
MCHC RBC AUTO-ENTMCNC: 34.2 G/DL (ref 31.5–36.5)
MCV RBC AUTO: 96 FL (ref 78–100)
MONOCYTES # BLD AUTO: 1 10E9/L (ref 0–1.3)
MONOCYTES NFR BLD AUTO: 5.9 %
MUCOUS THREADS #/AREA URNS LPF: PRESENT /LPF
NEUTROPHILS # BLD AUTO: 15.1 10E9/L (ref 1.6–8.3)
NEUTROPHILS NFR BLD AUTO: 86.2 %
NITRATE UR QL: NEGATIVE
NRBC # BLD AUTO: 0 10*3/UL
NRBC BLD AUTO-RTO: 0 /100
PH UR STRIP: 5 PH (ref 5–7)
PLATELET # BLD AUTO: 159 10E9/L (ref 150–450)
POTASSIUM SERPL-SCNC: 4.1 MMOL/L (ref 3.4–5.3)
PROT SERPL-MCNC: 6.5 G/DL (ref 6.8–8.8)
RBC # BLD AUTO: 5.29 10E12/L (ref 4.4–5.9)
RBC #/AREA URNS AUTO: 1 /HPF (ref 0–2)
SODIUM SERPL-SCNC: 138 MMOL/L (ref 133–144)
SOURCE: ABNORMAL
SP GR UR STRIP: 1.02 (ref 1–1.03)
TRANS CELLS #/AREA URNS HPF: <1 /HPF
UROBILINOGEN UR STRIP-MCNC: 0 MG/DL (ref 0–2)
WBC # BLD AUTO: 17.5 10E9/L (ref 4–11)
WBC #/AREA URNS AUTO: 4 /HPF (ref 0–5)

## 2020-01-28 PROCEDURE — 81001 URINALYSIS AUTO W/SCOPE: CPT | Performed by: PHYSICIAN ASSISTANT

## 2020-01-28 PROCEDURE — 87086 URINE CULTURE/COLONY COUNT: CPT | Performed by: PHYSICIAN ASSISTANT

## 2020-01-28 PROCEDURE — 80053 COMPREHEN METABOLIC PANEL: CPT | Performed by: PHYSICIAN ASSISTANT

## 2020-01-28 PROCEDURE — G0463 HOSPITAL OUTPT CLINIC VISIT: HCPCS | Mod: ZF

## 2020-01-28 PROCEDURE — 36415 COLL VENOUS BLD VENIPUNCTURE: CPT

## 2020-01-28 PROCEDURE — 85025 COMPLETE CBC W/AUTO DIFF WBC: CPT | Performed by: PHYSICIAN ASSISTANT

## 2020-01-28 RX ORDER — PREDNISONE 10 MG/1
TABLET ORAL
Qty: 100 TABLET | Refills: 0 | Status: SHIPPED | OUTPATIENT
Start: 2020-01-28 | End: 2020-02-19

## 2020-01-28 ASSESSMENT — MIFFLIN-ST. JEOR: SCORE: 1838.24

## 2020-01-28 ASSESSMENT — PAIN SCALES - GENERAL: PAINLEVEL: SEVERE PAIN (6)

## 2020-01-28 NOTE — PROGRESS NOTES
Take prednisone 40mg daily for 1 week (through 2/3/20)  Decrease Prednisone to 30mg daily staring 2/4/20  Follow up with BMT BRENDA 2/11/20.     Call BMT clinic if you have worsening oxygen requirements, are more SOB, or develop recurrent fevers.

## 2020-01-28 NOTE — NURSING NOTE
"Oncology Rooming Note    January 28, 2020 3:10 PM   Byron Russo is a 59 year old male who presents for:    Chief Complaint   Patient presents with     Blood Draw     Labs drawn via  by RN in lab. VS taken.      RECHECK     Return: MDS (myelodysplastic syndrome)     Initial Vitals: /79 (BP Location: Right arm, Patient Position: Sitting, Cuff Size: Adult Regular)   Pulse 89   Temp 97.3  F (36.3  C) (Oral)   Resp 20   Ht 1.778 m (5' 10\")   Wt 100.7 kg (222 lb)   SpO2 93%   BMI 31.85 kg/m   Estimated body mass index is 31.85 kg/m  as calculated from the following:    Height as of this encounter: 1.778 m (5' 10\").    Weight as of this encounter: 100.7 kg (222 lb). Body surface area is 2.23 meters squared.  Severe Pain (6) Comment: varies   No LMP for male patient.  Allergies reviewed: Yes  Medications reviewed: Yes    Medications: Medication refills not needed today.  Pharmacy name entered into EPIC:    [facility] [place], [place] - [address] [facility] [phone]  [facility] [address] [place] [phone] [facility] [place]    Clinical concerns: N/A       Marlin Summers CMA              "

## 2020-01-28 NOTE — NURSING NOTE
Chief Complaint   Patient presents with     Blood Draw     Labs drawn via  by RN in lab. VS taken.      Labs collected from venipuncture by RN. Vitals taken.     Precious Thompson RN

## 2020-01-28 NOTE — PROGRESS NOTES
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     HPI: Returns for follow up. Feeling well. Only using oxygen intermittently with activity. No recurrence of SOB. Starts pulmonary rehab tomorrow. Appetite is stable. No diarrhea. Notes a intermittent pain along his right low back/flank. Notices it most with movement. No dysuria or hematuria but has frequent urination, particularly at night. Also having difficulty sleeping at night with twice daily steroids.      ROS: Negative except as stated above in HPI    Physical Exam  /79 (BP Location: Right arm, Patient Position: Sitting, Cuff Size: Adult Regular)   Pulse 89   Temp 97.3  F (36.3  C) (Oral)   Resp 20   Ht 1.778 m (5' 10\")   Wt 100.7 kg (222 lb)   SpO2 93%   BMI 31.85 kg/m    General Appearance: well appearing, NAD.   HEENT: sclera anicteric. Moist mucus membranes, mild erythema throughout with some excoriation, no overt lesions or ulcers.    CV: RRR, no murmur or rub.   RESP: Breathing comfortably on room air. Decreased air movement in bases but otherwise CTAB  GI: +BS, soft, nontender, no masses   EXT: no edema rita LE's  SKIN:  No rash or lesions on exposed areas.  PSYCH: Appropriate affect  MSK: slight tenderness to palpation of right mid back/flank area. No overt CVA tenderness.     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      1.  BMT: 3.5 years s/p BMT for MDS RAEB-2.       2.  HEME:  - Leukocytosis with recent infection but also on steroids     3. PULM:   Acute on chronic hypoxic respiratory failure - secondary to coronavirus and enterococcus faecalis pneumonias on underlying cGVHD of the lungs.    - Steroids increased from pred 10mg daily to methylpred 100mg daily- now tapering. Currently on 25mg PO bid. Dropping ~20% every 2 weeks if clinically stable. Pt decreased steroids to 20mg bid on his own 1/24 but having difficulty sleeping. Will consolidate to 40mg daily through 2/3 and then decrease to 30mg daily 2/4. Further taper pending follow up 2/11.     - Note that his oxygen was originally ordered at 2L/minute after he failed a 6 minute walk test on 12/23, so he may require chronic oxygen after getting past this acute infectious exacerbation.      3.  ID:   Coronavirus pneumonia: supportive cares  Enterococcus faecalis pneumonia (sputum 1/9).  Rx with unasyn 1/10-1/14; then Augmentin through 1/16, per ID recommendations.  Continue ppx with ACV, Fluconazole, TMP-SMX, levaquin      4.  GI: no diarrhea, n/v  - Swallow dysfunction with aspiration on 12/31 video swallow study. Speech assessed here:  regular diet with nectar thick liquids.  Has f/u with speech 1/28   - Pilocarpine for dry mouth.  - mild transaminitis 1/16. Repeat LFTs next visit     5.  GVH: Continue cyclosporine 25mg PO bid and steroids. Steroid decreased from 15mg to 10 mg in early November, patient believes his SOB was gradually worsening since that time.  Steroid  management as above.      6.  FEN/Renal:   Cr and lytes WNL   Regular diet with nectar thick liquids, per SLP.   Obtain UA/UC d/t slight bump in Cr, urinary frequency, and note of right sided flank/low back pain.      7. CV. Continue metoprolol and rosuvastatin. Hold ASA. Echo with LVEF of 55-60%; mild decrease in right ventricular function when compared to last echo in 4/2016.     8. ENDO. Continue levothyroxine for hypothyroidism    9. MSK: right flank/low back pain- seems muscular in nature, encouraged heat and wearing supportive brace pt has. UA/UC as above.      RTC: 2/11 for labs and provider visit. Pt to call sooner with worsening O2 requirements or increasing SOB with steroid taper.   - Starts pulmonary rehab tomorrow 1/29.     Gerald Doty PA-C  [phone]

## 2020-01-29 ENCOUNTER — HOSPITAL ENCOUNTER (OUTPATIENT)
Dept: CARDIAC REHAB | Facility: CLINIC | Age: 60
End: 2020-01-29
Attending: INTERNAL MEDICINE
Payer: COMMERCIAL

## 2020-01-29 DIAGNOSIS — D89.811: ICD-10-CM

## 2020-01-29 DIAGNOSIS — B34.2 CORONAVIRUS INFECTION: ICD-10-CM

## 2020-01-29 DIAGNOSIS — T86.09: ICD-10-CM

## 2020-01-29 DIAGNOSIS — R53.81 PHYSICAL DECONDITIONING: ICD-10-CM

## 2020-01-29 DIAGNOSIS — Z94.81 STATUS POST BONE MARROW TRANSPLANT (H): ICD-10-CM

## 2020-01-29 DIAGNOSIS — D46.9 MDS (MYELODYSPLASTIC SYNDROME) (H): ICD-10-CM

## 2020-01-29 LAB
BACTERIA SPEC CULT: NO GROWTH
Lab: NORMAL
SPECIMEN SOURCE: NORMAL

## 2020-01-29 PROCEDURE — G0238 OTH RESP PROC, INDIV: HCPCS | Performed by: REHABILITATION PRACTITIONER

## 2020-01-29 PROCEDURE — 40000244 ZZH STATISTIC VISIT PULM REHAB: Performed by: REHABILITATION PRACTITIONER

## 2020-02-04 ENCOUNTER — HOSPITAL ENCOUNTER (OUTPATIENT)
Dept: CARDIAC REHAB | Facility: CLINIC | Age: 60
End: 2020-02-04
Attending: INTERNAL MEDICINE
Payer: COMMERCIAL

## 2020-02-04 PROCEDURE — G0239 OTH RESP PROC, GROUP: HCPCS | Performed by: REHABILITATION PRACTITIONER

## 2020-02-04 PROCEDURE — 40000244 ZZH STATISTIC VISIT PULM REHAB: Performed by: REHABILITATION PRACTITIONER

## 2020-02-06 ENCOUNTER — HOSPITAL ENCOUNTER (OUTPATIENT)
Dept: CARDIAC REHAB | Facility: CLINIC | Age: 60
End: 2020-02-06
Attending: INTERNAL MEDICINE
Payer: COMMERCIAL

## 2020-02-06 PROCEDURE — 40000244 ZZH STATISTIC VISIT PULM REHAB: Performed by: REHABILITATION PRACTITIONER

## 2020-02-06 PROCEDURE — G0239 OTH RESP PROC, GROUP: HCPCS | Performed by: REHABILITATION PRACTITIONER

## 2020-02-11 ENCOUNTER — HOSPITAL ENCOUNTER (OUTPATIENT)
Dept: CARDIAC REHAB | Facility: CLINIC | Age: 60
End: 2020-02-11
Attending: INTERNAL MEDICINE
Payer: COMMERCIAL

## 2020-02-11 PROCEDURE — G0239 OTH RESP PROC, GROUP: HCPCS | Performed by: REHABILITATION PRACTITIONER

## 2020-02-11 PROCEDURE — 40000244 ZZH STATISTIC VISIT PULM REHAB: Performed by: REHABILITATION PRACTITIONER

## 2020-02-12 ENCOUNTER — APPOINTMENT (OUTPATIENT)
Dept: LAB | Facility: CLINIC | Age: 60
End: 2020-02-12
Attending: PHYSICIAN ASSISTANT
Payer: COMMERCIAL

## 2020-02-12 ENCOUNTER — ONCOLOGY VISIT (OUTPATIENT)
Dept: TRANSPLANT | Facility: CLINIC | Age: 60
End: 2020-02-12
Attending: PHYSICIAN ASSISTANT
Payer: COMMERCIAL

## 2020-02-12 VITALS
DIASTOLIC BLOOD PRESSURE: 84 MMHG | HEIGHT: 70 IN | WEIGHT: 224.1 LBS | RESPIRATION RATE: 16 BRPM | BODY MASS INDEX: 32.08 KG/M2 | SYSTOLIC BLOOD PRESSURE: 113 MMHG | TEMPERATURE: 97.9 F | HEART RATE: 75 BPM | OXYGEN SATURATION: 94 %

## 2020-02-12 DIAGNOSIS — J96.21 ACUTE ON CHRONIC RESPIRATORY FAILURE WITH HYPOXIA (H): ICD-10-CM

## 2020-02-12 LAB
ALBUMIN SERPL-MCNC: 3.2 G/DL (ref 3.4–5)
ALP SERPL-CCNC: 95 U/L (ref 40–150)
ALT SERPL W P-5'-P-CCNC: 85 U/L (ref 0–70)
ANION GAP SERPL CALCULATED.3IONS-SCNC: 5 MMOL/L (ref 3–14)
AST SERPL W P-5'-P-CCNC: 60 U/L (ref 0–45)
BASOPHILS # BLD AUTO: 0.1 10E9/L (ref 0–0.2)
BASOPHILS NFR BLD AUTO: 0.6 %
BILIRUB SERPL-MCNC: 0.3 MG/DL (ref 0.2–1.3)
BUN SERPL-MCNC: 23 MG/DL (ref 7–30)
CALCIUM SERPL-MCNC: 9 MG/DL (ref 8.5–10.1)
CHLORIDE SERPL-SCNC: 107 MMOL/L (ref 94–109)
CO2 SERPL-SCNC: 27 MMOL/L (ref 20–32)
CREAT SERPL-MCNC: 0.75 MG/DL (ref 0.66–1.25)
DIFFERENTIAL METHOD BLD: ABNORMAL
EOSINOPHIL # BLD AUTO: 0 10E9/L (ref 0–0.7)
EOSINOPHIL NFR BLD AUTO: 0.2 %
ERYTHROCYTE [DISTWIDTH] IN BLOOD BY AUTOMATED COUNT: 17.5 % (ref 10–15)
GFR SERPL CREATININE-BSD FRML MDRD: >90 ML/MIN/{1.73_M2}
GLUCOSE SERPL-MCNC: 98 MG/DL (ref 70–99)
HCT VFR BLD AUTO: 49.2 % (ref 40–53)
HGB BLD-MCNC: 16.7 G/DL (ref 13.3–17.7)
IMM GRANULOCYTES # BLD: 0.2 10E9/L (ref 0–0.4)
IMM GRANULOCYTES NFR BLD: 1.6 %
LYMPHOCYTES # BLD AUTO: 1.2 10E9/L (ref 0.8–5.3)
LYMPHOCYTES NFR BLD AUTO: 10.9 %
MCH RBC QN AUTO: 32.7 PG (ref 26.5–33)
MCHC RBC AUTO-ENTMCNC: 33.9 G/DL (ref 31.5–36.5)
MCV RBC AUTO: 96 FL (ref 78–100)
MONOCYTES # BLD AUTO: 1 10E9/L (ref 0–1.3)
MONOCYTES NFR BLD AUTO: 8.9 %
NEUTROPHILS # BLD AUTO: 8.4 10E9/L (ref 1.6–8.3)
NEUTROPHILS NFR BLD AUTO: 77.8 %
NRBC # BLD AUTO: 0 10*3/UL
NRBC BLD AUTO-RTO: 0 /100
PLATELET # BLD AUTO: 215 10E9/L (ref 150–450)
POTASSIUM SERPL-SCNC: 4.4 MMOL/L (ref 3.4–5.3)
PROT SERPL-MCNC: 7 G/DL (ref 6.8–8.8)
RBC # BLD AUTO: 5.11 10E12/L (ref 4.4–5.9)
SODIUM SERPL-SCNC: 139 MMOL/L (ref 133–144)
WBC # BLD AUTO: 10.8 10E9/L (ref 4–11)

## 2020-02-12 PROCEDURE — G0463 HOSPITAL OUTPT CLINIC VISIT: HCPCS | Mod: ZF

## 2020-02-12 PROCEDURE — 80053 COMPREHEN METABOLIC PANEL: CPT | Performed by: PHYSICIAN ASSISTANT

## 2020-02-12 PROCEDURE — 36415 COLL VENOUS BLD VENIPUNCTURE: CPT

## 2020-02-12 PROCEDURE — 85025 COMPLETE CBC W/AUTO DIFF WBC: CPT | Performed by: PHYSICIAN ASSISTANT

## 2020-02-12 RX ORDER — ERYTHROMYCIN 5 MG/G
OINTMENT OPHTHALMIC
Status: ON HOLD | COMMUNITY
Start: 2020-02-09 | End: 2020-01-01

## 2020-02-12 RX ORDER — POLYMYXIN B SULFATE AND TRIMETHOPRIM 1; 10000 MG/ML; [USP'U]/ML
1 SOLUTION OPHTHALMIC
COMMUNITY
Start: 2020-02-09 | End: 2020-02-16

## 2020-02-12 RX ORDER — POLYMYXIN B SULFATE AND TRIMETHOPRIM 1; 10000 MG/ML; [USP'U]/ML
SOLUTION OPHTHALMIC
Status: ON HOLD | COMMUNITY
Start: 2020-02-09 | End: 2020-01-01

## 2020-02-12 ASSESSMENT — PAIN SCALES - GENERAL: PAINLEVEL: MILD PAIN (3)

## 2020-02-12 ASSESSMENT — MIFFLIN-ST. JEOR: SCORE: 1847.76

## 2020-02-12 NOTE — NURSING NOTE
"Oncology Rooming Note    February 12, 2020 1:58 PM   Byron Russo is a 59 year old male who presents for:    Chief Complaint   Patient presents with     Blood Draw     Labs drawn via  by RN in lab. VS taken. Patient checked in for next appt.     RECHECK     Return: MDS (myelodysplastic syndrome)     Initial Vitals: /84 (BP Location: Right arm, Patient Position: Sitting, Cuff Size: Adult Regular)   Pulse 75   Temp 97.9  F (36.6  C) (Oral)   Resp 16   Ht 1.778 m (5' 10\")   Wt 101.7 kg (224 lb 1.6 oz)   SpO2 94%   BMI 32.16 kg/m   Estimated body mass index is 32.16 kg/m  as calculated from the following:    Height as of this encounter: 1.778 m (5' 10\").    Weight as of this encounter: 101.7 kg (224 lb 1.6 oz). Body surface area is 2.24 meters squared.  Mild Pain (3) Comment: Data Unavailable   No LMP for male patient.  Allergies reviewed: Yes  Medications reviewed: Yes    Medications: Medication refills not needed today.  Pharmacy name entered into EPIC:    [facility] [place], [place] - [address] [facility] [phone]  [facility] [address] [place] [id number] [facility]    Clinical concerns: N/A      Marlin Summers CMA              "

## 2020-02-12 NOTE — NURSING NOTE
Chief Complaint   Patient presents with     Blood Draw     Labs drawn via  by RN in lab. VS taken. Patient checked in for next appt.     Labs collected from venipuncture by RN. Vitals taken. Checked in for appointment.    Precious Thompson RN

## 2020-02-12 NOTE — PROGRESS NOTES
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     HPI: Yg returns for follow up. He has been participating with pulm rehab, oxygen saturation holding with 20-30 min exertion without dipping below 90%. He is also going to work 4-8 hours/week. He did de-sat once at work, rested and used oxygen to get back above 90%. He plans to continue to use oxygen monitor when he goes in. No cough or SOB. Right back discomfort is persistent to 3/10 pain right around lowest rib. No further issues with urinating frequently, no fevers. Notes he sleeps on a couch which is \"deteriorating\". Pain is not radiating.     ROS: 8 point review with pertinent positive and negatives in HPI    Physical Exam  /84 (BP Location: Right arm, Patient Position: Sitting, Cuff Size: Adult Regular)   Pulse 75   Temp 97.9  F (36.6  C) (Oral)   Resp 16   Wt 101.7 kg (224 lb 1.6 oz)   SpO2 94%   BMI 32.16 kg/m    General Appearance: pale, well appearing, NAD.   HEENT: sclera anicteric. mucus membranes appear dry, no lichenoid mucosa changes. mild erythema throughout with some, no lesions or ulcers.    CV: RRR, no murmur or rub.   RESP: Breathing comfortably on room air, CTA, good breath sounds to bases  GI: +BS, soft, nontender, no masses   EXT: no edema rita LE's  SKIN:  No rash or lesions on exposed areas.  PSYCH: Appropriate affect  MSK: slight tenderness to palpation of right lower back at 9th rib; skin appears normal without ecchymosis. No overt CVA tenderness.     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      1.  BMT: 3.5 years s/p BMT for MDS RAEB-2.       2.  HEME:  - Leukocytosis with recent infection but also on steroids     3. PULM:   Acute on chronic hypoxic respiratory failure - secondary to coronavirus and enterococcus faecalis pneumonias on underlying cGVHD of the lungs.    - Steroids increased from pred 10mg daily to methylpred 100mg daily- now tapering. Dropping ~20% every 2 weeks if clinically stable. Doing well. Decrease to 20mg today and return in one week  - Note that his oxygen was originally ordered at 2L/minute after he failed a 6 minute walk test on 12/23, so he may require chronic oxygen after getting past this acute infectious exacerbation. He will keep oxygen available, using on limited occasions.     3.  ID:   Coronavirus pneumonia: supportive cares  Enterococcus faecalis pneumonia (sputum 1/9).  Rx with unasyn 1/10-1/14; then Augmentin through 1/16, per ID recommendations.  Continue ppx with ACV, Fluconazole, TMP-SMX, levaquin      4.  GI: no diarrhea, n/v  - Swallow dysfunction with aspiration on 12/31 video swallow study. Speech assessed here:  regular diet with nectar thick liquids.  Has f/u with speech 1/28   - Pilocarpine for dry mouth.  - mild transaminitis 1/16. Repeat LFTs next visit     5.  GVH: Continue cyclosporine 25mg PO bid and steroids.      6.  FEN/Renal:   Cr and lytes WNL   1/28 UA/UC (neg) d/t slight  bump in Cr, urinary frequency, note of right sided flank/low back pain. No further  sx 2/12     7. CV. Continue metoprolol and rosuvastatin. Hold ASA. Echo with LVEF of 55-60%; mild decrease in right ventricular function when compared to last echo in 4/2016.     8. ENDO. Continue levothyroxine for hypothyroidism    9. MSK: right flank/low back pain- seems muscular in nature, encouraged heat and wearing supportive brace pt has.       Plan: decrease steroids to 20mg.  RTC in 1 week  Fernie (tried to make with Caitlyn but he is unavailable) 2/27     Angela Bunch PAC  [phone]

## 2020-02-13 ENCOUNTER — HOSPITAL ENCOUNTER (OUTPATIENT)
Dept: CARDIAC REHAB | Facility: CLINIC | Age: 60
End: 2020-02-13
Attending: INTERNAL MEDICINE
Payer: COMMERCIAL

## 2020-02-13 PROCEDURE — 40000116 ZZH STATISTIC OP CR VISIT: Performed by: REHABILITATION PRACTITIONER

## 2020-02-13 PROCEDURE — 93798 PHYS/QHP OP CAR RHAB W/ECG: CPT | Performed by: REHABILITATION PRACTITIONER

## 2020-02-13 PROCEDURE — G0239 OTH RESP PROC, GROUP: HCPCS | Performed by: REHABILITATION PRACTITIONER

## 2020-02-13 PROCEDURE — 40000244 ZZH STATISTIC VISIT PULM REHAB: Performed by: REHABILITATION PRACTITIONER

## 2020-02-18 ENCOUNTER — HOSPITAL ENCOUNTER (OUTPATIENT)
Dept: CARDIAC REHAB | Facility: CLINIC | Age: 60
End: 2020-02-18
Attending: INTERNAL MEDICINE
Payer: COMMERCIAL

## 2020-02-18 PROCEDURE — 40000244 ZZH STATISTIC VISIT PULM REHAB: Performed by: REHABILITATION PRACTITIONER

## 2020-02-18 PROCEDURE — G0239 OTH RESP PROC, GROUP: HCPCS | Performed by: REHABILITATION PRACTITIONER

## 2020-02-19 ENCOUNTER — ONCOLOGY VISIT (OUTPATIENT)
Dept: TRANSPLANT | Facility: CLINIC | Age: 60
End: 2020-02-19
Attending: INTERNAL MEDICINE
Payer: COMMERCIAL

## 2020-02-19 ENCOUNTER — APPOINTMENT (OUTPATIENT)
Dept: LAB | Facility: CLINIC | Age: 60
End: 2020-02-19
Attending: INTERNAL MEDICINE
Payer: COMMERCIAL

## 2020-02-19 VITALS
RESPIRATION RATE: 16 BRPM | HEART RATE: 65 BPM | WEIGHT: 224.5 LBS | DIASTOLIC BLOOD PRESSURE: 83 MMHG | TEMPERATURE: 97.9 F | OXYGEN SATURATION: 97 % | SYSTOLIC BLOOD PRESSURE: 124 MMHG | BODY MASS INDEX: 32.21 KG/M2

## 2020-02-19 DIAGNOSIS — D46.22 RAEB-2 (REFRACTORY ANEMIA WITH EXCESS BLASTS-2) (H): ICD-10-CM

## 2020-02-19 DIAGNOSIS — J96.21 ACUTE ON CHRONIC RESPIRATORY FAILURE WITH HYPOXIA (H): ICD-10-CM

## 2020-02-19 LAB
ALBUMIN SERPL-MCNC: 3.3 G/DL (ref 3.4–5)
ALP SERPL-CCNC: 104 U/L (ref 40–150)
ALT SERPL W P-5'-P-CCNC: 59 U/L (ref 0–70)
ANION GAP SERPL CALCULATED.3IONS-SCNC: 4 MMOL/L (ref 3–14)
AST SERPL W P-5'-P-CCNC: 44 U/L (ref 0–45)
BASOPHILS # BLD AUTO: 0.1 10E9/L (ref 0–0.2)
BASOPHILS NFR BLD AUTO: 0.6 %
BILIRUB SERPL-MCNC: 0.4 MG/DL (ref 0.2–1.3)
BUN SERPL-MCNC: 18 MG/DL (ref 7–30)
CALCIUM SERPL-MCNC: 9.2 MG/DL (ref 8.5–10.1)
CHLORIDE SERPL-SCNC: 107 MMOL/L (ref 94–109)
CO2 SERPL-SCNC: 28 MMOL/L (ref 20–32)
CREAT SERPL-MCNC: 0.8 MG/DL (ref 0.66–1.25)
DIFFERENTIAL METHOD BLD: ABNORMAL
EOSINOPHIL # BLD AUTO: 0 10E9/L (ref 0–0.7)
EOSINOPHIL NFR BLD AUTO: 0.1 %
ERYTHROCYTE [DISTWIDTH] IN BLOOD BY AUTOMATED COUNT: 18.1 % (ref 10–15)
GFR SERPL CREATININE-BSD FRML MDRD: >90 ML/MIN/{1.73_M2}
GLUCOSE SERPL-MCNC: 93 MG/DL (ref 70–99)
HCT VFR BLD AUTO: 51.1 % (ref 40–53)
HGB BLD-MCNC: 16.9 G/DL (ref 13.3–17.7)
IMM GRANULOCYTES # BLD: 0.1 10E9/L (ref 0–0.4)
IMM GRANULOCYTES NFR BLD: 1 %
LYMPHOCYTES # BLD AUTO: 1 10E9/L (ref 0.8–5.3)
LYMPHOCYTES NFR BLD AUTO: 8 %
MCH RBC QN AUTO: 32.2 PG (ref 26.5–33)
MCHC RBC AUTO-ENTMCNC: 33.1 G/DL (ref 31.5–36.5)
MCV RBC AUTO: 97 FL (ref 78–100)
MONOCYTES # BLD AUTO: 0.9 10E9/L (ref 0–1.3)
MONOCYTES NFR BLD AUTO: 7.1 %
NEUTROPHILS # BLD AUTO: 10.5 10E9/L (ref 1.6–8.3)
NEUTROPHILS NFR BLD AUTO: 83.2 %
NRBC # BLD AUTO: 0 10*3/UL
NRBC BLD AUTO-RTO: 0 /100
PLATELET # BLD AUTO: 223 10E9/L (ref 150–450)
POTASSIUM SERPL-SCNC: 4.5 MMOL/L (ref 3.4–5.3)
PROT SERPL-MCNC: 7.2 G/DL (ref 6.8–8.8)
RBC # BLD AUTO: 5.25 10E12/L (ref 4.4–5.9)
SODIUM SERPL-SCNC: 139 MMOL/L (ref 133–144)
WBC # BLD AUTO: 12.6 10E9/L (ref 4–11)

## 2020-02-19 PROCEDURE — 36415 COLL VENOUS BLD VENIPUNCTURE: CPT

## 2020-02-19 PROCEDURE — 80053 COMPREHEN METABOLIC PANEL: CPT | Performed by: STUDENT IN AN ORGANIZED HEALTH CARE EDUCATION/TRAINING PROGRAM

## 2020-02-19 PROCEDURE — 85025 COMPLETE CBC W/AUTO DIFF WBC: CPT | Performed by: STUDENT IN AN ORGANIZED HEALTH CARE EDUCATION/TRAINING PROGRAM

## 2020-02-19 PROCEDURE — G0463 HOSPITAL OUTPT CLINIC VISIT: HCPCS

## 2020-02-19 RX ORDER — PREDNISONE 10 MG/1
TABLET ORAL
COMMUNITY
Start: 2020-02-19 | End: 2020-02-27

## 2020-02-19 ASSESSMENT — PAIN SCALES - GENERAL: PAINLEVEL: NO PAIN (0)

## 2020-02-19 NOTE — NURSING NOTE
Chief Complaint   Patient presents with     Blood Draw     vitals and venipuncture done by ANTHONY Jett CMA on 2/19/2020 at 2:47 PM

## 2020-02-19 NOTE — NURSING NOTE
"Oncology Rooming Note    February 19, 2020 3:04 PM   Byron Russo is a 59 year old male who presents for:    Chief Complaint   Patient presents with     Blood Draw     vitals and venipuncture done by CMA     RECHECK     Pt is here for a rtn for S/P BMT for Acute Chronic respirations hypoxia     Initial Vitals: Blood Pressure 124/83 (BP Location: Right arm, Patient Position: Sitting, Cuff Size: Adult Regular)   Pulse 65   Temperature 97.9  F (36.6  C) (Oral)   Respiration 16   Weight 101.8 kg (224 lb 8 oz)   Oxygen Saturation 97%   Body Mass Index 32.21 kg/m   Estimated body mass index is 32.21 kg/m  as calculated from the following:    Height as of 2/12/20: 1.778 m (5' 10\").    Weight as of this encounter: 101.8 kg (224 lb 8 oz). Body surface area is 2.24 meters squared.  No Pain (0) Comment: Data Unavailable   No LMP for male patient.  Allergies reviewed: Yes  Medications reviewed: Yes    Medications: Medication refills not needed today.  Pharmacy name entered into EPIC:    [facility] [place], [place] - [address] [facility] [phone]  [facility] [address] [place] [phone] [facility]    Clinical concerns: none       Fe Aguillon MA            "

## 2020-02-19 NOTE — PROGRESS NOTES
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     HPI: Yg returns for follow up. He has been participating with pulm rehab, oxygen saturation are 91-92% with walking on the treadmill for 30 minutes, using the stair stepper or lifting weights. He still gets occasionally sob with exertion or carrying things but no cough.He is not using oxygen at all. He feels like he is back to where he was this summer. Having dry eyes with feeling of grittiness-went to urgent care and they gave him some drops and antibiotic ointment. He also has dry mouth, drinks a lot of fluid with his food.  Declines steroid swish and spit, is using bioten. Swallowing pills is better but bread is still difficult and has to cut meat into smaller pieces.Right back discomfort is improved.No new numbness or tingling. Notes he sleeps on a couch which is very old.     ROS: 8 point review with pertinent positive and negatives in HPI    Physical Exam  /83 (BP Location: Right arm, Patient Position: Sitting, Cuff Size: Adult Regular)   Pulse 65   Temp 97.9  F (36.6  C) (Oral)   Resp 16   Wt 101.8 kg (224 lb 8 oz)   SpO2 97%   BMI 32.21 kg/m    General Appearance: pale, well appearing, NAD.   HEENT: sclera anicteric. mucus membranes appear dry, no lichenoid mucosa changes but has  mild erythema throughout without lesions or ulcers.    CV: RRR, no murmur or rub.   RESP: Breathing comfortably on room air, CTA, good breath sounds to bases  GI: +BS, soft, nontender, no masses   EXT: no edema rita LE's  SKIN:  No rash or lesions on exposed areas.  PSYCH: Appropriate affect  MSK: decreased tenderness to palpation of right lower back at 9th rib; skin appears normal without ecchymosis or sign of shingles.      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      1.  BMT: 3.5 years s/p BMT for MDS RAEB-2.       2.  HEME:  - Leukocytosis likely due to steroids     3. 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      Acute on chronic hypoxic respiratory failure - secondary to coronavirus(1/8/2020) and enterococcus faecalis pneumonias on underlying cGVHD of the lungs.    - Steroids increased from pred 10mg daily to methylpred 100mg daily on 1/9 and discharged on 25 mg po bid prednisone- now tapering. Dropping ~20% every 2 weeks if clinically stable. Doing well. Decreased to 20mg on 2/12 and return in one week, consider dropping  - Note that his oxygen was originally ordered at 2L/minute after he failed a 6 minute walk test on 12/23. He will keep oxygen available, using on limited occasions.  Will schedule for a 6 minute walk test in pulmonary to get rid of oxygen     3.  ID:  Afebrile.  History of Coronavirus pneumonia: tx with supportive cares. Enterococcus faecalis pneumonia (sputum 1/9).  Rx with unasyn 1/10-1/14; then Augmentin through 1/16.  Continue ppx with ACV, Fluconazole, TMP-SMX,  levaquin      4.  GI: no diarrhea, n/v  - Swallow dysfunction with aspiration on 12/31 video swallow study. Speech assessed during inpatient stay:  regular diet with nectar thick liquids.   - Pilocarpine for dry mouth.  - mild transaminitis 1/16-2/12.  LFTs in normal range today, 2/19     5.  GVH:   GVH eyes, mouth, and lungs.  Continue cyclosporine 25mg PO bid and steroids. Continue steroid taper as above.     6.  FEN/Renal:   Cr and lytes WNL   1/28 UA/UC (neg) d/t slight bump in Cr, urinary frequency.. No further  sx 2/19     7. CV. Continue metoprolol and rosuvastatin. Hold ASA. 1/9/2020:Echo with LVEF of 55-60%; mild decrease in right ventricular function when compared to last echo in 4/2016.     8. ENDO. Continue levothyroxine for hypothyroidism    9. MSK: right flank/low back pain- seems muscular in nature, encouraged heat and is improving.       Plan: Continue steroids  20 mg daily.l Continue rehab pulmonary     RTC in 1 week with Dr Enciso   and next available with Dr Brady  Refer back to ophthalmology at [facility] for dry eyes and so can improve enough to wear contacts.Schedule 6 minute walk test and get rid of oxygen if can    Aleta Donovan PA-C  [phone]

## 2020-02-19 NOTE — PATIENT INSTRUCTIONS
Ordered a 6 minute walk -can you schedule with pulmonary on 2/27/2020 before he sees Dr Enciso on 2/27/2020 and has a lab draw on 3 pm and Dr Enciso at 3:30 pm-- 6 minute walk is ordered    Then can you schedule with ophthalmology for GVH of the eyes.  Can you schedule the soonest the first week in March 2020--referral is in    Please schedule for follow up with Dr Brady

## 2020-02-20 ENCOUNTER — OFFICE VISIT (OUTPATIENT)
Dept: OPHTHALMOLOGY | Facility: CLINIC | Age: 60
End: 2020-02-20
Attending: PHYSICIAN ASSISTANT
Payer: COMMERCIAL

## 2020-02-20 DIAGNOSIS — H01.00B BLEPHARITIS OF UPPER AND LOWER EYELIDS OF BOTH EYES, UNSPECIFIED TYPE: ICD-10-CM

## 2020-02-20 DIAGNOSIS — H02.59 FLOPPY EYELID SYNDROME OF BOTH EYES: ICD-10-CM

## 2020-02-20 DIAGNOSIS — H16.123 FILAMENTARY KERATITIS OF BOTH EYES: ICD-10-CM

## 2020-02-20 DIAGNOSIS — H01.00A BLEPHARITIS OF UPPER AND LOWER EYELIDS OF BOTH EYES, UNSPECIFIED TYPE: ICD-10-CM

## 2020-02-20 DIAGNOSIS — D89.811 GRAFT-VERSUS-HOST DISEASE, CHRONIC (H): Primary | ICD-10-CM

## 2020-02-20 DIAGNOSIS — M35.00 SICCA SYNDROME (H): ICD-10-CM

## 2020-02-20 PROCEDURE — G0463 HOSPITAL OUTPT CLINIC VISIT: HCPCS | Mod: ZF

## 2020-02-20 RX ORDER — NEOMYCIN SULFATE, POLYMYXIN B SULFATE, AND DEXAMETHASONE 3.5; 10000; 1 MG/G; [USP'U]/G; MG/G
0.5 OINTMENT OPHTHALMIC AT BEDTIME
Qty: 1 TUBE | Refills: 3 | Status: SHIPPED | OUTPATIENT
Start: 2020-02-20 | End: 2020-04-01 | Stop reason: ALTCHOICE

## 2020-02-20 RX ORDER — FLUOROMETHOLONE 0.1 %
1 SUSPENSION, DROPS(FINAL DOSAGE FORM)(ML) OPHTHALMIC (EYE) 4 TIMES DAILY
Qty: 1 BOTTLE | Refills: 3 | Status: SHIPPED | OUTPATIENT
Start: 2020-02-20 | End: 2020-01-01

## 2020-02-20 ASSESSMENT — VISUAL ACUITY
OD_PH_SC+: -1
OD_PH_SC: 20/40
OS_PH_SC: 20/30
METHOD: SNELLEN - LINEAR
OS_PH_SC+: -2
OD_SC: 20/50
OS_SC: 20/50
OD_SC+: -2

## 2020-02-20 ASSESSMENT — CONF VISUAL FIELD
METHOD: COUNTING FINGERS
OD_NORMAL: 1
OS_NORMAL: 1

## 2020-02-20 ASSESSMENT — TONOMETRY
IOP_METHOD: ICARE
OS_IOP_MMHG: 10
OD_IOP_MMHG: 09

## 2020-02-20 NOTE — PROGRESS NOTES
"Byron Russo is a 59 year old male who presents today with complaints of bilateral sore, swollen, scratchy eyes. Waking up in the morning with his eyes \"glued shut\". Endorses redness. He has been experiencing these symptoms for the past 10 days. He went to urgent care about 10 days prior which is when they gave him antibiotic eye drops and ointment to use at night to treat his symptoms. Reports symptoms improved until Monday, but they have recurred since Tuesday. Has been using contact lens solution to treat his eye redness. Denies flashes, floaters, loss of peripheral vision. Endorses previous history of similar symptoms occurring roughly 2-3 years ago. Has been off his contact lenses for the past two weeks.    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 Pzfas-jaiaqe-ljul disease, chronic (H)    2. Blepharitis of upper and lower eyelids of both eyes, unspecified type    3. 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  Continue to manage with oncology    Plan  FML drops four times a day in both eyes  Xiidra (Lifitegrast 5%) two times a day in both eyes  Maxitrol ointment to all eyelids at bedtime  PFAT 4-6 times a day in both eyes  Avoid sleeping on " face  Consider PM occlusive goggles    Dispo:  Follow up in 2 weeks or sooner if needed    Joaquin Moody MD  PGY-4 Ophthalmology Resident  [phone]      Attending Physician Attestation:  Complete documentation of historical and exam elements from today's encounter can be found in the full encounter summary report (not reduplicated in this progress note).  I personally obtained the chief complaint(s) and history of present illness.  I confirmed and edited as necessary the review of systems, past medical/surgical history, family history, social history, and examination findings as documented by others; and I examined the patient myself.  I personally reviewed the relevant tests, images, and reports as documented above.  I formulated and edited as necessary the assessment and plan and discussed the findings and management plan with the patient and family. . - Jose Weems MD

## 2020-02-20 NOTE — LETTER
"2/20/2020       RE: Byron Russo  [address] [facility] [address]     Dear Colleague,    Thank you for referring your patient, Byron uRsso, to the EYE CLINIC at [facility]. Please see a copy of my visit note below.    Byron Russo is a 59 year old male who presents today with complaints of bilateral sore, swollen, scratchy eyes. Waking up in the morning with his eyes \"glued shut\". Endorses redness. He has been experiencing these symptoms for the past 10 days. He went to urgent care about 10 days prior which is when they gave him antibiotic eye drops and ointment to use at night to treat his symptoms. Reports symptoms improved until Monday, but they have recurred since Tuesday. Has been using contact lens solution to treat his eye redness. Denies flashes, floaters, loss of peripheral vision. Endorses previous history of similar symptoms occurring roughly 2-3 years ago. Has been off his contact lenses for the past two weeks.    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 Htokg-dfafzl-actx disease, chronic (H)    2. Blepharitis of upper and lower eyelids of both eyes, unspecified type    3. 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  Continue to manage with oncology    Plan  FML drops four times a day in both eyes  Xiidra (Lifitegrast 5%) two times a day in both eyes  Maxitrol ointment to all eyelids at bedtime  PFAT 4-6 times a day in both eyes  Avoid sleeping on face  Consider PM occlusive goggles    Dispo:  Follow up in 2 weeks or sooner if needed    Joaquin Moody MD  PGY-4 Ophthalmology Resident  [phone]      Attending Physician Attestation:  Complete documentation of historical and exam elements from today's encounter can be found in the full encounter summary report (not reduplicated in this progress note).  I personally obtained the chief complaint(s) and history of present illness.  I confirmed and edited as necessary the review of systems, past medical/surgical history, family history, social history, and examination findings as documented by others; and I examined the patient myself.  I personally reviewed the relevant tests, images, and reports as documented above.  I formulated and edited as necessary the assessment and plan and discussed the findings and management plan with the patient and family. . - Jose Weems MD         Again, thank you for allowing me to participate in the care of your patient.      Sincerely,  Jose Weems MD  , Comprehensive Ophthalmology  Department of Ophthalmology and Visual Neurosciences  [facility]

## 2020-02-20 NOTE — NURSING NOTE
Chief Complaints and History of Present Illnesses   Patient presents with     Follow Up     [facility].     Chief Complaint(s) and History of Present Illness(es)     Follow Up     Laterality: both eyes    Associated symptoms: eye pain, redness (mild in both eyes), dryness and swelling (lid swelling, worse towards the end of the day)    Treatments tried: eye drops    Pain scale: 3/10    Comments: [facility].              Comments     About ten days ago he was seen in Urgent Care for irritated, eyes.  He was diagnosed with blepharitis and give a drop and an antibiotic drop to use. His eyes are less irritated, less crusted now than they were last week.  He was seen by a doctor yesterday for his Graft Versus Host Disease (GVH) and she thought he should be seen in our clinic to evaluate GVH and the possible need for plugs.    Normally he wears contacts with readers over them.  He has been using a multipurpose contact lens solution (Equate Moisture Last Multipurpose Solution) in both eyes every 15-30 minutes.   He tells me that he has been using this product in his eye for a long time.      Rizwana Gomez, COT 3:03 PM  February 20, 2020

## 2020-02-25 ENCOUNTER — HOSPITAL ENCOUNTER (OUTPATIENT)
Dept: CARDIAC REHAB | Facility: CLINIC | Age: 60
End: 2020-02-25
Attending: INTERNAL MEDICINE
Payer: COMMERCIAL

## 2020-02-25 ENCOUNTER — TELEPHONE (OUTPATIENT)
Dept: OPHTHALMOLOGY | Facility: CLINIC | Age: 60
End: 2020-02-25

## 2020-02-25 PROCEDURE — G0239 OTH RESP PROC, GROUP: HCPCS | Performed by: REHABILITATION PRACTITIONER

## 2020-02-25 PROCEDURE — 40000244 ZZH STATISTIC VISIT PULM REHAB: Performed by: REHABILITATION PRACTITIONER

## 2020-02-25 NOTE — TELEPHONE ENCOUNTER
Byron Russo (Key: [id number])  Rx #: [id number]  Xiidra 5% solution     Form:  [employer] Electronic Prior Authorization Form (2017 NCPDP)    Standard PA has been initiated via coverymymeds today.

## 2020-02-26 NOTE — TELEPHONE ENCOUNTER
Approval coverage for Xiidra through 8/25/20 or until coverage for the medication is no longer available under pt's benefit plan.  Case #:  [id number], Plan member ID:  [id number]. [facility] [place] has been notified by fax.

## 2020-02-27 ENCOUNTER — APPOINTMENT (OUTPATIENT)
Dept: LAB | Facility: CLINIC | Age: 60
End: 2020-02-27
Attending: INTERNAL MEDICINE
Payer: COMMERCIAL

## 2020-02-27 ENCOUNTER — ONCOLOGY VISIT (OUTPATIENT)
Dept: TRANSPLANT | Facility: CLINIC | Age: 60
End: 2020-02-27
Attending: INTERNAL MEDICINE
Payer: COMMERCIAL

## 2020-02-27 VITALS
SYSTOLIC BLOOD PRESSURE: 106 MMHG | HEART RATE: 109 BPM | RESPIRATION RATE: 16 BRPM | OXYGEN SATURATION: 90 % | DIASTOLIC BLOOD PRESSURE: 68 MMHG | WEIGHT: 226.1 LBS | TEMPERATURE: 99 F | BODY MASS INDEX: 32.44 KG/M2

## 2020-02-27 DIAGNOSIS — J96.21 ACUTE ON CHRONIC RESPIRATORY FAILURE WITH HYPOXIA (H): ICD-10-CM

## 2020-02-27 DIAGNOSIS — D46.22 RAEB-2 (REFRACTORY ANEMIA WITH EXCESS BLASTS-2) (H): ICD-10-CM

## 2020-02-27 LAB
6 MIN WALK (FT): 1290 FT
6 MIN WALK (M): 393 M
ALBUMIN SERPL-MCNC: 3.3 G/DL (ref 3.4–5)
ALP SERPL-CCNC: 96 U/L (ref 40–150)
ALT SERPL W P-5'-P-CCNC: 43 U/L (ref 0–70)
ANION GAP SERPL CALCULATED.3IONS-SCNC: 7 MMOL/L (ref 3–14)
AST SERPL W P-5'-P-CCNC: 32 U/L (ref 0–45)
BASOPHILS # BLD AUTO: 0.1 10E9/L (ref 0–0.2)
BASOPHILS NFR BLD AUTO: 0.5 %
BILIRUB SERPL-MCNC: 0.7 MG/DL (ref 0.2–1.3)
BUN SERPL-MCNC: 16 MG/DL (ref 7–30)
CALCIUM SERPL-MCNC: 9 MG/DL (ref 8.5–10.1)
CHLORIDE SERPL-SCNC: 105 MMOL/L (ref 94–109)
CO2 SERPL-SCNC: 24 MMOL/L (ref 20–32)
CREAT SERPL-MCNC: 0.74 MG/DL (ref 0.66–1.25)
DIFFERENTIAL METHOD BLD: ABNORMAL
EOSINOPHIL # BLD AUTO: 0.1 10E9/L (ref 0–0.7)
EOSINOPHIL NFR BLD AUTO: 0.7 %
ERYTHROCYTE [DISTWIDTH] IN BLOOD BY AUTOMATED COUNT: 17.4 % (ref 10–15)
GFR SERPL CREATININE-BSD FRML MDRD: >90 ML/MIN/{1.73_M2}
GLUCOSE SERPL-MCNC: 109 MG/DL (ref 70–99)
HCT VFR BLD AUTO: 48.8 % (ref 40–53)
HGB BLD-MCNC: 16.7 G/DL (ref 13.3–17.7)
IMM GRANULOCYTES # BLD: 0.2 10E9/L (ref 0–0.4)
IMM GRANULOCYTES NFR BLD: 1.2 %
LYMPHOCYTES # BLD AUTO: 0.9 10E9/L (ref 0.8–5.3)
LYMPHOCYTES NFR BLD AUTO: 6.1 %
MAGNESIUM SERPL-MCNC: 2 MG/DL (ref 1.6–2.3)
MCH RBC QN AUTO: 32.8 PG (ref 26.5–33)
MCHC RBC AUTO-ENTMCNC: 34.2 G/DL (ref 31.5–36.5)
MCV RBC AUTO: 96 FL (ref 78–100)
MONOCYTES # BLD AUTO: 1 10E9/L (ref 0–1.3)
MONOCYTES NFR BLD AUTO: 6.8 %
NEUTROPHILS # BLD AUTO: 12.5 10E9/L (ref 1.6–8.3)
NEUTROPHILS NFR BLD AUTO: 84.7 %
NRBC # BLD AUTO: 0 10*3/UL
NRBC BLD AUTO-RTO: 0 /100
PLATELET # BLD AUTO: 228 10E9/L (ref 150–450)
POTASSIUM SERPL-SCNC: 4.3 MMOL/L (ref 3.4–5.3)
PROT SERPL-MCNC: 7.1 G/DL (ref 6.8–8.8)
RBC # BLD AUTO: 5.09 10E12/L (ref 4.4–5.9)
SODIUM SERPL-SCNC: 137 MMOL/L (ref 133–144)
WBC # BLD AUTO: 14.8 10E9/L (ref 4–11)

## 2020-02-27 PROCEDURE — 36415 COLL VENOUS BLD VENIPUNCTURE: CPT

## 2020-02-27 PROCEDURE — 83735 ASSAY OF MAGNESIUM: CPT | Performed by: PHYSICIAN ASSISTANT

## 2020-02-27 PROCEDURE — G0463 HOSPITAL OUTPT CLINIC VISIT: HCPCS | Mod: ZF

## 2020-02-27 PROCEDURE — 80053 COMPREHEN METABOLIC PANEL: CPT | Performed by: PHYSICIAN ASSISTANT

## 2020-02-27 PROCEDURE — 85025 COMPLETE CBC W/AUTO DIFF WBC: CPT | Performed by: PHYSICIAN ASSISTANT

## 2020-02-27 RX ORDER — PREDNISONE 10 MG/1
20 TABLET ORAL DAILY
Qty: 60 TABLET | Refills: 0 | Status: SHIPPED | OUTPATIENT
Start: 2020-02-27 | End: 2020-03-23

## 2020-02-27 ASSESSMENT — PAIN SCALES - GENERAL: PAINLEVEL: NO PAIN (0)

## 2020-02-27 NOTE — LETTER
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  He basically was put on steroids and is now doing much better.  His steroids were started at 100 mg daily on 01/09.  He is currently at 20 mg daily.  He was seen by Aleta Donovan approximately a week ago and complained of dry, gritty eyes.  The prednisone taper was held at 20 mg.  He was seen in Ophthalmology who was concerned that this might represent chronic mfypt-ylbfgn-rybm disease and prescribed topical tacrolimus and other drops.  He continues taking 20 mg daily.  He is functioning very well.  He is able to work.  He has been satting consistently in the 91%-92% while walking on the treadmill for 30 minutes and using the stair stepper.  He is interested in discontinuing oxygen because he is having money problems related to medical insurance as he is in transition between jobs currently.      REVIEW OF SYSTEMS:  No fevers, chills, nausea, vomiting, diarrhea, cough.  He does have shortness of breath on extreme exertion.  No hemoptysis.  He has no bruising or bleeding.  Appetite is good. The remainder of the 10-point review of systems is negative.      PHYSICAL EXAMINATION:   GENERAL:  He looked healthy.   VITAL SIGNS:  Unremarkable.   HEENT:  Sclerae anicteric and noninjected.  Buccal mucosa showed subtle lichenoid changes but no ulcerations.   LUNGS:  Clear to auscultation.   CARDIAC:  Without S3, rub or murmur.   ABDOMEN:  Nondistended, active bowel sounds, no organomegaly.   EXTREMITIES:  Trace pedal edema.   SKIN:  No skin rash.      LABORATORY DATA:  Labs today showed a white count of 14,800 with predominant  neutrophils, hemoglobin 16.7, platelets 228,000.  Normal electrolyte panel, normal liver function tests.      ASSESSMENT AND PLAN:   1.  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   2.  Chronic njkrh-tnqvwc-ytjd disease still active.  He remains on 20 mg prednisone daily and should stay on that level for now.   3.  History of pleural parenchymal fibroelastosis, followed by Dr. Brady in the Pulmonary Clinic.  We will continue his prednisone at 20 mg for now in view of the active eye GVHD.  We will contact Dr. Brady regarding whether, based on his 6-minute walk a day, it is now safe for him to discontinue oxygen as this leads him to incur substantial expenses.  I will plan on seeing him again in 2 months.     Uli Enciso M.D.

## 2020-02-27 NOTE — NURSING NOTE
"Oncology Rooming Note    February 27, 2020 3:18 PM   Byron Russo is a 59 year old male who presents for:    Chief Complaint   Patient presents with     Blood Draw     labs drawn via vpt by rn. vs taken      RECHECK     Return: MDS     Initial Vitals: /68 (BP Location: Left arm, Patient Position: Sitting, Cuff Size: Adult Regular)   Pulse 109   Temp 99  F (37.2  C) (Oral)   Resp 16   Wt 102.6 kg (226 lb 1.6 oz)   SpO2 90%   BMI 32.44 kg/m   Estimated body mass index is 32.44 kg/m  as calculated from the following:    Height as of 2/12/20: 1.778 m (5' 10\").    Weight as of this encounter: 102.6 kg (226 lb 1.6 oz). Body surface area is 2.25 meters squared.  No Pain (0) Comment: Data Unavailable   No LMP for male patient.  Allergies reviewed: Yes  Medications reviewed: Yes    Medications: Medication refills not needed today.  Pharmacy name entered into [facility]: [facility] [place], [place] - [id number]    Clinical concerns: None       Erica Fuentes CMA              "

## 2020-02-27 NOTE — PROGRESS NOTES
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  He basically was put on steroids and is now doing much better.  His steroids were started at 100 mg daily on 01/09.  He is currently at 20 mg daily.  He was seen by Aleta Donovan approximately a week ago and complained of dry, gritty eyes.  The prednisone taper was held at 20 mg.  He was seen in Ophthalmology who was concerned that this might represent chronic ecgow-jrybct-ylfb disease and prescribed topical tacrolimus and other drops.  He continues taking 20 mg daily.  He is functioning very well.  He is able to work.  He has been satting consistently in the 91%-92% while walking on the treadmill for 30 minutes and using the stair stepper.  He is interested in discontinuing oxygen because he is having money problems related to medical insurance as he is in transition between jobs currently.      REVIEW OF SYSTEMS:  No fevers, chills, nausea, vomiting, diarrhea, cough.  He does have shortness of breath on extreme exertion.  No hemoptysis.  He has no bruising or bleeding.  Appetite is good. The remainder of the 10-point review of systems is negative.      PHYSICAL EXAMINATION:   GENERAL:  He looked healthy.   VITAL SIGNS:  Unremarkable.   HEENT:  Sclerae anicteric and noninjected.  Buccal mucosa showed subtle lichenoid changes but no ulcerations.   LUNGS:  Clear to auscultation.   CARDIAC:  Without S3, rub or murmur.   ABDOMEN:  Nondistended, active bowel sounds, no organomegaly.   EXTREMITIES:  Trace pedal edema.   SKIN:  No skin rash.      LABORATORY DATA:  Labs today showed a white count of 14,800 with predominant neutrophils, hemoglobin 16.7, platelets 228,000.  Normal electrolyte panel, normal liver function  tests.      ASSESSMENT AND PLAN:   1.  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   2.  Chronic bjybs-lihszo-abkk disease still active.  He remains on 20 mg prednisone daily and should stay on that level for now.   3.  History of pleural parenchymal fibroelastosis, followed by Dr. Brady in the Pulmonary Clinic.  We will continue his prednisone at 20 mg for now in view of the active eye GVHD.  We will contact Dr. Brady regarding whether, based on his 6-minute walk a day, it is now safe for him to discontinue oxygen as this leads him to incur substantial expenses.  I will plan on seeing him again in 2 months.     Uli Enciso M.D.

## 2020-03-01 LAB — FIO2-PRE: 21 %

## 2020-03-23 ENCOUNTER — TELEPHONE (OUTPATIENT)
Dept: OPHTHALMOLOGY | Facility: CLINIC | Age: 60
End: 2020-03-23

## 2020-03-23 DIAGNOSIS — M35.00 SICCA SYNDROME (H): ICD-10-CM

## 2020-03-23 DIAGNOSIS — D89.811 GRAFT-VERSUS-HOST DISEASE, CHRONIC (H): Primary | ICD-10-CM

## 2020-03-23 DIAGNOSIS — J96.21 ACUTE ON CHRONIC RESPIRATORY FAILURE WITH HYPOXIA (H): ICD-10-CM

## 2020-03-23 DIAGNOSIS — H01.00A BLEPHARITIS OF UPPER AND LOWER EYELIDS OF BOTH EYES, UNSPECIFIED TYPE: ICD-10-CM

## 2020-03-23 DIAGNOSIS — J84.9 ILD (INTERSTITIAL LUNG DISEASE) (H): ICD-10-CM

## 2020-03-23 DIAGNOSIS — D46.22 RAEB-2 (REFRACTORY ANEMIA WITH EXCESS BLASTS-2) (H): ICD-10-CM

## 2020-03-23 DIAGNOSIS — H02.59 FLOPPY EYELID SYNDROME OF BOTH EYES: ICD-10-CM

## 2020-03-23 DIAGNOSIS — T86.09: ICD-10-CM

## 2020-03-23 DIAGNOSIS — Z94.81 STATUS POST BONE MARROW TRANSPLANT (H): ICD-10-CM

## 2020-03-23 DIAGNOSIS — D89.811: ICD-10-CM

## 2020-03-23 DIAGNOSIS — H01.00B BLEPHARITIS OF UPPER AND LOWER EYELIDS OF BOTH EYES, UNSPECIFIED TYPE: ICD-10-CM

## 2020-03-23 DIAGNOSIS — H16.123 FILAMENTARY KERATITIS OF BOTH EYES: ICD-10-CM

## 2020-03-23 DIAGNOSIS — D46.9 MDS (MYELODYSPLASTIC SYNDROME) (H): ICD-10-CM

## 2020-03-23 RX ORDER — SULFAMETHOXAZOLE/TRIMETHOPRIM 800-160 MG
1 TABLET ORAL 2 TIMES DAILY
Qty: 48 TABLET | Refills: 1 | Status: SHIPPED | OUTPATIENT
Start: 2020-03-23 | End: 2020-01-01

## 2020-03-23 RX ORDER — ROSUVASTATIN CALCIUM 5 MG/1
5 TABLET, COATED ORAL DAILY
Qty: 90 TABLET | Refills: 1 | Status: SHIPPED | OUTPATIENT
Start: 2020-03-23 | End: 2020-01-01

## 2020-03-23 RX ORDER — LEVOFLOXACIN 250 MG/1
250 TABLET, FILM COATED ORAL DAILY
Qty: 90 TABLET | Refills: 1 | Status: SHIPPED | OUTPATIENT
Start: 2020-03-23 | End: 2020-01-01

## 2020-03-23 RX ORDER — METOPROLOL TARTRATE 25 MG/1
25 TABLET, FILM COATED ORAL DAILY
Qty: 90 TABLET | Refills: 1 | Status: SHIPPED | OUTPATIENT
Start: 2020-03-23 | End: 2020-01-01

## 2020-03-23 RX ORDER — CYCLOSPORINE 25 MG/1
25 CAPSULE, LIQUID FILLED ORAL 2 TIMES DAILY
Qty: 180 CAPSULE | Refills: 1 | Status: SHIPPED | OUTPATIENT
Start: 2020-03-23 | End: 2020-01-01

## 2020-03-23 RX ORDER — ACYCLOVIR 800 MG/1
800 TABLET ORAL 2 TIMES DAILY
Qty: 180 TABLET | Refills: 1 | Status: SHIPPED | OUTPATIENT
Start: 2020-03-23 | End: 2020-01-01

## 2020-03-23 RX ORDER — FLUCONAZOLE 100 MG/1
100 TABLET ORAL DAILY
Qty: 90 TABLET | Refills: 1 | Status: SHIPPED | OUTPATIENT
Start: 2020-03-23 | End: 2020-01-01

## 2020-03-23 RX ORDER — LEVOTHYROXINE SODIUM 150 UG/1
150 TABLET ORAL DAILY
Qty: 90 TABLET | Refills: 1 | Status: SHIPPED | OUTPATIENT
Start: 2020-03-23 | End: 2020-01-01

## 2020-03-23 RX ORDER — PREDNISONE 10 MG/1
20 TABLET ORAL DAILY
Qty: 180 TABLET | Refills: 1 | Status: SHIPPED | OUTPATIENT
Start: 2020-03-23 | End: 2020-04-30

## 2020-03-23 RX ORDER — PILOCARPINE HYDROCHLORIDE 5 MG/1
5 TABLET, FILM COATED ORAL 2 TIMES DAILY
Qty: 180 TABLET | Refills: 1 | Status: SHIPPED | OUTPATIENT
Start: 2020-03-23 | End: 2020-01-01

## 2020-03-23 NOTE — TELEPHONE ENCOUNTER
HPI:  Byron Russo is a 59 year old male. He had a follow-up scheduled in the eye clinic, 3/25/20. Due to coronavirus pandemic, the patient consents to transition this to a telephone visit today, 3/23/20. The patient was last seen in our clinic on 2/20/20. The patient does not have another scheduled visit in the next 24 hours.     This visit is for follow-up of bilateral ocular GVHD and blepharitis with dry eye and filamentary keratitis. He states that he is overall doing quite well. Over the last month, the shayne feeling has improved with treatment. He has some mild residual redness, but this is not particularly bothersome. He has been using his eye medication regimen as directed, except he stopped the Xiidra because it might have caused him a periocular rash. He is interested in trying again because he is not certain the rash was related, but wanted to talk to us first. A few days ago, he ran out of Refresh tears and bought a different brand. Using the other brand actually increased the irritation, but now that he switched back to Refresh, the eyes have improved. He was even able to get back into his CLs for a few days prior to the irritation with the non-Refresh tears. He is going to wait to try the lenses again for a few more days.    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  Pulmonary GVHD/pelural parenchymal fobroelastosis    Past Ocular History:   Giant papillary conjunctivitis    Medications:   Cyclosporine 25 mg PO two times a day  Prednisone oral taper    Assessment & Plan     (D89.811) Dteoj-acysum-rclm disease, chronic (H)  (primary encounter diagnosis)  (M35.00) Sicca syndrome (H)  (H01.00A,  H01.00B) Blepharitis of upper and lower eyelids of both eyes, unspecified type  (H16.123) Filamentary keratitis of both eyes  (H02.59) Floppy eyelid syndrome of both eyes  Comment: Symptomatically overall doing quite well  Plan: Recommend he continue current regimen:  FML drops four times a day in  both eyes  Xiidra (Lifitegrast 5%) two times a day in both eyes (can try once more, if rash develops again, then would discontinue)  Maxitrol ointment to all eyelids at bedtime (Ok to discontinue when he starts to feel better)  PFAT 4-6 times a day in both eyes  Avoid sleeping on face    Discussed return precautions, and he will call if he has any worsening or new issues.       -----------------------------------------------------------------------------------    Patient disposition:   Return to clinic 3-4 months or when pandemic passes for ocular surface check.    I spent 10 minutes in conversation with this patient from 9:00am to 9:10am, 3/23/20, regarding his medical issues above for this visit.     Ely Oliveira MD  Comprehensive Ophthalmology & Ocular Pathology  Department of Ophthalmology and Visual Neurosciences  [email or web address]  Pager [phone]

## 2020-03-30 ENCOUNTER — TELEPHONE (OUTPATIENT)
Dept: OPHTHALMOLOGY | Facility: CLINIC | Age: 60
End: 2020-03-30

## 2020-03-30 DIAGNOSIS — D89.811 GRAFT-VERSUS-HOST DISEASE, CHRONIC (H): ICD-10-CM

## 2020-03-30 DIAGNOSIS — H01.00B BLEPHARITIS OF UPPER AND LOWER EYELIDS OF BOTH EYES, UNSPECIFIED TYPE: ICD-10-CM

## 2020-03-30 DIAGNOSIS — H16.123 FILAMENTARY KERATITIS OF BOTH EYES: ICD-10-CM

## 2020-03-30 DIAGNOSIS — H01.00A BLEPHARITIS OF UPPER AND LOWER EYELIDS OF BOTH EYES, UNSPECIFIED TYPE: ICD-10-CM

## 2020-03-30 NOTE — TELEPHONE ENCOUNTER
Central Prior Authorization Team   Phone: [phone]      PA Initiation    Medication: Neomycin-Polymyxin Dexameth Ophthalmic Ointment 3.5  - PA initiated  Insurance Company: [employer] - Phone [phone] Fax [phone]  Pharmacy Filling the Rx: [facility] [address] [place] [id number] [facility]  Filling Pharmacy Phone: [phone]  Filling Pharmacy Fax:    Start Date: 3/30/2020

## 2020-03-30 NOTE — TELEPHONE ENCOUNTER
Prior Authorization Specialty Medication Request    Medication/Dose: Neomycin-Polymyxin Dexameth Ophthalmic Ointment 3.5    ICD code (if different than what is on RX):    Previously Tried and Failed:      Important Lab Values:  Rationale:     Insurance Name:  Insurance ID:   Insurance Phone Number:     Pharmacy Information (if different than what is on RX)  Name:    Phone:

## 2020-04-01 NOTE — TELEPHONE ENCOUNTER
Prior Authorization Not Needed per Insurance-Pharmacy states they did not request a PA. They need a change to the medication because this is on a long-term backorder and not available. Will route to the clinic     Medication: Neomycin-Polymyxin Dexameth Ophthalmic Ointment 3.5  - PA Not Needed  Insurance Company: [employer] - Phone [phone] Fax [phone]  Expected CoPay:      Pharmacy Filling the Rx: [facility] [address] [place] [id number] [place] [place]  Pharmacy Notified: Yes  Patient Notified: No

## 2020-04-30 ENCOUNTER — APPOINTMENT (OUTPATIENT)
Dept: LAB | Facility: CLINIC | Age: 60
End: 2020-04-30
Attending: INTERNAL MEDICINE
Payer: COMMERCIAL

## 2020-04-30 ENCOUNTER — ONCOLOGY VISIT (OUTPATIENT)
Dept: TRANSPLANT | Facility: CLINIC | Age: 60
End: 2020-04-30
Attending: INTERNAL MEDICINE
Payer: COMMERCIAL

## 2020-04-30 VITALS
OXYGEN SATURATION: 95 % | WEIGHT: 234.3 LBS | TEMPERATURE: 97.9 F | SYSTOLIC BLOOD PRESSURE: 114 MMHG | HEART RATE: 110 BPM | BODY MASS INDEX: 33.62 KG/M2 | DIASTOLIC BLOOD PRESSURE: 79 MMHG | RESPIRATION RATE: 20 BRPM

## 2020-04-30 DIAGNOSIS — D46.22 RAEB-2 (REFRACTORY ANEMIA WITH EXCESS BLASTS-2) (H): ICD-10-CM

## 2020-04-30 DIAGNOSIS — J96.21 ACUTE ON CHRONIC RESPIRATORY FAILURE WITH HYPOXIA (H): ICD-10-CM

## 2020-04-30 LAB
ALBUMIN SERPL-MCNC: 3.4 G/DL (ref 3.4–5)
ALP SERPL-CCNC: 87 U/L (ref 40–150)
ALT SERPL W P-5'-P-CCNC: 24 U/L (ref 0–70)
ANION GAP SERPL CALCULATED.3IONS-SCNC: 4 MMOL/L (ref 3–14)
AST SERPL W P-5'-P-CCNC: 26 U/L (ref 0–45)
BASOPHILS # BLD AUTO: 0.1 10E9/L (ref 0–0.2)
BASOPHILS NFR BLD AUTO: 0.6 %
BILIRUB SERPL-MCNC: 0.4 MG/DL (ref 0.2–1.3)
BUN SERPL-MCNC: 15 MG/DL (ref 7–30)
CALCIUM SERPL-MCNC: 8.8 MG/DL (ref 8.5–10.1)
CHLORIDE SERPL-SCNC: 109 MMOL/L (ref 94–109)
CO2 SERPL-SCNC: 27 MMOL/L (ref 20–32)
CREAT SERPL-MCNC: 0.73 MG/DL (ref 0.66–1.25)
DIFFERENTIAL METHOD BLD: NORMAL
EOSINOPHIL # BLD AUTO: 0 10E9/L (ref 0–0.7)
EOSINOPHIL NFR BLD AUTO: 0.1 %
ERYTHROCYTE [DISTWIDTH] IN BLOOD BY AUTOMATED COUNT: 14.5 % (ref 10–15)
GFR SERPL CREATININE-BSD FRML MDRD: >90 ML/MIN/{1.73_M2}
GLUCOSE SERPL-MCNC: 103 MG/DL (ref 70–99)
HCT VFR BLD AUTO: 48.8 % (ref 40–53)
HGB BLD-MCNC: 16 G/DL (ref 13.3–17.7)
IMM GRANULOCYTES # BLD: 0.1 10E9/L (ref 0–0.4)
IMM GRANULOCYTES NFR BLD: 1.1 %
LYMPHOCYTES # BLD AUTO: 1.6 10E9/L (ref 0.8–5.3)
LYMPHOCYTES NFR BLD AUTO: 15.5 %
MCH RBC QN AUTO: 32.7 PG (ref 26.5–33)
MCHC RBC AUTO-ENTMCNC: 32.8 G/DL (ref 31.5–36.5)
MCV RBC AUTO: 100 FL (ref 78–100)
MONOCYTES # BLD AUTO: 1.2 10E9/L (ref 0–1.3)
MONOCYTES NFR BLD AUTO: 12 %
NEUTROPHILS # BLD AUTO: 7.3 10E9/L (ref 1.6–8.3)
NEUTROPHILS NFR BLD AUTO: 70.7 %
NRBC # BLD AUTO: 0 10*3/UL
NRBC BLD AUTO-RTO: 0 /100
PLATELET # BLD AUTO: 269 10E9/L (ref 150–450)
POTASSIUM SERPL-SCNC: 4.7 MMOL/L (ref 3.4–5.3)
PROT SERPL-MCNC: 7.8 G/DL (ref 6.8–8.8)
RBC # BLD AUTO: 4.89 10E12/L (ref 4.4–5.9)
SODIUM SERPL-SCNC: 140 MMOL/L (ref 133–144)
WBC # BLD AUTO: 10.4 10E9/L (ref 4–11)

## 2020-04-30 PROCEDURE — G0463 HOSPITAL OUTPT CLINIC VISIT: HCPCS

## 2020-04-30 PROCEDURE — 80053 COMPREHEN METABOLIC PANEL: CPT | Performed by: INTERNAL MEDICINE

## 2020-04-30 PROCEDURE — 85025 COMPLETE CBC W/AUTO DIFF WBC: CPT | Performed by: INTERNAL MEDICINE

## 2020-04-30 RX ORDER — PREDNISONE 10 MG/1
15 TABLET ORAL DAILY
Qty: 180 TABLET | Refills: 1 | COMMUNITY
Start: 2020-04-30 | End: 2020-01-01

## 2020-04-30 ASSESSMENT — PAIN SCALES - GENERAL: PAINLEVEL: NO PAIN (0)

## 2020-04-30 NOTE — NURSING NOTE
"Oncology Rooming Note    April 30, 2020 3:28 PM   Byron Russo is a 59 year old male who presents for:    Chief Complaint   Patient presents with     Blood Draw     Venipuncture labs collected by RN.      RECHECK     Pt is here for a rtn for  MDS     Initial Vitals: Blood Pressure 114/79 (BP Location: Right arm, Patient Position: Sitting)   Pulse 110   Temperature 97.9  F (36.6  C) (Oral)   Respiration 20   Weight 106.3 kg (234 lb 4.8 oz)   Oxygen Saturation 95%   Body Mass Index 33.62 kg/m   Estimated body mass index is 33.62 kg/m  as calculated from the following:    Height as of 2/12/20: 1.778 m (5' 10\").    Weight as of this encounter: 106.3 kg (234 lb 4.8 oz). Body surface area is 2.29 meters squared.  No Pain (0) Comment: Data Unavailable   No LMP for male patient.  Allergies reviewed: Yes  Medications reviewed: Yes    Medications: Medication refills not needed today.  Pharmacy name entered into [facility]: [facility] [place], [place] - [phone]    Clinical concerns: none       Fe Aguillon MA            "

## 2020-04-30 NOTE — PROGRESS NOTES
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 Most recently hospitalized 2/2020    Here for routine 2 month f/u. Yg has not needed oxygen last 2 months. He has noticed recently with weather getting nice that he is being more active outside- with that he is somewhat more SOB. He has pulse oximer which he cares with him - notes with strenguous activity O2 ~90%, at rest it is consistently 95%. His eye are much  Better since starting ocular GVHD tx (restasis and refresh). He was told by optho to keep on 20mg of pred daily until he can be seen in f/u. However optho is not seeing patients who are not having problems. He notes his eyes feel much better. Only other complaint is brittle cracking nails- worse on left than right hand.      REVIEW OF SYSTEMS:  No fevers, chills, nausea, vomiting, diarrhea, cough.  He does have shortness of breath on extreme exertion.  No hemoptysis.  He has no bruising or bleeding.  Appetite is good. The remainder of the 10-point review of systems is negative.      PHYSICAL EXAMINATION:   GENERAL:  He looked healthy.   VITAL SIGNS:  Unremarkable.   HEENT:  Sclerae anicteric and noninjected.  Buccal mucosa showed subtle lichenoid changes but no ulcerations.   LUNGS:  Clear to auscultation.   CARDIAC: RRR, rub or murmur.   ABDOMEN:  Nondistended, active bowel sounds, no organomegaly.   EXTREMITIES:  no pedal edema.   SKIN:  No skin rash.      LABORATORY DATA:   Lab Results   Component Value Date    WBC 10.4 04/30/2020    ANEU 7.3 04/30/2020    HGB 16.0 04/30/2020    HCT 48.8 04/30/2020     04/30/2020     04/30/2020    POTASSIUM 4.7 04/30/2020    CHLORIDE 109 04/30/2020    CO2 27 04/30/2020     (H) 04/30/2020    BUN 15 04/30/2020    CR 0.73 04/30/2020    MAG 2.0 02/27/2020    INR 1.46  (H) 01/08/2020    BILITOTAL 0.4 04/30/2020    AST 26 04/30/2020    ALT 24 04/30/2020    ALKPHOS 87 04/30/2020    PROTTOTAL 7.8 04/30/2020    ALBUMIN 3.4 04/30/2020        ASSESSMENT AND PLAN:   1.  BMT: Counts look good. 4 yrs s/p MA allo for high risk MDS.   2.  Chronic euyfc-lfygeb-qcnh disease still active.   - Decrease pred to 15mg daily. If doing well try to taper to 10mg daily (prior dose before pulmonary exacerbation 1/2020). Continue CSA 25mg PO BID   - Continue restasis and eye gtt per Ophthalmology. If increased eye dryness would increase pred back to 20mg daily until next f/u.   3.  History of pleural parenchymal fibroelastosis  - No longer needing oxygen. Does get SOB with activity  - continue taper steroids.   - he is feelign more sob lately - consider trial of claritin/singular to see he finds helpful     ID: continue prophy bactrim, fluconazole and levaquin.     RTC in 2 months for f/u with Dr Enciso.    Niyah Maria PA-C

## 2020-05-20 NOTE — PROGRESS NOTES
BMT Clinic Progress Note        HISTORY: Mr. 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  He is currently taking 30 mg of prednisone daily, decreased 3/8. Has very pronounced facial erythema and hyperpigmentation.  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 He, at Dr Enciso's advice, started using a 's type mask with a colored shield early March 2017.     Interim events: Byron is here for follow up of lower leg swelling. This is much improved on intermittent lasix (1-2 time a week) .He is hopeful to go down in his steroids. He feels his face rash is stable to improved, dry skin only now. He reports no fevers, no cough or shortness of breath. Eating well without n/v/d/c. No dysuria or dark blood no blood in urine. Otherwise feeling well.        REVIEW OF SYSTEMS: The remainder of the 10-point review of systems is negative except for the pronounced facial erythema which he says greatly worsens over the course of the day and improves overnight.      PHYSICAL EXAMINATION:     /82  Pulse 92  Temp 96.3  F (35.7  C) (Tympanic)  Resp 16  Wt 119.7 kg (264 lb)  SpO2 97%  BMI 36.31 kg/m2    GENERAL:  NAD. Pronounced hyperpigmentation and erythema of the face, floridly cushingoid. His mouth had subtle lichenoid changes, but no open ulcerations.     LUNGS:  Clear to auscultation. No fluid sounds on either base  CARDIAC:  Without S3, rub or murmur.   ABDOMEN:  Nondistended, active bowel sounds,  EXTREMITIES: pre-tibial trace edema, 1+ to ankles     LABORATORY DATA:    CBC RESULTS:   Recent Labs   Lab Test  03/21/17   1621   WBC  10.1   RBC  3.36*   HGB  12.0*   HCT  34.7*   MCV  103*   MCH  35.7*   MCHC  34.6   RDW  18.5*   PLT  225     Last Basic Metabolic Panel:  Lab Results   Component Value Date     03/21/2017      Lab Results   Component Value Date    POTASSIUM 4.1 03/21/2017     Lab Results   Component Value Date    CHLORIDE 107  03/21/2017     Lab Results   Component Value Date    TRACE 8.8 03/21/2017     Lab Results   Component Value Date    CO2 26 03/21/2017     Lab Results   Component Value Date    BUN 37 03/21/2017     Lab Results   Component Value Date    CR 1.07 03/21/2017     Lab Results   Component Value Date    GLC 89 03/21/2017         ASSESSMENT AND PLAN:   1.  322 days status post non-myeloablative allo-sib transplant for myelodysplastic syndrome with no evidence of relapse.     2.  Chronic ssriy-dtncbj-xedl disease.  The pronounced localization of the worst part of the rash to his face suggests that this is some factor other than chronic jzhmk-qydczb-sjik disease that is contributing to it.  In reviewing his meds it seems quite possible that voriconazole is implicated as it can cause photosensitization and he is exposed to bright light. Dr Enciso held voriconazole and switched to fluconazole 3/8 and steroids reduced to 30mg daily. Now decrease to 20 mg daily.    3.  LE edema:Significant improvement of edema. Continue prn lasix- BP Pressures very normal, norvasc had been decreased 3/8Plan: Run CSA on low end of normal.    RTC two weeks derm.    RTC 3 weeks Fernie.    Uli Enciso M.D.     Valtrex Counseling: I discussed with the patient the risks of valacyclovir including but not limited to kidney damage, nausea, vomiting and severe allergy.  The patient understands that if the infection seems to be worsening or is not improving, they are to call.

## 2020-07-02 ENCOUNTER — ONCOLOGY VISIT (OUTPATIENT)
Dept: TRANSPLANT | Facility: CLINIC | Age: 60
End: 2020-07-02
Attending: INTERNAL MEDICINE
Payer: COMMERCIAL

## 2020-07-02 ENCOUNTER — APPOINTMENT (OUTPATIENT)
Dept: LAB | Facility: CLINIC | Age: 60
End: 2020-07-02
Attending: INTERNAL MEDICINE
Payer: COMMERCIAL

## 2020-07-02 VITALS
OXYGEN SATURATION: 95 % | DIASTOLIC BLOOD PRESSURE: 80 MMHG | BODY MASS INDEX: 33.49 KG/M2 | HEART RATE: 85 BPM | SYSTOLIC BLOOD PRESSURE: 118 MMHG | WEIGHT: 233.4 LBS | TEMPERATURE: 97.5 F

## 2020-07-02 DIAGNOSIS — D46.22 RAEB-2 (REFRACTORY ANEMIA WITH EXCESS BLASTS-2) (H): ICD-10-CM

## 2020-07-02 LAB
ALBUMIN SERPL-MCNC: 3.3 G/DL (ref 3.4–5)
ALP SERPL-CCNC: 86 U/L (ref 40–150)
ALT SERPL W P-5'-P-CCNC: 22 U/L (ref 0–70)
ANION GAP SERPL CALCULATED.3IONS-SCNC: 5 MMOL/L (ref 3–14)
AST SERPL W P-5'-P-CCNC: 22 U/L (ref 0–45)
BASOPHILS # BLD AUTO: 0.1 10E9/L (ref 0–0.2)
BASOPHILS NFR BLD AUTO: 0.8 %
BILIRUB SERPL-MCNC: 0.4 MG/DL (ref 0.2–1.3)
BUN SERPL-MCNC: 13 MG/DL (ref 7–30)
CALCIUM SERPL-MCNC: 8.7 MG/DL (ref 8.5–10.1)
CHLORIDE SERPL-SCNC: 108 MMOL/L (ref 94–109)
CO2 SERPL-SCNC: 26 MMOL/L (ref 20–32)
CREAT SERPL-MCNC: 0.89 MG/DL (ref 0.66–1.25)
DIFFERENTIAL METHOD BLD: NORMAL
EOSINOPHIL # BLD AUTO: 0.3 10E9/L (ref 0–0.7)
EOSINOPHIL NFR BLD AUTO: 3 %
ERYTHROCYTE [DISTWIDTH] IN BLOOD BY AUTOMATED COUNT: 14.3 % (ref 10–15)
GFR SERPL CREATININE-BSD FRML MDRD: >90 ML/MIN/{1.73_M2}
GLUCOSE SERPL-MCNC: 118 MG/DL (ref 70–99)
HCT VFR BLD AUTO: 49.9 % (ref 40–53)
HGB BLD-MCNC: 16.7 G/DL (ref 13.3–17.7)
IMM GRANULOCYTES # BLD: 0.1 10E9/L (ref 0–0.4)
IMM GRANULOCYTES NFR BLD: 0.6 %
LYMPHOCYTES # BLD AUTO: 1.5 10E9/L (ref 0.8–5.3)
LYMPHOCYTES NFR BLD AUTO: 16.7 %
MCH RBC QN AUTO: 32.2 PG (ref 26.5–33)
MCHC RBC AUTO-ENTMCNC: 33.5 G/DL (ref 31.5–36.5)
MCV RBC AUTO: 96 FL (ref 78–100)
MONOCYTES # BLD AUTO: 1.1 10E9/L (ref 0–1.3)
MONOCYTES NFR BLD AUTO: 12.8 %
NEUTROPHILS # BLD AUTO: 5.9 10E9/L (ref 1.6–8.3)
NEUTROPHILS NFR BLD AUTO: 66.1 %
NRBC # BLD AUTO: 0 10*3/UL
NRBC BLD AUTO-RTO: 0 /100
PLATELET # BLD AUTO: 244 10E9/L (ref 150–450)
POTASSIUM SERPL-SCNC: 4.1 MMOL/L (ref 3.4–5.3)
PROT SERPL-MCNC: 7.7 G/DL (ref 6.8–8.8)
RBC # BLD AUTO: 5.19 10E12/L (ref 4.4–5.9)
SODIUM SERPL-SCNC: 139 MMOL/L (ref 133–144)
WBC # BLD AUTO: 8.9 10E9/L (ref 4–11)

## 2020-07-02 PROCEDURE — G0463 HOSPITAL OUTPT CLINIC VISIT: HCPCS | Mod: ZF

## 2020-07-02 PROCEDURE — 85025 COMPLETE CBC W/AUTO DIFF WBC: CPT | Performed by: PHYSICIAN ASSISTANT

## 2020-07-02 PROCEDURE — 80053 COMPREHEN METABOLIC PANEL: CPT | Performed by: PHYSICIAN ASSISTANT

## 2020-07-02 PROCEDURE — 36415 COLL VENOUS BLD VENIPUNCTURE: CPT

## 2020-07-02 ASSESSMENT — PAIN SCALES - GENERAL: PAINLEVEL: NO PAIN (0)

## 2020-07-02 NOTE — NURSING NOTE
Chief Complaint   Patient presents with     Labs Only     labs drawn, VS taken     Labs collected via venipuncture. VS taken. Pt checked into next appt and discharged to lobby.       Carolynn Arnold RN

## 2020-07-02 NOTE — LETTER
7/2/2020         RE: Byron Russo  [address] [address] [facility] [address]        Dear Colleague,    Thank you for referring your patient, Byron Russo, to the [facility]. Please see a copy of my visit note below.    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  He was last seen in April.  Because of the pulmonary disease, he had been on 20 mg of prednisone daily and was decreased to 15.  He states that on this decreased prednisone regimen, he notes no increasing shortness of breath or decrease in exercise tolerance.  He continues to work full time.  No fevers, chills, nausea, vomiting, diarrhea, cough, hemoptysis, shortness of breath, hematuria, dysuria, bruising or bleeding.  The remainder of the 10 point review of systems is negative.      PHYSICAL EXAMINATION:  On a visual physical examination, he appeared healthy looking.  There was no erythema or discharge from his eyes.  He did not have a cough or labored breathing.  His skin was not discolored, and there were no apparent lesions.  He did not have tremors.  He was not anxious.  He was alert and oriented.      LABORATORY:  Labs today included a white count of 8900, hemoglobin 16.7, platelets 244,000.  His electrolyte panel was normal with a creatinine of 0.89.  His liver function tests were normal.      ASSESSMENT AND PLAN:   1.  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   2.  History of chronic lbwkr-wiagwh-nzfc disease affecting his eyes and perhaps his lungs.  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  At this point, I believe it is safe to decrease his  prednisone from 15 mg every other day to 10.  I will plan on seeing him again in 3 months' time.  He is instructed to call my nurse coordinator, Nicolas Jara, should he start developing symptoms of shortness of breath or other abnormalities.     Uli Enciso M.D.            Again, thank you for allowing me to participate in the care of your patient.        Sincerely,        BMT DOM

## 2020-07-02 NOTE — NURSING NOTE
"Oncology Rooming Note    July 2, 2020 2:04 PM   Byron Russo is a 59 year old male who presents for:    Chief Complaint   Patient presents with     Labs Only     labs drawn, VS taken     RECHECK     MDS     Initial Vitals: /80 (BP Location: Right arm, Patient Position: Sitting, Cuff Size: Adult Regular)   Pulse 85   Temp 97.5  F (36.4  C) (Oral)   Wt 105.9 kg (233 lb 6.4 oz)   SpO2 95%   BMI 33.49 kg/m   Estimated body mass index is 33.49 kg/m  as calculated from the following:    Height as of 2/12/20: 1.778 m (5' 10\").    Weight as of this encounter: 105.9 kg (233 lb 6.4 oz). Body surface area is 2.29 meters squared.  No Pain (0) Comment: Data Unavailable   No LMP for male patient.  Allergies reviewed: Yes  Medications reviewed: Yes    Medications: Medication refills not needed today.  Pharmacy name entered into [facility]: [place], [place] - [address] [place] [address] [phone]    Clinical concerns: None       Erica Fuentes CMA              "

## 2020-09-11 NOTE — PROGRESS NOTES
----- Message from Los Amin MD sent at 9/11/2020  9:03 AM CDT -----  Polyps consistent with tubular adenoma benign once serrated polyp benign should have a repeat colonoscopy for polyps in 3 years.  Patient's bleeding is from hemorrhoids.  And I would imagine some of her discomfort is because of diverticular disease and the stool having trouble going and I would recommend a tbsp of Metamucil on a daily basis with plenty of liquids.  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  He was last seen in April.  Because of the pulmonary disease, he had been on 20 mg of prednisone daily and was decreased to 15.  He states that on this decreased prednisone regimen, he notes no increasing shortness of breath or decrease in exercise tolerance.  He continues to work full time.  No fevers, chills, nausea, vomiting, diarrhea, cough, hemoptysis, shortness of breath, hematuria, dysuria, bruising or bleeding.  The remainder of the 10 point review of systems is negative.      PHYSICAL EXAMINATION:  On a visual physical examination, he appeared healthy looking.  There was no erythema or discharge from his eyes.  He did not have a cough or labored breathing.  His skin was not discolored, and there were no apparent lesions.  He did not have tremors.  He was not anxious.  He was alert and oriented.      LABORATORY:  Labs today included a white count of 8900, hemoglobin 16.7, platelets 244,000.  His electrolyte panel was normal with a creatinine of 0.89.  His liver function tests were normal.      ASSESSMENT AND PLAN:   1.  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   2.  History of chronic iyjer-xfzrxi-hrjw disease affecting his eyes and perhaps his lungs.  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  At this point, I believe it is safe to decrease his prednisone from 15 mg every other day to 10.  I will plan on seeing him again in 3 months' time.  He is instructed to call my nurse coordinator, Nicolas Jara, should he start developing symptoms of shortness of breath or other abnormalities.     Uli Enciso M.D.

## 2020-09-16 NOTE — TELEPHONE ENCOUNTER
Received phone call request for medication refills. During the call, pt reports some CUELLAR while climbing stairs or while carrying heavy items. He has a home oxygen concentrator and uses the oxygen in short increments with activity. He reports his sats at >90% at rest, and about 90-91% with activity on RA. He reports that ever since he decreased his prednisone to 10mg daily, that he has more difficulty catching his breath. He reports some increased chest tightness, an infrequent dry cough, but denies any congestion, fevers, or other symptoms. Will f/u with Dr. Enciso regarding plan.

## 2020-10-05 NOTE — TELEPHONE ENCOUNTER
"Spoke with Yg to discuss his upcoming appointment. He reports that his SOB/CUELLAR is about the same. However, he is unable to articulate what his O2 sats are as his fingers get cold, and the sensor does not register very quickly. He reports that he is \"sucking wind\" more frequently however, and he is coughing a little more frequently. He reports the cough is dry, wihtout any sign of congestion, fever, or other symptoms. Will get COVID test ideally today or tomorrow morning to r/o COVID prior to clinic visit on Thursday. Appointment arranged for 10/20 for him to meet with Dr. Brady to discuss his PPFE and worsening symptoms. Yg was encouraged to contact us if symptoms acutely worsened.  "

## 2020-10-15 NOTE — NURSING NOTE
Chief Complaint   Patient presents with     Blood Draw     Labs drawn via  by RN in lab. VS taken.      Labs drawn via venipuncture. Vital signs taken. Checked into next appointment.   Abida Stewart RN

## 2020-10-15 NOTE — PROGRESS NOTES
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  He is also followed by Dr. Brady in the Pulmonary Clinic.  He was treated initially after the diagnosis with prednisone.  This had been tapered to 10 mg daily.  He called my nurse coordinator, Nicolas Jara, complaining that since August, he has had progressive shortness of breath and decreasing exercise tolerance.  His prednisone was then increased to 15 mg daily.  He states that the increased prednisone has helped his energy, but has not really made a difference in his pulmonary symptoms.  He is scheduled to see Dr. Brady next week in followup.  He has had no recent fevers, chills, nausea, vomiting, diarrhea, cough, hemoptysis, shortness of breath, hematuria, dysuria, bruising or bleeding.  The remainder of the 10 point review of systems is negative.      PHYSICAL EXAMINATION:   GENERAL:  He appeared pale, but otherwise healthy looking.  He was somewhat tachypneic.   VITAL SIGNS:  Within normal limits.  He was satting at 95%.  His respiratory rate was 20.     HEENT:  Sclerae were nonerythematous.  There was no discharge.  Examination of his mouth revealed subtle lichenoid changes, but no open ulcerations.     SKIN:  His skin remained without hyperpigmentation, which was an issue when he was on voriconazole.  The skin had normal elasticity.     PULMONARY:  There was a rub at the right base, which was intermittent.  There were decreased breath sounds at both bases as well.  Otherwise, no adventitious sounds.   CARDIAC:  Without S3, rub or murmur.   ABDOMEN:  Nondistended, active bowel sounds, no organomegaly.   EXTREMITIES:  Trace pedal edema.  No skin rash.      LABORATORY:  Labs today included a white count of 11,500, hemoglobin 14.7, platelets 232,000.  His electrolyte  panel was normal with a creatinine of 0.79.  Liver function tests were normal.      ASSESSMENT AND PLAN:   1.  Four-and-a-half years status post allogeneic sibling transplant for RAEB-2 MDS without evidence of relapse.   2.  Chronic qcncb-ziqxtz-hgue disease, currently manifesting as dry eyes and dry mouth, but no active lesions in the oropharyngeal area.  I will wait until Dr. Brady sees him next week.  Because of the progressive and rather rapid decline in his pulmonary function, my guess is that he will perform pulmonary function studies as well as some sort of imaging, since he has not been imaged in nearly 10 months.  I will defer to him about recommending treatment for the pulmonary problem, which might or might not be a manifestation of chronic exipb-ksxerr-mvdx disease.  I will schedule Dwight for followup when Dr. Brady has had a chance to see him.     Uli Enciso M.D.

## 2020-10-15 NOTE — NURSING NOTE
"Oncology Rooming Note    October 15, 2020 3:43 PM   Byron Russo is a 59 year old male who presents for:    Chief Complaint   Patient presents with     Blood Draw     Labs drawn via  by RN in lab. VS taken.      RECHECK     MDS     Initial Vitals: /76   Pulse 110   Temp 97.2  F (36.2  C) (Tympanic)   Resp 20   Wt 104.4 kg (230 lb 3.2 oz)   SpO2 95%   BMI 33.03 kg/m   Estimated body mass index is 33.03 kg/m  as calculated from the following:    Height as of 2/12/20: 1.778 m (5' 10\").    Weight as of this encounter: 104.4 kg (230 lb 3.2 oz). Body surface area is 2.27 meters squared.  Moderate Pain (4) Comment: Data Unavailable   No LMP for male patient.  Allergies reviewed: Yes  Medications reviewed: Yes    Medications: Medication refills not needed today.  Pharmacy name entered into [facility]: [place], [place] - [phone]    Clinical concerns: None       Erica Fuentes CMA              "

## 2020-10-15 NOTE — LETTER
10/15/2020         RE: Byron Russo  [address] [facility] [address]        Dear Colleague,    Thank you for referring your patient, Byron Russo, to the [facility]OD [facility]ARR[facility] TRANSPL[facility] [place]. Please see a copy of my visit note below.    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  He is also followed by Dr. Brady in the Pulmonary Clinic.  He was treated initially after the diagnosis with prednisone.  This had been tapered to 10 mg daily.  He called my nurse coordinator, Nicolas Jara, complaining that since August, he has had progressive shortness of breath and decreasing exercise tolerance.  His prednisone was then increased to 15 mg daily.  He states that the increased prednisone has helped his energy, but has not really made a difference in his pulmonary symptoms.  He is scheduled to see Dr. Brady next week in followup.  He has had no recent fevers, chills, nausea, vomiting, diarrhea, cough, hemoptysis, shortness of breath, hematuria, dysuria, bruising or bleeding.  The remainder of the 10 point review of systems is negative.      PHYSICAL EXAMINATION:   GENERAL:  He appeared pale, but otherwise healthy looking.  He was somewhat tachypneic.   VITAL SIGNS:  Within normal limits.  He was satting at 95%.  His respiratory rate was 20.     HEENT:  Sclerae were nonerythematous.  There was no discharge.  Examination of his mouth revealed subtle lichenoid changes, but no open ulcerations.     SKIN:  His skin remained without hyperpigmentation, which was an issue when he was on voriconazole.  The skin had normal elasticity.     PULMONARY:  There was a rub at the right base, which was intermittent.  There were decreased breath sounds at both bases as well.   Otherwise, no adventitious sounds.   CARDIAC:  Without S3, rub or murmur.   ABDOMEN:  Nondistended, active bowel sounds, no organomegaly.   EXTREMITIES:  Trace pedal edema.  No skin rash.      LABORATORY:  Labs today included a white count of 11,500, hemoglobin 14.7, platelets 232,000.  His electrolyte panel was normal with a creatinine of 0.79.  Liver function tests were normal.      ASSESSMENT AND PLAN:   1.  Four-and-a-half years status post allogeneic sibling transplant for RAEB-2 MDS without evidence of relapse.   2.  Chronic effkf-oflnxu-btgh disease, currently manifesting as dry eyes and dry mouth, but no active lesions in the oropharyngeal area.  I will wait until Dr. Brady sees him next week.  Because of the progressive and rather rapid decline in his pulmonary function, my guess is that he will perform pulmonary function studies as well as some sort of imaging, since he has not been imaged in nearly 10 months.  I will defer to him about recommending treatment for the pulmonary problem, which might or might not be a manifestation of chronic cgelb-slwfax-rqnb disease.  I will schedule Dwight for followup when Dr. Brady has had a chance to see him.     Uli Enciso M.D.

## 2020-10-20 NOTE — PROGRESS NOTES
"Byron Russo is a 59 year old male who is being evaluated via a billable telephone visit.      The patient has been notified of following:     \"This telephone visit will be conducted via a call between you and your physician/provider. We have found that certain health care needs can be provided without the need for a physical exam.  This service lets us provide the care you need with a short phone conversation.  If a prescription is necessary we can send it directly to your pharmacy.  If lab work is needed we can place an order for that and you can then stop by our lab to have the test done at a later time.    Telephone visits are billed at different rates depending on your insurance coverage. During this emergency period, for some insurers they may be billed the same as an in-person visit.  Please reach out to your insurance provider with any questions.    If during the course of the call the physician/provider feels a telephone visit is not appropriate, you will not be charged for this service.\"    Patient has given verbal consent for Telephone visit?  Yes    What phone number would you like to be contacted at? [phone]    How would you like to obtain your AVS? Mail a copy     58 YO male with history of MDS RAEB-2 s/p Allo-sib HSCT 3 years ago who was recently hospitalized for increased SOB/CUELLAR and hypoxia.  Underwent CT chest on admission that showed peripheral findings with a few middle lung nodular infiltrates.  Underwent bronchoscopy with BAL showing only 190 and 290 WBC in a lymphocyte predominant pattern, only microbiology findings were Candida; was started on Posaconazole. Discharged on 4L of oxygen.  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  SOB and CUELLAR is improved since discharge.  States onset of symptoms was in 10-18, noticed increased SOB while doing yardwork.  States normal walking does not cause a problem, only if he is walking " fast or carrying items. Denies any prior history of lung disease- no history of asthma, no pneumonia, no complications with chemotherapy.  Has a history of cGvHD, is on alternating prednisone of 10/20 mg and cyclosporin of 25 mg every day.  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    Last seen 10 months ago. Since his last visit had an episode of desaturation in January, was hospitalized until February.  Was ill again in August with increased shortness of breath.  SOB if he walks too quickly or if has to climb stairs.  Not much cough or sputum production.  Currently on 15 mg of prednisone. He underwent swallowing study in 12-19 that showed aspiration events.       60 YO s/p HSCT post 3 years with sub-acute presentation with SOB and CUELLAR with recent worsening.  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  Unclear of cause of acute worsening but may be related to concomitant viral infection, cannot fully exclude fungal infection but BAL cultures have remained negative. No effective treatment for PPFE, steroids do not seem to be effective, however in my experience the disease does not seem to progress.  Based on lack of treatment and seemingly no/slow progression, I would not proceed with biopsy to confirm the diagnosis. With impaired lung function he is at risk to develop respiratory complications during times of respiratory infections.  Needs new work-up for increase in pulmonary symptoms since August. Further treatment will be based on the findings from these studies.     1.CT chest to evaluate for cause of worsening  2.Full PFT's   3.Oxygen titration study.   Further treatment will be based on above results.  May need bronchoscopy based on findings from CT.     RTC in 2 months.  Patient to call with any questions or concerns prior to his next clinic visit    Phone  call duration: 25 minutes

## 2020-10-20 NOTE — LETTER
"    10/20/2020         RE: Byron Russo  [id number] [facility] [facility] [place] [address]        Dear Colleague,    Thank you for referring your patient, Byron Russo, to the [facility] AND [facility]. Please see a copy of my visit note below.    Byron Russo is a 59 year old male who is being evaluated via a billable telephone visit.      The patient has been notified of following:     \"This telephone visit will be conducted via a call between you and your physician/provider. We have found that certain health care needs can be provided without the need for a physical exam.  This service lets us provide the care you need with a short phone conversation.  If a prescription is necessary we can send it directly to your pharmacy.  If lab work is needed we can place an order for that and you can then stop by our lab to have the test done at a later time.    Telephone visits are billed at different rates depending on your insurance coverage. During this emergency period, for some insurers they may be billed the same as an in-person visit.  Please reach out to your insurance provider with any questions.    If during the course of the call the physician/provider feels a telephone visit is not appropriate, you will not be charged for this service.\"    Patient has given verbal consent for Telephone visit?  Yes    What phone number would you like to be contacted at? [phone]    How would you like to obtain your AVS? Mail a copy     60 YO male with history of MDS RAEB-2 s/p Allo-sib HSCT 3 years ago who was recently hospitalized for increased SOB/CUELLAR and hypoxia.  Underwent CT chest on admission that showed peripheral findings with a few middle lung nodular infiltrates.  Underwent bronchoscopy with BAL showing only 190 and 290 WBC in a lymphocyte predominant pattern, only microbiology findings were Candida; was started on Posaconazole. Discharged on 4L of " oxygen.  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  SOB and CUELLAR is improved since discharge.  States onset of symptoms was in 10-18, noticed increased SOB while doing yardwork.  States normal walking does not cause a problem, only if he is walking fast or carrying items. Denies any prior history of lung disease- no history of asthma, no pneumonia, no complications with chemotherapy.  Has a history of cGvHD, is on alternating prednisone of 10/20 mg and cyclosporin of 25 mg every day.  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    Last seen 10 months ago. Since his last visit had an episode of desaturation in January, was hospitalized until February.  Was ill again in August with increased shortness of breath.  SOB if he walks too quickly or if has to climb stairs.  Not much cough or sputum production.  Currently on 15 mg of prednisone. He underwent swallowing study in 12-19 that showed aspiration events.       58 YO s/p HSCT post 3 years with sub-acute presentation with SOB and CUELLAR with recent worsening.  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  Unclear of cause of acute worsening but may be related to concomitant viral infection, cannot fully exclude fungal infection but BAL cultures have remained negative. No effective treatment for PPFE, steroids do not seem to be effective, however in my experience the disease does not seem to progress.  Based on lack of treatment and seemingly no/slow progression, I would not proceed with biopsy to confirm the diagnosis. With impaired lung function he is at risk to develop respiratory complications during times of respiratory infections.  Needs new work-up for increase in pulmonary symptoms since August. Further treatment will be based on the findings from these  studies.     1.CT chest to evaluate for cause of worsening  2.Full PFT's   3.Oxygen titration study.   Further treatment will be based on above results.  May need bronchoscopy based on findings from CT.     RTC in 2 months.  Patient to call with any questions or concerns prior to his next clinic visit    Phone call duration: 25 minutes              Again, thank you for allowing me to participate in the care of your patient.        Sincerely,        Travis Brady MD

## 2020-10-22 PROBLEM — J90 PLEURAL EFFUSION: Status: ACTIVE | Noted: 2020-01-01

## 2020-10-22 PROBLEM — J90 LARGE PLEURAL EFFUSION: Status: ACTIVE | Noted: 2020-01-01

## 2020-10-22 NOTE — ED NOTES
Bed: IN01  Expected date: 10/22/20  Expected time: 5:20 PM  Means of arrival: Car  Comments:  Byron Callejas 59 M myelodysplastic syndrome with large pleural effusion and collapsed lung. SOB but not on oxygen[place] [facility] [place]     Gladis Bee MD  10/22/20 2133

## 2020-10-22 NOTE — ED PROVIDER NOTES
[place] EMERGENCY DEPARTMENT ([facility])  October 22, 2020  History     Chief Complaint   Patient presents with     Shortness of Breath     The history is provided by the patient and medical records.     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 Patient had a CT today that showed large left pleural effusion with total collapse of left lung.    Today in ED, patient endorses increased shortness of breath.  He states he has had increased dyspnea upon exertion over the last couple months which prompted the virtual visit on 10/20/2020.  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  During the visit it was unclear of the cause of the acute worsening of shortness of breath, but the care team believed that could be related to concomitant viral infection.  Patient was plan to be further evaluated with CT chest, full PFTs, and oxygen titration study.  The patient underwent his CT chest earlier today with results posted below.  Today in the ED, patient denies fevers, or other URI symptoms.      PAST MEDICAL HISTORY:   Past Medical History:   Diagnosis Date     Blepharitis      CAD (coronary artery disease) 2001     Qjicl-jsfwoc-zgrc disease (H)      Hyperlipidemia      Hypothyroidism      Obesity      Pulmonary embolism (H) 2/2016     Varicose veins        PAST SURGICAL HISTORY:   Past Surgical History:   Procedure Laterality Date     APPENDECTOMY       ARTHROSCOPY KNEE WITH MEDIAL MENISCECTOMY  6/20/2011    Procedure:ARTHROSCOPY KNEE WITH MEDIAL MENISCECTOMY; Surgeon:SACHIN SAMANO; Location:[facility]     BRONCHOSCOPY (RIGID OR FLEXIBLE), DIAGNOSTIC N/A 2/6/2019    Procedure: COMBINED  BRONCHOSCOPY (RIGID OR FLEXIBLE), LAVAGE;  Surgeon: Louise Rogel MD;  Location: [facility]     coronary artery stents placed x 5  2001       Past medical history, past surgical history, medications, and allergies were reviewed with the patient. Additional pertinent items: None    FAMILY HISTORY:   Family History   Problem Relation Age of Onset     Myocardial Infarction Father 58     Coronary Artery Disease Father      Heart Failure Mother      Glaucoma Mother      Coronary Artery Disease Brother      Coronary Artery Disease Brother      Cancer Brother         GIST     Skin Cancer No family hx of      Melanoma No family hx of      Macular Degeneration No family hx of        SOCIAL HISTORY:   Social History     Tobacco Use     Smoking status: Never Smoker     Smokeless tobacco: Former User   Substance Use Topics     Alcohol use: No     Alcohol/week: 0.0 standard drinks     Social history was reviewed with the patient. Additional pertinent items: None      Current Discharge Medication List      CONTINUE these medications which have NOT CHANGED    Details   acetaminophen (TYLENOL) 325 MG tablet Take 1-2 tablets (325-650 mg) by mouth every 4 hours as needed for mild pain or fever      acyclovir (ZOVIRAX) 800 MG tablet Take 1 tablet (800 mg) by mouth 2 times daily  Qty: 180 tablet, Refills: 1    Associated Diagnoses: MDS (myelodysplastic syndrome) (H)      artificial saliva (BIOTENE MT) SOLN solution Swish and spit 2 mLs (2 sprays) in mouth every hour as needed for dry mouth  Qty: 1 Bottle, Refills: 3    Associated Diagnoses: Acute on chronic respiratory failure with hypoxia (H); Chronic GVHD (H)      aspirin 81 MG EC tablet Take 81 mg by mouth daily Reported on 5/12/2017      cycloSPORINE modified (GENERIC EQUIVALENT) 25 MG capsule Take 1 capsule (25 mg) by mouth 2 times daily  Qty: 180 capsule, Refills: 1    Associated Diagnoses: 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      erythromycin (ROMYCIN) 5 MG/GM  ophthalmic ointment       fluconazole (DIFLUCAN) 100 MG tablet Take 1 tablet (100 mg) by mouth daily  Qty: 90 tablet, Refills: 1    Associated Diagnoses: MDS (myelodysplastic syndrome) (H); 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      fluorometholone 0.1 % OP ophthalmic suspension Place 1 drop into both eyes 4 times daily  Qty: 1 Bottle, Refills: 3    Associated Diagnoses: Fmztp-poevpg-gnvc disease, chronic (H); Blepharitis of upper and lower eyelids of both eyes, unspecified type; Filamentary keratitis of both eyes      levofloxacin (LEVAQUIN) 250 MG tablet Take 1 tablet (250 mg) by mouth daily  Qty: 90 tablet, Refills: 1    Associated Diagnoses: MDS (myelodysplastic syndrome) (H); 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      levothyroxine (SYNTHROID/LEVOTHROID) 150 MCG tablet Take 1 tablet (150 mcg) by mouth daily  Qty: 90 tablet, Refills: 1    Associated Diagnoses: 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; MDS (myelodysplastic syndrome) (H)      lifitegrast 5 % OP opthalmic solution Place 1 drop into both eyes 2 times daily  Qty: 60 each, Refills: 1    Associated Diagnoses: Baygf-yvujjn-sfew disease, chronic (H); Blepharitis of upper and lower eyelids of both eyes, unspecified type; Filamentary keratitis of both eyes      magnesium oxide (MAG-OX) 400 (241.3 MG) MG tablet Take 2 tablets daily      metoprolol tartrate (LOPRESSOR) 25 MG tablet Take 1 tablet (25 mg) by mouth daily  Qty: 90 tablet, Refills: 1    Associated Diagnoses: MDS (myelodysplastic syndrome) (H); 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      NITROGLYCERIN ER PO Take by mouth as needed Reported on 5/12/2017      !! order for DME Equipment being ordered: Oxygen. Please provide patient with continuous flow oxygen at 3 LPM via nasal cannula for activity and while sleeping. Patient will need concentrator, conserving device, and portable oxygen.  Qty: 1 each, Refills: 0    Associated Diagnoses:  Xoiih-vviupx-atvm disease, chronic, complicating bone marrow transplant (H); Acute on chronic respiratory failure with hypoxia (H); MDS (myelodysplastic syndrome) (H); Status post bone marrow transplant (H); ILD (interstitial lung disease) (H)      !! order for DME Equipment being ordered: Oxygen. Please provide patient with continuous flow oxygen at 3 LPM via nasal cannula. Patient will need concentrator, conserving device, and portable oxygen. Length of need is up to 6 months; will continue to reassess. Patient will need transportation oxygen delivered to the [facility], [address] [place] [id number]. [address], Room 5429.  Qty: 1 each, Refills: 0    Associated Diagnoses: Acute on chronic respiratory failure with hypoxia (H); MDS (myelodysplastic syndrome) (H); Status post bone marrow transplant (H); Chronic euywp-lxdjne-hkjt disease complicating bone marrow transplant (H)      !! order for DME Equipment being ordered: Please provide portable oxygen at 2 LPM with activity and with sleep via nasal canula. Patient requesting small tanks he can wear with back pack for his work.  Qty: 1 Device, Refills: 0    Associated Diagnoses: ILD (interstitial lung disease) (H)      pilocarpine (SALAGEN) 5 MG tablet Take 1 tablet (5 mg) by mouth 2 times daily  Qty: 180 tablet, Refills: 1    Associated Diagnoses: RAEB-2 (refractory anemia with excess blasts-2) (H)      predniSONE (DELTASONE) 10 MG tablet Take 1.5 tablets (15 mg) by mouth daily  Qty: 180 tablet, Refills: 1    Associated Diagnoses: RAEB-2 (refractory anemia with excess blasts-2) (H)      rosuvastatin (CRESTOR) 5 MG tablet Take 1 tablet (5 mg) by mouth daily  Qty: 90 tablet, Refills: 1    Associated Diagnoses: MDS (myelodysplastic syndrome) (H)      sulfamethoxazole-trimethoprim (BACTRIM DS) 800-160 MG tablet Take 1 tablet by mouth 2 times daily On Monday's and Tuesday's only  Qty: 48 tablet, Refills: 1    Associated Diagnoses: MDS  (myelodysplastic syndrome) (H); RAEB-2 (refractory anemia with excess blasts-2) (H)      tobramycin-dexamethasone (TOBRADEX) 0.3-0.1 % ophthalmic ointment Place 0.5 inch strip into each eye at Bedtime  Qty: 1 Tube, Refills: 3    Associated Diagnoses: Uxbqb-sdcwoj-znbe disease, chronic (H); Blepharitis of upper and lower eyelids of both eyes, unspecified type      trimethoprim-polymyxin b (POLYTRIM) 32361-6.1 UNIT/ML-% ophthalmic solution        !! - Potential duplicate medications found. Please discuss with provider.             Allergies   Allergen Reactions     Atorvastatin Other (See Comments)     Back pain  Tolerates atrovastatin     Docosahexaenoic Acid (Dha)      Other reaction(s): Intolerance-Can't Take - Omega 3 fish oil     Liquid Adhesive Rash        Review of Systems   Constitutional: Negative for fever.   HENT: Positive for rhinorrhea.    Respiratory: Positive for cough and shortness of breath.    Cardiovascular: Positive for chest pain.   Gastrointestinal: Negative for abdominal pain, nausea and vomiting.   All other systems reviewed and are negative.    A complete review of systems was performed with pertinent positives and negatives noted in the HPI, and all other systems negative.    Physical Exam   BP: (!) 138/99  Pulse: 97  Temp: 98  F (36.7  C)  Resp: 16  Height: 182.9 cm (6')  Weight: 102.5 kg (226 lb)  SpO2: 99 %      Physical Exam  Vitals signs and nursing note reviewed.   Constitutional:       General: He is not in acute distress.     Appearance: He is well-developed. He is obese. He is not ill-appearing or diaphoretic.   HENT:      Head: Normocephalic and atraumatic.      Nose: Nose normal.   Eyes:      General: No scleral icterus.     Conjunctiva/sclera: Conjunctivae normal.   Neck:      Musculoskeletal: Normal range of motion and neck supple.   Cardiovascular:      Rate and Rhythm: Normal rate and regular rhythm.   Pulmonary:      Effort: Pulmonary effort is normal. No tachypnea or  respiratory distress.      Breath sounds: No stridor.   Abdominal:      General: There is no distension.   Musculoskeletal: Normal range of motion.         General: No deformity or signs of injury.   Skin:     General: Skin is warm and dry.      Coloration: Skin is not jaundiced or pale.      Findings: No rash.   Neurological:      General: No focal deficit present.      Mental Status: He is alert and oriented to person, place, and time.   Psychiatric:         Mood and Affect: Mood normal.         Behavior: Behavior normal.         Thought Content: Thought content normal.         ED Course   7:29 PM  The patient was seen and examined by Gladis Bee MD in Room ED03.        Procedures             EKG Interpretation:      Interpreted by Gladis Bee MD  Time reviewed: 6:09 PM  Symptoms at time of EKG: SOB   Rhythm: normal sinus   Rate: Normal  Axis: Left Axis Deviation  Ectopy: none  Conduction: left anterior fasciclar block  ST Segments/ T Waves: Non-specific ST-T wave changes  Q Waves: III and aVf  Comparison to prior: Lower voltage QRS compared to 2016    Clinical Impression: non-specific EKG                            CT CHEST W/O CONTRAST, 10/22/2020   IMPRESSION:  1. Increased now large left pleural effusion with total collapse of  the left lung and associated mass effect on the mediastinum. Mural  nodularity at the medial portion of the possible large effusion,  recommend follow-up to resolution to exclude atypical neoplastic  process given the mass effect on the left upper and lower lobar  bronchi.  2. Significantly improved ground glass opacities in the right lung  with multifocal residual rounded bronchocentric consolidative  opacities in the upper lobe and continued consolidative opacities in  the right middle and lower lobes. Differential includes improving  organizing pneumonia/graft versus host disease/drug reaction/infection  in the setting of continued pleural-parenchymal fibroelastosis.  3.  Improved small right pleural effusion.  4. Unchanged mild ectasia of the ascending aorta to 4.1 cm. Coronary  artery disease.    Results for orders placed or performed during the hospital encounter of 10/22/20 (from the past 24 hour(s))   CBC with platelets differential   Result Value Ref Range    WBC 11.7 (H) 4.0 - 11.0 10e9/L    RBC Count 4.93 4.4 - 5.9 10e12/L    Hemoglobin 15.3 13.3 - 17.7 g/dL    Hematocrit 47.4 40.0 - 53.0 %    MCV 96 78 - 100 fl    MCH 31.0 26.5 - 33.0 pg    MCHC 32.3 31.5 - 36.5 g/dL    RDW 14.6 10.0 - 15.0 %    Platelet Count 289 150 - 450 10e9/L    Diff Method Automated Method     % Neutrophils 82.3 %    % Lymphocytes 7.4 %    % Monocytes 8.9 %    % Eosinophils 0.0 %    % Basophils 0.4 %    % Immature Granulocytes 1.0 %    Nucleated RBCs 0 0 /100    Absolute Neutrophil 9.7 (H) 1.6 - 8.3 10e9/L    Absolute Lymphocytes 0.9 0.8 - 5.3 10e9/L    Absolute Monocytes 1.1 0.0 - 1.3 10e9/L    Absolute Eosinophils 0.0 0.0 - 0.7 10e9/L    Absolute Basophils 0.1 0.0 - 0.2 10e9/L    Abs Immature Granulocytes 0.1 0 - 0.4 10e9/L    Absolute Nucleated RBC 0.0    Comprehensive metabolic panel   Result Value Ref Range    Sodium 138 133 - 144 mmol/L    Potassium 5.2 3.4 - 5.3 mmol/L    Chloride 104 94 - 109 mmol/L    Carbon Dioxide 28 20 - 32 mmol/L    Anion Gap 6 3 - 14 mmol/L    Glucose 119 (H) 70 - 99 mg/dL    Urea Nitrogen 18 7 - 30 mg/dL    Creatinine 0.81 0.66 - 1.25 mg/dL    GFR Estimate >90 >60 mL/min/[1.73_m2]    GFR Estimate If Black >90 >60 mL/min/[1.73_m2]    Calcium 9.8 8.5 - 10.1 mg/dL    Bilirubin Total 0.8 0.2 - 1.3 mg/dL    Albumin 3.0 (L) 3.4 - 5.0 g/dL    Protein Total 8.3 6.8 - 8.8 g/dL    Alkaline Phosphatase 182 (H) 40 - 150 U/L    ALT 44 0 - 70 U/L    AST 41 0 - 45 U/L   INR   Result Value Ref Range    INR 1.28 (H) 0.86 - 1.14   Partial thromboplastin time   Result Value Ref Range    PTT 40 (H) 22 - 37 sec   EKG 12-lead, tracing only   Result Value Ref Range    Interpretation ECG Click  View Image link to view waveform and result      Medications - No data to display          Assessments & Plan (with Medical Decision Making)   Byron Russo is a 59 year old male with a history of RAEB-2 myelodysplastic syndrome s/p BMT (2016) c/b chronic msyig-vfgrhb-qclj disease, CAD s/p stent placement x5 (2001), ILD, and PE who presents to the Emergency Department via EMS with shortness of breath.    Ddx: Malignant pleural effusion, respiratory insufficiency, parapneumonic effusion, respiratory infection viral versus infectious, aspiration pneumonia, jmbrs-ospwau-hiwc disease of the lung    Patient has had a dry cough and shortness of breath for several months.  He notes the worsening symptoms have been going on for the past couple weeks.  He is currently in no acute distress and satting in the high 90s on room air.  Labs within normal limits with a very mild white blood cell count elevation at 11.7.  This is similar to previous level from 10/15.  Patient has not had a fever and is nontoxic-appearing.  Procalcitonin was added on.  Cardiothoracic surgery was consulted for evaluation and recommendations on management of patient's pleural effusion.  The evaluated the patient in the emergency department and recommended patient have a nonemergent thoracentesis.  They do not recommend inserting a chest tube or pleural drain as they think this would likely cause a chronic leak.  Further diagnostic work-up per BMT and medicine.    BMT fellow aware of the patient and accepted for admission with hospitalist cross cover.    I have reviewed the nursing notes.    I have reviewed the findings, diagnosis, plan and need for follow up with the patient.    Current Discharge Medication List          Final diagnoses:   Pleural effusion   IRome, am serving as a trained medical scribe to document services personally performed by Gladis Bee MD, based on the provider's statements to me.      I, Gladis Bee MD,  was physically present and have reviewed and verified the accuracy of this note documented by Rome Dean.    10/22/2020   [facility] EMERGENCY DEPARTMENT     Gladis Bee MD  10/23/20 0012

## 2020-10-22 NOTE — ED TRIAGE NOTES
Pt presents to ED to have L lung fluid drained. Pt was having more SOB and had CT that showed fluid. Hx PPFE and BMT.

## 2020-10-22 NOTE — CONSULTS
[facility] Thoracic Surgery Consultation    Byron Russo MRN# [id number]   Age: 59 year old YOB: 1960     Date of Admission:  10/22/2020    Date of Consult:   10/22/20    Reason for consult: Large left pleural effusion       Requesting service: ED; requesting provider: Dr. Bee                   Assessment and Plan:   Assessment:   59 year old male with a history of RAEB-2 myelodysplastic syndrome s/p BMT (2016) c/b chronic alhuc-fjixuz-zmhv disease, pleural parenchymal fibroelastosis, CAD s/p stent placement x5 (2001), ILD, and PE with large left pleural effusion.       Plan:   - Recommend diagnostic and therapeutic thoracentesis. Do not place tube at this time as could develop chronic draining tract.   -  Recommend admission to medicine service  - Recommend pulmonary consultation  - Please call thoracic surgery service with any questions or concerns.     Patient discussed with fellow, Dr. Haas, who will discuss with staff.     Megan Porter DO PGY-2  General Surgery Resident            Chief Complaint:     SOB         History of Present Illness:     Byron Russo is a 59 year old male with a history of RAEB-2 myelodysplastic syndrome s/p BMT (2016) c/b chronic tstfz-unnzmn-qzfj disease, pleural parenchymal fibroelastosis, CAD s/p stent placement x5 (2001), ILD, and PE who presented to the ED today due to chronic SOB and findings of large left pleural effusion on CT scan. Patient was seen by pulmonology, Dr. Brady, on 10/20 in a video visit for increasing SOB. At that time it was decided that he should undergo a CT chest to evaluate for his worsening SOB. Today he had that study performed and was found to have a large left sided pleural effusion with collapse of his left lung and mediastinal shift. He was then transferred [place] [facility] [place] evaluation in the ED. He was found to have a leukocytosis as well to 11.7. He has never required a thoracentesis in the past per patient and  has had no thoracic surgeries.               Past Medical History:     Past Medical History:   Diagnosis Date     Blepharitis      CAD (coronary artery disease) 2001     Aiuik-vgmlxi-smjj disease (H)      Hyperlipidemia      Hypothyroidism      Obesity      Pulmonary embolism (H) 2/2016     Varicose veins              Past Surgical History:     Past Surgical History:   Procedure Laterality Date     APPENDECTOMY       ARTHROSCOPY KNEE WITH MEDIAL MENISCECTOMY  6/20/2011    Procedure:ARTHROSCOPY KNEE WITH MEDIAL MENISCECTOMY; Surgeon:SACHIN SAMANO; Location:[facility] OR     BRONCHOSCOPY (RIGID OR FLEXIBLE), DIAGNOSTIC N/A 2/6/2019    Procedure: COMBINED BRONCHOSCOPY (RIGID OR FLEXIBLE), LAVAGE;  Surgeon: Louise Rogel MD;  Location:  GI     coronary artery stents placed x 5  2001             Social History:     Social History     Tobacco Use     Smoking status: Never Smoker     Smokeless tobacco: Former User   Substance Use Topics     Alcohol use: No     Alcohol/week: 0.0 standard drinks   Works in laser machine work for part production.           Family History:     Family History   Problem Relation Age of Onset     Myocardial Infarction Father 58     Coronary Artery Disease Father      Heart Failure Mother      Glaucoma Mother      Coronary Artery Disease Brother      Coronary Artery Disease Brother      Cancer Brother         GIST     Skin Cancer No family hx of      Melanoma No family hx of      Macular Degeneration No family hx of                 Allergies:     Allergies   Allergen Reactions     Atorvastatin Other (See Comments)     Back pain  Tolerates atrovastatin     Docosahexaenoic Acid (Dha)      Other reaction(s): Intolerance-Can't Take - Omega 3 fish oil     Liquid Adhesive Rash             Medications:     No current facility-administered medications for this encounter.      Current Outpatient Medications   Medication Sig     acetaminophen (TYLENOL) 325 MG tablet Take 1-2 tablets (325-650 mg) by  mouth every 4 hours as needed for mild pain or fever     acyclovir (ZOVIRAX) 800 MG tablet Take 1 tablet (800 mg) by mouth 2 times daily     artificial saliva (BIOTENE MT) SOLN solution Swish and spit 2 mLs (2 sprays) in mouth every hour as needed for dry mouth     aspirin 81 MG EC tablet Take 81 mg by mouth daily Reported on 5/12/2017     cycloSPORINE modified (GENERIC EQUIVALENT) 25 MG capsule Take 1 capsule (25 mg) by mouth 2 times daily     erythromycin (ROMYCIN) 5 MG/GM ophthalmic ointment      fluconazole (DIFLUCAN) 100 MG tablet Take 1 tablet (100 mg) by mouth daily     fluorometholone 0.1 % OP ophthalmic suspension Place 1 drop into both eyes 4 times daily     levofloxacin (LEVAQUIN) 250 MG tablet Take 1 tablet (250 mg) by mouth daily     levothyroxine (SYNTHROID/LEVOTHROID) 150 MCG tablet Take 1 tablet (150 mcg) by mouth daily     lifitegrast 5 % OP opthalmic solution Place 1 drop into both eyes 2 times daily     magnesium oxide (MAG-OX) 400 (241.3 MG) MG tablet Take 2 tablets daily     metoprolol tartrate (LOPRESSOR) 25 MG tablet Take 1 tablet (25 mg) by mouth daily     NITROGLYCERIN ER PO Take by mouth as needed Reported on 5/12/2017     order for DME Equipment being ordered: Oxygen. Please provide patient with continuous flow oxygen at 3 LPM via nasal cannula for activity and while sleeping. Patient will need concentrator, conserving device, and portable oxygen. (Patient not taking: Reported on 4/30/2020)     order for DME Equipment being ordered: Oxygen. Please provide patient with continuous flow oxygen at 3 LPM via nasal cannula. Patient will need concentrator, conserving device, and portable oxygen. Length of need is up to 6 months; will continue to reassess. Patient will need transportation oxygen delivered to the [facility], [address] [address]. [address], Room [address]. (Patient not taking: Reported on 4/30/2020)     order for DME Equipment being ordered: Please  provide portable oxygen at 2 LPM with activity and with sleep via nasal canula. Patient requesting small tanks he can wear with back pack for his work. (Patient not taking: Reported on 4/30/2020)     pilocarpine (SALAGEN) 5 MG tablet Take 1 tablet (5 mg) by mouth 2 times daily     predniSONE (DELTASONE) 10 MG tablet Take 1.5 tablets (15 mg) by mouth daily     rosuvastatin (CRESTOR) 5 MG tablet Take 1 tablet (5 mg) by mouth daily     sulfamethoxazole-trimethoprim (BACTRIM DS) 800-160 MG tablet Take 1 tablet by mouth 2 times daily On Monday's and Tuesday's only     tobramycin-dexamethasone (TOBRADEX) 0.3-0.1 % ophthalmic ointment Place 0.5 inch strip into each eye at Bedtime     trimethoprim-polymyxin b (POLYTRIM) 67849-8.1 UNIT/ML-% ophthalmic solution                Review of Systems:     See HPI above for pertinent findings.          Physical Exam:   All vitals have been reviewed  Temp:  [98  F (36.7  C)] 98  F (36.7  C)  Pulse:  [95-99] 95  Resp:  [16] 16  BP: (103-138)/(80-99) 103/88  SpO2:  [94 %-99 %] 94 %  No intake or output data in the 24 hours ending 10/22/20 1856    Physical Exam:   Gen: Laying in bed, no acute distress, looks comfortable  Pulm: Non-labored breathing on room air in upper 90s, no tachypnea, clear breath sounds on right, no audible breath sounds on the left  CV: RRR, HR in 90s, strong radial pulse  Abd: soft, non-distended, non-tender to palpation.  Extremities: warm, well perfused          Data:   All laboratory data reviewed    Results:  BMP  Recent Labs   Lab 10/22/20  1716      POTASSIUM 5.2   CHLORIDE 104   CO2 28   BUN 18   CR 0.81   *     CBC  Recent Labs   Lab 10/22/20  1716   WBC 11.7*   HGB 15.3        LFT  Recent Labs   Lab 10/22/20  1716   AST 41   ALT 44   ALKPHOS 182*   BILITOTAL 0.8   ALBUMIN 3.0*   INR 1.28*     Recent Labs   Lab 10/22/20  1716   *       Imaging:  CT chest:   IMPRESSION:  1. Increased now large left pleural effusion with total  collapse of  the left lung and associated mass effect on the mediastinum. Mural  nodularity at the medial portion of the possible large effusion,  recommend follow-up to resolution to exclude atypical neoplastic  process given the mass effect on the left upper and lower lobar  bronchi.  2. Significantly improved ground glass opacities in the right lung  with multifocal residual rounded bronchocentric consolidative  opacities in the upper lobe and continued consolidative opacities in  the right middle and lower lobes. Differential includes improving  organizing pneumonia/graft versus host disease/drug reaction/infection  in the setting of continued pleural-parenchymal fibroelastosis.  3. Improved small right pleural effusion.  4. Unchanged mild ectasia of the ascending aorta to 4.1 cm. Coronary  artery disease.

## 2020-10-23 NOTE — PROGRESS NOTES
BMT Daily Progress Note    ID: Yg Russo is a 60yo M s/p allo BMT for MDS in May 2016, readmitted with large left pleural effusion    HPI: Admitted overnight from ED. Was called after CT chest result showed very large left sided pleural effusion with total collapse of the left lung and mass effect on the mediastinum, and went to the ED. He is afebrile. He is on room air. He is breathing comfortably at rest. He reports worsening dyspnea on exertion over the past few months. Other chronic GVHD symptoms are stable. No recent cold symptoms. One softer stool, but otherwise no GI symptoms.    ROS: Negative except as stated above    Physical Exam  BP (!) 114/97   Pulse 119   Temp 98.3  F (36.8  C) (Oral)   Resp 16   Ht 1.829 m (6')   Wt 102.3 kg (225 lb 8 oz)   SpO2 94%   BMI 30.58 kg/m    Gen: A&O, NAD  OP: Mucosa dry without lesions  Pulm: breathing comfortably on room air, no air movement L lung fields, crackles R lung fields  CV: RRR  Abd: +BS, soft, nontender  Extremities: no edema  Skin: no rash on exposed skin  Access: PIV    Labs  Lab Results   Component Value Date    WBC 11.7 (H) 10/23/2020    ANEU 9.7 (H) 10/22/2020    HGB 14.3 10/23/2020    HCT 44.5 10/23/2020     10/23/2020     10/23/2020    POTASSIUM 4.0 10/23/2020    CHLORIDE 106 10/23/2020    CO2 24 10/23/2020     (H) 10/23/2020    BUN 17 10/23/2020    CR 0.66 10/23/2020    MAG 2.0 02/27/2020    INR 1.28 (H) 10/22/2020    BILITOTAL 0.8 10/22/2020    AST 41 10/22/2020    ALT 44 10/22/2020    ALKPHOS 182 (H) 10/22/2020    PROTTOTAL 7.4 10/23/2020    ALBUMIN 3.0 (L) 10/22/2020     A/P: Yg Russo is a 60yo M s/p allo BMT for MDS in May 2016, readmitted with large left pleural effusion    1. 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 Heme  - baby ASA    3. ID  - give zosyn empirically (starting after thora complete) for bacterial pna  - COVID pending  - prophy: cont leva (for atypicals), fluc and bactrim    4. GVHD  -  chronic GVHD of eyes and mouth. On CSA 25mg BID and pred 15mg daily  - eye gtts: refresh (not using other gtts because he wears corrective contact lenses, not GVHD lenses  - not using dex s/s due to hx of thrush    5. Pulm  - pleural parenchymal fibroelastosis, followed by Dr. Hardy  - new large left pleural effusion with total collapse of left lung and mass effect on mediastinum. Pulm to place pigtail catheter today and send pleural fluid for multiple studies. Can't drain all fluid in one day due to expansion edema, so took 1L today (brown fluid), then clamped the tube. Will review with pulm tomorrow and drain over a couple days as tolerated    6. Endo  - on synthroid    7. Pilocarpine for xerostoma    8. CV  - on ASA, crestor, metoprolol    9. FEN/renal  - regular diet  - on PO mag 400mg/d    Dispo: unknown, possible in 3-5 days    Maranda Mayes PA-C  [phone]    Bone Marrow Transplant (5C) Attending Progress Note    The patient was discussed on morning rounds with the nurses, and mid-level provider, and was seen and examined by me. All labs and imaging were reviewed. I reviewed events over the last 24 hours including vitals and flow sheets. I agree with the above note and have been responsible for the care plan and interpretation of progress. 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  His course has been complicated by icrec-cdjouz-dszp disease especially of the lungs.  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  He has been evaluated by Dr. Brady.  He continues to be on cyclosporine at 25 mg twice daily and prednisone 15 mg/day.  He was admitted yesterday with increased shortness of breath and was found to have a massive pleural effusion collapsing his entire left lung.  He feels more short of breath but is stable from yesterday.  We have contacted pulmonary for recommendations..    There is no evidence of  mucositis. There is no adenopathy on exam and lungs show no breath sounds on the left side. There is no LE edema and no rash. Labs show a creatinine of 0.66, hemoglobin of 14.3, platelet count of 281,000 and a white count of 11,300 with adequate neutrophils.     Yg had a chest tube placed today and approximately 1500 cc of fluid was removed.  Talk to the pulmonary staff and they will continue to remove fluid over the next several days.  We discussed expectations for the next several days. All questions have been answered          Ryan Chan MD

## 2020-10-23 NOTE — CONSULTS
Care Management Assessment and Discharge Consult    General Information  Assessment completed with:: Patient, (Yg)  Type of CM/SW Visit: Initial Assessment  Primary Care Provider verified and updated as needed?: Yes  Readmission Within the Last 30 Days: no previous admission in last 30 days  Return Category: Exacerbation of disease  Reason for Consult: discharge planning  Advance Care Planning:            Communication Assessment  Patient's communication style: spoken language (English or Bilingual)  Hearing Difficulty or Deaf: no Wear Glasses or Blind: no(contacts)    Cognitive  Cognitive/Neuro/Behavioral: WDL                      Living Environment:   People in home: spouse     Current living Arrangements: house          Family/Social Support:  Care provided by: self  Provides care for: no one  Marital Status:   Who is your support system?: Wife, Children  Spouse's Name: ([place])  Description of Support System:                        Current Resources:   Skilled Home Care Services:    Community Resources:    Equipment currently used at home: none  Supplies currently used at home:      Employment:  Employment Status: employed full-time        Financial/Environmental Concerns:            Lifestyle & Psychosocial Needs:        Socioeconomic History     Marital status:      Spouse name: Not on file     Number of children: Not on file     Years of education: Not on file     Highest education level: Not on file     Tobacco Use     Smoking status: Never Smoker     Smokeless tobacco: Former User   Substance and Sexual Activity     Alcohol use: No     Alcohol/week: 0.0 standard drinks     Drug use: No       Functional Status:  Prior to admission patient needed assistance:          Mental Health Status:  WDL                            Values/Beliefs:  Spiritual, Cultural Beliefs, Mandaeism Practices, Values that affect care:                   Discharge Planning:  Expected Discharge Date: 10/28/20     Concerns  to be Addressed: denies needs/concerns at this time       Anticipated Discharge Disposition: Home  Anticipated Discharge Services:    Anticipated Discharge DME:      Patient/family educated on Medicare website which has current facility and service quality ratings: yes  Referrals Placed by CM/SW:  n/a  Education Provided on the Discharge Plan:    Patient/Family in Agreement with the Plan: yes     Disposition Comments:      Additional Information:  Pt readmitted due to large PE. SW following for discharge planning as needed.      AYANNA Melo, [facility]  Phone [phone]  Pager [phone]

## 2020-10-23 NOTE — PROGRESS NOTES
Admitted/transferred from:   2 RN full   skin assessment completed by Donny Schwartz, [facility] and Ana Walsh RN.  Skin assessment finding: skin intact, no problems   Interventions/actions: none      Will continue to monitor.

## 2020-10-23 NOTE — UTILIZATION REVIEW
Admission Status; Secondary Review Determination     Admission Date: 10/22/2020  5:23 PM       Under the authority of the Utilization Management Committee, the utilization review process indicated a secondary review on the above patient.  The review outcome is based on review of the medical records, discussions with staff, and applying clinical experience noted on the date of the review.        (x)      Inpatient Status Appropriate - This patient's medical care is consistent with medical management for inpatient care and reasonable inpatient medical practice.       RATIONALE FOR DETERMINATION      Brief clinical presentation, information copied from the chart, abbreviated and edited for relevant content:     Patient is a 59 year old male with a history of RAEB-2 myelodysplastic syndrome s/p BMT (2016) c/b chronic jqfpv-dyicbe-lkrs disease, pleural parenchymal fibroelastosis, CAD s/p stent placement x5 (2001), ILD, and PE with large left pleural effusion. patient endorses increased shortness of breath.  He states he has had increased dyspnea upon exertion over the last couple months. CT showed Increased now large left pleural effusion with total collapse of the left lung and associated mass effect on the mediastinum. Muralnodularity at the medial portion of the possible large effusion,  recommend follow-up to resolution to exclude atypical neoplastic process given the mass effect on the left upper and lower lobar bronchi.Plan for diagnostic and therapeutic thoracentesis. He has not been hypoxic but persistently tachycardic. WBC slightly elevated.     At the time of admission with the information available to the attending physician, more than 2 nights hospital complex care was anticipated. Also, there was a risk of adverse outcome if patient was treated outpatient or observation. High intensity of services anticipated. Inpatient admission appropriate based on Medicare guidelines.       The information on this  document is developed by the utilization review team in order for the business office to ensure compliance.  This only denotes the appropriateness of proper admission status and does not reflect the quality of care rendered.         The definitions of Inpatient Status and Observation Status used in making the determination above are those provided in the CMS Coverage Manual, Chapter 1 and Chapter 6, section 70.4.      Sincerely,      Kristal Jones MD   Utilization Review/ Case Management  [facility].

## 2020-10-23 NOTE — PLAN OF CARE
Vital signs:  Temp: 98.8  F (37.1  C) Temp src: Oral BP: 115/88 Pulse: 108   Resp: 16 SpO2: 95 % O2 Device: None (Room air)   Height: 182.9 cm (6') Weight: 102.3 kg (225 lb 8 oz)  Estimated body mass index is 30.58 kg/m  as calculated from the following:    Height as of this encounter: 1.829 m (6').    Weight as of this encounter: 102.3 kg (225 lb 8 oz).    Diagnostic/therapeutic thoracentesis done today at his bedside.  Pigtail catheter placed and connected to chest tube to allow for intermittent drainage.  Currently, catheter is clamped.  Plan is to drain again tomorrow.  At time of placement, one liter of fluid was drained off.  Fluid was cultured and sent to lab for multiple studies.  Dressing is dry and intact and he denies pain.  He is moving independently in his room and he is happy procedure is complete.  PIV is saline-locked.  He is covid negative.  To continue his plan of care.      Problem: Adult Inpatient Plan of Care  Goal: Plan of Care Review  Flowsheets (Taken 10/23/2020 4231)  Plan of Care Reviewed With: patient  Progress: no change

## 2020-10-23 NOTE — ED NOTES
[facility]    ED Nurse to Floor Handoff     Byron Russo is a 59 year old male who speaks English and lives with family members,  in a home  They arrived in the ED by car from home    ED Chief Complaint: Shortness of Breath    ED Dx;   Final diagnoses:   None         Needed?: No    Allergies:   Allergies   Allergen Reactions     Atorvastatin Other (See Comments)     Back pain  Tolerates atrovastatin     Docosahexaenoic Acid (Dha)      Other reaction(s): Intolerance-Can't Take - Omega 3 fish oil     Liquid Adhesive Rash   .  Past Medical Hx:   Past Medical History:   Diagnosis Date     Blepharitis      CAD (coronary artery disease) 2001     Ktouw-jgtoef-ogvd disease (H)      Hyperlipidemia      Hypothyroidism      Obesity      Pulmonary embolism (H) 2/2016     Varicose veins       Baseline Mental status: WDL  Current Mental Status changes: at basesline    Infection present or suspected this encounter: no  Sepsis suspected: No  Isolation type: Special Precautions-COVID-19  Patient tested for COVID 19 prior to admission: YES     Activity level - Baseline/Home:  Independent  Activity Level - Current:   Stand with Assist    Bariatric equipment needed?: No    In the ED these meds were given: Medications - No data to display    Drips running?  No    Home pump  No    Current LDAs  Peripheral IV 10/22/20 Right Upper forearm (Active)   Site Assessment WDL 10/22/20 1719   Line Status Saline locked 10/22/20 1719   Dressing Intervention New dressing  10/22/20 1719   Phlebitis Scale 0-->no symptoms 10/22/20 1719   Number of days: 0       Labs results:   Labs Ordered and Resulted from Time of ED Arrival Up to the Time of Departure from the ED   CBC WITH PLATELETS DIFFERENTIAL - Abnormal; Notable for the following components:       Result Value    WBC 11.7 (*)     Absolute Neutrophil 9.7 (*)     All other components within normal limits   COMPREHENSIVE METABOLIC PANEL -  Abnormal; Notable for the following components:    Glucose 119 (*)     Albumin 3.0 (*)     Alkaline Phosphatase 182 (*)     All other components within normal limits   INR - Abnormal; Notable for the following components:    INR 1.28 (*)     All other components within normal limits   PARTIAL THROMBOPLASTIN TIME - Abnormal; Notable for the following components:    PTT 40 (*)     All other components within normal limits   COVID-19 VIRUS (CORONAVIRUS) BY PCR       Imaging Studies:   Recent Results (from the past 24 hour(s))   CT Chest w/o contrast    Narrative    EXAMINATION: CT CHEST W/O CONTRAST, 10/22/2020 4:20 PM    TECHNIQUE:  Helical CT images from the thoracic inlet through the lung  bases were obtained without IV contrast.     COMPARISON: CT chest 1/8/2020 and 12/19/2019    HISTORY: Shortness of breath; s/p BMT with presumed PPFE, worsening  SOB; Dyspnea on exertion    FINDINGS:    Chest: Thyroid gland appears unremarkable. Normal 3 vessel branching  pattern of the great vessels. Tracheobronchial tree is mildly deviated  to the right but appears patent. Esophagus appears unremarkable. Heart  size within normal limits. Multivessel coronary artery stents. No  significant pericardial effusion. The main pulmonary artery measures  within normal limits. Mild ectasia of the ascending aorta up to 4.1  cm. No suspicious lymphadenopathy. Increased now large left pleural  effusion with total collapse of the left lung. Medial soft tissue  prominence with almost mural nodularity at the medial margin measuring  approximately 9.3 x 2.8 cm in the axial plane with effacement an  narrowing of the left-sided upper and lower lobar bronchi. Decreased  small right pleural effusion. Mild right apical scarring.  Significantly improved diffuse groundglass opacities in the right lung  with multifocal residual rounded bronchocentric consolidative  opacities upper lobe and continued consolidative opacities in the  right middle and lower  lobes. No right-sided pneumothorax. Improved  right-sided bronchial wall thickening. 8 mm nodular opacity in the  right upper lobe (series 4 image 82) appears grossly unchanged.  Multiple opacities with air bronchograms in the posterior right upper  lobe are similar or slightly more dense overall decrease groundglass  opacification in the right lung.    Upper abdomen: Unchanged small spleen and perinephric stranding. The  upper abdomen is otherwise unremarkable on this non-contrast exam.    Bones/soft tissues: Severe degenerative changes in the visualized  spine. No acute osseous abnormalities or suspicious bony lesions.      Impression    IMPRESSION:  1. Increased now large left pleural effusion with total collapse of  the left lung and associated mass effect on the mediastinum. Mural  nodularity at the medial portion of the possible large effusion,  recommend follow-up to resolution to exclude atypical neoplastic  process given the mass effect on the left upper and lower lobar  bronchi.  2. Significantly improved ground glass opacities in the right lung  with multifocal residual rounded bronchocentric consolidative  opacities in the upper lobe and continued consolidative opacities in  the right middle and lower lobes. Differential includes improving  organizing pneumonia/graft versus host disease/drug reaction/infection  in the setting of continued pleural-parenchymal fibroelastosis.  3. Improved small right pleural effusion.  4. Unchanged mild ectasia of the ascending aorta to 4.1 cm. Coronary  artery disease.    At the time of the scan, the patient was instructed to go to the  emergency department for further evaluation of his symptomatic left  pleural effusion and left lung collapse, by way of his present and  reliable family member. Dr. Bee from the [facility] emergency  department was made aware of the patient by Dr. Hayward on 10/22/2020  at 4:20 PM.    I have personally reviewed the examination and initial  interpretation  and I agree with the findings.    YESSENIA POLANCO MD       Recent vital signs:   /85   Pulse 94   Temp 98  F (36.7  C) (Oral)   Resp 16   Ht 1.829 m (6')   Wt 102.5 kg (226 lb)   SpO2 96%   BMI 30.65 kg/m      Melina Coma Scale Score: 15 (10/22/20 1740)       Cardiac Rhythm: Normal Sinus  Pt needs tele? Yes  Skin/wound Issues: None    Code Status: Full Code    Pain control: pt had none    Nausea control: pt had none    Abnormal labs/tests/findings requiring intervention: see results    Family present during ED course? No   Family Comments/Social Situation comments:     Tasks needing completion: None    Jose J Rivero, RN  [phone] [facility]

## 2020-10-23 NOTE — CONSULTS
[facility]  Pulmonary Consult Initial Note  October 23, 2020      Byron Russo MRN# [id number]   Age: 59 year old YOB: 1960     Date of Admission: 10/22/2020  Reason for Consultation: large left pleural effusion  Requesting Physician: Dr. 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  He has had subacute onset of dyspnea over the last 6 weeks, and was seen by Dr. Brady in a virtual visit on 10/20.  He had a chest CT due to his worsening symptoms and was found to have a large left pleural effusion. Differential diagnosis includes typical or atypical bacterial infection, fungal infection, PTLD, reactive from viral illness, malignancy, idiopathic. Discussed further with thoracic surgery and initial concern for chest tube placement was related to concern for liver disease and unknown etiology of effusion. Given size of the effusion we agreed that chest tube placement was reasonable.     1. Chest tube placement this afternoon.   2. Will send for: amylase, culture, cell count, cholesterol, cytology, fungal culture, glucose, gram stain, LDH, protein, pH, triglycerides.     We will continue to follow with you. Please do not hesitate to contact us with questions.     Staffed with Dr. Johns.     Casper Lee MD  Pulmonary & Critical Care Fellow            Chief Complaint:     History obtained from patient and chart review.    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 Admitted for pneumonia in 1/2020 but had recovered and was back to  work without limitations. Then about 6 weeks ago he started gradually developing shortness of breath that has progressed to the point that he can't go 20 feet without stopping to rest. He has had a dry cough for about 3 weeks and a slight runny nose he attributes to seasonal allergies. No fever, chills, weight loss, hemoptysis, night sweats. He was seen in virtual clinic by Dr. Brady on 10/20 and had a chest CT which showed a large left pleural effusion. He was admitted to the hospital last night                               Past Medical History:     Past Medical History:   Diagnosis Date     Blepharitis      CAD (coronary artery disease) 2001     Bcmsk-zrgndz-acuc disease (H)      Hyperlipidemia      Hypothyroidism      Obesity      Pulmonary embolism (H) 2/2016     Varicose veins                 Past Surgical History:     Past Surgical History:   Procedure Laterality Date     APPENDECTOMY       ARTHROSCOPY KNEE WITH MEDIAL MENISCECTOMY  6/20/2011    Procedure:ARTHROSCOPY KNEE WITH MEDIAL MENISCECTOMY; Surgeon:SACHIN SAMANO; Location:[facility] OR     BRONCHOSCOPY (RIGID OR FLEXIBLE), DIAGNOSTIC N/A 2/6/2019    Procedure: COMBINED BRONCHOSCOPY (RIGID OR FLEXIBLE), LAVAGE;  Surgeon: Louise Rogel MD;  Location:  GI     coronary artery stents placed x 5  2001            Social History:     Social History     Socioeconomic History     Marital status:      Spouse name: Not on file     Number of children: Not on file     Years of education: Not on file     Highest education level: Not on file   Occupational History     Not on file   Social Needs     Financial resource strain: Not on file     Food insecurity     Worry: Not on file     Inability: Not on file     Transportation needs     Medical: Not on file     Non-medical: Not on file   Tobacco Use     Smoking status: Never Smoker     Smokeless tobacco: Former User   Substance and Sexual Activity     Alcohol use: No     Alcohol/week: 0.0 standard drinks      Drug use: No     Sexual activity: Not on file   Lifestyle     Physical activity     Days per week: Not on file     Minutes per session: Not on file     Stress: Not on file   Relationships     Social connections     Talks on phone: Not on file     Gets together: Not on file     Attends Judaism service: Not on file     Active member of club or organization: Not on file     Attends meetings of clubs or organizations: Not on file     Relationship status: Not on file     Intimate partner violence     Fear of current or ex partner: Not on file     Emotionally abused: Not on file     Physically abused: Not on file     Forced sexual activity: Not on file   Other Topics Concern     Parent/sibling w/ CABG, MI or angioplasty before 65F 55M? Not Asked   Social History Narrative     Not on file            Family History:     Family History   Problem Relation Age of Onset     Myocardial Infarction Father 58     Coronary Artery Disease Father      Heart Failure Mother      Glaucoma Mother      Coronary Artery Disease Brother      Coronary Artery Disease Brother      Cancer Brother         GIST     Skin Cancer No family hx of      Melanoma No family hx of      Macular Degeneration No family hx of               Allergies:      Allergies   Allergen Reactions     Atorvastatin Other (See Comments)     Back pain  Tolerates atrovastatin     Docosahexaenoic Acid (Dha)      Other reaction(s): Intolerance-Can't Take - Omega 3 fish oil     Liquid Adhesive Rash            Medications:       acyclovir  800 mg Oral BID     [Held by provider] aspirin  81 mg Oral Daily     carboxymethylcellulose PF  1 drop Both Eyes BID     cycloSPORINE modified  25 mg Oral BID     fluconazole  100 mg Oral Daily     fluorometholone  1 drop Both Eyes 4x Daily     levofloxacin  250 mg Oral Daily     levothyroxine  150 mcg Oral Daily     magnesium oxide  400 mg Oral Daily     metoprolol tartrate  25 mg Oral Daily     pilocarpine  5 mg Oral BID      piperacillin-tazobactam  3.375 g Intravenous Q6H     predniSONE  15 mg Oral Daily     rosuvastatin  5 mg Oral Daily     sodium chloride (PF)  3 mL Intracatheter Q8H     [START ON 10/26/2020] sulfamethoxazole-trimethoprim  1 tablet Oral 2 times per day on Mon Tue     acetaminophen, artificial saliva, lidocaine 4%, lidocaine (buffered or not buffered), melatonin, morphine, naloxone, ondansetron **OR** ondansetron, oxyCODONE-acetaminophen, prochlorperazine **OR** prochlorperazine **OR** prochlorperazine, sodium chloride (PF)         Review of Systems:   10-systems reviewed with pertinent positives and negatives mentioned in the HPI.          Physical Exam:   Temp:  [97.7  F (36.5  C)-98.9  F (37.2  C)] 98.3  F (36.8  C)  Pulse:  [] 119  Resp:  [16] 16  BP: (101-138)/(74-99) 114/97  SpO2:  [94 %-99 %] 94 %      Gen:   No acute distress. Alert, awake, and oriented.     HEENT:   Anicteric sclerae. No oral lesions     Lungs:   Absent breath sounds on the left. Basilar crackles on the right.       Cardiovascular:    Normal S1 and S2.  RRR.  No murmur, gallop or rub.     Abdomen:   Soft, ND, NT with active BS.      Extremities:    No Edema.      Neurologic:    Alert and conversant.      Skin:    Warm, dry.  No rash on exposed skin.           Data:   All laboratory and imaging data reviewed.       Sputum Cultures in the last 3 months:  Specimen Description   Date Value Ref Range Status   01/28/2020 Midstream Urine  Final   01/11/2020 Feces  Final   01/09/2020 Sputum  Final   01/09/2020 Sputum  Final    Culture Micro   Date Value Ref Range Status   01/28/2020 No growth  Final   01/09/2020 Heavy growth  Normal karl    Final   01/09/2020 Heavy growth  Enterococcus faecalis   (A)  Final           Normal BMP  CBC notable for mild leukocytosis, otherwise normal     Chest CT with evidence of PPFE on the right, massive effusion causing displacement of the mediastinum and diaphragm on the left.

## 2020-10-23 NOTE — H&P
[facility]     History and Physical - Hospitalist Service, Gold nights       Date of Admission:  10/22/2020    Assessment & Plan   Byron Russo is a 59 year old male admitted on 10/22/2020. He present for a CT showing large pleural effusion.    Pleural effusion   Mediastinal compression  Pleural pulmonary fibroelastosis  Bone marrow transplant  Chronic immunosuppression  MDS history  Chronic bseeh-rseqdc-vone disease  Chronic respiratory failure requiring 2 L  Hypothyroidism    -Limit the patient for further work-up with thoracentesis.  PLT/INR/PTT have been reviewed and are witnin normal limits.  No anticoagulation prior to procedure.  Thoracentesis labs ordered. May need to be a staged procedure due to size  - Continue prophylactic Bactrim, antifungal, antiviral  - Appreciate CT surgery recommendation   -Pulmonary consult  - Low suspision for infection at this time. Will monitor. Cell counts look adequate.        Diet:  regular   DVT Prophylaxis: Pneumatic Compression Devices  Desai Catheter: not present  Code Status:   Full  Rule Out COVID-19 Handoff:  Byron is a LOW SUSPICION PUI.  Follow these instructions:    If COVID test positive -> continue isolation precautions    If COVID test negative -> discontinue COVID-specific isolation precautions       Disposition Plan   Expected discharge: 2 - 3 days, recommended to prior living arrangement once improved fluid status.  Entered: Bala Mixon MD 10/22/2020, 8:53 PM     The patient's care was discussed with the Patient.    Bala Mixon MD  [facility]   Contact information available via [facility] Paging/Directory      ______________________________________________________________________    Chief Complaint   Abnormal CT    History is obtained from the patient    History of Present Illness   Byron Russo is a 59 year old male who has a past medical history of allogenic  related donor transplant secondary to MDS.  Patient's course has been complicated by qcawv-hmbzey-jreu disease which is chronic and pleural-parenchymal fibroelastosis.  Patient has been worked up for worsening acute hypoxic respiratory failure which now appears to be chronic.  Patient has had a complicated course including coronavirus and Enterococcus faecalis pneumonia.  Patient had several dose of steroids and is currently on low dose therapy.    Most recently the patient complained since August he has been having right lateral chest pain that radiates to his back underneath the scapula.  He states it is a constant diffuse pain that is unremitting.  Patient has worsening shortness of breath however is currently on room air on admission.  Previous notes indicate that patient was on 2 L of nasal cannula.  Patient does have a cough that is nonproductive.  He additionally endorses postnasal drip and seasonal allergies.  No fevers.    Review of Systems    The 10 point Review of Systems is negative other than noted in the HPI or here.     Past Medical History    I have reviewed this patient's medical history and updated it with pertinent information if needed.   Past Medical History:   Diagnosis Date     Blepharitis      CAD (coronary artery disease) 2001     Yuzeh-jjkioc-jufb disease (H)      Hyperlipidemia      Hypothyroidism      Obesity      Pulmonary embolism (H) 2/2016     Varicose veins        Past Surgical History   I have reviewed this patient's surgical history and updated it with pertinent information if needed.  Past Surgical History:   Procedure Laterality Date     APPENDECTOMY       ARTHROSCOPY KNEE WITH MEDIAL MENISCECTOMY  6/20/2011    Procedure:ARTHROSCOPY KNEE WITH MEDIAL MENISCECTOMY; Surgeon:SACHIN SAMANO; Location:[facility]     BRONCHOSCOPY (RIGID OR FLEXIBLE), DIAGNOSTIC N/A 2/6/2019    Procedure: COMBINED BRONCHOSCOPY (RIGID OR FLEXIBLE), LAVAGE;  Surgeon: Louise Rogel MD;  Location: [facility]      coronary artery stents placed x 5  2001       Social History   I have reviewed this patient's social history and updated it with pertinent information if needed.  Social History     Tobacco Use     Smoking status: Never Smoker     Smokeless tobacco: Former User   Substance Use Topics     Alcohol use: No     Alcohol/week: 0.0 standard drinks     Drug use: No       Family History   I have reviewed this patient's family history and updated it with pertinent information if needed.  Family History   Problem Relation Age of Onset     Myocardial Infarction Father 58     Coronary Artery Disease Father      Heart Failure Mother      Glaucoma Mother      Coronary Artery Disease Brother      Coronary Artery Disease Brother      Cancer Brother         GIST     Skin Cancer No family hx of      Melanoma No family hx of      Macular Degeneration No family hx of        Prior to Admission Medications   Prior to Admission Medications   Prescriptions Last Dose Informant Patient Reported? Taking?   NITROGLYCERIN ER PO  Self Yes No   Sig: Take by mouth as needed Reported on 5/12/2017   acetaminophen (TYLENOL) 325 MG tablet   Yes No   Sig: Take 1-2 tablets (325-650 mg) by mouth every 4 hours as needed for mild pain or fever   acyclovir (ZOVIRAX) 800 MG tablet   No No   Sig: Take 1 tablet (800 mg) by mouth 2 times daily   artificial saliva (BIOTENE MT) SOLN solution   No No   Sig: Swish and spit 2 mLs (2 sprays) in mouth every hour as needed for dry mouth   aspirin 81 MG EC tablet   Yes No   Sig: Take 81 mg by mouth daily Reported on 5/12/2017   cycloSPORINE modified (GENERIC EQUIVALENT) 25 MG capsule   No No   Sig: Take 1 capsule (25 mg) by mouth 2 times daily   erythromycin (ROMYCIN) 5 MG/GM ophthalmic ointment   Yes No   fluconazole (DIFLUCAN) 100 MG tablet   No No   Sig: Take 1 tablet (100 mg) by mouth daily   fluorometholone 0.1 % OP ophthalmic suspension   No No   Sig: Place 1 drop into both eyes 4 times daily   levofloxacin  (LEVAQUIN) 250 MG tablet   No No   Sig: Take 1 tablet (250 mg) by mouth daily   levothyroxine (SYNTHROID/LEVOTHROID) 150 MCG tablet   No No   Sig: Take 1 tablet (150 mcg) by mouth daily   lifitegrast 5 % OP opthalmic solution   No No   Sig: Place 1 drop into both eyes 2 times daily   magnesium oxide (MAG-OX) 400 (241.3 MG) MG tablet   Yes No   Sig: Take 2 tablets daily   metoprolol tartrate (LOPRESSOR) 25 MG tablet   No No   Sig: Take 1 tablet (25 mg) by mouth daily   order for DME   No No   Sig: Equipment being ordered: Please provide portable oxygen at 2 LPM with activity and with sleep via nasal canula. Patient requesting small tanks he can wear with back pack for his work.   Patient not taking: Reported on 4/30/2020   order for DME   No No   Sig: Equipment being ordered: Oxygen. Please provide patient with continuous flow oxygen at 3 LPM via nasal cannula. Patient will need concentrator, conserving device, and portable oxygen. Length of need is up to 6 months; will continue to reassess. Patient will need transportation oxygen delivered to the [facility], [address] [place] [id number]. [address], Room 5429.   Patient not taking: Reported on 4/30/2020   order for DME   No No   Sig: Equipment being ordered: Oxygen. Please provide patient with continuous flow oxygen at 3 LPM via nasal cannula for activity and while sleeping. Patient will need concentrator, conserving device, and portable oxygen.   Patient not taking: Reported on 4/30/2020   pilocarpine (SALAGEN) 5 MG tablet   No No   Sig: Take 1 tablet (5 mg) by mouth 2 times daily   predniSONE (DELTASONE) 10 MG tablet   No No   Sig: Take 1.5 tablets (15 mg) by mouth daily   rosuvastatin (CRESTOR) 5 MG tablet   No No   Sig: Take 1 tablet (5 mg) by mouth daily   sulfamethoxazole-trimethoprim (BACTRIM DS) 800-160 MG tablet   No No   Sig: Take 1 tablet by mouth 2 times daily On Monday's and Tuesday's only   tobramycin-dexamethasone  (TOBRADEX) 0.3-0.1 % ophthalmic ointment   No No   Sig: Place 0.5 inch strip into each eye at Bedtime   trimethoprim-polymyxin b (POLYTRIM) 63598-8.1 UNIT/ML-% ophthalmic solution   Yes No      Facility-Administered Medications: None     Allergies   Allergies   Allergen Reactions     Atorvastatin Other (See Comments)     Back pain  Tolerates atrovastatin     Docosahexaenoic Acid (Dha)      Other reaction(s): Intolerance-Can't Take - Omega 3 fish oil     Liquid Adhesive Rash       Physical Exam   Vital Signs: Temp: 98  F (36.7  C) Temp src: Oral BP: (!) 122/95 Pulse: 103   Resp: 16 SpO2: 96 % O2 Device: None (Room air)    Weight: 226 lbs 0 oz  Physical Exam  Constitutional:       General: He is not in acute distress.     Appearance: Normal appearance. He is not ill-appearing or toxic-appearing.   HENT:      Head: Normocephalic.   Cardiovascular:      Rate and Rhythm: Normal rate and regular rhythm.      Heart sounds: No murmur. No friction rub. No gallop.    Pulmonary:      Breath sounds: No stridor. No wheezing.      Comments: Faint breath sounds in left lung fields.  Crackles noted in right bases.   Abdominal:      General: Abdomen is flat. There is no distension.      Palpations: Abdomen is soft. There is no mass.      Tenderness: There is no abdominal tenderness.   Musculoskeletal:      Right lower leg: No edema.      Left lower leg: No edema.   Neurological:      General: No focal deficit present.      Mental Status: He is alert and oriented to person, place, and time.      Cranial Nerves: No cranial nerve deficit.      Motor: No weakness.   Psychiatric:         Mood and Affect: Mood normal.         Behavior: Behavior normal.         Data   Data reviewed today: I reviewed all medications, new labs and imaging results over the last 24 hours. I personally reviewed the EKG tracing showing NSR.    Recent Labs   Lab 10/22/20  1716   WBC 11.7*   HGB 15.3   MCV 96      INR 1.28*      POTASSIUM 5.2    CHLORIDE 104   CO2 28   BUN 18   CR 0.81   ANIONGAP 6   TRACE 9.8   *   ALBUMIN 3.0*   PROTTOTAL 8.3   BILITOTAL 0.8   ALKPHOS 182*   ALT 44   AST 41

## 2020-10-23 NOTE — PLAN OF CARE
/84 (BP Location: Left arm)   Pulse 104   Temp 98.9  F (37.2  C) (Oral)   Resp 16   Ht 1.829 m (6')   Wt 102.8 kg (226 lb 10.1 oz)   SpO2 98%   BMI 30.74 kg/m      Time: 0251-2601  R. of admission/Status:admitted on 10/22/20 due to SOB. CT found to have left pleural effusion and left lung collapsed. Hx of MDS s/p BMT (2016), chronic GVHD, CAD s/p stent placement x 5 in 2001, ILD and PE.   Neuro:  A&Ox4  Activity: independent   Pain: minor discomfort to left lateral from pleural effusion, no prn pain med needed.   Cardiac: ST (RM527i), denied chest pain. Afebrile.   Respiratory: CUELLAR, room air at rest sating >94%, no respiratory distress noted.   GI/: Last bm yesterday, active bowel sound. Voiding using the bathroom.   Diet: Regular, denied n/v.   Skin: no skin deficit.   LDAs: PIV saline locked.   Labs/Imaging: reviewed, no replacement   New change this shift: none   Plan: Thoracentesis for pleural effusion. Pulmonary and thoracic surgery team consulted. Asymptomatic COVID-19 pending.

## 2020-10-23 NOTE — CONSULTS
Brief CAPS note:  We were contacted by the pulmonary medicine team who offered to do the Thoracentesis.   CAPS team will sign off. Please call shall any other issues arise.     Paul Dhillon MD  [phone]

## 2020-10-23 NOTE — PROCEDURES
[facility]     Procedure: Chest tube insertion    Date/Time: 10/23/2020 1:58 PM  Performed by: Casper Lee MD  Authorized by: Kang Johns MD   Indications: pleural effusion    UNIVERSAL PROTOCOL   Site Marked: Yes  Prior Images Obtained and Reviewed:  Yes  Required items: Required blood products, implants, devices and special equipment available    Patient identity confirmed:  Verbally with patient and arm band  Patient was reevaluated immediately before administering moderate or deep sedation or anesthesia  Confirmation Checklist:  Patient's identity using two indicators, relevant allergies and procedure was appropriate and matched the consent or emergent situation  Time out: Immediately prior to the procedure a time out was called    Universal Protocol: the Joint Commission Universal Protocol was followed    Preparation: Patient was prepped and draped in usual sterile fashion           ANESTHESIA    Anesthesia: Local infiltration  Local Anesthetic:  Lidocaine 1% without epinephrine      SEDATION    Patient Sedated: No      PROCEDURE DETAILS  Preparation: skin prepped with ChloraPrep  Placement location: left lateral  Scalpel size: 10  Tube size (Comoran): 14.  Ultrasound guidance: yes  Tension pneumothorax heard: no  Tube connected to: water seal  Drainage characteristics: brown, thin.  Drainage amount: 1000 ml  Suture material: 2-0 silk  Dressing: 4x4 sterile gauze    PROCEDURE   Length of time physician/provider present for 1:1 monitoring during sedation: 0      The first attempt was not successful. We were able to easily enter the pleural space with a needle and pass the wire. However, we were unable to successfully dilate and pass the chest tube.     The second attempt was successful and a 14 Comoran catheter was placed with ease. The site of the first attempt was sutured. 1 L was drained via gravity and he then began coughing. Tube was clamped  and should remain clamped until tomorrow morning when he will be assessed by the on call pulmonary team.

## 2020-10-23 NOTE — PROGRESS NOTES
THORACIC & FOREGUT SURGERY    S:  No acute overnight events.  Pt seen at bedside resting comfortably.       O:  Vitals:    10/23/20 0007 10/23/20 0425 10/23/20 0749 10/23/20 0820   BP: 132/88 120/84 (!) 114/97    BP Location: Left arm Left arm     Pulse: 106 104 119    Resp: 16 16 16    Temp: 97.7  F (36.5  C) 98.9  F (37.2  C) 98.3  F (36.8  C)    TempSrc: Oral Oral Oral    SpO2: 95% 98% 94%    Weight:    102.3 kg (225 lb 8 oz)   Height:           A&O, NAD  Breathing non-labored, good color  Distal extremities warm    Assessment:    59 year old male with a history of RAEB-2 myelodysplastic syndrome s/p BMT (2016) c/b chronic imzny-dygzdk-rroi disease, pleural parenchymal fibroelastosis, CAD s/p stent placement x5 (2001), ILD, and PE with large left pleural effusion.       Plan:    - Recommend diagnostic and therapeutic thoracentesis. Do not place tube at this time as could develop chronic draining tract.   - If chest tube is placed then ensure intermittent drainage to minimize risk of reexpansion edema  - Please call thoracic surgery service with any questions or concerns.        Clifford Lebron PA-C  Thoracic Surgery

## 2020-10-24 NOTE — PLAN OF CARE
Afebrile, VSS.  Patient complained of pain at chest tube site, given Percocet x's 1, with some improvement, declined additional intervention at this time.  Voiding without issue.  No BM noted on shift.  Up independently.  Up in chair most of shift.  Chest tube dressing clean, dry, and intact - tube is clamped currently, 700cc drained out by Pulmonology before patient became symptomatic.  They have opted not to drain any more fluid at this time, will be back tomorrow.  Patient is intermittently coughing.  Washed up at sink, teeth brushed and linen changed.  Eating without issue.  PIV saline locked.  Continue to monitor.     Problem: Adult Inpatient Plan of Care  Goal: Plan of Care Review  Outcome: No Change     Problem: Adult Inpatient Plan of Care  Goal: Patient-Specific Goal (Individualized)  Outcome: No Change     Problem: Adult Inpatient Plan of Care  Goal: Absence of Hospital-Acquired Illness or Injury  Outcome: No Change     Problem: Adult Inpatient Plan of Care  Goal: Optimal Comfort and Wellbeing  Outcome: No Change     Problem: Discharge Planning  Goal: Discharge Planning (Adult, OB, Behavioral, Peds)  Outcome: No Change

## 2020-10-24 NOTE — PROGRESS NOTES
BMT Daily Progress Note    ID: Yg Russo is a 58yo M s/p allo BMT for MDS in May 2016, readmitted with large left pleural effusion    HPI: Pulmonary placed chest tube yesterday due to large volume pleural effusion that could not be drained all in one day. Removed ~ 1L of brown/red fluid. Send for multiple cultures. Breathing feels about the same. Wore 2L O2 per nc with sats in the high 80s to low 90s. Dry cough. No n/v/d/c.    ROS: Negative except as stated above    Physical Exam  /82   Pulse 79   Temp 96.5  F (35.8  C) (Axillary)   Resp 18   Ht 1.829 m (6')   Wt 99.3 kg (218 lb 14.4 oz)   SpO2 95%   BMI 29.69 kg/m    Gen: A&O, NAD  OP: Mucosa dry without lesions  Pulm: breathing comfortably on room air when I saw him, then placed 2L on per nc. No air movement LL fields. Few crackles R lung fields. Chest tube in place with no drainage in the container  CV: RRR  Abd: +BS, soft, nontender  Extremities: no edema  Skin: no rash on exposed skin  Access: PIV    Labs  Lab Results   Component Value Date    WBC 11.6 (H) 10/24/2020    ANEU 7.1 10/24/2020    HGB 14.8 10/24/2020    HCT 47.7 10/24/2020     10/24/2020     10/24/2020    POTASSIUM 3.9 10/24/2020    CHLORIDE 105 10/24/2020    CO2 30 10/24/2020    GLC 92 10/24/2020    BUN 14 10/24/2020    CR 0.86 10/24/2020    MAG 2.0 02/27/2020    INR 1.28 (H) 10/22/2020    BILITOTAL 0.5 10/24/2020    AST 54 (H) 10/24/2020    ALT 55 10/24/2020    ALKPHOS 191 (H) 10/24/2020    PROTTOTAL 7.6 10/24/2020    ALBUMIN 2.7 (L) 10/24/2020     A/P: Yg Russo is a 58yo M s/p allo BMT for MDS in May 2016, readmitted with large left pleural effusion    1. 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 Heme  - baby ASA    3. ID  - give zosyn empirically (starting after thora complete) for bacterial pna - continue x 28 hours through Sunday, then revisit based on culture data  - COVID pending  - prophy: cont leva (for atypicals), fluc and bactrim    4. GVHD  -  chronic GVHD of eyes and mouth. On CSA 25mg BID and pred 15mg daily  - eye gtts: refresh (not using other gtts because he wears corrective contact lenses, not GVHD lenses  - not using dex s/s due to hx of thrush    5. Pulm  - pleural parenchymal fibroelastosis, followed by Dr. Hardy  - new large left pleural effusion with total collapse of left lung and mass effect on mediastinum. Pulm to place pigtail catheter today and send pleural fluid for multiple studies. Can't drain all fluid in one day due to expansion edema, so placed chest tube on 10/23 and  took 1L (brown fluid), then clamped the tube. Plan is to drain more fluid today    6. Endo  - on synthroid    7. Pilocarpine for xerostoma    8. CV  - on ASA, crestor, metoprolol    9. FEN/renal  - regular diet  - on PO mag 400mg/d    Dispo: unknown, possible in 3-5 days    Maranda Mayes PA-C  [phone]    Bone Marrow Transplant (5C) Attending Progress Note    The patient was discussed on morning rounds with the nurses, and mid-level provider, and was seen and examined by me. All labs and imaging were reviewed. I reviewed events over the last 24 hours including vitals and flow sheets. I agree with the above note and have been responsible for the care plan and interpretation of progress. 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  His course has been complicated by nezxp-rlyvtq-ccgk disease especially of the lungs.  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  He has been evaluated by Dr. Brady.  He continues to be on cyclosporine at 25 mg twice daily and prednisone 15 mg/day.  He was admitted 10/23 with increased shortness of breath and was found to have a massive pleural effusion collapsing his entire left lung.  He now has an a left-sided chest tube and pulmonary is slowly draining to allow reexpansion of his left lung.  He is still short of breath and coughing.  He is in  better spirits today.  Over 2 L have fluid have already been taken off.  Pulmonary continues to follow with us.    Physical exam: There is no evidence of mucositis. There is no adenopathy on exam and there is still almost no breath sounds on the left side. There is no LE edema and no rash. Labs show a creatinine of 0.86, and stable blood counts.  Cultures from the pulmonary cavity are still negative.  The fluid shows an exudate with 121 white cells, and LDH of 521 and a total protein of 4.4.     It is my overall impression that we are making progress.  He will remain hospitalized until the chest tube is removed.  Our biggest concern is about fluid reaccumulation.  We discussed expectations for the next several days. All questions have been answered          Ryan Chan MD

## 2020-10-24 NOTE — PLAN OF CARE
Pt is afebrile; vital signs are stable; still has chest tube in but not connected to any suction; has frequent unproductive cough; zosyn iv q 6 hours; up independently to the bathroom; laying on his abdomin to breath easier; monitor pt closely and continue plan of care.     Problem: Adult Inpatient Plan of Care  Goal: Plan of Care Review  Outcome: No Change  Flowsheets (Taken 10/23/2020 2143)  Plan of Care Reviewed With: patient     Problem: Adult Inpatient Plan of Care  Goal: Patient-Specific Goal (Individualized)  Outcome: No Change     Problem: Adult Inpatient Plan of Care  Goal: Absence of Hospital-Acquired Illness or Injury  Outcome: No Change     Problem: Adult Inpatient Plan of Care  Goal: Absence of Hospital-Acquired Illness or Injury  Intervention: Identify and Manage Fall Risk  Recent Flowsheet Documentation  Taken 10/23/2020 1626 by Angie Warren, RN  Safety Promotion/Fall Prevention:   fall prevention program maintained   clutter free environment maintained     Problem: Adult Inpatient Plan of Care  Goal: Absence of Hospital-Acquired Illness or Injury  Intervention: Prevent Skin Injury  Recent Flowsheet Documentation  Taken 10/23/2020 1626 by Angie Warren, RN  Body Position: position changed independently     Problem: Adult Inpatient Plan of Care  Goal: Optimal Comfort and Wellbeing  Outcome: No Change

## 2020-10-24 NOTE — PLAN OF CARE
"Blood pressure 92/66, pulse 77, temperature 97.6  F (36.4  C), temperature source Axillary, resp. rate 18, height 1.829 m (6'), weight 102.3 kg (225 lb 8 oz), SpO2 96 %.    Vital signs as recorded above. BP soft this morning, 92/66 from 108/84. Pt's 02 saturation dropped into high 80 % to 91 % with good waves. 02 2L NC applied with 02 saturations in 95 to 98%. Pt said \"I take 2.5 L oxygen at home\" Pt got 2 tablet of Percocet from previous shift at 23:12 and upon reassessment he said the \"2 tablet was too much for me\". Pt is on Zosyn q 6 hours. Chest tube is still clamped and plan is to attempt to re drain today. Up independently to the bathroom. Continue to monitor and follow plan of care.      Problem: Adult Inpatient Plan of Care  Goal: Plan of Care Review  Outcome: No Change     Problem: Adult Inpatient Plan of Care  Goal: Patient-Specific Goal (Individualized)  Outcome: No Change     Problem: Adult Inpatient Plan of Care  Goal: Optimal Comfort and Wellbeing  Outcome: No Change     Problem: Discharge Planning  Goal: Discharge Planning (Adult, OB, Behavioral, Peds)  Outcome: No Change     "

## 2020-10-24 NOTE — CONSULTS
[facility]  Pulmonary Follow-Up Note  October 24, 2020      Byron Russo MRN# [id number]   Age: 59 year old YOB: 1960     Date of Admission: 10/22/2020     Assessment and Plan:  Byron Russo is a 59 year old male with past medical history of allogeneic stem cell transplant about 4-1/2 years ago for MDS, pleuroparenchymal fibroelastosis thought to be a complication of his transplant, and admission for coronavirus and Enterococcus pneumonia in January 2020 admitted on 10/22/2020.  He has had subacute onset of dyspnea over the last 6 weeks, and was seen by Dr. Brady in a virtual visit on 10/20.  He had a chest CT due to his worsening symptoms and was found to have a large left pleural effusion. Differential diagnosis includes typical or atypical bacterial infection, fungal infection, PTLD, reactive from viral illness, malignancy, idiopathic. Discussed further with thoracic surgery and initial concern for chest tube placement was related to concern for liver disease and unknown etiology of effusion. Given size of the effusion we agreed that chest tube placement was reasonable and this was placed on 10/23. Removed 1L red-brown fluid then clamped due to pt coughing and chest discomfort.     1. Chest tube management: I drained another 700cc today (drainied fairly quickly to gravity) then stopped due to pt symptoms. Given the rapid recurrence of symptoms, will wait until tomorrow to pull more fluid off.   2. Exudative pleural effusion, low glucose. Pending cell count, cytology, TG, cholesterol, amylase, cultures including fungal.     We will continue to follow with you. Please do not hesitate to contact us with questions.     Staffed with Dr. Srivastava.     Yadira Smith MD  Pulmonary & Critical Care Fellow    Pulmonary Attending Attestation  I discussed the patient with Dr. Smith, confirming key aspects of the history and exam.  I personally reviewed the recent Xrays and  other labs.  The fellow s note reflects our detailed discussion of the findings, assessment and plan.  He seems to be sensitive to pleural fluid removal with relatively early symptoms of cough / SOB / Tightness that likely reflect some reexpansion edema in the affected lung.  I did a brief literature search on effusions in his underlying rare lung disease, but little information is available.  Casper Srivastava MD           Subjective:     Having some irritating pain at the CT insertion site. Overnight took 2 percocets then became hypotensive and hypoxic so was on higher O2 for a few hours. This morning he's back down to his baseline 2.5L O2 and the pain is well controlled with one percocet. No other complaints today. He did get cough and chest discomfort with draining 700cc so this was stopped.                           Physical Exam:   Temp:  [96.5  F (35.8  C)-98.5  F (36.9  C)] 96.5  F (35.8  C)  Pulse:  [] 79  Resp:  [17-20] 18  BP: ()/(66-82) 102/82  SpO2:  [89 %-96 %] 95 %      Gen:   No acute distress. Alert, awake, and oriented.     HEENT:   Anicteric sclerae. No oral lesions     Lungs:   Absent breath sounds on the left. Basilar crackles on the right.       Cardiovascular:    Normal S1 and S2.  RRR.  No murmur, gallop or rub.     Abdomen:   Soft, ND, NT with active BS.      Extremities:    No Edema.      Neurologic:    Alert and conversant.      Skin:    Warm, dry.  No rash on exposed skin.      CT drained red-brown watery liquid rapidly when on gravity.         Data:   All laboratory and imaging data reviewed.       Sputum Cultures in the last 3 months:  Specimen Description   Date Value Ref Range Status   10/23/2020 Fluid Pleural fluid  Final   10/23/2020 Fluid Pleural fluid  Final   10/23/2020 Fluid Pleural fluid  Final   10/23/2020 Fluid Pleural fluid  Final    Culture Micro   Date Value Ref Range Status   10/23/2020 PENDING  Preliminary   10/23/2020 Culture negative monitoring continues   Preliminary   10/23/2020 PENDING  Preliminary

## 2020-10-25 NOTE — PROGRESS NOTES
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 Wearing 2-3L per nasal cannula. Pulm saw him yesterday and drained about 700cc from chest tube, then stopped due to cough and chest pain. He said this lasted for about an hour after the fluid was pulled, then symptoms resolved. He has no n/v/d/c. He is frustrated being in the hospital with risk factors for COVID and feels the teams aren't communicating well regarding his chest tube/drainage plan.    ROS: Negative except as stated above    Physical Exam  /85 (BP Location: Left arm)   Pulse 80   Temp 97.8  F (36.6  C) (Oral)   Resp 18   Ht 1.829 m (6')   Wt 99.2 kg (218 lb 11.2 oz)   SpO2 94%   BMI 29.66 kg/m    Gen: A&O, NAD  OP: Mucosa dry without lesions  Pulm: breathing comfortably on 3L per nasal cannula. Now with some air movement on the L side, but still minimal. Few crackles R lung fields. Chest tube in place under large dressing, chest tube container with dark brown fluid  CV: RRR  Abd: +BS, soft, nontender  Extremities: no edema  Skin: no rash on exposed skin  Access: PIV, no indwelling access    Labs  Lab Results   Component Value Date    WBC 10.5 10/25/2020    ANEU 7.1 10/25/2020    HGB 14.1 10/25/2020    HCT 44.9 10/25/2020     10/25/2020     10/25/2020    POTASSIUM 4.2 10/25/2020    CHLORIDE 105 10/25/2020    CO2 29 10/25/2020    GLC 79 10/25/2020    BUN 11 10/25/2020    CR 0.68 10/25/2020    MAG 2.0 02/27/2020    INR 1.28 (H) 10/22/2020    BILITOTAL 0.4 10/25/2020    AST 36 10/25/2020    ALT 44 10/25/2020    ALKPHOS 148 10/25/2020    PROTTOTAL 7.1 10/25/2020    ALBUMIN 2.4 (L) 10/25/2020     A/P: Yg Russo is a 58yo M s/p allo BMT for MDS in May 2016, readmitted with large left pleural effusion    1. 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 Heme  - baby ASA     3. ID  - give zosyn empirically (starting after chest  tube place and pleural fluid collected) x 48 hours 10/23-10/25; stop 10/25 with negative cx and afebrile.  - COVID negative on admission 10/23  - prophy: cont leva, fluc and bactrim. Per patient, Nicolas Jara advised discontinuation of fluconazole prior to admission - however being he is still on CSA and pred, prefer to continue for now and can revisit on discharge     4. 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 On CSA 25mg BID and pred 15mg daily  - eye gtts: refresh (not using other gtts because he wears corrective contact lenses, not GVHD lenses  - not using dex s/s due to hx of thrush    5. 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 Pulm placed pigtail catheter 10/23 (due to large volume fluid, can't all be drained in one day via thoracentesis) and sent pleural fluid for multiple studies - cx remain negative.  Took ~ 1L on 10/23, ~ 700cc on 10/24, limited by cough and chest pain, therefore didn't take more fluid. Today only able to pull 400cc before he developed symptoms (cough/chest pain), so will continue to take fluid as his symptoms allow  - unclear why he developed such a large pleural effusion, not necessarily related to underlying PPFE. Continue to monitor cx results    6. Endo  - on synthroid    7. Pilocarpine for xerostoma    8. CV  - on ASA, crestor, metoprolol    9. FEN/renal  - regular diet  - Cr stable with repeat chest tube drainage  - on PO mag 400mg/d - increased to 800mg daily per patient, he was taking this dose at home    Dispo: unknown, possible middle of next week. Recommend pharmacist appt in clinic for medication review when he is discharged. Declined to go over medications with hospital pharmacist on 10/25, but wants to go through them later once he has med list from home    Maranda Mayes PA-C  [phone]    Bone Marrow Transplant (5C) Attending Progress Note    The  patient was discussed on morning rounds with the nurses, and mid-level provider, and was seen and examined by me. All labs and imaging were reviewed. I reviewed events over the last 24 hours including vitals and flow sheets. I agree with the above note and have been responsible for the care plan and interpretation of progress. 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  His course has been complicated by woxvs-ohbhvj-gvag disease especially of the lungs.  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  He has been evaluated by Dr. Brady.  He continues to be on cyclosporine at 25 mg twice daily and prednisone 15 mg/day.  He was admitted 10/23 with increased shortness of breath and was found to have a massive pleural effusion collapsing his entire left lung.  He now has an a left-sided chest tube and pulmonary is slowly draining to allow reexpansion of his left lung.  His breathing is a little bit better today and his cough is less.  We will continue to work with the pulmonary team who is managing his chest tube.  He is having some pain but this is controlled with medications.    Physical exam: There is no evidence of mucositis. There is no adenopathy on exam and there is now faint breath sounds on the left side. There is no LE edema and no rash. Labs show a creatinine of 0.8, and a hemoglobin of 14.1, platelet count of 312,000 and a white count of 10,500 with adequate neutrophils.  Cultures from the pulmonary cavity are still negative.     It is my overall impression that we are making progress.  He will remain hospitalized until the chest tube is removed.  Our biggest concern is about fluid reaccumulation.  We discussed expectations for the next several days. All questions have been answered          Ryan Chan MD

## 2020-10-25 NOTE — PLAN OF CARE
Pt is afebrile, vital signs are stable; complained of pain at the chest tube site and took 1 Percocet; chest tube is still in place but clamped; occasional nonproductive coughing; 02 sat 95% on 3 L/NC; IV antibiotic continues. Up independently sitting up in the chair. Monitor closely and continue plan of care.     Problem: Adult Inpatient Plan of Care  Goal: Plan of Care Review  Outcome: No Change  Flowsheets (Taken 10/24/2020 2212)  Plan of Care Reviewed With: patient     Problem: Adult Inpatient Plan of Care  Goal: Patient-Specific Goal (Individualized)  Outcome: No Change     Problem: Adult Inpatient Plan of Care  Goal: Absence of Hospital-Acquired Illness or Injury  Intervention: Identify and Manage Fall Risk  Recent Flowsheet Documentation  Taken 10/24/2020 1600 by Angie Warren, RN  Safety Promotion/Fall Prevention:    activity supervised    clutter free environment maintained

## 2020-10-25 NOTE — PLAN OF CARE
Pt is pleasant with a positive attitude. Vital signs stable. O2 at 2L. Pulm anna off 400 mL of fluid through his chest tube this morning. Pt had chest X-ray and chest CT this komal. Changed chest tube drain container and chest tube dressing. Pt complained of L flank pain when moving and L lower chest pain when coughing. Resolved with 1 dose of percocet.     Problem: Adult Inpatient Plan of Care  Goal: Plan of Care Review  Outcome: No Change  Goal: Patient-Specific Goal (Individualized)  Outcome: No Change  Goal: Absence of Hospital-Acquired Illness or Injury  Outcome: No Change  Intervention: Identify and Manage Fall Risk  Recent Flowsheet Documentation  Taken 10/25/2020 0900 by Britany Rosario  Safety Promotion/Fall Prevention:   clutter free environment maintained   lighting adjusted   nonskid shoes/slippers when out of bed   room organization consistent  Intervention: Prevent Skin Injury  Recent Flowsheet Documentation  Taken 10/25/2020 0900 by Britany Rosario  Body Position: position changed independently  Intervention: Prevent Infection  Recent Flowsheet Documentation  Taken 10/25/2020 0900 by Britany Rosario  Infection Prevention:   personal protective equipment utilized   single patient room provided   visitors restricted/screened  Goal: Readiness for Transition of Care  Outcome: No Change     Problem: Discharge Planning  Goal: Discharge Planning (Adult, OB, Behavioral, Peds)  Outcome: No Change

## 2020-10-25 NOTE — PLAN OF CARE
[id number]    No acute event, Afebrile, VSS.       NEURO: Alert and oriented x4.      RESPIRATORY: 2 LPM o2 via NC.  denies dizziness, and SOB.     CARDIO: VSS, denies dizziness, and extremity pain.      GI/:  Denies N/V, BS active, void urine spontaneously without saving.       SKIN: Intact.      ACTIVITY: Independent.      PAIN: Denies pain.    DLA: PIV, Chest tube, clamped.     BG: NO      PLAN: Continue monitoring.

## 2020-10-26 NOTE — PROGRESS NOTES
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 Continues using 2L oxygen via NC. Breathing is comfortable but pt is frustrated with lack of progress with the chest tube and is wanting to know the plan. No new medical complaints.     ROS: 6 point ROS negative except as stated above    Physical Exam  /88 (BP Location: Left arm)   Pulse 85   Temp 98.4  F (36.9  C) (Oral)   Resp 16   Ht 1.829 m (6')   Wt 99.2 kg (218 lb 11.2 oz)   SpO2 96%   BMI 29.66 kg/m    Gen: A&O, NAD  OP: Mucosa dry without lesions  Pulm: breathing comfortably on 2-3L per nasal cannula. Now with some air movement on the L side, but still minimal. Few crackles R lung fields. Chest tube in place under large dressing.  CV: RRR  Abd: +BS, soft, nontender  Extremities: no edema  Skin: no rash on exposed skin  Access: PIV, no indwelling access    Labs  Lab Results   Component Value Date    WBC 10.0 10/26/2020    ANEU 6.3 10/26/2020    HGB 15.5 10/26/2020    HCT 48.2 10/26/2020     10/26/2020     10/26/2020    POTASSIUM 4.2 10/26/2020    CHLORIDE 104 10/26/2020    CO2 31 10/26/2020    GLC 77 10/26/2020    BUN 12 10/26/2020    CR 0.69 10/26/2020    MAG 2.0 02/27/2020    INR 1.28 (H) 10/22/2020    BILITOTAL 0.4 10/26/2020    AST 41 10/26/2020    ALT 51 10/26/2020    ALKPHOS 164 (H) 10/26/2020    PROTTOTAL 7.4 10/26/2020    ALBUMIN 2.7 (L) 10/26/2020     Imaging:   CT w/o contrast 10/25:   1. Slight increased aeration of the left lung with slight decrease  size of the left-sided hydropneumothorax with a large pleural effusion  component and now a few small foci of air. Left basilar chest tube in  place.  2. Right lung demonstrates scattered peribronchovascular areas of  consolidation which are similar to exam 10/22/2020. Trace right-sided  pleural effusion. Findings may represent organizing pneumonia,  infection,  pazfw-fioapo-dgbn disease, drug reaction or other etiology.  3. 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 Heme  - baby ASA     3. ID  - given zosyn empirically (starting after chest tube place and pleural fluid collected) x 48 hours 10/23-10/25; stop 10/25 with negative cx and afebrile.  - COVID negative on admission 10/23  - prophy: cont leva, fluc and bactrim. Per patient, Nicolas Jara advised discontinuation of fluconazole prior to admission - however being he is still on CSA and pred, prefer to continue for now and can revisit on discharge     4. 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 On CSA 25mg BID and pred 15mg daily  - eye gtts: refresh (not using other gtts because he wears corrective contact lenses, not GVHD lenses  - not using dex s/s due to hx of thrush    5. 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 Pulm placed pigtail catheter 10/23 (due to large volume fluid, can't all be drained in one day via thoracentesis). Pulm following, attempting to drain fluid as tolerated (gets cough/chest pain).   - 1L removed 10/23, 700cc 10/24, 600 ml 10/25. Re-try again today.   - Pleural fluid consistent with exudative effusion.  - CT chest 10/25 as above. Possible trapping of the lung/pneumothorax. May need thoracic surgery to facilitate release of lung parenchyma to allow lung to re-inflate. Monitoring.   - unclear why he developed such a large pleural effusion, PFE vs. Serositis? May need to discuss steroids to prevent re-accumulation.     6. Endo  - on synthroid    7. Pilocarpine for xerostoma    8. CV  - on ASA, crestor, metoprolol    9. FEN/renal  - regular diet  - Cr stable with repeat chest tube drainage  - on PO mag  800mg/d     Dispo: unknown, possible end of the week pending clinical course with effusion/chest tube.  Recommend pharmacist appt in clinic for medication review when he is discharged. Declined to go over medications with hospital pharmacist on 10/25, but wants to go through them later once he has med list from home    Gerald Doty PA-C  [phone]    Bone Marrow Transplant (5C) Attending Progress Note    The patient was discussed on morning rounds with the nurses, and mid-level provider, and was seen and examined by me. All labs and imaging were reviewed. I reviewed events over the last 24 hours including vitals and flow sheets. I agree with the above note and have been responsible for the care plan and interpretation of progress. 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  His course has been complicated by qtoci-paipkc-pasz disease especially of the lungs.  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  He has been evaluated by Dr. Brady.  He continues to be on cyclosporine at 25 mg twice daily and prednisone 15 mg/day.  He was admitted 10/23 with increased shortness of breath and was found to have a massive pleural effusion collapsing his entire left lung.  He now has an a left-sided chest tube and pulmonary is slowly draining to allow reexpansion of his left lung.  Today he continues to be stable.  Pulmonary came by this morning and decided to let his to continue to open drainage.  At some point if he develops symptoms, we may clamp again.  At that time I saw him this morning he already had 600 cc of additional brown fluid out.  He is not having any coughing.  He had minor chest discomfort.  He was in better spirits today.    Physical exam: There is no evidence of mucositis. There is no adenopathy on exam and there is now faint breath sounds on the left side. There is no LE edema and no rash. Labs show a creatinine of  0.69, and a hemoglobin of 15.5, platelet count of 351,000 and a white count of 10,000 with 6000 absolute neutrophils.  Cultures from the pulmonary cavity are still negative.     It is my overall impression that we are making progress.  He will remain hospitalized until the chest tube is removed.  Our biggest concern is about fluid reaccumulation.  We discussed expectations for the next several days. All questions have been answered.  I am wondering whether steroids would help prevent fluid accumulation but we will need to discuss this with Dr. Brady.    Ryan Chan MD

## 2020-10-26 NOTE — PLAN OF CARE
/83 (BP Location: Left arm)   Pulse 90   Temp 98.2  F (36.8  C) (Oral)   Resp 18   Ht 1.829 m (6')   Wt 99.2 kg (218 lb 11.2 oz)   SpO2 94%   BMI 29.66 kg/m    Pt has been AVSS, maintains O2 on 2L NC.  Dyspnea with activity.  Pt reports very minimal pain at CT site only.  Pulmonary unclamped CT late this am, total of 1280cc red-brown drainage this shift.  Pt denies any sharp pain, difficulty breathing or other discomforts.  Pt up independently in room, up in chair most of day.  Eating/drinking well.  Per recs, keeping CT unclamped.    Problem: Adult Inpatient Plan of Care  Goal: Absence of Hospital-Acquired Illness or Injury  Outcome: No Change     Problem: Adult Inpatient Plan of Care  Goal: Plan of Care Review  Outcome: Improving     Problem: Adult Inpatient Plan of Care  Goal: Optimal Comfort and Wellbeing  Outcome: Improving

## 2020-10-26 NOTE — PROGRESS NOTES
[facility]  Pulmonary Follow-Up Note  October 26, 2020      Byron Russo MRN# [id number]   Age: 59 year old YOB: 1960     Date of Admission: 10/22/2020     Assessment and Plan:  Byron Russo is a 59 year old male with past medical history of allogeneic stem cell transplant about 4-1/2 years ago for MDS, pleuroparenchymal fibroelastosis thought to be a complication of his transplant, and admission for coronavirus and Enterococcus pneumonia in January 2020 admitted on 10/22/2020.  He has had subacute onset of dyspnea over the last 6 weeks, and was seen by Dr. Brady in a virtual visit on 10/20.  He had a chest CT due to his worsening symptoms and was found to have a large left pleural effusion. Differential diagnosis includes typical or atypical bacterial infection, fungal infection, PTLD, reactive from viral illness, malignancy, idiopathic. Discussed further with thoracic surgery and initial concern for chest tube placement was related to concern for liver disease and unknown etiology of effusion. Given size of the effusion we agreed that chest tube placement was reasonable and this was placed on 10/23. Removed 1L red-brown fluid then clamped due to pt coughing and chest discomfort. Over 10/24 and 10/25 2.3 L removed before clamping chest tube due to discomfort. Repeat chest imaging on 10/25 with some mild aeration in the left lung, however it largely remains trapped. Pleural studies significant for exudative effusion. Infectious workup still pending. Chest tube placed on gravity and drained ~1.2L. Recommend continuing chest tube to gravity and watch over the next few days. Advise against clamping as given new air leak, he has the potential to develop tension pneumothorax. Recommend obtaining additional pleural fluid cytology.     1. Chest tube management: Likely trapped lung. Repeat CT chest with some aeration post fluid removal and small hydropneumothorax.   -Continue chest  tube to drain   -CXR tomorrow morning  -Advise against clamping chest tube    2. Exudative pleural effusion, low glucose. Pending cell count, cytology, preliminary fungal and bacterial cultures negative.    -Obtain 2 additional cytology samples (on different days) and flow cytometry    We will continue to follow with you. Please do not hesitate to contact us with questions.     Staffed with Dr. Perlman.     Flori Newman MD  Internal Medicine Resident PGY2         Subjective:   No acute events overnight. Has been breathing comfortably between 2-4L NC. Had chest pain with fluid removal last night which improved. Eager to know about any interventions moving forward. Removed 1.2L of fluid.          Physical Exam:   Temp:  [97.7  F (36.5  C)-98.9  F (37.2  C)] 98.2  F (36.8  C)  Pulse:  [77-89] 89  Resp:  [16-18] 16  BP: (103-123)/(83-90) 103/85  SpO2:  [94 %-97 %] 94 %      Gen:   No acute distress. Sitting up in bed.      HEENT:   Anicteric sclerae. No oral lesions     Lungs:   Absent breath sounds on the left. Basilar crackles on the right.       Cardiovascular:    Normal S1 and S2.  RRR.  No murmur, gallop or rub.     Abdomen:   Soft, ND, NT with active BS.      Extremities:    No Edema.      Neurologic:    Alert and conversant.      Skin:    Warm, dry.  No rash on exposed skin.      CT drained red-brown watery liquid rapidly when on gravity. Clamped.          Data:   All laboratory and imaging data reviewed.      Pleural fluid analysis 10/26/2020     Ref. Range 10/23/2020 13:30   Body Fluid Analysis Source Unknown Pleural fluid   Color Fluid Unknown Brown   Appearance Fluid Unknown Cloudy   WBC Fluid Latest Units: /uL 121   Amylase Fluid Latest Units: U/L 18   Cholesterol Fluid Latest Units: mg/dL 98   Glucose Fluid Latest Units: mg/dL 34   Lactate Dehydrogenase Fluid Latest Units: U/L 521   Ph Fluid Latest Units: pH 7.4   Protein Total Fluid Latest Units: g/dL 4.4   Triglyceride Fluid Latest Units: mg/dL 79      Pleural fluid cytology x1 Negative for malignant cells  Serum LDH-232  Serum total protein-7.4    Chest CT 10/25/2020  1. Slight increased aeration of the left lung with slight decrease  size of the left-sided hydropneumothorax with a large pleural effusion  component and now a few small foci of air. Left basilar chest tube in  place.  2. Right lung demonstrates scattered peribronchovascular areas of  consolidation which are similar to exam 10/22/2020. Trace right-sided  pleural effusion.

## 2020-10-26 NOTE — PROGRESS NOTES
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  He has had subacute onset of dyspnea over the last 6 weeks, and was seen by Dr. Brady in a virtual visit on 10/20.  He had a chest CT due to his worsening symptoms and was found to have a large left pleural effusion. Differential diagnosis includes typical or atypical bacterial infection, fungal infection, PTLD, reactive from viral illness, malignancy, idiopathic. Discussed further with thoracic surgery and initial concern for chest tube placement was related to concern for liver disease and unknown etiology of effusion. Given size of the effusion we agreed that chest tube placement was reasonable and this was placed on 10/23. Removed 1L red-brown fluid then clamped due to pt coughing and chest discomfort. Over 10/24 (got 700cc) and 10/25 (got 400cc) he did not tolerate fluid removal.     1. Chest tube management: I'm concerned the lung is trapped. CT chest ordered with read pending but now shoes some Ptx. Will avoid removing more fluid tonight and make a plan tomorrow.   2. Exudative pleural effusion, low glucose. Pending cell count, cytology, cultures including fungal.     We will continue to follow with you. Please do not hesitate to contact us with questions.     Staffed with Dr. Webster.     Yadira Smith MD  Pulmonary & Critical Care Fellow         Subjective:     Pain is tolerable today. Getting frustrated with lack of progress. Gets chest pain with removal of 400cc of fluid today but was  eager to have more removed later in the evening.          Physical Exam:   Temp:  [96.8  F (36  C)-98.9  F (37.2  C)] 98.9  F (37.2  C)  Pulse:  [78-89] 89  Resp:  [18] 18  BP: (103-123)/(79-86) 123/86  SpO2:  [94 %-97 %] 97 %      Gen:   No acute distress. Woke up from a nap.      HEENT:   Anicteric sclerae. No oral lesions     Lungs:   Absent breath sounds on the left. Basilar crackles on the right.       Cardiovascular:    Normal S1 and S2.  RRR.  No murmur, gallop or rub.     Abdomen:   Soft, ND, NT with active BS.      Extremities:    No Edema.      Neurologic:    Alert and conversant.      Skin:    Warm, dry.  No rash on exposed skin.      CT drained red-brown watery liquid rapidly when on gravity.         Data:   All laboratory and imaging data reviewed.       Sputum Cultures in the last 3 months:  Specimen Description   Date Value Ref Range Status   10/23/2020 Fluid Pleural fluid  Final   10/23/2020 Fluid Pleural fluid  Final   10/23/2020 Fluid Pleural fluid  Final   10/23/2020 Fluid Pleural fluid  Final    Culture Micro   Date Value Ref Range Status   10/23/2020 Culture negative monitoring continues  Preliminary   10/23/2020 Culture negative monitoring continues  Preliminary   10/23/2020 PENDING  Preliminary

## 2020-10-26 NOTE — PLAN OF CARE
"Temp: 98.7  F (37.1  C) Temp src: Oral BP: 108/88 Pulse: 80   Resp: 16 SpO2: 94 % O2 Device: Nasal cannula Oxygen Delivery: 2 LPM    A&Ox4, AVSS on 2 L oxygen. Ambulates independently to bathroom, voiding good amount. Denies pain/nausea ex one complaint of \"irritation\" at chest tube insertion site. Refused PRN pain meds when offered. Patient's chest tube dressing is clean and intact. Chest tube is clamped. Will continue to monitor and follow plan of care.     Problem: Adult Inpatient Plan of Care  Goal: Plan of Care Review  Outcome: No Change  Goal: Patient-Specific Goal (Individualized)  Outcome: No Change  Goal: Absence of Hospital-Acquired Illness or Injury  Outcome: No Change  Intervention: Identify and Manage Fall Risk  Recent Flowsheet Documentation  Taken 10/26/2020 0100 by Bharat Spann  Safety Promotion/Fall Prevention:    clutter free environment maintained    fall prevention program maintained    lighting adjusted    nonskid shoes/slippers when out of bed    room organization consistent    safety round/check completed  Taken 10/25/2020 2010 by Bharat Spann  Safety Promotion/Fall Prevention:    clutter free environment maintained    fall prevention program maintained    lighting adjusted    nonskid shoes/slippers when out of bed    room organization consistent    safety round/check completed  Intervention: Prevent Skin Injury  Recent Flowsheet Documentation  Taken 10/26/2020 0100 by Bharat Spann  Body Position: position changed independently  Taken 10/25/2020 2010 by Bharat Spann  Body Position: position changed independently  Intervention: Prevent and Manage VTE (Venous Thromboembolism) Risk  Recent Flowsheet Documentation  Taken 10/26/2020 0100 by Bharat Spann  VTE Prevention/Management: ambulation promoted  Taken 10/25/2020 2010 by Bharat Spann  VTE Prevention/Management: ambulation promoted  Goal: Optimal Comfort and Wellbeing  Outcome: No Change  Goal: Readiness for " Transition of Care  Outcome: No Change     Problem: Discharge Planning  Goal: Discharge Planning (Adult, OB, Behavioral, Peds)  Outcome: No Change

## 2020-10-27 NOTE — PLAN OF CARE
Patient said he slept ok overnight. Declined additional pain meds for chest tube discomfort. Afebrile. Vitals stable. Using supplemental oxygen at 3 L per nasal cannula. Sats ok with oxygen use. Slight dyspnea on exertion. Chest tube to gravity; Had 70 ml sanguineous drainage overnight. Venipuncture lab draw; no replacements necessary. Up to bathroom independently; voiding ok.

## 2020-10-27 NOTE — PROGRESS NOTES
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  He has had subacute onset of dyspnea over the last 6 weeks, and was seen by Dr. Brady in a virtual visit on 10/20.  He had a chest CT due to his worsening symptoms and was found to have a large left pleural effusion. Differential diagnosis includes typical or atypical bacterial infection, fungal infection, PTLD, reactive from viral illness, malignancy, idiopathic. Discussed further with thoracic surgery and initial concern for chest tube placement was related to concern for liver disease and unknown etiology of effusion. Given size of the effusion we agreed that chest tube placement was reasonable and this was placed on 10/23. Removed 1L red-brown fluid then clamped due to pt coughing and chest discomfort. Over 10/24 and 10/25 2.3 L removed before clamping chest tube due to discomfort. Repeat chest imaging on 10/25 with some mild aeration in the left lung, however it largely remains trapped. Pleural studies significant for exudative effusion. Unclear etiology at this time. Unlikely to be related to PPFE, as there pleural effusions are unlikely. This could possibly be related to a lung manifestation of GVHD or malignancy. Infectious workup still pending. Chest tube placed on gravity and drained ~1.2L, no significant fluid drained overnight. CXR on 10/27 with pneumothorax, no significant change in trapped lung. Recommend discussing with thoracic surgery for further intervention. Can continue chest  tube to gravity and watch over the next few days. Advise against clamping as given pneumothorax, he has the potential to develop tension pneumothorax. Recommend obtaining additional pleural fluid cytology.     1. Chest tube management: CXR with redemonstration of trapped lung and increased pneumothorax. Left sided effusion has resolved. No need for further imaging at this time.   -Discuss with thoracic surgery regarding trapped lung  -Continue chest tube to drain    2. Exudative pleural effusion, low glucose. Pending cell count, cytology, preliminary fungal and bacterial cultures negative.    -Obtain 2 additional cytology samples (on different days) and flow cytometry    We will continue to follow with you. Please do not hesitate to contact us with questions.     Staffed with Dr. Perlman.     Flori Newman MD  Internal Medicine Resident PGY2         Subjective:   No acute events overnight. Says his breathing has improved since last night. Had some mild chest pain which resolved.          Physical Exam:   Temp:  [97.9  F (36.6  C)-98.5  F (36.9  C)] 98.1  F (36.7  C)  Pulse:  [79-90] 84  Resp:  [16-18] 18  BP: (106-122)/(78-88) 109/79  SpO2:  [92 %-96 %] 92 %      Gen:   No acute distress. Sitting up in bed.      HEENT:   Anicteric sclerae. No oral lesions     Lungs:   Absent breath sounds on the left. Basilar crackles on the right.       Cardiovascular:    Normal S1 and S2.  RRR.  No murmur, gallop or rub.     Abdomen:   Soft, ND, NT with active BS.      Extremities:    No Edema.      Neurologic:    Alert and conversant.      Skin:    Warm, dry.  No rash on exposed skin.      CT drained red-brown watery liquid rapidly when on gravity. Clamped.          Data:   All laboratory and imaging data reviewed.      Pleural fluid analysis 10/26/2020     Ref. Range 10/23/2020 13:30   Body Fluid Analysis Source Unknown Pleural fluid   Color Fluid Unknown Brown   Appearance Fluid Unknown Cloudy   WBC Fluid Latest Units: /uL  121   Amylase Fluid Latest Units: U/L 18   Cholesterol Fluid Latest Units: mg/dL 98   Glucose Fluid Latest Units: mg/dL 34   Lactate Dehydrogenase Fluid Latest Units: U/L 521   Ph Fluid Latest Units: pH 7.4   Protein Total Fluid Latest Units: g/dL 4.4   Triglyceride Fluid Latest Units: mg/dL 79     Pleural fluid cytology x1 Negative for malignant cells  Serum LDH-232  Serum total protein-7.4    Chest CT 10/25/2020  1. Slight increased aeration of the left lung with slight decrease  size of the left-sided hydropneumothorax with a large pleural effusion  component and now a few small foci of air. Left basilar chest tube in  place.  2. Right lung demonstrates scattered peribronchovascular areas of  consolidation which are similar to exam 10/22/2020. Trace right-sided  pleural effusion.     CXR 10/27  Trapped lung. Persistent pneumothorax. Blunting of right costrophrenic angle. Left sided effusion resolved.

## 2020-10-27 NOTE — PLAN OF CARE
Patient had no output from his chest tube this evening.  He continues on 3L O2.  Had some pain at chest tube insertion site.  Oxycodone given for pain.  Problem: Adult Inpatient Plan of Care  Goal: Plan of Care Review  Outcome: No Change     Problem: Adult Inpatient Plan of Care  Goal: Patient-Specific Goal (Individualized)  Outcome: No Change     Problem: Adult Inpatient Plan of Care  Goal: Optimal Comfort and Wellbeing  Outcome: No Change

## 2020-10-27 NOTE — PROGRESS NOTES
BMT Daily Progress Note    ID: Yg Russo is a 60yo M s/p allo BMT for MDS in May 2016, readmitted with large left pleural effusion    HPI: Chest tube unclamped to gravity all evening and had sudden output of 1280cc, then small additional output through the night. Still on 3L O2 per nasal cannula. No cough or chest pain with fluid output, did feel a sense of pressure relief. Slight pain at chest pain insertion site. Took pain medication x 1 overnight. No n/v/d/c. Otherwise doing ok, just sitting in his room waiting.    ROS: 6 point ROS negative except as stated above    Physical Exam  /79 (BP Location: Right arm)   Pulse 84   Temp 98.1  F (36.7  C) (Oral)   Resp 18   Ht 1.829 m (6')   Wt 95.8 kg (211 lb 4.8 oz)   SpO2 92%   BMI 28.66 kg/m    Gen: A&O, NAD  OP: Mucosa dry without lesions  Pulm: breathing comfortably on 2-3L per nasal cannula. Minimal air movement L lung field. Few crackles R lung field. Chest tube in place under dressing. Brown/serosanguinous fluid in tubing  CV: RRR  Abd: +BS, soft, nontender  Extremities: no edema  Skin: no rash on exposed skin  Access: PIV, no indwelling access    Labs  Lab Results   Component Value Date    WBC 10.3 10/27/2020    ANEU 6.0 10/27/2020    HGB 15.8 10/27/2020    HCT 48.8 10/27/2020     10/27/2020     10/27/2020    POTASSIUM 4.2 10/27/2020    CHLORIDE 104 10/27/2020    CO2 31 10/27/2020    GLC 81 10/27/2020    BUN 15 10/27/2020    CR 0.76 10/27/2020    MAG 2.0 02/27/2020    INR 1.28 (H) 10/22/2020    BILITOTAL 0.3 10/27/2020    AST 42 10/27/2020    ALT 57 10/27/2020    ALKPHOS 165 (H) 10/27/2020    PROTTOTAL 7.4 10/27/2020    ALBUMIN 2.7 (L) 10/27/2020     Imaging:   CT w/o contrast 10/25:   1. Slight increased aeration of the left lung with slight decrease  size of the left-sided hydropneumothorax with a large pleural effusion  component and now a few small foci of air. Left basilar chest tube in  place.  2. Right lung demonstrates scattered  peribronchovascular areas of  consolidation which are similar to exam 10/22/2020. Trace right-sided  pleural effusion. Findings may represent organizing pneumonia,  infection, lqgcd-[id number]-nmrj disease, drug reaction or other etiology.  3. 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 Heme  - baby ASA     3. ID  - given zosyn empirically (starting after chest tube place and pleural fluid collected) x 48 hours 10/23-10/25; stop 10/25 with negative cx and afebrile.  - COVID negative on admission 10/23  - prophy: cont leva, fluc and bactrim. Per patient, Nicolas Jara advised discontinuation of fluconazole prior to admission - however being he is still on CSA and pred, prefer to continue for now and can revisit on discharge     4. 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 On CSA 25mg BID and pred 15mg daily - increased pred to 40mg daily for inflammatory pleural effusion  - eye gtts: refresh (not using other gtts because he wears corrective contact lenses, not GVHD lenses  - not using dex s/s due to hx of thrush    5. 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 Pulm placed pigtail catheter 10/23 (due to large volume fluid, can't all be drained in one day via thoracentesis). Pulm following, attempting to drain fluid as tolerated (gets cough/chest pain).   - So far have removed between 2-3L over the past few days. CT 10/26 shows loculated fluid collection and trapped left lung. Repeat CXR today to reassess after additional fluid drained yesterday. If pleural effusion still present, consider thoracic surgey consult  - Pleural fluid consistent with exudative effusion.  - unclear why he developed such a large pleural effusion, PFE vs.  Serositis? Will increase pred from 15 to 40mg daily starting 10/27    6. Endo  - on synthroid    7. Pilocarpine for xerostoma    8. CV  - on ASA, crestor, metoprolol    9. FEN/renal  - regular diet  - Cr stable with repeat chest tube drainage  - on PO mag 800mg/d     Dispo: unknown, possible end of the week pending clinical course with effusion/chest tube.  Recommend pharmacist appt in clinic for medication review when he is discharged. Declined to go over medications with hospital pharmacist on 10/25, but wants to go through them later once he has med list from home    Maranda Mayes PA-C  [phone]      Bone Marrow Transplant (5C) Attending Progress Note    The patient was discussed on morning rounds with the nurses, and mid-level provider, and was seen and examined by me. All labs and imaging were reviewed. I reviewed events over the last 24 hours including vitals and flow sheets. I agree with the above note and have been responsible for the care plan and interpretation of progress. 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  His course has been complicated by jmciq-wmuwco-tkow disease especially of the lungs.  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  He has been evaluated by Dr. Brady.  He continues to be on cyclosporine at 25 mg twice daily and prednisone 15 mg/day.  He was admitted 10/23 with increased shortness of breath and was found to have a massive pleural effusion collapsing his entire left lung.  He now has an a left-sided chest tube and pulmonary is slowly draining to allow reexpansion of his left lung.  Today he continues to be stable.  His chest tube has been draining to gravity. He is not coughing. There is no worsening of his pulmonary symptoms.     Physical exam: There is no evidence of mucositis. There is no adenopathy on exam and there is now faint breath sounds on the left side. There is no LE  edema and no rash. Labs show a creatinine of 0.76, and a hemoglobin of 15.8, platelet count of 357,000 and a white count of 10,300 with 6000 absolute neutrophils.  Cultures from the pulmonary cavity are still negative.     It is my overall impression that we are making progress.  He will remain hospitalized until the chest tube is removed.  Our biggest concern is about fluid reaccumulation.  We have increased his daily steroids to 40 mg daily. We discussed expectations for the next several days. All questions have been answered.    Ryan Chan MD

## 2020-10-27 NOTE — PLAN OF CARE
/80 (BP Location: Left arm)   Pulse 79   Temp 98.4  F (36.9  C) (Oral)   Resp 18   Ht 1.829 m (6')   Wt 95.8 kg (211 lb 4.8 oz)   SpO2 92%   BMI 28.66 kg/m    Afebrile, BP /70-80s, 3L NC with O2 mid 90s.  Pt up independently in room, eating/drinking well.  C/O of intermittent, mild CT site pain-Percocet x1 with relief.  CT drainage 80cc out.  Problem: Adult Inpatient Plan of Care  Goal: Plan of Care Review  Outcome: No Change     Problem: Adult Inpatient Plan of Care  Goal: Optimal Comfort and Wellbeing  Outcome: No Change     Problem: Adult Inpatient Plan of Care  Goal: Absence of Hospital-Acquired Illness or Injury  Intervention: Identify and Manage Fall Risk  Recent Flowsheet Documentation  Taken 10/27/2020 0900 by Alison Esposito, RN  Safety Promotion/Fall Prevention:    fall prevention program maintained    clutter free environment maintained    lighting adjusted    nonskid shoes/slippers when out of bed    safety round/check completed

## 2020-10-28 NOTE — PLAN OF CARE
[id number]  Status: Patient admitted for L pleural effusion.   VS: VSS on 2.5L NC.   Neuros: A&Ox4.  GI/: Tolerating regular diet. Denies nausea. BM today. Voiding spontaneously.   IV: No PIV.   Activity: Up independently.   Pain: Denies.  Respiratory/Trach: CUELLAR. L LS diminished. Infrequent cough.   Skin: L CT to WS w/ air leak.   Plan of Care: Continue to monitor and follow POC.

## 2020-10-28 NOTE — PROGRESS NOTES
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  He has had subacute onset of dyspnea over the last 6 weeks, and was seen by Dr. Brady in a virtual visit on 10/20.  He had a chest CT due to his worsening symptoms and was found to have a large left pleural effusion. Differential diagnosis includes typical or atypical bacterial infection, fungal infection, PTLD, reactive from viral illness, malignancy, idiopathic. Discussed further with thoracic surgery and initial concern for chest tube placement was related to concern for liver disease and unknown etiology of effusion. Given size of the effusion we agreed that chest tube placement was reasonable and this was placed on 10/23. Removed 1L red-brown fluid then clamped due to pt coughing and chest discomfort. Over 10/24 and 10/25 2.3 L removed before clamping chest tube due to discomfort. Repeat chest imaging on 10/25 with some mild aeration in the left lung, however it largely remains trapped. Pleural studies significant for exudative effusion. Unclear etiology at this time. Unlikely to be related to PPFE, as there pleural effusions are unlikely. This could possibly be related to a lung manifestation of GVHD or malignancy. Infectious workup still pending. Chest tube placed on gravity and drained ~1.2L, no significant fluid drained overnight. CXR on 10/28 with pneumothorax, no significant change in trapped lung. Recommend discussing with thoracic surgery for further intervention. If no intervention  advised, recommend pulling chest tube out and repeat chest imaging in the morning. No indication for pleurX at this time, but would consider if pleural effusion recurs.     1. Chest tube management: Repeat CXR with no significant change of left sided pneumothorax. Left sided effusion has resolved. No need for further imaging at this time.   -Recommend discussing with thoracic surgery regarding trapped lung  -Continue chest tube to drain    2. Exudative pleural effusion, low glucose. Pending cell count, cytology, preliminary fungal and bacterial cultures negative.    -Obtain 2 additional cytology samples (on different days) and flow cytometry    We will continue to follow with you. Please do not hesitate to contact us with questions.     Staffed with Dr. Perlman.     Flori Newman MD  Internal Medicine Resident PGY2         Subjective:   No acute events overnight. Has had around 160ml output in 24 hrs.         Physical Exam:   Temp:  [97.9  F (36.6  C)-98.6  F (37  C)] 98.4  F (36.9  C)  Pulse:  [] 77  Resp:  [18] 18  BP: (101-115)/(67-89) 109/77  SpO2:  [92 %-95 %] 95 %      Gen:   No acute distress. Sitting up in bed.      HEENT:   Anicteric sclerae. No oral lesions     Lungs:   Absent breath sounds on the left. Basilar crackles on the right.       Cardiovascular:    Normal S1 and S2.  RRR.  No murmur, gallop or rub.     Abdomen:   Soft, ND, NT with active BS.      Extremities:    No Edema.      Neurologic:    Alert and conversant.      Skin:    Warm, dry.  No rash on exposed skin.      CT drained red-brown watery liquid rapidly when on gravity. Clamped.          Data:   All laboratory and imaging data reviewed.      Pleural fluid analysis 10/26/2020     Ref. Range 10/23/2020 13:30   Body Fluid Analysis Source Unknown Pleural fluid   Color Fluid Unknown Brown   Appearance Fluid Unknown Cloudy   WBC Fluid Latest Units: /uL 121   Amylase Fluid Latest Units: U/L 18   Cholesterol Fluid Latest Units: mg/dL 98    Glucose Fluid Latest Units: mg/dL 34   Lactate Dehydrogenase Fluid Latest Units: U/L 521   Ph Fluid Latest Units: pH 7.4   Protein Total Fluid Latest Units: g/dL 4.4   Triglyceride Fluid Latest Units: mg/dL 79     Pleural fluid cytology x1 Negative for malignant cells  Serum LDH-232  Serum total protein-7.4    Chest CT 10/25/2020  1. Slight increased aeration of the left lung with slight decrease  size of the left-sided hydropneumothorax with a large pleural effusion  component and now a few small foci of air. Left basilar chest tube in  place.  2. Right lung demonstrates scattered peribronchovascular areas of  consolidation which are similar to exam 10/22/2020. Trace right-sided  pleural effusion.     CXR 10/27  Trapped lung. Persistent pneumothorax. Blunting of right costrophrenic angle. Left sided effusion resolved.     CXR 10/28  No significant change in ptx

## 2020-10-28 NOTE — PROGRESS NOTES
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 Remains on 2-3L O2 per nasal cannula. No fevers. No other concerns. Awaiting input from thoracic surgery. Continues to express frustration with long hospital stay    ROS: 6 point ROS negative except as stated above    Physical Exam  /89 (BP Location: Left arm)   Pulse 90   Temp 97.9  F (36.6  C) (Oral)   Resp 18   Ht 1.829 m (6')   Wt 95.8 kg (211 lb 1.6 oz)   SpO2 92%   BMI 28.63 kg/m    Gen: A&O, NAD  OP: Mucosa dry without lesions  Pulm: breathing comfortably on 2-3L per nasal cannula. Minimal air movement L lung field. Few crackles R lung field. Chest tube in place under dressing. Brown/serosanguinous fluid in tubing  CV: RRR  Abd: +BS, soft, nontender  Extremities: no edema  Skin: no rash on exposed skin  Access: PIV, no indwelling access    Labs  Lab Results   Component Value Date    WBC 12.2 (H) 10/28/2020    ANEU 8.4 (H) 10/28/2020    HGB 15.6 10/28/2020    HCT 47.7 10/28/2020     10/28/2020     10/28/2020    POTASSIUM 4.6 10/28/2020    CHLORIDE 105 10/28/2020    CO2 31 10/28/2020    GLC 92 10/28/2020    BUN 13 10/28/2020    CR 0.80 10/28/2020    MAG 2.0 02/27/2020    INR 1.28 (H) 10/22/2020    BILITOTAL 0.3 10/28/2020    AST 39 10/28/2020    ALT 55 10/28/2020    ALKPHOS 152 (H) 10/28/2020    PROTTOTAL 7.1 10/28/2020    ALBUMIN 2.6 (L) 10/28/2020     Imaging:   CT w/o contrast 10/25:   1. Slight increased aeration of the left lung with slight decrease  size of the left-sided hydropneumothorax with a large pleural effusion  component and now a few small foci of air. Left basilar chest tube in  place.  2. Right lung demonstrates scattered peribronchovascular areas of  consolidation which are similar to exam 10/22/2020. Trace right-sided  pleural effusion.  Findings may represent organizing pneumonia,  infection, czjfx-yrldfs-rnhn disease, drug reaction or other etiology.  3. 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 Heme  - baby ASA     3. ID  -s/p zosyn for poss infectious etiology of effusion, stopped with negative pleural fluid cultures  - COVID negative on admission 10/23  - prophy: cont leva, fluc and bactrim. Per patient, Nicolas Jara advised discontinuation of fluconazole prior to admission - however being he is still on CSA and pred, prefer to continue for now and can revisit on discharge     4. 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 On CSA 25mg BID and pred 15mg daily - increased pred to 40mg daily for inflammatory pleural effusion  - eye gtts: refresh (not using other gtts because he wears corrective contact lenses, not GVHD lenses  - not using dex s/s due to hx of thrush    5. Pulm  - pulm following, appreciate recs  - new large left pleural effusion with total collapse of left lung and mass effect on mediastinum. Pulm placed pigtail catheter 10/23 (due to large volume fluid, can't all be drained in one day via thoracentesis). So far have removed between 2-3L over the past few days. Chest tube now unclamped and to gravity. Additional 160cc out overnight. Repeat CXR 10/27 showed large pneumothorax vs trapped lung, awaiting review from thoracic surgery team to determine next steps. Unlikely he would be a candidate for decortication due to underlying lung disease. Will review with pulmonary, would we leave a chest tube in place for intermittent draining?  - Pleural fluid consistent with exudative effusion.  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 Serositis? Increased pred from 15 to 40mg daily starting  10/27    6. Endo  - on synthroid    7. Pilocarpine for xerostoma    8. CV  - on ASA, crestor, metoprolol    9. FEN/renal  - regular diet  - Cr stable with repeat chest tube drainage  - on PO mag 800mg/d     Dispo: unknown, possible end of the week pending clinical course with effusion/chest tube.  Recommend pharmacist appt in clinic for medication review when he is discharged. Declined to go over medications with hospital pharmacist on 10/25, but wants to go through them later once he has med list from home    Maranda Mayes PA-C  [phone]      Bone Marrow Transplant (5C) Attending Progress Note    The patient was discussed on morning rounds with the nurses, and mid-level provider, and was seen and examined by me. All labs and imaging were reviewed. I reviewed events over the last 24 hours including vitals and flow sheets. I agree with the above note and have been responsible for the care plan and interpretation of progress. 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  His course has been complicated by bqgcd-vonnlw-bybi disease especially of the lungs.  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  He has been evaluated by Dr. Brady.  He continues to be on cyclosporine at 25 mg twice daily and prednisone 15 mg/day.  He was admitted 10/23 with increased shortness of breath and was found to have a massive pleural effusion collapsing his entire left lung.  He now has an a left-sided chest tube and pulmonary is slowly draining to allow reexpansion of his left lung.  Today he continues to be stable.  His chest tube has been draining to gravity. He is not coughing. There is no worsening of his pulmonary symptoms.     Physical exam: There is no evidence of mucositis. There is no adenopathy on exam and there is now faint breath sounds on the left side. There is no LE edema and no rash. Labs show a creatinine of 0.8, and a  hemoglobin of 15.6, platelet count of 369,000 and a white count of 12,200 with 8400 absolute neutrophils.  Cultures from the pulmonary cavity are still negative.     After rereview of his last chest x-ray it is very clear that he has a pneumothorax and his left lung is not expanding.  We had a discussion with the thoracic surgeons today and they do not think he has any surgical options.  We need to get some recommendations from pulmonary as to whether the chest tube can be removed and he can continue to be followed in the outpatient clinic since surgery does not seem to be an option.  We have already increased his prednisone to 40 mg and may consider a further increase.  We discussed expectations for the next several days. All questions have been answered.    Ryan Chan MD

## 2020-10-28 NOTE — PLAN OF CARE
[id number]    Afebrile, VSS. No acute even, Pt slept very well.       NEURO: Alert and oriented x4.      RESPIRATORY: Room Air, denies dizziness, and SOB.     CARDIO: VSS, denies dizziness, and extremity pain.      GI/:  Denies N/V, BS active, void urine spontaneously without saving.       SKIN: Intact.      ACTIVITY: Independent.      PAIN: Denies pain.    DLA: Chest tube, water seal.     BG: No     PLAN: Continue monitoring.

## 2020-10-29 NOTE — PROGRESS NOTES
Care Coordination - Discharge Note     IV medications needed at discharge:  None   Pharmacy concerns:  None  Home O2:  Patient currently has stationary ome O2 setup (purchased OOP) through:   [facility]   Based in: [place] [address]   [phone] - [facility]   **Patient would like a Portable O2 Concentrator (POC). Per [facility], the script already on file with [employer] can be utilized for [facility] to set pt up with POC. [facility] to follow up with pt; pt aware.   **Patient already owns a fingertip pulse oximeter  PT/OT recommended:  No  Referral made for PT/OT:  NA  D/c location:  Home - [place]    Caregiver:  (spouse) Latonia Fabio- [phone]    Outpatient Nurse Coordinator notified of patient discharge.       Brianda Gunn, RN, BSN  RN Care Coordinator - Inpatient BMT, Unit 5C  Ph [phone]  Pg [phone]

## 2020-10-29 NOTE — PROGRESS NOTES
O2 sats at rest - 93% on RA  O2 sats with ambulation - 87% on RA  O2 sats with ambulation - 92 to 94% on 3 L O2 NC

## 2020-10-29 NOTE — SUMMARY OF CARE
BMT Summary of Care    October 29, 2020 11:07 AM  Byron Rusos  MRN: [id number]    Discharge Date: 10/29/20    BMT Primary Physician: Dr. Enciso    BMT Nurse Coordinator: Nicolas Jara    Discharge Diagnosis: S/P readmission for pleural effusion and pneumothorac    Discharge To: home    Activity: as tolerated    Catheter Care: None    IV Medications through home infusion: None    Nutrition: Regular diet as tolerated    Blood Transfusions:  Transfuse if Hemoglobin < or equal 7 mg/dL  Red Blood Cell Order: 1 units, irradiated and leukoreduced   Transfuse if Platelet count < or equal 10,000 uL  Platelet order: 1 adult dose, irradiated and leukoreduced  Transfusion Pre-meds:  None    Intravenous Electrolyte Replacement:  Potassium  chloride (give only if serum creatinine < 2)     3-3.3  20mEq/hr  over 1 hour x 2 doses     <3      20mEq/hr  over 1 hour x 3 doses  Magnesium sulfate 1.3-1.7     2 grams over 1 hour x1 dose                                     <1.3         2 grams over 2 hours x1 dose      Laboratory Tests:  At next clinic appointment (date: 11/2/20)  Hemogram (CBC) differential, platelet count  Comprehensive Metabolic Panel    Support Services:  O2: patient has purchased his own O2 concentration through [employer] in [place]. He prefers to purchase portable O2 tank and they can overnight this to him, instead of insurance covering rental equipment through "[employer]. Recommend wearing 3L O2 per nc with ambulation. No requirement for O2 at rest, but can wear 2L for comfort if symptomatic    Appointments:   BMT Clinic (date, time, provider):   1. Monday, 11/2 with CXR at 7:30am, BMT lab appt at 7:45am, provider visit at 8:30am and pharmacist appt for medication review at 9:30am for labs  2. Thursday, 11/5 check in at 2:30 for labs and 3pm visit with Dr. Enciso    Pulmonary   - Tuesday, 11/17 follow up with Dr. Brady at 4pm    Requested follow up with Pulmonary (Dr. Brady) and Dr Enciso in the  next 2 weeks    Maranda Mayes PA-C      BMT Contact Information:-   For issues including fevers 100.5 or more:  Please call during the week: Monday through Friday between hours of 8:00 am and 4:30 pm- Call BMT office- [phone]  After hours/Weekends: Please call [facility] : [phone] and ask for BMT physician on call and the  will have the BMT physician call you back.    Regarding COVID-19 Pandemic  Advice for Patients:  a.       Avoid contact with individuals:   i.     Who are sick or have recently been sick  ii.      Have traveled to high risk areas (per CDC guidelines) or have been on a cruise in the last 14 days  iii.      Who were or could have been exposed to COVID-19   b.       If experiencing symptoms such as: Fever, cough or shortness of breath contact BMT at [phone] Mon-Fri 8am-4:30pm or After Hours at [phone] (ask to speak to a BMT Fellow) for guidance on need for clinical assessment  c.      Avoid all non- essential travel at this time; if traveling is necessary use mask (N-95)   d.    Wear a mask when in public areas  d.       Avoid crowded places, if possible  f.        Follow CDC advice https://www.cdc.gov/coronavirus/2019-ncov/index.html and travel guidelines https://www.cdc.gov/coronavirus/2019-ncov/travelers/index.html

## 2020-10-29 NOTE — PROGRESS NOTES
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 Only 110cc of pleural fluid output from chest tube in the last 24 hours. Remains on 2.5L 02 per nasal cannula, but we haven't tried turning O2 off to see sats on room air. Will obtain walking sats today. He still feels dyspneic on exertion, but not at rest. No cough. Pain at chest tube insertion site. He has oxygen compressor at home that he purchased himself, not through insurance via qualifying O2 sats. He is willing to buy portable O2 tank out of pocket. Nurse coordinator will look into this before discharge today    Qualifies for home O2 based on walking sats 87% on room air, 92-94% on 3L. On room air at rest he is 93%    ROS: 6 point ROS negative except as stated above    Physical Exam  BP 92/69 (BP Location: Left arm)   Pulse 82   Temp 98.4  F (36.9  C) (Oral)   Resp 16   Ht 1.829 m (6')   Wt 95.9 kg (211 lb 6.4 oz)   SpO2 91%   BMI 28.67 kg/m    Gen: A&O, NAD  OP: Mucosa dry without lesions  Pulm: breathing comfortably on 2.5L per nasal cannula. Minimal air movement L lung field. Few crackles R lung field. Chest tube in place under dressing. Brown/serosanguinous fluid in tubing  CV: RRR  Abd: +BS, soft, nontender  Extremities: no edema  Skin: no rash on exposed skin  Access: PIV, no indwelling access    Labs  Lab Results   Component Value Date    WBC 11.8 (H) 10/29/2020    ANEU 7.2 10/29/2020    HGB 15.5 10/29/2020    HCT 48.2 10/29/2020     10/29/2020     10/29/2020    POTASSIUM 4.2 10/29/2020    CHLORIDE 107 10/29/2020    CO2 29 10/29/2020    GLC 81 10/29/2020    BUN 18 10/29/2020    CR 0.70 10/29/2020    MAG 2.0 02/27/2020    INR 1.28 (H) 10/22/2020    BILITOTAL 0.4 10/29/2020    AST 40 10/29/2020    ALT 51 10/29/2020    ALKPHOS 139 10/29/2020    PROTTOTAL 6.9 10/29/2020    ALBUMIN 2.6 (L) 10/29/2020     Imaging:   CT w/o contrast 10/25:    1. Slight increased aeration of the left lung with slight decrease  size of the left-sided hydropneumothorax with a large pleural effusion  component and now a few small foci of air. Left basilar chest tube in  place.  2. Right lung demonstrates scattered peribronchovascular areas of  consolidation which are similar to exam 10/22/2020. Trace right-sided  pleural effusion. Findings may represent organizing pneumonia,  infection, [id number] disease, drug reaction or other etiology.  3. 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 Heme  - baby ASA     3. ID  - s/p zosyn for poss infectious etiology of effusion, stopped with negative pleural fluid cultures  - COVID negative on admission 10/23  - prophy: cont leva, fluc and bactrim. Per patient, Nicolas Jara advised discontinuation of fluconazole prior to admission - however being he is still on CSA and pred, prefer to continue for now and can revisit on discharge     4. 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 On CSA 25mg BID and pred 15mg daily - increased pred to 40mg daily for inflammatory pleural effusion  - eye gtts: refresh (not using other gtts because he wears corrective contact lenses, not GVHD lenses  - not using dex s/s due to hx of thrush    5. Pulm  - pulmonary team following, appreciate recs  - new large left pleural effusion with total collapse of left lung and mass effect on mediastinum. Pulm placed pigtail catheter 10/23 (due to large volume fluid, can't all be drained in one day via thoracentesis). Effusion is exudative, infectious testing negative, cause unknown. Repeat CXR 10/27 showed resolution of pleural effusion, but large left pneumothorax vs trapped lung. Per thoracic surgery, not a candidate for decortication due to underlying lung disease. Pulmonary recommends removal of  chest tube this morning, repeat CXR in the afternoon, then discharge to home. No plan for serial CXR to monitor, will obtain CXR on Monday at BMT clinic follow up, then set up for follow up in pulmonary clinic in 2-3 weeks (requested appt with Dr. Brady mid November. 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 Serositis? Increased pred from 15 to 40mg daily starting 10/27    Home O2:  Yg previously purchased an O2 concentrator through [employer] in [place]. He prefers to also purchase a portable O2 tank - [employer] confirmed they can overnight this to him for arrival on 10/30. He qualifies for insurance coverage of home O2, but he prefers to buy it himself instead of using rental equipment ([employer]) that would be covered by insurance. Recommend wearing 3L O2 per nc with ambulation. No requirement for O2 at rest, but can wear 2L for comfort if symptomatic    6. Endo  - on synthroid    7. Pilocarpine for xerostoma    8. CV  - on ASA, crestor, metoprolol    9. FEN/renal  - regular diet  - Cr stable with repeat chest tube drainage  - on PO mag 800mg/d     Dispo:   - anticipate discharge this afternoon after repeat CXR  - follow up in BMT clinic on Monday 11/2 with labs + provider visit and CXR prior, as well as pharmD appt for medication review per his request  - Dr. segovia 11/5 - question if we should increase CSA dose?  - Dr. Brady 11/17      Addendum: Repeat CXR a couple hours after chest tube removal shows some reaccumulation of fluid. Reviewed this with pulmonary. We should anticipate reaccumulation of fluid. The question is how quickly, and will he have symptoms? On Monday in clinic for follow up, he will likely have more fluid on CXR. Question is, do we need to drain it or not? He really wasn't very symptomatic on admission, so might not feel  differently if it reaccumulates. Criteria to help decide: certainly if he has increased dyspnea, would pursue thoracentesis (IR can do same day or next day thora, or could contact interventional pulmonary also as a second option). If he has full compression of left lung, would get thora (right now has an aerated superior aspect of left lung), or if mediastinal shift, would get thora. Can page pulm for recs, as well.     Maranda Mayes PA-C  [phone]      Bone Marrow Transplant (5C) Attending Discharge Note    The patient was discussed on morning rounds with the nurses, and mid-level provider, and was seen and examined by me. All labs and imaging were reviewed. I reviewed events over the last 24 hours including vitals and flow sheets. I agree with the above note and have been responsible for the care plan and interpretation of progress. 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  His course has been complicated by mkoku-yafyuy-[id number] disease especially of the lungs.  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  He has been evaluated by Dr. Brady.  He continues to be on cyclosporine at 25 mg twice daily and prednisone 15 mg/day.  He was admitted 10/23 with increased shortness of breath and was found to have a massive pleural effusion collapsing his entire left lung.  He now has an a left-sided chest tube and pulmonary is slowly draining to allow reexpansion of his left lung.  Today he continues to be stable.  We talked to the thoracic surgeons and they are not able to do anything surgically.  They believe that the scarring is too great and the lung will not expand.  Symptomatically, he is on low oxygen and adequately saturating until he exercises.  We have decided that it is safe for him to go home.  We talked to the pulmonary team and his chest tube was removed this morning.  He has no other new symptoms..     Physical exam:  There is no evidence of mucositis. There is no adenopathy on exam and there is now faint breath sounds on the left side. There is no LE edema and no rash. Labs show a creatinine of 0.7, and a hemoglobin of 15.5, platelet count of 369,000 and a white count of 11,800 0 with 7200 absolute neutrophils.  Cultures from the pulmonary cavity are still negative.     It is my overall impression that Yg is ready for discharge today.  We have drained as much fluid out of his left chest cavity as possible.  Despite this, on the left side he is left with a pneumothorax in the left lung which is nearly completely scarred down.  He is being discharged on 40 mg of prednisone per day and cyclosporine at 25 mg p.o. twice daily.  When he is seen in clinic by Dr. Enciso, he needs to address whether his cyclosporine should also increase.  He also needs follow-up with Dr. Brady.  The patient is ready for discharge. I spend 40 minutes today reviewing this hospitalization, progress and ongoing issues. I reviewed medications and follow-up plans. All questions have been answered. We discussed expectations for the next several days. All questions have been answered.  He understands his discharge plans.    Ryan Chan MD

## 2020-10-29 NOTE — PROGRESS NOTES
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  He has had subacute onset of dyspnea over the last 6 weeks, and was seen by Dr. Brady in a virtual visit on 10/20.  He had a chest CT due to his worsening symptoms and was found to have a large left pleural effusion. Differential diagnosis includes typical or atypical bacterial infection, fungal infection, PTLD, reactive from viral illness, malignancy, idiopathic. Given size of the effusion we agreed that chest tube placement was reasonable and this was placed on 10/23. Removed a total of 3-4L throughout admission, however subsequent imaging showed trapped lung with pneumothorax. Pleural fluid studies showed exudative effusion, negative infectious workup. Cytology negative for malignant cells, however this is not definitive and more samples would be needed to rule this out. Unclear etiology of the effusion, whether it is a manifestation of GVH vs new malignancy.Thoracic surgery consulted and advised against decortication of lung given his underlying lung fibrosis. Plan to remove chest tube today and repeat CXR this afternoon. Recommend imaging in the next week post discharge.     1. Chest tube management:Continues to have trapped lung with pneumothorax. Will remove chest tube today. Repeat CXR. Will need follow up imaging after discharge.   -Chest tube removed  -CXR in the afternoon  -Follow up CXR on 11/2  -Follow up in Pulmonology clinic    2. Exudative pleural effusion. Negative infectious  workup. Unclear etiology if this is a manifestation of GVH vs new malignancy.    We will continue to follow with you. Please do not hesitate to contact us with questions.     Staffed with Dr. Perlman.     Flori Newman MD  Internal Medicine Resident PGY2         Subjective:   No acute events overnight. Has had around 110ml output in 24 hrs.         Physical Exam:   Temp:  [97.7  F (36.5  C)-98.6  F (37  C)] 98.6  F (37  C)  Pulse:  [72-90] 79  Resp:  [16-18] 16  BP: (109-124)/(75-89) 111/75  SpO2:  [92 %-95 %] 93 %      Gen:   No acute distress. Sitting up in bed.      HEENT:   Anicteric sclerae. No oral lesions     Lungs:   Absent breath sounds on the left. Basilar crackles on the right.       Cardiovascular:    Normal S1 and S2.  RRR.  No murmur, gallop or rub.     Abdomen:   Soft, ND, NT with active BS.      Extremities:    No Edema.      Neurologic:    Alert and conversant.      Skin:    Warm, dry.  No rash on exposed skin.      CT drained red-brown watery liquid rapidly when on gravity. Clamped.          Data:   All laboratory and imaging data reviewed.      Pleural fluid analysis 10/26/2020     Ref. Range 10/23/2020 13:30   Body Fluid Analysis Source Unknown Pleural fluid   Color Fluid Unknown Brown   Appearance Fluid Unknown Cloudy   WBC Fluid Latest Units: /uL 121   Amylase Fluid Latest Units: U/L 18   Cholesterol Fluid Latest Units: mg/dL 98   Glucose Fluid Latest Units: mg/dL 34   Lactate Dehydrogenase Fluid Latest Units: U/L 521   Ph Fluid Latest Units: pH 7.4   Protein Total Fluid Latest Units: g/dL 4.4   Triglyceride Fluid Latest Units: mg/dL 79     Pleural fluid cytology x1 Negative for malignant cells  Serum LDH-232  Serum total protein-7.4    Chest CT 10/25/2020  1. Slight increased aeration of the left lung with slight decrease  size of the left-sided hydropneumothorax with a large pleural effusion  component and now a few small foci of air. Left basilar chest tube in  place.  2. Right lung  demonstrates scattered peribronchovascular areas of  consolidation which are similar to exam 10/22/2020. Trace right-sided  pleural effusion.     CXR 10/27  Trapped lung. Persistent pneumothorax. Blunting of right costrophrenic angle. Left sided effusion resolved.     CXR 10/28  No significant change in ptx

## 2020-10-29 NOTE — PROGRESS NOTES
CLINICAL NUTRITION SERVICES    Reviewed nutrition risk factors due to LOS. Pt is tolerating diet, eating well per nursing documentation, flowsheet, and meal ordering system. Weight down from admit weight, likely related to fluid shifts. No nutrition issues identified at this time. RD will follow via rounds at this time, unless consulted.     Brittany Benz RD, LD  5C/BMT RD pager [phone]

## 2020-10-29 NOTE — PLAN OF CARE
/84   Pulse 74   Temp 97.7  F (36.5  C) (Oral)   Resp 18   Ht 1.829 m (6')   Wt 95.8 kg (211 lb 1.6 oz)   SpO2 94%   BMI 28.63 kg/m        S: No acute events 0700 - 1900    B:  60yo M s/p allo BMT for MDS in May 2016, readmitted with large left pleural effusion    A: Afebrile, VSS, sats 94% on 2.5 Liters. Patient up independently. Denies nausea, vomiting. Received percocet x1 for pain around drain, to good effect. Patient eating well. Endorses feeling better and would like to go home.     R: No further nursing concerns, continue plan of care.

## 2020-10-29 NOTE — DISCHARGE SUMMARY
[facility] Discharge Summary   Byron Russo MRN# [id number]   Age: 59 year old  YOB: 1960   Date of Admission: 10/22/2020  Date of Discharge:  10/29/2020  Admitting Physician: Bala Mixon MD  Discharge Physician:  Dr. Eduardo Chan  Discharge Diagnoses:    1. Large left sided pleural effusion, exudative, but cause unknown  2. Left sided pneumothorax  3. Pleural Parenchymal Fibroelastosis  4. 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 Hypothyroidism  7. Hyperlipidemia  8. Hypertension  Discharge Medications:       Yg Russo   Home Medication Instructions TESS:[id number]    Printed on:10/29/20 1110   Medication Information                      acetaminophen (TYLENOL) 325 MG tablet  Take 1-2 tablets (325-650 mg) by mouth every 4 hours as needed for mild pain or fever             acyclovir (ZOVIRAX) 800 MG tablet  Take 1 tablet (800 mg) by mouth 2 times daily             artificial saliva (BIOTENE MT) SOLN solution  Swish and spit 2 mLs (2 sprays) in mouth every hour as needed for dry mouth             aspirin 81 MG EC tablet  Take 81 mg by mouth daily              cycloSPORINE modified (GENERIC EQUIVALENT) 25 MG capsule  Take 1 capsule (25 mg) by mouth 2 times daily             fluconazole (DIFLUCAN) 100 MG tablet  Take 1 tablet (100 mg) by mouth daily             fluorometholone 0.1 % OP ophthalmic suspension  Place 1 drop into both eyes 4 times daily             levofloxacin (LEVAQUIN) 250 MG tablet  Take 1 tablet (250 mg) by mouth daily             levothyroxine (SYNTHROID/LEVOTHROID) 150 MCG tablet  Take 1 tablet (150 mcg) by mouth daily             lifitegrast 5 % OP opthalmic solution  Place 1 drop into both eyes 2 times daily             magnesium oxide (MAG-OX) 400 (241.3 Mg) MG tablet  Take 2 tablets (800 mg) by mouth daily             metoprolol tartrate (LOPRESSOR) 25 MG tablet  Take 1 tablet (25 mg) by mouth daily             order for DME  Equipment being  ordered: Please provide portable oxygen at 2 LPM with activity and with sleep via nasal canula. Patient requesting small tanks he can wear with back pack for his work.             order for DME  Equipment being ordered: Oxygen. Please provide patient with continuous flow oxygen at 3 LPM via nasal cannula. Patient will need concentrator, conserving device, and portable oxygen. Length of need is up to 6 months; will continue to reassess. Patient will need transportation oxygen delivered to the [facility], [address] [place] [id number]. [address], Room 5429.             order for DME  Equipment being ordered: Oxygen. Please provide patient with continuous flow oxygen at 3 LPM via nasal cannula for activity and while sleeping. Patient will need concentrator, conserving device, and portable oxygen.             pilocarpine (SALAGEN) 5 MG tablet  Take 1 tablet (5 mg) by mouth 2 times daily             predniSONE (DELTASONE) 20 MG tablet  Take 2 tablets (40 mg) by mouth daily             rosuvastatin (CRESTOR) 5 MG tablet  Take 1 tablet (5 mg) by mouth daily             sulfamethoxazole-trimethoprim (BACTRIM DS) 800-160 MG tablet  Take 1 tablet by mouth 2 times daily On Mondays and Tuesdays only               Brief History of Illness:    **Adopted from H&P  Byron Russo is a 59 year old male who has a past medical history of allogenic related donor transplant secondary to MDS.  Patient's course has been complicated by yvotr-fyazpz-qzic disease which is chronic and pleural-parenchymal fibroelastosis.  Patient has been worked up for worsening acute hypoxic respiratory failure which now appears to be chronic.  Patient has had a complicated course including coronavirus and Enterococcus faecalis pneumonia.  Patient had several dose of steroids and is currently on low dose therapy.     Most recently the patient complained since August he has been having right lateral chest pain that radiates to his  back underneath the scapula.  He states it is a constant diffuse pain that is unremitting.  Patient has worsening shortness of breath however is currently on room air on admission.  Previous notes indicate that patient was on 2 L of nasal cannula.  Patient does have a cough that is nonproductive.  He additionally endorses postnasal drip and seasonal allergies.  No fevers.     Hospital Course:    Summary: Yg was admitted on 10/22/20 after called by radiology with result from CT chest showing large left pleural effusion with total collapse of the left lung and mass effect on the mediastinum. Most recently he has been followed by BMT and pulmonology for increasing dyspnea on exertion over the past few months. No acute change in symptoms on admission. In fact he was on room air. No fevers or cold symptoms. A COVID test was negative. He was seen by pulmonary and thoracic surgery. A chest tube was placed on 10/23 due to inability to drain the large effusion all at once. The effusion was drained over about 4 days, and limited volume removal each day due to development of symptoms of cough and chest pain, concerning for pulmonary expansion edema. Once all the fluid was removed, he had a remaining large pneumothorax on the left side. The fluid was consistent with an exudate. Infectious testing of the fluid was negative. Cause of the pleural effusion was unclear, and unlikely to be related to his underlying lung disease. We increased his prednisone from 15 to 40mg daily with goal of preventing or slowing reaccumulation of an effusion. Thoracic surgery reviewed his case and recommend no interventions at this time. They wouldn't consider a decortication for him due to underlying lung disease, which would likely prohibit a success from the procedure. The pulmonary team recommended removing the chest tube with a repeat CXR a few hours later, then discharge without a chest tube in place. He will be seen as an outpatient on Monday  for labs, repeat CXR and provider visit. He will then be seen by pulmonology as an outpatient in two weeks. If he develops any exacerbation of dyspnea, recommend CXR to see if effusion has reaccumulated. He is requiring 3L of O2 with activity, but on room air at rest. He has an O2 concentrator at home that he purchased himself, and prefers to use this over insurance-covered rental equipment. He will also purchase his own portable oxygen tank that will be shipped overnight to arrive tomorrow.    See additional details below:    1. BMT  - s/p allo sib BMT for RAEB-2 MDS in CR     2. Heme  - baby ASA      3. ID  - s/p zosyn for poss infectious etiology of effusion, stopped with negative pleural fluid cultures  - COVID negative on admission 10/23  - prophy: cont leva, fluc and bactrim. Per patient, Nicolas Jara advised discontinuation of fluconazole prior to admission - however being he is still on CSA and pred, prefer to continue for now and can revisit on discharge      4. 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 On CSA 25mg BID and pred 15mg daily - increased pred to 40mg daily for inflammatory pleural effusion  - eye gtts: refresh (not using other gtts because he wears corrective contact lenses, not GVHD lenses  - not using dex s/s due to hx of thrush     5. Pulm  - pulmonary team following, appreciate recs  - new large left pleural effusion with total collapse of left lung and mass effect on mediastinum. Pulm placed pigtail catheter 10/23 (due to large volume fluid, can't all be drained in one day via thoracentesis). Effusion is exudative, infectious testing negative, cause unknown. Repeat CXR 10/27 showed resolution of pleural effusion, but large left pneumothorax vs trapped lung. Per thoracic surgery, not a candidate for decortication due to underlying lung disease. Pulmonary recommends removal of chest tube this morning, repeat CXR in the afternoon, then discharge to home. No plan for serial CXR to monitor, will  obtain CXR on Monday at BMT clinic follow up, then set up for follow up in pulmonary clinic in 2-3 weeks (requested appt with Dr. Brady mid November. 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 Serositis? Increased pred from 15 to 40mg daily starting 10/27     Home O2:  Yg previously purchased an O2 concentrator through [employer] in [place]. He prefers to also purchase a portable O2 tank - [employer] confirmed they can overnight this to him for arrival on 10/30. He qualifies for insurance coverage of home O2, but he prefers to buy it himself instead of using rental equipment ([employer]) that would be covered by insurance. Recommend wearing 3L O2 per nc with ambulation. No requirement for O2 at rest, but can wear 2L for comfort if symptomatic     6. Endo  - on synthroid     7. Pilocarpine for xerostoma     8. CV  - on ASA, crestor, metoprolol     9. FEN/renal  - regular diet  - Cr stable with repeat chest tube drainage  - on PO mag 800mg/d     Addendum: Repeat CXR a couple hours after chest tube removal on 10/29 shows some reaccumulation of fluid. Reviewed this with pulmonary. We should anticipate reaccumulation of fluid. The question is how quickly, and will he have symptoms? On Monday in clinic for follow up, he will likely have more fluid on CXR. Question is, do we need to drain it or not? He really wasn't very symptomatic on admission, so might not feel differently if it reaccumulates. Criteria to help decide: certainly if he has increased dyspnea, would pursue thoracentesis (IR can do same day or next day thora, or could contact interventional pulmonary also as a second option). If he has full compression of left lung, would get thora (right now has an aerated superior aspect of left lung), or if mediastinal shift, would get thora. Can page pulm  for recs, as well.     CODE STATUS: Full Code  Discharge Instructions and Follow-Up:    Discharge diet: Regular diet as tolerated  Discharge activity: Activity as tolerated   Discharge follow-up: Follow up with BMT Clinic as follows:  1. Monday, 11/2 with CXR at 7:30am, BMT lab appt at 7:45am, provider visit at 8:30am and pharmacist appt for medication review at 9:30am for labs  2. Thursday, 11/5 check in at 2:30 for labs and 3pm visit with Dr. Enciso     Pulmonary   - Tuesday, 11/17 follow up with Dr. Brady at 4pm  Discharge Disposition:    Discharged to home.

## 2020-10-29 NOTE — SUMMARY OF CARE
Discharge Medications    Changes to medications  1. Increased prednisone dose from 15mg to 40mg daily  2. Recommend continuation of fluconazole due to increased dose of steroid     Yg Russo   Home Medication Instructions TESS:[id number]    Printed on:10/29/20 3042   Medication Information                      acetaminophen (TYLENOL) 325 MG tablet  Take 1-2 tablets (325-650 mg) by mouth every 4 hours as needed for mild pain or fever             acyclovir (ZOVIRAX) 800 MG tablet  Take 1 tablet (800 mg) by mouth 2 times daily             artificial saliva (BIOTENE MT) SOLN solution  Swish and spit 2 mLs (2 sprays) in mouth every hour as needed for dry mouth             aspirin 81 MG EC tablet  Take 81 mg by mouth daily              cycloSPORINE modified (GENERIC EQUIVALENT) 25 MG capsule  Take 1 capsule (25 mg) by mouth 2 times daily             fluconazole (DIFLUCAN) 100 MG tablet  Take 1 tablet (100 mg) by mouth daily             fluorometholone 0.1 % OP ophthalmic suspension  Place 1 drop into both eyes 4 times daily             levofloxacin (LEVAQUIN) 250 MG tablet  Take 1 tablet (250 mg) by mouth daily             levothyroxine (SYNTHROID/LEVOTHROID) 150 MCG tablet  Take 1 tablet (150 mcg) by mouth daily             lifitegrast 5 % OP opthalmic solution  Place 1 drop into both eyes 2 times daily             magnesium oxide (MAG-OX) 400 (241.3 Mg) MG tablet  Take 2 tablets (800 mg) by mouth daily             metoprolol tartrate (LOPRESSOR) 25 MG tablet  Take 1 tablet (25 mg) by mouth daily             order for DME  Equipment being ordered: Please provide portable oxygen at 2 LPM with activity and with sleep via nasal canula. Patient requesting small tanks he can wear with back pack for his work.             order for DME  Equipment being ordered: Oxygen. Please provide patient with continuous flow oxygen at 3 LPM via nasal cannula. Patient will need concentrator, conserving device, and portable oxygen.  Length of need is up to 6 months; will continue to reassess. Patient will need transportation oxygen delivered to the \[facility]\"", [facility], [address] [place] [id number]. [address], Room 5429.             order for DME  Equipment being ordered: Oxygen. Please provide patient with continuous flow oxygen at 3 LPM via nasal cannula for activity and while sleeping. Patient will need concentrator, conserving device, and portable oxygen.             pilocarpine (SALAGEN) 5 MG tablet  Take 1 tablet (5 mg) by mouth 2 times daily             predniSONE (DELTASONE) 20 MG tablet  Take 2 tablets (40 mg) by mouth daily             rosuvastatin (CRESTOR) 5 MG tablet  Take 1 tablet (5 mg) by mouth daily             sulfamethoxazole-trimethoprim (BACTRIM DS) 800-160 MG tablet  Take 1 tablet by mouth 2 times daily On Mondays and Tuesdays only

## 2020-10-29 NOTE — PLAN OF CARE
Status: Patient admitted for L pleural effusion.   VS: VSS on 2.5L NC.   Neuros: A&Ox4.  GI/: Tolerating regular diet. Denies nausea. LBM 10/28,=. Voiding spontaneously.   IV: No PIV.   Activity: Up independently.   Pain: CT site pain controlled w/ hot packs.   Respiratory/Trach: CUELLAR. L LS diminished. Infrequent cough.   Skin: L CT to WS w/ air leak.   Plan of Care: Continue to monitor and follow POC.

## 2020-10-29 NOTE — PROGRESS NOTES
Care Management Discharge Note    Discharge Planning:  Expected Discharge Date: 10/29/20  Expected Time of Departure: 14:30  Concerns to be Addressed: no discharge needs identified       Anticipated Discharge Disposition: Home  Anticipated Discharge Services:    Anticipated Discharge DME:      Patient/family educated on Medicare website which has current facility and service quality ratings: no  Referrals Placed by CM/SW: Financial Services  Education Provided on the Discharge Plan:    Patient/Family in Agreement with the Plan: yes     Disposition Comments:      Additional Information:  CSW met with the pt to discuss his questions prior to discharge today. The pt shared that he is very happy to be able to return home today. The pt discussed that he had some questions regarding applying for SSDI. He notes that he will be unable to work now and will be putting his notice in. He feels that applying for SSDI would be the best and we discussed this process. He wonders if he should apply with the help of a  and CSW discussed that he could decide this, but we talked over the pros and cons of this. The pt notes that overall he is aware that his limited lung function will hinder his ability to continue his work and now that he will need continues O2 he knows this is the time to stop. He feels prepared to take this step, although it will take some adjusting and he worried what he will do each day to help pass the time.    AYANNA Melo, [facility]  Phone [phone]  Pager [phone]

## 2020-10-29 NOTE — PLAN OF CARE
DISCHARGE                         No discharge date for patient encounter.  ----------------------------------------------------------------------------  Discharged to: Home  Via: private transportation  Accompanied by: Daughter  Discharge Instructions: diet, activity, medications, follow up appointments, when to call the MD, aftercare instruction reviewed, pt states understanding  Prescriptions: To be filled by hospital discharge pharmacy; medication list reviewed & sent with pt  Follow Up Appointments: arranged; information given  Belongings: All sent with pt    Pt exhibits understanding of above discharge instructions; all questions answered.    Discharge Paperwork: Signed, copied, and sent home with patient.

## 2020-10-30 NOTE — TELEPHONE ENCOUNTER
POST HOSPITAL DISCHARGE FOLLOW-UP PHONE CALL    Follow-Up Type:  Hospital Discharge - Follow-Up Call  Date of Admission:  10/22/20  Date of Discharge:  10/29/20  Discharge Diagnosis:   1. Large left sided pleural effusion, exudative, but cause unknown  2. Left sided pneumothorax  3. Pleural Parenchymal Fibroelastosis  Next BMT Follow-up Visit:  11/2: 730 CXR, 745 labs, 830 BRENDA, 930 Pharmacist  Discharging Provider:  Ryan Chan  Primary BMT Physician:  Uli Enciso    Summary of Encounter:  Contacted patient via phone to conduct follow-up assessment post recent hospital discharge. Patient verbalized that he received and understands written post discharge care instructions, has a current medication list, and contact phone numbers. Patient understands to call BMT office @ [phone] during office hours Mon-Fri 8am-4:30pm, and [phone] after hours to report symptoms. Patient verbalized that he has all necessary medications, has no questions regarding their administration instructions, and is currently taking them without difficulty. Patient was reminded of next follow-up visit in BMT clinic as listed above. Patient does not report any new symptoms or concerns, and has no further questions at this time.      Brianda Gunn, RN, BSN  RN Care Coordinator - Inpatient BMT, [address]  Ph [phone]  Pg [phone]

## 2020-11-02 NOTE — NURSING NOTE
Chief Complaint   Patient presents with     Blood Draw     Labs drawn via VPT by RN in lab. VS taken. Pt checked in for next appt     Labs collected from venipuncture by RN. Vitals taken. Checked in for appointment(s).    Alyssia ROQUE RN PHN BSN  BMT/Oncology Lab

## 2020-11-02 NOTE — PROGRESS NOTES
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 He is otherwise on 2L home O2 but his portable tank requires 2.5L. Feels better than when he was admitted to the hospital. He doesn't feel he's at the point of needing his left lung drained. No fevers. Coughing but less than when admitted. No n/v. Eating/drinking okay.       ROS: 6 point ROS negative except as stated above    Physical Exam  /87 (BP Location: Right arm, Patient Position: Sitting, Cuff Size: Adult Large)   Pulse 84   Temp 98.1  F (36.7  C) (Oral)   Resp 16   Wt 98.8 kg (217 lb 12.8 oz)   SpO2 94%   BMI 29.54 kg/m    Gen: A&O, NAD  OP: Mucosa dry without lesions  Pulm: breathing comfortably on 2.5L per nasal cannula. Minimal air movement L lung field. Few crackles R lung field. Previous chest tube site, c/d/i. No dressing  CV: RRR  Abd: +BS, soft, nontender  Extremities: no edema  Skin: no rash on exposed skin  Access: no access    Labs  Lab Results   Component Value Date    WBC 15.2 (H) 11/02/2020    ANEU 12.0 (H) 11/02/2020    HGB 16.3 11/02/2020    HCT 50.3 11/02/2020     11/02/2020     11/02/2020    POTASSIUM 4.2 11/02/2020    CHLORIDE 104 11/02/2020    CO2 29 11/02/2020    GLC 99 11/02/2020    BUN 16 11/02/2020    CR 0.75 11/02/2020    MAG 2.0 02/27/2020    INR 1.28 (H) 10/22/2020    BILITOTAL 0.4 11/02/2020    AST 25 11/02/2020    ALT 36 11/02/2020    ALKPHOS 110 11/02/2020    PROTTOTAL 7.5 11/02/2020    ALBUMIN 3.2 (L) 11/02/2020     Imaging:   CT w/o contrast 10/25:   1. Slight increased aeration of the left lung with slight decrease  size of the left-sided hydropneumothorax with a large pleural effusion  component and now a few small foci of air. Left basilar chest tube in  place.  2. Right lung demonstrates scattered peribronchovascular areas  of  consolidation which are similar to exam 10/22/2020. Trace right-sided  pleural effusion. Findings may represent organizing pneumonia,  infection, gfofx-mkzdpy-jpsa disease, drug reaction or other etiology.  3. 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 Heme  - baby ASA     3. ID  - s/p zosyn for poss infectious etiology of effusion, stopped with negative pleural fluid cultures  - COVID negative on admission 10/23  - prophy: cont leva, fluc and bactrim.   - blood culture ordered for 11/5 (elevated WBC however likely 2/2 steroids)    4. 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 On CSA 25mg BID and pred 15mg daily - increased pred to 40mg daily for inflammatory pleural effusion  - eye gtts: refresh (not using other gtts because he wears corrective contact lenses, not GVHD lenses  - not using dex s/s due to hx of thrush    5. Pulm  - pulmonary team following, appreciate recs  - new large left pleural effusion with total collapse of left lung and mass effect on mediastinum. Pulm placed pigtail catheter 10/23 (due to large volume fluid, can't all be drained in one day via thoracentesis). Effusion is exudative, infectious testing negative, cause unknown. Repeat CXR 10/27 showed resolution of pleural effusion, but large left pneumothorax vs trapped lung. Per thoracic surgery, not a candidate for decortication due to underlying lung disease. Pulmonary removed chest tube with repeat CXR showing re-accumulation of fluid. Today CXR shows increased fluid however Yg is not more symptomatic. Per Pulm inpt discussion, he will always re-accumulate however need to sort out rate. Plan to get repeat cxr on Thursday.  Question is, do we need to drain it or not? He really wasn't very symptomatic on admission, so might not feel differently if it  reaccumulates. Criteria to help decide: certainly if he has increased dyspnea, would pursue thoracentesis (IR can do same day or next day thora, or could contact interventional pulmonary also as a second option). If he has full compression of left lung, would get thora (right now has an aerated superior aspect of left lung), or if mediastinal shift, would get thora  -Yg is also set up with Dr. Brady Nov 17. 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 Serositis? Increased pred from 15 to 40mg daily starting 10/27    Home O2:  Yg previously purchased an O2 concentrator through [employer] in [place]. He prefers to also purchase a portable O2 tank - [employer] confirmed they can overnight this to him for arrival on 10/30. He qualifies for insurance coverage of home O2, but he prefers to buy it himself instead of using rental equipment ([employer]) that would be covered by insurance. Recommend wearing 3L O2 per nc with ambulation. No requirement for O2 at rest, but can wear 2L for comfort if symptomatic    6. Endo  - on synthroid    7. Pilocarpine for xerostoma    8. CV  - on ASA, crestor, metoprolol    9. FEN/renal  - regular diet  - Cr stable with repeat chest tube drainage  - on PO mag 800mg/d     RTC  - Dr. Enciso 11/5 - question if we should increase CSA dose? Repeat CXR and blood culture.  - Dr. Brady 11/17      Jesi Giles NP

## 2020-11-02 NOTE — PROGRESS NOTES
I met with Byron Russo to review his medication list after hospital discharge post-transplant. Byron Russo and his caregiver had the opportunity to ask questions regarding his therapy which were answered to their satisfaction. We specifically discussed the following items:    1. BMT: [id number] s/p allo BMT for MDS  2. ID prophy: acyclovir, fluconazole, levofloxacin, Bactrim  3: cGVHD: prednisone 40mg daily, CSA 25mg bid,    4: GI: no prophy per pt request  5: Other: on pilocarpine for dry mouth    Changes made to scheduled medication list by today's provider include:  Added: none  Deleted: none  Changed: none    Pt wanted to talk to pharmacist about timing of medications: fluconazole, levofloxacin, magnesium, rosuvastatin.  I advised to separate levofloxacin from magnesium (also calcium, MVI, antacids) by at least 2 hours.  Rosuvastatin can be taken at any time of day.  He also wanted to know if he could try Claritin or similar agent for post-nasal drip.  I stated that it would likely cause worse dry mouth and work against pilocarpine.     Current Outpatient Medications   Medication Sig Dispense Refill     acetaminophen (TYLENOL) 325 MG tablet Take 1-2 tablets (325-650 mg) by mouth every 4 hours as needed for mild pain or fever       acyclovir (ZOVIRAX) 800 MG tablet Take 1 tablet (800 mg) by mouth 2 times daily 180 tablet 1     aspirin 81 MG EC tablet Take 81 mg by mouth daily Reported on 5/12/2017       cycloSPORINE modified (GENERIC EQUIVALENT) 25 MG capsule Take 1 capsule (25 mg) by mouth 2 times daily 180 capsule 1     fluconazole (DIFLUCAN) 100 MG tablet Take 1 tablet (100 mg) by mouth daily 90 tablet 1     levofloxacin (LEVAQUIN) 250 MG tablet Take 1 tablet (250 mg) by mouth daily 90 tablet 1     levothyroxine (SYNTHROID/LEVOTHROID) 150 MCG tablet Take 1 tablet (150 mcg) by mouth daily 90 tablet 1     magnesium oxide (MAG-OX) 400 (241.3 Mg) MG tablet Take 2 tablets (800 mg) by mouth daily        metoprolol tartrate (LOPRESSOR) 25 MG tablet Take 1 tablet (25 mg) by mouth daily 90 tablet 1     order for DME Equipment being ordered: Oxygen. Please provide patient with continuous flow oxygen at 3 LPM via nasal cannula for activity and while sleeping. Patient will need concentrator, conserving device, and portable oxygen. (Patient not taking: Reported on 11/2/2020) 1 each 0     order for DME Equipment being ordered: Oxygen. Please provide patient with continuous flow oxygen at 3 LPM via nasal cannula. Patient will need concentrator, conserving device, and portable oxygen. Length of need is up to 6 months; will continue to reassess. Patient will need transportation oxygen delivered to the [facility], [address] [place] [address]. [address], Room 54[address]. 1 each 0     order for DME Equipment being ordered: Please provide portable oxygen at 2 LPM with activity and with sleep via nasal canula. Patient requesting small tanks he can wear with back pack for his work. 1 Device 0     pilocarpine (SALAGEN) 5 MG tablet Take 1 tablet (5 mg) by mouth 2 times daily 180 tablet 1     predniSONE (DELTASONE) 20 MG tablet Take 2 tablets (40 mg) by mouth daily 60 tablet 1     rosuvastatin (CRESTOR) 5 MG tablet Take 1 tablet (5 mg) by mouth daily 90 tablet 1     sulfamethoxazole-trimethoprim (BACTRIM DS) 800-160 MG tablet Take 1 tablet by mouth 2 times daily On Monday's and Tuesday's only 48 tablet 1        Pertinent labs considered today:  Lab Results   Component Value Date     11/02/2020                 normal sodium 136-148  Lab Results   Component Value Date    POTASSIUM 4.2 11/02/2020       normal potassium 3.5-5.2   Lab Results   Component Value Date    CHLORIDE 104 11/02/2020           normal chloride    Lab Results   Component Value Date    CO2 29 11/02/2020                normal CO2 20-32  Lab Results   Component Value Date    BUN 16 11/02/2020                 normal BUN 5-24  Lab  Results   Component Value Date    GLC 99 11/02/2020                 normal glucose   Lab Results   Component Value Date    CR 0.75 11/02/2020                 normal cr 0.8-1.5  Lab Results   Component Value Date    TRACE 9.0 11/02/2020               normal calcium  8.5-10.4  Lab Results   Component Value Date    BILITOTAL 0.4 11/02/2020          normal bilirubin 0.2-1.3  Lab Results   Component Value Date    PROTTOTAL 7.5 11/02/2020          normal total protein 6.0-8.2  Lab Results   Component Value Date    ALBUMIN 3.2 11/02/2020             normal albumin 3.2-4.5  Lab Results   Component Value Date    ALKPHOS 110 11/02/2020            normal alkphos   Lab Results   Component Value Date    ALT 36 11/02/2020         normal ALT 0-65  Lab Results   Component Value Date    AST 25 11/02/2020         normal AST 0-37  Lab Results   Component Value Date    HGB 16.3 11/02/2020     Lab Results   Component Value Date    WBC 15.2 11/02/2020      Lab Results   Component Value Date     11/02/2020     Estimated Creatinine Clearance: 132.3 mL/min (based on SCr of 0.75 mg/dL).      Nikunj Cruz, [facility], PharmD

## 2020-11-02 NOTE — NURSING NOTE
Oncology Rooming Note    November 2, 2020 9:00 AM   Byron Russo is a 59 year old male who presents for:    Chief Complaint   Patient presents with     Blood Draw     Labs drawn via VPT by RN in lab. VS taken. Pt checked in for next appt     Oncology Clinic Visit     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)      Initial Vitals: /87 (BP Location: Right arm, Patient Position: Sitting, Cuff Size: Adult Large)   Pulse 84   Temp 98.1  F (36.7  C) (Oral)   Resp 16   Wt 98.8 kg (217 lb 12.8 oz)   SpO2 94%   BMI 29.54 kg/m   Estimated body mass index is 29.54 kg/m  as calculated from the following:    Height as of 10/22/20: 1.829 m (6').    Weight as of this encounter: 98.8 kg (217 lb 12.8 oz). Body surface area is 2.24 meters squared.  No Pain (0) Comment: Data Unavailable   No LMP for male patient.  Allergies reviewed: Yes  Medications reviewed: Yes    Medications: Medication refills not needed today.  Pharmacy name entered into [employer]: [facility] [place], [place] - [address] [place] [address] [phone]    Clinical concerns: No new concerns today.       Nusrat Zayas MA

## 2020-11-02 NOTE — LETTER
11/2/2020         RE: Byron Russo  [address] [facility] [address]        Dear Colleague,    Thank you for referring your patient, Byron Russo, to the [facility] BLOOD AND MARROW TRANSPLANT PROGRAM [place]. Please see a copy of my visit note below.    I met with Byron Russo to review his medication list after hospital discharge post-transplant. Byron Russo and his caregiver had the opportunity to ask questions regarding his therapy which were answered to their satisfaction. We specifically discussed the following items:    1. BMT: [id number] s/p allo BMT for MDS  2. ID prophy: acyclovir, fluconazole, levofloxacin, Bactrim  3: cGVHD: prednisone 40mg daily, CSA 25mg bid,    4: GI: no prophy per pt request  5: Other: on pilocarpine for dry mouth    Changes made to scheduled medication list by today's provider include:  Added: none  Deleted: none  Changed: none    Pt wanted to talk to pharmacist about timing of medications: fluconazole, levofloxacin, magnesium, rosuvastatin.  I advised to separate levofloxacin from magnesium (also calcium, MVI, antacids) by at least 2 hours.  Rosuvastatin can be taken at any time of day.  He also wanted to know if he could try Claritin or similar agent for post-nasal drip.  I stated that it would likely cause worse dry mouth and work against pilocarpine.     Current Outpatient Medications   Medication Sig Dispense Refill     acetaminophen (TYLENOL) 325 MG tablet Take 1-2 tablets (325-650 mg) by mouth every 4 hours as needed for mild pain or fever       acyclovir (ZOVIRAX) 800 MG tablet Take 1 tablet (800 mg) by mouth 2 times daily 180 tablet 1     aspirin 81 MG EC tablet Take 81 mg by mouth daily Reported on 5/12/2017       cycloSPORINE modified (GENERIC EQUIVALENT) 25 MG capsule Take 1 capsule (25 mg) by mouth 2 times daily 180 capsule 1     fluconazole (DIFLUCAN) 100 MG tablet Take 1 tablet (100 mg) by mouth daily 90 tablet 1      levofloxacin (LEVAQUIN) 250 MG tablet Take 1 tablet (250 mg) by mouth daily 90 tablet 1     levothyroxine (SYNTHROID/LEVOTHROID) 150 MCG tablet Take 1 tablet (150 mcg) by mouth daily 90 tablet 1     magnesium oxide (MAG-OX) 400 (241.3 Mg) MG tablet Take 2 tablets (800 mg) by mouth daily       metoprolol tartrate (LOPRESSOR) 25 MG tablet Take 1 tablet (25 mg) by mouth daily 90 tablet 1     order for DME Equipment being ordered: Oxygen. Please provide patient with continuous flow oxygen at 3 LPM via nasal cannula for activity and while sleeping. Patient will need concentrator, conserving device, and portable oxygen. (Patient not taking: Reported on 11/2/2020) 1 each 0     order for DME Equipment being ordered: Oxygen. Please provide patient with continuous flow oxygen at 3 LPM via nasal cannula. Patient will need concentrator, conserving device, and portable oxygen. Length of need is up to 6 months; will continue to reassess. Patient will need transportation oxygen delivered to the [facility], [address] [place] [address]. [address], Room 5429. 1 each 0     order for DME Equipment being ordered: Please provide portable oxygen at 2 LPM with activity and with sleep via nasal canula. Patient requesting small tanks he can wear with back pack for his work. 1 Device 0     pilocarpine (SALAGEN) 5 MG tablet Take 1 tablet (5 mg) by mouth 2 times daily 180 tablet 1     predniSONE (DELTASONE) 20 MG tablet Take 2 tablets (40 mg) by mouth daily 60 tablet 1     rosuvastatin (CRESTOR) 5 MG tablet Take 1 tablet (5 mg) by mouth daily 90 tablet 1     sulfamethoxazole-trimethoprim (BACTRIM DS) 800-160 MG tablet Take 1 tablet by mouth 2 times daily On Monday's and Tuesday's only 48 tablet 1        Pertinent labs considered today:  Lab Results   Component Value Date     11/02/2020                 normal sodium 136-148  Lab Results   Component Value Date    POTASSIUM 4.2 11/02/2020       normal  potassium 3.5-5.2   Lab Results   Component Value Date    CHLORIDE 104 11/02/2020           normal chloride    Lab Results   Component Value Date    CO2 29 11/02/2020                normal CO2 20-32  Lab Results   Component Value Date    BUN 16 11/02/2020                 normal BUN 5-24  Lab Results   Component Value Date    GLC 99 11/02/2020                 normal glucose   Lab Results   Component Value Date    CR 0.75 11/02/2020                 normal cr 0.8-1.5  Lab Results   Component Value Date    TRACE 9.0 11/02/2020               normal calcium  8.5-10.4  Lab Results   Component Value Date    BILITOTAL 0.4 11/02/2020          normal bilirubin 0.2-1.3  Lab Results   Component Value Date    PROTTOTAL 7.5 11/02/2020          normal total protein 6.0-8.2  Lab Results   Component Value Date    ALBUMIN 3.2 11/02/2020             normal albumin 3.2-4.5  Lab Results   Component Value Date    ALKPHOS 110 11/02/2020            normal alkphos   Lab Results   Component Value Date    ALT 36 11/02/2020         normal ALT 0-65  Lab Results   Component Value Date    AST 25 11/02/2020         normal AST 0-37  Lab Results   Component Value Date    HGB 16.3 11/02/2020     Lab Results   Component Value Date    WBC 15.2 11/02/2020      Lab Results   Component Value Date     11/02/2020     Estimated Creatinine Clearance: 132.3 mL/min (based on SCr of 0.75 mg/dL).      Nikunj Cruz, [facility], PharmD      Again, thank you for allowing me to participate in the care of your patient.        Sincerely,        BMT Pharm D, RP

## 2020-11-02 NOTE — LETTER
11/2/2020         RE: Byron Russo  [address] [facility] [address]        Dear Colleague,    Thank you for referring your patient, Byron Russo, to the [facility] BLOOD AND MARROW TRANSPLANT PROGRAM [place]. Please see a copy of my visit note below.    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 He is otherwise on 2L home O2 but his portable tank requires 2.5L. Feels better than when he was admitted to the hospital. He doesn't feel he's at the point of needing his left lung drained. No fevers. Coughing but less than when admitted. No n/v. Eating/drinking okay.       ROS: 6 point ROS negative except as stated above    Physical Exam  /87 (BP Location: Right arm, Patient Position: Sitting, Cuff Size: Adult Large)   Pulse 84   Temp 98.1  F (36.7  C) (Oral)   Resp 16   Wt 98.8 kg (217 lb 12.8 oz)   SpO2 94%   BMI 29.54 kg/m    Gen: A&O, NAD  OP: Mucosa dry without lesions  Pulm: breathing comfortably on 2.5L per nasal cannula. Minimal air movement L lung field. Few crackles R lung field. Previous chest tube site, c/d/i. No dressing  CV: RRR  Abd: +BS, soft, nontender  Extremities: no edema  Skin: no rash on exposed skin  Access: no access    Labs  Lab Results   Component Value Date    WBC 15.2 (H) 11/02/2020    ANEU 12.0 (H) 11/02/2020    HGB 16.3 11/02/2020    HCT 50.3 11/02/2020     11/02/2020     11/02/2020    POTASSIUM 4.2 11/02/2020    CHLORIDE 104 11/02/2020    CO2 29 11/02/2020    GLC 99 11/02/2020    BUN 16 11/02/2020    CR 0.75 11/02/2020    MAG 2.0 02/27/2020    INR 1.28 (H) 10/22/2020    BILITOTAL 0.4 11/02/2020    AST 25 11/02/2020    ALT 36 11/02/2020    ALKPHOS 110 11/02/2020    PROTTOTAL 7.5 11/02/2020    ALBUMIN 3.2 (L) 11/02/2020     Imaging:   CT w/o contrast 10/25:    1. Slight increased aeration of the left lung with slight decrease  size of the left-sided hydropneumothorax with a large pleural effusion  component and now a few small foci of air. Left basilar chest tube in  place.  2. Right lung demonstrates scattered peribronchovascular areas of  consolidation which are similar to exam 10/22/2020. Trace right-sided  pleural effusion. Findings may represent organizing pneumonia,  infection, gagtu-ifbzsv-fvnx disease, drug reaction or other etiology.  3. 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 Heme  - baby ASA     3. ID  - s/p zosyn for poss infectious etiology of effusion, stopped with negative pleural fluid cultures  - COVID negative on admission 10/23  - prophy: cont leva, fluc and bactrim.   - blood culture ordered for 11/5 (elevated WBC however likely 2/2 steroids)    4. 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 On CSA 25mg BID and pred 15mg daily - increased pred to 40mg daily for inflammatory pleural effusion  - eye gtts: refresh (not using other gtts because he wears corrective contact lenses, not GVHD lenses  - not using dex s/s due to hx of thrush    5. Pulm  - pulmonary team following, appreciate recs  - new large left pleural effusion with total collapse of left lung and mass effect on mediastinum. Pulm placed pigtail catheter 10/23 (due to large volume fluid, can't all be drained in one day via thoracentesis). Effusion is exudative, infectious testing negative, cause unknown. Repeat CXR 10/27 showed resolution of pleural effusion, but large left pneumothorax vs trapped lung. Per thoracic surgery, not a candidate for decortication due to underlying lung disease. Pulmonary removed chest tube with repeat CXR showing re-accumulation of fluid. Today CXR shows increased fluid however Yg  is not more symptomatic. Per Pulm inpt discussion, he will always re-accumulate however need to sort out rate. Plan to get repeat cxr on Thursday.  Question is, do we need to drain it or not? He really wasn't very symptomatic on admission, so might not feel differently if it reaccumulates. Criteria to help decide: certainly if he has increased dyspnea, would pursue thoracentesis (IR can do same day or next day thora, or could contact interventional pulmonary also as a second option). If he has full compression of left lung, would get thora (right now has an aerated superior aspect of left lung), or if mediastinal shift, would get thora  -Yg is also set up with Dr. Brady Nov 17. 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 Serositis? Increased pred from 15 to 40mg daily starting 10/27    Home O2:  Yg previously purchased an O2 concentrator through [employer] in [place]. He prefers to also purchase a portable O2 tank - [employer] confirmed they can overnight this to him for arrival on 10/30. He qualifies for insurance coverage of home O2, but he prefers to buy it himself instead of using rental equipment ([employer]) that would be covered by insurance. Recommend wearing 3L O2 per nc with ambulation. No requirement for O2 at rest, but can wear 2L for comfort if symptomatic    6. Endo  - on synthroid    7. Pilocarpine for xerostoma    8. CV  - on ASA, crestor, metoprolol    9. FEN/renal  - regular diet  - Cr stable with repeat chest tube drainage  - on PO mag 800mg/d     RTC  - Dr. Enciso 11/5 - question if we should increase CSA dose? Repeat CXR and blood culture.  - Dr. Brady 11/17      Jesi Giles NP      Again, thank you for allowing me to participate in the care of your patient.        Sincerely,        BMT Advanced Practice Provider

## 2020-11-05 NOTE — NURSING NOTE
Chief Complaint   Patient presents with     Lab Only     venipuncture, vitals checked     Fely Henry RN on 11/5/2020 at 2:41 PM

## 2020-11-05 NOTE — LETTER
11/5/2020         RE: Byron Russo  [address] [facility] [address]        Dear Colleague,    Thank you for referring your patient, Byron Russo, to the [facility] BLOOD AND MARROW TRANSPLANT PROGRAM [place]. Please see a copy of my visit note below.    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 He is otherwise on 2L home O2 but his portable tank requires 2.5L. Feels better than when he was admitted to the hospital. He doesn't feel he's at the point of needing his left lung drained. No fevers. Coughing but less than when admitted. No n/v. Eating/drinking okay. Slightly more SOB on exertion but uses no O2 while sleeping or sitting.      ROS: 6 point ROS negative except as stated above    Physical Exam  /85   Pulse 101   Temp 97.9  F (36.6  C)   Resp 20   Wt 98.4 kg (217 lb)   SpO2 93%   BMI 29.43 kg/m    Gen: A&O, NAD  OP: Mucosa dry without lesions  Pulm: breathing comfortably on 2.5L per nasal cannula. Minimal air movement L lung field. Few crackles R lung field. Previous chest tube site, c/d/i. No dressing  CV: RRR  Abd: +BS, soft, nontender  Extremities: no edema  Skin: no rash on exposed skin  Access: no access    Labs  Lab Results   Component Value Date    WBC 14.8 (H) 11/05/2020    ANEU 12.6 (H) 11/05/2020    HGB 17.2 11/05/2020    HCT 50.9 11/05/2020     11/05/2020     11/05/2020    POTASSIUM 4.3 11/05/2020    CHLORIDE 104 11/05/2020    CO2 26 11/05/2020     (H) 11/05/2020    BUN 17 11/05/2020    CR 0.82 11/05/2020    MAG 2.0 02/27/2020    INR 1.28 (H) 10/22/2020    BILITOTAL 0.4 11/02/2020    AST 25 11/02/2020    ALT 36 11/02/2020    ALKPHOS 110 11/02/2020    PROTTOTAL 7.5 11/02/2020    ALBUMIN 3.2 (L) 11/02/2020     Imaging:   CT w/o contrast 10/25:   1. Slight  increased aeration of the left lung with slight decrease  size of the left-sided hydropneumothorax with a large pleural effusion  component and now a few small foci of air. Left basilar chest tube in  place.  2. Right lung demonstrates scattered peribronchovascular areas of  consolidation which are similar to exam 10/22/2020. Trace right-sided  pleural effusion. Findings may represent organizing pneumonia,  infection, ejpjq-kcdrqj-vnsm disease, drug reaction or other etiology.  3. Heavy coronary artery calcifications     CXR pending: Increased pleural effusion left.       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 Heme  - baby ASA     3. ID  - s/p zosyn for poss infectious etiology of effusion, stopped with negative pleural fluid cultures  - COVID negative on admission 10/23  - prophy: cont leva, fluc and bactrim.   - blood culture ordered for 11/5 (elevated WBC however likely 2/2 steroids)    4. 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 On CSA 25mg BID and pred 15mg daily - increased pred to 40mg daily for inflammatory pleural effusion. Will increase CSA to 50 BID.  - eye gtts: refresh (not using other gtts because he wears corrective contact lenses, not GVHD lenses  - not using dex s/s due to hx of thrush    5. Pulm  - pulmonary team following, appreciate recs  - new large left pleural effusion with total collapse of left lung and mass effect on mediastinum. Pulm placed pigtail catheter 10/23 (due to large volume fluid, can't all be drained in one day via thoracentesis). Effusion is exudative, infectious testing negative, cause unknown. Repeat CXR 10/27 showed resolution of pleural effusion, but large left pneumothorax vs trapped lung. Per thoracic surgery, not a candidate for decortication due to underlying lung disease. Pulmonary removed chest tube with repeat CXR showing re-accumulation of  fluid. Today CXR shows increased fluid however Yg is not more symptomatic. Per Pulm inpt discussion, he will always re-accumulate however need to sort out rate. Plan to get repeat cxr on Thursday.  Question is, do we need to drain it or not? He really wasn't very symptomatic on admission, so might not feel differently if it reaccumulates. Criteria to help decide: certainly if he has increased dyspnea, would pursue thoracentesis (IR can do same day or next day thora, or could contact interventional pulmonary also as a second option). If he has full compression of left lung, would get thora (right now has an aerated superior aspect of left lung), or if mediastinal shift, would get thora  -Yg is also set up with Dr. Brady Nov 17. 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 Serositis? Increased pred from 15 to 40mg daily starting 10/27    Home O2:  Yg previously purchased an O2 concentrator through [facility] in [place]. He prefers to also purchase a portable O2 tank - [facility] confirmed they can overnight this to him for arrival on 10/30. He qualifies for insurance coverage of home O2, but he prefers to buy it himself instead of using rental equipment ([employer]) that would be covered by insurance. Recommend wearing 3L O2 per nc with ambulation. No requirement for O2 at rest, but can wear 2L for comfort if symptomatic    6. Endo  - on synthroid    7. Pilocarpine for xerostoma    8. CV  - on ASA, crestor, metoprolol    9. FEN/renal  - regular diet  - Cr stable with repeat chest tube drainage  - on PO mag 800mg/d     RTC  -11-9 with CXR and tacro level. Told to go to ER if respiratory sxs acutely worsen or increasing O2 requirements.    Uli Enciso M.D.          Again, thank you for allowing me to participate in the care of your patient.         Sincerely,        Uli Enciso MD

## 2020-11-05 NOTE — PROGRESS NOTES
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 He is otherwise on 2L home O2 but his portable tank requires 2.5L. Feels better than when he was admitted to the hospital. He doesn't feel he's at the point of needing his left lung drained. No fevers. Coughing but less than when admitted. No n/v. Eating/drinking okay. Slightly more SOB on exertion but uses no O2 while sleeping or sitting.      ROS: 6 point ROS negative except as stated above    Physical Exam  /85   Pulse 101   Temp 97.9  F (36.6  C)   Resp 20   Wt 98.4 kg (217 lb)   SpO2 93%   BMI 29.43 kg/m    Gen: A&O, NAD  OP: Mucosa dry without lesions  Pulm: breathing comfortably on 2.5L per nasal cannula. Minimal air movement L lung field. Few crackles R lung field. Previous chest tube site, c/d/i. No dressing  CV: RRR  Abd: +BS, soft, nontender  Extremities: no edema  Skin: no rash on exposed skin  Access: no access    Labs  Lab Results   Component Value Date    WBC 14.8 (H) 11/05/2020    ANEU 12.6 (H) 11/05/2020    HGB 17.2 11/05/2020    HCT 50.9 11/05/2020     11/05/2020     11/05/2020    POTASSIUM 4.3 11/05/2020    CHLORIDE 104 11/05/2020    CO2 26 11/05/2020     (H) 11/05/2020    BUN 17 11/05/2020    CR 0.82 11/05/2020    MAG 2.0 02/27/2020    INR 1.28 (H) 10/22/2020    BILITOTAL 0.4 11/02/2020    AST 25 11/02/2020    ALT 36 11/02/2020    ALKPHOS 110 11/02/2020    PROTTOTAL 7.5 11/02/2020    ALBUMIN 3.2 (L) 11/02/2020     Imaging:   CT w/o contrast 10/25:   1. Slight increased aeration of the left lung with slight decrease  size of the left-sided hydropneumothorax with a large pleural effusion  component and now a few small foci of air. Left basilar chest tube in  place.  2. Right lung demonstrates scattered peribronchovascular areas of  consolidation which are  similar to exam 10/22/2020. Trace right-sided  pleural effusion. Findings may represent organizing pneumonia,  infection, ruetc-hvgkrr-ojkn disease, drug reaction or other etiology.  3. Heavy coronary artery calcifications     CXR pending: Increased pleural effusion left.       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 Heme  - baby ASA     3. ID  - s/p zosyn for poss infectious etiology of effusion, stopped with negative pleural fluid cultures  - COVID negative on admission 10/23  - prophy: cont leva, fluc and bactrim.   - blood culture ordered for 11/5 (elevated WBC however likely 2/2 steroids)    4. 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 On CSA 25mg BID and pred 15mg daily - increased pred to 40mg daily for inflammatory pleural effusion. Will increase CSA to 50 BID.  - eye gtts: refresh (not using other gtts because he wears corrective contact lenses, not GVHD lenses  - not using dex s/s due to hx of thrush    5. Pulm  - pulmonary team following, appreciate recs  - new large left pleural effusion with total collapse of left lung and mass effect on mediastinum. Pulm placed pigtail catheter 10/23 (due to large volume fluid, can't all be drained in one day via thoracentesis). Effusion is exudative, infectious testing negative, cause unknown. Repeat CXR 10/27 showed resolution of pleural effusion, but large left pneumothorax vs trapped lung. Per thoracic surgery, not a candidate for decortication due to underlying lung disease. Pulmonary removed chest tube with repeat CXR showing re-accumulation of fluid. Today CXR shows increased fluid however Yg is not more symptomatic. Per Pulm inpt discussion, he will always re-accumulate however need to sort out rate. Plan to get repeat cxr on Thursday.  Question is, do we need to drain it or not? He really wasn't very symptomatic on admission, so might not  feel differently if it reaccumulates. Criteria to help decide: certainly if he has increased dyspnea, would pursue thoracentesis (IR can do same day or next day thora, or could contact interventional pulmonary also as a second option). If he has full compression of left lung, would get thora (right now has an aerated superior aspect of left lung), or if mediastinal shift, would get thora  -Yg is also set up with Dr. Brady Nov 17. 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 Serositis? Increased pred from 15 to 40mg daily starting 10/27    Home O2:  Yg previously purchased an O2 concentrator through [employer] in [place]. He prefers to also purchase a portable O2 tank - [employer] confirmed they can overnight this to him for arrival on 10/30. He qualifies for insurance coverage of home O2, but he prefers to buy it himself instead of using rental equipment ([employer]) that would be covered by insurance. Recommend wearing 3L O2 per nc with ambulation. No requirement for O2 at rest, but can wear 2L for comfort if symptomatic    6. Endo  - on synthroid    7. Pilocarpine for xerostoma    8. CV  - on ASA, crestor, metoprolol    9. FEN/renal  - regular diet  - Cr stable with repeat chest tube drainage  - on PO mag 800mg/d     RTC  -11-9 with CXR and tacro level. Told to go to ER if respiratory sxs acutely worsen or increasing O2 requirements.    Uli Enciso M.D.

## 2020-11-11 NOTE — LETTER
11/11/2020         RE: Byron Russo  [address] [facility] [address]        Dear Colleague,    Thank you for referring your patient, Byron Russo, to the [facility] BLOOD AND MARROW TRANSPLANT PROGRAM [place]. Please see a copy of my visit note below.    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 States he feels the same since last Thursday. Using 2.5-3L of oxygen mostly when he is getting up and moving around. Uses it at rest too but can frequently turn his oxygen down or off completely at rest. No pain with inspiration, chest pain, or coughing. Overall in good spirits regarding the plan for management of fluid accumulation. Dry eyes/dry mouth are stable. No new ulcers. Eating and drinking well.     ROS: 6 point ROS negative except as stated above    Physical Exam  /78 (BP Location: Right arm, Patient Position: Sitting, Cuff Size: Adult Regular)   Pulse 90   Temp 98.3  F (36.8  C) (Tympanic)   Resp 16   Ht 1.829 m (6')   Wt 100.4 kg (221 lb 6.4 oz)   SpO2 96%   BMI 30.03 kg/m    Gen: A&O, NAD  OP: Mucosa dry without lesions  Pulm: breathing comfortably on 3L per nasal cannula. Minimal air movement in lower L lung field. Few crackles/course breath sounds in R lung field.   CV: RRR  Abd: +BS, soft, nontender  Extremities: no edema  Skin: no rash on exposed skin  Access: no access    Labs  Lab Results   Component Value Date    WBC 12.9 (H) 11/11/2020    ANEU 11.0 (H) 11/11/2020    HGB 17.3 11/11/2020    HCT 53.2 (H) 11/11/2020     11/11/2020     11/11/2020    POTASSIUM 4.2 11/11/2020    CHLORIDE 103 11/11/2020    CO2 31 11/11/2020     (H) 11/11/2020    BUN 20 11/11/2020    CR 0.79 11/11/2020    MAG 2.0 02/27/2020    INR 1.28 (H) 10/22/2020    BILITOTAL 0.4 11/11/2020    AST 34 11/11/2020     ALT 44 11/11/2020    ALKPHOS 100 11/11/2020    PROTTOTAL 8.0 11/11/2020    ALBUMIN 3.4 11/11/2020     Imaging:   CT w/o contrast 10/25:   1. Slight increased aeration of the left lung with slight decrease  size of the left-sided hydropneumothorax with a large pleural effusion  component and now a few small foci of air. Left basilar chest tube in  place.  2. Right lung demonstrates scattered peribronchovascular areas of  consolidation which are similar to exam 10/22/2020. Trace right-sided  pleural effusion. Findings may represent organizing pneumonia,  infection, rjisq-krrkzw-htzq disease, drug reaction or other etiology.  3. Heavy coronary artery calcifications    CXR pending 11/11: Increased pleural effusion left but without mediastinal shift or complete collapse of left lung per my review.        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 Etiology unclear but empirically treating as possible complication of GVHD.     1. 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 Heme  - Counts stable. Mild ongoing leukocytosis due to steroids, improving.  - baby ASA     3. ID  - pleural fluid cx neg for infectious etiology  - prophy: cont leva, fluc and bactrim.     4. 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 On CSA 25mg BID and pred 15mg daily - increased pred to 40mg daily for inflammatory pleural effusion.  - Increased CSA to 50 BID 11/5. Took CSA this morning, will get level next week.   - eye gtts: refresh (not using other gtts because he wears corrective contact lenses, not GVHD lenses  - not using dex s/s due to hx of thrush    5. Pulm  - pulmonary team following, appreciate recs  - new large left pleural effusion with total collapse of left lung and mass effect on mediastinum. Pulm placed pigtail catheter 10/23 (due to large volume fluid, can't all be drained in one day via thoracentesis). Effusion is  exudative, infectious testing negative, cause unknown. Repeat CXR 10/27 showed resolution of pleural effusion, but large left pneumothorax vs trapped lung. Per thoracic surgery, not a candidate for decortication due to underlying lung disease. Pulmonary removed chest tube with repeat CXR showing re-accumulation of fluid. Per Pulm:  he will always re-accumulate unless his lung re-expands however need to sort out rate. Pursue thoracentesis (IR can do same day or next day thora, or could contact interventional pulmonary also as a second option) if there is total compression of left lung, pt more dyspneic/incresed O2, or mediastinal shift present on CXR.     - CXR 11/11 with increased fluid but without lung collapse, no mediastinal shift, and O2 needs stable. Will not pursue thora at this time. Re-eval next week.   - Yg is also set up with Dr. Brady Nov 17.   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 Serositis? Increased pred from 15 to 40mg daily starting 10/27    Home O2:  Yg previously purchased an O2 concentrator through [employer] in [place]. He prefers to also purchase a portable O2 tank - [employer] confirmed they can overnight this to him for arrival on 10/30. He qualifies for insurance coverage of home O2, but he prefers to buy it himself instead of using rental equipment ([employer]) that would be covered by insurance. Recommend wearing 3L O2 per nc with ambulation. No requirement for O2 at rest, but can wear 2L for comfort if symptomatic    6. Endo  - on synthroid    7. Pilocarpine for xerostoma    8. CV  - on ASA, crestor, metoprolol    9. FEN/renal  - regular diet  - Cr stable   - on PO mag 800mg/d     RTC: 11/17 for CXR, labs, provider visit and follow up with Dr. Brady. Pt aware to go to ED if develops acute worsening of breathing or increasing oxygen requirements.     Gerald Doty,  SOFIE  [phone]          Again, thank you for allowing me to participate in the care of your patient.        Sincerely,        BMT Advanced Practice Provider

## 2020-11-11 NOTE — PROGRESS NOTES
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 States he feels the same since last Thursday. Using 2.5-3L of oxygen mostly when he is getting up and moving around. Uses it at rest too but can frequently turn his oxygen down or off completely at rest. No pain with inspiration, chest pain, or coughing. Overall in good spirits regarding the plan for management of fluid accumulation. Dry eyes/dry mouth are stable. No new ulcers. Eating and drinking well.     ROS: 6 point ROS negative except as stated above    Physical Exam  /78 (BP Location: Right arm, Patient Position: Sitting, Cuff Size: Adult Regular)   Pulse 90   Temp 98.3  F (36.8  C) (Tympanic)   Resp 16   Ht 1.829 m (6')   Wt 100.4 kg (221 lb 6.4 oz)   SpO2 96%   BMI 30.03 kg/m    Gen: A&O, NAD  OP: Mucosa dry without lesions  Pulm: breathing comfortably on 3L per nasal cannula. Minimal air movement in lower L lung field. Few crackles/course breath sounds in R lung field.   CV: RRR  Abd: +BS, soft, nontender  Extremities: no edema  Skin: no rash on exposed skin  Access: no access    Labs  Lab Results   Component Value Date    WBC 12.9 (H) 11/11/2020    ANEU 11.0 (H) 11/11/2020    HGB 17.3 11/11/2020    HCT 53.2 (H) 11/11/2020     11/11/2020     11/11/2020    POTASSIUM 4.2 11/11/2020    CHLORIDE 103 11/11/2020    CO2 31 11/11/2020     (H) 11/11/2020    BUN 20 11/11/2020    CR 0.79 11/11/2020    MAG 2.0 02/27/2020    INR 1.28 (H) 10/22/2020    BILITOTAL 0.4 11/11/2020    AST 34 11/11/2020    ALT 44 11/11/2020    ALKPHOS 100 11/11/2020    PROTTOTAL 8.0 11/11/2020    ALBUMIN 3.4 11/11/2020     Imaging:   CT w/o contrast 10/25:   1. Slight increased aeration of the left lung with slight decrease  size of the left-sided hydropneumothorax with a large pleural effusion  component and now a  few small foci of air. Left basilar chest tube in  place.  2. Right lung demonstrates scattered peribronchovascular areas of  consolidation which are similar to exam 10/22/2020. Trace right-sided  pleural effusion. Findings may represent organizing pneumonia,  infection, nmkab-[id number]-htzg disease, drug reaction or other etiology.  3. Heavy coronary artery calcifications    CXR pending 11/11: Increased pleural effusion left but without mediastinal shift or complete collapse of left lung per my review.        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 Etiology unclear but empirically treating as possible complication of GVHD.     1. 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 Heme  - Counts stable. Mild ongoing leukocytosis due to steroids, improving.  - baby ASA     3. ID  - pleural fluid cx neg for infectious etiology  - prophy: cont leva, fluc and bactrim.     4. 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 On CSA 25mg BID and pred 15mg daily - increased pred to 40mg daily for inflammatory pleural effusion.  - Increased CSA to 50 BID 11/5. Took CSA this morning, will get level next week.   - eye gtts: refresh (not using other gtts because he wears corrective contact lenses, not GVHD lenses  - not using dex s/s due to hx of thrush    5. Pulm  - pulmonary team following, appreciate recs  - new large left pleural effusion with total collapse of left lung and mass effect on mediastinum. Pulm placed pigtail catheter 10/23 (due to large volume fluid, can't all be drained in one day via thoracentesis). Effusion is exudative, infectious testing negative, cause unknown. Repeat CXR 10/27 showed resolution of pleural effusion, but large left pneumothorax vs trapped lung. Per thoracic surgery, not a candidate for decortication due to underlying lung disease. Pulmonary removed chest tube with repeat CXR showing  re-accumulation of fluid. Per Pulm:  he will always re-accumulate unless his lung re-expands however need to sort out rate. Pursue thoracentesis (IR can do same day or next day thora, or could contact interventional pulmonary also as a second option) if there is total compression of left lung, pt more dyspneic/incresed O2, or mediastinal shift present on CXR.     - CXR 11/11 with increased fluid but without lung collapse, no mediastinal shift, and O2 needs stable. Will not pursue thora at this time. Re-eval next week.   - Yg is also set up with Dr. Brady Nov 17.   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 Serositis? Increased pred from 15 to 40mg daily starting 10/27    Home O2:  Yg previously purchased an O2 concentrator through [employer] in [place]. He prefers to also purchase a portable O2 tank - [employer] confirmed they can overnight this to him for arrival on 10/30. He qualifies for insurance coverage of home O2, but he prefers to buy it himself instead of using rental equipment ([employer]) that would be covered by insurance. Recommend wearing 3L O2 per nc with ambulation. No requirement for O2 at rest, but can wear 2L for comfort if symptomatic    6. Endo  - on synthroid    7. Pilocarpine for xerostoma    8. CV  - on ASA, crestor, metoprolol    9. FEN/renal  - regular diet  - Cr stable   - on PO mag 800mg/d     RTC: 11/17 for CXR, labs, provider visit and follow up with Dr. Brady. Pt aware to go to ED if develops acute worsening of breathing or increasing oxygen requirements.     Gerald Doty PA-C  [phone]

## 2020-11-16 NOTE — TELEPHONE ENCOUNTER
BMT CLINICAL SOCIAL WORK NOTE:    Focus: Resources    Data: Pt is a 60 year old male 4 y 6 m post allo BMT, calling to discuss resources.    Interventions: Clinical  (CSW) received call from the Pt to assist with a handicap parking as well as questions associated to apply for SSDI. CSW sent a message to the pt's RNCC Nicolas to help with handicapt form, discussed with the pt that we will mail this to him when completed. The pt is planning to complete his SSDI application soon and wanted to review the steps he needed to complete. CSW confirmed he had all the right steps in place. CSW assessed coping, provide supportive counseling and assist with resources as needed. SW encouraged Pt to contact CSW for support, questions and/or resources.    Assessment: Pt presented as friendly and open.  Pt appears to be coping well at this time. Pt continues to be supported by his wife and siblings.     Plan: CSW will continue to work with Pt and family to provide supportive counseling and assist with resources as needed. CSW will continue to collaborate with multidisciplinary team regarding Pt's plan of care.     AYANNA Melo, [facility]  Pager: [phone]  Phone: [phone]

## 2020-11-17 NOTE — LETTER
11/17/2020         RE: Byron Russo  [address] [facility] [address]        Dear Colleague,    Thank you for referring your patient, Byron Russo, to the [facility] BLOOD AND MARROW TRANSPLANT PROGRAM [place]. Please see a copy of my visit note below.    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 Notes he is using oxygen all the time now, not only with activity. He denies feeling more short of breath while using 3L oxygen. He is not monitoring his oxygen level at home. No sharp chest pain. No fevers. He denies n/v/d, voracious appetite on steroids. No rashes, bruises or bleeding. No mouth sores or dry eyes.He is working on disability income and handicap parking.     ROS: 8 point ROS negative except as stated above    Physical Exam  /85 (BP Location: Right arm, Patient Position: Sitting, Cuff Size: Adult Regular)   Pulse 92   Temp 98.2  F (36.8  C) (Oral)   Resp 18   Wt 102.3 kg (225 lb 9.6 oz)   SpO2 94%   BMI 30.60 kg/m    Gen: A&O, NAD, conversant  OP: Mucosa dry without lesions, no OP erythema  Pulm: breathing appears somewhat labored on 3L per nasal cannula. Minimal air movement in lower L lung field. Few crackles/course breath sounds in R lung field.   CV: RRR  Abd: +BS, soft, nontender  Extremities: no edema  Skin: no rash on exposed skin  Access: no access    Labs  Lab Results   Component Value Date    WBC 17.6 (H) 11/17/2020    ANEU 14.4 (H) 11/17/2020    HGB 16.6 11/17/2020    HCT 50.1 11/17/2020     11/17/2020     11/17/2020    POTASSIUM 4.0 11/17/2020    CHLORIDE 104 11/17/2020    CO2 26 11/17/2020    GLC 95 11/17/2020    BUN 20 11/17/2020    CR 0.70 11/17/2020    MAG 2.2 11/17/2020    INR 1.28 (H) 10/22/2020    BILITOTAL 0.6 11/17/2020    AST 32 11/17/2020    ALT 50 11/17/2020     ALKPHOS 85 11/17/2020    PROTTOTAL 7.2 11/17/2020    ALBUMIN 3.2 (L) 11/17/2020     CXR 11/17 Impression: Further slight increase in fluid component of a large left hydropneumothorax. Stable trace right pleural effusion and adjacent peripheral atelectasis versus consolidation.       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 Etiology unclear but empirically treating as possible complication of GVHD.     1. 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 Heme  - Counts stable. Mild ongoing leukocytosis due to steroids, improving.  - baby ASA     3. ID: afebrile  - pleural fluid cx neg for infectious etiology  - prophy: cont leva, fluc and bactrim.     4. 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 On CSA 25mg BID and pred 15mg daily   - 10/27 increased pred to 40mg daily for inflammatory pleural effusion.  - Increased CSA to 50 BID 11/5. CSA level pending 11/17.   - eye gtts: refresh (not using other gtts because he wears corrective contact lenses, not GVHD lenses  - not using dex s/s due to hx of thrush    5. Pulm  - pulmonary team following, appreciate recs  - new large left pleural effusion with total collapse of left lung and mass effect on mediastinum. Pulm placed pigtail catheter 10/23 (due to large volume fluid, can't all be drained in one day via thoracentesis). Effusion is exudative, infectious testing negative, cause unknown. Repeat CXR 10/27 showed resolution of pleural effusion, but large left pneumothorax vs trapped lung. Per thoracic surgery, not a candidate for decortication due to underlying lung disease. Pulmonary removed chest tube with repeat CXR showing re-accumulation of fluid. Per Pulm:  he will always re-accumulate unless his lung re-expands however need to sort out rate. Pursue thoracentesis (IR can do same day or next day thora, or could contact interventional pulmonary also as a  second option) if there is total compression of left lung, pt more dyspneic/incresed O2, or mediastinal shift present on CXR.     - CXR 11/11 with increased fluid but without lung collapse, no mediastinal shift,   - CXR 11/17: increased fluid line. Yg was set up with Dr. Brady Nov 17 however Dr Brady had a last minute need to cancel. 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 Serositis? Increased pred from 15 to 40mg daily starting 10/27    Home O2:  Yg previously purchased an O2 concentrator through [employer] in [place]. He prefers to also purchase a portable O2 tank - [employer] confirmed they can overnight this to him for arrival on 10/30. He qualifies for insurance coverage of home O2, but he prefers to buy it himself instead of using rental equipment ([employer]) that would be covered by insurance. Recommend wearing 3L O2 per nc with ambulation. Now needing 3L O2 throughout the day, including at rest.    6. Endo  - on synthroid    7. Pilocarpine for xerostoma    8. CV  - on ASA, crestor, metoprolol    9. FEN/renal  - regular diet  - Cr stable   - on PO mag 800mg/d       Plan: record oxygen levels at home (at rest and with activity) call or go to ED if oxygenation <90% or for any new or worsening symptoms  Signed handicap parking form    Spoke with SARAH Lema. She will review chart and try to help expedite thoracentesis tomorrow if possible    RTC within 1 week    Angela Bunch PAC  [phone]      Again, thank you for allowing me to participate in the care of your patient.        Sincerely,        BMT Advanced Practice Provider

## 2020-11-17 NOTE — NURSING NOTE
Oncology Rooming Note    November 17, 2020 2:48 PM   Byron Russo is a 60 year old male who presents for:    Chief Complaint   Patient presents with     Blood Draw     Labs drawn from venipuncture by RN in lab. Vitals taken. Checked into next appointment.     Oncology Clinic Visit     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); Pleural effusion      Initial Vitals: /85 (BP Location: Right arm, Patient Position: Sitting, Cuff Size: Adult Regular)   Pulse 92   Temp 98.2  F (36.8  C) (Oral)   Resp 18   Wt 102.3 kg (225 lb 9.6 oz)   SpO2 94%   BMI 30.60 kg/m   Estimated body mass index is 30.6 kg/m  as calculated from the following:    Height as of 11/11/20: 1.829 m (6').    Weight as of this encounter: 102.3 kg (225 lb 9.6 oz). Body surface area is 2.28 meters squared.  No Pain (0) Comment: Data Unavailable   No LMP for male patient.  Allergies reviewed: Yes  Medications reviewed: Yes    Medications: Medication refills not needed today.  Pharmacy name entered into [facility]: [facility] [place], [place] - [address] [place] [address] [phone]    Clinical concerns: No new concerns today.       Nusrat Zayas MA

## 2020-11-17 NOTE — NURSING NOTE
Chief Complaint   Patient presents with     Blood Draw     Labs drawn from venipuncture by RN in lab. Vitals taken. Checked into next appointment.     Labs drawn by venipuncture by RN in lab.  Vital signs taken.  Pt checked in to next appointment.    Atiya Abbott RN

## 2020-11-17 NOTE — PROGRESS NOTES
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 Notes he is using oxygen all the time now, not only with activity. He denies feeling more short of breath while using 3L oxygen. He is not monitoring his oxygen level at home. No sharp chest pain. No fevers. He denies n/v/d, voracious appetite on steroids. No rashes, bruises or bleeding. No mouth sores or dry eyes.He is working on disability income and handicap parking.     ROS: 8 point ROS negative except as stated above    Physical Exam  /85 (BP Location: Right arm, Patient Position: Sitting, Cuff Size: Adult Regular)   Pulse 92   Temp 98.2  F (36.8  C) (Oral)   Resp 18   Wt 102.3 kg (225 lb 9.6 oz)   SpO2 94%   BMI 30.60 kg/m    Gen: A&O, NAD, conversant  OP: Mucosa dry without lesions, no OP erythema  Pulm: breathing appears somewhat labored on 3L per nasal cannula. Minimal air movement in lower L lung field. Few crackles/course breath sounds in R lung field.   CV: RRR  Abd: +BS, soft, nontender  Extremities: no edema  Skin: no rash on exposed skin  Access: no access    Labs  Lab Results   Component Value Date    WBC 17.6 (H) 11/17/2020    ANEU 14.4 (H) 11/17/2020    HGB 16.6 11/17/2020    HCT 50.1 11/17/2020     11/17/2020     11/17/2020    POTASSIUM 4.0 11/17/2020    CHLORIDE 104 11/17/2020    CO2 26 11/17/2020    GLC 95 11/17/2020    BUN 20 11/17/2020    CR 0.70 11/17/2020    MAG 2.2 11/17/2020    INR 1.28 (H) 10/22/2020    BILITOTAL 0.6 11/17/2020    AST 32 11/17/2020    ALT 50 11/17/2020    ALKPHOS 85 11/17/2020    PROTTOTAL 7.2 11/17/2020    ALBUMIN 3.2 (L) 11/17/2020     CXR 11/17 Impression: Further slight increase in fluid component of a large left hydropneumothorax. Stable trace right pleural effusion and adjacent peripheral atelectasis versus consolidation.       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 Etiology unclear but empirically treating as possible complication of GVHD.     1. 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 Heme  - Counts stable. Mild ongoing leukocytosis due to steroids, improving.  - baby ASA     3. ID: afebrile  - pleural fluid cx neg for infectious etiology  - prophy: cont leva, fluc and bactrim.     4. 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 On CSA 25mg BID and pred 15mg daily   - 10/27 increased pred to 40mg daily for inflammatory pleural effusion.  - Increased CSA to 50 BID 11/5. CSA level pending 11/17.   - eye gtts: refresh (not using other gtts because he wears corrective contact lenses, not GVHD lenses  - not using dex s/s due to hx of thrush    5. Pulm  - pulmonary team following, appreciate recs  - new large left pleural effusion with total collapse of left lung and mass effect on mediastinum. Pulm placed pigtail catheter 10/23 (due to large volume fluid, can't all be drained in one day via thoracentesis). Effusion is exudative, infectious testing negative, cause unknown. Repeat CXR 10/27 showed resolution of pleural effusion, but large left pneumothorax vs trapped lung. Per thoracic surgery, not a candidate for decortication due to underlying lung disease. Pulmonary removed chest tube with repeat CXR showing re-accumulation of fluid. Per Pulm:  he will always re-accumulate unless his lung re-expands however need to sort out rate. Pursue thoracentesis (IR can do same day or next day thora, or could contact interventional pulmonary also as a second option) if there is total compression of left lung, pt more dyspneic/incresed O2, or mediastinal shift present on CXR.     - CXR 11/11 with increased fluid but without lung collapse, no mediastinal shift,   - CXR 11/17: increased fluid line. Yg was set up with Dr. Brady Nov 17 however Dr Brady had a  last minute need to cancel. 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 Serositis? Increased pred from 15 to 40mg daily starting 10/27    Home O2:  Yg previously purchased an O2 concentrator through [employer] in [place]. He prefers to also purchase a portable O2 tank - [employer] confirmed they can overnight this to him for arrival on 10/30. He qualifies for insurance coverage of home O2, but he prefers to buy it himself instead of using rental equipment ([employer]) that would be covered by insurance. Recommend wearing 3L O2 per nc with ambulation. Now needing 3L O2 throughout the day, including at rest.    6. Endo  - on synthroid    7. Pilocarpine for xerostoma    8. CV  - on ASA, crestor, metoprolol    9. FEN/renal  - regular diet  - Cr stable   - on PO mag 800mg/d       Plan: record oxygen levels at home (at rest and with activity) call or go to ED if oxygenation <90% or for any new or worsening symptoms  Signed handicap parking form    Spoke with SARAH Lema. She will review chart and try to help expedite thoracentesis tomorrow if possible    RTC within 1 week    Angela Bunch PAC  [phone]

## 2020-11-23 NOTE — LETTER
"    11/23/2020         RE: Byron Russo  [address] [address] [address]        Dear Colleague,    Thank you for referring your patient, Byron Russo, to the [facility] LUNG [facility] AND [facility]. Please see a copy of my visit note below.    Byron Russo is a 60 year old male who is being evaluated via a billable telephone visit.      The patient has been notified of following:     \"This telephone visit will be conducted via a call between you and your physician/provider. We have found that certain health care needs can be provided without the need for a physical exam.  This service lets us provide the care you need with a short phone conversation.  If a prescription is necessary we can send it directly to your pharmacy.  If lab work is needed we can place an order for that and you can then stop by our lab to have the test done at a later time.    Telephone visits are billed at different rates depending on your insurance coverage. During this emergency period, for some insurers they may be billed the same as an in-person visit.  Please reach out to your insurance provider with any questions.    If during the course of the call the physician/provider feels a telephone visit is not appropriate, you will not be charged for this service.\"    Patient has given verbal consent for Telephone visit?  Yes    What phone number would you like to be contacted at? [phone]    How would you like to obtain your AVS? Radhikahart       61 YO male with history of MDS RAEB-2 s/p Allo-sib HSCT 3 years ago who was recently hospitalized for increased SOB/CUELLAR and hypoxia.  Underwent CT chest on admission that showed peripheral findings with a few middle lung nodular infiltrates.  Underwent bronchoscopy with BAL showing only 190 and 290 WBC in a lymphocyte predominant pattern, only microbiology findings were Candida; was started on Posaconazole. Discharged on 4L of " oxygen.  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  SOB and CUELLAR is improved since discharge.  States onset of symptoms was in 10-18, noticed increased SOB while doing yardwork.  States normal walking does not cause a problem, only if he is walking fast or carrying items. Denies any prior history of lung disease- no history of asthma, no pneumonia, no complications with chemotherapy.  Has a history of cGvHD, is on alternating prednisone of 10/20 mg and cyclosporin of 25 mg every day.  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    Last seen one month ago.  At that visit he reported increased SOB and desaturation.  Underwent additional testing- 6 minute walk test did not reveal desaturation but he had significant SOB. CT chest revealed large left pleural effusion with left lung collapse.  Underwent thoracentesis with development of hydropneumothorax.  Pleural effusion was drained gradually over several days due to SOB and cough with each removal of fluid.  Fluid was exudative in character, most likely related to serositis. Steroids were increased to 40 mg per day.  Discharged on 3L of oxygen at rest and with exertion.  Discharge CXR with persistent left hydropneumothorax. Was evaluated by Thoracic surgery during his hospitalization, no recommendation for surgery.  Increasing SOB since discharge with increased pleural effusion on follow-up imaging.         Assessment and Plan:  61 YO s/p HSCT post 3 years with sub-acute presentation with SOB and CUELLAR with recent worsening.  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  Unclear of cause of acute worsening but may be related to concomitant viral infection, cannot fully exclude fungal infection but BAL cultures have remained negative. No effective treatment for PPFE,  steroids do not seem to be effective, however in my experience the disease does not seem to progress.  Based on lack of treatment and seemingly no/slow progression, I would not proceed with biopsy to confirm the diagnosis. With impaired lung function he is at risk to develop respiratory complications during times of respiratory infections.  New increased SOB since August 2020; found to have a large left pleural effusion.  Underwent thoracentesis with trapped lung.  Pleural fluid is consistent with serositis.  Now with slow re-accumulation of fluid.  To undergo repeat thoracentesis on Friday.  If fluid accumulates again, I would recommend placement of PleurX catheter to assist in controlling symptoms of SOB and desaturation if develops. With rapid accumulation of fluid, pleurodesis is not an option as it has a low likelihood of success.          RTC will be arranged after repeat thoracentesis and limited follow-up.  Patient to call with any questions or concerns prior to his next clinic visit    Phone call duration: 26 minutes        Again, thank you for allowing me to participate in the care of your patient.        Sincerely,        Travis Brady MD

## 2020-11-23 NOTE — PROGRESS NOTES
"Byron Russo is a 60 year old male who is being evaluated via a billable telephone visit.      The patient has been notified of following:     \"This telephone visit will be conducted via a call between you and your physician/provider. We have found that certain health care needs can be provided without the need for a physical exam.  This service lets us provide the care you need with a short phone conversation.  If a prescription is necessary we can send it directly to your pharmacy.  If lab work is needed we can place an order for that and you can then stop by our lab to have the test done at a later time.    Telephone visits are billed at different rates depending on your insurance coverage. During this emergency period, for some insurers they may be billed the same as an in-person visit.  Please reach out to your insurance provider with any questions.    If during the course of the call the physician/provider feels a telephone visit is not appropriate, you will not be charged for this service.\"    Patient has given verbal consent for Telephone visit?  Yes    What phone number would you like to be contacted at? [phone]    How would you like to obtain your AVS? Adryant       61 YO male with history of MDS RAEB-2 s/p Allo-sib HSCT 3 years ago who was recently hospitalized for increased SOB/CUELLAR and hypoxia.  Underwent CT chest on admission that showed peripheral findings with a few middle lung nodular infiltrates.  Underwent bronchoscopy with BAL showing only 190 and 290 WBC in a lymphocyte predominant pattern, only microbiology findings were Candida; was started on Posaconazole. Discharged on 4L of oxygen.  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  SOB and CUELLAR is improved since discharge.  States onset of symptoms was in 10-18, noticed increased SOB while doing yardwork.  States normal walking does not cause a problem, only if he is walking fast " or carrying items. Denies any prior history of lung disease- no history of asthma, no pneumonia, no complications with chemotherapy.  Has a history of cGvHD, is on alternating prednisone of 10/20 mg and cyclosporin of 25 mg every day.  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    Last seen one month ago.  At that visit he reported increased SOB and desaturation.  Underwent additional testing- 6 minute walk test did not reveal desaturation but he had significant SOB. CT chest revealed large left pleural effusion with left lung collapse.  Underwent thoracentesis with development of hydropneumothorax.  Pleural effusion was drained gradually over several days due to SOB and cough with each removal of fluid.  Fluid was exudative in character, most likely related to serositis. Steroids were increased to 40 mg per day.  Discharged on 3L of oxygen at rest and with exertion.  Discharge CXR with persistent left hydropneumothorax. Was evaluated by Thoracic surgery during his hospitalization, no recommendation for surgery.  Increasing SOB since discharge with increased pleural effusion on follow-up imaging.         Assessment and Plan:  61 YO s/p HSCT post 3 years with sub-acute presentation with SOB and CUELLAR with recent worsening.  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  Unclear of cause of acute worsening but may be related to concomitant viral infection, cannot fully exclude fungal infection but BAL cultures have remained negative. No effective treatment for PPFE, steroids do not seem to be effective, however in my experience the disease does not seem to progress.  Based on lack of treatment and seemingly no/slow progression, I would not proceed with biopsy to confirm the diagnosis. With impaired lung function he is at risk to develop respiratory complications during  times of respiratory infections.  New increased SOB since August 2020; found to have a large left pleural effusion.  Underwent thoracentesis with trapped lung.  Pleural fluid is consistent with serositis.  Now with slow re-accumulation of fluid.  To undergo repeat thoracentesis on Friday.  If fluid accumulates again, I would recommend placement of PleurX catheter to assist in controlling symptoms of SOB and desaturation if develops. With rapid accumulation of fluid, pleurodesis is not an option as it has a low likelihood of success.          RTC will be arranged after repeat thoracentesis and limited follow-up.  Patient to call with any questions or concerns prior to his next clinic visit    Phone call duration: 26 minutes

## 2020-11-27 NOTE — DISCHARGE INSTRUCTIONS
Discharge Instructions for Paracentesis or Thoracentesis     Paracentesis is a procedure to remove extra fluid from your belly (abdomen). Thoracentesis is a procedure to remove extra fluid from your chest.  This fluid buildup is called ascites. The procedure may have been done to take a sample of the fluid. Or, it may have been done to drain the extra fluid from your abdomen or chest to help make you more comfortable.     Home care    If you have pain after the procedure, your healthcare provider can prescribe or recommend pain medicines. Take these exactly as directed. If you stopped taking other medicines before the procedure, ask your provider when you can start them again.    Avoid strenuous activity for 48 hours.    You will have a small bandage over the puncture site. Adhesive tapes, adhesive strips, or surgical glue may also be used to close the incision. They also help stop bleeding and promote healing. You may take the bandage off in 24-48 hours. Once there is a scab over the site, no bandages are required.    Check the puncture site for the signs of infection listed below.     Follow-up care  Make a follow-up appointment with your healthcare provider as directed. During your follow-up visit, your provider will check your healing. Let your provider know how you are feeling. You can also discuss the cause of your fluid, and if you need any further treatment.    When to seek medical advice:  Call your healthcare provider if you have any of the following after the procedure:    A fever of 100.4 F (38.0 C) or higher    Trouble breathing    Abdominal pain that is worse than expected    Belly Abdominal pain not caused by having the skin punctured that is worse than expected    Persistent bleeding from the puncture site    More than a small amount of fluid leaking from the puncture site    Swollen belly    Signs of infection at the puncture site. These include increased pain, redness, or swelling, warmth, or  bad-smelling drainage.    Feeling dizzy or lightheaded, or fainting     Call our Interventional Radiology (IR) service if:  If you start bleeding from the procedure site.  If you do start to bleed from the site, lie down and hold some pressure on the site.  Our radiology provider can help you decide if you need to return to the hospital.  If you have new or worsening pain related to the procedure.  If you have pronounced swelling at the procedure site.  If you develop persistent nausea or vomiting.  If you develop hives or a rash or any unexplained itching.  If you have a fever of greater than 100.4  F and chills in the first 5 days after procedure.  Any other concerns related to your procedure.      [facility]  Interventional Radiology (IR)  [address]  [facility] [place] [address]    Contact Number:  [phone]  (IR control desk)  Monday - Friday 8:00 am - 4:30 pm    After hours for urgent concerns:  [phone]  After 4:30 pm Monday - Friday, Weekends and Holidays.   Ask for Interventional Radiology on-call.  Someone is available 24 hours a day.  [facility] toll free number:  [phone]

## 2020-12-01 NOTE — LETTER
12/1/2020         RE: Byron Russo  [address] [address] [address]        Dear Colleague,    Thank you for referring your patient, Byron Russo, to the [facility] BLOOD AND MARROW TRANSPLANT PROGRAM [place]. Please see a copy of my visit note below.    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 His breathing is better since thoracentesis on 11/27.  He does not need oxygen when just sitting on the couch.  Wears it at all other times at 3L.  His CXR this morning looks very similar to the one done 11/17.  He did not have one just before the thoracentesis for comparison. 1700 mL were removed on 11/27.     He has no cold, cough or fever.  He had a cough right before the thora, but it has resolved.     ROS: 8 point ROS negative except as stated above    Physical Exam  /89   Pulse 89   Temp 98.1  F (36.7  C) (Oral)   Wt 103.4 kg (227 lb 14.4 oz)   SpO2 95%   BMI 31.79 kg/m    Gen: A&O, NAD, conversant  OP: Mucosa dry without lesions, no OP erythema  Pulm: breathing comfortably at rest on 3L.  Unable to hear lung sounds on the left.  Dull to percussion on the left.   CV: RRR  Abd: +BS, soft, nontender  Extremities: no edema  Skin: no rash on exposed skin  Access: no access    Labs  Lab Results   Component Value Date    WBC 11.5 (H) 12/01/2020    ANEU 9.7 (H) 12/01/2020    HGB 17.5 12/01/2020    HCT 52.1 12/01/2020     12/01/2020     12/01/2020    POTASSIUM 4.4 12/01/2020    CHLORIDE 105 12/01/2020    CO2 29 12/01/2020     (H) 12/01/2020    BUN 19 12/01/2020    CR 0.78 12/01/2020    MAG 2.2 11/17/2020    INR 1.04 11/27/2020    BILITOTAL 0.4 12/01/2020    AST 37 12/01/2020    ALT 50 12/01/2020    ALKPHOS 87 12/01/2020    PROTTOTAL 7.4 12/01/2020    ALBUMIN 3.3 (L) 12/01/2020     CXR 11/17  Impression: Further slight increase in fluid component of a large left hydropneumothorax. Stable trace right pleural effusion and adjacent peripheral atelectasis versus consolidation.       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 Etiology unclear but empirically treating as possible complication of GVHD.     1. 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 Heme  - Counts stable. Mild ongoing leukocytosis due to steroids  - baby ASA     3. ID: afebrile  - pleural fluid cx neg for infectious etiology  - prophy: cont leva, fluc and bactrim.     4. 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 On CSA 25mg BID and pred 15mg daily   - 10/27 increased pred to 40mg daily for inflammatory pleural effusion.  - Increased CSA to 50 BID 11/5. CSA level due but he forgot to hold it; will get this next week.   - eye gtts: refresh (not using other gtts because he wears corrective contact lenses, not GVHD lenses  - not using dex s/s due to hx of thrush    5. Pulm  - pulmonary team following, appreciate reccs: per last note by Dr. Brady, if patient is re-accumulating, he would recommend PleurX catheter.  Messaged him today to ask him to look at the CXR from today.    - had a large, left pleural effusion with total collapse of left lung and mass effect on mediastinum. Pulm placed pigtail catheter 10/23 (due to large volume fluid, can't all be drained in one day via thoracentesis).     - Last thoracentesis done 11/27  -  Increased pred from 15 to 40mg daily starting 10/27; continue.     Home O2:  Has home and portable O2 concentrators through [employer] in [place]    6. Endo  - on synthroid    7. Pilocarpine for xerostoma    8. CV  - on ASA, crestor, metoprolol    9. FEN/renal  - regular diet  - Cr stable   - on PO mag 800mg/d; no level 12/1.       Plan: RTC 12/8; await further input from Dr. Caitlyn ROQUE  SOFIE Kern  12/1/2020        Again, thank you for allowing me to participate in the care of your patient.        Sincerely,        BMT Advanced Practice Provider

## 2020-12-01 NOTE — NURSING NOTE
"Oncology Rooming Note    December 1, 2020 1:54 PM   Byron Russo is a 60 year old male who presents for:    Chief Complaint   Patient presents with     Blood Draw     Venipuncture labs collected by RN.      Oncology Clinic Visit     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)      Initial Vitals: /89   Pulse 89   Temp 98.1  F (36.7  C) (Oral)   Wt 103.4 kg (227 lb 14.4 oz)   SpO2 95%   BMI 31.79 kg/m   Estimated body mass index is 31.79 kg/m  as calculated from the following:    Height as of 11/27/20: 1.803 m (5' 11\").    Weight as of this encounter: 103.4 kg (227 lb 14.4 oz). Body surface area is 2.28 meters squared.  No Pain (1) Comment: Data Unavailable   No LMP for male patient.  Allergies reviewed: Yes  Medications reviewed: Yes    Medications: Medication refills not needed today.  Pharmacy name entered into [email or web address]: [facility] [place], [place] - [address] [place] [address] [phone]    Clinical concerns: No new concerns today.       Nusrat Zayas MA            "

## 2020-12-01 NOTE — PROGRESS NOTES
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 His breathing is better since thoracentesis on 11/27.  He does not need oxygen when just sitting on the couch.  Wears it at all other times at 3L.  His CXR this morning looks very similar to the one done 11/17.  He did not have one just before the thoracentesis for comparison. 1700 mL were removed on 11/27.     He has no cold, cough or fever.  He had a cough right before the thora, but it has resolved.     ROS: 8 point ROS negative except as stated above    Physical Exam  /89   Pulse 89   Temp 98.1  F (36.7  C) (Oral)   Wt 103.4 kg (227 lb 14.4 oz)   SpO2 95%   BMI 31.79 kg/m    Gen: A&O, NAD, conversant  OP: Mucosa dry without lesions, no OP erythema  Pulm: breathing comfortably at rest on 3L.  Unable to hear lung sounds on the left.  Dull to percussion on the left.   CV: RRR  Abd: +BS, soft, nontender  Extremities: no edema  Skin: no rash on exposed skin  Access: no access    Labs  Lab Results   Component Value Date    WBC 11.5 (H) 12/01/2020    ANEU 9.7 (H) 12/01/2020    HGB 17.5 12/01/2020    HCT 52.1 12/01/2020     12/01/2020     12/01/2020    POTASSIUM 4.4 12/01/2020    CHLORIDE 105 12/01/2020    CO2 29 12/01/2020     (H) 12/01/2020    BUN 19 12/01/2020    CR 0.78 12/01/2020    MAG 2.2 11/17/2020    INR 1.04 11/27/2020    BILITOTAL 0.4 12/01/2020    AST 37 12/01/2020    ALT 50 12/01/2020    ALKPHOS 87 12/01/2020    PROTTOTAL 7.4 12/01/2020    ALBUMIN 3.3 (L) 12/01/2020     CXR 11/17 Impression: Further slight increase in fluid component of a large left hydropneumothorax. Stable trace right pleural effusion and adjacent peripheral atelectasis versus consolidation.       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 Etiology unclear but empirically treating as possible complication of GVHD.     1. 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 Heme  - Counts stable. Mild ongoing leukocytosis due to steroids  - baby ASA     3. ID: afebrile  - pleural fluid cx neg for infectious etiology  - prophy: cont leva, fluc and bactrim.     4. 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 On CSA 25mg BID and pred 15mg daily   - 10/27 increased pred to 40mg daily for inflammatory pleural effusion.  - Increased CSA to 50 BID 11/5. CSA level due but he forgot to hold it; will get this next week.   - eye gtts: refresh (not using other gtts because he wears corrective contact lenses, not GVHD lenses  - not using dex s/s due to hx of thrush    5. Pulm  - pulmonary team following, appreciate reccs: per last note by Dr. Brady, if patient is re-accumulating, he would recommend PleurX catheter.  Messaged him today to ask him to look at the CXR from today.    - had a large, left pleural effusion with total collapse of left lung and mass effect on mediastinum. Pulm placed pigtail catheter 10/23 (due to large volume fluid, can't all be drained in one day via thoracentesis).     - Last thoracentesis done 11/27  -  Increased pred from 15 to 40mg daily starting 10/27; continue.     Home O2:  Has home and portable O2 concentrators through [employer] in [place]    6. Endo  - on synthroid    7. [place] for xerostoma    8. CV  - on ASA, crestor, metoprolol    9. FEN/renal  - regular diet  - Cr stable   - on PO mag 800mg/d; no level 12/1.       Plan: RTC 12/8; await further input from Dr. Caitlyn Kern PA-C  12/1/2020

## 2020-12-08 NOTE — LETTER
12/8/2020         RE: Byron Russo  [address] [facility] [address]        Dear Colleague,    Thank you for referring your patient, Byron Russo, to the [facility] BLOOD AND MARROW TRANSPLANT PROGRAM [place]. Please see a copy of my visit note below.    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 Etiology unclear but empirically treating as possible complication of GVHD.     HPI: Yg returns for follow up. Stable breathing. Using 3L supplemental O2 continuously. Talks easily. Notes some increased dyspnea after bending over, etc. No fevers. Continues on CSA 50mg bid and Pred 40. No n/v. Occasional consitpation. Some low back pain, no persistent, not new. Stable chronic, clear rhinorrhea. Rare cough. S/p thoracentesis 11/27 draining 1.7L. CXR today.      ROS: 8 point ROS negative except as stated above    Physical Exam  BP (!) 135/92   Pulse 89   Temp 98  F (36.7  C) (Oral)   Resp 18   Wt 104.4 kg (230 lb 1.6 oz)   SpO2 96%   BMI 32.09 kg/m    Gen: A&O, NAD, conversant  OP: Mucosa dry without lesions, no OP erythema  Pulm: breathing comfortably at rest on 3L. Markedly diminished lung sounds L. Some courseness with rubs R>L.    CV: RRR  Abd: +BS, soft, nontender  Extremities: trace LE edema  Skin: no rash on exposed skin  Access: no access    Labs:  Lab Results   Component Value Date    WBC 15.1 (H) 12/08/2020    ANEU 13.0 (H) 12/08/2020    HGB 17.6 12/08/2020    HCT 53.7 (H) 12/08/2020     12/08/2020     12/08/2020    POTASSIUM 4.6 12/08/2020    CHLORIDE 106 12/08/2020    CO2 28 12/08/2020     (H) 12/08/2020    BUN 18 12/08/2020    CR 0.84 12/08/2020    MAG 2.2 12/08/2020     INR 1.04 11/27/2020    BILITOTAL 0.4 12/08/2020    AST 27 12/08/2020    ALT 49 12/08/2020    ALKPHOS 91 12/08/2020    PROTTOTAL 7.5 12/08/2020    ALBUMIN 3.4 12/08/2020       CXR (12/8/20): 1. Relative stable appearance of the left chest although the amount of  fluid has increased in the known hydropneumothorax with a trapped  lung.  2. Mixed opacities throughout the right lung are similar.  3. Stable small right pleural effusion with associated atelectasis.    CXR (11/17): Further slight increase in fluid component of a large left hydropneumothorax. Stable trace right pleural effusion and adjacent peripheral atelectasis versus consolidation.       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 Etiology unclear but empirically treating as possible complication of GVHD.     1. 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 Heme  - Counts stable. Mild ongoing leukocytosis due to steroids  - baby ASA     3. ID: afebrile  - pleural fluid cx neg for infectious etiology  - prophy: ACV, levo, fluc and bactrim.     4. 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 Previously on CSA 25mg BID and pred 15mg daily   - 10/27 increased pred to 40mg daily for inflammatory pleural effusion.  - Increased CSA to 50 BID 11/5. CSA level pending  - could he benefit from alternative therapy? I.e. Jakafi - will message Dr. Enciso.  - eye gtts: refresh (not using other gtts because he wears corrective contact lenses, not GVHD lenses    5. Pulm:   - had a large, left pleural effusion with total collapse of left lung and mass effect on mediastinum. Suspect d/t GVHD.  Pulm placed pigtail catheter 10/23 (due to large volume fluid, can't all be drained in one day via thoracentesis).   - per last note by Dr. Brady, if patient is re-accumulating, he would recommend PleurX catheter. Per IR, will not do per insurance unless  hospice pt. Awaiting further recs.     - Last thoracentesis done 11/27  -  Increased pred from 15 to 40mg daily starting 10/27; continue.     Home O2:  Has home and portable O2 concentrators through [employer] in [place]; 3L    6. Endo  - on synthroid    7. CV  - on ASA, crestor, metoprolol    8. FEN/renal  - creat, lytes wnl  - Mg good at 2.2 (on CSA); cont MgOx bid (help with mild consitpation)      Plan: RTC 12/15 for labs, follow-up with CXR prior. Await further input from Dr. Brady and Dr. Enciso. Unemployment ins paperwork left in [place] [facility] Jara's clinic folder today.    MEÑO FranciscoC  [phone]

## 2020-12-08 NOTE — PROGRESS NOTES
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 Etiology unclear but empirically treating as possible complication of GVHD.     HPI: Yg returns for follow up. Stable breathing. Using 3L supplemental O2 continuously. Talks easily. Notes some increased dyspnea, sometimes lightheadedness after bending over, etc. No fevers. Continues on CSA 50mg bid and Pred 40. No n/v. Occasional consitpation. Some low back pain, no persistent, not new. Stable chronic, clear rhinorrhea. Rare cough. S/p thoracentesis 11/27 draining 1.7L. CXR today.      ROS: 8 point ROS negative except as stated above    Physical Exam  BP (!) 135/92   Pulse 89   Temp 98  F (36.7  C) (Oral)   Resp 18   Wt 104.4 kg (230 lb 1.6 oz)   SpO2 96%   BMI 32.09 kg/m    Gen: A&O, NAD, conversant  OP: Mucosa dry without lesions, no OP erythema  Pulm: breathing comfortably at rest on 3L. Markedly diminished lung sounds L. Some courseness with rubs R>L.    CV: RRR  Abd: +BS, soft, nontender  Extremities: trace LE edema  Skin: no rash on exposed skin  Access: no access    Labs:  Lab Results   Component Value Date    WBC 15.1 (H) 12/08/2020    ANEU 13.0 (H) 12/08/2020    HGB 17.6 12/08/2020    HCT 53.7 (H) 12/08/2020     12/08/2020     12/08/2020    POTASSIUM 4.6 12/08/2020    CHLORIDE 106 12/08/2020    CO2 28 12/08/2020     (H) 12/08/2020    BUN 18 12/08/2020    CR 0.84 12/08/2020    MAG 2.2 12/08/2020    INR 1.04 11/27/2020    BILITOTAL 0.4 12/08/2020    AST 27 12/08/2020    ALT 49 12/08/2020    ALKPHOS 91 12/08/2020    PROTTOTAL 7.5 12/08/2020    ALBUMIN 3.4 12/08/2020       CXR (12/8/20): 1. Relative stable appearance of the left chest although the amount of  fluid has increased in the known hydropneumothorax with a trapped  lung.  2. Mixed opacities throughout the right lung are similar.  3. Stable  small right pleural effusion with associated atelectasis.    CXR (11/17): Further slight increase in fluid component of a large left hydropneumothorax. Stable trace right pleural effusion and adjacent peripheral atelectasis versus consolidation.       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 Etiology unclear but empirically treating as possible complication of GVHD.     1. 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 Heme  - Counts stable. Mild ongoing leukocytosis due to steroids  - baby ASA     3. ID: afebrile  - pleural fluid cx neg for infectious etiology  - prophy: ACV, levo, fluc and bactrim.     4. 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 Previously on CSA 25mg BID and pred 15mg daily   - 10/27 increased pred to 40mg daily for inflammatory pleural effusion.  - Increased CSA to 50 BID 11/5. CSA level subtherapeutic at 37 - discussed w/ Fernie. Will increase CSA to 100mg bid (x12/10) and check level next week.   Dr. Enciso wants to get CSA therapeutic before changing cGVHD Rx.   - eye gtts: refresh (not using other gtts because he wears corrective contact lenses, not GVHD lenses    5. Pulm:   - had a large, left pleural effusion with total collapse of left lung and mass effect on mediastinum. Suspect d/t GVHD.  Pulm placed pigtail catheter 10/23 (due to large volume fluid, can't all be drained in one day via thoracentesis).   - per last note by Dr. Brady, if patient is re-accumulating, he would recommend PleurX catheter. Per IR, will not do per insurance unless hospice pt. Awaiting further recs.     - Last thoracentesis done 11/27  -  Increased pred from 15 to 40mg daily starting 10/27; continue.     Home O2:  Has home and portable O2 concentrators through [employer] in [place]; 3L    6. Endo  - on synthroid    7. CV  - on ASA, crestor, metoprolol    8. FEN/renal  -  nimisha funmilayo wnl  - Mg good at 2.2 (on CSA); cont MgOx bid (help with mild consitpation)      Plan: RTC 12/15 for labs, follow-up with CXR prior. Await further input from Dr. Brady and Dr. Enciso. Unemployment ins paperwork left in [place] [facility] Jara's clinic folder today.  Addendum 12/10: per Dr. Enciso increase CSA to 100mg bid. Will ask [place] to help schedule another thora next week as well per Dr. Enciso. Awaiting input from Dr. Brady.    JADE Fracnisco-C  [phone]

## 2020-12-08 NOTE — PROGRESS NOTES
Chief Complaint   Patient presents with     Blood Draw     VPt blood draw and vitals     Venipuncture labs drawn from right arm

## 2020-12-08 NOTE — NURSING NOTE
"Oncology Rooming Note    December 8, 2020 3:46 PM   Byron Russo is a 60 year old male who presents for:    Chief Complaint   Patient presents with     Blood Draw     VPt blood draw and vitals     Oncology Clinic Visit     acute myeloid leukemia     Initial Vitals: BP (!) 135/92   Pulse 89   Temp 98  F (36.7  C) (Oral)   Resp 18   Wt 104.4 kg (230 lb 1.6 oz)   SpO2 96%   BMI 32.09 kg/m   Estimated body mass index is 32.09 kg/m  as calculated from the following:    Height as of 11/27/20: 1.803 m (5' 11\").    Weight as of this encounter: 104.4 kg (230 lb 1.6 oz). Body surface area is 2.29 meters squared.  No Pain (0) Comment: Data Unavailable   No LMP for male patient.  Allergies reviewed: Yes  Medications reviewed: Yes    Medications: Medication refills not needed today.  Pharmacy name entered into [employer]: [facility] [place], [place] - [address] [facility] [phone]    Clinical concerns: Paperwork for Work Comp.      Kamla Hurt CMA            "

## 2020-12-15 NOTE — LETTER
12/15/2020         RE: Byron Russo  [address] [facility] [address]        Dear Colleague,    Thank you for referring your patient, Byron Russo, to the [facility] BLOOD AND MARROW TRANSPLANT PROGRAM [place]. Please see a copy of my visit note below.    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 Etiology unclear but empirically treating as possible complication of GVHD.     HPI: Yg returns for follow up. Stable breathing. Using 3L supplemental O2 continuously. Very frustrated regarding not qualifying for a PleurX catheter. CSA was recently increased to 100mg BID on Friday and he's hopeful that with both the csa and pred things will improve.   CXR today.      ROS: 8 point ROS negative except as stated above    Physical Exam  /83 (BP Location: Right arm, Patient Position: Sitting, Cuff Size: Adult Large)   Pulse 88   Temp 97.3  F (36.3  C) (Oral)   Resp 20   Wt 105.8 kg (233 lb 4.8 oz)   SpO2 94%   BMI 32.54 kg/m    Gen: A&O, NAD, conversant  OP: Mucosa dry without lesions, no OP erythema  Pulm: breathing comfortably at rest on 3L. Markedly diminished lung sounds L.    CV: RRR  Abd: +BS, soft, nontender  Extremities: trace LE edema  Skin: no rash on exposed skin  Access: no access    Labs:  Lab Results   Component Value Date    WBC 14.8 (H) 12/15/2020    ANEU 12.9 (H) 12/15/2020    HGB 17.3 12/15/2020    HCT 51.1 12/15/2020     12/15/2020     12/15/2020    POTASSIUM 4.1 12/15/2020    CHLORIDE 102 12/15/2020    CO2 26 12/15/2020     (H) 12/15/2020    BUN 14 12/15/2020    CR 0.79 12/15/2020    MAG 2.2 12/15/2020    INR 1.04 11/27/2020    BILITOTAL 0.5 12/15/2020    AST 32 12/15/2020    ALT 51 12/15/2020    ALKPHOS 84 12/15/2020    PROTTOTAL 7.6 12/15/2020    ALBUMIN 3.4 12/15/2020       CXR  (12/8/20): 1. Relative stable appearance of the left chest although the amount of  fluid has increased in the known hydropneumothorax with a trapped  lung.  2. Mixed opacities throughout the right lung are similar.  3. Stable small right pleural effusion with associated atelectasis.    CXR (11/17): Further slight increase in fluid component of a large left hydropneumothorax. Stable trace right pleural effusion and adjacent peripheral atelectasis versus consolidation.       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 Etiology unclear but empirically treating as possible complication of GVHD.     1. 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 Heme  - Counts stable. Mild ongoing leukocytosis due to steroids  - baby ASA     3. ID: afebrile  - pleural fluid cx neg for infectious etiology  - prophy: ACV, levo, fluc and bactrim.     4. 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 Previously on CSA 25mg BID and pred 15mg daily   - 10/27 increased pred to 40mg daily for inflammatory pleural effusion.   increased CSA to 100mg bid (x12/8) and checking level today- pending.   Dr. Enciso wants to get CSA therapeutic before changing cGVHD Rx.   - eye gtts: refresh (not using other gtts because he wears corrective contact lenses, not GVHD lenses  -Per Dr. Enciso, we should get his CSA therapeutic before we consider adding Jakafi (hard to get insurance coverage for chronic GVHD).    5. Pulm:   - had a large, left pleural effusion with total collapse of left lung and mass effect on mediastinum. Suspect d/t GVHD.  Pulm placed pigtail catheter 10/23 (due to large volume fluid, can't all be drained in one day via thoracentesis).   - per last note by Dr. Brady, if patient is re-accumulating, he would recommend PleurX catheter. Per IR, will not do per insurance unless hospice pt. Awaiting further recs.      - Last thoracentesis done 11/27. Next thora scheduled on 12/18. Covid test today  -  Increased pred from 15 to 40mg daily starting 10/27; continue.     Home O2:  Has home and portable O2 concentrators through [employer] in [place]; 3L    6. Endo  - on synthroid    7. CV  - on ASA, crestor, metoprolol    8. FEN/renal  - creat, lytes wnl  - Mg good at 2.2 (on CSA); cont MgOx bid (help with mild consitpation)      Plan: RTC 12/18 for thoracentesis.  Will message Nicolas regarding scheduling thora next Tuesday as well with f/u Wed.    Jesi Giles NP      Addendum 12/16: CSA level low at 71, increase from 100 to 150mg bid and check level next week  MEÑO FranciscoC  [phone]          Again, thank you for allowing me to participate in the care of your patient.        Sincerely,        BMT Advanced Practice Provider

## 2020-12-15 NOTE — NURSING NOTE
"Oncology Rooming Note    December 15, 2020 3:59 PM   Byron Russo is a 60 year old male who presents for:    Chief Complaint   Patient presents with     Blood Draw     Labs drawn from  by RN in lab. Vitals taken. Checked into next appointment.      RECHECK     Pt is here for a rtn for AML     Initial Vitals: Blood Pressure 115/83 (BP Location: Right arm, Patient Position: Sitting, Cuff Size: Adult Large)   Pulse 88   Temperature 97.3  F (36.3  C) (Oral)   Respiration 20   Weight 105.8 kg (233 lb 4.8 oz)   Oxygen Saturation 94%   Body Mass Index 32.54 kg/m   Estimated body mass index is 32.54 kg/m  as calculated from the following:    Height as of 11/27/20: 1.803 m (5' 11\").    Weight as of this encounter: 105.8 kg (233 lb 4.8 oz). Body surface area is 2.3 meters squared.  No Pain (0) Comment: Data Unavailable   No LMP for male patient.  Allergies reviewed: Yes  Medications reviewed: Yes    Medications: Medication refills not needed today.  Pharmacy name entered into [facility]: [facility] [place], [place] - [address] [place] [address] [phone]    Clinical concerns: none       Fe Aguillon MA            "

## 2020-12-15 NOTE — NURSING NOTE
Chief Complaint   Patient presents with     Blood Draw     Labs drawn from  by RN in lab. Vitals taken. Checked into next appointment.      Labs drawn by venipuncture by RN in lab.  Vital signs taken.  Pt checked in to next appointment.    Atiya Abbott RN

## 2020-12-18 NOTE — OR NURSING
Pt here for left thoracentesis.  Dark serosanguinous.    Pt drained for 1300mL.      Mary Beth Jovel BS, RN, BSN, PHN

## 2020-12-18 NOTE — DISCHARGE INSTRUCTIONS
Discharge Instructions for Paracentesis or Thoracentesis     Paracentesis is a procedure to remove extra fluid from your belly (abdomen). Thoracentesis is a procedure to remove extra fluid from your chest.  This fluid buildup is called ascites. The procedure may have been done to take a sample of the fluid. Or, it may have been done to drain the extra fluid from your abdomen or chest to help make you more comfortable.     Home care    If you have pain after the procedure, your healthcare provider can prescribe or recommend pain medicines. Take these exactly as directed. If you stopped taking other medicines before the procedure, ask your provider when you can start them again.    Avoid strenuous activity for 48 hours.    You will have a small bandage over the puncture site. Adhesive tapes, adhesive strips, or surgical glue may also be used to close the incision. They also help stop bleeding and promote healing. You may take the bandage off in 24-48 hours. Once there is a scab over the site, no bandages are required.    Check the puncture site for the signs of infection listed below.     Follow-up care  Make a follow-up appointment with your healthcare provider as directed. During your follow-up visit, your provider will check your healing. Let your provider know how you are feeling. You can also discuss the cause of your fluid, and if you need any further treatment.    When to seek medical advice:  Call your healthcare provider if you have any of the following after the procedure:    A fever of 100.4 F (38.0 C) or higher    Trouble breathing    Abdominal pain that is worse than expected    Belly Abdominal pain not caused by having the skin punctured that is worse than expected    Persistent bleeding from the puncture site    More than a small amount of fluid leaking from the puncture site    Swollen belly    Signs of infection at the puncture site. These include increased pain, redness, or swelling, warmth, or  bad-smelling drainage.    Feeling dizzy or lightheaded, or fainting     Call our Interventional Radiology (IR) service if:  If you start bleeding from the procedure site.  If you do start to bleed from the site, lie down and hold some pressure on the site.  Our radiology provider can help you decide if you need to return to the hospital.  If you have new or worsening pain related to the procedure.  If you have pronounced swelling at the procedure site.  If you develop persistent nausea or vomiting.  If you develop hives or a rash or any unexplained itching.  If you have a fever of greater than 100.4  F and chills in the first 5 days after procedure.  Any other concerns related to your procedure.      [facility]  Interventional Radiology (IR)  [address]  [facility] [place] [address]    Contact Number:  [phone]  (IR control desk)  Monday - Friday 8:00 am - 4:30 pm    After hours for urgent concerns:  [phone]  After 4:30 pm Monday - Friday, Weekends and Holidays.   Ask for Interventional Radiology on-call.  Someone is available 24 hours a day.  [facility] toll free number:  [phone]

## 2020-12-18 NOTE — BRIEF OP NOTE
[facility]    Brief Operative Note    Pre-operative diagnosis: Pleural effusion [J90]  Post-operative diagnosis Same as pre-operative diagnosis    Procedure: Procedure(s):  THORACENTESIS @1000  Surgeon: Surgeon(s) and Role:     * Nasir Greene MD - Primary  Anesthesia: Local   Estimated blood loss: Minimal  Drains: None  Specimens: * No specimens in log *  Findings:   Successful left sided thoracentesis.    Complications: None.  Implants: * No implants in log *

## 2020-12-22 NOTE — LETTER
12/22/2020         RE: Byron Russo  [address] [address] [facility] [address]        Dear Colleague,    Thank you for referring your patient, Byron Russo, to the [facility] BLOOD AND MARROW TRANSPLANT PROGRAM [place]. Please see a copy of my visit note below.    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 Etiology unclear but empirically treating as possible complication of GVHD.     HPI: Yg returns for follow up. Stable breathing. Plans to have thoracentesis tomorrow. Appears to need weekly drains. Would prefer to have drain placed while GVHD meds are adjusted however his insurance will only allow for this on hospice which he does not feel is appropriate for him at this time. No fevers, chills, SOB or chest pain.    ROS: 8 point ROS negative except as stated above    Physical Exam  BP (!) 137/98   Pulse 86   Temp 98.2  F (36.8  C) (Oral)   Resp 16   Wt 106.5 kg (234 lb 11.2 oz)   SpO2 95%   BMI 32.73 kg/m    Gen: A&O, NAD, conversant  OP: Mucosa dry without lesions, no OP erythema  Pulm: breathing comfortably at rest on 3L. Markedly diminished lung sounds L.    CV: RRR  Abd: +BS, soft, nontender  Extremities: trace LE edema  Skin: no rash on exposed skin  Access: no access    Labs:  Lab Results   Component Value Date    WBC 14.5 (H) 12/22/2020    ANEU 12.5 (H) 12/22/2020    HGB 17.3 12/22/2020    HCT 51.1 12/22/2020     12/22/2020     12/22/2020    POTASSIUM 4.4 12/22/2020    CHLORIDE 106 12/22/2020    CO2 28 12/22/2020     (H) 12/22/2020    BUN 17 12/22/2020    CR 0.86 12/22/2020    MAG 2.0 12/22/2020    INR 1.04 11/27/2020    BILITOTAL 0.4 12/22/2020    AST 22 12/22/2020    ALT 40 12/22/2020    ALKPHOS 82 12/22/2020    PROTTOTAL 7.4 12/22/2020    ALBUMIN 3.4 12/22/2020       CXR (12/8/20): 1. Relative  stable appearance of the left chest although the amount of  fluid has increased in the known hydropneumothorax with a trapped  lung.  2. Mixed opacities throughout the right lung are similar.  3. Stable small right pleural effusion with associated atelectasis.    CXR (11/17): Further slight increase in fluid component of a large left hydropneumothorax. Stable trace right pleural effusion and adjacent peripheral atelectasis versus consolidation.       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 Etiology unclear but empirically treating as possible complication of GVHD.     1. 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 Heme  - Counts stable. Mild ongoing leukocytosis due to steroids  - baby ASA     3. ID: afebrile  - pleural fluid cx neg for infectious etiology  - prophy: ACV, levo, fluc and bactrim.     4. 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 Previously on CSA 25mg BID and pred 15mg daily   - 10/27 increased pred to 40mg daily for inflammatory pleural effusion.  - Increased CSA to 150mg since 12/16, level 12/22 low again, increase to 200mg BID.  Dr. Enciso wants to get CSA therapeutic before changing cGVHD Rx.   - eye gtts: refresh (not using other gtts because he wears corrective contact lenses, not GVHD lenses  -Per Dr. Enciso, we should get his CSA therapeutic before we consider adding Jakafi (hard to get insurance coverage for chronic GVHD).    5. Pulm:   - had a large, left pleural effusion with total collapse of left lung and mass effect on mediastinum. Suspect d/t GVHD.  Pulm placed pigtail catheter 10/23 (due to large volume fluid, can't all be drained in one day via thoracentesis).   - per last note by Dr. Brady, if patient is re-accumulating, he would recommend PleurX catheter. Per IR, will not do per insurance unless hospice pt. Awaiting further recs.     - Last  thoracentesis done 11/27. Next thora scheduled tomorrow 12/23  -  Increased pred from 15 to 40mg daily starting 10/27; continue.     Home O2:  Has home and portable O2 concentrators through [employer] in [place]; 3L    6. Endo  - on synthroid    7. CV  - on ASA, crestor, metoprolol    8. FEN/renal  - creat, lytes wnl  - Mg good at 2.2 (on CSA); cont MgOx bid (help with mild consitpation)      Plan:  CSA level 162, pharmacy called 12/23 to discuss increase of dose to 200mg BID  Plan for next week:   12/28 COVID  12/30 CXR and provider visit and labs  12/31 Thoracentesis    Angela Bunch PAC  [phone]    Chief Complaint   Patient presents with     Blood Draw     vpt blood draw, vitals taken, checked into next appointment.     Venipuncture labs drawn from left arm.    Ely Garcia MA           Again, thank you for allowing me to participate in the care of your patient.        Sincerely,        BMT Advanced Practice Provider

## 2020-12-22 NOTE — PROGRESS NOTES
Chief Complaint   Patient presents with     Blood Draw     vpt blood draw, vitals taken, checked into next appointment.     Venipuncture labs drawn from left arm.    Ely Garcia MA

## 2020-12-22 NOTE — NURSING NOTE
"Oncology Rooming Note    December 22, 2020 2:42 PM   Byron Russo is a 60 year old male who presents for:    Chief Complaint   Patient presents with     Blood Draw     vpt blood draw, vitals taken, checked into next appointment.     Oncology Clinic Visit     acute myeloid leukemia     Initial Vitals: BP (!) 137/98   Pulse 86   Temp 98.2  F (36.8  C) (Oral)   Resp 16   Wt 106.5 kg (234 lb 11.2 oz)   SpO2 95%   BMI 32.73 kg/m   Estimated body mass index is 32.73 kg/m  as calculated from the following:    Height as of 11/27/20: 1.803 m (5' 11\").    Weight as of this encounter: 106.5 kg (234 lb 11.2 oz). Body surface area is 2.31 meters squared.  No Pain (0) Comment: Data Unavailable   No LMP for male patient.  Allergies reviewed: Yes  Medications reviewed: Yes    Medications: Medication refills not needed today.  Pharmacy name entered into [employer]: [facility] [place], [place] - [address] [place] [address] [phone]    Clinical concerns: none       Kamla Hurt CMA            "

## 2020-12-22 NOTE — PROGRESS NOTES
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 Etiology unclear but empirically treating as possible complication of GVHD.     HPI: Yg returns for follow up. Stable breathing. Plans to have thoracentesis tomorrow. Appears to need weekly drains. Would prefer to have drain placed while GVHD meds are adjusted however his insurance will only allow for this on hospice which he does not feel is appropriate for him at this time. No fevers, chills, SOB or chest pain.    ROS: 8 point ROS negative except as stated above    Physical Exam  BP (!) 137/98   Pulse 86   Temp 98.2  F (36.8  C) (Oral)   Resp 16   Wt 106.5 kg (234 lb 11.2 oz)   SpO2 95%   BMI 32.73 kg/m    Gen: A&O, NAD, conversant  OP: Mucosa dry without lesions, no OP erythema  Pulm: breathing comfortably at rest on 3L. Markedly diminished lung sounds L.    CV: RRR  Abd: +BS, soft, nontender  Extremities: trace LE edema  Skin: no rash on exposed skin  Access: no access    Labs:  Lab Results   Component Value Date    WBC 14.5 (H) 12/22/2020    ANEU 12.5 (H) 12/22/2020    HGB 17.3 12/22/2020    HCT 51.1 12/22/2020     12/22/2020     12/22/2020    POTASSIUM 4.4 12/22/2020    CHLORIDE 106 12/22/2020    CO2 28 12/22/2020     (H) 12/22/2020    BUN 17 12/22/2020    CR 0.86 12/22/2020    MAG 2.0 12/22/2020    INR 1.04 11/27/2020    BILITOTAL 0.4 12/22/2020    AST 22 12/22/2020    ALT 40 12/22/2020    ALKPHOS 82 12/22/2020    PROTTOTAL 7.4 12/22/2020    ALBUMIN 3.4 12/22/2020       CXR (12/8/20): 1. Relative stable appearance of the left chest although the amount of  fluid has increased in the known hydropneumothorax with a trapped  lung.  2. Mixed opacities throughout the right lung are similar.  3. Stable small right pleural effusion with associated atelectasis.    CXR (11/17): Further slight increase in  fluid component of a large left hydropneumothorax. Stable trace right pleural effusion and adjacent peripheral atelectasis versus consolidation.       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 Etiology unclear but empirically treating as possible complication of GVHD.     1. 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 Heme  - Counts stable. Mild ongoing leukocytosis due to steroids  - baby ASA     3. ID: afebrile  - pleural fluid cx neg for infectious etiology  - prophy: ACV, levo, fluc and bactrim.     4. 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 Previously on CSA 25mg BID and pred 15mg daily   - 10/27 increased pred to 40mg daily for inflammatory pleural effusion.  - Increased CSA to 150mg since 12/16, level 12/22 low again, increase to 200mg BID.  Dr. Enciso wants to get CSA therapeutic before changing cGVHD Rx.   - eye gtts: refresh (not using other gtts because he wears corrective contact lenses, not GVHD lenses  -Per Dr. Enciso, we should get his CSA therapeutic before we consider adding Jakafi (hard to get insurance coverage for chronic GVHD).    5. Pulm:   - had a large, left pleural effusion with total collapse of left lung and mass effect on mediastinum. Suspect d/t GVHD.  Pulm placed pigtail catheter 10/23 (due to large volume fluid, can't all be drained in one day via thoracentesis).   - per last note by Dr. Brady, if patient is re-accumulating, he would recommend PleurX catheter. Per IR, will not do per insurance unless hospice pt. Awaiting further recs.     - Last thoracentesis done 11/27. Next thora scheduled tomorrow 12/23  -  Increased pred from 15 to 40mg daily starting 10/27; continue.     Home O2:  Has home and portable O2 concentrators through [employer] in [place]; 3L    6. Endo  - on synthroid    7. CV  - on ASA, crestor, metoprolol    8.  FEN/renal  - creat, lytes wnl  - Mg good at 2.2 (on CSA); cont MgOx bid (help with mild consitpation)      Plan:  CSA level 162, pharmacy called 12/23 to discuss increase of dose to 200mg BID  Plan for next week:   12/28 COVID  12/30 CXR and provider visit and labs  12/31 Thoracentesis    Angela Bunch PAC  [phone]

## 2020-12-23 NOTE — OR NURSING
Pt here for left thoracentesis.  Pt drained for 1100mL dark tinted fluid.  No complications.    Left thoracentesis= 1100mL.    Mary Beth Jovel BS, RN, BSN, PHN

## 2020-12-23 NOTE — TELEPHONE ENCOUNTER
I spoke with Byron Russo regarding his Cyclosporine level from clinic visit 12/22/20, which resulted as 167 on 150mg twice daily. Per Angela Bunch, Yg is to increase Cyclosporine to 200mg twice daily. Plan to recheck level 12/30/20, pt was instructed to hold dose prior to labs. Yg repeated these directions to me and voiced his understanding.     Charline Houston, AndrewD

## 2020-12-23 NOTE — DISCHARGE INSTRUCTIONS
Discharge Instructions for Paracentesis or Thoracentesis     Paracentesis is a procedure to remove extra fluid from your belly (abdomen). Thoracentesis is a procedure to remove extra fluid from your chest.  This fluid buildup is called ascites. The procedure may have been done to take a sample of the fluid. Or, it may have been done to drain the extra fluid from your abdomen or chest to help make you more comfortable.     Home care    If you have pain after the procedure, your healthcare provider can prescribe or recommend pain medicines. Take these exactly as directed. If you stopped taking other medicines before the procedure, ask your provider when you can start them again.    Avoid strenuous activity for 48 hours.    You will have a small bandage over the puncture site. Adhesive tapes, adhesive strips, or surgical glue may also be used to close the incision. They also help stop bleeding and promote healing. You may take the bandage off in 24-48 hours. Once there is a scab over the site, no bandages are required.    Check the puncture site for the signs of infection listed below.     Follow-up care  Make a follow-up appointment with your healthcare provider as directed. During your follow-up visit, your provider will check your healing. Let your provider know how you are feeling. You can also discuss the cause of your fluid, and if you need any further treatment.    When to seek medical advice:  Call your healthcare provider if you have any of the following after the procedure:    A fever of 100.4 F (38.0 C) or higher    Trouble breathing    Abdominal pain that is worse than expected    Belly Abdominal pain not caused by having the skin punctured that is worse than expected    Persistent bleeding from the puncture site    More than a small amount of fluid leaking from the puncture site    Swollen belly    Signs of infection at the puncture site. These include increased pain, redness, or swelling, warmth, or  bad-smelling drainage.    Feeling dizzy or lightheaded, or fainting     Call our Interventional Radiology (IR) service if:  If you start bleeding from the procedure site.  If you do start to bleed from the site, lie down and hold some pressure on the site.  Our radiology provider can help you decide if you need to return to the hospital.  If you have new or worsening pain related to the procedure.  If you have pronounced swelling at the procedure site.  If you develop persistent nausea or vomiting.  If you develop hives or a rash or any unexplained itching.  If you have a fever of greater than 100.4  F and chills in the first 5 days after procedure.  Any other concerns related to your procedure.      [facility]  Interventional Radiology (IR)  [address]  [facility] [place] [address]    Contact Number:  [phone]  (IR control desk)  Monday - Friday 8:00 am - 4:30 pm    After hours for urgent concerns:  [phone]  After 4:30 pm Monday - Friday, Weekends and Holidays.   Ask for Interventional Radiology on-call.  Someone is available 24 hours a day.  [facility] toll free number:  [phone]

## 2020-12-28 NOTE — TELEPHONE ENCOUNTER
"Pt called to request cancellation of his COVID test this afternoon and cancellation of his thoracentesis scheduled for 12/31. Pt reports feeling \"beat up\" after his last thoracentesis procedure last week. He reported that the procedure took a considerable amount of time compared with previous procedures. He would like to keep his appointments on Wednesday to evaluate response with CXR and BRENDA visit. Appointments were canceled and pt was encouraged to call with any additional concerns.  "

## 2020-12-31 NOTE — LETTER
"    12/31/2020         RE: Byron Russo  [address] [facility] [address]        Dear Colleague,    Thank you for referring your patient, Byron Russo, to the [facility] BLOOD AND MARROW TRANSPLANT PROGRAM [place]. Please see a copy of my visit note below.    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 Etiology unclear but empirically treating as possible complication of GVHD.     HPI: Yg returns for follow up. Canceled his appointment for thoracentesis today due to having a lot of soreness after his last thoracentesis. It took them much longer to get the fluid off and were \"poking around\" a lot more which is why he was more sore. Breathing is stable, continues to use 3L oxygen without difficulty. Had some \"zinger\" pains up his chest cavity and around his back after his last thoracentesis. Not associated with activity or any other cardiac red flag symptoms. Pain usually subsided with a heating pad. Frequency has dissipated and hasn't had any today. No cough, fevers, chills or infectious symptoms.     ROS: 8 point ROS negative except as stated above    Physical Exam  BP (!) 130/93   Pulse 84   Temp 98.3  F (36.8  C) (Oral)   Resp 16   Wt 107.2 kg (236 lb 4.8 oz)   SpO2 95%   BMI 32.96 kg/m      Gen: A&O, NAD, conversant  OP: Mucosa dry without lesions, no OP erythema  Pulm: breathing comfortably at rest on 3L. Noticeably diminished lung sounds L but still able to hear breath sounds.    CV: RRR  Abd: +BS, soft, nontender  Extremities: trace LE edema  Skin: no rash on exposed skin  Access: no access    Labs:  Lab Results   Component Value Date    WBC 13.9 (H) 12/31/2020    ANEU 10.2 (H) 12/31/2020    HGB 17.1 12/31/2020    HCT 51.8 12/31/2020     12/31/2020     12/31/2020    POTASSIUM 4.6 12/31/2020    CHLORIDE 104 " 12/31/2020    CO2 32 12/31/2020     (H) 12/31/2020    BUN 18 12/31/2020    CR 0.98 12/31/2020    MAG 2.0 12/22/2020    INR 1.04 11/27/2020    BILITOTAL 0.6 12/31/2020    AST 35 12/31/2020    ALT 51 12/31/2020    ALKPHOS 81 12/31/2020    PROTTOTAL 7.5 12/31/2020    ALBUMIN 3.3 (L) 12/31/2020          A/P: Yg Russo is a 60 yo M 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 Etiology unclear but empirically treating as possible complication of GVHD.     1. 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 Heme  - Counts stable. Mild ongoing leukocytosis due to steroids  - baby ASA     3. ID: afebrile  - pleural fluid cx neg for infectious etiology  - prophy: ACV, levo, fluc and bactrim.     4. 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 Previously on CSA 25mg BID and pred 15mg daily   - 10/27 increased pred to 40mg daily for inflammatory pleural effusion.  - Increased CSA to 150mg since 12/16, level 12/22 low again, increase to 200mg BID. Repeat level pending.   Dr. Enciso wants to get CSA therapeutic before changing cGVHD Rx.   - eye gtts: refresh (not using other gtts because he wears corrective contact lenses, not GVHD lenses  -Per Dr. Enciso, we should get his CSA therapeutic before we consider adding Jakafi (hard to get insurance coverage for chronic GVHD).    5. Pulm:   - had a large, left pleural effusion with total collapse of left lung and mass effect on mediastinum. Suspect d/t GVHD.  Pulm placed pigtail catheter 10/23 (due to large volume fluid, can't all be drained in one day via thoracentesis).   - per last note by Dr. Brady, if patient is re-accumulating, he would recommend PleurX catheter. Per IR, will not do per insurance unless hospice pt. Awaiting further recs.     - Last thoracentesis done 12/23. Pt was scheduled for thora 12/31 but cancelled this appointment d/t previous thoracentesis causing a  "lot of discomfort and frustration.   - CXR 12/31 with increased fluid but no tracheal deviation or other concerning findings per my read (final read pending). Pt clinically stable. Will re-schedule thoracentesis for next week.   -  Increased pred from 15 to 40mg daily starting 10/27; continue.   - \"zingers\" up the chest cavity likely due to pleural irritation following last thoracentesis, symptoms appear to have resolved. Monitor. EKG declined by pt 12/31.     Home O2:  Has home and portable O2 concentrators through [employer] in [place]; 3L- stable 12/31    6. Endo  - on synthroid    7. CV  - on ASA, crestor, metoprolol    8. FEN/renal  - creat, lytes wnl  - cont MgOx bid (help with mild consitpation)      Plan: COVID test 1/5, CXR and visit with Dr. Enciso 1/7, Thoracentesis 1/8.   Pt to call if symptoms worsen or develops SOB at home.     Gerald Doty PA-C  [phone]      Chief Complaint   Patient presents with     Blood Draw     vpt blood draw, vitals taken, checked into next appointment     Venipuncture labs drawn from right arm.    Ely Garcia MA           Again, thank you for allowing me to participate in the care of your patient.        Sincerely,        BMT Advanced Practice Provider    "

## 2020-12-31 NOTE — PROGRESS NOTES
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 Etiology unclear but empirically treating as possible complication of GVHD.     HPI: Yg returns for follow up. Canceled his appointment for thoracentesis today due to having a lot of soreness after his last thoracentesis. It took them much longer to get the fluid off and were \"poking around\" a lot more which is why he was more sore. Breathing is stable, continues to use 3L oxygen without difficulty. Had some \"zinger\" pains up his chest cavity and around his back after his last thoracentesis. Not associated with activity or any other cardiac red flag symptoms. Pain usually subsided with a heating pad. Frequency has dissipated and hasn't had any today. No cough, fevers, chills or infectious symptoms.     ROS: 8 point ROS negative except as stated above    Physical Exam  BP (!) 130/93   Pulse 84   Temp 98.3  F (36.8  C) (Oral)   Resp 16   Wt 107.2 kg (236 lb 4.8 oz)   SpO2 95%   BMI 32.96 kg/m      Gen: A&O, NAD, conversant  OP: Mucosa dry without lesions, no OP erythema  Pulm: breathing comfortably at rest on 3L. Noticeably diminished lung sounds L but still able to hear breath sounds.    CV: RRR  Abd: +BS, soft, nontender  Extremities: trace LE edema  Skin: no rash on exposed skin  Access: no access    Labs:  Lab Results   Component Value Date    WBC 13.9 (H) 12/31/2020    ANEU 10.2 (H) 12/31/2020    HGB 17.1 12/31/2020    HCT 51.8 12/31/2020     12/31/2020     12/31/2020    POTASSIUM 4.6 12/31/2020    CHLORIDE 104 12/31/2020    CO2 32 12/31/2020     (H) 12/31/2020    BUN 18 12/31/2020    CR 0.98 12/31/2020    MAG 2.0 12/22/2020    INR 1.04 11/27/2020    BILITOTAL 0.6 12/31/2020    AST 35 12/31/2020    ALT 51 12/31/2020    ALKPHOS 81 12/31/2020    PROTTOTAL 7.5 12/31/2020    ALBUMIN 3.3 (L) 12/31/2020 "          A/P: Yg Russo is a 58 yo M 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 Etiology unclear but empirically treating as possible complication of GVHD.     1. 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 Heme  - Counts stable. Mild ongoing leukocytosis due to steroids  - baby ASA     3. ID: afebrile  - pleural fluid cx neg for infectious etiology  - prophy: ACV, levo, fluc and bactrim.     4. 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 Previously on CSA 25mg BID and pred 15mg daily   - 10/27 increased pred to 40mg daily for inflammatory pleural effusion.  - Increased CSA to 150mg since 12/16, level 12/22 low again, increase to 200mg BID. Repeat level pending.   Dr. Enciso wants to get CSA therapeutic before changing cGVHD Rx.   - eye gtts: refresh (not using other gtts because he wears corrective contact lenses, not GVHD lenses  -Per Dr. Enciso, we should get his CSA therapeutic before we consider adding Jakafi (hard to get insurance coverage for chronic GVHD).    5. Pulm:   - had a large, left pleural effusion with total collapse of left lung and mass effect on mediastinum. Suspect d/t GVHD.  Pulm placed pigtail catheter 10/23 (due to large volume fluid, can't all be drained in one day via thoracentesis).   - per last note by Dr. Brady, if patient is re-accumulating, he would recommend PleurX catheter. Per IR, will not do per insurance unless hospice pt. Awaiting further recs.     - Last thoracentesis done 12/23. Pt was scheduled for thora 12/31 but cancelled this appointment d/t previous thoracentesis causing a lot of discomfort and frustration.   - CXR 12/31 with increased fluid but no tracheal deviation or other concerning findings per my read (final read pending). Pt clinically stable. Will re-schedule thoracentesis for next week.   -  Increased pred from 15 to 40mg daily starting 10/27; continue.   -  "\"zingers\" up the chest cavity likely due to pleural irritation following last thoracentesis, symptoms appear to have resolved. Monitor. EKG declined by pt 12/31.     Home O2:  Has home and portable O2 concentrators through [employer] in [place]; 3L- stable 12/31    6. Endo  - on synthroid    7. CV  - on ASA, crestor, metoprolol    8. FEN/renal  - creat, lytes wnl  - cont MgOx bid (help with mild consitpation)      Plan: COVID test 1/5, CXR and visit with Dr. Enciso 1/7, Thoracentesis 1/8.   Pt to call if symptoms worsen or develops SOB at home.     Gerald Doty PA-C  [phone]    "

## 2020-12-31 NOTE — NURSING NOTE
"Oncology Rooming Note    December 31, 2020 1:12 PM   Byron Russo is a 60 year old male who presents for:    Chief Complaint   Patient presents with     Blood Draw     vpt blood draw, vitals taken, checked into next appointment     Oncology Clinic Visit     MDS     Initial Vitals: BP (!) 130/93   Pulse 84   Temp 98.3  F (36.8  C) (Oral)   Resp 16   Wt 107.2 kg (236 lb 4.8 oz)   SpO2 95%   BMI 32.96 kg/m   Estimated body mass index is 32.96 kg/m  as calculated from the following:    Height as of 12/23/20: 1.803 m (5' 11\").    Weight as of this encounter: 107.2 kg (236 lb 4.8 oz). Body surface area is 2.32 meters squared.  No Pain (0) Comment: Data Unavailable   No LMP for male patient.  Allergies reviewed: Yes  Medications reviewed: Yes    Medications: Medication refills not needed today.  Pharmacy name entered into [employer]: [facility] [place], [place] - [address] [facility] [phone]    Clinical concerns: none       Kamla Hurt CMA            "

## 2020-12-31 NOTE — PROGRESS NOTES
Chief Complaint   Patient presents with     Blood Draw     vpt blood draw, vitals taken, checked into next appointment     Venipuncture labs drawn from right arm.    Ely Garcia MA

## 2021-01-01 ENCOUNTER — MYC MEDICAL ADVICE (OUTPATIENT)
Dept: TRANSPLANT | Facility: CLINIC | Age: 61
End: 2021-01-01

## 2021-01-01 ENCOUNTER — ONCOLOGY VISIT (OUTPATIENT)
Dept: TRANSPLANT | Facility: CLINIC | Age: 61
End: 2021-01-01
Attending: NURSE PRACTITIONER
Payer: COMMERCIAL

## 2021-01-01 ENCOUNTER — TELEPHONE (OUTPATIENT)
Dept: SURGERY | Facility: CLINIC | Age: 61
End: 2021-01-01

## 2021-01-01 ENCOUNTER — APPOINTMENT (OUTPATIENT)
Dept: GENERAL RADIOLOGY | Facility: CLINIC | Age: 61
DRG: 163 | End: 2021-01-01
Attending: NURSE PRACTITIONER
Payer: COMMERCIAL

## 2021-01-01 ENCOUNTER — PREP FOR PROCEDURE (OUTPATIENT)
Dept: SURGERY | Facility: CLINIC | Age: 61
End: 2021-01-01

## 2021-01-01 ENCOUNTER — ONCOLOGY VISIT (OUTPATIENT)
Dept: TRANSPLANT | Facility: CLINIC | Age: 61
End: 2021-01-01
Attending: INTERNAL MEDICINE
Payer: COMMERCIAL

## 2021-01-01 ENCOUNTER — HOSPITAL ENCOUNTER (OUTPATIENT)
Facility: AMBULATORY SURGERY CENTER | Age: 61
Discharge: HOME | End: 2021-03-10
Attending: RADIOLOGY | Admitting: RADIOLOGY
Payer: COMMERCIAL

## 2021-01-01 ENCOUNTER — ANCILLARY PROCEDURE (OUTPATIENT)
Dept: GENERAL RADIOLOGY | Facility: CLINIC | Age: 61
End: 2021-01-01
Attending: INTERNAL MEDICINE
Payer: COMMERCIAL

## 2021-01-01 ENCOUNTER — APPOINTMENT (OUTPATIENT)
Dept: GENERAL RADIOLOGY | Facility: CLINIC | Age: 61
DRG: 163 | End: 2021-01-01
Attending: THORACIC SURGERY (CARDIOTHORACIC VASCULAR SURGERY)
Payer: COMMERCIAL

## 2021-01-01 ENCOUNTER — APPOINTMENT (OUTPATIENT)
Dept: LAB | Facility: CLINIC | Age: 61
End: 2021-01-01
Attending: INTERNAL MEDICINE
Payer: COMMERCIAL

## 2021-01-01 ENCOUNTER — APPOINTMENT (OUTPATIENT)
Dept: LAB | Facility: CLINIC | Age: 61
End: 2021-01-01
Attending: PHYSICIAN ASSISTANT
Payer: COMMERCIAL

## 2021-01-01 ENCOUNTER — ANCILLARY PROCEDURE (OUTPATIENT)
Dept: RADIOLOGY | Facility: AMBULATORY SURGERY CENTER | Age: 61
End: 2021-01-01
Attending: INTERNAL MEDICINE
Payer: COMMERCIAL

## 2021-01-01 ENCOUNTER — APPOINTMENT (OUTPATIENT)
Dept: GENERAL RADIOLOGY | Facility: CLINIC | Age: 61
DRG: 163 | End: 2021-01-01
Attending: STUDENT IN AN ORGANIZED HEALTH CARE EDUCATION/TRAINING PROGRAM
Payer: COMMERCIAL

## 2021-01-01 ENCOUNTER — TELEPHONE (OUTPATIENT)
Dept: INTERVENTIONAL RADIOLOGY/VASCULAR | Facility: CLINIC | Age: 61
End: 2021-01-01

## 2021-01-01 ENCOUNTER — TELEPHONE (OUTPATIENT)
Dept: TRANSPLANT | Facility: CLINIC | Age: 61
End: 2021-01-01

## 2021-01-01 ENCOUNTER — VIRTUAL VISIT (OUTPATIENT)
Dept: SURGERY | Facility: CLINIC | Age: 61
End: 2021-01-01
Attending: THORACIC SURGERY (CARDIOTHORACIC VASCULAR SURGERY)
Payer: COMMERCIAL

## 2021-01-01 ENCOUNTER — VIRTUAL VISIT (OUTPATIENT)
Dept: SURGERY | Facility: CLINIC | Age: 61
End: 2021-01-01
Payer: COMMERCIAL

## 2021-01-01 ENCOUNTER — APPOINTMENT (OUTPATIENT)
Dept: GENERAL RADIOLOGY | Facility: CLINIC | Age: 61
DRG: 163 | End: 2021-01-01
Attending: INTERNAL MEDICINE
Payer: COMMERCIAL

## 2021-01-01 ENCOUNTER — ONCOLOGY VISIT (OUTPATIENT)
Dept: TRANSPLANT | Facility: CLINIC | Age: 61
End: 2021-01-01
Attending: PHYSICIAN ASSISTANT
Payer: COMMERCIAL

## 2021-01-01 ENCOUNTER — HOSPITAL ENCOUNTER (OUTPATIENT)
Facility: AMBULATORY SURGERY CENTER | Age: 61
Discharge: HOME | End: 2021-01-08
Attending: PHYSICIAN ASSISTANT | Admitting: PHYSICIAN ASSISTANT
Payer: COMMERCIAL

## 2021-01-01 ENCOUNTER — ANESTHESIA (OUTPATIENT)
Dept: SURGERY | Facility: CLINIC | Age: 61
DRG: 163 | End: 2021-01-01
Payer: COMMERCIAL

## 2021-01-01 ENCOUNTER — ANCILLARY PROCEDURE (OUTPATIENT)
Dept: RADIOLOGY | Facility: AMBULATORY SURGERY CENTER | Age: 61
End: 2021-01-01
Attending: STUDENT IN AN ORGANIZED HEALTH CARE EDUCATION/TRAINING PROGRAM
Payer: COMMERCIAL

## 2021-01-01 ENCOUNTER — HEALTH MAINTENANCE LETTER (OUTPATIENT)
Age: 61
End: 2021-01-01

## 2021-01-01 ENCOUNTER — VIRTUAL VISIT (OUTPATIENT)
Dept: PULMONOLOGY | Facility: CLINIC | Age: 61
End: 2021-01-01
Attending: INTERNAL MEDICINE
Payer: COMMERCIAL

## 2021-01-01 ENCOUNTER — APPOINTMENT (OUTPATIENT)
Dept: ULTRASOUND IMAGING | Facility: CLINIC | Age: 61
DRG: 163 | End: 2021-01-01
Attending: NURSE PRACTITIONER
Payer: COMMERCIAL

## 2021-01-01 ENCOUNTER — HOSPITAL ENCOUNTER (INPATIENT)
Facility: CLINIC | Age: 61
LOS: 8 days | DRG: 163 | End: 2021-08-12
Attending: THORACIC SURGERY (CARDIOTHORACIC VASCULAR SURGERY) | Admitting: THORACIC SURGERY (CARDIOTHORACIC VASCULAR SURGERY)
Payer: COMMERCIAL

## 2021-01-01 ENCOUNTER — HOSPITAL ENCOUNTER (OUTPATIENT)
Facility: AMBULATORY SURGERY CENTER | Age: 61
Discharge: HOME | End: 2021-06-11
Attending: RADIOLOGY | Admitting: RADIOLOGY
Payer: COMMERCIAL

## 2021-01-01 ENCOUNTER — IMMUNIZATION (OUTPATIENT)
Dept: FAMILY MEDICINE | Facility: CLINIC | Age: 61
End: 2021-01-01
Payer: COMMERCIAL

## 2021-01-01 ENCOUNTER — HOSPITAL ENCOUNTER (OUTPATIENT)
Facility: AMBULATORY SURGERY CENTER | Age: 61
Discharge: HOME | End: 2021-06-30
Attending: RADIOLOGY | Admitting: RADIOLOGY
Payer: COMMERCIAL

## 2021-01-01 ENCOUNTER — TRANSFERRED RECORDS (OUTPATIENT)
Dept: HEALTH INFORMATION MANAGEMENT | Facility: CLINIC | Age: 61
End: 2021-01-01

## 2021-01-01 ENCOUNTER — ANESTHESIA EVENT (OUTPATIENT)
Dept: SURGERY | Facility: CLINIC | Age: 61
DRG: 163 | End: 2021-01-01
Payer: COMMERCIAL

## 2021-01-01 ENCOUNTER — HOSPITAL ENCOUNTER (OUTPATIENT)
Facility: AMBULATORY SURGERY CENTER | Age: 61
Discharge: HOME | End: 2021-07-15
Attending: RADIOLOGY | Admitting: RADIOLOGY
Payer: COMMERCIAL

## 2021-01-01 ENCOUNTER — ANCILLARY PROCEDURE (OUTPATIENT)
Dept: CT IMAGING | Facility: CLINIC | Age: 61
End: 2021-01-01
Attending: CLINICAL NURSE SPECIALIST
Payer: COMMERCIAL

## 2021-01-01 ENCOUNTER — HOSPITAL ENCOUNTER (OUTPATIENT)
Facility: AMBULATORY SURGERY CENTER | Age: 61
Discharge: HOME | End: 2021-05-21
Attending: PHYSICIAN ASSISTANT | Admitting: PHYSICIAN ASSISTANT
Payer: COMMERCIAL

## 2021-01-01 ENCOUNTER — IMMUNIZATION (OUTPATIENT)
Dept: FAMILY MEDICINE | Facility: CLINIC | Age: 61
End: 2021-01-01
Attending: FAMILY MEDICINE
Payer: COMMERCIAL

## 2021-01-01 ENCOUNTER — APPOINTMENT (OUTPATIENT)
Dept: LAB | Facility: CLINIC | Age: 61
End: 2021-01-01
Attending: THORACIC SURGERY (CARDIOTHORACIC VASCULAR SURGERY)
Payer: COMMERCIAL

## 2021-01-01 ENCOUNTER — PRE VISIT (OUTPATIENT)
Dept: SURGERY | Facility: CLINIC | Age: 61
End: 2021-01-01

## 2021-01-01 ENCOUNTER — HOSPITAL ENCOUNTER (OUTPATIENT)
Facility: AMBULATORY SURGERY CENTER | Age: 61
End: 2021-01-01
Attending: RADIOLOGY
Payer: COMMERCIAL

## 2021-01-01 ENCOUNTER — HOSPITAL ENCOUNTER (OUTPATIENT)
Facility: AMBULATORY SURGERY CENTER | Age: 61
Discharge: HOME | End: 2021-04-01
Attending: RADIOLOGY | Admitting: RADIOLOGY
Payer: COMMERCIAL

## 2021-01-01 ENCOUNTER — TRANSCRIBE ORDERS (OUTPATIENT)
Dept: OTHER | Age: 61
End: 2021-01-01

## 2021-01-01 ENCOUNTER — HOSPITAL ENCOUNTER (OUTPATIENT)
Facility: AMBULATORY SURGERY CENTER | Age: 61
Discharge: HOME | End: 2021-04-22
Attending: RADIOLOGY | Admitting: RADIOLOGY
Payer: COMMERCIAL

## 2021-01-01 ENCOUNTER — TELEPHONE (OUTPATIENT)
Dept: ONCOLOGY | Facility: CLINIC | Age: 61
End: 2021-01-01

## 2021-01-01 ENCOUNTER — ANCILLARY PROCEDURE (OUTPATIENT)
Dept: GENERAL RADIOLOGY | Facility: CLINIC | Age: 61
End: 2021-01-01
Attending: PHYSICIAN ASSISTANT
Payer: COMMERCIAL

## 2021-01-01 ENCOUNTER — APPOINTMENT (OUTPATIENT)
Dept: CARDIOLOGY | Facility: CLINIC | Age: 61
DRG: 163 | End: 2021-01-01
Attending: STUDENT IN AN ORGANIZED HEALTH CARE EDUCATION/TRAINING PROGRAM
Payer: COMMERCIAL

## 2021-01-01 ENCOUNTER — ANCILLARY PROCEDURE (OUTPATIENT)
Dept: GENERAL RADIOLOGY | Facility: CLINIC | Age: 61
End: 2021-01-01
Attending: THORACIC SURGERY (CARDIOTHORACIC VASCULAR SURGERY)
Payer: COMMERCIAL

## 2021-01-01 ENCOUNTER — APPOINTMENT (OUTPATIENT)
Dept: CARDIOLOGY | Facility: CLINIC | Age: 61
DRG: 163 | End: 2021-01-01
Attending: THORACIC SURGERY (CARDIOTHORACIC VASCULAR SURGERY)
Payer: COMMERCIAL

## 2021-01-01 ENCOUNTER — LAB (OUTPATIENT)
Dept: LAB | Facility: OTHER | Age: 61
End: 2021-01-01
Payer: COMMERCIAL

## 2021-01-01 ENCOUNTER — DOCUMENTATION ONLY (OUTPATIENT)
Dept: LAB | Facility: OTHER | Age: 61
End: 2021-01-01

## 2021-01-01 VITALS
WEIGHT: 233 LBS | HEART RATE: 102 BPM | HEIGHT: 72 IN | OXYGEN SATURATION: 97 % | DIASTOLIC BLOOD PRESSURE: 95 MMHG | SYSTOLIC BLOOD PRESSURE: 133 MMHG | RESPIRATION RATE: 20 BRPM | TEMPERATURE: 98.1 F | BODY MASS INDEX: 31.56 KG/M2

## 2021-01-01 VITALS
DIASTOLIC BLOOD PRESSURE: 102 MMHG | BODY MASS INDEX: 33.04 KG/M2 | SYSTOLIC BLOOD PRESSURE: 137 MMHG | WEIGHT: 236 LBS | OXYGEN SATURATION: 96 % | RESPIRATION RATE: 18 BRPM | TEMPERATURE: 97.5 F | HEIGHT: 71 IN | HEART RATE: 103 BPM

## 2021-01-01 VITALS
HEART RATE: 119 BPM | WEIGHT: 237 LBS | OXYGEN SATURATION: 98 % | SYSTOLIC BLOOD PRESSURE: 140 MMHG | TEMPERATURE: 97.5 F | DIASTOLIC BLOOD PRESSURE: 92 MMHG | BODY MASS INDEX: 32.14 KG/M2 | RESPIRATION RATE: 16 BRPM

## 2021-01-01 VITALS
BODY MASS INDEX: 33.15 KG/M2 | HEART RATE: 109 BPM | DIASTOLIC BLOOD PRESSURE: 92 MMHG | RESPIRATION RATE: 18 BRPM | WEIGHT: 237.7 LBS | SYSTOLIC BLOOD PRESSURE: 143 MMHG | OXYGEN SATURATION: 95 % | TEMPERATURE: 98.3 F

## 2021-01-01 VITALS
OXYGEN SATURATION: 96 % | SYSTOLIC BLOOD PRESSURE: 125 MMHG | TEMPERATURE: 97.4 F | RESPIRATION RATE: 16 BRPM | WEIGHT: 235 LBS | BODY MASS INDEX: 32.78 KG/M2 | HEART RATE: 115 BPM | DIASTOLIC BLOOD PRESSURE: 87 MMHG

## 2021-01-01 VITALS
OXYGEN SATURATION: 95 % | SYSTOLIC BLOOD PRESSURE: 133 MMHG | BODY MASS INDEX: 33.22 KG/M2 | DIASTOLIC BLOOD PRESSURE: 87 MMHG | HEART RATE: 124 BPM | TEMPERATURE: 97.7 F | RESPIRATION RATE: 16 BRPM | WEIGHT: 238.2 LBS

## 2021-01-01 VITALS
HEART RATE: 113 BPM | TEMPERATURE: 98.1 F | WEIGHT: 233.8 LBS | RESPIRATION RATE: 16 BRPM | DIASTOLIC BLOOD PRESSURE: 87 MMHG | OXYGEN SATURATION: 98 % | BODY MASS INDEX: 33.31 KG/M2 | SYSTOLIC BLOOD PRESSURE: 140 MMHG

## 2021-01-01 VITALS
HEIGHT: 72 IN | OXYGEN SATURATION: 100 % | SYSTOLIC BLOOD PRESSURE: 107 MMHG | HEART RATE: 99 BPM | TEMPERATURE: 97 F | DIASTOLIC BLOOD PRESSURE: 78 MMHG | RESPIRATION RATE: 18 BRPM | BODY MASS INDEX: 32.1 KG/M2 | WEIGHT: 237 LBS

## 2021-01-01 VITALS
RESPIRATION RATE: 16 BRPM | HEART RATE: 115 BPM | DIASTOLIC BLOOD PRESSURE: 85 MMHG | TEMPERATURE: 97.3 F | HEART RATE: 120 BPM | SYSTOLIC BLOOD PRESSURE: 125 MMHG | DIASTOLIC BLOOD PRESSURE: 88 MMHG | SYSTOLIC BLOOD PRESSURE: 124 MMHG | RESPIRATION RATE: 18 BRPM | BODY MASS INDEX: 31.9 KG/M2 | OXYGEN SATURATION: 96 % | WEIGHT: 234.9 LBS | TEMPERATURE: 98.3 F | OXYGEN SATURATION: 95 % | BODY MASS INDEX: 33.47 KG/M2 | WEIGHT: 235.2 LBS

## 2021-01-01 VITALS
OXYGEN SATURATION: 95 % | HEART RATE: 116 BPM | BODY MASS INDEX: 33.42 KG/M2 | TEMPERATURE: 98.7 F | DIASTOLIC BLOOD PRESSURE: 86 MMHG | WEIGHT: 234.6 LBS | SYSTOLIC BLOOD PRESSURE: 122 MMHG

## 2021-01-01 VITALS
DIASTOLIC BLOOD PRESSURE: 79 MMHG | TEMPERATURE: 98.1 F | HEART RATE: 101 BPM | SYSTOLIC BLOOD PRESSURE: 115 MMHG | BODY MASS INDEX: 32.37 KG/M2 | OXYGEN SATURATION: 96 % | WEIGHT: 232.1 LBS | RESPIRATION RATE: 24 BRPM

## 2021-01-01 VITALS
RESPIRATION RATE: 18 BRPM | WEIGHT: 234.9 LBS | TEMPERATURE: 98.2 F | HEART RATE: 87 BPM | DIASTOLIC BLOOD PRESSURE: 89 MMHG | SYSTOLIC BLOOD PRESSURE: 130 MMHG | OXYGEN SATURATION: 97 % | BODY MASS INDEX: 31.86 KG/M2

## 2021-01-01 VITALS
HEART RATE: 114 BPM | WEIGHT: 236.6 LBS | RESPIRATION RATE: 18 BRPM | BODY MASS INDEX: 33 KG/M2 | TEMPERATURE: 98.5 F | OXYGEN SATURATION: 95 % | SYSTOLIC BLOOD PRESSURE: 142 MMHG | DIASTOLIC BLOOD PRESSURE: 98 MMHG

## 2021-01-01 VITALS
BODY MASS INDEX: 33.64 KG/M2 | HEIGHT: 70 IN | SYSTOLIC BLOOD PRESSURE: 106 MMHG | WEIGHT: 235 LBS | OXYGEN SATURATION: 97 % | RESPIRATION RATE: 18 BRPM | DIASTOLIC BLOOD PRESSURE: 79 MMHG | HEART RATE: 102 BPM | TEMPERATURE: 98.2 F

## 2021-01-01 VITALS
DIASTOLIC BLOOD PRESSURE: 98 MMHG | SYSTOLIC BLOOD PRESSURE: 143 MMHG | OXYGEN SATURATION: 96 % | WEIGHT: 238 LBS | HEART RATE: 116 BPM | BODY MASS INDEX: 33.32 KG/M2 | TEMPERATURE: 97.8 F | HEIGHT: 71 IN | RESPIRATION RATE: 20 BRPM

## 2021-01-01 VITALS
SYSTOLIC BLOOD PRESSURE: 111 MMHG | WEIGHT: 237 LBS | OXYGEN SATURATION: 98 % | HEART RATE: 97 BPM | RESPIRATION RATE: 18 BRPM | HEIGHT: 72 IN | DIASTOLIC BLOOD PRESSURE: 84 MMHG | BODY MASS INDEX: 32.1 KG/M2 | TEMPERATURE: 98.3 F

## 2021-01-01 VITALS
DIASTOLIC BLOOD PRESSURE: 102 MMHG | WEIGHT: 236.6 LBS | HEART RATE: 100 BPM | SYSTOLIC BLOOD PRESSURE: 141 MMHG | OXYGEN SATURATION: 95 % | TEMPERATURE: 98 F | BODY MASS INDEX: 33 KG/M2 | RESPIRATION RATE: 16 BRPM

## 2021-01-01 VITALS
OXYGEN SATURATION: 99 % | SYSTOLIC BLOOD PRESSURE: 120 MMHG | HEIGHT: 71 IN | TEMPERATURE: 98.1 F | BODY MASS INDEX: 32.9 KG/M2 | DIASTOLIC BLOOD PRESSURE: 80 MMHG | HEART RATE: 105 BPM | RESPIRATION RATE: 18 BRPM | WEIGHT: 235 LBS

## 2021-01-01 VITALS
RESPIRATION RATE: 16 BRPM | HEART RATE: 110 BPM | SYSTOLIC BLOOD PRESSURE: 122 MMHG | TEMPERATURE: 98.3 F | OXYGEN SATURATION: 96 % | WEIGHT: 233 LBS | BODY MASS INDEX: 32.96 KG/M2 | DIASTOLIC BLOOD PRESSURE: 81 MMHG

## 2021-01-01 VITALS
HEART RATE: 34 BPM | OXYGEN SATURATION: 97 % | RESPIRATION RATE: 44 BRPM | SYSTOLIC BLOOD PRESSURE: 93 MMHG | BODY MASS INDEX: 33.15 KG/M2 | WEIGHT: 244.71 LBS | TEMPERATURE: 100.9 F | DIASTOLIC BLOOD PRESSURE: 69 MMHG | HEIGHT: 72 IN

## 2021-01-01 VITALS
TEMPERATURE: 98.1 F | RESPIRATION RATE: 36 BRPM | DIASTOLIC BLOOD PRESSURE: 88 MMHG | SYSTOLIC BLOOD PRESSURE: 140 MMHG | HEART RATE: 116 BPM | OXYGEN SATURATION: 96 % | BODY MASS INDEX: 32.28 KG/M2 | WEIGHT: 238 LBS

## 2021-01-01 VITALS
BODY MASS INDEX: 31.29 KG/M2 | HEIGHT: 72 IN | OXYGEN SATURATION: 99 % | HEART RATE: 103 BPM | DIASTOLIC BLOOD PRESSURE: 93 MMHG | TEMPERATURE: 97.8 F | SYSTOLIC BLOOD PRESSURE: 119 MMHG | RESPIRATION RATE: 14 BRPM | WEIGHT: 231 LBS

## 2021-01-01 DIAGNOSIS — D89.813 GVHD AS COMPLICATION OF BONE MARROW TRANSPLANT (H): Primary | ICD-10-CM

## 2021-01-01 DIAGNOSIS — Z11.59 ENCOUNTER FOR SCREENING FOR OTHER VIRAL DISEASES: ICD-10-CM

## 2021-01-01 DIAGNOSIS — D46.22 RAEB-2 (REFRACTORY ANEMIA WITH EXCESS BLASTS-2) (H): ICD-10-CM

## 2021-01-01 DIAGNOSIS — D89.813 GVHD AS COMPLICATION OF BONE MARROW TRANSPLANT (H): ICD-10-CM

## 2021-01-01 DIAGNOSIS — J90 PLEURAL EFFUSION: ICD-10-CM

## 2021-01-01 DIAGNOSIS — T86.09 GVHD AS COMPLICATION OF BONE MARROW TRANSPLANT (H): ICD-10-CM

## 2021-01-01 DIAGNOSIS — T86.09 GVHD AS COMPLICATION OF BONE MARROW TRANSPLANT (H): Primary | ICD-10-CM

## 2021-01-01 DIAGNOSIS — J98.19 TRAPPED LUNG: ICD-10-CM

## 2021-01-01 DIAGNOSIS — D46.9 MDS (MYELODYSPLASTIC SYNDROME) (H): ICD-10-CM

## 2021-01-01 DIAGNOSIS — J90 LARGE PLEURAL EFFUSION: ICD-10-CM

## 2021-01-01 DIAGNOSIS — D46.9 MDS (MYELODYSPLASTIC SYNDROME) (H): Primary | ICD-10-CM

## 2021-01-01 DIAGNOSIS — J90 LARGE PLEURAL EFFUSION: Primary | ICD-10-CM

## 2021-01-01 DIAGNOSIS — J98.19 TRAPPED LUNG: Primary | ICD-10-CM

## 2021-01-01 DIAGNOSIS — D89.813 GRAFT-VERSUS-HOST DISEASE (H): ICD-10-CM

## 2021-01-01 DIAGNOSIS — J98.19 COLLAPSED LUNG: ICD-10-CM

## 2021-01-01 DIAGNOSIS — J98.19 COLLAPSED LUNG: Primary | ICD-10-CM

## 2021-01-01 DIAGNOSIS — Z94.81 STATUS POST BONE MARROW TRANSPLANT (H): ICD-10-CM

## 2021-01-01 DIAGNOSIS — D46.22 RAEB-2 (REFRACTORY ANEMIA WITH EXCESS BLASTS-2) (H): Primary | ICD-10-CM

## 2021-01-01 DIAGNOSIS — I42.4: ICD-10-CM

## 2021-01-01 DIAGNOSIS — J90 PLEURAL EFFUSION: Primary | ICD-10-CM

## 2021-01-01 DIAGNOSIS — Z01.818 PREOP EXAMINATION: Primary | ICD-10-CM

## 2021-01-01 DIAGNOSIS — Z11.59 ENCOUNTER FOR SCREENING FOR OTHER VIRAL DISEASES: Primary | ICD-10-CM

## 2021-01-01 DIAGNOSIS — J98.19 COLLAPSE, LUNG: Primary | ICD-10-CM

## 2021-01-01 LAB
1,3 BETA GLUCAN SER-MCNC: <31 PG/ML
ABO/RH(D): NORMAL
ABO/RH(D): NORMAL
ACANTHOCYTES BLD QL SMEAR: SLIGHT
ALBUMIN SERPL-MCNC: 1.9 G/DL (ref 3.4–5)
ALBUMIN SERPL-MCNC: 2 G/DL (ref 3.4–5)
ALBUMIN SERPL-MCNC: 2.4 G/DL (ref 3.4–5)
ALBUMIN SERPL-MCNC: 3.4 G/DL (ref 3.4–5)
ALBUMIN SERPL-MCNC: 3.6 G/DL (ref 3.4–5)
ALBUMIN SERPL-MCNC: 3.7 G/DL (ref 3.4–5)
ALBUMIN SERPL-MCNC: 3.7 G/DL (ref 3.4–5)
ALBUMIN SERPL-MCNC: 3.8 G/DL (ref 3.4–5)
ALBUMIN SERPL-MCNC: 3.8 G/DL (ref 3.4–5)
ALBUMIN SERPL-MCNC: 3.9 G/DL (ref 3.4–5)
ALBUMIN UR-MCNC: 50 MG/DL
ALBUMIN UR-MCNC: NEGATIVE MG/DL
ALP SERPL-CCNC: 101 U/L (ref 40–150)
ALP SERPL-CCNC: 101 U/L (ref 40–150)
ALP SERPL-CCNC: 113 U/L (ref 40–150)
ALP SERPL-CCNC: 114 U/L (ref 40–150)
ALP SERPL-CCNC: 114 U/L (ref 40–150)
ALP SERPL-CCNC: 115 U/L (ref 40–150)
ALP SERPL-CCNC: 116 U/L (ref 40–150)
ALP SERPL-CCNC: 119 U/L (ref 40–150)
ALP SERPL-CCNC: 130 U/L (ref 40–150)
ALP SERPL-CCNC: 132 U/L (ref 40–150)
ALP SERPL-CCNC: 142 U/L (ref 40–150)
ALP SERPL-CCNC: 82 U/L (ref 40–150)
ALP SERPL-CCNC: 86 U/L (ref 40–150)
ALP SERPL-CCNC: 87 U/L (ref 40–150)
ALP SERPL-CCNC: 90 U/L (ref 40–150)
ALP SERPL-CCNC: 99 U/L (ref 40–150)
ALT SERPL W P-5'-P-CCNC: 16 U/L (ref 0–70)
ALT SERPL W P-5'-P-CCNC: 21 U/L (ref 0–70)
ALT SERPL W P-5'-P-CCNC: 22 U/L (ref 0–70)
ALT SERPL W P-5'-P-CCNC: 24 U/L (ref 0–70)
ALT SERPL W P-5'-P-CCNC: 25 U/L (ref 0–70)
ALT SERPL W P-5'-P-CCNC: 27 U/L (ref 0–70)
ALT SERPL W P-5'-P-CCNC: 28 U/L (ref 0–70)
ALT SERPL W P-5'-P-CCNC: 28 U/L (ref 0–70)
ALT SERPL W P-5'-P-CCNC: 32 U/L (ref 0–70)
ALT SERPL W P-5'-P-CCNC: 33 U/L (ref 0–70)
ALT SERPL W P-5'-P-CCNC: 34 U/L (ref 0–70)
ALT SERPL W P-5'-P-CCNC: 37 U/L (ref 0–70)
ALT SERPL W P-5'-P-CCNC: 38 U/L (ref 0–70)
ALT SERPL W P-5'-P-CCNC: 41 U/L (ref 0–70)
ALT SERPL W P-5'-P-CCNC: 45 U/L (ref 0–70)
ALT SERPL W P-5'-P-CCNC: 46 U/L (ref 0–70)
AMORPH CRY #/AREA URNS HPF: ABNORMAL /HPF
ANION GAP SERPL CALCULATED.3IONS-SCNC: 1 MMOL/L (ref 3–14)
ANION GAP SERPL CALCULATED.3IONS-SCNC: 10 MMOL/L (ref 3–14)
ANION GAP SERPL CALCULATED.3IONS-SCNC: 11 MMOL/L (ref 3–14)
ANION GAP SERPL CALCULATED.3IONS-SCNC: 2 MMOL/L (ref 3–14)
ANION GAP SERPL CALCULATED.3IONS-SCNC: 3 MMOL/L (ref 3–14)
ANION GAP SERPL CALCULATED.3IONS-SCNC: 4 MMOL/L (ref 3–14)
ANION GAP SERPL CALCULATED.3IONS-SCNC: 5 MMOL/L (ref 3–14)
ANION GAP SERPL CALCULATED.3IONS-SCNC: 6 MMOL/L (ref 3–14)
ANION GAP SERPL CALCULATED.3IONS-SCNC: 6 MMOL/L (ref 3–14)
ANION GAP SERPL CALCULATED.3IONS-SCNC: 7 MMOL/L (ref 3–14)
ANION GAP SERPL CALCULATED.3IONS-SCNC: 8 MMOL/L (ref 3–14)
ANION GAP SERPL CALCULATED.3IONS-SCNC: 9 MMOL/L (ref 3–14)
ANISOCYTOSIS BLD QL SMEAR: SLIGHT
ANTIBODY SCREEN: NEGATIVE
ANTIBODY SCREEN: NEGATIVE
APPEARANCE FLD: ABNORMAL
APPEARANCE FLD: CLEAR
APPEARANCE UR: ABNORMAL
APPEARANCE UR: CLEAR
AST SERPL W P-5'-P-CCNC: 21 U/L (ref 0–45)
AST SERPL W P-5'-P-CCNC: 21 U/L (ref 0–45)
AST SERPL W P-5'-P-CCNC: 22 U/L (ref 0–45)
AST SERPL W P-5'-P-CCNC: 23 U/L (ref 0–45)
AST SERPL W P-5'-P-CCNC: 24 U/L (ref 0–45)
AST SERPL W P-5'-P-CCNC: 25 U/L (ref 0–45)
AST SERPL W P-5'-P-CCNC: 26 U/L (ref 0–45)
AST SERPL W P-5'-P-CCNC: 26 U/L (ref 0–45)
AST SERPL W P-5'-P-CCNC: 31 U/L (ref 0–45)
AST SERPL W P-5'-P-CCNC: 31 U/L (ref 0–45)
AST SERPL W P-5'-P-CCNC: 33 U/L (ref 0–45)
AST SERPL W P-5'-P-CCNC: 52 U/L (ref 0–45)
AST SERPL W P-5'-P-CCNC: 55 U/L (ref 0–45)
ATRIAL RATE - MUSE: 137 BPM
BACTERIA #/AREA URNS HPF: ABNORMAL /HPF
BACTERIA BLD CULT: NO GROWTH
BACTERIA BLD CULT: NO GROWTH
BACTERIA BRONCH: ABNORMAL
BACTERIA BRONCH: ABNORMAL
BACTERIA FLD CULT: NORMAL
BACTERIA PLR CULT: NO GROWTH
BACTERIA PLR CULT: NO GROWTH
BACTERIA PLR CULT: NORMAL
BACTERIA SPEC CULT: NO GROWTH
BACTERIA SPT CULT: NO GROWTH
BASE EXCESS BLDA CALC-SCNC: -0.1 MMOL/L (ref -9–1.8)
BASE EXCESS BLDA CALC-SCNC: -0.2 MMOL/L (ref -9–1.8)
BASE EXCESS BLDA CALC-SCNC: -0.6 MMOL/L (ref -9.6–2)
BASE EXCESS BLDA CALC-SCNC: -1.3 MMOL/L (ref -9–1.8)
BASE EXCESS BLDA CALC-SCNC: -1.6 MMOL/L (ref -9–1.8)
BASE EXCESS BLDA CALC-SCNC: -1.7 MMOL/L (ref -9–1.8)
BASE EXCESS BLDA CALC-SCNC: -1.8 MMOL/L (ref -9–1.8)
BASE EXCESS BLDA CALC-SCNC: -1.9 MMOL/L (ref -9–1.8)
BASE EXCESS BLDA CALC-SCNC: -1.9 MMOL/L (ref -9–1.8)
BASE EXCESS BLDA CALC-SCNC: -2 MMOL/L (ref -9–1.8)
BASE EXCESS BLDA CALC-SCNC: -2.1 MMOL/L (ref -9–1.8)
BASE EXCESS BLDA CALC-SCNC: -2.3 MMOL/L (ref -9–1.8)
BASE EXCESS BLDA CALC-SCNC: -2.7 MMOL/L (ref -9–1.8)
BASE EXCESS BLDA CALC-SCNC: -3 MMOL/L (ref -9–1.8)
BASE EXCESS BLDA CALC-SCNC: -3.1 MMOL/L (ref -9–1.8)
BASE EXCESS BLDA CALC-SCNC: -3.8 MMOL/L (ref -9–1.8)
BASE EXCESS BLDA CALC-SCNC: -5.1 MMOL/L (ref -9–1.8)
BASE EXCESS BLDA CALC-SCNC: -5.4 MMOL/L (ref -9–1.8)
BASE EXCESS BLDA CALC-SCNC: 1.5 MMOL/L (ref -9–1.8)
BASE EXCESS BLDA CALC-SCNC: 10.1 MMOL/L (ref -9–1.8)
BASE EXCESS BLDA CALC-SCNC: 10.3 MMOL/L (ref -9–1.8)
BASE EXCESS BLDA CALC-SCNC: 10.3 MMOL/L (ref -9–1.8)
BASE EXCESS BLDA CALC-SCNC: 10.5 MMOL/L (ref -9–1.8)
BASE EXCESS BLDA CALC-SCNC: 2.6 MMOL/L (ref -9–1.8)
BASE EXCESS BLDA CALC-SCNC: 3.1 MMOL/L (ref -9–1.8)
BASE EXCESS BLDA CALC-SCNC: 6.4 MMOL/L (ref -9–1.8)
BASE EXCESS BLDA CALC-SCNC: 6.5 MMOL/L (ref -9–1.8)
BASE EXCESS BLDA CALC-SCNC: 6.5 MMOL/L (ref -9–1.8)
BASE EXCESS BLDA CALC-SCNC: 6.6 MMOL/L (ref -9–1.8)
BASE EXCESS BLDA CALC-SCNC: 6.6 MMOL/L (ref -9–1.8)
BASE EXCESS BLDA CALC-SCNC: 6.8 MMOL/L (ref -9–1.8)
BASE EXCESS BLDA CALC-SCNC: 7.4 MMOL/L (ref -9–1.8)
BASE EXCESS BLDA CALC-SCNC: 8.4 MMOL/L (ref -9–1.8)
BASE EXCESS BLDA CALC-SCNC: 8.5 MMOL/L (ref -9–1.8)
BASE EXCESS BLDA CALC-SCNC: 8.7 MMOL/L (ref -9–1.8)
BASE EXCESS BLDA CALC-SCNC: 9.3 MMOL/L (ref -9–1.8)
BASE EXCESS BLDA CALC-SCNC: 9.3 MMOL/L (ref -9–1.8)
BASE EXCESS BLDA CALC-SCNC: 9.7 MMOL/L (ref -9–1.8)
BASOPHILS # BLD AUTO: 0 10E3/UL (ref 0–0.2)
BASOPHILS # BLD AUTO: 0 10E9/L (ref 0–0.2)
BASOPHILS # BLD AUTO: 0.1 10E3/UL (ref 0–0.2)
BASOPHILS # BLD AUTO: 0.1 10E3/UL (ref 0–0.2)
BASOPHILS # BLD AUTO: 0.1 10E9/L (ref 0–0.2)
BASOPHILS NFR BLD AUTO: 0 %
BASOPHILS NFR BLD AUTO: 0 %
BASOPHILS NFR BLD AUTO: 0.4 %
BASOPHILS NFR BLD AUTO: 0.4 %
BASOPHILS NFR BLD AUTO: 0.5 %
BASOPHILS NFR BLD AUTO: 0.6 %
BASOPHILS NFR BLD AUTO: 0.7 %
BASOPHILS NFR BLD AUTO: 0.8 %
BASOPHILS NFR BLD AUTO: 1 %
BASOPHILS NFR BLD AUTO: 1 %
BILIRUB DIRECT SERPL-MCNC: 0.4 MG/DL (ref 0–0.2)
BILIRUB DIRECT SERPL-MCNC: 0.6 MG/DL (ref 0–0.2)
BILIRUB DIRECT SERPL-MCNC: 0.7 MG/DL (ref 0–0.2)
BILIRUB SERPL-MCNC: 0.6 MG/DL (ref 0.2–1.3)
BILIRUB SERPL-MCNC: 0.7 MG/DL (ref 0.2–1.3)
BILIRUB SERPL-MCNC: 0.8 MG/DL (ref 0.2–1.3)
BILIRUB SERPL-MCNC: 0.9 MG/DL (ref 0.2–1.3)
BILIRUB SERPL-MCNC: 1 MG/DL (ref 0.2–1.3)
BILIRUB SERPL-MCNC: 1 MG/DL (ref 0.2–1.3)
BILIRUB SERPL-MCNC: 1.3 MG/DL (ref 0.2–1.3)
BILIRUB UR QL STRIP: NEGATIVE
BILIRUB UR QL STRIP: NEGATIVE
BUN SERPL-MCNC: 15 MG/DL (ref 7–30)
BUN SERPL-MCNC: 17 MG/DL (ref 7–30)
BUN SERPL-MCNC: 19 MG/DL (ref 7–30)
BUN SERPL-MCNC: 20 MG/DL (ref 7–30)
BUN SERPL-MCNC: 21 MG/DL (ref 7–30)
BUN SERPL-MCNC: 22 MG/DL (ref 7–30)
BUN SERPL-MCNC: 22 MG/DL (ref 7–30)
BUN SERPL-MCNC: 23 MG/DL (ref 7–30)
BUN SERPL-MCNC: 25 MG/DL (ref 7–30)
BUN SERPL-MCNC: 26 MG/DL (ref 7–30)
BUN SERPL-MCNC: 27 MG/DL (ref 7–30)
BUN SERPL-MCNC: 28 MG/DL (ref 7–30)
BUN SERPL-MCNC: 30 MG/DL (ref 7–30)
BUN SERPL-MCNC: 30 MG/DL (ref 7–30)
BUN SERPL-MCNC: 31 MG/DL (ref 7–30)
BUN SERPL-MCNC: 35 MG/DL (ref 7–30)
BUN SERPL-MCNC: 35 MG/DL (ref 7–30)
BUN SERPL-MCNC: 41 MG/DL (ref 7–30)
BUN SERPL-MCNC: 49 MG/DL (ref 7–30)
BUN SERPL-MCNC: 54 MG/DL (ref 7–30)
BUN SERPL-MCNC: 60 MG/DL (ref 7–30)
BUN SERPL-MCNC: 64 MG/DL (ref 7–30)
BUN SERPL-MCNC: 70 MG/DL (ref 7–30)
BUN SERPL-MCNC: 71 MG/DL (ref 7–30)
BUN SERPL-MCNC: 73 MG/DL (ref 7–30)
BUN SERPL-MCNC: 74 MG/DL (ref 7–30)
BUN SERPL-MCNC: 75 MG/DL (ref 7–30)
BUN SERPL-MCNC: 76 MG/DL (ref 7–30)
BUN SERPL-MCNC: 79 MG/DL (ref 7–30)
BUN SERPL-MCNC: 82 MG/DL (ref 7–30)
BUN SERPL-MCNC: 83 MG/DL (ref 7–30)
BUN SERPL-MCNC: 88 MG/DL (ref 7–30)
BURR CELLS BLD QL SMEAR: ABNORMAL
CA-I BLD-MCNC: 4.3 MG/DL (ref 4.4–5.2)
CALCIUM SERPL-MCNC: 7.4 MG/DL (ref 8.5–10.1)
CALCIUM SERPL-MCNC: 7.5 MG/DL (ref 8.5–10.1)
CALCIUM SERPL-MCNC: 7.6 MG/DL (ref 8.5–10.1)
CALCIUM SERPL-MCNC: 7.8 MG/DL (ref 8.5–10.1)
CALCIUM SERPL-MCNC: 8 MG/DL (ref 8.5–10.1)
CALCIUM SERPL-MCNC: 8 MG/DL (ref 8.5–10.1)
CALCIUM SERPL-MCNC: 8.1 MG/DL (ref 8.5–10.1)
CALCIUM SERPL-MCNC: 8.2 MG/DL (ref 8.5–10.1)
CALCIUM SERPL-MCNC: 8.3 MG/DL (ref 8.5–10.1)
CALCIUM SERPL-MCNC: 8.3 MG/DL (ref 8.5–10.1)
CALCIUM SERPL-MCNC: 8.4 MG/DL (ref 8.5–10.1)
CALCIUM SERPL-MCNC: 8.4 MG/DL (ref 8.5–10.1)
CALCIUM SERPL-MCNC: 8.5 MG/DL (ref 8.5–10.1)
CALCIUM SERPL-MCNC: 8.6 MG/DL (ref 8.5–10.1)
CALCIUM SERPL-MCNC: 8.7 MG/DL (ref 8.5–10.1)
CALCIUM SERPL-MCNC: 8.8 MG/DL (ref 8.5–10.1)
CALCIUM SERPL-MCNC: 8.9 MG/DL (ref 8.5–10.1)
CALCIUM SERPL-MCNC: 9.2 MG/DL (ref 8.5–10.1)
CALCIUM SERPL-MCNC: 9.3 MG/DL (ref 8.5–10.1)
CALCIUM SERPL-MCNC: 9.3 MG/DL (ref 8.5–10.1)
CALCIUM SERPL-MCNC: 9.4 MG/DL (ref 8.5–10.1)
CALCIUM SERPL-MCNC: 9.4 MG/DL (ref 8.5–10.1)
CALCIUM SERPL-MCNC: 9.5 MG/DL (ref 8.5–10.1)
CALCIUM SERPL-MCNC: 9.5 MG/DL (ref 8.5–10.1)
CALCIUM SERPL-MCNC: 9.6 MG/DL (ref 8.5–10.1)
CALCIUM SERPL-MCNC: 9.6 MG/DL (ref 8.5–10.1)
CALCIUM SERPL-MCNC: 9.7 MG/DL (ref 8.5–10.1)
CALCIUM SERPL-MCNC: 9.9 MG/DL (ref 8.5–10.1)
CHLORIDE BLD-SCNC: 107 MMOL/L (ref 94–109)
CHLORIDE BLD-SCNC: 108 MMOL/L (ref 94–109)
CHLORIDE BLD-SCNC: 108 MMOL/L (ref 94–109)
CHLORIDE BLD-SCNC: 109 MMOL/L (ref 94–109)
CHLORIDE BLD-SCNC: 109 MMOL/L (ref 94–109)
CHLORIDE BLD-SCNC: 110 MMOL/L (ref 94–109)
CHLORIDE BLD-SCNC: 110 MMOL/L (ref 94–109)
CHLORIDE BLD-SCNC: 111 MMOL/L (ref 94–109)
CHLORIDE BLD-SCNC: 111 MMOL/L (ref 94–109)
CHLORIDE BLD-SCNC: 112 MMOL/L (ref 94–109)
CHLORIDE BLD-SCNC: 113 MMOL/L (ref 94–109)
CHLORIDE BLD-SCNC: 114 MMOL/L (ref 94–109)
CHLORIDE BLD-SCNC: 116 MMOL/L (ref 94–109)
CHLORIDE BLD-SCNC: 117 MMOL/L (ref 94–109)
CHLORIDE BLD-SCNC: 118 MMOL/L (ref 94–109)
CHLORIDE BLD-SCNC: 119 MMOL/L (ref 94–109)
CHLORIDE BLD-SCNC: 119 MMOL/L (ref 94–109)
CHLORIDE BLD-SCNC: 120 MMOL/L (ref 94–109)
CHLORIDE BLD-SCNC: 122 MMOL/L (ref 94–109)
CHLORIDE BLD-SCNC: 125 MMOL/L (ref 94–109)
CHLORIDE SERPL-SCNC: 102 MMOL/L (ref 94–109)
CHLORIDE SERPL-SCNC: 104 MMOL/L (ref 94–109)
CHLORIDE SERPL-SCNC: 105 MMOL/L (ref 94–109)
CHLORIDE SERPL-SCNC: 106 MMOL/L (ref 94–109)
CHLORIDE SERPL-SCNC: 107 MMOL/L (ref 94–109)
CHLORIDE SERPL-SCNC: 107 MMOL/L (ref 94–109)
CHLORIDE SERPL-SCNC: 108 MMOL/L (ref 94–109)
CHLORIDE SERPL-SCNC: 109 MMOL/L (ref 94–109)
CO2 SERPL-SCNC: 20 MMOL/L (ref 20–32)
CO2 SERPL-SCNC: 21 MMOL/L (ref 20–32)
CO2 SERPL-SCNC: 22 MMOL/L (ref 20–32)
CO2 SERPL-SCNC: 23 MMOL/L (ref 20–32)
CO2 SERPL-SCNC: 24 MMOL/L (ref 20–32)
CO2 SERPL-SCNC: 25 MMOL/L (ref 20–32)
CO2 SERPL-SCNC: 26 MMOL/L (ref 20–32)
CO2 SERPL-SCNC: 27 MMOL/L (ref 20–32)
CO2 SERPL-SCNC: 27 MMOL/L (ref 20–32)
CO2 SERPL-SCNC: 29 MMOL/L (ref 20–32)
CO2 SERPL-SCNC: 29 MMOL/L (ref 20–32)
CO2 SERPL-SCNC: 31 MMOL/L (ref 20–32)
CO2 SERPL-SCNC: 34 MMOL/L (ref 20–32)
CO2 SERPL-SCNC: 34 MMOL/L (ref 20–32)
CO2 SERPL-SCNC: 35 MMOL/L (ref 20–32)
CO2 SERPL-SCNC: 36 MMOL/L (ref 20–32)
CO2 SERPL-SCNC: 36 MMOL/L (ref 20–32)
CO2 SERPL-SCNC: 37 MMOL/L (ref 20–32)
COLOR FLD: ABNORMAL
COLOR FLD: COLORLESS
COLOR UR AUTO: ABNORMAL
COLOR UR AUTO: YELLOW
CREAT SERPL-MCNC: 0.8 MG/DL (ref 0.66–1.25)
CREAT SERPL-MCNC: 0.8 MG/DL (ref 0.66–1.25)
CREAT SERPL-MCNC: 0.84 MG/DL (ref 0.66–1.25)
CREAT SERPL-MCNC: 0.85 MG/DL (ref 0.66–1.25)
CREAT SERPL-MCNC: 0.87 MG/DL (ref 0.66–1.25)
CREAT SERPL-MCNC: 0.87 MG/DL (ref 0.66–1.25)
CREAT SERPL-MCNC: 0.88 MG/DL (ref 0.66–1.25)
CREAT SERPL-MCNC: 0.88 MG/DL (ref 0.66–1.25)
CREAT SERPL-MCNC: 0.9 MG/DL (ref 0.66–1.25)
CREAT SERPL-MCNC: 0.99 MG/DL (ref 0.66–1.25)
CREAT SERPL-MCNC: 1 MG/DL (ref 0.66–1.25)
CREAT SERPL-MCNC: 1.06 MG/DL (ref 0.66–1.25)
CREAT SERPL-MCNC: 1.08 MG/DL (ref 0.66–1.25)
CREAT SERPL-MCNC: 1.11 MG/DL (ref 0.66–1.25)
CREAT SERPL-MCNC: 1.13 MG/DL (ref 0.66–1.25)
CREAT SERPL-MCNC: 1.18 MG/DL (ref 0.66–1.25)
CREAT SERPL-MCNC: 1.18 MG/DL (ref 0.66–1.25)
CREAT SERPL-MCNC: 1.2 MG/DL (ref 0.66–1.25)
CREAT SERPL-MCNC: 1.23 MG/DL (ref 0.66–1.25)
CREAT SERPL-MCNC: 1.27 MG/DL (ref 0.66–1.25)
CREAT SERPL-MCNC: 1.31 MG/DL (ref 0.66–1.25)
CREAT SERPL-MCNC: 1.35 MG/DL (ref 0.66–1.25)
CREAT SERPL-MCNC: 1.36 MG/DL (ref 0.66–1.25)
CREAT SERPL-MCNC: 1.37 MG/DL (ref 0.66–1.25)
CREAT SERPL-MCNC: 1.38 MG/DL (ref 0.66–1.25)
CREAT SERPL-MCNC: 1.43 MG/DL (ref 0.66–1.25)
CREAT SERPL-MCNC: 1.51 MG/DL (ref 0.66–1.25)
CREAT SERPL-MCNC: 1.6 MG/DL (ref 0.66–1.25)
CREAT SERPL-MCNC: 1.65 MG/DL (ref 0.66–1.25)
CREAT SERPL-MCNC: 1.81 MG/DL (ref 0.66–1.25)
CREAT SERPL-MCNC: 1.85 MG/DL (ref 0.66–1.25)
CREAT SERPL-MCNC: 1.9 MG/DL (ref 0.66–1.25)
CRP SERPL-MCNC: 95 MG/L (ref 0–8)
CRYPTOC AG SPEC QL: NEGATIVE
CYCLOSPORINE BLD LC/MS/MS-MCNC: 151 UG/L (ref 50–400)
CYCLOSPORINE BLD LC/MS/MS-MCNC: 158 UG/L (ref 50–400)
CYCLOSPORINE BLD LC/MS/MS-MCNC: 161 UG/L (ref 50–400)
CYCLOSPORINE BLD LC/MS/MS-MCNC: 169 UG/L (ref 50–400)
CYCLOSPORINE BLD LC/MS/MS-MCNC: 178 UG/L (ref 50–400)
CYCLOSPORINE BLD LC/MS/MS-MCNC: 181 UG/L (ref 50–400)
CYCLOSPORINE BLD LC/MS/MS-MCNC: 277 UG/L (ref 50–400)
CYCLOSPORINE BLD LC/MS/MS-MCNC: 282 UG/L (ref 50–400)
CYCLOSPORINE BLD LC/MS/MS-MCNC: 358 UG/L (ref 50–400)
CYCLOSPORINE BLD LC/MS/MS-MCNC: 395 UG/L (ref 50–400)
CYCLOSPORINE BLD LC/MS/MS-MCNC: 409 UG/L (ref 50–400)
CYCLOSPORINE BLD LC/MS/MS-MCNC: 417 UG/L (ref 50–400)
CYCLOSPORINE BLD LC/MS/MS-MCNC: 481 UG/L (ref 50–400)
DIASTOLIC BLOOD PRESSURE - MUSE: NORMAL MMHG
DIFFERENTIAL METHOD BLD: ABNORMAL
EOSINOPHIL # BLD AUTO: 0 10E3/UL (ref 0–0.7)
EOSINOPHIL # BLD AUTO: 0 10E9/L (ref 0–0.7)
EOSINOPHIL # BLD AUTO: 0.1 10E9/L (ref 0–0.7)
EOSINOPHIL # BLD AUTO: 0.2 10E3/UL (ref 0–0.7)
EOSINOPHIL # BLD AUTO: 0.2 10E9/L (ref 0–0.7)
EOSINOPHIL # BLD AUTO: 0.3 10E9/L (ref 0–0.7)
EOSINOPHIL # BLD AUTO: 0.7 10E3/UL (ref 0–0.7)
EOSINOPHIL NFR BLD AUTO: 0 %
EOSINOPHIL NFR BLD AUTO: 0 %
EOSINOPHIL NFR BLD AUTO: 0.1 %
EOSINOPHIL NFR BLD AUTO: 0.2 %
EOSINOPHIL NFR BLD AUTO: 0.3 %
EOSINOPHIL NFR BLD AUTO: 0.5 %
EOSINOPHIL NFR BLD AUTO: 0.6 %
EOSINOPHIL NFR BLD AUTO: 0.6 %
EOSINOPHIL NFR BLD AUTO: 1 %
EOSINOPHIL NFR BLD AUTO: 2 %
EOSINOPHIL NFR BLD AUTO: 2 %
EOSINOPHIL NFR BLD AUTO: 5 %
ERYTHROCYTE [DISTWIDTH] IN BLOOD BY AUTOMATED COUNT: 13.2 % (ref 10–15)
ERYTHROCYTE [DISTWIDTH] IN BLOOD BY AUTOMATED COUNT: 13.3 % (ref 10–15)
ERYTHROCYTE [DISTWIDTH] IN BLOOD BY AUTOMATED COUNT: 13.5 % (ref 10–15)
ERYTHROCYTE [DISTWIDTH] IN BLOOD BY AUTOMATED COUNT: 13.6 % (ref 10–15)
ERYTHROCYTE [DISTWIDTH] IN BLOOD BY AUTOMATED COUNT: 13.7 % (ref 10–15)
ERYTHROCYTE [DISTWIDTH] IN BLOOD BY AUTOMATED COUNT: 13.7 % (ref 10–15)
ERYTHROCYTE [DISTWIDTH] IN BLOOD BY AUTOMATED COUNT: 13.8 % (ref 10–15)
ERYTHROCYTE [DISTWIDTH] IN BLOOD BY AUTOMATED COUNT: 14.1 % (ref 10–15)
ERYTHROCYTE [DISTWIDTH] IN BLOOD BY AUTOMATED COUNT: 14.3 % (ref 10–15)
ERYTHROCYTE [DISTWIDTH] IN BLOOD BY AUTOMATED COUNT: 14.6 % (ref 10–15)
ERYTHROCYTE [DISTWIDTH] IN BLOOD BY AUTOMATED COUNT: 14.9 % (ref 10–15)
ERYTHROCYTE [DISTWIDTH] IN BLOOD BY AUTOMATED COUNT: 15 % (ref 10–15)
ERYTHROCYTE [DISTWIDTH] IN BLOOD BY AUTOMATED COUNT: 15.1 % (ref 10–15)
ERYTHROCYTE [DISTWIDTH] IN BLOOD BY AUTOMATED COUNT: 15.5 % (ref 10–15)
ERYTHROCYTE [DISTWIDTH] IN BLOOD BY AUTOMATED COUNT: 15.5 % (ref 10–15)
ERYTHROCYTE [DISTWIDTH] IN BLOOD BY AUTOMATED COUNT: 15.8 % (ref 10–15)
ERYTHROCYTE [DISTWIDTH] IN BLOOD BY AUTOMATED COUNT: 16.1 % (ref 10–15)
ERYTHROCYTE [DISTWIDTH] IN BLOOD BY AUTOMATED COUNT: 16.7 % (ref 10–15)
GALACTOMANNAN AG BAL QL: NEGATIVE
GALACTOMANNAN AG SERPL QL IA: NEGATIVE
GALACTOMANNAN AG SPEC IA-ACNC: 0.05
GALACTOMANNAN AG SPEC IA-ACNC: 0.12
GFR SERPL CREATININE-BSD FRML MDRD: 38 ML/MIN/1.73M2
GFR SERPL CREATININE-BSD FRML MDRD: 39 ML/MIN/1.73M2
GFR SERPL CREATININE-BSD FRML MDRD: 40 ML/MIN/1.73M2
GFR SERPL CREATININE-BSD FRML MDRD: 45 ML/MIN/1.73M2
GFR SERPL CREATININE-BSD FRML MDRD: 47 ML/MIN/1.73M2
GFR SERPL CREATININE-BSD FRML MDRD: 50 ML/MIN/1.73M2
GFR SERPL CREATININE-BSD FRML MDRD: 54 ML/MIN/1.73M2
GFR SERPL CREATININE-BSD FRML MDRD: 56 ML/MIN/1.73M2
GFR SERPL CREATININE-BSD FRML MDRD: 56 ML/MIN/1.73M2
GFR SERPL CREATININE-BSD FRML MDRD: 57 ML/MIN/1.73M2
GFR SERPL CREATININE-BSD FRML MDRD: 57 ML/MIN/{1.73_M2}
GFR SERPL CREATININE-BSD FRML MDRD: 60 ML/MIN/1.73M2
GFR SERPL CREATININE-BSD FRML MDRD: 62 ML/MIN/1.73M2
GFR SERPL CREATININE-BSD FRML MDRD: 64 ML/MIN/1.73M2
GFR SERPL CREATININE-BSD FRML MDRD: 66 ML/MIN/{1.73_M2}
GFR SERPL CREATININE-BSD FRML MDRD: 68 ML/MIN/1.73M2
GFR SERPL CREATININE-BSD FRML MDRD: 68 ML/MIN/1.73M2
GFR SERPL CREATININE-BSD FRML MDRD: 71 ML/MIN/1.73M2
GFR SERPL CREATININE-BSD FRML MDRD: 73 ML/MIN/1.73M2
GFR SERPL CREATININE-BSD FRML MDRD: 75 ML/MIN/1.73M2
GFR SERPL CREATININE-BSD FRML MDRD: 77 ML/MIN/1.73M2
GFR SERPL CREATININE-BSD FRML MDRD: 77 ML/MIN/1.73M2
GFR SERPL CREATININE-BSD FRML MDRD: 77 ML/MIN/{1.73_M2}
GFR SERPL CREATININE-BSD FRML MDRD: 82 ML/MIN/{1.73_M2}
GFR SERPL CREATININE-BSD FRML MDRD: 84 ML/MIN/{1.73_M2}
GFR SERPL CREATININE-BSD FRML MDRD: >90 ML/MIN/1.73M2
GFR SERPL CREATININE-BSD FRML MDRD: >90 ML/MIN/{1.73_M2}
GLUCOSE BLD-MCNC: 105 MG/DL (ref 70–99)
GLUCOSE BLD-MCNC: 111 MG/DL (ref 70–99)
GLUCOSE BLD-MCNC: 111 MG/DL (ref 70–99)
GLUCOSE BLD-MCNC: 118 MG/DL (ref 70–99)
GLUCOSE BLD-MCNC: 119 MG/DL (ref 70–99)
GLUCOSE BLD-MCNC: 120 MG/DL (ref 70–99)
GLUCOSE BLD-MCNC: 120 MG/DL (ref 70–99)
GLUCOSE BLD-MCNC: 146 MG/DL (ref 70–99)
GLUCOSE BLD-MCNC: 146 MG/DL (ref 70–99)
GLUCOSE BLD-MCNC: 149 MG/DL (ref 70–99)
GLUCOSE BLD-MCNC: 153 MG/DL (ref 70–99)
GLUCOSE BLD-MCNC: 153 MG/DL (ref 70–99)
GLUCOSE BLD-MCNC: 154 MG/DL (ref 70–99)
GLUCOSE BLD-MCNC: 162 MG/DL (ref 70–99)
GLUCOSE BLD-MCNC: 162 MG/DL (ref 70–99)
GLUCOSE BLD-MCNC: 164 MG/DL (ref 70–99)
GLUCOSE BLD-MCNC: 164 MG/DL (ref 70–99)
GLUCOSE BLD-MCNC: 166 MG/DL (ref 70–99)
GLUCOSE BLD-MCNC: 168 MG/DL (ref 70–99)
GLUCOSE BLD-MCNC: 168 MG/DL (ref 70–99)
GLUCOSE BLD-MCNC: 180 MG/DL (ref 70–99)
GLUCOSE BLD-MCNC: 194 MG/DL (ref 70–99)
GLUCOSE BLD-MCNC: 199 MG/DL (ref 70–99)
GLUCOSE BLD-MCNC: 208 MG/DL (ref 70–99)
GLUCOSE BLD-MCNC: 214 MG/DL (ref 70–99)
GLUCOSE BLD-MCNC: 217 MG/DL (ref 70–99)
GLUCOSE BLDC GLUCOMTR-MCNC: 103 MG/DL (ref 70–99)
GLUCOSE BLDC GLUCOMTR-MCNC: 105 MG/DL (ref 70–99)
GLUCOSE BLDC GLUCOMTR-MCNC: 110 MG/DL (ref 70–99)
GLUCOSE BLDC GLUCOMTR-MCNC: 110 MG/DL (ref 70–99)
GLUCOSE BLDC GLUCOMTR-MCNC: 111 MG/DL (ref 70–99)
GLUCOSE BLDC GLUCOMTR-MCNC: 113 MG/DL (ref 70–99)
GLUCOSE BLDC GLUCOMTR-MCNC: 116 MG/DL (ref 70–99)
GLUCOSE BLDC GLUCOMTR-MCNC: 118 MG/DL (ref 70–99)
GLUCOSE BLDC GLUCOMTR-MCNC: 119 MG/DL (ref 70–99)
GLUCOSE BLDC GLUCOMTR-MCNC: 119 MG/DL (ref 70–99)
GLUCOSE BLDC GLUCOMTR-MCNC: 120 MG/DL (ref 70–99)
GLUCOSE BLDC GLUCOMTR-MCNC: 122 MG/DL (ref 70–99)
GLUCOSE BLDC GLUCOMTR-MCNC: 123 MG/DL (ref 70–99)
GLUCOSE BLDC GLUCOMTR-MCNC: 124 MG/DL (ref 70–99)
GLUCOSE BLDC GLUCOMTR-MCNC: 125 MG/DL (ref 70–99)
GLUCOSE BLDC GLUCOMTR-MCNC: 125 MG/DL (ref 70–99)
GLUCOSE BLDC GLUCOMTR-MCNC: 126 MG/DL (ref 70–99)
GLUCOSE BLDC GLUCOMTR-MCNC: 127 MG/DL (ref 70–99)
GLUCOSE BLDC GLUCOMTR-MCNC: 128 MG/DL (ref 70–99)
GLUCOSE BLDC GLUCOMTR-MCNC: 128 MG/DL (ref 70–99)
GLUCOSE BLDC GLUCOMTR-MCNC: 129 MG/DL (ref 70–99)
GLUCOSE BLDC GLUCOMTR-MCNC: 131 MG/DL (ref 70–99)
GLUCOSE BLDC GLUCOMTR-MCNC: 131 MG/DL (ref 70–99)
GLUCOSE BLDC GLUCOMTR-MCNC: 132 MG/DL (ref 70–99)
GLUCOSE BLDC GLUCOMTR-MCNC: 135 MG/DL (ref 70–99)
GLUCOSE BLDC GLUCOMTR-MCNC: 135 MG/DL (ref 70–99)
GLUCOSE BLDC GLUCOMTR-MCNC: 136 MG/DL (ref 70–99)
GLUCOSE BLDC GLUCOMTR-MCNC: 138 MG/DL (ref 70–99)
GLUCOSE BLDC GLUCOMTR-MCNC: 139 MG/DL (ref 70–99)
GLUCOSE BLDC GLUCOMTR-MCNC: 139 MG/DL (ref 70–99)
GLUCOSE BLDC GLUCOMTR-MCNC: 142 MG/DL (ref 70–99)
GLUCOSE BLDC GLUCOMTR-MCNC: 143 MG/DL (ref 70–99)
GLUCOSE BLDC GLUCOMTR-MCNC: 143 MG/DL (ref 70–99)
GLUCOSE BLDC GLUCOMTR-MCNC: 144 MG/DL (ref 70–99)
GLUCOSE BLDC GLUCOMTR-MCNC: 145 MG/DL (ref 70–99)
GLUCOSE BLDC GLUCOMTR-MCNC: 146 MG/DL (ref 70–99)
GLUCOSE BLDC GLUCOMTR-MCNC: 147 MG/DL (ref 70–99)
GLUCOSE BLDC GLUCOMTR-MCNC: 148 MG/DL (ref 70–99)
GLUCOSE BLDC GLUCOMTR-MCNC: 149 MG/DL (ref 70–99)
GLUCOSE BLDC GLUCOMTR-MCNC: 150 MG/DL (ref 70–99)
GLUCOSE BLDC GLUCOMTR-MCNC: 150 MG/DL (ref 70–99)
GLUCOSE BLDC GLUCOMTR-MCNC: 151 MG/DL (ref 70–99)
GLUCOSE BLDC GLUCOMTR-MCNC: 152 MG/DL (ref 70–99)
GLUCOSE BLDC GLUCOMTR-MCNC: 153 MG/DL (ref 70–99)
GLUCOSE BLDC GLUCOMTR-MCNC: 153 MG/DL (ref 70–99)
GLUCOSE BLDC GLUCOMTR-MCNC: 154 MG/DL (ref 70–99)
GLUCOSE BLDC GLUCOMTR-MCNC: 154 MG/DL (ref 70–99)
GLUCOSE BLDC GLUCOMTR-MCNC: 155 MG/DL (ref 70–99)
GLUCOSE BLDC GLUCOMTR-MCNC: 156 MG/DL (ref 70–99)
GLUCOSE BLDC GLUCOMTR-MCNC: 156 MG/DL (ref 70–99)
GLUCOSE BLDC GLUCOMTR-MCNC: 157 MG/DL (ref 70–99)
GLUCOSE BLDC GLUCOMTR-MCNC: 157 MG/DL (ref 70–99)
GLUCOSE BLDC GLUCOMTR-MCNC: 158 MG/DL (ref 70–99)
GLUCOSE BLDC GLUCOMTR-MCNC: 159 MG/DL (ref 70–99)
GLUCOSE BLDC GLUCOMTR-MCNC: 159 MG/DL (ref 70–99)
GLUCOSE BLDC GLUCOMTR-MCNC: 160 MG/DL (ref 70–99)
GLUCOSE BLDC GLUCOMTR-MCNC: 161 MG/DL (ref 70–99)
GLUCOSE BLDC GLUCOMTR-MCNC: 162 MG/DL (ref 70–99)
GLUCOSE BLDC GLUCOMTR-MCNC: 162 MG/DL (ref 70–99)
GLUCOSE BLDC GLUCOMTR-MCNC: 163 MG/DL (ref 70–99)
GLUCOSE BLDC GLUCOMTR-MCNC: 166 MG/DL (ref 70–99)
GLUCOSE BLDC GLUCOMTR-MCNC: 166 MG/DL (ref 70–99)
GLUCOSE BLDC GLUCOMTR-MCNC: 167 MG/DL (ref 70–99)
GLUCOSE BLDC GLUCOMTR-MCNC: 168 MG/DL (ref 70–99)
GLUCOSE BLDC GLUCOMTR-MCNC: 171 MG/DL (ref 70–99)
GLUCOSE BLDC GLUCOMTR-MCNC: 172 MG/DL (ref 70–99)
GLUCOSE BLDC GLUCOMTR-MCNC: 172 MG/DL (ref 70–99)
GLUCOSE BLDC GLUCOMTR-MCNC: 173 MG/DL (ref 70–99)
GLUCOSE BLDC GLUCOMTR-MCNC: 175 MG/DL (ref 70–99)
GLUCOSE BLDC GLUCOMTR-MCNC: 176 MG/DL (ref 70–99)
GLUCOSE BLDC GLUCOMTR-MCNC: 176 MG/DL (ref 70–99)
GLUCOSE BLDC GLUCOMTR-MCNC: 177 MG/DL (ref 70–99)
GLUCOSE BLDC GLUCOMTR-MCNC: 177 MG/DL (ref 70–99)
GLUCOSE BLDC GLUCOMTR-MCNC: 180 MG/DL (ref 70–99)
GLUCOSE BLDC GLUCOMTR-MCNC: 185 MG/DL (ref 70–99)
GLUCOSE BLDC GLUCOMTR-MCNC: 186 MG/DL (ref 70–99)
GLUCOSE BLDC GLUCOMTR-MCNC: 190 MG/DL (ref 70–99)
GLUCOSE BLDC GLUCOMTR-MCNC: 191 MG/DL (ref 70–99)
GLUCOSE BLDC GLUCOMTR-MCNC: 196 MG/DL (ref 70–99)
GLUCOSE BLDC GLUCOMTR-MCNC: 203 MG/DL (ref 70–99)
GLUCOSE BLDC GLUCOMTR-MCNC: 208 MG/DL (ref 70–99)
GLUCOSE BLDC GLUCOMTR-MCNC: 209 MG/DL (ref 70–99)
GLUCOSE BLDC GLUCOMTR-MCNC: 213 MG/DL (ref 70–99)
GLUCOSE BLDC GLUCOMTR-MCNC: 236 MG/DL (ref 70–99)
GLUCOSE BLDC GLUCOMTR-MCNC: 86 MG/DL (ref 70–99)
GLUCOSE BLDC GLUCOMTR-MCNC: 91 MG/DL (ref 70–99)
GLUCOSE FLD-MCNC: 82 MG/DL
GLUCOSE SERPL-MCNC: 110 MG/DL (ref 70–99)
GLUCOSE SERPL-MCNC: 113 MG/DL (ref 70–99)
GLUCOSE SERPL-MCNC: 117 MG/DL (ref 70–99)
GLUCOSE SERPL-MCNC: 121 MG/DL (ref 70–99)
GLUCOSE SERPL-MCNC: 122 MG/DL (ref 70–99)
GLUCOSE SERPL-MCNC: 124 MG/DL (ref 70–99)
GLUCOSE SERPL-MCNC: 124 MG/DL (ref 70–99)
GLUCOSE SERPL-MCNC: 134 MG/DL (ref 70–99)
GLUCOSE SERPL-MCNC: 136 MG/DL (ref 70–99)
GLUCOSE SERPL-MCNC: 138 MG/DL (ref 70–99)
GLUCOSE SERPL-MCNC: 98 MG/DL (ref 70–99)
GLUCOSE UR STRIP-MCNC: NEGATIVE MG/DL
GLUCOSE UR STRIP-MCNC: NEGATIVE MG/DL
GRAM STAIN RESULT: NORMAL
HBA1C MFR BLD: 5.6 % (ref 0–5.6)
HCO3 BLD-SCNC: 20 MMOL/L (ref 21–28)
HCO3 BLD-SCNC: 20 MMOL/L (ref 21–28)
HCO3 BLD-SCNC: 23 MMOL/L (ref 21–28)
HCO3 BLD-SCNC: 23 MMOL/L (ref 21–28)
HCO3 BLD-SCNC: 24 MMOL/L (ref 21–28)
HCO3 BLD-SCNC: 25 MMOL/L (ref 21–28)
HCO3 BLD-SCNC: 25 MMOL/L (ref 21–28)
HCO3 BLD-SCNC: 26 MMOL/L (ref 21–28)
HCO3 BLD-SCNC: 28 MMOL/L (ref 21–28)
HCO3 BLD-SCNC: 29 MMOL/L (ref 21–28)
HCO3 BLD-SCNC: 30 MMOL/L (ref 21–28)
HCO3 BLD-SCNC: 31 MMOL/L (ref 21–28)
HCO3 BLD-SCNC: 34 MMOL/L (ref 21–28)
HCO3 BLD-SCNC: 34 MMOL/L (ref 21–28)
HCO3 BLD-SCNC: 35 MMOL/L (ref 21–28)
HCO3 BLD-SCNC: 35 MMOL/L (ref 21–28)
HCO3 BLD-SCNC: 36 MMOL/L (ref 21–28)
HCO3 BLD-SCNC: 37 MMOL/L (ref 21–28)
HCO3 BLD-SCNC: 37 MMOL/L (ref 21–28)
HCO3 BLD-SCNC: 38 MMOL/L (ref 21–28)
HCO3 BLDA-SCNC: 22 MMOL/L (ref 21–28)
HCT VFR BLD AUTO: 37.7 % (ref 40–53)
HCT VFR BLD AUTO: 38.5 % (ref 40–53)
HCT VFR BLD AUTO: 39.6 % (ref 40–53)
HCT VFR BLD AUTO: 40.2 % (ref 40–53)
HCT VFR BLD AUTO: 40.4 % (ref 40–53)
HCT VFR BLD AUTO: 40.4 % (ref 40–53)
HCT VFR BLD AUTO: 40.8 % (ref 40–53)
HCT VFR BLD AUTO: 40.8 % (ref 40–53)
HCT VFR BLD AUTO: 40.9 % (ref 40–53)
HCT VFR BLD AUTO: 41 % (ref 40–53)
HCT VFR BLD AUTO: 41.3 % (ref 40–53)
HCT VFR BLD AUTO: 42.2 % (ref 40–53)
HCT VFR BLD AUTO: 42.4 % (ref 40–53)
HCT VFR BLD AUTO: 43.4 % (ref 40–53)
HCT VFR BLD AUTO: 44.2 % (ref 40–53)
HCT VFR BLD AUTO: 44.3 % (ref 40–53)
HCT VFR BLD AUTO: 44.8 % (ref 40–53)
HCT VFR BLD AUTO: 46.4 % (ref 40–53)
HCT VFR BLD AUTO: 46.4 % (ref 40–53)
HCT VFR BLD AUTO: 46.8 % (ref 40–53)
HCT VFR BLD AUTO: 48.2 % (ref 40–53)
HCT VFR BLD AUTO: 48.5 % (ref 40–53)
HCT VFR BLD AUTO: 48.8 % (ref 40–53)
HCT VFR BLD AUTO: 49.4 % (ref 40–53)
HCT VFR BLD AUTO: 49.8 % (ref 40–53)
HCT VFR BLD AUTO: 49.9 % (ref 40–53)
HCT VFR BLD AUTO: 50.7 % (ref 40–53)
HCT VFR BLD AUTO: 50.9 % (ref 40–53)
HCT VFR BLD AUTO: 51.7 % (ref 40–53)
HCT VFR BLD AUTO: 52.7 % (ref 40–53)
HCT VFR BLD AUTO: 53.8 % (ref 40–53)
HGB BLD-MCNC: 12 G/DL (ref 13.3–17.7)
HGB BLD-MCNC: 12.2 G/DL (ref 13.3–17.7)
HGB BLD-MCNC: 12.5 G/DL (ref 13.3–17.7)
HGB BLD-MCNC: 12.5 G/DL (ref 13.3–17.7)
HGB BLD-MCNC: 12.8 G/DL (ref 13.3–17.7)
HGB BLD-MCNC: 12.8 G/DL (ref 13.3–17.7)
HGB BLD-MCNC: 13.1 G/DL (ref 13.3–17.7)
HGB BLD-MCNC: 13.3 G/DL (ref 13.3–17.7)
HGB BLD-MCNC: 13.4 G/DL (ref 13.3–17.7)
HGB BLD-MCNC: 13.5 G/DL (ref 13.3–17.7)
HGB BLD-MCNC: 13.6 G/DL (ref 13.3–17.7)
HGB BLD-MCNC: 13.8 G/DL (ref 13.3–17.7)
HGB BLD-MCNC: 13.9 G/DL (ref 13.3–17.7)
HGB BLD-MCNC: 14 G/DL (ref 13.3–17.7)
HGB BLD-MCNC: 14.1 G/DL (ref 13.3–17.7)
HGB BLD-MCNC: 14.7 G/DL (ref 13.3–17.7)
HGB BLD-MCNC: 14.8 G/DL (ref 13.3–17.7)
HGB BLD-MCNC: 15.8 G/DL (ref 13.3–17.7)
HGB BLD-MCNC: 16 G/DL (ref 13.3–17.7)
HGB BLD-MCNC: 16.1 G/DL (ref 13.3–17.7)
HGB BLD-MCNC: 16.1 G/DL (ref 13.3–17.7)
HGB BLD-MCNC: 16.2 G/DL (ref 13.3–17.7)
HGB BLD-MCNC: 16.4 G/DL (ref 13.3–17.7)
HGB BLD-MCNC: 16.6 G/DL (ref 13.3–17.7)
HGB BLD-MCNC: 16.6 G/DL (ref 13.3–17.7)
HGB BLD-MCNC: 16.8 G/DL (ref 13.3–17.7)
HGB BLD-MCNC: 16.9 G/DL (ref 13.3–17.7)
HGB BLD-MCNC: 17.1 G/DL (ref 13.3–17.7)
HGB BLD-MCNC: 17.2 G/DL (ref 13.3–17.7)
HGB BLD-MCNC: 17.2 G/DL (ref 13.3–17.7)
HGB BLD-MCNC: 17.8 G/DL (ref 13.3–17.7)
HGB BLD-MCNC: 18.1 G/DL (ref 13.3–17.7)
HGB UR QL STRIP: ABNORMAL
HGB UR QL STRIP: ABNORMAL
IMM GRANULOCYTES # BLD: 0.1 10E3/UL
IMM GRANULOCYTES # BLD: 0.1 10E3/UL
IMM GRANULOCYTES # BLD: 0.1 10E9/L (ref 0–0.4)
IMM GRANULOCYTES # BLD: 0.1 10E9/L (ref 0–0.4)
IMM GRANULOCYTES # BLD: 0.2 10E3/UL
IMM GRANULOCYTES # BLD: 0.2 10E9/L (ref 0–0.4)
IMM GRANULOCYTES # BLD: 0.3 10E9/L (ref 0–0.4)
IMM GRANULOCYTES # BLD: 0.3 10E9/L (ref 0–0.4)
IMM GRANULOCYTES # BLD: 0.4 10E9/L (ref 0–0.4)
IMM GRANULOCYTES # BLD: 0.4 10E9/L (ref 0–0.4)
IMM GRANULOCYTES NFR BLD: 0.7 %
IMM GRANULOCYTES NFR BLD: 0.8 %
IMM GRANULOCYTES NFR BLD: 1 %
IMM GRANULOCYTES NFR BLD: 1.1 %
IMM GRANULOCYTES NFR BLD: 1.2 %
IMM GRANULOCYTES NFR BLD: 1.2 %
IMM GRANULOCYTES NFR BLD: 1.5 %
IMM GRANULOCYTES NFR BLD: 1.6 %
IMM GRANULOCYTES NFR BLD: 1.9 %
IMM GRANULOCYTES NFR BLD: 2.1 %
IMM GRANULOCYTES NFR BLD: 2.2 %
INR PPP: 1.03 (ref 0.86–1.14)
INR PPP: 1.06 (ref 0.86–1.14)
INR PPP: 1.07 (ref 0.86–1.14)
INR PPP: 1.12 (ref 0.85–1.15)
INR PPP: 1.7 (ref 0.85–1.15)
INR PPP: 1.74 (ref 0.85–1.15)
INTERPRETATION ECG - MUSE: NORMAL
KETONES UR STRIP-MCNC: NEGATIVE MG/DL
KETONES UR STRIP-MCNC: NEGATIVE MG/DL
KOH PREPARATION: NORMAL
LABORATORY COMMENT REPORT: NORMAL
LACTATE BLD-SCNC: 2.1 MMOL/L
LACTATE SERPL-SCNC: 1.4 MMOL/L (ref 0.7–2)
LACTATE SERPL-SCNC: 1.6 MMOL/L (ref 0.7–2)
LACTATE SERPL-SCNC: 1.7 MMOL/L (ref 0.7–2)
LACTATE SERPL-SCNC: 1.8 MMOL/L (ref 0.7–2)
LACTATE SERPL-SCNC: 1.9 MMOL/L (ref 0.7–2)
LACTATE SERPL-SCNC: 1.9 MMOL/L (ref 0.7–2)
LACTATE SERPL-SCNC: 2 MMOL/L (ref 0.7–2)
LACTATE SERPL-SCNC: 2.1 MMOL/L (ref 0.7–2)
LACTATE SERPL-SCNC: 2.1 MMOL/L (ref 0.7–2)
LACTATE SERPL-SCNC: 2.2 MMOL/L (ref 0.7–2)
LDH FLD L TO P-CCNC: 160 U/L
LEUKOCYTE ESTERASE UR QL STRIP: NEGATIVE
LEUKOCYTE ESTERASE UR QL STRIP: NEGATIVE
LVEF ECHO: NORMAL
LVEF ECHO: NORMAL
LYMPHOCYTES # BLD AUTO: 0.4 10E3/UL (ref 0.8–5.3)
LYMPHOCYTES # BLD AUTO: 0.7 10E9/L (ref 0.8–5.3)
LYMPHOCYTES # BLD AUTO: 1 10E9/L (ref 0.8–5.3)
LYMPHOCYTES # BLD AUTO: 1.2 10E9/L (ref 0.8–5.3)
LYMPHOCYTES # BLD AUTO: 1.5 10E3/UL (ref 0.8–5.3)
LYMPHOCYTES # BLD AUTO: 1.5 10E9/L (ref 0.8–5.3)
LYMPHOCYTES # BLD AUTO: 1.5 10E9/L (ref 0.8–5.3)
LYMPHOCYTES # BLD AUTO: 1.6 10E9/L (ref 0.8–5.3)
LYMPHOCYTES # BLD AUTO: 1.8 10E9/L (ref 0.8–5.3)
LYMPHOCYTES # BLD AUTO: 2 10E3/UL (ref 0.8–5.3)
LYMPHOCYTES # BLD AUTO: 2 10E9/L (ref 0.8–5.3)
LYMPHOCYTES # BLD AUTO: 2 10E9/L (ref 0.8–5.3)
LYMPHOCYTES # BLD AUTO: 2.4 10E9/L (ref 0.8–5.3)
LYMPHOCYTES # BLD AUTO: 3.6 10E9/L (ref 0.8–5.3)
LYMPHOCYTES NFR BLD AUTO: 10 %
LYMPHOCYTES NFR BLD AUTO: 11.6 %
LYMPHOCYTES NFR BLD AUTO: 12 %
LYMPHOCYTES NFR BLD AUTO: 12.1 %
LYMPHOCYTES NFR BLD AUTO: 15.6 %
LYMPHOCYTES NFR BLD AUTO: 2 %
LYMPHOCYTES NFR BLD AUTO: 20 %
LYMPHOCYTES NFR BLD AUTO: 6 %
LYMPHOCYTES NFR BLD AUTO: 7 %
LYMPHOCYTES NFR BLD AUTO: 7.7 %
LYMPHOCYTES NFR BLD AUTO: 8.5 %
LYMPHOCYTES NFR BLD AUTO: 8.9 %
LYMPHOCYTES NFR BLD AUTO: 9.5 %
LYMPHOCYTES NFR BLD AUTO: 9.8 %
LYMPHOCYTES NFR FLD MANUAL: 1 %
LYMPHOCYTES NFR FLD MANUAL: 79 %
MACROCYTES BLD QL SMEAR: PRESENT
MAGNESIUM SERPL-MCNC: 1.6 MG/DL (ref 1.6–2.3)
MAGNESIUM SERPL-MCNC: 1.6 MG/DL (ref 1.6–2.3)
MAGNESIUM SERPL-MCNC: 1.7 MG/DL (ref 1.6–2.3)
MAGNESIUM SERPL-MCNC: 1.9 MG/DL (ref 1.6–2.3)
MAGNESIUM SERPL-MCNC: 2 MG/DL (ref 1.6–2.3)
MAGNESIUM SERPL-MCNC: 2 MG/DL (ref 1.6–2.3)
MAGNESIUM SERPL-MCNC: 2.2 MG/DL (ref 1.6–2.3)
MAGNESIUM SERPL-MCNC: 2.3 MG/DL (ref 1.6–2.3)
MAGNESIUM SERPL-MCNC: 2.7 MG/DL (ref 1.6–2.3)
MAGNESIUM SERPL-MCNC: 3 MG/DL (ref 1.6–2.3)
MAGNESIUM SERPL-MCNC: 3.1 MG/DL (ref 1.6–2.3)
MCH RBC QN AUTO: 31.8 PG (ref 26.5–33)
MCH RBC QN AUTO: 31.8 PG (ref 26.5–33)
MCH RBC QN AUTO: 31.9 PG (ref 26.5–33)
MCH RBC QN AUTO: 32 PG (ref 26.5–33)
MCH RBC QN AUTO: 32.1 PG (ref 26.5–33)
MCH RBC QN AUTO: 32.2 PG (ref 26.5–33)
MCH RBC QN AUTO: 32.2 PG (ref 26.5–33)
MCH RBC QN AUTO: 32.3 PG (ref 26.5–33)
MCH RBC QN AUTO: 32.4 PG (ref 26.5–33)
MCH RBC QN AUTO: 32.4 PG (ref 26.5–33)
MCH RBC QN AUTO: 32.5 PG (ref 26.5–33)
MCH RBC QN AUTO: 32.5 PG (ref 26.5–33)
MCH RBC QN AUTO: 32.6 PG (ref 26.5–33)
MCH RBC QN AUTO: 32.7 PG (ref 26.5–33)
MCH RBC QN AUTO: 32.8 PG (ref 26.5–33)
MCH RBC QN AUTO: 32.8 PG (ref 26.5–33)
MCH RBC QN AUTO: 32.9 PG (ref 26.5–33)
MCH RBC QN AUTO: 33 PG (ref 26.5–33)
MCH RBC QN AUTO: 33.1 PG (ref 26.5–33)
MCH RBC QN AUTO: 33.2 PG (ref 26.5–33)
MCH RBC QN AUTO: 33.3 PG (ref 26.5–33)
MCH RBC QN AUTO: 33.5 PG (ref 26.5–33)
MCH RBC QN AUTO: 33.7 PG (ref 26.5–33)
MCH RBC QN AUTO: 33.9 PG (ref 26.5–33)
MCHC RBC AUTO-ENTMCNC: 30.4 G/DL (ref 31.5–36.5)
MCHC RBC AUTO-ENTMCNC: 30.6 G/DL (ref 31.5–36.5)
MCHC RBC AUTO-ENTMCNC: 30.6 G/DL (ref 31.5–36.5)
MCHC RBC AUTO-ENTMCNC: 31.2 G/DL (ref 31.5–36.5)
MCHC RBC AUTO-ENTMCNC: 31.7 G/DL (ref 31.5–36.5)
MCHC RBC AUTO-ENTMCNC: 31.7 G/DL (ref 31.5–36.5)
MCHC RBC AUTO-ENTMCNC: 31.8 G/DL (ref 31.5–36.5)
MCHC RBC AUTO-ENTMCNC: 32 G/DL (ref 31.5–36.5)
MCHC RBC AUTO-ENTMCNC: 32 G/DL (ref 31.5–36.5)
MCHC RBC AUTO-ENTMCNC: 32.3 G/DL (ref 31.5–36.5)
MCHC RBC AUTO-ENTMCNC: 32.9 G/DL (ref 31.5–36.5)
MCHC RBC AUTO-ENTMCNC: 33 G/DL (ref 31.5–36.5)
MCHC RBC AUTO-ENTMCNC: 33.1 G/DL (ref 31.5–36.5)
MCHC RBC AUTO-ENTMCNC: 33.2 G/DL (ref 31.5–36.5)
MCHC RBC AUTO-ENTMCNC: 33.3 G/DL (ref 31.5–36.5)
MCHC RBC AUTO-ENTMCNC: 33.3 G/DL (ref 31.5–36.5)
MCHC RBC AUTO-ENTMCNC: 33.4 G/DL (ref 31.5–36.5)
MCHC RBC AUTO-ENTMCNC: 33.6 G/DL (ref 31.5–36.5)
MCHC RBC AUTO-ENTMCNC: 33.6 G/DL (ref 31.5–36.5)
MCHC RBC AUTO-ENTMCNC: 33.7 G/DL (ref 31.5–36.5)
MCHC RBC AUTO-ENTMCNC: 33.8 G/DL (ref 31.5–36.5)
MCHC RBC AUTO-ENTMCNC: 33.9 G/DL (ref 31.5–36.5)
MCHC RBC AUTO-ENTMCNC: 33.9 G/DL (ref 31.5–36.5)
MCHC RBC AUTO-ENTMCNC: 34 G/DL (ref 31.5–36.5)
MCHC RBC AUTO-ENTMCNC: 34 G/DL (ref 31.5–36.5)
MCHC RBC AUTO-ENTMCNC: 34.2 G/DL (ref 31.5–36.5)
MCHC RBC AUTO-ENTMCNC: 34.2 G/DL (ref 31.5–36.5)
MCHC RBC AUTO-ENTMCNC: 34.4 G/DL (ref 31.5–36.5)
MCHC RBC AUTO-ENTMCNC: 34.5 G/DL (ref 31.5–36.5)
MCHC RBC AUTO-ENTMCNC: 34.5 G/DL (ref 31.5–36.5)
MCHC RBC AUTO-ENTMCNC: 34.7 G/DL (ref 31.5–36.5)
MCV RBC AUTO: 100 FL (ref 78–100)
MCV RBC AUTO: 101 FL (ref 78–100)
MCV RBC AUTO: 104 FL (ref 78–100)
MCV RBC AUTO: 105 FL (ref 78–100)
MCV RBC AUTO: 106 FL (ref 78–100)
MCV RBC AUTO: 107 FL (ref 78–100)
MCV RBC AUTO: 93 FL (ref 78–100)
MCV RBC AUTO: 95 FL (ref 78–100)
MCV RBC AUTO: 95 FL (ref 78–100)
MCV RBC AUTO: 96 FL (ref 78–100)
MCV RBC AUTO: 97 FL (ref 78–100)
MCV RBC AUTO: 98 FL (ref 78–100)
MCV RBC AUTO: 98 FL (ref 78–100)
MCV RBC AUTO: 99 FL (ref 78–100)
MONOCYTES # BLD AUTO: 0.4 10E9/L (ref 0–1.3)
MONOCYTES # BLD AUTO: 0.7 10E9/L (ref 0–1.3)
MONOCYTES # BLD AUTO: 0.7 10E9/L (ref 0–1.3)
MONOCYTES # BLD AUTO: 0.9 10E9/L (ref 0–1.3)
MONOCYTES # BLD AUTO: 1.1 10E9/L (ref 0–1.3)
MONOCYTES # BLD AUTO: 1.1 10E9/L (ref 0–1.3)
MONOCYTES # BLD AUTO: 1.2 10E9/L (ref 0–1.3)
MONOCYTES # BLD AUTO: 1.4 10E3/UL (ref 0–1.3)
MONOCYTES # BLD AUTO: 1.4 10E9/L (ref 0–1.3)
MONOCYTES # BLD AUTO: 1.5 10E9/L (ref 0–1.3)
MONOCYTES # BLD AUTO: 1.7 10E9/L (ref 0–1.3)
MONOCYTES # BLD AUTO: 1.8 10E3/UL (ref 0–1.3)
MONOCYTES # BLD AUTO: 1.9 10E3/UL (ref 0–1.3)
MONOCYTES # BLD AUTO: 2.6 10E9/L (ref 0–1.3)
MONOCYTES NFR BLD AUTO: 11 %
MONOCYTES NFR BLD AUTO: 11.1 %
MONOCYTES NFR BLD AUTO: 14.2 %
MONOCYTES NFR BLD AUTO: 3.5 %
MONOCYTES NFR BLD AUTO: 3.8 %
MONOCYTES NFR BLD AUTO: 4.4 %
MONOCYTES NFR BLD AUTO: 5.4 %
MONOCYTES NFR BLD AUTO: 6.2 %
MONOCYTES NFR BLD AUTO: 6.8 %
MONOCYTES NFR BLD AUTO: 7.5 %
MONOCYTES NFR BLD AUTO: 8 %
MONOCYTES NFR BLD AUTO: 8 %
MONOCYTES NFR BLD AUTO: 9 %
MONOCYTES NFR BLD AUTO: 9.2 %
MONOS+MACROS NFR FLD MANUAL: 17 %
MONOS+MACROS NFR FLD MANUAL: 19 %
MRSA DNA SPEC QL NAA+PROBE: NEGATIVE
MUCOUS THREADS #/AREA URNS LPF: PRESENT /LPF
MUCOUS THREADS #/AREA URNS LPF: PRESENT /LPF
NEUTROPHILS # BLD AUTO: 10.4 10E9/L (ref 1.6–8.3)
NEUTROPHILS # BLD AUTO: 11 10E9/L (ref 1.6–8.3)
NEUTROPHILS # BLD AUTO: 11.4 10E3/UL (ref 1.6–8.3)
NEUTROPHILS # BLD AUTO: 11.5 10E9/L (ref 1.6–8.3)
NEUTROPHILS # BLD AUTO: 11.8 10E3/UL (ref 1.6–8.3)
NEUTROPHILS # BLD AUTO: 12.5 10E9/L (ref 1.6–8.3)
NEUTROPHILS # BLD AUTO: 13 10E9/L (ref 1.6–8.3)
NEUTROPHILS # BLD AUTO: 13.5 10E9/L (ref 1.6–8.3)
NEUTROPHILS # BLD AUTO: 14.5 10E9/L (ref 1.6–8.3)
NEUTROPHILS # BLD AUTO: 14.6 10E9/L (ref 1.6–8.3)
NEUTROPHILS # BLD AUTO: 15.2 10E9/L (ref 1.6–8.3)
NEUTROPHILS # BLD AUTO: 16.8 10E9/L (ref 1.6–8.3)
NEUTROPHILS # BLD AUTO: 20.3 10E3/UL (ref 1.6–8.3)
NEUTROPHILS # BLD AUTO: 9.4 10E9/L (ref 1.6–8.3)
NEUTROPHILS NFR BLD AUTO: 63.1 %
NEUTROPHILS NFR BLD AUTO: 69.4 %
NEUTROPHILS NFR BLD AUTO: 70 %
NEUTROPHILS NFR BLD AUTO: 76.2 %
NEUTROPHILS NFR BLD AUTO: 77 %
NEUTROPHILS NFR BLD AUTO: 78.4 %
NEUTROPHILS NFR BLD AUTO: 81.4 %
NEUTROPHILS NFR BLD AUTO: 82.2 %
NEUTROPHILS NFR BLD AUTO: 83.1 %
NEUTROPHILS NFR BLD AUTO: 83.1 %
NEUTROPHILS NFR BLD AUTO: 84.7 %
NEUTROPHILS NFR BLD AUTO: 86.7 %
NEUTROPHILS NFR BLD AUTO: 89 %
NEUTROPHILS NFR BLD AUTO: 89.5 %
NEUTS BAND NFR FLD MANUAL: 1 %
NEUTS BAND NFR FLD MANUAL: 82 %
NITRATE UR QL: NEGATIVE
NITRATE UR QL: NEGATIVE
NRBC # BLD AUTO: 0 10*3/UL
NRBC # BLD AUTO: 0 10E3/UL
NRBC BLD AUTO-RTO: 0 /100
NT-PROBNP SERPL-MCNC: 642 PG/ML (ref 0–900)
O2/TOTAL GAS SETTING VFR VENT: 100 %
O2/TOTAL GAS SETTING VFR VENT: 80 %
O2/TOTAL GAS SETTING VFR VENT: 90 %
O2/TOTAL GAS SETTING VFR VENT: 90 %
OBSERVATION IMP: NEGATIVE
OXYHGB MFR BLDA: 81 % (ref 92–100)
P AXIS - MUSE: 38 DEGREES
P JIROVECII DNA SPEC QL NAA+PROBE: NOT DETECTED
PATH REPORT.COMMENTS IMP SPEC: NORMAL
PATH REPORT.FINAL DX SPEC: NORMAL
PATH REPORT.FINAL DX SPEC: NORMAL
PATH REPORT.GROSS SPEC: NORMAL
PATH REPORT.GROSS SPEC: NORMAL
PATH REPORT.MICROSCOPIC SPEC OTHER STN: NORMAL
PATH REPORT.MICROSCOPIC SPEC OTHER STN: NORMAL
PATH REPORT.RELEVANT HX SPEC: NORMAL
PATH REPORT.RELEVANT HX SPEC: NORMAL
PATH REV: ABNORMAL
PCO2 BLD: 35 MM HG (ref 35–45)
PCO2 BLD: 37 MM HG (ref 35–45)
PCO2 BLD: 42 MM HG (ref 35–45)
PCO2 BLD: 43 MM HG (ref 35–45)
PCO2 BLD: 45 MM HG (ref 35–45)
PCO2 BLD: 49 MM HG (ref 35–45)
PCO2 BLD: 52 MM HG (ref 35–45)
PCO2 BLD: 54 MM HG (ref 35–45)
PCO2 BLD: 55 MM HG (ref 35–45)
PCO2 BLD: 56 MM HG (ref 35–45)
PCO2 BLD: 57 MM HG (ref 35–45)
PCO2 BLD: 58 MM HG (ref 35–45)
PCO2 BLD: 58 MM HG (ref 35–45)
PCO2 BLD: 59 MM HG (ref 35–45)
PCO2 BLD: 60 MM HG (ref 35–45)
PCO2 BLD: 61 MM HG (ref 35–45)
PCO2 BLD: 62 MM HG (ref 35–45)
PCO2 BLD: 62 MM HG (ref 35–45)
PCO2 BLD: 63 MM HG (ref 35–45)
PCO2 BLD: 65 MM HG (ref 35–45)
PCO2 BLD: 67 MM HG (ref 35–45)
PCO2 BLD: 69 MM HG (ref 35–45)
PCO2 BLD: 70 MM HG (ref 35–45)
PCO2 BLD: 72 MM HG (ref 35–45)
PCO2 BLD: 72 MM HG (ref 35–45)
PCO2 BLD: 75 MM HG (ref 35–45)
PCO2 BLD: 75 MM HG (ref 35–45)
PCO2 BLD: 76 MM HG (ref 35–45)
PCO2 BLD: 79 MM HG (ref 35–45)
PCO2 BLD: 79 MM HG (ref 35–45)
PCO2 BLD: 80 MM HG (ref 35–45)
PCO2 BLD: 82 MM HG (ref 35–45)
PCO2 BLD: 86 MM HG (ref 35–45)
PCO2 BLDA: 31 MM HG (ref 35–45)
PH BLD: 7.17 [PH] (ref 7.35–7.45)
PH BLD: 7.18 [PH] (ref 7.35–7.45)
PH BLD: 7.23 [PH] (ref 7.35–7.45)
PH BLD: 7.23 [PH] (ref 7.35–7.45)
PH BLD: 7.24 [PH] (ref 7.35–7.45)
PH BLD: 7.25 [PH] (ref 7.35–7.45)
PH BLD: 7.25 [PH] (ref 7.35–7.45)
PH BLD: 7.27 [PH] (ref 7.35–7.45)
PH BLD: 7.27 [PH] (ref 7.35–7.45)
PH BLD: 7.28 [PH] (ref 7.35–7.45)
PH BLD: 7.28 [PH] (ref 7.35–7.45)
PH BLD: 7.29 [PH] (ref 7.35–7.45)
PH BLD: 7.29 [PH] (ref 7.35–7.45)
PH BLD: 7.3 [PH] (ref 7.35–7.45)
PH BLD: 7.31 [PH] (ref 7.35–7.45)
PH BLD: 7.31 [PH] (ref 7.35–7.45)
PH BLD: 7.32 [PH] (ref 7.35–7.45)
PH BLD: 7.33 [PH] (ref 7.35–7.45)
PH BLD: 7.34 [PH] (ref 7.35–7.45)
PH BLD: 7.34 [PH] (ref 7.35–7.45)
PH BLD: 7.35 [PH] (ref 7.35–7.45)
PH BLD: 7.35 [PH] (ref 7.35–7.45)
PH BLD: 7.36 [PH] (ref 7.35–7.45)
PH BLD: 7.37 [PH] (ref 7.35–7.45)
PH BLD: 7.38 [PH] (ref 7.35–7.45)
PH BLD: 7.39 [PH] (ref 7.35–7.45)
PH BLD: 7.4 [PH] (ref 7.35–7.45)
PH BLD: 7.41 [PH] (ref 7.35–7.45)
PH BLD: 7.41 [PH] (ref 7.35–7.45)
PH BLD: 7.42 [PH] (ref 7.35–7.45)
PH BLDA: 7.46 [PH] (ref 7.35–7.45)
PH FLD: 7.4 PH
PH UR STRIP: 5 [PH] (ref 5–7)
PH UR STRIP: 5.5 [PH] (ref 5–7)
PHOSPHATE SERPL-MCNC: 2.8 MG/DL (ref 2.5–4.5)
PHOSPHATE SERPL-MCNC: 2.9 MG/DL (ref 2.5–4.5)
PHOSPHATE SERPL-MCNC: 3.1 MG/DL (ref 2.5–4.5)
PHOSPHATE SERPL-MCNC: 3.4 MG/DL (ref 2.5–4.5)
PHOSPHATE SERPL-MCNC: 3.6 MG/DL (ref 2.5–4.5)
PHOSPHATE SERPL-MCNC: 3.7 MG/DL (ref 2.5–4.5)
PHOSPHATE SERPL-MCNC: 3.7 MG/DL (ref 2.5–4.5)
PHOSPHATE SERPL-MCNC: 3.8 MG/DL (ref 2.5–4.5)
PHOSPHATE SERPL-MCNC: 3.9 MG/DL (ref 2.5–4.5)
PHOSPHATE SERPL-MCNC: 4.8 MG/DL (ref 2.5–4.5)
PHOTO IMAGE: NORMAL
PLATELET # BLD AUTO: 220 10E9/L (ref 150–450)
PLATELET # BLD AUTO: 220 10E9/L (ref 150–450)
PLATELET # BLD AUTO: 225 10E9/L (ref 150–450)
PLATELET # BLD AUTO: 230 10E9/L (ref 150–450)
PLATELET # BLD AUTO: 232 10E3/UL (ref 150–450)
PLATELET # BLD AUTO: 232 10E9/L (ref 150–450)
PLATELET # BLD AUTO: 235 10E9/L (ref 150–450)
PLATELET # BLD AUTO: 238 10E9/L (ref 150–450)
PLATELET # BLD AUTO: 245 10E3/UL (ref 150–450)
PLATELET # BLD AUTO: 265 10E3/UL (ref 150–450)
PLATELET # BLD AUTO: 265 10E3/UL (ref 150–450)
PLATELET # BLD AUTO: 267 10E9/L (ref 150–450)
PLATELET # BLD AUTO: 272 10E3/UL (ref 150–450)
PLATELET # BLD AUTO: 275 10E3/UL (ref 150–450)
PLATELET # BLD AUTO: 275 10E9/L (ref 150–450)
PLATELET # BLD AUTO: 278 10E3/UL (ref 150–450)
PLATELET # BLD AUTO: 282 10E3/UL (ref 150–450)
PLATELET # BLD AUTO: 283 10E3/UL (ref 150–450)
PLATELET # BLD AUTO: 283 10E9/L (ref 150–450)
PLATELET # BLD AUTO: 286 10E3/UL (ref 150–450)
PLATELET # BLD AUTO: 287 10E3/UL (ref 150–450)
PLATELET # BLD AUTO: 287 10E9/L (ref 150–450)
PLATELET # BLD AUTO: 288 10E3/UL (ref 150–450)
PLATELET # BLD AUTO: 299 10E3/UL (ref 150–450)
PLATELET # BLD AUTO: 299 10E3/UL (ref 150–450)
PLATELET # BLD AUTO: 300 10E3/UL (ref 150–450)
PLATELET # BLD AUTO: 302 10E3/UL (ref 150–450)
PLATELET # BLD AUTO: 313 10E3/UL (ref 150–450)
PLATELET # BLD AUTO: 316 10E3/UL (ref 150–450)
PLATELET # BLD AUTO: 318 10E3/UL (ref 150–450)
PLATELET # BLD AUTO: 337 10E3/UL (ref 150–450)
PLATELET # BLD EST: ABNORMAL 10*3/UL
PO2 BLD: 110 MM HG (ref 80–105)
PO2 BLD: 40 MM HG (ref 80–105)
PO2 BLD: 49 MM HG (ref 80–105)
PO2 BLD: 54 MM HG (ref 80–105)
PO2 BLD: 57 MM HG (ref 80–105)
PO2 BLD: 58 MM HG (ref 80–105)
PO2 BLD: 59 MM HG (ref 80–105)
PO2 BLD: 60 MM HG (ref 80–105)
PO2 BLD: 60 MM HG (ref 80–105)
PO2 BLD: 62 MM HG (ref 80–105)
PO2 BLD: 63 MM HG (ref 80–105)
PO2 BLD: 64 MM HG (ref 80–105)
PO2 BLD: 64 MM HG (ref 80–105)
PO2 BLD: 66 MM HG (ref 80–105)
PO2 BLD: 67 MM HG (ref 80–105)
PO2 BLD: 69 MM HG (ref 80–105)
PO2 BLD: 71 MM HG (ref 80–105)
PO2 BLD: 72 MM HG (ref 80–105)
PO2 BLD: 74 MM HG (ref 80–105)
PO2 BLD: 74 MM HG (ref 80–105)
PO2 BLD: 75 MM HG (ref 80–105)
PO2 BLD: 76 MM HG (ref 80–105)
PO2 BLD: 81 MM HG (ref 80–105)
PO2 BLD: 84 MM HG (ref 80–105)
PO2 BLD: 85 MM HG (ref 80–105)
PO2 BLD: 87 MM HG (ref 80–105)
PO2 BLD: 89 MM HG (ref 80–105)
PO2 BLD: 96 MM HG (ref 80–105)
PO2 BLDA: 44 MM HG (ref 80–105)
POIKILOCYTOSIS BLD QL SMEAR: ABNORMAL
POTASSIUM BLD-SCNC: 3.2 MMOL/L (ref 3.4–5.3)
POTASSIUM BLD-SCNC: 3.3 MMOL/L (ref 3.4–5.3)
POTASSIUM BLD-SCNC: 3.4 MMOL/L (ref 3.4–5.3)
POTASSIUM BLD-SCNC: 3.7 MMOL/L (ref 3.4–5.3)
POTASSIUM BLD-SCNC: 3.8 MMOL/L (ref 3.4–5.3)
POTASSIUM BLD-SCNC: 3.9 MMOL/L (ref 3.4–5.3)
POTASSIUM BLD-SCNC: 4 MMOL/L (ref 3.4–5.3)
POTASSIUM BLD-SCNC: 4.1 MMOL/L (ref 3.5–5)
POTASSIUM BLD-SCNC: 4.2 MMOL/L (ref 3.4–5.3)
POTASSIUM BLD-SCNC: 4.2 MMOL/L (ref 3.4–5.3)
POTASSIUM BLD-SCNC: 4.3 MMOL/L (ref 3.4–5.3)
POTASSIUM BLD-SCNC: 4.4 MMOL/L (ref 3.4–5.3)
POTASSIUM BLD-SCNC: 4.5 MMOL/L (ref 3.4–5.3)
POTASSIUM BLD-SCNC: 4.6 MMOL/L (ref 3.4–5.3)
POTASSIUM BLD-SCNC: 4.6 MMOL/L (ref 3.4–5.3)
POTASSIUM BLD-SCNC: 4.8 MMOL/L (ref 3.4–5.3)
POTASSIUM BLD-SCNC: 5 MMOL/L (ref 3.4–5.3)
POTASSIUM BLD-SCNC: 5 MMOL/L (ref 3.4–5.3)
POTASSIUM BLD-SCNC: 5.4 MMOL/L (ref 3.4–5.3)
POTASSIUM BLD-SCNC: 5.5 MMOL/L (ref 3.4–5.3)
POTASSIUM SERPL-SCNC: 3.9 MMOL/L (ref 3.4–5.3)
POTASSIUM SERPL-SCNC: 3.9 MMOL/L (ref 3.4–5.3)
POTASSIUM SERPL-SCNC: 4.2 MMOL/L (ref 3.4–5.3)
POTASSIUM SERPL-SCNC: 4.3 MMOL/L (ref 3.4–5.3)
POTASSIUM SERPL-SCNC: 4.3 MMOL/L (ref 3.4–5.3)
POTASSIUM SERPL-SCNC: 4.4 MMOL/L (ref 3.4–5.3)
POTASSIUM SERPL-SCNC: 4.6 MMOL/L (ref 3.4–5.3)
POTASSIUM SERPL-SCNC: 5.1 MMOL/L (ref 3.4–5.3)
PR INTERVAL - MUSE: 138 MS
PROT FLD-MCNC: 3.7 G/DL
PROT SERPL-MCNC: 5.9 G/DL (ref 6.8–8.8)
PROT SERPL-MCNC: 6.1 G/DL (ref 6.8–8.8)
PROT SERPL-MCNC: 6.2 G/DL (ref 6.8–8.8)
PROT SERPL-MCNC: 7 G/DL (ref 6.8–8.8)
PROT SERPL-MCNC: 7 G/DL (ref 6.8–8.8)
PROT SERPL-MCNC: 7.2 G/DL (ref 6.8–8.8)
PROT SERPL-MCNC: 7.3 G/DL (ref 6.8–8.8)
PROT SERPL-MCNC: 7.3 G/DL (ref 6.8–8.8)
PROT SERPL-MCNC: 7.4 G/DL (ref 6.8–8.8)
PROT SERPL-MCNC: 7.5 G/DL (ref 6.8–8.8)
PROT SERPL-MCNC: 7.6 G/DL (ref 6.8–8.8)
PROT SERPL-MCNC: 7.7 G/DL (ref 6.8–8.8)
PROT SERPL-MCNC: 7.7 G/DL (ref 6.8–8.8)
PROT SERPL-MCNC: 7.8 G/DL (ref 6.8–8.8)
QRS DURATION - MUSE: 80 MS
QT - MUSE: 280 MS
QTC - MUSE: 422 MS
R AXIS - MUSE: -26 DEGREES
RADIOLOGIST FLAGS: ABNORMAL
RBC # BLD AUTO: 3.75 10E6/UL (ref 4.4–5.9)
RBC # BLD AUTO: 3.81 10E6/UL (ref 4.4–5.9)
RBC # BLD AUTO: 3.83 10E6/UL (ref 4.4–5.9)
RBC # BLD AUTO: 3.85 10E6/UL (ref 4.4–5.9)
RBC # BLD AUTO: 3.99 10E6/UL (ref 4.4–5.9)
RBC # BLD AUTO: 4 10E6/UL (ref 4.4–5.9)
RBC # BLD AUTO: 4.05 10E6/UL (ref 4.4–5.9)
RBC # BLD AUTO: 4.13 10E6/UL (ref 4.4–5.9)
RBC # BLD AUTO: 4.16 10E6/UL (ref 4.4–5.9)
RBC # BLD AUTO: 4.2 10E6/UL (ref 4.4–5.9)
RBC # BLD AUTO: 4.24 10E6/UL (ref 4.4–5.9)
RBC # BLD AUTO: 4.25 10E6/UL (ref 4.4–5.9)
RBC # BLD AUTO: 4.25 10E6/UL (ref 4.4–5.9)
RBC # BLD AUTO: 4.26 10E6/UL (ref 4.4–5.9)
RBC # BLD AUTO: 4.39 10E6/UL (ref 4.4–5.9)
RBC # BLD AUTO: 4.53 10E6/UL (ref 4.4–5.9)
RBC # BLD AUTO: 4.54 10E6/UL (ref 4.4–5.9)
RBC # BLD AUTO: 4.78 10E12/L (ref 4.4–5.9)
RBC # BLD AUTO: 4.85 10E6/UL (ref 4.4–5.9)
RBC # BLD AUTO: 4.86 10E6/UL (ref 4.4–5.9)
RBC # BLD AUTO: 4.87 10E6/UL (ref 4.4–5.9)
RBC # BLD AUTO: 4.98 10E12/L (ref 4.4–5.9)
RBC # BLD AUTO: 4.99 10E12/L (ref 4.4–5.9)
RBC # BLD AUTO: 5.04 10E12/L (ref 4.4–5.9)
RBC # BLD AUTO: 5.1 10E12/L (ref 4.4–5.9)
RBC # BLD AUTO: 5.1 10E12/L (ref 4.4–5.9)
RBC # BLD AUTO: 5.12 10E12/L (ref 4.4–5.9)
RBC # BLD AUTO: 5.22 10E12/L (ref 4.4–5.9)
RBC # BLD AUTO: 5.31 10E12/L (ref 4.4–5.9)
RBC # BLD AUTO: 5.5 10E12/L (ref 4.4–5.9)
RBC # BLD AUTO: 5.69 10E12/L (ref 4.4–5.9)
RBC URINE: 2 /HPF
RBC URINE: 8 /HPF
SA TARGET DNA: NEGATIVE
SARS-COV-2 RNA RESP QL NAA+PROBE: NEGATIVE
SARS-COV-2 RNA RESP QL NAA+PROBE: NORMAL
SARS-COV-2 RNA SPEC QL NAA+PROBE: NOT DETECTED
SCANNED LAB RESULT: ABNORMAL
SCANNED LAB RESULT: NORMAL
SODIUM BLD-SCNC: 142 MMOL/L (ref 133–144)
SODIUM SERPL-SCNC: 135 MMOL/L (ref 133–144)
SODIUM SERPL-SCNC: 137 MMOL/L (ref 133–144)
SODIUM SERPL-SCNC: 138 MMOL/L (ref 133–144)
SODIUM SERPL-SCNC: 139 MMOL/L (ref 133–144)
SODIUM SERPL-SCNC: 140 MMOL/L (ref 133–144)
SODIUM SERPL-SCNC: 141 MMOL/L (ref 133–144)
SODIUM SERPL-SCNC: 141 MMOL/L (ref 133–144)
SODIUM SERPL-SCNC: 142 MMOL/L (ref 133–144)
SODIUM SERPL-SCNC: 143 MMOL/L (ref 133–144)
SODIUM SERPL-SCNC: 144 MMOL/L (ref 133–144)
SODIUM SERPL-SCNC: 144 MMOL/L (ref 133–144)
SODIUM SERPL-SCNC: 145 MMOL/L (ref 133–144)
SODIUM SERPL-SCNC: 148 MMOL/L (ref 133–144)
SODIUM SERPL-SCNC: 149 MMOL/L (ref 133–144)
SODIUM SERPL-SCNC: 150 MMOL/L (ref 133–144)
SODIUM SERPL-SCNC: 151 MMOL/L (ref 133–144)
SODIUM SERPL-SCNC: 151 MMOL/L (ref 133–144)
SODIUM SERPL-SCNC: 152 MMOL/L (ref 133–144)
SODIUM SERPL-SCNC: 152 MMOL/L (ref 133–144)
SODIUM SERPL-SCNC: 153 MMOL/L (ref 133–144)
SODIUM SERPL-SCNC: 154 MMOL/L (ref 133–144)
SODIUM SERPL-SCNC: 155 MMOL/L (ref 133–144)
SP GR UR STRIP: 1.01 (ref 1–1.03)
SP GR UR STRIP: 1.03 (ref 1–1.03)
SPECIMEN EXPIRATION DATE: NORMAL
SPECIMEN EXPIRATION DATE: NORMAL
SPECIMEN SOURCE: NORMAL
SQUAMOUS EPITHELIAL: <1 /HPF
SYSTOLIC BLOOD PRESSURE - MUSE: NORMAL MMHG
T AXIS - MUSE: 33 DEGREES
TME LAST DOSE: ABNORMAL H
TME LAST DOSE: NORMAL H
TRANSITIONAL EPI: <1 /HPF
UFH PPP CHRO-ACNC: 0.9 IU/ML
UFH PPP CHRO-ACNC: <0.1 IU/ML
UFH PPP CHRO-ACNC: >1.1 IU/ML
UROBILINOGEN UR STRIP-MCNC: NORMAL MG/DL
UROBILINOGEN UR STRIP-MCNC: NORMAL MG/DL
VANCOMYCIN SERPL-MCNC: 18.4 MG/L
VENTRICULAR RATE- MUSE: 137 BPM
WBC # BLD AUTO: 10.5 10E9/L (ref 4–11)
WBC # BLD AUTO: 14 10E9/L (ref 4–11)
WBC # BLD AUTO: 15 10E9/L (ref 4–11)
WBC # BLD AUTO: 15.1 10E3/UL (ref 4–11)
WBC # BLD AUTO: 15.6 10E9/L (ref 4–11)
WBC # BLD AUTO: 15.9 10E3/UL (ref 4–11)
WBC # BLD AUTO: 16.1 10E3/UL (ref 4–11)
WBC # BLD AUTO: 16.2 10E9/L (ref 4–11)
WBC # BLD AUTO: 16.4 10E9/L (ref 4–11)
WBC # BLD AUTO: 16.7 10E9/L (ref 4–11)
WBC # BLD AUTO: 17.3 10E3/UL (ref 4–11)
WBC # BLD AUTO: 17.3 10E9/L (ref 4–11)
WBC # BLD AUTO: 17.9 10E3/UL (ref 4–11)
WBC # BLD AUTO: 18.2 10E9/L (ref 4–11)
WBC # BLD AUTO: 18.3 10E3/UL (ref 4–11)
WBC # BLD AUTO: 18.5 10E3/UL (ref 4–11)
WBC # BLD AUTO: 18.5 10E9/L (ref 4–11)
WBC # BLD AUTO: 19.1 10E3/UL (ref 4–11)
WBC # BLD AUTO: 19.1 10E3/UL (ref 4–11)
WBC # BLD AUTO: 19.7 10E3/UL (ref 4–11)
WBC # BLD AUTO: 20.5 10E3/UL (ref 4–11)
WBC # BLD AUTO: 20.6 10E9/L (ref 4–11)
WBC # BLD AUTO: 22.1 10E3/UL (ref 4–11)
WBC # BLD AUTO: 22.2 10E3/UL (ref 4–11)
WBC # BLD AUTO: 22.4 10E3/UL (ref 4–11)
WBC # BLD AUTO: 22.5 10E3/UL (ref 4–11)
WBC # BLD AUTO: 22.5 10E3/UL (ref 4–11)
WBC # BLD AUTO: 24.3 10E3/UL (ref 4–11)
WBC # BLD AUTO: 25.4 10E3/UL (ref 4–11)
WBC # BLD AUTO: 26.3 10E3/UL (ref 4–11)
WBC # BLD AUTO: 28.4 10E3/UL (ref 4–11)
WBC # BLD AUTO: 32.7 10E3/UL (ref 4–11)
WBC # FLD AUTO: 5 /UL
WBC # FLD AUTO: 923 /UL
WBC URINE: 0 /HPF
WBC URINE: 3 /HPF
YEAST #/AREA URNS HPF: ABNORMAL /HPF

## 2021-01-01 PROCEDURE — 89051 BODY FLUID CELL COUNT: CPT

## 2021-01-01 PROCEDURE — 88108 CYTOPATH CONCENTRATE TECH: CPT | Mod: TC

## 2021-01-01 PROCEDURE — 71046 X-RAY EXAM CHEST 2 VIEWS: CPT | Mod: GC | Performed by: RADIOLOGY

## 2021-01-01 PROCEDURE — 80048 BASIC METABOLIC PNL TOTAL CA: CPT | Performed by: STUDENT IN AN ORGANIZED HEALTH CARE EDUCATION/TRAINING PROGRAM

## 2021-01-01 PROCEDURE — 250N000009 HC RX 250: Performed by: DIETITIAN, REGISTERED

## 2021-01-01 PROCEDURE — 32555 ASPIRATE PLEURA W/ IMAGING: CPT | Mod: LT

## 2021-01-01 PROCEDURE — 80053 COMPREHEN METABOLIC PANEL: CPT | Performed by: PHYSICIAN ASSISTANT

## 2021-01-01 PROCEDURE — 84295 ASSAY OF SERUM SODIUM: CPT | Performed by: NURSE PRACTITIONER

## 2021-01-01 PROCEDURE — 82803 BLOOD GASES ANY COMBINATION: CPT

## 2021-01-01 PROCEDURE — 71045 X-RAY EXAM CHEST 1 VIEW: CPT | Mod: 26 | Performed by: RADIOLOGY

## 2021-01-01 PROCEDURE — 99214 OFFICE O/P EST MOD 30 MIN: CPT | Performed by: INTERNAL MEDICINE

## 2021-01-01 PROCEDURE — 85027 COMPLETE CBC AUTOMATED: CPT

## 2021-01-01 PROCEDURE — 999N000141 HC STATISTIC PRE-PROCEDURE NURSING ASSESSMENT: Performed by: THORACIC SURGERY (CARDIOTHORACIC VASCULAR SURGERY)

## 2021-01-01 PROCEDURE — 999N000157 HC STATISTIC RCP TIME EA 10 MIN

## 2021-01-01 PROCEDURE — 85610 PROTHROMBIN TIME: CPT | Performed by: PATHOLOGY

## 2021-01-01 PROCEDURE — 99292 CRITICAL CARE ADDL 30 MIN: CPT | Mod: GC | Performed by: INTERNAL MEDICINE

## 2021-01-01 PROCEDURE — 71045 X-RAY EXAM CHEST 1 VIEW: CPT

## 2021-01-01 PROCEDURE — C9113 INJ PANTOPRAZOLE SODIUM, VIA: HCPCS | Performed by: STUDENT IN AN ORGANIZED HEALTH CARE EDUCATION/TRAINING PROGRAM

## 2021-01-01 PROCEDURE — 200N000002 HC R&B ICU UMMC

## 2021-01-01 PROCEDURE — 250N000011 HC RX IP 250 OP 636

## 2021-01-01 PROCEDURE — 999N000054 HC STATISTIC EKG NON-CHARGEABLE

## 2021-01-01 PROCEDURE — 250N000011 HC RX IP 250 OP 636: Performed by: STUDENT IN AN ORGANIZED HEALTH CARE EDUCATION/TRAINING PROGRAM

## 2021-01-01 PROCEDURE — 36415 COLL VENOUS BLD VENIPUNCTURE: CPT

## 2021-01-01 PROCEDURE — U0003 INFECTIOUS AGENT DETECTION BY NUCLEIC ACID (DNA OR RNA); SEVERE ACUTE RESPIRATORY SYNDROME CORONAVIRUS 2 (SARS-COV-2) (CORONAVIRUS DISEASE [COVID-19]), AMPLIFIED PROBE TECHNIQUE, MAKING USE OF HIGH THROUGHPUT TECHNOLOGIES AS DESCRIBED BY CMS-2020-01-R: HCPCS

## 2021-01-01 PROCEDURE — 258N000003 HC RX IP 258 OP 636: Performed by: DIETITIAN, REGISTERED

## 2021-01-01 PROCEDURE — 250N000009 HC RX 250

## 2021-01-01 PROCEDURE — 258N000003 HC RX IP 258 OP 636: Performed by: STUDENT IN AN ORGANIZED HEALTH CARE EDUCATION/TRAINING PROGRAM

## 2021-01-01 PROCEDURE — 85610 PROTHROMBIN TIME: CPT | Performed by: THORACIC SURGERY (CARDIOTHORACIC VASCULAR SURGERY)

## 2021-01-01 PROCEDURE — 94003 VENT MGMT INPAT SUBQ DAY: CPT

## 2021-01-01 PROCEDURE — 370N000003 HC ANESTHESIA WARD SERVICE

## 2021-01-01 PROCEDURE — 250N000011 HC RX IP 250 OP 636: Performed by: NURSE PRACTITIONER

## 2021-01-01 PROCEDURE — 87106 FUNGI IDENTIFICATION YEAST: CPT

## 2021-01-01 PROCEDURE — 83605 ASSAY OF LACTIC ACID: CPT | Performed by: NURSE PRACTITIONER

## 2021-01-01 PROCEDURE — 74018 RADEX ABDOMEN 1 VIEW: CPT | Mod: 26 | Performed by: STUDENT IN AN ORGANIZED HEALTH CARE EDUCATION/TRAINING PROGRAM

## 2021-01-01 PROCEDURE — 82803 BLOOD GASES ANY COMBINATION: CPT | Performed by: STUDENT IN AN ORGANIZED HEALTH CARE EDUCATION/TRAINING PROGRAM

## 2021-01-01 PROCEDURE — 84100 ASSAY OF PHOSPHORUS: CPT | Performed by: NURSE PRACTITIONER

## 2021-01-01 PROCEDURE — 83880 ASSAY OF NATRIURETIC PEPTIDE: CPT | Performed by: ANESTHESIOLOGY

## 2021-01-01 PROCEDURE — 83735 ASSAY OF MAGNESIUM: CPT | Performed by: THORACIC SURGERY (CARDIOTHORACIC VASCULAR SURGERY)

## 2021-01-01 PROCEDURE — 83735 ASSAY OF MAGNESIUM: CPT | Performed by: PHYSICIAN ASSISTANT

## 2021-01-01 PROCEDURE — 87103 BLOOD FUNGUS CULTURE: CPT | Performed by: STUDENT IN AN ORGANIZED HEALTH CARE EDUCATION/TRAINING PROGRAM

## 2021-01-01 PROCEDURE — 80158 DRUG ASSAY CYCLOSPORINE: CPT | Performed by: INTERNAL MEDICINE

## 2021-01-01 PROCEDURE — 99214 OFFICE O/P EST MOD 30 MIN: CPT | Mod: 25

## 2021-01-01 PROCEDURE — 999N000065 XR ABDOMEN PORT 1 VIEWS

## 2021-01-01 PROCEDURE — 36416 COLLJ CAPILLARY BLOOD SPEC: CPT | Performed by: PATHOLOGY

## 2021-01-01 PROCEDURE — 87081 CULTURE SCREEN ONLY: CPT

## 2021-01-01 PROCEDURE — U0005 INFEC AGEN DETEC AMPLI PROBE: HCPCS

## 2021-01-01 PROCEDURE — 85520 HEPARIN ASSAY: CPT | Performed by: THORACIC SURGERY (CARDIOTHORACIC VASCULAR SURGERY)

## 2021-01-01 PROCEDURE — 36620 INSERTION CATHETER ARTERY: CPT | Performed by: NURSE PRACTITIONER

## 2021-01-01 PROCEDURE — 250N000009 HC RX 250: Performed by: STUDENT IN AN ORGANIZED HEALTH CARE EDUCATION/TRAINING PROGRAM

## 2021-01-01 PROCEDURE — 83735 ASSAY OF MAGNESIUM: CPT | Performed by: STUDENT IN AN ORGANIZED HEALTH CARE EDUCATION/TRAINING PROGRAM

## 2021-01-01 PROCEDURE — 258N000003 HC RX IP 258 OP 636: Performed by: THORACIC SURGERY (CARDIOTHORACIC VASCULAR SURGERY)

## 2021-01-01 PROCEDURE — 71045 X-RAY EXAM CHEST 1 VIEW: CPT | Mod: 77

## 2021-01-01 PROCEDURE — 85025 COMPLETE CBC W/AUTO DIFF WBC: CPT | Performed by: INTERNAL MEDICINE

## 2021-01-01 PROCEDURE — 89051 BODY FLUID CELL COUNT: CPT | Performed by: INTERNAL MEDICINE

## 2021-01-01 PROCEDURE — 81001 URINALYSIS AUTO W/SCOPE: CPT

## 2021-01-01 PROCEDURE — 0B9C8ZX DRAINAGE OF RIGHT UPPER LUNG LOBE, VIA NATURAL OR ARTIFICIAL OPENING ENDOSCOPIC, DIAGNOSTIC: ICD-10-PCS | Performed by: STUDENT IN AN ORGANIZED HEALTH CARE EDUCATION/TRAINING PROGRAM

## 2021-01-01 PROCEDURE — 71045 X-RAY EXAM CHEST 1 VIEW: CPT | Mod: 26 | Performed by: STUDENT IN AN ORGANIZED HEALTH CARE EDUCATION/TRAINING PROGRAM

## 2021-01-01 PROCEDURE — 0011A PR COVID VAC MODERNA 100 MCG/0.5 ML IM: CPT

## 2021-01-01 PROCEDURE — 84100 ASSAY OF PHOSPHORUS: CPT | Performed by: THORACIC SURGERY (CARDIOTHORACIC VASCULAR SURGERY)

## 2021-01-01 PROCEDURE — 80053 COMPREHEN METABOLIC PANEL: CPT | Performed by: INTERNAL MEDICINE

## 2021-01-01 PROCEDURE — 32555 ASPIRATE PLEURA W/ IMAGING: CPT | Mod: LT | Performed by: RADIOLOGY

## 2021-01-01 PROCEDURE — 82945 GLUCOSE OTHER FLUID: CPT | Mod: 90 | Performed by: PATHOLOGY

## 2021-01-01 PROCEDURE — 31624 DX BRONCHOSCOPE/LAVAGE: CPT

## 2021-01-01 PROCEDURE — 88307 TISSUE EXAM BY PATHOLOGIST: CPT | Mod: 26 | Performed by: PATHOLOGY

## 2021-01-01 PROCEDURE — 93970 EXTREMITY STUDY: CPT

## 2021-01-01 PROCEDURE — G0463 HOSPITAL OUTPT CLINIC VISIT: HCPCS

## 2021-01-01 PROCEDURE — 250N000013 HC RX MED GY IP 250 OP 250 PS 637: Performed by: SURGERY

## 2021-01-01 PROCEDURE — 250N000013 HC RX MED GY IP 250 OP 250 PS 637: Performed by: STUDENT IN AN ORGANIZED HEALTH CARE EDUCATION/TRAINING PROGRAM

## 2021-01-01 PROCEDURE — 99233 SBSQ HOSP IP/OBS HIGH 50: CPT | Mod: 24 | Performed by: INTERNAL MEDICINE

## 2021-01-01 PROCEDURE — 80069 RENAL FUNCTION PANEL: CPT | Performed by: THORACIC SURGERY (CARDIOTHORACIC VASCULAR SURGERY)

## 2021-01-01 PROCEDURE — 84132 ASSAY OF SERUM POTASSIUM: CPT | Performed by: NURSE PRACTITIONER

## 2021-01-01 PROCEDURE — 80048 BASIC METABOLIC PNL TOTAL CA: CPT | Performed by: NURSE PRACTITIONER

## 2021-01-01 PROCEDURE — 250N000012 HC RX MED GY IP 250 OP 636 PS 637: Performed by: THORACIC SURGERY (CARDIOTHORACIC VASCULAR SURGERY)

## 2021-01-01 PROCEDURE — 99291 CRITICAL CARE FIRST HOUR: CPT | Mod: 24 | Performed by: DIETITIAN, REGISTERED

## 2021-01-01 PROCEDURE — 87798 DETECT AGENT NOS DNA AMP: CPT | Performed by: DIETITIAN, REGISTERED

## 2021-01-01 PROCEDURE — 83735 ASSAY OF MAGNESIUM: CPT | Performed by: NURSE PRACTITIONER

## 2021-01-01 PROCEDURE — 85025 COMPLETE CBC W/AUTO DIFF WBC: CPT | Performed by: STUDENT IN AN ORGANIZED HEALTH CARE EDUCATION/TRAINING PROGRAM

## 2021-01-01 PROCEDURE — 250N000013 HC RX MED GY IP 250 OP 250 PS 637: Performed by: NURSE PRACTITIONER

## 2021-01-01 PROCEDURE — 87070 CULTURE OTHR SPECIMN AEROBIC: CPT | Performed by: NURSE PRACTITIONER

## 2021-01-01 PROCEDURE — 80197 ASSAY OF TACROLIMUS: CPT | Performed by: INTERNAL MEDICINE

## 2021-01-01 PROCEDURE — 32555 ASPIRATE PLEURA W/ IMAGING: CPT | Mod: LT | Performed by: PHYSICIAN ASSISTANT

## 2021-01-01 PROCEDURE — 82803 BLOOD GASES ANY COMBINATION: CPT | Performed by: NURSE PRACTITIONER

## 2021-01-01 PROCEDURE — 85027 COMPLETE CBC AUTOMATED: CPT | Performed by: NURSE PRACTITIONER

## 2021-01-01 PROCEDURE — 999N000015 HC STATISTIC ARTERIAL MONITORING DAILY

## 2021-01-01 PROCEDURE — 71250 CT THORAX DX C-: CPT | Mod: GC | Performed by: RADIOLOGY

## 2021-01-01 PROCEDURE — 85025 COMPLETE CBC W/AUTO DIFF WBC: CPT | Performed by: PHYSICIAN ASSISTANT

## 2021-01-01 PROCEDURE — 0012A PR COVID VAC MODERNA 100 MCG/0.5 ML IM: CPT

## 2021-01-01 PROCEDURE — 99204 OFFICE O/P NEW MOD 45 MIN: CPT | Mod: GT | Performed by: PHYSICIAN ASSISTANT

## 2021-01-01 PROCEDURE — 250N000013 HC RX MED GY IP 250 OP 250 PS 637: Performed by: DIETITIAN, REGISTERED

## 2021-01-01 PROCEDURE — 84295 ASSAY OF SERUM SODIUM: CPT | Performed by: STUDENT IN AN ORGANIZED HEALTH CARE EDUCATION/TRAINING PROGRAM

## 2021-01-01 PROCEDURE — 93321 DOPPLER ECHO F-UP/LMTD STD: CPT | Mod: 26 | Performed by: STUDENT IN AN ORGANIZED HEALTH CARE EDUCATION/TRAINING PROGRAM

## 2021-01-01 PROCEDURE — U0005 INFEC AGEN DETEC AMPLI PROBE: HCPCS | Performed by: RADIOLOGY

## 2021-01-01 PROCEDURE — 250N000009 HC RX 250: Performed by: NURSE PRACTITIONER

## 2021-01-01 PROCEDURE — 99223 1ST HOSP IP/OBS HIGH 75: CPT | Mod: 24 | Performed by: INTERNAL MEDICINE

## 2021-01-01 PROCEDURE — U0003 INFECTIOUS AGENT DETECTION BY NUCLEIC ACID (DNA OR RNA); SEVERE ACUTE RESPIRATORY SYNDROME CORONAVIRUS 2 (SARS-COV-2) (CORONAVIRUS DISEASE [COVID-19]), AMPLIFIED PROBE TECHNIQUE, MAKING USE OF HIGH THROUGHPUT TECHNOLOGIES AS DESCRIBED BY CMS-2020-01-R: HCPCS | Performed by: PHYSICIAN ASSISTANT

## 2021-01-01 PROCEDURE — 83735 ASSAY OF MAGNESIUM: CPT

## 2021-01-01 PROCEDURE — 87507 IADNA-DNA/RNA PROBE TQ 12-25: CPT | Performed by: DIETITIAN, REGISTERED

## 2021-01-01 PROCEDURE — 82248 BILIRUBIN DIRECT: CPT | Performed by: NURSE PRACTITIONER

## 2021-01-01 PROCEDURE — 250N000011 HC RX IP 250 OP 636: Performed by: ANESTHESIOLOGY

## 2021-01-01 PROCEDURE — 999N000127 HC STATISTIC PERIPHERAL IV START W US GUIDANCE

## 2021-01-01 PROCEDURE — 83036 HEMOGLOBIN GLYCOSYLATED A1C: CPT | Performed by: STUDENT IN AN ORGANIZED HEALTH CARE EDUCATION/TRAINING PROGRAM

## 2021-01-01 PROCEDURE — 94667 MNPJ CHEST WALL 1ST: CPT | Performed by: PEDIATRICS

## 2021-01-01 PROCEDURE — 93308 TTE F-UP OR LMTD: CPT | Mod: 26 | Performed by: STUDENT IN AN ORGANIZED HEALTH CARE EDUCATION/TRAINING PROGRAM

## 2021-01-01 PROCEDURE — 84132 ASSAY OF SERUM POTASSIUM: CPT | Performed by: STUDENT IN AN ORGANIZED HEALTH CARE EDUCATION/TRAINING PROGRAM

## 2021-01-01 PROCEDURE — 36592 COLLECT BLOOD FROM PICC: CPT | Performed by: THORACIC SURGERY (CARDIOTHORACIC VASCULAR SURGERY)

## 2021-01-01 PROCEDURE — 258N000003 HC RX IP 258 OP 636: Performed by: NURSE PRACTITIONER

## 2021-01-01 PROCEDURE — 85014 HEMATOCRIT: CPT | Performed by: NURSE PRACTITIONER

## 2021-01-01 PROCEDURE — 99292 CRITICAL CARE ADDL 30 MIN: CPT | Mod: 25 | Performed by: NURSE PRACTITIONER

## 2021-01-01 PROCEDURE — 85025 COMPLETE CBC W/AUTO DIFF WBC: CPT

## 2021-01-01 PROCEDURE — 86140 C-REACTIVE PROTEIN: CPT | Performed by: INTERNAL MEDICINE

## 2021-01-01 PROCEDURE — 87070 CULTURE OTHR SPECIMN AEROBIC: CPT | Performed by: STUDENT IN AN ORGANIZED HEALTH CARE EDUCATION/TRAINING PROGRAM

## 2021-01-01 PROCEDURE — 94645 CONT INHLJ TX EACH ADDL HOUR: CPT

## 2021-01-01 PROCEDURE — 87070 CULTURE OTHR SPECIMN AEROBIC: CPT | Mod: 90 | Performed by: PATHOLOGY

## 2021-01-01 PROCEDURE — 99291 CRITICAL CARE FIRST HOUR: CPT | Mod: GC | Performed by: INTERNAL MEDICINE

## 2021-01-01 PROCEDURE — U0003 INFECTIOUS AGENT DETECTION BY NUCLEIC ACID (DNA OR RNA); SEVERE ACUTE RESPIRATORY SYNDROME CORONAVIRUS 2 (SARS-COV-2) (CORONAVIRUS DISEASE [COVID-19]), AMPLIFIED PROBE TECHNIQUE, MAKING USE OF HIGH THROUGHPUT TECHNOLOGIES AS DESCRIBED BY CMS-2020-01-R: HCPCS | Performed by: RADIOLOGY

## 2021-01-01 PROCEDURE — 83605 ASSAY OF LACTIC ACID: CPT

## 2021-01-01 PROCEDURE — 250N000012 HC RX MED GY IP 250 OP 636 PS 637

## 2021-01-01 PROCEDURE — 36415 COLL VENOUS BLD VENIPUNCTURE: CPT | Performed by: ANESTHESIOLOGY

## 2021-01-01 PROCEDURE — 87899 AGENT NOS ASSAY W/OPTIC: CPT | Performed by: STUDENT IN AN ORGANIZED HEALTH CARE EDUCATION/TRAINING PROGRAM

## 2021-01-01 PROCEDURE — 89051 BODY FLUID CELL COUNT: CPT | Mod: 59 | Performed by: INTERNAL MEDICINE

## 2021-01-01 PROCEDURE — 87210 SMEAR WET MOUNT SALINE/INK: CPT

## 2021-01-01 PROCEDURE — 99214 OFFICE O/P EST MOD 30 MIN: CPT

## 2021-01-01 PROCEDURE — 250N000011 HC RX IP 250 OP 636: Performed by: THORACIC SURGERY (CARDIOTHORACIC VASCULAR SURGERY)

## 2021-01-01 PROCEDURE — 87040 BLOOD CULTURE FOR BACTERIA: CPT

## 2021-01-01 PROCEDURE — 36592 COLLECT BLOOD FROM PICC: CPT

## 2021-01-01 PROCEDURE — 87116 MYCOBACTERIA CULTURE: CPT

## 2021-01-01 PROCEDURE — 87205 SMEAR GRAM STAIN: CPT | Performed by: NURSE PRACTITIONER

## 2021-01-01 PROCEDURE — 0B9G8ZX DRAINAGE OF LEFT UPPER LUNG LOBE, VIA NATURAL OR ARTIFICIAL OPENING ENDOSCOPIC, DIAGNOSTIC: ICD-10-PCS | Performed by: STUDENT IN AN ORGANIZED HEALTH CARE EDUCATION/TRAINING PROGRAM

## 2021-01-01 PROCEDURE — 84295 ASSAY OF SERUM SODIUM: CPT | Performed by: DIETITIAN, REGISTERED

## 2021-01-01 PROCEDURE — 99215 OFFICE O/P EST HI 40 MIN: CPT | Mod: GT | Performed by: THORACIC SURGERY (CARDIOTHORACIC VASCULAR SURGERY)

## 2021-01-01 PROCEDURE — 88305 TISSUE EXAM BY PATHOLOGIST: CPT | Mod: 26 | Performed by: PATHOLOGY

## 2021-01-01 PROCEDURE — 250N000013 HC RX MED GY IP 250 OP 250 PS 637: Performed by: THORACIC SURGERY (CARDIOTHORACIC VASCULAR SURGERY)

## 2021-01-01 PROCEDURE — 82565 ASSAY OF CREATININE: CPT

## 2021-01-01 PROCEDURE — 82374 ASSAY BLOOD CARBON DIOXIDE: CPT | Performed by: NURSE PRACTITIONER

## 2021-01-01 PROCEDURE — 91301 PR COVID VAC MODERNA 100 MCG/0.5 ML IM: CPT

## 2021-01-01 PROCEDURE — 94002 VENT MGMT INPAT INIT DAY: CPT

## 2021-01-01 PROCEDURE — 258N000003 HC RX IP 258 OP 636: Performed by: PHYSICIAN ASSISTANT

## 2021-01-01 PROCEDURE — 87075 CULTR BACTERIA EXCEPT BLOOD: CPT | Performed by: STUDENT IN AN ORGANIZED HEALTH CARE EDUCATION/TRAINING PROGRAM

## 2021-01-01 PROCEDURE — 99291 CRITICAL CARE FIRST HOUR: CPT | Performed by: NURSE PRACTITIONER

## 2021-01-01 PROCEDURE — 999N000185 HC STATISTIC TRANSPORT TIME EA 15 MIN

## 2021-01-01 PROCEDURE — 84999 UNLISTED CHEMISTRY PROCEDURE: CPT | Performed by: DIETITIAN, REGISTERED

## 2021-01-01 PROCEDURE — 32651 THORACOSCOPY REMOVE CORTEX: CPT | Mod: GC | Performed by: THORACIC SURGERY (CARDIOTHORACIC VASCULAR SURGERY)

## 2021-01-01 PROCEDURE — 258N000003 HC RX IP 258 OP 636

## 2021-01-01 PROCEDURE — 250N000011 HC RX IP 250 OP 636: Performed by: SURGERY

## 2021-01-01 PROCEDURE — 82810 BLOOD GASES O2 SAT ONLY: CPT

## 2021-01-01 PROCEDURE — 0BCP4ZZ EXTIRPATION OF MATTER FROM LEFT PLEURA, PERCUTANEOUS ENDOSCOPIC APPROACH: ICD-10-PCS | Performed by: THORACIC SURGERY (CARDIOTHORACIC VASCULAR SURGERY)

## 2021-01-01 PROCEDURE — 88307 TISSUE EXAM BY PATHOLOGIST: CPT | Mod: TC | Performed by: THORACIC SURGERY (CARDIOTHORACIC VASCULAR SURGERY)

## 2021-01-01 PROCEDURE — 87205 SMEAR GRAM STAIN: CPT

## 2021-01-01 PROCEDURE — 86900 BLOOD TYPING SEROLOGIC ABO: CPT | Performed by: THORACIC SURGERY (CARDIOTHORACIC VASCULAR SURGERY)

## 2021-01-01 PROCEDURE — 85610 PROTHROMBIN TIME: CPT | Performed by: NURSE PRACTITIONER

## 2021-01-01 PROCEDURE — 0W9B40Z DRAINAGE OF LEFT PLEURAL CAVITY WITH DRAINAGE DEVICE, PERCUTANEOUS ENDOSCOPIC APPROACH: ICD-10-PCS | Performed by: THORACIC SURGERY (CARDIOTHORACIC VASCULAR SURGERY)

## 2021-01-01 PROCEDURE — 99214 OFFICE O/P EST MOD 30 MIN: CPT | Performed by: THORACIC SURGERY (CARDIOTHORACIC VASCULAR SURGERY)

## 2021-01-01 PROCEDURE — 250N000011 HC RX IP 250 OP 636: Performed by: DIETITIAN, REGISTERED

## 2021-01-01 PROCEDURE — P9041 ALBUMIN (HUMAN),5%, 50ML: HCPCS | Performed by: ANESTHESIOLOGY

## 2021-01-01 PROCEDURE — 5A09357 ASSISTANCE WITH RESPIRATORY VENTILATION, LESS THAN 24 CONSECUTIVE HOURS, CONTINUOUS POSITIVE AIRWAY PRESSURE: ICD-10-PCS | Performed by: INTERNAL MEDICINE

## 2021-01-01 PROCEDURE — 88108 CYTOPATH CONCENTRATE TECH: CPT | Mod: 26 | Performed by: PATHOLOGY

## 2021-01-01 PROCEDURE — 94640 AIRWAY INHALATION TREATMENT: CPT | Mod: 76

## 2021-01-01 PROCEDURE — 87633 RESP VIRUS 12-25 TARGETS: CPT | Performed by: DIETITIAN, REGISTERED

## 2021-01-01 PROCEDURE — 87040 BLOOD CULTURE FOR BACTERIA: CPT | Performed by: PHYSICIAN ASSISTANT

## 2021-01-01 PROCEDURE — 0W993ZX DRAINAGE OF RIGHT PLEURAL CAVITY, PERCUTANEOUS APPROACH, DIAGNOSTIC: ICD-10-PCS | Performed by: THORACIC SURGERY (CARDIOTHORACIC VASCULAR SURGERY)

## 2021-01-01 PROCEDURE — 94667 MNPJ CHEST WALL 1ST: CPT

## 2021-01-01 PROCEDURE — 44500 INTRO GASTROINTESTINAL TUBE: CPT

## 2021-01-01 PROCEDURE — 71046 X-RAY EXAM CHEST 2 VIEWS: CPT | Performed by: RADIOLOGY

## 2021-01-01 PROCEDURE — 84100 ASSAY OF PHOSPHORUS: CPT

## 2021-01-01 PROCEDURE — 999N000065 XR CHEST PORT 1 VIEW

## 2021-01-01 PROCEDURE — 93010 ELECTROCARDIOGRAM REPORT: CPT | Mod: 59 | Performed by: INTERNAL MEDICINE

## 2021-01-01 PROCEDURE — 82565 ASSAY OF CREATININE: CPT | Performed by: STUDENT IN AN ORGANIZED HEALTH CARE EDUCATION/TRAINING PROGRAM

## 2021-01-01 PROCEDURE — 84157 ASSAY OF PROTEIN OTHER: CPT | Mod: 90 | Performed by: PATHOLOGY

## 2021-01-01 PROCEDURE — 85027 COMPLETE CBC AUTOMATED: CPT | Performed by: STUDENT IN AN ORGANIZED HEALTH CARE EDUCATION/TRAINING PROGRAM

## 2021-01-01 PROCEDURE — 250N000009 HC RX 250: Performed by: ANESTHESIOLOGY

## 2021-01-01 PROCEDURE — 99221 1ST HOSP IP/OBS SF/LOW 40: CPT | Mod: GC | Performed by: INTERNAL MEDICINE

## 2021-01-01 PROCEDURE — 87449 NOS EACH ORGANISM AG IA: CPT | Performed by: STUDENT IN AN ORGANIZED HEALTH CARE EDUCATION/TRAINING PROGRAM

## 2021-01-01 PROCEDURE — 36592 COLLECT BLOOD FROM PICC: CPT | Performed by: STUDENT IN AN ORGANIZED HEALTH CARE EDUCATION/TRAINING PROGRAM

## 2021-01-01 PROCEDURE — 99232 SBSQ HOSP IP/OBS MODERATE 35: CPT | Mod: 24 | Performed by: INTERNAL MEDICINE

## 2021-01-01 PROCEDURE — 85014 HEMATOCRIT: CPT | Performed by: STUDENT IN AN ORGANIZED HEALTH CARE EDUCATION/TRAINING PROGRAM

## 2021-01-01 PROCEDURE — 83735 ASSAY OF MAGNESIUM: CPT | Performed by: INTERNAL MEDICINE

## 2021-01-01 PROCEDURE — 87040 BLOOD CULTURE FOR BACTERIA: CPT | Performed by: DIETITIAN, REGISTERED

## 2021-01-01 PROCEDURE — 82248 BILIRUBIN DIRECT: CPT | Performed by: THORACIC SURGERY (CARDIOTHORACIC VASCULAR SURGERY)

## 2021-01-01 PROCEDURE — 93325 DOPPLER ECHO COLOR FLOW MAPG: CPT | Mod: 26 | Performed by: STUDENT IN AN ORGANIZED HEALTH CARE EDUCATION/TRAINING PROGRAM

## 2021-01-01 PROCEDURE — 87070 CULTURE OTHR SPECIMN AEROBIC: CPT

## 2021-01-01 PROCEDURE — 87071 CULTURE AEROBIC QUANT OTHER: CPT

## 2021-01-01 PROCEDURE — 80158 DRUG ASSAY CYCLOSPORINE: CPT

## 2021-01-01 PROCEDURE — 80202 ASSAY OF VANCOMYCIN: CPT | Performed by: THORACIC SURGERY (CARDIOTHORACIC VASCULAR SURGERY)

## 2021-01-01 PROCEDURE — 87102 FUNGUS ISOLATION CULTURE: CPT | Performed by: NURSE PRACTITIONER

## 2021-01-01 PROCEDURE — 36592 COLLECT BLOOD FROM PICC: CPT | Performed by: NURSE PRACTITIONER

## 2021-01-01 PROCEDURE — 84132 ASSAY OF SERUM POTASSIUM: CPT | Performed by: THORACIC SURGERY (CARDIOTHORACIC VASCULAR SURGERY)

## 2021-01-01 PROCEDURE — 88305 TISSUE EXAM BY PATHOLOGIST: CPT | Mod: TC

## 2021-01-01 PROCEDURE — 83615 LACTATE (LD) (LDH) ENZYME: CPT | Mod: 90 | Performed by: PATHOLOGY

## 2021-01-01 PROCEDURE — C1729 CATH, DRAINAGE: HCPCS | Performed by: THORACIC SURGERY (CARDIOTHORACIC VASCULAR SURGERY)

## 2021-01-01 PROCEDURE — 82040 ASSAY OF SERUM ALBUMIN: CPT

## 2021-01-01 PROCEDURE — 0W9930Z DRAINAGE OF RIGHT PLEURAL CAVITY WITH DRAINAGE DEVICE, PERCUTANEOUS APPROACH: ICD-10-PCS | Performed by: THORACIC SURGERY (CARDIOTHORACIC VASCULAR SURGERY)

## 2021-01-01 PROCEDURE — 86900 BLOOD TYPING SEROLOGIC ABO: CPT

## 2021-01-01 PROCEDURE — 80158 DRUG ASSAY CYCLOSPORINE: CPT | Performed by: PHYSICIAN ASSISTANT

## 2021-01-01 PROCEDURE — 99291 CRITICAL CARE FIRST HOUR: CPT | Mod: 25 | Performed by: NURSE PRACTITIONER

## 2021-01-01 PROCEDURE — 80053 COMPREHEN METABOLIC PANEL: CPT

## 2021-01-01 PROCEDURE — 272N000001 HC OR GENERAL SUPPLY STERILE: Performed by: THORACIC SURGERY (CARDIOTHORACIC VASCULAR SURGERY)

## 2021-01-01 PROCEDURE — 74018 RADEX ABDOMEN 1 VIEW: CPT | Mod: 26 | Performed by: RADIOLOGY

## 2021-01-01 PROCEDURE — 87641 MR-STAPH DNA AMP PROBE: CPT | Performed by: NURSE PRACTITIONER

## 2021-01-01 PROCEDURE — 82330 ASSAY OF CALCIUM: CPT

## 2021-01-01 PROCEDURE — 87206 SMEAR FLUORESCENT/ACID STAI: CPT

## 2021-01-01 PROCEDURE — U0005 INFEC AGEN DETEC AMPLI PROBE: HCPCS | Performed by: PHYSICIAN ASSISTANT

## 2021-01-01 PROCEDURE — 93306 TTE W/DOPPLER COMPLETE: CPT

## 2021-01-01 PROCEDURE — U0003 INFECTIOUS AGENT DETECTION BY NUCLEIC ACID (DNA OR RNA); SEVERE ACUTE RESPIRATORY SYNDROME CORONAVIRUS 2 (SARS-COV-2) (CORONAVIRUS DISEASE [COVID-19]), AMPLIFIED PROBE TECHNIQUE, MAKING USE OF HIGH THROUGHPUT TECHNOLOGIES AS DESCRIBED BY CMS-2020-01-R: HCPCS | Performed by: INTERNAL MEDICINE

## 2021-01-01 PROCEDURE — 999N000157 HC STATISTIC RCP TIME EA 10 MIN: Performed by: PEDIATRICS

## 2021-01-01 PROCEDURE — 32550 INSERT PLEURAL CATH: CPT | Mod: GC | Performed by: THORACIC SURGERY (CARDIOTHORACIC VASCULAR SURGERY)

## 2021-01-01 PROCEDURE — 272N000220 HC BRONCHOSCOPE, DISPOSABLE

## 2021-01-01 PROCEDURE — 710N000010 HC RECOVERY PHASE 1, LEVEL 2, PER MIN: Performed by: THORACIC SURGERY (CARDIOTHORACIC VASCULAR SURGERY)

## 2021-01-01 PROCEDURE — 87102 FUNGUS ISOLATION CULTURE: CPT | Performed by: STUDENT IN AN ORGANIZED HEALTH CARE EDUCATION/TRAINING PROGRAM

## 2021-01-01 PROCEDURE — 94660 CPAP INITIATION&MGMT: CPT

## 2021-01-01 PROCEDURE — 85049 AUTOMATED PLATELET COUNT: CPT | Performed by: ANESTHESIOLOGY

## 2021-01-01 PROCEDURE — 36415 COLL VENOUS BLD VENIPUNCTURE: CPT | Performed by: THORACIC SURGERY (CARDIOTHORACIC VASCULAR SURGERY)

## 2021-01-01 PROCEDURE — 360N000076 HC SURGERY LEVEL 3, PER MIN: Performed by: THORACIC SURGERY (CARDIOTHORACIC VASCULAR SURGERY)

## 2021-01-01 PROCEDURE — U0005 INFEC AGEN DETEC AMPLI PROBE: HCPCS | Performed by: INTERNAL MEDICINE

## 2021-01-01 PROCEDURE — 82374 ASSAY BLOOD CARBON DIOXIDE: CPT

## 2021-01-01 PROCEDURE — 5A1955Z RESPIRATORY VENTILATION, GREATER THAN 96 CONSECUTIVE HOURS: ICD-10-PCS | Performed by: THORACIC SURGERY (CARDIOTHORACIC VASCULAR SURGERY)

## 2021-01-01 PROCEDURE — 0B9M8ZZ DRAINAGE OF BILATERAL LUNGS, VIA NATURAL OR ARTIFICIAL OPENING ENDOSCOPIC: ICD-10-PCS | Performed by: SURGERY

## 2021-01-01 PROCEDURE — 80048 BASIC METABOLIC PNL TOTAL CA: CPT | Performed by: ANESTHESIOLOGY

## 2021-01-01 PROCEDURE — 99232 SBSQ HOSP IP/OBS MODERATE 35: CPT | Mod: GC | Performed by: INTERNAL MEDICINE

## 2021-01-01 PROCEDURE — 250N000012 HC RX MED GY IP 250 OP 636 PS 637: Performed by: STUDENT IN AN ORGANIZED HEALTH CARE EDUCATION/TRAINING PROGRAM

## 2021-01-01 PROCEDURE — 99214 OFFICE O/P EST MOD 30 MIN: CPT | Mod: TEL | Performed by: INTERNAL MEDICINE

## 2021-01-01 PROCEDURE — 999N001193 HC VIDEO/TELEPHONE VISIT; NO CHARGE

## 2021-01-01 PROCEDURE — 99233 SBSQ HOSP IP/OBS HIGH 50: CPT | Mod: GC | Performed by: INTERNAL MEDICINE

## 2021-01-01 PROCEDURE — 83986 ASSAY PH BODY FLUID NOS: CPT | Mod: 90 | Performed by: PATHOLOGY

## 2021-01-01 PROCEDURE — 87305 ASPERGILLUS AG IA: CPT | Performed by: DIETITIAN, REGISTERED

## 2021-01-01 PROCEDURE — 80048 BASIC METABOLIC PNL TOTAL CA: CPT

## 2021-01-01 PROCEDURE — 87075 CULTR BACTERIA EXCEPT BLOOD: CPT | Mod: 90 | Performed by: PATHOLOGY

## 2021-01-01 PROCEDURE — 85027 COMPLETE CBC AUTOMATED: CPT | Performed by: PATHOLOGY

## 2021-01-01 PROCEDURE — 84100 ASSAY OF PHOSPHORUS: CPT | Performed by: STUDENT IN AN ORGANIZED HEALTH CARE EDUCATION/TRAINING PROGRAM

## 2021-01-01 PROCEDURE — 370N000017 HC ANESTHESIA TECHNICAL FEE, PER MIN: Performed by: THORACIC SURGERY (CARDIOTHORACIC VASCULAR SURGERY)

## 2021-01-01 PROCEDURE — 87102 FUNGUS ISOLATION CULTURE: CPT

## 2021-01-01 PROCEDURE — 87210 SMEAR WET MOUNT SALINE/INK: CPT | Performed by: NURSE PRACTITIONER

## 2021-01-01 PROCEDURE — 80053 COMPREHEN METABOLIC PANEL: CPT | Performed by: STUDENT IN AN ORGANIZED HEALTH CARE EDUCATION/TRAINING PROGRAM

## 2021-01-01 PROCEDURE — 94668 MNPJ CHEST WALL SBSQ: CPT

## 2021-01-01 PROCEDURE — 258N000003 HC RX IP 258 OP 636: Performed by: ANESTHESIOLOGY

## 2021-01-01 PROCEDURE — 93970 EXTREMITY STUDY: CPT | Mod: 26 | Performed by: RADIOLOGY

## 2021-01-01 PROCEDURE — 87077 CULTURE AEROBIC IDENTIFY: CPT

## 2021-01-01 PROCEDURE — 93308 TTE F-UP OR LMTD: CPT

## 2021-01-01 PROCEDURE — 99214 OFFICE O/P EST MOD 30 MIN: CPT | Mod: GT | Performed by: INTERNAL MEDICINE

## 2021-01-01 PROCEDURE — 250N000009 HC RX 250: Performed by: THORACIC SURGERY (CARDIOTHORACIC VASCULAR SURGERY)

## 2021-01-01 PROCEDURE — 80158 DRUG ASSAY CYCLOSPORINE: CPT | Performed by: STUDENT IN AN ORGANIZED HEALTH CARE EDUCATION/TRAINING PROGRAM

## 2021-01-01 PROCEDURE — 3E033XZ INTRODUCTION OF VASOPRESSOR INTO PERIPHERAL VEIN, PERCUTANEOUS APPROACH: ICD-10-PCS | Performed by: THORACIC SURGERY (CARDIOTHORACIC VASCULAR SURGERY)

## 2021-01-01 PROCEDURE — 99233 SBSQ HOSP IP/OBS HIGH 50: CPT | Performed by: STUDENT IN AN ORGANIZED HEALTH CARE EDUCATION/TRAINING PROGRAM

## 2021-01-01 PROCEDURE — 87305 ASPERGILLUS AG IA: CPT | Performed by: STUDENT IN AN ORGANIZED HEALTH CARE EDUCATION/TRAINING PROGRAM

## 2021-01-01 PROCEDURE — 93306 TTE W/DOPPLER COMPLETE: CPT | Mod: 26 | Performed by: INTERNAL MEDICINE

## 2021-01-01 RX ORDER — FUROSEMIDE 10 MG/ML
20 SOLUTION ORAL
Status: CANCELLED | OUTPATIENT
Start: 2021-01-01

## 2021-01-01 RX ORDER — CYCLOSPORINE 100 MG/1
200 CAPSULE, LIQUID FILLED ORAL 2 TIMES DAILY
Qty: 120 CAPSULE | Refills: 0 | Status: SHIPPED | OUTPATIENT
Start: 2021-01-01 | End: 2021-01-01

## 2021-01-01 RX ORDER — BUPIVACAINE HYDROCHLORIDE AND EPINEPHRINE 5; 5 MG/ML; UG/ML
INJECTION, SOLUTION PERINEURAL PRN
Status: DISCONTINUED | OUTPATIENT
Start: 2021-01-01 | End: 2021-01-01 | Stop reason: HOSPADM

## 2021-01-01 RX ORDER — HYDROXYZINE HYDROCHLORIDE 25 MG/1
25 TABLET, FILM COATED ORAL EVERY 6 HOURS PRN
Status: DISCONTINUED | OUTPATIENT
Start: 2021-01-01 | End: 2021-01-01 | Stop reason: HOSPADM

## 2021-01-01 RX ORDER — AMOXICILLIN 250 MG
1-2 CAPSULE ORAL 2 TIMES DAILY
Status: DISCONTINUED | OUTPATIENT
Start: 2021-01-01 | End: 2021-01-01 | Stop reason: HOSPADM

## 2021-01-01 RX ORDER — METHYLPREDNISOLONE SODIUM SUCCINATE 125 MG/2ML
125 INJECTION, POWDER, LYOPHILIZED, FOR SOLUTION INTRAMUSCULAR; INTRAVENOUS EVERY 6 HOURS
Status: DISCONTINUED | OUTPATIENT
Start: 2021-01-01 | End: 2021-01-01 | Stop reason: HOSPADM

## 2021-01-01 RX ORDER — LIDOCAINE 40 MG/G
CREAM TOPICAL
Status: CANCELLED | OUTPATIENT
Start: 2021-01-01

## 2021-01-01 RX ORDER — DEXTROSE MONOHYDRATE 25 G/50ML
25-50 INJECTION, SOLUTION INTRAVENOUS
Status: DISCONTINUED | OUTPATIENT
Start: 2021-01-01 | End: 2021-01-01 | Stop reason: HOSPADM

## 2021-01-01 RX ORDER — DEXTROSE MONOHYDRATE 25 G/50ML
25-50 INJECTION, SOLUTION INTRAVENOUS
Status: DISCONTINUED | OUTPATIENT
Start: 2021-01-01 | End: 2021-01-01

## 2021-01-01 RX ORDER — ONDANSETRON 4 MG/1
4 TABLET, ORALLY DISINTEGRATING ORAL EVERY 30 MIN PRN
Status: DISCONTINUED | OUTPATIENT
Start: 2021-01-01 | End: 2021-01-01 | Stop reason: HOSPADM

## 2021-01-01 RX ORDER — ONDANSETRON 4 MG/1
4 TABLET, ORALLY DISINTEGRATING ORAL EVERY 6 HOURS PRN
Status: DISCONTINUED | OUTPATIENT
Start: 2021-01-01 | End: 2021-01-01 | Stop reason: HOSPADM

## 2021-01-01 RX ORDER — PREDNISONE 2.5 MG/1
7.5 TABLET ORAL DAILY
Status: DISCONTINUED | OUTPATIENT
Start: 2021-01-01 | End: 2021-01-01

## 2021-01-01 RX ORDER — SODIUM CHLORIDE, SODIUM LACTATE, POTASSIUM CHLORIDE, CALCIUM CHLORIDE 600; 310; 30; 20 MG/100ML; MG/100ML; MG/100ML; MG/100ML
INJECTION, SOLUTION INTRAVENOUS CONTINUOUS
Status: CANCELLED | OUTPATIENT
Start: 2021-01-01

## 2021-01-01 RX ORDER — PROPOFOL 10 MG/ML
5-75 INJECTION, EMULSION INTRAVENOUS CONTINUOUS
Status: DISCONTINUED | OUTPATIENT
Start: 2021-01-01 | End: 2021-01-01

## 2021-01-01 RX ORDER — PREDNISONE 20 MG/1
40 TABLET ORAL DAILY
Qty: 60 TABLET | Refills: 3 | Status: SHIPPED | OUTPATIENT
Start: 2021-01-01 | End: 2021-01-01

## 2021-01-01 RX ORDER — LIDOCAINE 40 MG/G
CREAM TOPICAL
Status: DISCONTINUED | OUTPATIENT
Start: 2021-01-01 | End: 2021-01-01 | Stop reason: HOSPADM

## 2021-01-01 RX ORDER — CEFAZOLIN SODIUM 2 G/50ML
2 SOLUTION INTRAVENOUS SEE ADMIN INSTRUCTIONS
Status: CANCELLED | OUTPATIENT
Start: 2021-01-01

## 2021-01-01 RX ORDER — POTASSIUM CHLORIDE 1500 MG/1
20 TABLET, EXTENDED RELEASE ORAL DAILY
Qty: 90 TABLET | Refills: 3 | Status: SHIPPED | OUTPATIENT
Start: 2021-01-01 | End: 2021-01-01

## 2021-01-01 RX ORDER — LEVOFLOXACIN 250 MG/1
250 TABLET, FILM COATED ORAL DAILY
Qty: 90 TABLET | Refills: 4 | Status: SHIPPED | OUTPATIENT
Start: 2021-01-01

## 2021-01-01 RX ORDER — PROPOFOL 10 MG/ML
INJECTION, EMULSION INTRAVENOUS
Status: DISCONTINUED | OUTPATIENT
Start: 2021-01-01 | End: 2021-01-01

## 2021-01-01 RX ORDER — SULFAMETHOXAZOLE/TRIMETHOPRIM 800-160 MG
1 TABLET ORAL 2 TIMES DAILY
Qty: 48 TABLET | Refills: 4 | Status: SHIPPED | OUTPATIENT
Start: 2021-01-01

## 2021-01-01 RX ORDER — AMOXICILLIN 250 MG
1 CAPSULE ORAL 2 TIMES DAILY
Status: DISCONTINUED | OUTPATIENT
Start: 2021-01-01 | End: 2021-01-01

## 2021-01-01 RX ORDER — PREDNISONE 5 MG/1
TABLET ORAL
Qty: 60 TABLET | Refills: 3 | Status: SHIPPED | OUTPATIENT
Start: 2021-01-01 | End: 2021-01-01

## 2021-01-01 RX ORDER — CYCLOSPORINE 100 MG/ML
100 SOLUTION ORAL
Status: DISCONTINUED | OUTPATIENT
Start: 2021-01-01 | End: 2021-01-01

## 2021-01-01 RX ORDER — VECURONIUM BROMIDE 1 MG/ML
10 INJECTION, POWDER, LYOPHILIZED, FOR SOLUTION INTRAVENOUS ONCE
Status: COMPLETED | OUTPATIENT
Start: 2021-01-01 | End: 2021-01-01

## 2021-01-01 RX ORDER — FUROSEMIDE 10 MG/ML
20 INJECTION INTRAMUSCULAR; INTRAVENOUS ONCE
Status: COMPLETED | OUTPATIENT
Start: 2021-01-01 | End: 2021-01-01

## 2021-01-01 RX ORDER — CARBOXYMETHYLCELLULOSE SODIUM 5 MG/ML
1 SOLUTION/ DROPS OPHTHALMIC 3 TIMES DAILY PRN
Status: DISCONTINUED | OUTPATIENT
Start: 2021-01-01 | End: 2021-01-01 | Stop reason: HOSPADM

## 2021-01-01 RX ORDER — CYCLOSPORINE 100 MG/1
100 CAPSULE, LIQUID FILLED ORAL 2 TIMES DAILY
Qty: 60 CAPSULE | Refills: 11 | COMMUNITY
Start: 2021-01-01

## 2021-01-01 RX ORDER — METOPROLOL TARTRATE 1 MG/ML
INJECTION, SOLUTION INTRAVENOUS PRN
Status: DISCONTINUED | OUTPATIENT
Start: 2021-01-01 | End: 2021-01-01

## 2021-01-01 RX ORDER — HYDROMORPHONE HCL IN WATER/PF 6 MG/30 ML
0.4 PATIENT CONTROLLED ANALGESIA SYRINGE INTRAVENOUS
Status: DISCONTINUED | OUTPATIENT
Start: 2021-01-01 | End: 2021-01-01 | Stop reason: HOSPADM

## 2021-01-01 RX ORDER — NALOXONE HYDROCHLORIDE 0.4 MG/ML
0.4 INJECTION, SOLUTION INTRAMUSCULAR; INTRAVENOUS; SUBCUTANEOUS
Status: DISCONTINUED | OUTPATIENT
Start: 2021-01-01 | End: 2021-01-01 | Stop reason: HOSPADM

## 2021-01-01 RX ORDER — LABETALOL HYDROCHLORIDE 5 MG/ML
5 INJECTION, SOLUTION INTRAVENOUS EVERY 5 MIN PRN
Status: DISCONTINUED | OUTPATIENT
Start: 2021-01-01 | End: 2021-01-01 | Stop reason: HOSPADM

## 2021-01-01 RX ORDER — FENTANYL CITRATE 50 UG/ML
25 INJECTION, SOLUTION INTRAMUSCULAR; INTRAVENOUS EVERY 5 MIN PRN
Status: DISCONTINUED | OUTPATIENT
Start: 2021-01-01 | End: 2021-01-01 | Stop reason: HOSPADM

## 2021-01-01 RX ORDER — FUROSEMIDE 20 MG
20 TABLET ORAL DAILY
Qty: 90 TABLET | Refills: 11 | Status: SHIPPED | OUTPATIENT
Start: 2021-01-01 | End: 2021-01-01

## 2021-01-01 RX ORDER — CYCLOSPORINE 25 MG/1
CAPSULE, LIQUID FILLED ORAL
Qty: 120 CAPSULE | Refills: 4 | Status: SHIPPED | OUTPATIENT
Start: 2021-01-01

## 2021-01-01 RX ORDER — MORPHINE SULFATE 2 MG/ML
2 INJECTION, SOLUTION INTRAMUSCULAR; INTRAVENOUS ONCE
Status: COMPLETED | OUTPATIENT
Start: 2021-01-01 | End: 2021-01-01

## 2021-01-01 RX ORDER — FLUCONAZOLE 100 MG/1
100 TABLET ORAL EVERY EVENING
Status: DISCONTINUED | OUTPATIENT
Start: 2021-01-01 | End: 2021-01-01

## 2021-01-01 RX ORDER — BISACODYL 10 MG
10 SUPPOSITORY, RECTAL RECTAL DAILY PRN
Status: DISCONTINUED | OUTPATIENT
Start: 2021-01-01 | End: 2021-01-01 | Stop reason: HOSPADM

## 2021-01-01 RX ORDER — IPRATROPIUM BROMIDE AND ALBUTEROL SULFATE 2.5; .5 MG/3ML; MG/3ML
3 SOLUTION RESPIRATORY (INHALATION)
Status: DISCONTINUED | OUTPATIENT
Start: 2021-01-01 | End: 2021-01-01 | Stop reason: HOSPADM

## 2021-01-01 RX ORDER — DEXTROSE MONOHYDRATE 100 MG/ML
INJECTION, SOLUTION INTRAVENOUS CONTINUOUS PRN
Status: DISCONTINUED | OUTPATIENT
Start: 2021-01-01 | End: 2021-01-01 | Stop reason: HOSPADM

## 2021-01-01 RX ORDER — MEPERIDINE HYDROCHLORIDE 25 MG/ML
12.5 INJECTION INTRAMUSCULAR; INTRAVENOUS; SUBCUTANEOUS
Status: DISCONTINUED | OUTPATIENT
Start: 2021-01-01 | End: 2021-01-01 | Stop reason: HOSPADM

## 2021-01-01 RX ORDER — AMOXICILLIN 500 MG/1
500 CAPSULE ORAL 3 TIMES DAILY
Qty: 30 CAPSULE | Refills: 0 | Status: SHIPPED | OUTPATIENT
Start: 2021-01-01 | End: 2021-01-01

## 2021-01-01 RX ORDER — AMLODIPINE BESYLATE 5 MG/1
5 TABLET ORAL DAILY
Qty: 90 TABLET | Refills: 4 | Status: SHIPPED | OUTPATIENT
Start: 2021-01-01

## 2021-01-01 RX ORDER — POTASSIUM CHLORIDE 1.5 G/1.58G
20 POWDER, FOR SOLUTION ORAL ONCE
Status: COMPLETED | OUTPATIENT
Start: 2021-01-01 | End: 2021-01-01

## 2021-01-01 RX ORDER — ACYCLOVIR 400 MG/1
800 TABLET ORAL 2 TIMES DAILY
Status: DISCONTINUED | OUTPATIENT
Start: 2021-01-01 | End: 2021-01-01 | Stop reason: HOSPADM

## 2021-01-01 RX ORDER — FLUCONAZOLE 100 MG/1
100 TABLET ORAL DAILY
Qty: 90 TABLET | Refills: 1 | Status: SHIPPED | OUTPATIENT
Start: 2021-01-01 | End: 2021-01-01

## 2021-01-01 RX ORDER — POLYETHYLENE GLYCOL 3350 17 G/17G
17 POWDER, FOR SOLUTION ORAL DAILY
Status: DISCONTINUED | OUTPATIENT
Start: 2021-01-01 | End: 2021-01-01 | Stop reason: HOSPADM

## 2021-01-01 RX ORDER — DEXTROSE MONOHYDRATE 25 G/50ML
25-50 INJECTION, SOLUTION INTRAVENOUS
Status: CANCELLED | OUTPATIENT
Start: 2021-01-01

## 2021-01-01 RX ORDER — LIDOCAINE HYDROCHLORIDE 20 MG/ML
5 SOLUTION OROPHARYNGEAL ONCE
Status: COMPLETED | OUTPATIENT
Start: 2021-01-01 | End: 2021-01-01

## 2021-01-01 RX ORDER — LEVOFLOXACIN 5 MG/ML
750 INJECTION, SOLUTION INTRAVENOUS EVERY 24 HOURS
Status: DISCONTINUED | OUTPATIENT
Start: 2021-01-01 | End: 2021-01-01

## 2021-01-01 RX ORDER — DEXTROSE MONOHYDRATE 50 MG/ML
INJECTION, SOLUTION INTRAVENOUS CONTINUOUS
Status: DISCONTINUED | OUTPATIENT
Start: 2021-01-01 | End: 2021-01-01

## 2021-01-01 RX ORDER — OXYCODONE HYDROCHLORIDE 10 MG/1
10 TABLET ORAL EVERY 4 HOURS PRN
Status: DISCONTINUED | OUTPATIENT
Start: 2021-01-01 | End: 2021-01-01 | Stop reason: HOSPADM

## 2021-01-01 RX ORDER — HYDROMORPHONE HCL IN WATER/PF 6 MG/30 ML
0.2 PATIENT CONTROLLED ANALGESIA SYRINGE INTRAVENOUS
Status: DISCONTINUED | OUTPATIENT
Start: 2021-01-01 | End: 2021-01-01 | Stop reason: HOSPADM

## 2021-01-01 RX ORDER — MORPHINE SULFATE 2 MG/ML
2 INJECTION, SOLUTION INTRAMUSCULAR; INTRAVENOUS ONCE
Status: DISCONTINUED | OUTPATIENT
Start: 2021-01-01 | End: 2021-01-01

## 2021-01-01 RX ORDER — DEXMEDETOMIDINE HYDROCHLORIDE 4 UG/ML
0.2-0.7 INJECTION, SOLUTION INTRAVENOUS CONTINUOUS
Status: DISCONTINUED | OUTPATIENT
Start: 2021-01-01 | End: 2021-01-01 | Stop reason: HOSPADM

## 2021-01-01 RX ORDER — DEXTROSE MONOHYDRATE 50 MG/ML
INJECTION, SOLUTION INTRAVENOUS CONTINUOUS
Status: DISCONTINUED | OUTPATIENT
Start: 2021-01-01 | End: 2021-01-01 | Stop reason: HOSPADM

## 2021-01-01 RX ORDER — CYCLOSPORINE 100 MG/1
100 CAPSULE, LIQUID FILLED ORAL 2 TIMES DAILY
Status: DISCONTINUED | OUTPATIENT
Start: 2021-01-01 | End: 2021-01-01

## 2021-01-01 RX ORDER — LEVOTHYROXINE SODIUM 150 UG/1
150 TABLET ORAL DAILY
Qty: 90 TABLET | Refills: 1 | Status: SHIPPED | OUTPATIENT
Start: 2021-01-01 | End: 2021-01-01

## 2021-01-01 RX ORDER — ACETAMINOPHEN 325 MG/1
975 TABLET ORAL EVERY 8 HOURS
Status: COMPLETED | OUTPATIENT
Start: 2021-01-01 | End: 2021-01-01

## 2021-01-01 RX ORDER — LEVOFLOXACIN 250 MG/1
250 TABLET, FILM COATED ORAL DAILY
Status: DISCONTINUED | OUTPATIENT
Start: 2021-01-01 | End: 2021-01-01

## 2021-01-01 RX ORDER — FUROSEMIDE 10 MG/ML
40 INJECTION INTRAMUSCULAR; INTRAVENOUS ONCE
Status: COMPLETED | OUTPATIENT
Start: 2021-01-01 | End: 2021-01-01

## 2021-01-01 RX ORDER — METHYLPREDNISOLONE SODIUM SUCCINATE 125 MG/2ML
100 INJECTION, POWDER, LYOPHILIZED, FOR SOLUTION INTRAMUSCULAR; INTRAVENOUS EVERY 8 HOURS
Status: DISCONTINUED | OUTPATIENT
Start: 2021-01-01 | End: 2021-01-01

## 2021-01-01 RX ORDER — POTASSIUM CHLORIDE 1.5 G/1.58G
40 POWDER, FOR SOLUTION ORAL ONCE
Status: COMPLETED | OUTPATIENT
Start: 2021-01-01 | End: 2021-01-01

## 2021-01-01 RX ORDER — GLYCOPYRROLATE 0.2 MG/ML
0.2 INJECTION, SOLUTION INTRAMUSCULAR; INTRAVENOUS ONCE
Status: COMPLETED | OUTPATIENT
Start: 2021-01-01 | End: 2021-01-01

## 2021-01-01 RX ORDER — SODIUM CHLORIDE, SODIUM LACTATE, POTASSIUM CHLORIDE, CALCIUM CHLORIDE 600; 310; 30; 20 MG/100ML; MG/100ML; MG/100ML; MG/100ML
INJECTION, SOLUTION INTRAVENOUS CONTINUOUS
Status: DISCONTINUED | OUTPATIENT
Start: 2021-01-01 | End: 2021-01-01 | Stop reason: HOSPADM

## 2021-01-01 RX ORDER — AMINO AC/PROTEIN HYDR/WHEY PRO 10G-100/30
1 LIQUID (ML) ORAL 3 TIMES DAILY
Status: DISCONTINUED | OUTPATIENT
Start: 2021-01-01 | End: 2021-01-01 | Stop reason: HOSPADM

## 2021-01-01 RX ORDER — IOPAMIDOL 755 MG/ML
135 INJECTION, SOLUTION INTRAVASCULAR ONCE
Status: DISCONTINUED | OUTPATIENT
Start: 2021-01-01 | End: 2021-01-01

## 2021-01-01 RX ORDER — ESMOLOL HYDROCHLORIDE 10 MG/ML
INJECTION INTRAVENOUS PRN
Status: DISCONTINUED | OUTPATIENT
Start: 2021-01-01 | End: 2021-01-01

## 2021-01-01 RX ORDER — HEPARIN SODIUM 10000 [USP'U]/100ML
0-5000 INJECTION, SOLUTION INTRAVENOUS CONTINUOUS
Status: DISCONTINUED | OUTPATIENT
Start: 2021-01-01 | End: 2021-01-01

## 2021-01-01 RX ORDER — DEXTROSE MONOHYDRATE 100 MG/ML
INJECTION, SOLUTION INTRAVENOUS CONTINUOUS PRN
Status: DISCONTINUED | OUTPATIENT
Start: 2021-01-01 | End: 2021-01-01

## 2021-01-01 RX ORDER — PROPOFOL 10 MG/ML
5-50 INJECTION, EMULSION INTRAVENOUS CONTINUOUS
Status: DISCONTINUED | OUTPATIENT
Start: 2021-01-01 | End: 2021-01-01

## 2021-01-01 RX ORDER — ACETAMINOPHEN 325 MG/1
650 TABLET ORAL EVERY 4 HOURS PRN
Status: DISCONTINUED | OUTPATIENT
Start: 2021-01-01 | End: 2021-01-01 | Stop reason: HOSPADM

## 2021-01-01 RX ORDER — ROSUVASTATIN CALCIUM 5 MG/1
5 TABLET, COATED ORAL EVERY EVENING
Status: DISCONTINUED | OUTPATIENT
Start: 2021-01-01 | End: 2021-01-01 | Stop reason: HOSPADM

## 2021-01-01 RX ORDER — AMLODIPINE BESYLATE 5 MG/1
5 TABLET ORAL DAILY
Status: DISCONTINUED | OUTPATIENT
Start: 2021-01-01 | End: 2021-01-01

## 2021-01-01 RX ORDER — CYCLOSPORINE 100 MG/1
200 CAPSULE, LIQUID FILLED ORAL 2 TIMES DAILY
Qty: 100 CAPSULE | Refills: 1 | Status: SHIPPED | OUTPATIENT
Start: 2021-01-01 | End: 2021-01-01

## 2021-01-01 RX ORDER — LIDOCAINE HYDROCHLORIDE 10 MG/ML
INJECTION, SOLUTION EPIDURAL; INFILTRATION; INTRACAUDAL; PERINEURAL
Status: COMPLETED
Start: 2021-01-01 | End: 2021-01-01

## 2021-01-01 RX ORDER — HYDRALAZINE HYDROCHLORIDE 20 MG/ML
10 INJECTION INTRAMUSCULAR; INTRAVENOUS EVERY 30 MIN PRN
Status: DISCONTINUED | OUTPATIENT
Start: 2021-01-01 | End: 2021-01-01 | Stop reason: HOSPADM

## 2021-01-01 RX ORDER — PIPERACILLIN SODIUM, TAZOBACTAM SODIUM 4; .5 G/20ML; G/20ML
4.5 INJECTION, POWDER, LYOPHILIZED, FOR SOLUTION INTRAVENOUS EVERY 6 HOURS
Status: DISCONTINUED | OUTPATIENT
Start: 2021-01-01 | End: 2021-01-01

## 2021-01-01 RX ORDER — MORPHINE SULFATE 10 MG/ML
5 INJECTION, SOLUTION INTRAMUSCULAR; INTRAVENOUS EVERY 30 MIN PRN
Status: DISCONTINUED | OUTPATIENT
Start: 2021-01-01 | End: 2021-01-01 | Stop reason: HOSPADM

## 2021-01-01 RX ORDER — DEXMEDETOMIDINE HYDROCHLORIDE 4 UG/ML
.2-1.2 INJECTION, SOLUTION INTRAVENOUS CONTINUOUS
Status: DISCONTINUED | OUTPATIENT
Start: 2021-01-01 | End: 2021-01-01

## 2021-01-01 RX ORDER — SODIUM CHLORIDE 9 MG/ML
INJECTION, SOLUTION INTRAVENOUS CONTINUOUS
Status: DISCONTINUED | OUTPATIENT
Start: 2021-01-01 | End: 2021-01-01 | Stop reason: HOSPADM

## 2021-01-01 RX ORDER — MIDAZOLAM HCL IN 0.9 % NACL/PF 1 MG/ML
1-8 PLASTIC BAG, INJECTION (ML) INTRAVENOUS CONTINUOUS
Status: DISCONTINUED | OUTPATIENT
Start: 2021-01-01 | End: 2021-01-01

## 2021-01-01 RX ORDER — NALOXONE HYDROCHLORIDE 0.4 MG/ML
0.2 INJECTION, SOLUTION INTRAMUSCULAR; INTRAVENOUS; SUBCUTANEOUS
Status: DISCONTINUED | OUTPATIENT
Start: 2021-01-01 | End: 2021-01-01 | Stop reason: HOSPADM

## 2021-01-01 RX ORDER — SULFAMETHOXAZOLE/TRIMETHOPRIM 800-160 MG
1 TABLET ORAL 2 TIMES DAILY
Qty: 48 TABLET | Refills: 1 | Status: SHIPPED | OUTPATIENT
Start: 2021-01-01 | End: 2021-01-01

## 2021-01-01 RX ORDER — POTASSIUM CHLORIDE 20MEQ/15ML
20 LIQUID (ML) ORAL ONCE
Status: COMPLETED | OUTPATIENT
Start: 2021-01-01 | End: 2021-01-01

## 2021-01-01 RX ORDER — QUETIAPINE FUMARATE 50 MG/1
50 TABLET, FILM COATED ORAL EVERY 8 HOURS
Status: DISCONTINUED | OUTPATIENT
Start: 2021-01-01 | End: 2021-01-01 | Stop reason: HOSPADM

## 2021-01-01 RX ORDER — LABETALOL HYDROCHLORIDE 5 MG/ML
10-40 INJECTION, SOLUTION INTRAVENOUS EVERY 10 MIN PRN
Status: DISCONTINUED | OUTPATIENT
Start: 2021-01-01 | End: 2021-01-01 | Stop reason: HOSPADM

## 2021-01-01 RX ORDER — CEFAZOLIN SODIUM 2 G/100ML
2 INJECTION, SOLUTION INTRAVENOUS SEE ADMIN INSTRUCTIONS
Status: DISCONTINUED | OUTPATIENT
Start: 2021-01-01 | End: 2021-01-01 | Stop reason: HOSPADM

## 2021-01-01 RX ORDER — MEROPENEM 1 G/1
1 INJECTION, POWDER, FOR SOLUTION INTRAVENOUS EVERY 8 HOURS
Status: DISCONTINUED | OUTPATIENT
Start: 2021-01-01 | End: 2021-01-01 | Stop reason: HOSPADM

## 2021-01-01 RX ORDER — ROSUVASTATIN CALCIUM 5 MG/1
5 TABLET, COATED ORAL DAILY
Qty: 90 TABLET | Refills: 4 | Status: SHIPPED | OUTPATIENT
Start: 2021-01-01

## 2021-01-01 RX ORDER — NOREPINEPHRINE BITARTRATE 0.06 MG/ML
.01-.6 INJECTION, SOLUTION INTRAVENOUS CONTINUOUS
Status: DISCONTINUED | OUTPATIENT
Start: 2021-01-01 | End: 2021-01-01 | Stop reason: HOSPADM

## 2021-01-01 RX ORDER — NICOTINE POLACRILEX 4 MG
15-30 LOZENGE BUCCAL
Status: CANCELLED | OUTPATIENT
Start: 2021-01-01

## 2021-01-01 RX ORDER — NICOTINE POLACRILEX 4 MG
15-30 LOZENGE BUCCAL
Status: DISCONTINUED | OUTPATIENT
Start: 2021-01-01 | End: 2021-01-01 | Stop reason: HOSPADM

## 2021-01-01 RX ORDER — ONDANSETRON 2 MG/ML
4 INJECTION INTRAMUSCULAR; INTRAVENOUS EVERY 6 HOURS PRN
Status: DISCONTINUED | OUTPATIENT
Start: 2021-01-01 | End: 2021-01-01 | Stop reason: HOSPADM

## 2021-01-01 RX ORDER — SULFAMETHOXAZOLE/TRIMETHOPRIM 800-160 MG
1 TABLET ORAL 2 TIMES DAILY
Status: DISCONTINUED | OUTPATIENT
Start: 2021-01-01 | End: 2021-01-01

## 2021-01-01 RX ORDER — LEVOTHYROXINE SODIUM 150 UG/1
150 TABLET ORAL DAILY
Qty: 90 TABLET | Refills: 4 | Status: SHIPPED | OUTPATIENT
Start: 2021-01-01

## 2021-01-01 RX ORDER — ACYCLOVIR 800 MG/1
800 TABLET ORAL 2 TIMES DAILY
Qty: 180 TABLET | Refills: 4 | Status: SHIPPED | OUTPATIENT
Start: 2021-01-01

## 2021-01-01 RX ORDER — SULFAMETHOXAZOLE/TRIMETHOPRIM 800-160 MG
1 TABLET ORAL
Status: DISCONTINUED | OUTPATIENT
Start: 2021-01-01 | End: 2021-01-01 | Stop reason: HOSPADM

## 2021-01-01 RX ORDER — CEFAZOLIN SODIUM 2 G/50ML
2 SOLUTION INTRAVENOUS
Status: CANCELLED | OUTPATIENT
Start: 2021-01-01

## 2021-01-01 RX ORDER — METOPROLOL TARTRATE 1 MG/ML
5 INJECTION, SOLUTION INTRAVENOUS EVERY 6 HOURS
Status: DISCONTINUED | OUTPATIENT
Start: 2021-01-01 | End: 2021-01-01

## 2021-01-01 RX ORDER — OXYCODONE HYDROCHLORIDE 5 MG/1
5 TABLET ORAL EVERY 4 HOURS PRN
Status: DISCONTINUED | OUTPATIENT
Start: 2021-01-01 | End: 2021-01-01 | Stop reason: HOSPADM

## 2021-01-01 RX ORDER — NICOTINE POLACRILEX 4 MG
15-30 LOZENGE BUCCAL
Status: DISCONTINUED | OUTPATIENT
Start: 2021-01-01 | End: 2021-01-01

## 2021-01-01 RX ORDER — MIDAZOLAM HCL IN 0.9 % NACL/PF 1 MG/ML
1-8 PLASTIC BAG, INJECTION (ML) INTRAVENOUS CONTINUOUS
Status: DISCONTINUED | OUTPATIENT
Start: 2021-01-01 | End: 2021-01-01 | Stop reason: HOSPADM

## 2021-01-01 RX ORDER — FENTANYL CITRATE 50 UG/ML
0.5 INJECTION, SOLUTION INTRAMUSCULAR; INTRAVENOUS ONCE
Status: DISCONTINUED | OUTPATIENT
Start: 2021-01-01 | End: 2021-01-01 | Stop reason: CLARIF

## 2021-01-01 RX ORDER — FENTANYL CITRATE 50 UG/ML
INJECTION, SOLUTION INTRAMUSCULAR; INTRAVENOUS
Status: COMPLETED
Start: 2021-01-01 | End: 2021-01-01

## 2021-01-01 RX ORDER — DEXMEDETOMIDINE HYDROCHLORIDE 4 UG/ML
0.2-0.7 INJECTION, SOLUTION INTRAVENOUS CONTINUOUS
Status: DISCONTINUED | OUTPATIENT
Start: 2021-01-01 | End: 2021-01-01

## 2021-01-01 RX ORDER — FLUCONAZOLE 100 MG/1
100 TABLET ORAL DAILY
Qty: 90 TABLET | Refills: 4 | Status: SHIPPED | OUTPATIENT
Start: 2021-01-01

## 2021-01-01 RX ORDER — MORPHINE SULFATE 10 MG/ML
10 INJECTION, SOLUTION INTRAMUSCULAR; INTRAVENOUS ONCE
Status: COMPLETED | OUTPATIENT
Start: 2021-01-01 | End: 2021-01-01

## 2021-01-01 RX ORDER — ACYCLOVIR 800 MG/1
800 TABLET ORAL 2 TIMES DAILY
Qty: 180 TABLET | Refills: 1 | Status: SHIPPED | OUTPATIENT
Start: 2021-01-01 | End: 2021-01-01

## 2021-01-01 RX ORDER — FENTANYL CITRATE 50 UG/ML
100 INJECTION, SOLUTION INTRAMUSCULAR; INTRAVENOUS ONCE
Status: COMPLETED | OUTPATIENT
Start: 2021-01-01 | End: 2021-01-01

## 2021-01-01 RX ORDER — METOPROLOL TARTRATE 25 MG/1
25 TABLET, FILM COATED ORAL DAILY
Qty: 90 TABLET | Refills: 1 | Status: SHIPPED | OUTPATIENT
Start: 2021-01-01 | End: 2021-01-01

## 2021-01-01 RX ORDER — POTASSIUM CHLORIDE 29.8 MG/ML
20 INJECTION INTRAVENOUS ONCE
Status: COMPLETED | OUTPATIENT
Start: 2021-01-01 | End: 2021-01-01

## 2021-01-01 RX ORDER — HEPARIN SODIUM 5000 [USP'U]/.5ML
5000 INJECTION, SOLUTION INTRAVENOUS; SUBCUTANEOUS EVERY 8 HOURS
Status: DISCONTINUED | OUTPATIENT
Start: 2021-01-01 | End: 2021-01-01 | Stop reason: HOSPADM

## 2021-01-01 RX ORDER — ALBUMIN, HUMAN INJ 5% 5 %
250 SOLUTION INTRAVENOUS ONCE
Status: COMPLETED | OUTPATIENT
Start: 2021-01-01 | End: 2021-01-01

## 2021-01-01 RX ORDER — METOPROLOL TARTRATE 25 MG/1
25 TABLET, FILM COATED ORAL DAILY
Qty: 90 TABLET | Refills: 4 | Status: SHIPPED | OUTPATIENT
Start: 2021-01-01

## 2021-01-01 RX ORDER — LIDOCAINE HYDROCHLORIDE AND EPINEPHRINE 10; 10 MG/ML; UG/ML
INJECTION, SOLUTION INFILTRATION; PERINEURAL PRN
Status: DISCONTINUED | OUTPATIENT
Start: 2021-01-01 | End: 2021-01-01 | Stop reason: HOSPADM

## 2021-01-01 RX ORDER — CARBOXYMETHYLCELLULOSE SODIUM 5 MG/ML
1 SOLUTION/ DROPS OPHTHALMIC 3 TIMES DAILY PRN
COMMUNITY

## 2021-01-01 RX ORDER — LEVOTHYROXINE SODIUM 75 UG/1
150 TABLET ORAL DAILY
Status: DISCONTINUED | OUTPATIENT
Start: 2021-01-01 | End: 2021-01-01 | Stop reason: HOSPADM

## 2021-01-01 RX ORDER — POLYETHYLENE GLYCOL 3350 17 G/17G
17 POWDER, FOR SOLUTION ORAL DAILY
Status: DISCONTINUED | OUTPATIENT
Start: 2021-01-01 | End: 2021-01-01

## 2021-01-01 RX ORDER — CEFAZOLIN SODIUM 2 G/100ML
2 INJECTION, SOLUTION INTRAVENOUS
Status: COMPLETED | OUTPATIENT
Start: 2021-01-01 | End: 2021-01-01

## 2021-01-01 RX ORDER — CYCLOSPORINE 25 MG/1
CAPSULE, LIQUID FILLED ORAL
Qty: 120 CAPSULE | Refills: 4 | Status: SHIPPED | OUTPATIENT
Start: 2021-01-01 | End: 2021-01-01

## 2021-01-01 RX ORDER — PROCHLORPERAZINE MALEATE 10 MG
10 TABLET ORAL EVERY 6 HOURS PRN
Status: DISCONTINUED | OUTPATIENT
Start: 2021-01-01 | End: 2021-01-01 | Stop reason: HOSPADM

## 2021-01-01 RX ORDER — CYCLOSPORINE 100 MG/1
200 CAPSULE, LIQUID FILLED ORAL 2 TIMES DAILY
Qty: 120 CAPSULE | Refills: 11 | Status: SHIPPED | OUTPATIENT
Start: 2021-01-01 | End: 2021-01-01

## 2021-01-01 RX ORDER — AMOXICILLIN 250 MG
1 CAPSULE ORAL 2 TIMES DAILY PRN
Status: DISCONTINUED | OUTPATIENT
Start: 2021-01-01 | End: 2021-01-01 | Stop reason: HOSPADM

## 2021-01-01 RX ORDER — METOPROLOL TARTRATE 25 MG/1
25 TABLET, FILM COATED ORAL EVERY EVENING
Status: DISCONTINUED | OUTPATIENT
Start: 2021-01-01 | End: 2021-01-01

## 2021-01-01 RX ORDER — LEVOFLOXACIN 250 MG/1
250 TABLET, FILM COATED ORAL DAILY
Qty: 90 TABLET | Refills: 1 | Status: SHIPPED | OUTPATIENT
Start: 2021-01-01 | End: 2021-01-01

## 2021-01-01 RX ORDER — ONDANSETRON 2 MG/ML
4 INJECTION INTRAMUSCULAR; INTRAVENOUS EVERY 30 MIN PRN
Status: DISCONTINUED | OUTPATIENT
Start: 2021-01-01 | End: 2021-01-01 | Stop reason: HOSPADM

## 2021-01-01 RX ORDER — MORPHINE SULFATE 10 MG/ML
5 INJECTION, SOLUTION INTRAMUSCULAR; INTRAVENOUS EVERY 10 MIN PRN
Status: DISCONTINUED | OUTPATIENT
Start: 2021-01-01 | End: 2021-01-01 | Stop reason: HOSPADM

## 2021-01-01 RX ORDER — CYCLOSPORINE 100 MG/ML
100 SOLUTION ORAL
Status: CANCELLED | OUTPATIENT
Start: 2021-01-01

## 2021-01-01 RX ORDER — ALBUTEROL SULFATE 0.83 MG/ML
2.5 SOLUTION RESPIRATORY (INHALATION) EVERY 4 HOURS PRN
Status: DISCONTINUED | OUTPATIENT
Start: 2021-01-01 | End: 2021-01-01 | Stop reason: HOSPADM

## 2021-01-01 RX ORDER — METOLAZONE 5 MG/1
5 TABLET ORAL ONCE
Status: COMPLETED | OUTPATIENT
Start: 2021-01-01 | End: 2021-01-01

## 2021-01-01 RX ORDER — LINEZOLID 2 MG/ML
600 INJECTION, SOLUTION INTRAVENOUS EVERY 12 HOURS
Status: DISCONTINUED | OUTPATIENT
Start: 2021-01-01 | End: 2021-01-01 | Stop reason: HOSPADM

## 2021-01-01 RX ORDER — PANTOPRAZOLE SODIUM 40 MG/1
40 TABLET, DELAYED RELEASE ORAL
Status: DISCONTINUED | OUTPATIENT
Start: 2021-01-01 | End: 2021-01-01 | Stop reason: CLARIF

## 2021-01-01 RX ORDER — PREDNISONE 20 MG/1
40 TABLET ORAL DAILY
Qty: 180 TABLET | Refills: 4 | Status: SHIPPED | OUTPATIENT
Start: 2021-01-01 | End: 2021-01-01

## 2021-01-01 RX ORDER — METOLAZONE 2.5 MG/1
2.5 TABLET ORAL ONCE
Status: COMPLETED | OUTPATIENT
Start: 2021-01-01 | End: 2021-01-01

## 2021-01-01 RX ORDER — ROSUVASTATIN CALCIUM 5 MG/1
5 TABLET, COATED ORAL DAILY
Qty: 90 TABLET | Refills: 1 | Status: SHIPPED | OUTPATIENT
Start: 2021-01-01 | End: 2021-01-01

## 2021-01-01 RX ORDER — ACETAMINOPHEN 325 MG/1
975 TABLET ORAL ONCE
Status: COMPLETED | OUTPATIENT
Start: 2021-01-01 | End: 2021-01-01

## 2021-01-01 RX ORDER — POTASSIUM CHLORIDE 7.45 MG/ML
10 INJECTION INTRAVENOUS
Status: COMPLETED | OUTPATIENT
Start: 2021-01-01 | End: 2021-01-01

## 2021-01-01 RX ADMIN — HEPARIN SODIUM 5000 UNITS: 10000 INJECTION, SOLUTION INTRAVENOUS; SUBCUTANEOUS at 03:22

## 2021-01-01 RX ADMIN — DOCUSATE SODIUM 50 MG AND SENNOSIDES 8.6 MG 1 TABLET: 8.6; 5 TABLET, FILM COATED ORAL at 08:20

## 2021-01-01 RX ADMIN — VECURONIUM BROMIDE 0.4 MCG/KG/MIN: 1 INJECTION, POWDER, LYOPHILIZED, FOR SOLUTION INTRAVENOUS at 23:12

## 2021-01-01 RX ADMIN — HYDROCORTISONE SODIUM SUCCINATE 50 MG: 100 INJECTION, POWDER, FOR SOLUTION INTRAMUSCULAR; INTRAVENOUS at 14:34

## 2021-01-01 RX ADMIN — Medication 1 PACKET: at 15:37

## 2021-01-01 RX ADMIN — HEPARIN SODIUM 5000 UNITS: 10000 INJECTION, SOLUTION INTRAVENOUS; SUBCUTANEOUS at 03:27

## 2021-01-01 RX ADMIN — MORPHINE SULFATE 2 MG: 2 INJECTION, SOLUTION INTRAMUSCULAR; INTRAVENOUS at 17:21

## 2021-01-01 RX ADMIN — HEPARIN SODIUM 5000 UNITS: 10000 INJECTION, SOLUTION INTRAVENOUS; SUBCUTANEOUS at 20:18

## 2021-01-01 RX ADMIN — MEROPENEM 1 G: 1 INJECTION, POWDER, FOR SOLUTION INTRAVENOUS at 21:32

## 2021-01-01 RX ADMIN — Medication 100 MCG/HR: at 15:16

## 2021-01-01 RX ADMIN — LEVOFLOXACIN 750 MG: 5 INJECTION, SOLUTION INTRAVENOUS at 23:06

## 2021-01-01 RX ADMIN — MINERAL OIL AND PETROLATUM: 150; 830 OINTMENT OPHTHALMIC at 03:46

## 2021-01-01 RX ADMIN — SUCCINYLCHOLINE CHLORIDE 100 MG: 20 INJECTION, SOLUTION INTRAMUSCULAR; INTRAVENOUS; PARENTERAL at 17:35

## 2021-01-01 RX ADMIN — PIPERACILLIN AND TAZOBACTAM 4.5 G: 4; .5 INJECTION, POWDER, FOR SOLUTION INTRAVENOUS at 15:15

## 2021-01-01 RX ADMIN — PIPERACILLIN AND TAZOBACTAM 4.5 G: 4; .5 INJECTION, POWDER, FOR SOLUTION INTRAVENOUS at 03:13

## 2021-01-01 RX ADMIN — DOCUSATE SODIUM 50 MG AND SENNOSIDES 8.6 MG 1 TABLET: 8.6; 5 TABLET, FILM COATED ORAL at 08:43

## 2021-01-01 RX ADMIN — Medication 1 PACKET: at 08:36

## 2021-01-01 RX ADMIN — Medication 50 MCG: at 20:11

## 2021-01-01 RX ADMIN — Medication 1 PACKET: at 16:26

## 2021-01-01 RX ADMIN — SODIUM BICARBONATE: 84 INJECTION, SOLUTION INTRAVENOUS at 18:16

## 2021-01-01 RX ADMIN — MIDAZOLAM 4 MG: 1 INJECTION INTRAMUSCULAR; INTRAVENOUS at 13:30

## 2021-01-01 RX ADMIN — MIDAZOLAM 0.5 MG: 1 INJECTION INTRAMUSCULAR; INTRAVENOUS at 10:12

## 2021-01-01 RX ADMIN — Medication 0.06 MCG/KG/MIN: at 04:33

## 2021-01-01 RX ADMIN — Medication 15 ML: at 07:56

## 2021-01-01 RX ADMIN — Medication 0.2 MCG/KG/MIN: at 19:53

## 2021-01-01 RX ADMIN — DEXMEDETOMIDINE 0.7 MCG/KG/HR: 100 INJECTION, SOLUTION, CONCENTRATE INTRAVENOUS at 06:20

## 2021-01-01 RX ADMIN — FENTANYL CITRATE 25 MCG: 50 INJECTION, SOLUTION INTRAMUSCULAR; INTRAVENOUS at 14:17

## 2021-01-01 RX ADMIN — Medication 20 NG/KG/MIN: at 18:44

## 2021-01-01 RX ADMIN — SULFAMETHOXAZOLE AND TRIMETHOPRIM 1 TABLET: 800; 160 TABLET ORAL at 20:18

## 2021-01-01 RX ADMIN — METOLAZONE 2.5 MG: 2.5 TABLET ORAL at 14:25

## 2021-01-01 RX ADMIN — ESMOLOL HYDROCHLORIDE 20 MG: 10 INJECTION, SOLUTION INTRAVENOUS at 09:56

## 2021-01-01 RX ADMIN — HUMAN INSULIN 14 UNITS/HR: 100 INJECTION, SOLUTION SUBCUTANEOUS at 06:21

## 2021-01-01 RX ADMIN — HYDROCORTISONE SODIUM SUCCINATE 50 MG: 100 INJECTION, POWDER, FOR SOLUTION INTRAMUSCULAR; INTRAVENOUS at 13:44

## 2021-01-01 RX ADMIN — Medication 1 DROP: at 22:09

## 2021-01-01 RX ADMIN — FUROSEMIDE 40 MG: 10 INJECTION, SOLUTION INTRAVENOUS at 18:43

## 2021-01-01 RX ADMIN — SODIUM BICARBONATE 25 MEQ/HR: 84 INJECTION, SOLUTION INTRAVENOUS at 16:27

## 2021-01-01 RX ADMIN — INSULIN GLARGINE 25 UNITS: 100 INJECTION, SOLUTION SUBCUTANEOUS at 20:18

## 2021-01-01 RX ADMIN — MEROPENEM 1 G: 1 INJECTION, POWDER, FOR SOLUTION INTRAVENOUS at 14:03

## 2021-01-01 RX ADMIN — VECURONIUM BROMIDE 10 MG: 1 INJECTION, POWDER, LYOPHILIZED, FOR SOLUTION INTRAVENOUS at 13:00

## 2021-01-01 RX ADMIN — ANGIOTENSIN II 20 NG/KG/MIN: 2.5 INJECTION INTRAVENOUS at 02:33

## 2021-01-01 RX ADMIN — ALBUMIN HUMAN 250 ML: 0.05 INJECTION, SOLUTION INTRAVENOUS at 13:33

## 2021-01-01 RX ADMIN — HYDROCORTISONE SODIUM SUCCINATE 50 MG: 100 INJECTION, POWDER, FOR SOLUTION INTRAMUSCULAR; INTRAVENOUS at 03:22

## 2021-01-01 RX ADMIN — MICAFUNGIN SODIUM 150 MG: 50 INJECTION, POWDER, LYOPHILIZED, FOR SOLUTION INTRAVENOUS at 15:37

## 2021-01-01 RX ADMIN — CYCLOSPORINE 100 MG: 100 SOLUTION ORAL at 08:18

## 2021-01-01 RX ADMIN — HYDROCORTISONE SODIUM SUCCINATE 50 MG: 100 INJECTION, POWDER, FOR SOLUTION INTRAMUSCULAR; INTRAVENOUS at 21:27

## 2021-01-01 RX ADMIN — ENOXAPARIN SODIUM 40 MG: 40 INJECTION SUBCUTANEOUS at 06:07

## 2021-01-01 RX ADMIN — LIDOCAINE HYDROCHLORIDE 3 MG: 10 INJECTION, SOLUTION EPIDURAL; INFILTRATION; INTRACAUDAL; PERINEURAL at 13:50

## 2021-01-01 RX ADMIN — HYDROCORTISONE SODIUM SUCCINATE 50 MG: 100 INJECTION, POWDER, FOR SOLUTION INTRAMUSCULAR; INTRAVENOUS at 14:56

## 2021-01-01 RX ADMIN — LINEZOLID 600 MG: 600 INJECTION, SOLUTION INTRAVENOUS at 13:33

## 2021-01-01 RX ADMIN — PROPOFOL 20 MCG/KG/MIN: 10 INJECTION, EMULSION INTRAVENOUS at 20:55

## 2021-01-01 RX ADMIN — HEPARIN SODIUM 5000 UNITS: 10000 INJECTION, SOLUTION INTRAVENOUS; SUBCUTANEOUS at 12:10

## 2021-01-01 RX ADMIN — VASOPRESSIN 2.4 UNITS/HR: 20 INJECTION INTRAVENOUS at 17:35

## 2021-01-01 RX ADMIN — SODIUM CHLORIDE, POTASSIUM CHLORIDE, SODIUM LACTATE AND CALCIUM CHLORIDE 500 ML: 600; 310; 30; 20 INJECTION, SOLUTION INTRAVENOUS at 15:18

## 2021-01-01 RX ADMIN — Medication 20 NG/KG/MIN: at 11:01

## 2021-01-01 RX ADMIN — ACETAMINOPHEN 975 MG: 325 TABLET, FILM COATED ORAL at 19:46

## 2021-01-01 RX ADMIN — PIPERACILLIN AND TAZOBACTAM 4.5 G: 4; .5 INJECTION, POWDER, FOR SOLUTION INTRAVENOUS at 15:40

## 2021-01-01 RX ADMIN — Medication 1 PACKET: at 19:47

## 2021-01-01 RX ADMIN — PIPERACILLIN AND TAZOBACTAM 4.5 G: 4; .5 INJECTION, POWDER, FOR SOLUTION INTRAVENOUS at 14:37

## 2021-01-01 RX ADMIN — Medication 50 MCG: at 12:19

## 2021-01-01 RX ADMIN — Medication 50 MCG: at 09:10

## 2021-01-01 RX ADMIN — Medication 20 NG/KG/MIN: at 04:32

## 2021-01-01 RX ADMIN — ALBUTEROL SULFATE 2.5 MG: 2.5 SOLUTION RESPIRATORY (INHALATION) at 16:12

## 2021-01-01 RX ADMIN — SODIUM BICARBONATE 50 MEQ: 84 INJECTION INTRAVENOUS at 01:49

## 2021-01-01 RX ADMIN — Medication 2 MG/HR: at 12:37

## 2021-01-01 RX ADMIN — METOPROLOL TARTRATE 1 MG: 5 INJECTION INTRAVENOUS at 10:29

## 2021-01-01 RX ADMIN — MEROPENEM 1 G: 1 INJECTION, POWDER, FOR SOLUTION INTRAVENOUS at 14:54

## 2021-01-01 RX ADMIN — HEPARIN SODIUM 5000 UNITS: 10000 INJECTION, SOLUTION INTRAVENOUS; SUBCUTANEOUS at 20:10

## 2021-01-01 RX ADMIN — Medication 2 MG/HR: at 20:00

## 2021-01-01 RX ADMIN — HUMAN INSULIN 15 UNITS/HR: 100 INJECTION, SOLUTION SUBCUTANEOUS at 04:35

## 2021-01-01 RX ADMIN — HUMAN INSULIN 14 UNITS/HR: 100 INJECTION, SOLUTION SUBCUTANEOUS at 16:21

## 2021-01-01 RX ADMIN — ACETAMINOPHEN 975 MG: 325 TABLET ORAL at 06:55

## 2021-01-01 RX ADMIN — PIPERACILLIN AND TAZOBACTAM 4.5 G: 4; .5 INJECTION, POWDER, FOR SOLUTION INTRAVENOUS at 15:25

## 2021-01-01 RX ADMIN — HEPARIN SODIUM 5000 UNITS: 10000 INJECTION, SOLUTION INTRAVENOUS; SUBCUTANEOUS at 20:17

## 2021-01-01 RX ADMIN — MINERAL OIL AND PETROLATUM: 150; 830 OINTMENT OPHTHALMIC at 14:26

## 2021-01-01 RX ADMIN — MIDAZOLAM 2 MG: 1 INJECTION INTRAMUSCULAR; INTRAVENOUS at 11:14

## 2021-01-01 RX ADMIN — MINERAL OIL AND PETROLATUM: 150; 830 OINTMENT OPHTHALMIC at 21:26

## 2021-01-01 RX ADMIN — Medication 10 MG: at 10:01

## 2021-01-01 RX ADMIN — HUMAN INSULIN 12 UNITS/HR: 100 INJECTION, SOLUTION SUBCUTANEOUS at 12:11

## 2021-01-01 RX ADMIN — VANCOMYCIN HYDROCHLORIDE 2500 MG: 1 INJECTION, POWDER, LYOPHILIZED, FOR SOLUTION INTRAVENOUS at 22:23

## 2021-01-01 RX ADMIN — MICAFUNGIN SODIUM 150 MG: 50 INJECTION, POWDER, LYOPHILIZED, FOR SOLUTION INTRAVENOUS at 16:30

## 2021-01-01 RX ADMIN — VASOPRESSIN 2.4 UNITS/HR: 20 INJECTION INTRAVENOUS at 04:56

## 2021-01-01 RX ADMIN — Medication 1 PACKET: at 20:19

## 2021-01-01 RX ADMIN — PANTOPRAZOLE SODIUM 40 MG: 40 INJECTION, POWDER, FOR SOLUTION INTRAVENOUS at 07:57

## 2021-01-01 RX ADMIN — ACYCLOVIR 800 MG: 400 TABLET ORAL at 07:53

## 2021-01-01 RX ADMIN — ENOXAPARIN SODIUM 40 MG: 40 INJECTION, SOLUTION INTRAVENOUS; SUBCUTANEOUS at 07:41

## 2021-01-01 RX ADMIN — CYCLOSPORINE 100 MG: 100 SOLUTION ORAL at 08:09

## 2021-01-01 RX ADMIN — MIDAZOLAM 2 MG: 1 INJECTION INTRAMUSCULAR; INTRAVENOUS at 20:01

## 2021-01-01 RX ADMIN — HYDROCORTISONE SODIUM SUCCINATE 50 MG: 100 INJECTION, POWDER, FOR SOLUTION INTRAMUSCULAR; INTRAVENOUS at 08:12

## 2021-01-01 RX ADMIN — HUMAN INSULIN 14 UNITS/HR: 100 INJECTION, SOLUTION SUBCUTANEOUS at 23:41

## 2021-01-01 RX ADMIN — ACETAMINOPHEN 650 MG: 325 TABLET, FILM COATED ORAL at 20:55

## 2021-01-01 RX ADMIN — Medication 2 MG/HR: at 03:27

## 2021-01-01 RX ADMIN — ACETAMINOPHEN 975 MG: 325 TABLET, FILM COATED ORAL at 22:05

## 2021-01-01 RX ADMIN — METHYLPREDNISOLONE SODIUM SUCCINATE 125 MG: 125 INJECTION, POWDER, FOR SOLUTION INTRAMUSCULAR; INTRAVENOUS at 16:33

## 2021-01-01 RX ADMIN — Medication 50 MCG: at 13:08

## 2021-01-01 RX ADMIN — HUMAN INSULIN 1 UNITS/HR: 100 INJECTION, SOLUTION SUBCUTANEOUS at 12:04

## 2021-01-01 RX ADMIN — Medication 100 MCG/HR: at 13:15

## 2021-01-01 RX ADMIN — HEPARIN SODIUM 5000 UNITS: 10000 INJECTION, SOLUTION INTRAVENOUS; SUBCUTANEOUS at 11:53

## 2021-01-01 RX ADMIN — LEVOTHYROXINE SODIUM 150 MCG: 75 TABLET ORAL at 08:43

## 2021-01-01 RX ADMIN — CYCLOSPORINE 100 MG: 100 SOLUTION ORAL at 21:40

## 2021-01-01 RX ADMIN — POTASSIUM CHLORIDE 20 MEQ: 20 SOLUTION ORAL at 08:16

## 2021-01-01 RX ADMIN — ROSUVASTATIN CALCIUM 5 MG: 5 TABLET, FILM COATED ORAL at 20:18

## 2021-01-01 RX ADMIN — POTASSIUM CHLORIDE 20 MEQ: 1.5 POWDER, FOR SOLUTION ORAL at 20:12

## 2021-01-01 RX ADMIN — Medication 20 NG/KG/MIN: at 00:00

## 2021-01-01 RX ADMIN — HYDROCORTISONE SODIUM SUCCINATE 50 MG: 100 INJECTION, POWDER, FOR SOLUTION INTRAMUSCULAR; INTRAVENOUS at 08:37

## 2021-01-01 RX ADMIN — ROSUVASTATIN CALCIUM 5 MG: 5 TABLET, FILM COATED ORAL at 20:57

## 2021-01-01 RX ADMIN — Medication 15 ML: at 08:16

## 2021-01-01 RX ADMIN — Medication 50 MCG: at 09:11

## 2021-01-01 RX ADMIN — MICAFUNGIN SODIUM 100 MG: 50 INJECTION, POWDER, LYOPHILIZED, FOR SOLUTION INTRAVENOUS at 17:18

## 2021-01-01 RX ADMIN — MINERAL OIL AND PETROLATUM: 150; 830 OINTMENT OPHTHALMIC at 21:38

## 2021-01-01 RX ADMIN — Medication 1 PACKET: at 14:37

## 2021-01-01 RX ADMIN — METHYLPREDNISOLONE SODIUM SUCCINATE 100 MG: 125 INJECTION, POWDER, FOR SOLUTION INTRAMUSCULAR; INTRAVENOUS at 05:45

## 2021-01-01 RX ADMIN — ROSUVASTATIN CALCIUM 5 MG: 5 TABLET, FILM COATED ORAL at 19:33

## 2021-01-01 RX ADMIN — PIPERACILLIN AND TAZOBACTAM 4.5 G: 4; .5 INJECTION, POWDER, FOR SOLUTION INTRAVENOUS at 10:25

## 2021-01-01 RX ADMIN — PROPOFOL 50 MG: 10 INJECTION, EMULSION INTRAVENOUS at 17:35

## 2021-01-01 RX ADMIN — QUETIAPINE FUMARATE 50 MG: 50 TABLET ORAL at 06:20

## 2021-01-01 RX ADMIN — POLYETHYLENE GLYCOL 3350 17 G: 17 POWDER, FOR SOLUTION ORAL at 08:29

## 2021-01-01 RX ADMIN — MICAFUNGIN SODIUM 150 MG: 50 INJECTION, POWDER, LYOPHILIZED, FOR SOLUTION INTRAVENOUS at 16:31

## 2021-01-01 RX ADMIN — ACYCLOVIR 800 MG: 400 TABLET ORAL at 20:16

## 2021-01-01 RX ADMIN — ACYCLOVIR 800 MG: 400 TABLET ORAL at 19:46

## 2021-01-01 RX ADMIN — PIPERACILLIN AND TAZOBACTAM 4.5 G: 4; .5 INJECTION, POWDER, FOR SOLUTION INTRAVENOUS at 21:27

## 2021-01-01 RX ADMIN — METOPROLOL TARTRATE 1 MG: 5 INJECTION INTRAVENOUS at 10:22

## 2021-01-01 RX ADMIN — Medication 20 NG/KG/MIN: at 18:51

## 2021-01-01 RX ADMIN — VANCOMYCIN HYDROCHLORIDE 1250 MG: 100 INJECTION, POWDER, LYOPHILIZED, FOR SOLUTION INTRAVENOUS at 19:46

## 2021-01-01 RX ADMIN — MIDAZOLAM 4 MG: 1 INJECTION INTRAMUSCULAR; INTRAVENOUS at 12:06

## 2021-01-01 RX ADMIN — QUETIAPINE FUMARATE 50 MG: 50 TABLET ORAL at 05:59

## 2021-01-01 RX ADMIN — ENOXAPARIN SODIUM 40 MG: 40 INJECTION SUBCUTANEOUS at 05:06

## 2021-01-01 RX ADMIN — LEVOTHYROXINE SODIUM 150 MCG: 75 TABLET ORAL at 07:56

## 2021-01-01 RX ADMIN — ACYCLOVIR 800 MG: 400 TABLET ORAL at 07:56

## 2021-01-01 RX ADMIN — SULFAMETHOXAZOLE AND TRIMETHOPRIM 1 TABLET: 800; 160 TABLET ORAL at 19:34

## 2021-01-01 RX ADMIN — Medication 20 NG/KG/MIN: at 07:50

## 2021-01-01 RX ADMIN — ACYCLOVIR 800 MG: 400 TABLET ORAL at 07:50

## 2021-01-01 RX ADMIN — MINERAL OIL AND PETROLATUM: 150; 830 OINTMENT OPHTHALMIC at 21:27

## 2021-01-01 RX ADMIN — Medication 1 PACKET: at 08:21

## 2021-01-01 RX ADMIN — Medication 1 PACKET: at 13:44

## 2021-01-01 RX ADMIN — DOCUSATE SODIUM 50 MG AND SENNOSIDES 8.6 MG 1 TABLET: 8.6; 5 TABLET, FILM COATED ORAL at 22:08

## 2021-01-01 RX ADMIN — PROPOFOL 20 MCG/KG/MIN: 10 INJECTION, EMULSION INTRAVENOUS at 15:44

## 2021-01-01 RX ADMIN — Medication 15 ML: at 07:50

## 2021-01-01 RX ADMIN — MEROPENEM 1 G: 1 INJECTION, POWDER, FOR SOLUTION INTRAVENOUS at 21:42

## 2021-01-01 RX ADMIN — ACETAMINOPHEN 975 MG: 325 TABLET, FILM COATED ORAL at 20:57

## 2021-01-01 RX ADMIN — Medication 20 NG/KG/MIN: at 04:38

## 2021-01-01 RX ADMIN — MORPHINE SULFATE 10 MG: 10 INJECTION, SOLUTION INTRAMUSCULAR; INTRAVENOUS at 13:31

## 2021-01-01 RX ADMIN — ACETAMINOPHEN 975 MG: 325 TABLET, FILM COATED ORAL at 03:57

## 2021-01-01 RX ADMIN — SODIUM CHLORIDE, POTASSIUM CHLORIDE, SODIUM LACTATE AND CALCIUM CHLORIDE: 600; 310; 30; 20 INJECTION, SOLUTION INTRAVENOUS at 07:35

## 2021-01-01 RX ADMIN — DEXTROSE MONOHYDRATE: 50 INJECTION, SOLUTION INTRAVENOUS at 12:17

## 2021-01-01 RX ADMIN — CYCLOSPORINE 100 MG: 100 SOLUTION ORAL at 17:52

## 2021-01-01 RX ADMIN — Medication 15 ML: at 08:43

## 2021-01-01 RX ADMIN — SODIUM CHLORIDE, POTASSIUM CHLORIDE, SODIUM LACTATE AND CALCIUM CHLORIDE: 600; 310; 30; 20 INJECTION, SOLUTION INTRAVENOUS at 12:04

## 2021-01-01 RX ADMIN — DEXTROSE MONOHYDRATE: 50 INJECTION, SOLUTION INTRAVENOUS at 16:31

## 2021-01-01 RX ADMIN — MINERAL OIL AND PETROLATUM: 150; 830 OINTMENT OPHTHALMIC at 21:46

## 2021-01-01 RX ADMIN — Medication 50 MCG: at 03:08

## 2021-01-01 RX ADMIN — HEPARIN SODIUM 5000 UNITS: 10000 INJECTION, SOLUTION INTRAVENOUS; SUBCUTANEOUS at 03:50

## 2021-01-01 RX ADMIN — DEXMEDETOMIDINE 0.6 MCG/KG/HR: 100 INJECTION, SOLUTION, CONCENTRATE INTRAVENOUS at 09:08

## 2021-01-01 RX ADMIN — HYDROCORTISONE SODIUM SUCCINATE 50 MG: 100 INJECTION, POWDER, FOR SOLUTION INTRAMUSCULAR; INTRAVENOUS at 20:54

## 2021-01-01 RX ADMIN — MEROPENEM 1 G: 1 INJECTION, POWDER, FOR SOLUTION INTRAVENOUS at 05:51

## 2021-01-01 RX ADMIN — PIPERACILLIN AND TAZOBACTAM 4.5 G: 4; .5 INJECTION, POWDER, FOR SOLUTION INTRAVENOUS at 08:39

## 2021-01-01 RX ADMIN — Medication 15 ML: at 08:20

## 2021-01-01 RX ADMIN — PROPOFOL 50 MCG/KG/MIN: 10 INJECTION, EMULSION INTRAVENOUS at 12:21

## 2021-01-01 RX ADMIN — ROSUVASTATIN CALCIUM 5 MG: 5 TABLET, FILM COATED ORAL at 20:17

## 2021-01-01 RX ADMIN — DEXTROSE MONOHYDRATE: 50 INJECTION, SOLUTION INTRAVENOUS at 09:02

## 2021-01-01 RX ADMIN — FENTANYL CITRATE 75 MCG: 50 INJECTION, SOLUTION INTRAMUSCULAR; INTRAVENOUS at 19:19

## 2021-01-01 RX ADMIN — Medication 20 NG/KG/MIN: at 20:26

## 2021-01-01 RX ADMIN — BISACODYL 10 MG: 10 SUPPOSITORY RECTAL at 08:17

## 2021-01-01 RX ADMIN — Medication 25 MCG: at 04:39

## 2021-01-01 RX ADMIN — Medication 20 NG/KG/MIN: at 23:25

## 2021-01-01 RX ADMIN — LINEZOLID 600 MG: 600 INJECTION, SOLUTION INTRAVENOUS at 00:42

## 2021-01-01 RX ADMIN — ACYCLOVIR 800 MG: 400 TABLET ORAL at 22:08

## 2021-01-01 RX ADMIN — MINERAL OIL AND PETROLATUM: 150; 830 OINTMENT OPHTHALMIC at 23:12

## 2021-01-01 RX ADMIN — PIPERACILLIN AND TAZOBACTAM 4.5 G: 4; .5 INJECTION, POWDER, FOR SOLUTION INTRAVENOUS at 09:05

## 2021-01-01 RX ADMIN — PIPERACILLIN AND TAZOBACTAM 4.5 G: 4; .5 INJECTION, POWDER, FOR SOLUTION INTRAVENOUS at 08:20

## 2021-01-01 RX ADMIN — PANTOPRAZOLE SODIUM 40 MG: 40 INJECTION, POWDER, FOR SOLUTION INTRAVENOUS at 08:06

## 2021-01-01 RX ADMIN — PIPERACILLIN AND TAZOBACTAM 4.5 G: 4; .5 INJECTION, POWDER, FOR SOLUTION INTRAVENOUS at 22:09

## 2021-01-01 RX ADMIN — ROSUVASTATIN CALCIUM 5 MG: 5 TABLET, FILM COATED ORAL at 19:46

## 2021-01-01 RX ADMIN — QUETIAPINE FUMARATE 50 MG: 50 TABLET ORAL at 14:03

## 2021-01-01 RX ADMIN — HYDROCORTISONE SODIUM SUCCINATE 50 MG: 100 INJECTION, POWDER, FOR SOLUTION INTRAMUSCULAR; INTRAVENOUS at 08:35

## 2021-01-01 RX ADMIN — PHENYLEPHRINE HYDROCHLORIDE 100 MCG: 10 INJECTION INTRAVENOUS at 17:37

## 2021-01-01 RX ADMIN — PROPOFOL 30 MCG/KG/MIN: 10 INJECTION, EMULSION INTRAVENOUS at 20:35

## 2021-01-01 RX ADMIN — FUROSEMIDE 40 MG: 10 INJECTION, SOLUTION INTRAVENOUS at 15:50

## 2021-01-01 RX ADMIN — VECURONIUM BROMIDE 0.4 MCG/KG/MIN: 1 INJECTION, POWDER, LYOPHILIZED, FOR SOLUTION INTRAVENOUS at 12:43

## 2021-01-01 RX ADMIN — LEVOTHYROXINE SODIUM 150 MCG: 75 TABLET ORAL at 08:20

## 2021-01-01 RX ADMIN — METHYLPREDNISOLONE SODIUM SUCCINATE 100 MG: 125 INJECTION, POWDER, FOR SOLUTION INTRAMUSCULAR; INTRAVENOUS at 21:10

## 2021-01-01 RX ADMIN — LIDOCAINE HYDROCHLORIDE 5 ML: 20 SOLUTION ORAL; TOPICAL at 11:13

## 2021-01-01 RX ADMIN — DEXMEDETOMIDINE 0.7 MCG/KG/HR: 100 INJECTION, SOLUTION, CONCENTRATE INTRAVENOUS at 13:58

## 2021-01-01 RX ADMIN — HEPARIN SODIUM 1800 UNITS/HR: 10000 INJECTION, SOLUTION INTRAVENOUS at 13:52

## 2021-01-01 RX ADMIN — Medication 100 MCG/HR: at 12:46

## 2021-01-01 RX ADMIN — DEXMEDETOMIDINE 0.3 MCG/KG/HR: 100 INJECTION, SOLUTION, CONCENTRATE INTRAVENOUS at 16:31

## 2021-01-01 RX ADMIN — ACETAMINOPHEN 650 MG: 325 TABLET, FILM COATED ORAL at 03:36

## 2021-01-01 RX ADMIN — DEXMEDETOMIDINE 0.7 MCG/KG/HR: 100 INJECTION, SOLUTION, CONCENTRATE INTRAVENOUS at 22:26

## 2021-01-01 RX ADMIN — ACYCLOVIR 800 MG: 400 TABLET ORAL at 20:57

## 2021-01-01 RX ADMIN — Medication 1 PACKET: at 19:51

## 2021-01-01 RX ADMIN — ENOXAPARIN SODIUM 40 MG: 40 INJECTION SUBCUTANEOUS at 05:45

## 2021-01-01 RX ADMIN — Medication 50 MCG: at 21:40

## 2021-01-01 RX ADMIN — Medication 20 NG/KG/MIN: at 09:49

## 2021-01-01 RX ADMIN — FENTANYL CITRATE 25 MCG: 50 INJECTION, SOLUTION INTRAMUSCULAR; INTRAVENOUS at 13:31

## 2021-01-01 RX ADMIN — PIPERACILLIN AND TAZOBACTAM 4.5 G: 4; .5 INJECTION, POWDER, FOR SOLUTION INTRAVENOUS at 08:16

## 2021-01-01 RX ADMIN — FUROSEMIDE 20 MG: 10 INJECTION, SOLUTION INTRAVENOUS at 12:43

## 2021-01-01 RX ADMIN — PROPOFOL 10 MCG/KG/MIN: 10 INJECTION, EMULSION INTRAVENOUS at 11:58

## 2021-01-01 RX ADMIN — QUETIAPINE FUMARATE 50 MG: 50 TABLET ORAL at 21:42

## 2021-01-01 RX ADMIN — VASOPRESSIN 2.4 UNITS/HR: 20 INJECTION INTRAVENOUS at 00:47

## 2021-01-01 RX ADMIN — HUMAN INSULIN 3 UNITS/HR: 100 INJECTION, SOLUTION SUBCUTANEOUS at 17:41

## 2021-01-01 RX ADMIN — PIPERACILLIN AND TAZOBACTAM 4.5 G: 4; .5 INJECTION, POWDER, FOR SOLUTION INTRAVENOUS at 03:59

## 2021-01-01 RX ADMIN — PROPOFOL 20 MCG/KG/MIN: 10 INJECTION, EMULSION INTRAVENOUS at 08:05

## 2021-01-01 RX ADMIN — INSULIN ASPART 4 UNITS: 100 INJECTION, SOLUTION INTRAVENOUS; SUBCUTANEOUS at 03:58

## 2021-01-01 RX ADMIN — METHYLPREDNISOLONE SODIUM SUCCINATE 125 MG: 125 INJECTION, POWDER, FOR SOLUTION INTRAMUSCULAR; INTRAVENOUS at 10:14

## 2021-01-01 RX ADMIN — ACYCLOVIR 800 MG: 400 TABLET ORAL at 08:08

## 2021-01-01 RX ADMIN — PIPERACILLIN AND TAZOBACTAM 4.5 G: 4; .5 INJECTION, POWDER, FOR SOLUTION INTRAVENOUS at 21:14

## 2021-01-01 RX ADMIN — Medication 2 NG/KG/MIN: at 19:30

## 2021-01-01 RX ADMIN — ACETAMINOPHEN 975 MG: 325 TABLET, FILM COATED ORAL at 03:37

## 2021-01-01 RX ADMIN — ESMOLOL HYDROCHLORIDE 10 MG: 10 INJECTION, SOLUTION INTRAVENOUS at 10:22

## 2021-01-01 RX ADMIN — HUMAN INSULIN 10 UNITS/HR: 100 INJECTION, SOLUTION SUBCUTANEOUS at 12:33

## 2021-01-01 RX ADMIN — DOCUSATE SODIUM 50 MG AND SENNOSIDES 8.6 MG 1 TABLET: 8.6; 5 TABLET, FILM COATED ORAL at 07:56

## 2021-01-01 RX ADMIN — PIPERACILLIN AND TAZOBACTAM 4.5 G: 4; .5 INJECTION, POWDER, FOR SOLUTION INTRAVENOUS at 03:39

## 2021-01-01 RX ADMIN — LEVOTHYROXINE SODIUM 150 MCG: 75 TABLET ORAL at 08:16

## 2021-01-01 RX ADMIN — HYDROCORTISONE SODIUM SUCCINATE 50 MG: 100 INJECTION, POWDER, FOR SOLUTION INTRAMUSCULAR; INTRAVENOUS at 02:54

## 2021-01-01 RX ADMIN — Medication 20 NG/KG/MIN: at 00:20

## 2021-01-01 RX ADMIN — Medication 100 MCG/HR: at 15:03

## 2021-01-01 RX ADMIN — HEPARIN SODIUM 1500 UNITS/HR: 10000 INJECTION, SOLUTION INTRAVENOUS at 23:11

## 2021-01-01 RX ADMIN — INSULIN GLARGINE 25 UNITS: 100 INJECTION, SOLUTION SUBCUTANEOUS at 08:22

## 2021-01-01 RX ADMIN — INSULIN ASPART 2 UNITS: 100 INJECTION, SOLUTION INTRAVENOUS; SUBCUTANEOUS at 09:04

## 2021-01-01 RX ADMIN — PIPERACILLIN AND TAZOBACTAM 4.5 G: 4; .5 INJECTION, POWDER, FOR SOLUTION INTRAVENOUS at 03:17

## 2021-01-01 RX ADMIN — PIPERACILLIN AND TAZOBACTAM 4.5 G: 4; .5 INJECTION, POWDER, FOR SOLUTION INTRAVENOUS at 20:55

## 2021-01-01 RX ADMIN — VASOPRESSIN 2.4 UNITS/HR: 20 INJECTION INTRAVENOUS at 21:24

## 2021-01-01 RX ADMIN — PANTOPRAZOLE SODIUM 40 MG: 40 INJECTION, POWDER, FOR SOLUTION INTRAVENOUS at 08:16

## 2021-01-01 RX ADMIN — DEXMEDETOMIDINE 0.7 MCG/KG/HR: 100 INJECTION, SOLUTION, CONCENTRATE INTRAVENOUS at 02:55

## 2021-01-01 RX ADMIN — PROPOFOL 20 MCG/KG/MIN: 10 INJECTION, EMULSION INTRAVENOUS at 04:56

## 2021-01-01 RX ADMIN — MIDAZOLAM 0.5 MG: 1 INJECTION INTRAMUSCULAR; INTRAVENOUS at 09:11

## 2021-01-01 RX ADMIN — Medication 40 MG: at 09:08

## 2021-01-01 RX ADMIN — PHENYLEPHRINE HYDROCHLORIDE 50 MCG: 10 INJECTION INTRAVENOUS at 17:29

## 2021-01-01 RX ADMIN — Medication 20 NG/KG/MIN: at 13:03

## 2021-01-01 RX ADMIN — Medication 40 MG: at 08:15

## 2021-01-01 RX ADMIN — POTASSIUM CHLORIDE 10 MEQ: 7.46 INJECTION, SOLUTION INTRAVENOUS at 22:44

## 2021-01-01 RX ADMIN — HEPARIN SODIUM 5000 UNITS: 10000 INJECTION, SOLUTION INTRAVENOUS; SUBCUTANEOUS at 12:17

## 2021-01-01 RX ADMIN — FUROSEMIDE 20 MG: 10 INJECTION, SOLUTION INTRAVENOUS at 16:36

## 2021-01-01 RX ADMIN — Medication 2 G: at 09:09

## 2021-01-01 RX ADMIN — INSULIN ASPART 3 UNITS: 100 INJECTION, SOLUTION INTRAVENOUS; SUBCUTANEOUS at 08:29

## 2021-01-01 RX ADMIN — INSULIN ASPART 3 UNITS: 100 INJECTION, SOLUTION INTRAVENOUS; SUBCUTANEOUS at 04:28

## 2021-01-01 RX ADMIN — INSULIN ASPART 1 UNITS: 100 INJECTION, SOLUTION INTRAVENOUS; SUBCUTANEOUS at 12:17

## 2021-01-01 RX ADMIN — INSULIN GLARGINE 25 UNITS: 100 INJECTION, SOLUTION SUBCUTANEOUS at 13:00

## 2021-01-01 RX ADMIN — Medication 100 MCG/HR: at 17:10

## 2021-01-01 RX ADMIN — DOCUSATE SODIUM 50 MG AND SENNOSIDES 8.6 MG 1 TABLET: 8.6; 5 TABLET, FILM COATED ORAL at 09:24

## 2021-01-01 RX ADMIN — Medication 50 MCG: at 03:33

## 2021-01-01 RX ADMIN — DOCUSATE SODIUM 50 MG AND SENNOSIDES 8.6 MG 1 TABLET: 8.6; 5 TABLET, FILM COATED ORAL at 20:57

## 2021-01-01 RX ADMIN — METOLAZONE 5 MG: 5 TABLET ORAL at 16:41

## 2021-01-01 RX ADMIN — HUMAN INSULIN 2 UNITS/HR: 100 INJECTION, SOLUTION SUBCUTANEOUS at 16:09

## 2021-01-01 RX ADMIN — ACETAMINOPHEN 975 MG: 325 TABLET, FILM COATED ORAL at 14:25

## 2021-01-01 RX ADMIN — Medication 20 NG/KG/MIN: at 22:06

## 2021-01-01 RX ADMIN — PIPERACILLIN AND TAZOBACTAM 4.5 G: 4; .5 INJECTION, POWDER, FOR SOLUTION INTRAVENOUS at 14:34

## 2021-01-01 RX ADMIN — GLYCOPYRROLATE 0.2 MG: 0.2 INJECTION INTRAMUSCULAR; INTRAVENOUS at 13:40

## 2021-01-01 RX ADMIN — Medication 25 MCG/HR: at 19:33

## 2021-01-01 RX ADMIN — ROSUVASTATIN CALCIUM 5 MG: 5 TABLET, FILM COATED ORAL at 20:12

## 2021-01-01 RX ADMIN — Medication 20 NG/KG/MIN: at 07:35

## 2021-01-01 RX ADMIN — HUMAN INSULIN 6 UNITS/HR: 100 INJECTION, SOLUTION SUBCUTANEOUS at 19:01

## 2021-01-01 RX ADMIN — SODIUM BICARBONATE 50 MEQ: 84 INJECTION INTRAVENOUS at 13:45

## 2021-01-01 RX ADMIN — DEXMEDETOMIDINE 0.7 MCG/KG/HR: 100 INJECTION, SOLUTION, CONCENTRATE INTRAVENOUS at 00:41

## 2021-01-01 RX ADMIN — LINEZOLID 600 MG: 600 INJECTION, SOLUTION INTRAVENOUS at 12:59

## 2021-01-01 RX ADMIN — Medication 1 DROP: at 19:34

## 2021-01-01 RX ADMIN — MICAFUNGIN SODIUM 150 MG: 50 INJECTION, POWDER, LYOPHILIZED, FOR SOLUTION INTRAVENOUS at 17:15

## 2021-01-01 RX ADMIN — Medication 10 MG: at 10:48

## 2021-01-01 RX ADMIN — Medication 20 NG/KG/MIN: at 05:02

## 2021-01-01 RX ADMIN — ACYCLOVIR 800 MG: 400 TABLET ORAL at 20:55

## 2021-01-01 RX ADMIN — DEXTROSE MONOHYDRATE: 50 INJECTION, SOLUTION INTRAVENOUS at 21:43

## 2021-01-01 RX ADMIN — INSULIN ASPART 3 UNITS: 100 INJECTION, SOLUTION INTRAVENOUS; SUBCUTANEOUS at 00:02

## 2021-01-01 RX ADMIN — POLYETHYLENE GLYCOL 3350 17 G: 17 POWDER, FOR SOLUTION ORAL at 07:56

## 2021-01-01 RX ADMIN — POTASSIUM CHLORIDE 40 MEQ: 1.5 POWDER, FOR SOLUTION ORAL at 18:21

## 2021-01-01 RX ADMIN — PROPOFOL 30 MCG/KG/MIN: 10 INJECTION, EMULSION INTRAVENOUS at 15:43

## 2021-01-01 RX ADMIN — POTASSIUM CHLORIDE 10 MEQ: 7.46 INJECTION, SOLUTION INTRAVENOUS at 01:00

## 2021-01-01 RX ADMIN — DEXMEDETOMIDINE 0.7 MCG/KG/HR: 100 INJECTION, SOLUTION, CONCENTRATE INTRAVENOUS at 12:34

## 2021-01-01 RX ADMIN — METOPROLOL TARTRATE 1 MG: 5 INJECTION INTRAVENOUS at 10:51

## 2021-01-01 RX ADMIN — Medication 1 PACKET: at 19:34

## 2021-01-01 RX ADMIN — POLYETHYLENE GLYCOL 3350 17 G: 17 POWDER, FOR SOLUTION ORAL at 08:43

## 2021-01-01 RX ADMIN — LEVOTHYROXINE SODIUM 150 MCG: 75 TABLET ORAL at 08:35

## 2021-01-01 RX ADMIN — Medication 100 MCG/HR: at 17:01

## 2021-01-01 RX ADMIN — VECURONIUM BROMIDE 10 MG: 1 INJECTION, POWDER, LYOPHILIZED, FOR SOLUTION INTRAVENOUS at 20:34

## 2021-01-01 RX ADMIN — ACETAMINOPHEN 650 MG: 325 TABLET, FILM COATED ORAL at 13:35

## 2021-01-01 RX ADMIN — MICAFUNGIN SODIUM 100 MG: 50 INJECTION, POWDER, LYOPHILIZED, FOR SOLUTION INTRAVENOUS at 16:38

## 2021-01-01 RX ADMIN — MEROPENEM 1 G: 1 INJECTION, POWDER, FOR SOLUTION INTRAVENOUS at 06:20

## 2021-01-01 RX ADMIN — ACETAMINOPHEN 975 MG: 325 TABLET, FILM COATED ORAL at 11:13

## 2021-01-01 RX ADMIN — HYDROCORTISONE SODIUM SUCCINATE 50 MG: 100 INJECTION, POWDER, FOR SOLUTION INTRAMUSCULAR; INTRAVENOUS at 21:05

## 2021-01-01 RX ADMIN — DEXTROSE MONOHYDRATE: 50 INJECTION, SOLUTION INTRAVENOUS at 02:01

## 2021-01-01 RX ADMIN — HUMAN INSULIN 12 UNITS/HR: 100 INJECTION, SOLUTION SUBCUTANEOUS at 01:00

## 2021-01-01 RX ADMIN — ACYCLOVIR 800 MG: 400 TABLET ORAL at 20:18

## 2021-01-01 RX ADMIN — Medication 1 PACKET: at 21:27

## 2021-01-01 RX ADMIN — ACYCLOVIR 800 MG: 400 TABLET ORAL at 08:17

## 2021-01-01 RX ADMIN — LINEZOLID 600 MG: 600 INJECTION, SOLUTION INTRAVENOUS at 00:59

## 2021-01-01 RX ADMIN — Medication 1 PACKET: at 14:15

## 2021-01-01 RX ADMIN — MIDAZOLAM 0.5 MG: 1 INJECTION INTRAMUSCULAR; INTRAVENOUS at 09:21

## 2021-01-01 RX ADMIN — ESMOLOL HYDROCHLORIDE 20 MG: 10 INJECTION, SOLUTION INTRAVENOUS at 09:47

## 2021-01-01 RX ADMIN — ROCURONIUM BROMIDE 50 MG: 10 INJECTION INTRAVENOUS at 17:35

## 2021-01-01 RX ADMIN — DEXTROSE MONOHYDRATE: 50 INJECTION, SOLUTION INTRAVENOUS at 10:16

## 2021-01-01 RX ADMIN — Medication 100 MCG/HR: at 17:45

## 2021-01-01 RX ADMIN — PROPOFOL 25 MCG/KG/MIN: 10 INJECTION, EMULSION INTRAVENOUS at 18:30

## 2021-01-01 RX ADMIN — ROSUVASTATIN CALCIUM 5 MG: 5 TABLET, FILM COATED ORAL at 22:08

## 2021-01-01 RX ADMIN — Medication 1 PACKET: at 19:36

## 2021-01-01 RX ADMIN — QUETIAPINE FUMARATE 50 MG: 50 TABLET ORAL at 21:33

## 2021-01-01 RX ADMIN — Medication 0.07 MCG/KG/MIN: at 19:47

## 2021-01-01 RX ADMIN — METHYLPREDNISOLONE SODIUM SUCCINATE 125 MG: 125 INJECTION, POWDER, FOR SOLUTION INTRAMUSCULAR; INTRAVENOUS at 03:30

## 2021-01-01 RX ADMIN — Medication 25 MG: at 09:47

## 2021-01-01 RX ADMIN — Medication 40 MG: at 11:16

## 2021-01-01 RX ADMIN — HEPARIN SODIUM 5000 UNITS: 10000 INJECTION, SOLUTION INTRAVENOUS; SUBCUTANEOUS at 04:04

## 2021-01-01 RX ADMIN — DEXMEDETOMIDINE 0.7 MCG/KG/HR: 100 INJECTION, SOLUTION, CONCENTRATE INTRAVENOUS at 03:27

## 2021-01-01 RX ADMIN — PIPERACILLIN AND TAZOBACTAM 4.5 G: 4; .5 INJECTION, POWDER, FOR SOLUTION INTRAVENOUS at 03:36

## 2021-01-01 RX ADMIN — ACYCLOVIR 800 MG: 400 TABLET ORAL at 08:35

## 2021-01-01 RX ADMIN — Medication 20 NG/KG/MIN: at 16:06

## 2021-01-01 RX ADMIN — Medication 100 MCG/HR: at 13:59

## 2021-01-01 RX ADMIN — ACYCLOVIR 800 MG: 400 TABLET ORAL at 08:20

## 2021-01-01 RX ADMIN — HUMAN INSULIN 14 UNITS/HR: 100 INJECTION, SOLUTION SUBCUTANEOUS at 03:26

## 2021-01-01 RX ADMIN — ROSUVASTATIN CALCIUM 5 MG: 5 TABLET, FILM COATED ORAL at 20:55

## 2021-01-01 RX ADMIN — LEVOTHYROXINE SODIUM 150 MCG: 75 TABLET ORAL at 07:50

## 2021-01-01 RX ADMIN — INSULIN ASPART 2 UNITS: 100 INJECTION, SOLUTION INTRAVENOUS; SUBCUTANEOUS at 20:05

## 2021-01-01 RX ADMIN — LEVOTHYROXINE SODIUM 150 MCG: 75 TABLET ORAL at 08:07

## 2021-01-01 RX ADMIN — VECURONIUM BROMIDE 10 MG: 1 INJECTION, POWDER, LYOPHILIZED, FOR SOLUTION INTRAVENOUS at 12:07

## 2021-01-01 RX ADMIN — METHYLPREDNISOLONE SODIUM SUCCINATE 125 MG: 125 INJECTION, POWDER, FOR SOLUTION INTRAMUSCULAR; INTRAVENOUS at 21:32

## 2021-01-01 RX ADMIN — Medication 20 NG/KG/MIN: at 12:22

## 2021-01-01 RX ADMIN — FUROSEMIDE 20 MG: 10 INJECTION, SOLUTION INTRAVENOUS at 09:24

## 2021-01-01 RX ADMIN — ACETAMINOPHEN 650 MG: 325 TABLET, FILM COATED ORAL at 07:53

## 2021-01-01 RX ADMIN — PIPERACILLIN AND TAZOBACTAM 4.5 G: 4; .5 INJECTION, POWDER, FOR SOLUTION INTRAVENOUS at 21:35

## 2021-01-01 RX ADMIN — Medication 1 PACKET: at 08:03

## 2021-01-01 RX ADMIN — QUETIAPINE FUMARATE 50 MG: 50 TABLET ORAL at 13:35

## 2021-01-01 RX ADMIN — DEXMEDETOMIDINE 0.3 MCG/KG/HR: 100 INJECTION, SOLUTION, CONCENTRATE INTRAVENOUS at 17:08

## 2021-01-01 RX ADMIN — Medication 25 MCG: at 08:11

## 2021-01-01 RX ADMIN — MINERAL OIL AND PETROLATUM: 150; 830 OINTMENT OPHTHALMIC at 21:33

## 2021-01-01 RX ADMIN — POTASSIUM CHLORIDE 10 MEQ: 7.46 INJECTION, SOLUTION INTRAVENOUS at 01:30

## 2021-01-01 RX ADMIN — ESMOLOL HYDROCHLORIDE 30 MG: 10 INJECTION, SOLUTION INTRAVENOUS at 10:06

## 2021-01-01 RX ADMIN — DEXTROSE MONOHYDRATE: 50 INJECTION, SOLUTION INTRAVENOUS at 04:35

## 2021-01-01 RX ADMIN — DOCUSATE SODIUM 50 MG AND SENNOSIDES 8.6 MG 1 TABLET: 8.6; 5 TABLET, FILM COATED ORAL at 08:07

## 2021-01-01 RX ADMIN — ACETAMINOPHEN 975 MG: 325 TABLET, FILM COATED ORAL at 03:59

## 2021-01-01 RX ADMIN — POLYETHYLENE GLYCOL 3350 17 G: 17 POWDER, FOR SOLUTION ORAL at 08:20

## 2021-01-01 RX ADMIN — HYDROCORTISONE SODIUM SUCCINATE 50 MG: 100 INJECTION, POWDER, FOR SOLUTION INTRAMUSCULAR; INTRAVENOUS at 14:37

## 2021-01-01 RX ADMIN — METOPROLOL TARTRATE 5 MG: 1 INJECTION, SOLUTION INTRAVENOUS at 18:42

## 2021-01-01 RX ADMIN — Medication 1 PACKET: at 08:19

## 2021-01-01 RX ADMIN — DOCUSATE SODIUM 50 MG AND SENNOSIDES 8.6 MG 1 TABLET: 8.6; 5 TABLET, FILM COATED ORAL at 19:46

## 2021-01-01 RX ADMIN — QUETIAPINE FUMARATE 50 MG: 50 TABLET ORAL at 05:51

## 2021-01-01 RX ADMIN — DEXMEDETOMIDINE 0.3 MCG/KG/HR: 100 INJECTION, SOLUTION, CONCENTRATE INTRAVENOUS at 05:00

## 2021-01-01 RX ADMIN — Medication 1 PACKET: at 08:44

## 2021-01-01 RX ADMIN — PIPERACILLIN AND TAZOBACTAM 4.5 G: 4; .5 INJECTION, POWDER, FOR SOLUTION INTRAVENOUS at 03:21

## 2021-01-01 RX ADMIN — METHYLPREDNISOLONE SODIUM SUCCINATE 125 MG: 125 INJECTION, POWDER, FOR SOLUTION INTRAMUSCULAR; INTRAVENOUS at 21:41

## 2021-01-01 RX ADMIN — MICAFUNGIN SODIUM 150 MG: 50 INJECTION, POWDER, LYOPHILIZED, FOR SOLUTION INTRAVENOUS at 16:39

## 2021-01-01 RX ADMIN — DEXMEDETOMIDINE 0.7 MCG/KG/HR: 100 INJECTION, SOLUTION, CONCENTRATE INTRAVENOUS at 18:59

## 2021-01-01 RX ADMIN — Medication 1 PACKET: at 20:17

## 2021-01-01 RX ADMIN — POLYETHYLENE GLYCOL 3350 17 G: 17 POWDER, FOR SOLUTION ORAL at 08:06

## 2021-01-01 RX ADMIN — HEPARIN SODIUM 5000 UNITS: 10000 INJECTION, SOLUTION INTRAVENOUS; SUBCUTANEOUS at 19:33

## 2021-01-01 RX ADMIN — INSULIN ASPART 1 UNITS: 100 INJECTION, SOLUTION INTRAVENOUS; SUBCUTANEOUS at 16:46

## 2021-01-01 RX ADMIN — INSULIN ASPART 1 UNITS: 100 INJECTION, SOLUTION INTRAVENOUS; SUBCUTANEOUS at 00:35

## 2021-01-01 RX ADMIN — METHYLPREDNISOLONE SODIUM SUCCINATE 125 MG: 125 INJECTION, POWDER, FOR SOLUTION INTRAMUSCULAR; INTRAVENOUS at 04:04

## 2021-01-01 RX ADMIN — ACYCLOVIR 800 MG: 400 TABLET ORAL at 19:33

## 2021-01-01 RX ADMIN — SULFAMETHOXAZOLE AND TRIMETHOPRIM 1 TABLET: 800; 160 TABLET ORAL at 08:03

## 2021-01-01 RX ADMIN — Medication 1 PACKET: at 07:51

## 2021-01-01 RX ADMIN — Medication 15 ML: at 12:35

## 2021-01-01 RX ADMIN — METOPROLOL TARTRATE 1 MG: 5 INJECTION INTRAVENOUS at 10:14

## 2021-01-01 RX ADMIN — Medication 15 ML: at 08:35

## 2021-01-01 RX ADMIN — PIPERACILLIN AND TAZOBACTAM 4.5 G: 4; .5 INJECTION, POWDER, FOR SOLUTION INTRAVENOUS at 08:35

## 2021-01-01 RX ADMIN — QUETIAPINE FUMARATE 50 MG: 50 TABLET ORAL at 21:27

## 2021-01-01 RX ADMIN — ACYCLOVIR 800 MG: 400 TABLET ORAL at 20:12

## 2021-01-01 RX ADMIN — Medication 25 MG: at 09:45

## 2021-01-01 RX ADMIN — HUMAN INSULIN 4 UNITS/HR: 100 INJECTION, SOLUTION SUBCUTANEOUS at 10:13

## 2021-01-01 RX ADMIN — LEVOTHYROXINE SODIUM 150 MCG: 75 TABLET ORAL at 07:53

## 2021-01-01 RX ADMIN — Medication 15 ML: at 07:53

## 2021-01-01 RX ADMIN — PROPOFOL 25 MCG/KG/MIN: 10 INJECTION, EMULSION INTRAVENOUS at 18:39

## 2021-01-01 RX ADMIN — DEXMEDETOMIDINE 0.7 MCG/KG/HR: 100 INJECTION, SOLUTION, CONCENTRATE INTRAVENOUS at 08:23

## 2021-01-01 RX ADMIN — Medication 1 PACKET: at 14:03

## 2021-01-01 RX ADMIN — Medication 0.15 MCG/KG/MIN: at 11:48

## 2021-01-01 RX ADMIN — POTASSIUM CHLORIDE 20 MEQ: 29.8 INJECTION, SOLUTION INTRAVENOUS at 13:43

## 2021-01-01 RX ADMIN — HYDROCORTISONE SODIUM SUCCINATE 50 MG: 100 INJECTION, POWDER, FOR SOLUTION INTRAMUSCULAR; INTRAVENOUS at 03:10

## 2021-01-01 RX ADMIN — ACETAMINOPHEN 650 MG: 325 TABLET, FILM COATED ORAL at 20:12

## 2021-01-01 RX ADMIN — DEXMEDETOMIDINE 0.7 MCG/KG/HR: 100 INJECTION, SOLUTION, CONCENTRATE INTRAVENOUS at 20:47

## 2021-01-01 RX ADMIN — PIPERACILLIN AND TAZOBACTAM 4.5 G: 4; .5 INJECTION, POWDER, FOR SOLUTION INTRAVENOUS at 21:17

## 2021-01-01 RX ADMIN — Medication 40 MG: at 10:35

## 2021-01-01 RX ADMIN — SODIUM BICARBONATE: 84 INJECTION, SOLUTION INTRAVENOUS at 02:48

## 2021-01-01 RX ADMIN — MINERAL OIL AND PETROLATUM: 150; 830 OINTMENT OPHTHALMIC at 21:42

## 2021-01-01 RX ADMIN — SODIUM BICARBONATE 50 MEQ: 84 INJECTION INTRAVENOUS at 01:52

## 2021-01-01 RX ADMIN — ACETAMINOPHEN 975 MG: 325 TABLET, FILM COATED ORAL at 11:53

## 2021-01-01 RX ADMIN — VANCOMYCIN HYDROCHLORIDE 1250 MG: 100 INJECTION, POWDER, LYOPHILIZED, FOR SOLUTION INTRAVENOUS at 08:17

## 2021-01-01 RX ADMIN — PROPOFOL 30 MCG/KG/MIN: 10 INJECTION, EMULSION INTRAVENOUS at 02:11

## 2021-01-01 RX ADMIN — Medication 1 PACKET: at 07:58

## 2021-01-01 RX ADMIN — POTASSIUM CHLORIDE 20 MEQ: 1.5 POWDER, FOR SOLUTION ORAL at 00:59

## 2021-01-01 RX ADMIN — METHYLPREDNISOLONE SODIUM SUCCINATE 125 MG: 125 INJECTION, POWDER, FOR SOLUTION INTRAMUSCULAR; INTRAVENOUS at 17:12

## 2021-01-01 RX ADMIN — QUETIAPINE FUMARATE 50 MG: 50 TABLET ORAL at 14:34

## 2021-01-01 RX ADMIN — FLUCONAZOLE 100 MG: 100 TABLET ORAL at 20:57

## 2021-01-01 RX ADMIN — VASOPRESSIN 2.4 UNITS/HR: 20 INJECTION INTRAVENOUS at 19:32

## 2021-01-01 RX ADMIN — HUMAN INSULIN 14 UNITS/HR: 100 INJECTION, SOLUTION SUBCUTANEOUS at 20:06

## 2021-01-01 RX ADMIN — ACYCLOVIR 800 MG: 400 TABLET ORAL at 08:43

## 2021-01-01 RX ADMIN — POTASSIUM CHLORIDE 20 MEQ: 29.8 INJECTION, SOLUTION INTRAVENOUS at 05:30

## 2021-01-01 RX ADMIN — Medication 1 PACKET: at 13:59

## 2021-01-01 RX ADMIN — ACETAMINOPHEN 650 MG: 325 TABLET, FILM COATED ORAL at 05:51

## 2021-01-01 RX ADMIN — HUMAN INSULIN 14 UNITS/HR: 100 INJECTION, SOLUTION SUBCUTANEOUS at 07:57

## 2021-01-01 RX ADMIN — POTASSIUM CHLORIDE 10 MEQ: 7.46 INJECTION, SOLUTION INTRAVENOUS at 23:45

## 2021-01-01 ASSESSMENT — MIFFLIN-ST. JEOR
SCORE: 1905.81
SCORE: 1933.02
SCORE: 1913.06
SCORE: 1900.14
SCORE: 1844.06
SCORE: 1933.02
SCORE: 1913.06
SCORE: 1896.17
SCORE: 1876.06
SCORE: 1914.88
SCORE: 1912.62
SCORE: 1882.06
SCORE: 1921.69
SCORE: 1868.06
SCORE: 1933.06
SCORE: 1960.06

## 2021-01-01 ASSESSMENT — ACTIVITIES OF DAILY LIVING (ADL)
ADLS_ACUITY_SCORE: 20
ADLS_ACUITY_SCORE: 22
ADLS_ACUITY_SCORE: 22
ADLS_ACUITY_SCORE: 20
ADLS_ACUITY_SCORE: 13
ADLS_ACUITY_SCORE: 20
ADLS_ACUITY_SCORE: 22
ADLS_ACUITY_SCORE: 20
ADLS_ACUITY_SCORE: 18
ADLS_ACUITY_SCORE: 20

## 2021-01-01 ASSESSMENT — VISUAL ACUITY
OU: NOT TESTABLE

## 2021-01-01 ASSESSMENT — PAIN SCALES - GENERAL
PAINLEVEL: NO PAIN (0)
PAINLEVEL: MILD PAIN (3)
PAINLEVEL: NO PAIN (0)
PAINLEVEL: MILD PAIN (3)
PAINLEVEL: NO PAIN (0)
PAINLEVEL: MILD PAIN (3)
PAINLEVEL: NO PAIN (0)
PAINLEVEL: NO PAIN (0)

## 2021-01-01 ASSESSMENT — PAIN DESCRIPTION - DESCRIPTORS: DESCRIPTORS: INTERMITTENT

## 2021-01-01 ASSESSMENT — LIFESTYLE VARIABLES: TOBACCO_USE: 0

## 2021-01-07 NOTE — LETTER
"    1/7/2021         RE: Byron Russo  [address] [facility] [address]        Dear Colleague,    Thank you for referring your patient, Byron Russo, to the [facility]. Please see a copy of my visit note below.    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 Etiology unclear but empirically treating as possible complication of GVHD.     HPI: Yg returns for follow up. Canceled his appointment for thoracentesis today due to having a lot of soreness after his last thoracentesis. It took them much longer to get the fluid off and were \"poking around\" a lot more which is why he was more sore. Breathing is stable, continues to use 3L oxygen without difficulty. Had some \"zinger\" pains up his chest cavity and around his back after his last thoracentesis. Not associated with activity or any other cardiac red flag symptoms. Pain usually subsided with a heating pad. Frequency has dissipated and hasn't had any today. No cough, fevers, chills or infectious symptoms. Pain improved. SOB stable but not improved.    ROS: 8 point ROS negative except as stated above    Physical Exam  BP (!) 141/102   Pulse 100   Temp 98  F (36.7  C) (Tympanic)   Resp 16   Wt 107.3 kg (236 lb 9.6 oz)   SpO2 95%   BMI 33.00 kg/m      Gen: A&O, NAD, conversant  OP: Mucosa dry without lesions, no OP erythema  Pulm: breathing comfortably at rest on 3L. Noticeably diminished lung sounds L but still able to hear breath sounds.    CV: RRR  Abd: +BS, soft, nontender  Extremities: trace LE edema  Skin: no rash on exposed skin  Access: no access    Labs:  Lab Results   Component Value Date    WBC 10.5 01/07/2021    ANEU 9.4 (H) 01/07/2021    HGB 18.1 (H) 01/07/2021    HCT 53.8 (H) 01/07/2021     01/07/2021     " 01/07/2021    POTASSIUM 5.1 01/07/2021    CHLORIDE 102 01/07/2021    CO2 29 01/07/2021     (H) 01/07/2021    BUN 21 01/07/2021    CR 1.06 01/07/2021    MAG 2.0 12/22/2020    INR 1.04 11/27/2020    BILITOTAL 0.8 01/07/2021    AST 31 01/07/2021    ALT 46 01/07/2021    ALKPHOS 87 01/07/2021    PROTTOTAL 7.8 01/07/2021    ALBUMIN 3.6 01/07/2021          A/P: Yg Russo is a 60 yo M 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 Etiology unclear but empirically treating as possible complication of GVHD.     1. 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 Heme  - Counts stable. Mild ongoing leukocytosis due to steroids  - baby ASA     3. ID: afebrile  - pleural fluid cx neg for infectious etiology  - prophy: ACV, levo, fluc and bactrim.     4. 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 Previously on CSA 25mg BID and pred 15mg daily   - 10/27 increased pred to 40mg daily for inflammatory pleural effusion.  - Increased CSA to 150mg since 12/16, level 12/22 low again, increase to 200mg BID. Repeat level pending.   Dr. Enciso wants to get CSA therapeutic before changing cGVHD Rx.   - eye gtts: refresh (not using other gtts because he wears corrective contact lenses, not GVHD lenses  -Per Dr. Enciso, we should get his CSA therapeutic before we consider adding Jakafi (hard to get insurance coverage for chronic GVHD).    5. Pulm:   - had a large, left pleural effusion with total collapse of left lung and mass effect on mediastinum. Suspect d/t GVHD.  Pulm placed pigtail catheter 10/23 (due to large volume fluid, can't all be drained in one day via thoracentesis).   - per last note by Dr. Brady, if patient is re-accumulating, he would recommend PleurX catheter. Per IR, will not do per insurance unless hospice pt. Awaiting further recs.     - Last thoracentesis done 12/23. Pt was scheduled for thora 12/31 but cancelled  "this appointment d/t previous thoracentesis causing a lot of discomfort and frustration.   - CXR 12/31 with increased fluid but no tracheal deviation or other concerning findings per my read (final read pending). Pt clinically stable. Will re-schedule thoracentesis for next week.     Will do pleural tap tomorrow than go as long as we can before scheduling repeat tap. CSA may be slowly inproving the rate at which the effusion reaccumulates.    CXR today similar to that of 12-30 with pneomo/pleural effusion worse on left than right.  -  Increased pred from 15 to 40mg daily starting 10/27; continue.     - \"zingers\" up the chest cavity likely due to pleural irritation following last thoracentesis, symptoms appear to have resolved. Monitor. EKG declined by pt 12/31.     Home O2:  Has home and portable O2 concentrators through [employer] in [place]; 3L- stable 12/31    6. Endo  - on synthroid    7. CV  - on ASA, crestor, metoprolol    8. FEN/renal  - creat, lytes wnl  - cont MgOx bid (help with mild consitpation)      Plan: RTC 1-19 with CXR.  Renew CSA.  Amlodipine 5 mg po daily for high BP.    Uli Enciso M.D.      "

## 2021-01-07 NOTE — PROGRESS NOTES
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 Etiology unclear but empirically treating as possible complication of GVHD.     HPI: Yg returns for follow up. Canceled his appointment for thoracentesis today due to having a lot of soreness after his last thoracentesis. It took them much longer to get the fluid off and were \"poking around\" a lot more which is why he was more sore. Breathing is stable, continues to use 3L oxygen without difficulty. Had some \"zinger\" pains up his chest cavity and around his back after his last thoracentesis. Not associated with activity or any other cardiac red flag symptoms. Pain usually subsided with a heating pad. Frequency has dissipated and hasn't had any today. No cough, fevers, chills or infectious symptoms. Pain improved. SOB stable but not improved.    ROS: 8 point ROS negative except as stated above    Physical Exam  BP (!) 141/102   Pulse 100   Temp 98  F (36.7  C) (Tympanic)   Resp 16   Wt 107.3 kg (236 lb 9.6 oz)   SpO2 95%   BMI 33.00 kg/m      Gen: A&O, NAD, conversant  OP: Mucosa dry without lesions, no OP erythema  Pulm: breathing comfortably at rest on 3L. Noticeably diminished lung sounds L but still able to hear breath sounds.    CV: RRR  Abd: +BS, soft, nontender  Extremities: trace LE edema  Skin: no rash on exposed skin  Access: no access    Labs:  Lab Results   Component Value Date    WBC 10.5 01/07/2021    ANEU 9.4 (H) 01/07/2021    HGB 18.1 (H) 01/07/2021    HCT 53.8 (H) 01/07/2021     01/07/2021     01/07/2021    POTASSIUM 5.1 01/07/2021    CHLORIDE 102 01/07/2021    CO2 29 01/07/2021     (H) 01/07/2021    BUN 21 01/07/2021    CR 1.06 01/07/2021    MAG 2.0 12/22/2020    INR 1.04 11/27/2020    BILITOTAL 0.8 01/07/2021    AST 31 01/07/2021    ALT 46 01/07/2021    ALKPHOS 87 01/07/2021    " PROTTOTAL 7.8 01/07/2021    ALBUMIN 3.6 01/07/2021          A/P: Yg Russo is a 60 yo M 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 Etiology unclear but empirically treating as possible complication of GVHD.     1. 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 Heme  - Counts stable. Mild ongoing leukocytosis due to steroids  - baby ASA     3. ID: afebrile  - pleural fluid cx neg for infectious etiology  - prophy: ACV, levo, fluc and bactrim.     4. 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 Previously on CSA 25mg BID and pred 15mg daily   - 10/27 increased pred to 40mg daily for inflammatory pleural effusion.  - Increased CSA to 150mg since 12/16, level 12/22 low again, increase to 200mg BID. Repeat level pending.   Dr. Enciso wants to get CSA therapeutic before changing cGVHD Rx.   - eye gtts: refresh (not using other gtts because he wears corrective contact lenses, not GVHD lenses  -Per Dr. Enciso, we should get his CSA therapeutic before we consider adding Jakafi (hard to get insurance coverage for chronic GVHD).    5. Pulm:   - had a large, left pleural effusion with total collapse of left lung and mass effect on mediastinum. Suspect d/t GVHD.  Pulm placed pigtail catheter 10/23 (due to large volume fluid, can't all be drained in one day via thoracentesis).   - per last note by Dr. Brady, if patient is re-accumulating, he would recommend PleurX catheter. Per IR, will not do per insurance unless hospice pt. Awaiting further recs.     - Last thoracentesis done 12/23. Pt was scheduled for thora 12/31 but cancelled this appointment d/t previous thoracentesis causing a lot of discomfort and frustration.   - CXR 12/31 with increased fluid but no tracheal deviation or other concerning findings per my read (final read pending). Pt clinically stable. Will re-schedule thoracentesis for next week.     Will do pleural  "tap tomorrow than go as long as we can before scheduling repeat tap. CSA may be slowly inproving the rate at which the effusion reaccumulates.    CXR today similar to that of 12-30 with pneomo/pleural effusion worse on left than right.  -  Increased pred from 15 to 40mg daily starting 10/27; continue.     - \"zingers\" up the chest cavity likely due to pleural irritation following last thoracentesis, symptoms appear to have resolved. Monitor. EKG declined by pt 12/31.     Home O2:  Has home and portable O2 concentrators through [employer] in [place]; 3L- stable 12/31    6. Endo  - on synthroid    7. CV  - on ASA, crestor, metoprolol    8. FEN/renal  - creat, lytes wnl  - cont MgOx bid (help with mild consitpation)      Plan: RTC 1-19 with CXR.  Renew CSA.  Amlodipine 5 mg po daily for high BP.    Uli Enciso M.D.    "

## 2021-01-07 NOTE — NURSING NOTE
Chief Complaint   Patient presents with     Blood Draw     Labs drawn via  by RN. VS taken     Labs drawn with vpt by rn.  Pt tolerated well.  VS taken.  Pt checked in for next appt.    Aquiles Dupree RN

## 2021-01-07 NOTE — NURSING NOTE
"Oncology Rooming Note    January 7, 2021 4:27 PM   Byron Russo is a 60 year old male who presents for:    Chief Complaint   Patient presents with     Blood Draw     Labs drawn via  by RN. VS taken     Oncology Clinic Visit     MDS      Initial Vitals: BP (!) 141/102   Pulse 100   Temp 98  F (36.7  C) (Tympanic)   Resp 16   Wt 107.3 kg (236 lb 9.6 oz)   SpO2 95%   BMI 33.00 kg/m   Estimated body mass index is 33 kg/m  as calculated from the following:    Height as of 12/23/20: 1.803 m (5' 11\").    Weight as of this encounter: 107.3 kg (236 lb 9.6 oz). Body surface area is 2.32 meters squared.  No Pain (0) Comment: Data Unavailable   No LMP for male patient.  Allergies reviewed: Yes  Medications reviewed: Yes    Medications: MEDICATION REFILLS NEEDED TODAY. Provider was notified. Patient needs a cyclosporine 100 mg refill   Pharmacy name entered into [facility]: [facility] [place], [place] - [address] [place] [address] [phone]    Clinical concerns: No new concerns today  Dr Reina was notified.      María Howell            "

## 2021-01-08 NOTE — BRIEF OP NOTE
Interventional Radiology Brief Post Procedure Note    Procedure: Thoracentsis    Proceduralist: Carl Singh [facility], [facility]    Assistant: None    Time Out: Prior to the start of the procedure and with procedural staff participation, I verbally confirmed the patient s identity using two indicators, relevant allergies, that the procedure was appropriate and matched the consent or emergent situation, and that the correct equipment/implants were available. Immediately prior to starting the procedure I conducted the Time Out with the procedural staff and re-confirmed the patient s name, procedure, and site/side. (The Joint Commission universal protocol was followed.)  Yes        Sedation: None. Local Anesthestic used    Findings: U/S guided left therapeutic thoracentesis, with return of hazy khadra fluid. 1425 ml aspirated. Septations noted on U/S    Estimated Blood Loss: Minimal    Fluoroscopy Time: None.    SPECIMENS: None    Complications: 1. None     Condition: Stable    Plan: Follow up per primary team.    Comments: See dictated procedure note for full details.    Carl Singh PA-C

## 2021-01-08 NOTE — DISCHARGE INSTRUCTIONS
Tylenol 975mg given at 6:55am. Next dose available after 1pm. Then follow package instructions.       Discharge Instructions for Paracentesis or Thoracentesis     Paracentesis is a procedure to remove extra fluid from your belly (abdomen). Thoracentesis is a procedure to remove extra fluid from your chest.  This fluid buildup is called ascites. The procedure may have been done to take a sample of the fluid. Or, it may have been done to drain the extra fluid from your abdomen or chest to help make you more comfortable.     Home care    If you have pain after the procedure, your healthcare provider can prescribe or recommend pain medicines. Take these exactly as directed. If you stopped taking other medicines before the procedure, ask your provider when you can start them again.    Avoid strenuous activity for 48 hours.    You will have a small bandage over the puncture site. Adhesive tapes, adhesive strips, or surgical glue may also be used to close the incision. They also help stop bleeding and promote healing. You may take the bandage off in 24-48 hours. Once there is a scab over the site, no bandages are required.    Check the puncture site for the signs of infection listed below.     Follow-up care  Make a follow-up appointment with your healthcare provider as directed. During your follow-up visit, your provider will check your healing. Let your provider know how you are feeling. You can also discuss the cause of your fluid, and if you need any further treatment.    When to seek medical advice:  Call your healthcare provider if you have any of the following after the procedure:    A fever of 100.4 F (38.0 C) or higher    Trouble breathing    Abdominal pain that is worse than expected    Belly Abdominal pain not caused by having the skin punctured that is worse than expected    Persistent bleeding from the puncture site    More than a small amount of fluid leaking from the puncture site    Swollen belly    Signs  of infection at the puncture site. These include increased pain, redness, or swelling, warmth, or bad-smelling drainage.    Feeling dizzy or lightheaded, or fainting     Call our Interventional Radiology (IR) service if:  If you start bleeding from the procedure site.  If you do start to bleed from the site, lie down and hold some pressure on the site.  Our radiology provider can help you decide if you need to return to the hospital.  If you have new or worsening pain related to the procedure.  If you have pronounced swelling at the procedure site.  If you develop persistent nausea or vomiting.  If you develop hives or a rash or any unexplained itching.  If you have a fever of greater than 100.4  F and chills in the first 5 days after procedure.  Any other concerns related to your procedure.      [facility]  Interventional Radiology (IR)  [address]  [address] [facility] [place] [address]    Contact Number:  [phone]  (IR control desk)  Monday - Friday 8:00 am - 4:30 pm    After hours for urgent concerns:  [phone]  After 4:30 pm Monday - Friday, Weekends and Holidays.   Ask for Interventional Radiology on-call.  Someone is available 24 hours a day.  [facility] toll free number:  [phone]

## 2021-01-19 NOTE — NURSING NOTE
"Oncology Rooming Note    January 19, 2021 3:36 PM   Byron Russo is a 60 year old male who presents for:    Chief Complaint   Patient presents with     Blood Draw     LABS DRAWN BY NURSE     Initial Vitals: BP (!) 143/92   Pulse 109   Temp 98.3  F (36.8  C) (Tympanic)   Resp 18   Wt 107.8 kg (237 lb 11.2 oz)   SpO2 95%   BMI 33.15 kg/m   Estimated body mass index is 33.15 kg/m  as calculated from the following:    Height as of 1/8/21: 1.803 m (5' 11\").    Weight as of this encounter: 107.8 kg (237 lb 11.2 oz). Body surface area is 2.32 meters squared.  No Pain (0) Comment: Data Unavailable   No LMP for male patient.  Allergies reviewed: Yes  Medications reviewed: Yes    Medications: Medication refills not needed today.  Pharmacy name entered into [employer]: [facility] [place], [place] - [address] [place] [address] [phone]    Clinical concerns: No new concerns today.       Nusrat Zayas MA            "

## 2021-01-19 NOTE — PROGRESS NOTES
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 Etiology unclear but empirically treating as possible complication of GVHD.     HPI: Yg returns for follow up. Stable. Breathing stable. No cough returning. No CP. No fevers or infectious concerns. On 3L.     ROS: 8 point ROS negative except as stated above    Physical Exam  BP (!) 143/92   Pulse 109   Temp 98.3  F (36.8  C) (Tympanic)   Resp 18   Wt 107.8 kg (237 lb 11.2 oz)   SpO2 95%   BMI 33.15 kg/m      Gen: A&O, NAD, conversant  OP: Mucosa dry without lesions, no OP erythema  Pulm: breathing comfortably at rest on 3L. Noticeably diminished lung sounds L but still able to hear breath sounds.    CV: RRR  Abd: +BS, soft, nontender  Extremities: trace LE edema  Skin: no rash on exposed skin  Access: no access    Labs:  Lab Results   Component Value Date    WBC 20.6 (H) 01/19/2021    ANEU 16.8 (H) 01/19/2021    HGB 17.8 (H) 01/19/2021    HCT 52.7 01/19/2021     01/19/2021     01/19/2021    POTASSIUM 4.3 01/19/2021    CHLORIDE 106 01/19/2021    CO2 25 01/19/2021     (H) 01/19/2021    BUN 22 01/19/2021    CR 0.80 01/19/2021    MAG 2.0 12/22/2020    INR 1.03 01/08/2021    BILITOTAL 0.7 01/19/2021    AST 25 01/19/2021    ALT 33 01/19/2021    ALKPHOS 82 01/19/2021    PROTTOTAL 7.7 01/19/2021    ALBUMIN 3.6 01/19/2021          A/P: Yg Russo is a 58 yo M 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 Etiology unclear but empirically treating as possible complication of GVHD.     1. 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 Heme  - Counts stable. Ongoing leukocytosis due to steroids and maybe acute stress with lung strain. He has no central line and no acute infectious symptoms. He clinically looks well  despite his impressive CXR (not new) as below. Will get surveillance BC next visit just in case.    - baby ASA     3. ID: afebrile  - pleural fluid cx neg for infectious etiology  - prophy: ACV, levo, fluc and bactrim.     4. 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 Previously on CSA 25mg BID and pred 15mg daily   - 10/27 increased pred to 40mg daily for inflammatory pleural effusion.  - CSA 200mg BID. Level pending.   - He appears to be stable based on the fact that last tap on 1/8 and he is feeling no changes signaling that he needs a tap today.   - eye gtts: refresh (not using other gtts because he wears corrective contact lenses, not GVHD lenses    5. Pulm:   - has a large, left pleural effusion with total collapse of left lung and mass effect on mediastinum. Suspect d/t GVHD.  Pulm placed pigtail catheter 10/23 (due to large volume fluid, can't all be drained in one day via thoracentesis).   - per last note by Dr. Brady, if patient is re-accumulating, he would recommend PleurX catheter. Per IR, will not do per insurance unless hospice.     - Last thoracentesis done 1/8. CXR today similar with pneomo/pleural effusion worse on left than right. Discussed with IR today no validity in repeating CXR weekly. If he develops symptoms he should get a CT chest w/o contrast. Yg is aware if any acute changes he would present to ED or Call 911.   - CSA may be slowly inproving the rate at which the effusion reaccumulates.  - He has no current tap lined up. Will follow-up with him in 1 week and re-assess.    Home O2:  Has home and portable O2 concentrators through [employer] in [place]; 3L- stable 12/31    6. Endo  - on synthroid    7. CV  - on ASA, crestor, metoprolol    8. FEN/renal  - creat, lytes wnl  - cont MgOx bid (help with mild consitpation).      RTC: 1 week    Karrie Mc PA-C  #[id number]

## 2021-01-19 NOTE — LETTER
1/19/2021         RE: Byron Russo  [address] [facility] [address]        Dear Colleague,    Thank you for referring your patient, Byron Russo, to the [facility] BLOOD AND MARROW TRANSPLANT PROGRAM [place]. Please see a copy of my visit note below.    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 Etiology unclear but empirically treating as possible complication of GVHD.     HPI: Yg returns for follow up. Stable. Breathing stable. No cough returning. No CP. No fevers or infectious concerns. On 3L.     ROS: 8 point ROS negative except as stated above    Physical Exam  BP (!) 143/92   Pulse 109   Temp 98.3  F (36.8  C) (Tympanic)   Resp 18   Wt 107.8 kg (237 lb 11.2 oz)   SpO2 95%   BMI 33.15 kg/m      Gen: A&O, NAD, conversant  OP: Mucosa dry without lesions, no OP erythema  Pulm: breathing comfortably at rest on 3L. Noticeably diminished lung sounds L but still able to hear breath sounds.    CV: RRR  Abd: +BS, soft, nontender  Extremities: trace LE edema  Skin: no rash on exposed skin  Access: no access    Labs:  Lab Results   Component Value Date    WBC 20.6 (H) 01/19/2021    ANEU 16.8 (H) 01/19/2021    HGB 17.8 (H) 01/19/2021    HCT 52.7 01/19/2021     01/19/2021     01/19/2021    POTASSIUM 4.3 01/19/2021    CHLORIDE 106 01/19/2021    CO2 25 01/19/2021     (H) 01/19/2021    BUN 22 01/19/2021    CR 0.80 01/19/2021    MAG 2.0 12/22/2020    INR 1.03 01/08/2021    BILITOTAL 0.7 01/19/2021    AST 25 01/19/2021    ALT 33 01/19/2021    ALKPHOS 82 01/19/2021    PROTTOTAL 7.7 01/19/2021    ALBUMIN 3.6 01/19/2021          A/P: Yg Russo is a 60 yo M 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 Etiology unclear but empirically treating as possible complication of GVHD.     1. 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 Heme  - Counts stable. Ongoing leukocytosis due to steroids and maybe acute stress with lung strain. He has no central line and no acute infectious symptoms. He clinically looks well despite his impressive CXR (not new) as below. Will get surveillance BC next visit just in case.    - baby ASA     3. ID: afebrile  - pleural fluid cx neg for infectious etiology  - prophy: ACV, levo, fluc and bactrim.     4. 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 Previously on CSA 25mg BID and pred 15mg daily   - 10/27 increased pred to 40mg daily for inflammatory pleural effusion.  - CSA 200mg BID. Level pending.   - He appears to be stable based on the fact that last tap on 1/8 and he is feeling no changes signaling that he needs a tap today.   - eye gtts: refresh (not using other gtts because he wears corrective contact lenses, not GVHD lenses    5. Pulm:   - has a large, left pleural effusion with total collapse of left lung and mass effect on mediastinum. Suspect d/t GVHD.  Pulm placed pigtail catheter 10/23 (due to large volume fluid, can't all be drained in one day via thoracentesis).   - per last note by Dr. Brady, if patient is re-accumulating, he would recommend PleurX catheter. Per IR, will not do per insurance unless hospice.     - Last thoracentesis done 1/8. CXR today similar with pneomo/pleural effusion worse on left than right. Discussed with IR today no validity in repeating CXR weekly. If he develops symptoms he should get a CT chest w/o contrast. Yg is aware if any acute changes he would present to ED or Call 911.   - CSA may be slowly inproving the rate at which the effusion reaccumulates.  - He has no current tap lined up. Will follow-up with him in 1 week and re-assess.    Home O2:  Has home and portable O2  concentrators through [employer] in [place]; 3L- stable 12/31    6. Endo  - on synthroid    7. CV  - on ASA, crestor, metoprolol    8. FEN/renal  - creat, lytes wnl  - cont MgOx bid (help with mild consitpation).      RTC: 1 week    Karrie Mc PA-C  #[id number]          BMT Advanced Practice Provider

## 2021-01-26 NOTE — LETTER
1/26/2021         RE: Byron Russo  [address] [facility] [address]        Dear Colleague,    Thank you for referring your patient, Byron Russo, to the [facility] BLOOD AND MARROW TRANSPLANT PROGRAM [place]. Please see a copy of my visit note below.    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 Etiology unclear but empirically treating as possible complication of GVHD.     HPI: Yg returns for follow up. He is stable on his 3L of oxygen.  He notes that stairs are hard, but more due to muscle weakness in his legs.  He feels he has lost a lot of muscle, and he is gearing up to start exercising more.  He still has his list of exercises from PT in hospital.  Also considering buying an exercise bike.    Eyes and mouth are dry.     ROS: 8 point ROS negative except as stated above    Physical Exam  BP (!) 142/98 (BP Location: Right arm, Patient Position: Sitting, Cuff Size: Adult Regular)   Pulse 114   Temp 98.5  F (36.9  C) (Tympanic)   Resp 18   Wt 107.3 kg (236 lb 9.6 oz)   SpO2 95%   BMI 33.00 kg/m      Gen: A&O, NAD, conversant  OP: Mucosa very dry without lesions, no OP erythema  Pulm: breathing comfortably at rest on 3L.Lung sounds are surprisingly pretty good on the left for about the upper 3/4 of the left lung.  They also sound good on the right.   CV: RRR  Abd: +BS, soft, nontender  Extremities: trace LE edema  Skin: no rash on exposed skin  Access: no access    Labs:  Lab Results   Component Value Date    WBC 18.2 (H) 01/26/2021    ANEU 11.5 (H) 01/26/2021    HGB 16.9 01/26/2021    HCT 49.4 01/26/2021     01/26/2021     01/26/2021    POTASSIUM 3.9 01/26/2021    CHLORIDE 107 01/26/2021    CO2 26 01/26/2021    GLC 98 01/26/2021    BUN 30 01/26/2021    CR 0.80 01/26/2021    MAG 1.6 01/26/2021    INR 1.03  01/08/2021    BILITOTAL 0.6 01/26/2021    AST 33 01/26/2021    ALT 45 01/26/2021    ALKPHOS 86 01/26/2021    PROTTOTAL 7.2 01/26/2021    ALBUMIN 3.4 01/26/2021          A/P: Yg Russo is a 58 yo M 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 Etiology unclear but empirically treating as possible complication of GVHD.     1. 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 Heme  - Counts stable. Ongoing leukocytosis due to steroids and maybe acute stress with lung strain. Surveillance blood cultures today as ordered.   - baby ASA     3. ID: afebrile  - pleural fluid cx neg for infectious etiology  - prophy: ACV, levo, fluc and bactrim.     4. 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 Previously on CSA 25mg BID and pred 15mg daily   - 10/27 increased pred to 40mg daily for inflammatory pleural effusion.  - CSA 200mg BID. Level pending.   - He appears to be stable based on the fact that last tap on 1/8 and he is feeling no changes signaling that he needs a tap today.       5. Pulm:   - has a large, left pleural effusion with total collapse of left lung and mass effect on mediastinum. Suspect d/t GVHD.  Pulm placed pigtail catheter 10/23 (due to large volume fluid, can't all be drained in one day via thoracentesis).   - per last note by Dr. Brady, if patient is re-accumulating, he would recommend PleurX catheter. Per IR, will not do per insurance unless hospice.     - Last thoracentesis done 1/8. CXR today similar with pneomo/pleural effusion worse on left than right. Discussed with IR today no validity in repeating CXR weekly. If he develops symptoms he should get a CT chest w/o contrast. Yg is aware if any acute changes he would present to ED or Call 911.   - CSA may be slowly inproving the rate at which the effusion reaccumulates.  - He has no current tap lined up. Will follow-up with him in 1 week and re-assess.    Home  O2:  Has home and portable O2 concentrators through [employer] in [place]; 3L- stable      6. Endo  - on synthroid    7. CV  - on ASA, crestor, metoprolol    8. FEN/renal  - creat, lytes wnl  - cont MgOx bid (help with mild consitpation).    9. GVHD of the eyes: resume drop rx'd by ophthalmology.  Due to covid, he is following up only if needed.    10. GVHD of the mouth: no sores at this time. Using biotene.  Doesn't have sores so doesn't want to do dex swish/spit at this point. Also, he had a terrible case of thrush that led to bad gingivitis, so he wants to avoid steroid for that reason as well.     RTC: 2/4 with Dr. Enciso.  Patient states Dr. Enciso may want to change from CSA to something else, so it would be good if Dr. Enciso can complete that visit rather than an BRENDA.       Meera Kern PA-C  1/26/2021        Again, thank you for allowing me to participate in the care of your patient.        Sincerely,        BMT Advanced Practice Provider

## 2021-01-26 NOTE — PROGRESS NOTES
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 Etiology unclear but empirically treating as possible complication of GVHD.     HPI: Yg returns for follow up. He is stable on his 3L of oxygen.  He notes that stairs are hard, but more due to muscle weakness in his legs.  He feels he has lost a lot of muscle, and he is gearing up to start exercising more.  He still has his list of exercises from PT in hospital.  Also considering buying an exercise bike.    Eyes and mouth are dry.     ROS: 8 point ROS negative except as stated above    Physical Exam  BP (!) 142/98 (BP Location: Right arm, Patient Position: Sitting, Cuff Size: Adult Regular)   Pulse 114   Temp 98.5  F (36.9  C) (Tympanic)   Resp 18   Wt 107.3 kg (236 lb 9.6 oz)   SpO2 95%   BMI 33.00 kg/m      Gen: A&O, NAD, conversant  OP: Mucosa very dry without lesions, no OP erythema  Pulm: breathing comfortably at rest on 3L.Lung sounds are surprisingly pretty good on the left for about the upper 3/4 of the left lung.  They also sound good on the right.   CV: RRR  Abd: +BS, soft, nontender  Extremities: trace LE edema  Skin: no rash on exposed skin  Access: no access    Labs:  Lab Results   Component Value Date    WBC 18.2 (H) 01/26/2021    ANEU 11.5 (H) 01/26/2021    HGB 16.9 01/26/2021    HCT 49.4 01/26/2021     01/26/2021     01/26/2021    POTASSIUM 3.9 01/26/2021    CHLORIDE 107 01/26/2021    CO2 26 01/26/2021    GLC 98 01/26/2021    BUN 30 01/26/2021    CR 0.80 01/26/2021    MAG 1.6 01/26/2021    INR 1.03 01/08/2021    BILITOTAL 0.6 01/26/2021    AST 33 01/26/2021    ALT 45 01/26/2021    ALKPHOS 86 01/26/2021    PROTTOTAL 7.2 01/26/2021    ALBUMIN 3.4 01/26/2021          A/P: Yg Russo is a 60 yo M 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 Etiology unclear but empirically treating as possible complication of GVHD.     1. 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 Heme  - Counts stable. Ongoing leukocytosis due to steroids and maybe acute stress with lung strain. Surveillance blood cultures today as ordered.   - baby ASA     3. ID: afebrile  - pleural fluid cx neg for infectious etiology  - prophy: ACV, levo, fluc and bactrim.     4. 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 Previously on CSA 25mg BID and pred 15mg daily   - 10/27 increased pred to 40mg daily for inflammatory pleural effusion.  - CSA 200mg BID. Level pending.   - He appears to be stable based on the fact that last tap on 1/8 and he is feeling no changes signaling that he needs a tap today.       5. Pulm:   - has a large, left pleural effusion with total collapse of left lung and mass effect on mediastinum. Suspect d/t GVHD.  Pulm placed pigtail catheter 10/23 (due to large volume fluid, can't all be drained in one day via thoracentesis).   - per last note by Dr. Brady, if patient is re-accumulating, he would recommend PleurX catheter. Per IR, will not do per insurance unless hospice.     - Last thoracentesis done 1/8. CXR today similar with pneomo/pleural effusion worse on left than right. Discussed with IR today no validity in repeating CXR weekly. If he develops symptoms he should get a CT chest w/o contrast. Yg is aware if any acute changes he would present to ED or Call 911.   - CSA may be slowly inproving the rate at which the effusion reaccumulates.  - He has no current tap lined up. Will follow-up with him in 1 week and re-assess.    Home O2:  Has home and portable O2 concentrators through [employer] in [place]; 3L- stable      6. Endo  - on synthroid    7. CV  - on ASA, crestor, metoprolol    8. FEN/renal  - creat, lytes wnl  - cont MgOx bid (help with mild consitpation).    9. GVHD of the eyes: resume drop rx'd by  ophthalmology.  Due to covid, he is following up only if needed.    10. GVHD of the mouth: no sores at this time. Using biotene.  Doesn't have sores so doesn't want to do dex swish/spit at this point. Also, he had a terrible case of thrush that led to bad gingivitis, so he wants to avoid steroid for that reason as well.     RTC: 2/4 with Dr. Enciso.  Patient states Dr. Enciso may want to change from CSA to something else, so it would be good if Dr. Enciso can complete that visit rather than an BRENDA.       Meera Kern PA-C  1/26/2021

## 2021-01-26 NOTE — NURSING NOTE
Chief Complaint   Patient presents with     Blood Draw     Labs drawn by RN  and vss taken     Labs collected from venipuncture by RN. Vitals taken. Checked in for appointment(s).

## 2021-01-26 NOTE — NURSING NOTE
"Oncology Rooming Note    January 26, 2021 2:32 PM   Byron Russo is a 60 year old male who presents for:    Chief Complaint   Patient presents with     Blood Draw     Labs drawn by RN nery and vss taken     RECHECK     Pt is here for a rtn for AML     Initial Vitals: Blood Pressure (Abnormal) 142/98 (BP Location: Right arm, Patient Position: Sitting, Cuff Size: Adult Regular)   Pulse 114   Temperature 98.5  F (36.9  C) (Tympanic)   Respiration 18   Weight 107.3 kg (236 lb 9.6 oz)   Oxygen Saturation 95%   Body Mass Index 33.00 kg/m   Estimated body mass index is 33 kg/m  as calculated from the following:    Height as of 1/8/21: 1.803 m (5' 11\").    Weight as of this encounter: 107.3 kg (236 lb 9.6 oz). Body surface area is 2.32 meters squared.  No Pain (0) Comment: Data Unavailable   No LMP for male patient.  Allergies reviewed: Yes  Medications reviewed: Yes    Medications: Medication refills not needed today.  Pharmacy name entered into [employer] [facility]: [facility] [place], [place] - [address] [place] [address] [phone]    Clinical concerns: none       Fe Aguillon MA            "

## 2021-03-04 NOTE — LETTER
3/4/2021         RE: Byron Russo  [address] [facility] [address]        Dear Colleague,    Thank you for referring your patient, Byron Rsuso, to the [facility] BLOOD AND MARROW TRANSPLANT PROGRAM [place]. Please see a copy of my visit note below.    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 Etiology unclear but empirically treating as possible complication of GVHD.     HPI: Yg returns for follow up. He is stable on his 3L of oxygen.  However  he feels breathing harder aan he has dry cough, signs he has exhibited before when he need a pleural tap.  He notes that stairs are hard, but more due to muscle weakness in his legs.  He feels he has lost a lot of muscle, and he is gearing up to start exercising more.  He still has his list of exercises from PT in hospital.  Also considering buying an exercise bike.    Eyes and mouth are dry.     ROS: 8 point ROS negative except as stated above    Physical Exam  /87   Pulse 124   Temp 97.7  F (36.5  C) (Oral)   Resp 16   Wt 108 kg (238 lb 3.2 oz)   SpO2 95%   PF (!) 3 L/min   BMI 33.22 kg/m      Gen: A&O, NAD, conversant  OP: Mucosa very dry without lesions, no OP erythema  Pulm: breathing comfortably at rest on 3L.Lung sounds are surprisingly pretty good on the left for about the upper 3/4 of the left lung.  They also sound good on the right.   CV: RRR  Abd: +BS, soft, nontender  Extremities: trace LE edema  Skin: no rash on exposed skin  Access: no access    Labs:  Lab Results   Component Value Date    WBC 18.5 (H) 03/04/2021    ANEU 15.2 (H) 03/04/2021    HGB 16.6 03/04/2021    HCT 48.5 03/04/2021     03/04/2021     03/04/2021    POTASSIUM 4.2 03/04/2021    CHLORIDE 106 03/04/2021    CO2 25 03/04/2021     (H) 03/04/2021    BUN 26 03/04/2021    CR  0.87 03/04/2021    MAG 1.6 03/04/2021    INR 1.03 01/08/2021    BILITOTAL 1.0 03/04/2021    AST 22 03/04/2021    ALT 38 03/04/2021    ALKPHOS 101 03/04/2021    PROTTOTAL 7.4 03/04/2021    ALBUMIN 3.6 03/04/2021          A/P: Yg Russo is a 58 yo M 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 Etiology unclear but empirically treating as possible complication of GVHD.     1. 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 Heme  - Counts stable. Ongoing leukocytosis due to steroids and maybe acute stress with lung strain.   - baby ASA     3. ID: afebrile  - pleural fluid cx neg for infectious etiology  - prophy: ACV, levo, fluc and bactrim.     4. 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 Previously on CSA 25mg BID and pred 15mg daily   - 10/27 increased pred to 40mg daily for inflammatory pleural effusion.  - CSA 200mg BID. Level pending.   - CXR shows bigger L effusion and shift of trachea to right.  5. Pulm:   - has a large, left pleural effusion with total collapse of left lung and mass effect on mediastinum. Suspect d/t GVHD.  Pulm placed pigtail catheter 10/23 (due to large volume fluid, can't all be drained in one day via thoracentesis).   -Schedule pleural tap with IR and f/u with Dr Brady per patient request. Yg is aware if any acute changes he would present to ED or Call 911.   - CSA may be slowly inproving the rate at which the effusion reaccumulates.  [place] stable ot 95%.  Home O2:  Has home and portable O2 concentrators through [employer] in [place]; 3L- stable      6. Endo  - on synthroid    7. CV  - on ASA, crestor, metoprolol    8. FEN/renal  - creat, lytes wnl  - cont MgOx bid (help with mild consitpation).    9. GVHD of the eyes: resume drop rx'd by ophthalmology.  Due to covid, he is following up only if needed.    10. GVHD of the mouth: no sores at this time. Using biotene.  Doesn't have sores  so doesn't want to do dex swish/spit at this point. Also, he had a terrible case of thrush that led to bad gingivitis, so he wants to avoid steroid for that reason as well.     RTC: Pending scheduling abdirizak and Dr. Brady.    Uli Enciso M.D.

## 2021-03-04 NOTE — NURSING NOTE
"Oncology Rooming Note    March 4, 2021 2:52 PM   Byron Russo is a 60 year old male who presents for:    Chief Complaint   Patient presents with     Blood Draw     Labs drawn via  by RN. VS taken.     Oncology Clinic Visit     RAEB-2     Initial Vitals: /87   Pulse 124   Temp 97.7  F (36.5  C) (Oral)   Resp 16   Wt 108 kg (238 lb 3.2 oz)   SpO2 95%   PF (!) 3 L/min   BMI 33.22 kg/m   Estimated body mass index is 33.22 kg/m  as calculated from the following:    Height as of 1/8/21: 1.803 m (5' 11\").    Weight as of this encounter: 108 kg (238 lb 3.2 oz). Body surface area is 2.33 meters squared.  No Pain (0) Comment: Data Unavailable   No LMP for male patient.  Allergies reviewed: Yes  Medications reviewed: Yes    Medications: MEDICATION REFILLS NEEDED TODAY. Provider was notified.   Needs everything refilled in the next 3 weeks    Pharmacy name entered into [facility]: [facility] [place], [place] - [phone]    Clinical concerns: none       Kamla Hurt CMA            "

## 2021-03-04 NOTE — NURSING NOTE
Chief Complaint   Patient presents with     Blood Draw     Labs drawn via  by RN. VS taken.     Labs drawn with vpt by rn.  Pt tolerated well.  VS taken.  Pt checked in for next appt.    Aquiles Dupree RN

## 2021-03-04 NOTE — PROGRESS NOTES
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 Etiology unclear but empirically treating as possible complication of GVHD.     HPI: Yg returns for follow up. He is stable on his 3L of oxygen.  However  he feels breathing harder aan he has dry cough, signs he has exhibited before when he need a pleural tap.  He notes that stairs are hard, but more due to muscle weakness in his legs.  He feels he has lost a lot of muscle, and he is gearing up to start exercising more.  He still has his list of exercises from PT in hospital.  Also considering buying an exercise bike.    Eyes and mouth are dry.     ROS: 8 point ROS negative except as stated above    Physical Exam  /87   Pulse 124   Temp 97.7  F (36.5  C) (Oral)   Resp 16   Wt 108 kg (238 lb 3.2 oz)   SpO2 95%   PF (!) 3 L/min   BMI 33.22 kg/m      Gen: A&O, NAD, conversant  OP: Mucosa very dry without lesions, no OP erythema  Pulm: breathing comfortably at rest on 3L.Lung sounds are surprisingly pretty good on the left for about the upper 3/4 of the left lung.  They also sound good on the right.   CV: RRR  Abd: +BS, soft, nontender  Extremities: trace LE edema  Skin: no rash on exposed skin  Access: no access    Labs:  Lab Results   Component Value Date    WBC 18.5 (H) 03/04/2021    ANEU 15.2 (H) 03/04/2021    HGB 16.6 03/04/2021    HCT 48.5 03/04/2021     03/04/2021     03/04/2021    POTASSIUM 4.2 03/04/2021    CHLORIDE 106 03/04/2021    CO2 25 03/04/2021     (H) 03/04/2021    BUN 26 03/04/2021    CR 0.87 03/04/2021    MAG 1.6 03/04/2021    INR 1.03 01/08/2021    BILITOTAL 1.0 03/04/2021    AST 22 03/04/2021    ALT 38 03/04/2021    ALKPHOS 101 03/04/2021    PROTTOTAL 7.4 03/04/2021    ALBUMIN 3.6 03/04/2021          A/P: Yg Russo is a 60 yo M 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 Etiology unclear but empirically treating as possible complication of GVHD.     1. 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 Heme  - Counts stable. Ongoing leukocytosis due to steroids and maybe acute stress with lung strain.   - baby ASA     3. ID: afebrile  - pleural fluid cx neg for infectious etiology  - prophy: ACV, levo, fluc and bactrim.     4. 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 Previously on CSA 25mg BID and pred 15mg daily   - 10/27 increased pred to 40mg daily for inflammatory pleural effusion.  - CSA 200mg BID. Level pending.   - CXR shows bigger L effusion and shift of trachea to right.  5. Pulm:   - has a large, left pleural effusion with total collapse of left lung and mass effect on mediastinum. Suspect d/t GVHD.  Pulm placed pigtail catheter 10/23 (due to large volume fluid, can't all be drained in one day via thoracentesis).   -Schedule pleural tap with IR and f/u with Dr Brady per patient request. Yg is aware if any acute changes he would present to ED or Call 911.   - CSA may be slowly inproving the rate at which the effusion reaccumulates.  Carson stable ot 95%.  Home O2:  Has home and portable O2 concentrators through [employer] in [place]; 3L- stable      6. Endo  - on synthroid    7. CV  - on ASA, crestor, metoprolol    8. FEN/renal  - creat, lytes wnl  - cont MgOx bid (help with mild consitpation).    9. GVHD of the eyes: resume drop rx'd by ophthalmology.  Due to covid, he is following up only if needed.    10. GVHD of the mouth: no sores at this time. Using biotene.  Doesn't have sores so doesn't want to do dex swish/spit at this point. Also, he had a terrible case of thrush that led to bad gingivitis, so he wants to avoid steroid for that reason as well.     RTC: Pending scheduling tap and Dr. Brady.    Uli Enciso M.D.

## 2021-03-09 NOTE — LETTER
3/9/2021         RE: Byron Russo  [address] [facility] [id number]        Dear Colleague,    Thank you for referring your patient, Byron Russo, to the [facility] FOR LUNG SCIENCE AND [facility] CLINIC [place]. Please see a copy of my visit note below.    Yg is a 60 year old who is being evaluated via a billable telephone visit.      What phone number would you like to be contacted at? [phone]  How would you like to obtain your AVS? [employer]  Phone call duration: 21 minutes      61 YO male with history of MDS RAEB-2 s/p Allo-sib HSCT 3 years ago who was recently hospitalized for increased SOB/CUELLAR and hypoxia.  Underwent CT chest on admission that showed peripheral findings with a few middle lung nodular infiltrates.  Underwent bronchoscopy with BAL showing only 190 and 290 WBC in a lymphocyte predominant pattern, only microbiology findings were Candida; was started on Posaconazole. Discharged on 4L of oxygen.  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  SOB and CUELLAR is improved since discharge.  States onset of symptoms was in 10-18, noticed increased SOB while doing yardwork.  States normal walking does not cause a problem, only if he is walking fast or carrying items. Denies any prior history of lung disease- no history of asthma, no pneumonia, no complications with chemotherapy.  Has a history of cGvHD, is on alternating prednisone of 10/20 mg and cyclosporin of 25 mg every day.  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    Last seen 4 months ago. Since that time he has undergone thoracentesis on 11-27, 12-18, 12-23 and 1-8.  Has been maintained on Prednisone 40 mg per day and CSA (increased dose lat 2 months).  Has not required a repeat thoracentesis since 1-8 but presented with increased SOB on 3-2; CXR on that day showed an  increase in the left pleural effusion.  Is scheduled for a repeat thoracentesis by IR tomorrow.    Assessment and Plan:  61 YO s/p HSCT post 3 years with sub-acute presentation with SOB and CUELLAR with recent worsening.  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  Unclear of cause of acute worsening but may be related to concomitant viral infection, cannot fully exclude fungal infection but BAL cultures have remained negative. No effective treatment for PPFE, steroids do not seem to be effective, however in my experience the disease does not seem to progress.  Based on lack of treatment and seemingly no/slow progression, I would not proceed with biopsy to confirm the diagnosis. With impaired lung function he is at risk to develop respiratory complications during times of respiratory infections.  New increased SOB since August 2020; found to have a large left pleural effusion.  Underwent thoracentesis with trapped lung.  Pleural fluid is consistent with serositis.  Continued re-accumulation of fluid, possibly slower re-accumulation now on CSA.  To undergo repeat thoracentesis tomorrow.  Challenging case in setting of recurrent pleural effusion with trapped lung.  In my experience cGvHD serositis responds poorly to immunosuppression.  Further treatment options include placement of PleurX catheter to assist in controlling symptoms of SOB and desaturation if develops; this may also help with the trapped lung. With rapid accumulation of fluid and trapped lung, pleurodesis is less likely to be successful as pleural surfaces will not be able to adhere with trapped lung and accumulating fluid.  Will discuss with IP regarding possibility of placement of PleurX catheter.  Need to clarify insurance issues; is starting on [facility] on April 12th, unclear if will be covered by Medicaid or Medicare at that time.           RTC will be arranged after repeat  thoracentesis and ongoing discussions regarding PleurX catheter.  Patient to call with any questions or concerns prior to his next clinic visit     Phone call duration: 26 minutes      Again, thank you for allowing me to participate in the care of your patient.        Sincerely,        Travis Brady MD

## 2021-03-09 NOTE — PROGRESS NOTES
Yg is a 60 year old who is being evaluated via a billable telephone visit.      What phone number would you like to be contacted at? [phone]  How would you like to obtain your AVS? Girish  Phone call duration: 21 minutes      61 YO male with history of MDS RAEB-2 s/p Allo-sib HSCT 3 years ago who was recently hospitalized for increased SOB/CUELLAR and hypoxia.  Underwent CT chest on admission that showed peripheral findings with a few middle lung nodular infiltrates.  Underwent bronchoscopy with BAL showing only 190 and 290 WBC in a lymphocyte predominant pattern, only microbiology findings were Candida; was started on Posaconazole. Discharged on 4L of oxygen.  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  SOB and CUELLAR is improved since discharge.  States onset of symptoms was in 10-18, noticed increased SOB while doing yardwork.  States normal walking does not cause a problem, only if he is walking fast or carrying items. Denies any prior history of lung disease- no history of asthma, no pneumonia, no complications with chemotherapy.  Has a history of cGvHD, is on alternating prednisone of 10/20 mg and cyclosporin of 25 mg every day.  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    Last seen 4 months ago. Since that time he has undergone thoracentesis on 11-27, 12-18, 12-23 and 1-8.  Has been maintained on Prednisone 40 mg per day and CSA (increased dose lat 2 months).  Has not required a repeat thoracentesis since 1-8 but presented with increased SOB on 3-2; CXR on that day showed an increase in the left pleural effusion.  Is scheduled for a repeat thoracentesis by IR tomorrow.    Assessment and Plan:  61 YO s/p HSCT post 3 years with sub-acute presentation with SOB and CUELLAR with recent worsening.  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  Unclear of cause of acute worsening but may be related to concomitant viral infection, cannot fully exclude fungal infection but BAL cultures have remained negative. No effective treatment for PPFE, steroids do not seem to be effective, however in my experience the disease does not seem to progress.  Based on lack of treatment and seemingly no/slow progression, I would not proceed with biopsy to confirm the diagnosis. With impaired lung function he is at risk to develop respiratory complications during times of respiratory infections.  New increased SOB since August 2020; found to have a large left pleural effusion.  Underwent thoracentesis with trapped lung.  Pleural fluid is consistent with serositis.  Continued re-accumulation of fluid, possibly slower re-accumulation now on CSA.  To undergo repeat thoracentesis tomorrow.  Challenging case in setting of recurrent pleural effusion with trapped lung.  In my experience cGvHD serositis responds poorly to immunosuppression.  Further treatment options include placement of PleurX catheter to assist in controlling symptoms of SOB and desaturation if develops; this may also help with the trapped lung. With rapid accumulation of fluid and trapped lung, pleurodesis is less likely to be successful as pleural surfaces will not be able to adhere with trapped lung and accumulating fluid.  Will discuss with IP regarding possibility of placement of PleurX catheter.  Need to clarify insurance issues; is starting on [facility] on April 12th, unclear if will be covered by Medicaid or Medicare at that time.           RTC will be arranged after repeat thoracentesis and ongoing discussions regarding PleurX catheter.  Patient to call with any questions or concerns prior to his next clinic visit     Phone call duration: 26 minutes

## 2021-03-10 NOTE — DISCHARGE INSTRUCTIONS
Discharge Instructions for Paracentesis or Thoracentesis     Paracentesis is a procedure to remove extra fluid from your belly (abdomen). Thoracentesis is a procedure to remove extra fluid from your chest.  This fluid buildup is called ascites. The procedure may have been done to take a sample of the fluid. Or, it may have been done to drain the extra fluid from your abdomen or chest to help make you more comfortable.     Home care    If you have pain after the procedure, your healthcare provider can prescribe or recommend pain medicines. Take these exactly as directed. If you stopped taking other medicines before the procedure, ask your provider when you can start them again.    Avoid strenuous activity for 48 hours.    You will have a small bandage over the puncture site. Adhesive tapes, adhesive strips, or surgical glue may also be used to close the incision. They also help stop bleeding and promote healing. You may take the bandage off in 24-48 hours. Once there is a scab over the site, no bandages are required.    Check the puncture site for the signs of infection listed below.     Follow-up care  Make a follow-up appointment with your healthcare provider as directed. During your follow-up visit, your provider will check your healing. Let your provider know how you are feeling. You can also discuss the cause of your fluid, and if you need any further treatment.    When to seek medical advice:  Call your healthcare provider if you have any of the following after the procedure:    A fever of 100.4 F (38.0 C) or higher    Trouble breathing    Abdominal pain that is worse than expected    Belly Abdominal pain not caused by having the skin punctured that is worse than expected    Persistent bleeding from the puncture site    More than a small amount of fluid leaking from the puncture site    Swollen belly    Signs of infection at the puncture site. These include increased pain, redness, or swelling, warmth, or  bad-smelling drainage.    Feeling dizzy or lightheaded, or fainting     Call our Interventional Radiology (IR) service if:  If you start bleeding from the procedure site.  If you do start to bleed from the site, lie down and hold some pressure on the site.  Our radiology provider can help you decide if you need to return to the hospital.  If you have new or worsening pain related to the procedure.  If you have pronounced swelling at the procedure site.  If you develop persistent nausea or vomiting.  If you develop hives or a rash or any unexplained itching.  If you have a fever of greater than 100.4  F and chills in the first 5 days after procedure.  Any other concerns related to your procedure.      [facility]  Interventional Radiology (IR)  [address]  [facility] [place] [address]    Contact Number:  [phone]  (IR control desk)  Monday - Friday 8:00 am - 4:30 pm    After hours for urgent concerns:  [phone]  After 4:30 pm Monday - Friday, Weekends and Holidays.   Ask for Interventional Radiology on-call.  Someone is available 24 hours a day.  [facility] toll free number:  [phone]

## 2021-03-10 NOTE — PROCEDURES
Interventional Radiology Brief Post Procedure Note    Procedure: IR THORACENTESIS    Proceduralist: Dary Young MD    Assistant: None    Time Out: Prior to the start of the procedure and with procedural staff participation, I verbally confirmed the patient s identity using two indicators, relevant allergies, that the procedure was appropriate and matched the consent or emergent situation, and that the correct equipment/implants were available. Immediately prior to starting the procedure I conducted the Time Out with the procedural staff and re-confirmed the patient s name, procedure, and site/side. (The Joint Commission universal protocol was followed.)  Yes    Sedation: None. Local Anesthestic used    Findings: Completed ultrasound-guided left therapeutic thoracentesis. A total of 2000 mL clear dark fluid drained from the left pleural space. No immediate complication.    Estimated Blood Loss: Minimal    Specimens: None    Complications: 1. None     Condition: Stable    Plan: Follow up per primary team. Return to IR as needed.    Comments: See dictated procedure note for full details.    Yusef Mercado PA-C  Interventional Radiology  [phone]

## 2021-03-24 NOTE — NURSING NOTE
"Oncology Rooming Note    March 24, 2021 2:30 PM   Byron Russo is a 60 year old male who presents for:    Chief Complaint   Patient presents with     Blood Draw     labs drawn with vpt by rn.  vs taken     Oncology Clinic Visit     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); MDS (myelodysplastic syndrome) (H     Initial Vitals: /87 (BP Location: Right arm, Patient Position: Sitting, Cuff Size: Adult Large)   Pulse 115   Temp 97.4  F (36.3  C) (Tympanic)   Resp 16   Wt 106.6 kg (235 lb)   SpO2 96%   BMI 32.78 kg/m   Estimated body mass index is 32.78 kg/m  as calculated from the following:    Height as of 3/10/21: 1.803 m (5' 11\").    Weight as of this encounter: 106.6 kg (235 lb). Body surface area is 2.31 meters squared.  No Pain (0) Comment: Data Unavailable   No LMP for male patient.  Allergies reviewed: Yes  Medications reviewed: Yes    Medications: Medication refills not needed today.  Pharmacy name entered into EPIC: [facility] [place], [place] - [address] [place] [address] [phone]    Clinical concerns: None.       Nusrat Zayas MA            "

## 2021-03-24 NOTE — NURSING NOTE
Chief Complaint   Patient presents with     Blood Draw     labs drawn with vpt by rn.  vs taken     Labs drawn with vpt by rn.  Pt tolerated well.  VS taken.  Pt checked in for next appt.    Anisha Saenz RN

## 2021-03-24 NOTE — LETTER
3/24/2021         RE: Byron Russo  [address] [facility] [address]        Dear Colleague,    Thank you for referring your patient, Byron Russo, to the [facility] BLOOD AND MARROW TRANSPLANT PROGRAM [place]. Please see a copy of my visit note below.    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 Etiology unclear but empirically treating as possible complication of GVHD.     HPI: Yg returns for follow up. He is stable on his 3L of oxygen.  Fells slightly more SOB than the day after the thoracentesis but much improved compared to prior to the last thoracentesis. He notes that stairs are hard, but more due to muscle weakness in his legs.  He feels he has lost a lot of muscle, and he is gearing up to start exercising more.  He still has his list of exercises from PT in hospital.  Also considering buying an exercise bike.    Eyes and mouth are dry.     ROS: 8 point ROS negative except as stated above    Physical Exam  /87 (BP Location: Right arm, Patient Position: Sitting, Cuff Size: Adult Large)   Pulse 115   Temp 97.4  F (36.3  C) (Tympanic)   Resp 16   Wt 106.6 kg (235 lb)   SpO2 96%   BMI 32.78 kg/m      Gen: A&O, NAD, conversant  OP: Mucosa very dry without lesions, no OP erythema  Pulm: breathing comfortably at rest on 3L.Lung sounds are surprisingly pretty good on the left for about the upper 3/4 of the left lung.  They also sound good on the right.   CV: RRR  Abd: +BS, soft, nontender  Extremities: trace LE edema  Skin: no rash on exposed skin  Access: no access    Labs:  Lab Results   Component Value Date    WBC 16.4 (H) 03/24/2021    ANEU 12.5 (H) 03/24/2021    HGB 17.2 03/24/2021    HCT 49.9 03/24/2021     03/24/2021     03/24/2021    POTASSIUM 4.2 03/24/2021    CHLORIDE 105 03/24/2021    CO2 26  03/24/2021     (H) 03/24/2021    BUN 30 03/24/2021    CR 1.36 (H) 03/24/2021    MAG 1.6 03/04/2021    INR 1.07 03/10/2021    BILITOTAL 0.9 03/24/2021    AST 24 03/24/2021    ALT 41 03/24/2021    ALKPHOS 101 03/24/2021    PROTTOTAL 7.3 03/24/2021    ALBUMIN 3.4 03/24/2021          A/P: Yg Russo is a 58 yo M 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 Etiology unclear but empirically treating as possible complication of GVHD.     1. 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 Heme  - Counts stable. Ongoing leukocytosis due to steroids and maybe acute stress with lung strain.   - baby ASA     3. ID: afebrile  - pleural fluid cx neg for infectious etiology  - prophy: ACV, levo, fluc and bactrim.     4. 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 Previously on CSA 25mg BID and pred 15mg daily   - 10/27 increased pred to 40mg daily for inflammatory pleural effusion.  - CSA 200mg BID. Decrease to 175 mg BID b/o .   - CXR shows smallwe L effusion and less  shift of trachea to right than 3/4/21.  5. Pulm:   - has a large, left pleural effusion with total collapse of left lung and mass effect on mediastinum. Suspect d/t GVHD.  Pulm placed pigtail catheter 10/23 (due to large volume fluid, can't all be drained in one day via thoracentesis).   -Schedule pleural tap for late next week.   - CSA may be slowly inproving the rate at which the effusion reaccumulates.  Carson stable ot 95%.  Home O2:  Has home and portable O2 concentrators through [employer] in [place]; 3L- stable      6. Endo  - on synthroid    7. CV  - on ASA, crestor, metoprolol    8. FEN/renal  - creat, lytes wnl  - cont MgOx bid (help with mild consitpation).    9. GVHD of the eyes: resume drop rx'd by ophthalmology.  Due to covid, he is following up only if needed.    10. GVHD of the mouth: no sores at this time. Using biotene.  Doesn't have  sores so doesn't want to do dex swish/spit at this point. Also, he had a terrible case of thrush that led to bad gingivitis, so he wants to avoid steroid for that reason as well.     RTC: Pending scheduling tap.    Uli Enciso M.D.

## 2021-03-24 NOTE — PROGRESS NOTES
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 Etiology unclear but empirically treating as possible complication of GVHD.     HPI: Yg returns for follow up. He is stable on his 3L of oxygen.  Fells slightly more SOB than the day after the thoracentesis but much improved compared to prior to the last thoracentesis. He notes that stairs are hard, but more due to muscle weakness in his legs.  He feels he has lost a lot of muscle, and he is gearing up to start exercising more.  He still has his list of exercises from PT in hospital.  Also considering buying an exercise bike.    Eyes and mouth are dry.     ROS: 8 point ROS negative except as stated above    Physical Exam  /87 (BP Location: Right arm, Patient Position: Sitting, Cuff Size: Adult Large)   Pulse 115   Temp 97.4  F (36.3  C) (Tympanic)   Resp 16   Wt 106.6 kg (235 lb)   SpO2 96%   BMI 32.78 kg/m      Gen: A&O, NAD, conversant  OP: Mucosa very dry without lesions, no OP erythema  Pulm: breathing comfortably at rest on 3L.Lung sounds are surprisingly pretty good on the left for about the upper 3/4 of the left lung.  They also sound good on the right.   CV: RRR  Abd: +BS, soft, nontender  Extremities: trace LE edema  Skin: no rash on exposed skin  Access: no access    Labs:  Lab Results   Component Value Date    WBC 16.4 (H) 03/24/2021    ANEU 12.5 (H) 03/24/2021    HGB 17.2 03/24/2021    HCT 49.9 03/24/2021     03/24/2021     03/24/2021    POTASSIUM 4.2 03/24/2021    CHLORIDE 105 03/24/2021    CO2 26 03/24/2021     (H) 03/24/2021    BUN 30 03/24/2021    CR 1.36 (H) 03/24/2021    MAG 1.6 03/04/2021    INR 1.07 03/10/2021    BILITOTAL 0.9 03/24/2021    AST 24 03/24/2021    ALT 41 03/24/2021    ALKPHOS 101 03/24/2021    PROTTOTAL 7.3 03/24/2021    ALBUMIN 3.4 03/24/2021          A/P: Yg  Rafaela is a 58 yo M 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 Etiology unclear but empirically treating as possible complication of GVHD.     1. 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 Heme  - Counts stable. Ongoing leukocytosis due to steroids and maybe acute stress with lung strain.   - baby ASA     3. ID: afebrile  - pleural fluid cx neg for infectious etiology  - prophy: ACV, levo, fluc and bactrim.     4. 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 Previously on CSA 25mg BID and pred 15mg daily   - 10/27 increased pred to 40mg daily for inflammatory pleural effusion.  - CSA 200mg BID. Decrease to 175 mg BID b/o .   - CXR shows smallwe L effusion and less  shift of trachea to right than 3/4/21.  5. Pulm:   - has a large, left pleural effusion with total collapse of left lung and mass effect on mediastinum. Suspect d/t GVHD.  Pulm placed pigtail catheter 10/23 (due to large volume fluid, can't all be drained in one day via thoracentesis).   -Schedule pleural tap for late next week.   - CSA may be slowly inproving the rate at which the effusion reaccumulates.  [place] stable ot 95%.  Home O2:  Has home and portable O2 concentrators through [employer] in [place]; 3L- stable      6. Endo  - on synthroid    7. CV  - on ASA, crestor, metoprolol    8. FEN/renal  - creat, lytes wnl  - cont MgOx bid (help with mild consitpation).    9. GVHD of the eyes: resume drop rx'd by ophthalmology.  Due to covid, he is following up only if needed.    10. GVHD of the mouth: no sores at this time. Using biotene.  Doesn't have sores so doesn't want to do dex swish/spit at this point. Also, he had a terrible case of thrush that led to bad gingivitis, so he wants to avoid steroid for that reason as well.     RTC: Pending scheduling tap.    Uli Enciso M.D.

## 2021-03-25 NOTE — TELEPHONE ENCOUNTER
Called Yg after discussing with pharmacy plan for CSA level and dosing. Spoke with his wife. Requested pt hold 3 doses CSA, then resume at 100mg BID. He should return to clinic Tuesday for labs and provider visit to check Cr and re-draw CSA level (requested he hold CSA for labs). Requested appt.  Angela Bunch [facility]  [phone]

## 2021-03-25 NOTE — TELEPHONE ENCOUNTER
Hematology/oncology on-call fellow note  March 24, 2021    Received a call from the drug analysis lab regarding patient's cyclosporine level. Called and spoke to Yg. He states that he was previously taking cyclosporine 200 mg BID until yesterday and he held today morning's dose for the lab and then was instructed by Dr. Enciso to take 175 mg tonight. Given persistently elevated cyclosporine level, I recommend that he take only 150 mg cyclosporine tonight and then call the clinic in the morning for further instructions.     Patient was agreeable and understands the plan.     CCed Dr. Enciso and Angela Bunch.    Cori Kim  Hematology, Oncology & Transplantation fellow  Pager: [phone]  03/24/2021

## 2021-03-26 NOTE — TELEPHONE ENCOUNTER
Patient has an appointment for a pre procedure covid swab and no orders. Please place future orders.    Thank You,  Abida Dawson MLT([facility])

## 2021-03-30 NOTE — NURSING NOTE
"Oncology Rooming Note    March 30, 2021 1:28 PM   Byron Russo is a 60 year old male who presents for:    Chief Complaint   Patient presents with     Blood Draw     Labs drawn from  by RN in lab. Vitals taken. Checked into next appointment.     Oncology Clinic Visit     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)      Initial Vitals: /79 (BP Location: Right arm, Patient Position: Sitting, Cuff Size: Adult Regular)   Pulse 101   Temp 98.1  F (36.7  C) (Oral)   Resp 24   Wt 105.3 kg (232 lb 1.6 oz)   SpO2 96%   BMI 32.37 kg/m   Estimated body mass index is 32.37 kg/m  as calculated from the following:    Height as of 3/10/21: 1.803 m (5' 11\").    Weight as of this encounter: 105.3 kg (232 lb 1.6 oz). Body surface area is 2.3 meters squared.  Mild Pain (3) Comment: \"SHOOTING PAIN\"   No LMP for male patient.  Allergies reviewed: Yes  Medications reviewed: Yes    Medications: Medication refills not needed today.  Pharmacy name entered into [facility]: [facility] [place], [place] -  [facility] [phone]    Clinical concerns: None.       Nusrat Zayas MA            "

## 2021-03-30 NOTE — LETTER
3/30/2021         RE: Byron Russo  [id number] [address] [address]        Dear Colleague,    Thank you for referring your patient, Byron Russo, to the [facility] [place]. Please see a copy of my visit note below.    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 Etiology unclear but empirically treating as possible complication of GVHD.     HPI: Yg returns for follow up. He is stable on his 3L of oxygen.  Feells slightly more SOB than the week after the thoracentesis. Not much of a cough.  Eyes are dry and mouth is dry without lesions.  Declines dex s/s.  He can eat acidic food and no difficulty swallowing.  No rash or tightness of skin. He is gearing up to start exercising more and purchasing some exercise equipment but is limited by his breathing.          ROS: 8 point ROS negative except as stated above    Physical Exam  /79 (BP Location: Right arm, Patient Position: Sitting, Cuff Size: Adult Regular)   Pulse 101   Temp 98.1  F (36.7  C) (Oral)   Resp 24   Wt 105.3 kg (232 lb 1.6 oz)   SpO2 96%   BMI 32.37 kg/m      Gen: A&O, NAD, conversant  OP: Mucosa very dry without lesions, no OP erythema  Pulm: gets out of breath today when he speaks but breathing comfortably at rest on 3L.Lung sounds are decreased on the left but still moving some air decreased bs until the upper 3/4 of the left lung.  They also sound good on the right.   CV: RRR  Abd: +BS, soft, nontender  Extremities: trace LE edema, has tremor bilateral  Skin: no rash on exposed skin including upper torso and ankles/tibia  Access: no access    Labs:  Lab Results   Component Value Date    WBC 16.2 (H) 03/30/2021    ANEU 13.5 (H) 03/30/2021    HGB 17.1 03/30/2021    HCT 50.7 03/30/2021     03/30/2021     03/30/2021     POTASSIUM 4.6 03/30/2021    CHLORIDE 107 03/30/2021    CO2 23 03/30/2021     (H) 03/30/2021    BUN 19 03/30/2021    CR 0.87 03/30/2021    MAG 1.6 03/04/2021    INR 1.07 03/10/2021    BILITOTAL 1.0 03/30/2021    AST 31 03/30/2021    ALT 37 03/30/2021    ALKPHOS 99 03/30/2021    PROTTOTAL 7.5 03/30/2021    ALBUMIN 3.7 03/30/2021          A/P: Yg Russo is a 60 yo M 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 Etiology unclear but empirically treating as possible complication of GVHD.     1. 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 Heme  - Counts stable. Ongoing leukocytosis due to steroids and maybe acute stress with lung strain.   - baby ASA     3. ID: afebrile  - pleural fluid cx neg for infectious etiology, last tested 1/26/2021  - prophy: ACV, levo, fluc and bactrim.     4. 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 Previously on CSA 25mg BID and pred 15mg daily   - 10/27/20 increased pred to 40mg daily for inflammatory pleural effusion.  - CSA 200mg BID. Decrease to 175 mg BID, CSA level=481 on 3/24. and then per telephone/pharmacy note:  Run CSA level closer to 200 since the higher doses have not made a significant difference in his sx after discussion with Dr Enciso.. Requested pt hold 3 doses CSA, then resume at 100mg BID-  - 3/24:CXR shows large L effusion and no  shift of trachea to right than 3/4/21.  5. Pulm:   - had a large, left pleural effusion with total collapse of left lung and mass effect on mediastinum. Suspect d/t GVHD.  Pulm placed pigtail catheter 10/23 (due to large volume fluid, can't all be drained in one day via thoracentesis). This has since been removed.  -Scheduled for pleural tap on 4/1.  Last tap was on 3/10, removed 2 liters..   -   Sats stable ot 95% but still remains on 3 liters.    -Last saw Dr Brady on 3/9 when he talks about a pler-x catheter and if insurance will pay for  it. He also notes that this type of cGvHD serositis responds poorly to immunosuppression  IR says that insurance will not pay for pleurx catheter unless in hospice but Yg says his insurance company says there has been no inquires from the U.  Will message Dr Brady and clarify what he knows about the catheter and if we put something thru insurance so we can answer this question.  Home O2:  Has home and portable O2 concentrators through [employer] in [place]; 3L- stable      6. Endo  - on synthroid    7. CV  - on ASA, crestor, metoprolol    8. FEN/renal  - creat, lytes wnl  - cont MgOx bid (help with mild consitpation).    9. GVHD of the eyes: resume drop rx'd by ophthalmology.  Due to covid, he is following up only if needed.    10. GVHD of the mouth: no sores at this time. Using biotene.  Doesn't have sores so doesn't want to do dex swish/spit at this point. Also, he had a terrible case of thrush that led to bad gingivitis, so he wants to avoid steroid for that reason as well.     RTC: in 2 weeks to see Dr Enciso on 4/15- and 2 weeks after last tap  Sooner with issues    Aleta Cade PA-C    30 minutes spent on the date of the encounter doing chart review, review of test results, interpretation of tests, patient visit and documentation           Again, thank you for allowing me to participate in the care of your patient.        Sincerely,        BMT Advanced Practice Provider

## 2021-03-30 NOTE — PROGRESS NOTES
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 Etiology unclear but empirically treating as possible complication of GVHD.     HPI: Yg returns for follow up. He is stable on his 3L of oxygen.  Feells slightly more SOB than the week after the thoracentesis. Not much of a cough.  Eyes are dry and mouth is dry without lesions.  Declines dex s/s.  He can eat acidic food and no difficulty swallowing.  No rash or tightness of skin. He is gearing up to start exercising more and purchasing some exercise equipment but is limited by his breathing.          ROS: 8 point ROS negative except as stated above    Physical Exam  /79 (BP Location: Right arm, Patient Position: Sitting, Cuff Size: Adult Regular)   Pulse 101   Temp 98.1  F (36.7  C) (Oral)   Resp 24   Wt 105.3 kg (232 lb 1.6 oz)   SpO2 96%   BMI 32.37 kg/m      Gen: A&O, NAD, conversant  OP: Mucosa very dry without lesions, no OP erythema  Pulm: gets out of breath today when he speaks but breathing comfortably at rest on 3L.Lung sounds are decreased on the left but still moving some air decreased bs until the upper 3/4 of the left lung.  They also sound good on the right.   CV: RRR  Abd: +BS, soft, nontender  Extremities: trace LE edema, has tremor bilateral  Skin: no rash on exposed skin including upper torso and ankles/tibia  Access: no access    Labs:  Lab Results   Component Value Date    WBC 16.2 (H) 03/30/2021    ANEU 13.5 (H) 03/30/2021    HGB 17.1 03/30/2021    HCT 50.7 03/30/2021     03/30/2021     03/30/2021    POTASSIUM 4.6 03/30/2021    CHLORIDE 107 03/30/2021    CO2 23 03/30/2021     (H) 03/30/2021    BUN 19 03/30/2021    CR 0.87 03/30/2021    MAG 1.6 03/04/2021    INR 1.07 03/10/2021    BILITOTAL 1.0 03/30/2021    AST 31 03/30/2021    ALT 37 03/30/2021    ALKPHOS 99 03/30/2021    PROTTOTAL  7.5 03/30/2021    ALBUMIN 3.7 03/30/2021          A/P: Yg Russo is a 60 yo M 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 Etiology unclear but empirically treating as possible complication of GVHD.     1. 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 Heme  - Counts stable. Ongoing leukocytosis due to steroids and maybe acute stress with lung strain.   - baby ASA     3. ID: afebrile  - pleural fluid cx neg for infectious etiology, last tested 1/26/2021  - prophy: ACV, levo, fluc and bactrim.     4. 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 Previously on CSA 25mg BID and pred 15mg daily   - 10/27/20 increased pred to 40mg daily for inflammatory pleural effusion.  - CSA 200mg BID. Decrease to 175 mg BID, CSA level=481 on 3/24. and then per telephone/pharmacy note:  Run CSA level closer to 200 since the higher doses have not made a significant difference in his sx after discussion with Dr Enciso.. Requested pt hold 3 doses CSA, then resume at 100mg BID-  - 3/24:CXR shows large L effusion and no  shift of trachea to right than 3/4/21.  5. Pulm:   - had a large, left pleural effusion with total collapse of left lung and mass effect on mediastinum. Suspect d/t GVHD.  Pulm placed pigtail catheter 10/23 (due to large volume fluid, can't all be drained in one day via thoracentesis). This has since been removed.  -Scheduled for pleural tap on 4/1.  Last tap was on 3/10, removed 2 liters..   -   Sats stable ot 95% but still remains on 3 liters.    -Last saw Dr Brady on 3/9 when he talks about a pler-x catheter and if insurance will pay for it. He also notes that this type of cGvHD serositis responds poorly to immunosuppression  IR says that insurance will not pay for pleurx catheter unless in hospice but Yg says his insurance company says there has been no inquires from the [employer].  Will message Dr Brady and clarify what he knows about  the catheter and if we put something thru insurance so we can answer this question.  Home O2:  Has home and portable O2 concentrators through [employer] in [place]; 3L- stable      6. Endo  - on synthroid    7. CV  - on ASA, crestor, metoprolol    8. FEN/renal  - creat, lytes wnl  - cont MgOx bid (help with mild consitpation).    9. GVHD of the eyes: resume drop rx'd by ophthalmology.  Due to covid, he is following up only if needed.    10. GVHD of the mouth: no sores at this time. Using biotene.  Doesn't have sores so doesn't want to do dex swish/spit at this point. Also, he had a terrible case of thrush that led to bad gingivitis, so he wants to avoid steroid for that reason as well.     RTC: in 2 weeks to see Dr Enciso on 4/15- and 2 weeks after last tap  Sooner with issues    Aleta Cade PA-C    30 minutes spent on the date of the encounter doing chart review, review of test results, interpretation of tests, patient visit and documentation

## 2021-04-01 NOTE — H&P
Patient returns for left thoracentesis. Large effusion and dyspnea. Tolerated last thoracentesis with 2000 mL aspiration without complication.    No interval change to H&P.    Consent obtained.

## 2021-04-01 NOTE — DISCHARGE INSTRUCTIONS
Discharge Instructions for Thoracentesis      Thoracentesis is a procedure to remove extra fluid from your chest.  This fluid buildup is called ascites. The procedure may have been done to take a sample of the fluid. Or, it may have been done to drain the extra fluid from your abdomen or chest to help make you more comfortable.     Home care    If you have pain after the procedure, your healthcare provider can prescribe or recommend pain medicines. Take these exactly as directed. If you stopped taking other medicines before the procedure, ask your provider when you can start them again.    Avoid strenuous activity for 48 hours.    You will have a small bandage over the puncture site. Adhesive tapes, adhesive strips, or surgical glue may also be used to close the incision. They also help stop bleeding and promote healing. You may take the bandage off in 24-48 hours. Once there is a scab over the site, no bandages are required.    Check the puncture site for the signs of infection listed below.     Follow-up care  Make a follow-up appointment with your healthcare provider as directed. During your follow-up visit, your provider will check your healing. Let your provider know how you are feeling. You can also discuss the cause of your fluid, and if you need any further treatment.    When to seek medical advice:  Call your healthcare provider if you have any of the following after the procedure:    A fever of 100.4 F (38.0 C) or higher    Trouble breathing    Abdominal pain that is worse than expected    Belly Abdominal pain not caused by having the skin punctured that is worse than expected    Persistent bleeding from the puncture site    More than a small amount of fluid leaking from the puncture site    Swollen belly    Signs of infection at the puncture site. These include increased pain, redness, or swelling, warmth, or bad-smelling drainage.    Feeling dizzy or lightheaded, or fainting     Call our Interventional  Radiology (IR) service if:  If you start bleeding from the procedure site.  If you do start to bleed from the site, lie down and hold some pressure on the site.  Our radiology provider can help you decide if you need to return to the hospital.  If you have new or worsening pain related to the procedure.  If you have pronounced swelling at the procedure site.  If you develop persistent nausea or vomiting.  If you develop hives or a rash or any unexplained itching.  If you have a fever of greater than 100.4  F and chills in the first 5 days after procedure.  Any other concerns related to your procedure.      [facility]  Interventional Radiology (IR)  [address]  [address] [facility] [place] [address]    Contact Number:  [phone]  (IR control desk)  Monday - Friday 8:00 am - 4:30 pm    After hours for urgent concerns:  [phone]  After 4:30 pm Monday - Friday, Weekends and Holidays.   Ask for Interventional Radiology on-call.  Someone is available 24 hours a day.  [facility] toll free number:  [phone]

## 2021-04-01 NOTE — OR NURSING
Pt returns for left thoracentesis.  Removed 1600mL from left side.  No labs ordered.    Mary Beth Jovel BS, RN, BSN, PHN

## 2021-04-01 NOTE — BRIEF OP NOTE
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  Complications: None.  Implants: * No implants in log *      1600 mL able to be aspirated from left pleural space. Required placement of two 5 [place] Yueh catheters. No immediate complication.

## 2021-04-09 NOTE — TELEPHONE ENCOUNTER
RECORDS STATUS - ALL OTHER DIAGNOSIS      RECORDS RECEIVED FROM: [facility]   DATE RECEIVED: 4/9   NOTES STATUS DETAILS   OFFICE NOTE from referring provider [facility] Jesus Manuel Morales MD: 3/30/21   OFFICE NOTE from medical oncologist Epic    DISCHARGE SUMMARY from hospital Epic/CE - [facility] Epic:  10/22/20, 1/8/20, 2/4/19, 6/29/16, 5/4/16    [facility]:  3/24/16, 3/4/16   DISCHARGE REPORT from the ER CE - [facility] 11/24/15   Pulmonary Function Test Epic 10/22/20, 2/27/20, 12/23/19, 6/25/19, 3/26/19, 3/4/19, 2/13/19, 2/21/17, 4/18/16   OPERATIVE REPORT [facility] 4/1/21. 3/10/21, 1/8/21, 12/23/20, 12/18/20, 11/27/20: Thoracentesis    2/6/19: Bronchoscopy    5/11/18, 5/12/17, 11/11/16, 8/19/16, 6/1/16, 4/27/16: BMB    5/11/16: BMT Transplant   MEDICATION LIST [facility] 3/25/21   CLINICAL TRIAL TREATMENTS TO DATE     LABS     PATHOLOGY REPORTS Epic 5/11/18, 5/12/17, 11/11/16, 8/19/16, 6/1/16, 4/27/16: BMB    11/1/16, 5/21/16: Surg Path    4/18/16: Path Consult ([facility])   ANYTHING RELATED TO DIAGNOSIS Epic 3/30/21   GENONOMIC TESTING     TYPE:     IMAGING (NEED IMAGES & REPORT)     XR Swallow PACS 12/31/19: Epic   XR PACS Epic   CT SCANS PACS [facility]   MRI     MAMMO     ULTRASOUND     PET

## 2021-04-15 NOTE — NURSING NOTE
Oncology Rooming Note    April 15, 2021 2:46 PM   Byron Russo is a 60 year old male who presents for:    Chief Complaint   Patient presents with     Blood Draw     labs drawn via  by RN in lab      RECHECK     Pt is here for a rtn for a S/P BMT for AML     Initial Vitals: Blood Pressure 130/89   Pulse 87   Temperature 98.2  F (36.8  C) (Oral)   Respiration 18   Weight 106.5 kg (234 lb 14.4 oz)   Oxygen Saturation 97%   Body Mass Index 31.86 kg/m   Estimated body mass index is 31.86 kg/m  as calculated from the following:    Height as of 4/1/21: 1.829 m (6').    Weight as of this encounter: 106.5 kg (234 lb 14.4 oz). Body surface area is 2.33 meters squared.  No Pain (0) Comment: Data Unavailable   No LMP for male patient.  Allergies reviewed: Yes  Medications reviewed: Yes    Medications: Medication refills not needed today.  Pharmacy name entered into "[employer]: [facility] [place], [place] - [address] [place] [address] [phone]    Clinical concerns: none       Fe Aguillon MA

## 2021-04-15 NOTE — PROGRESS NOTES
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 Etiology unclear but empirically treating as possible complication of GVHD.     HPI: Yg returns for follow up. He is stable on his 3L of oxygen.  Feells slightly more SOB than the week after the thoracentesis but not so much that he fells he need thoracentesis yet. Not much of a cough.  Eyes are dry and mouth is dry without lesions.  Declines dex s/s.  He can eat acidic food and no difficulty swallowing.  No rash or tightness of skin. He is gearing up to start exercising more and purchasing some exercise equipment but is limited by his breathing.          ROS: 8 point ROS negative except as stated above    Physical Exam  /89   Pulse 87   Temp 98.2  F (36.8  C) (Oral)   Resp 18   Wt 106.5 kg (234 lb 14.4 oz)   SpO2 97%   BMI 31.86 kg/m      Gen: A&O, NAD, conversant  OP: Mucosa very dry without lesions, no OP erythema  Pulm: gets out of breath today when he speaks but breathing comfortably at rest on 3L.Lung sounds are decreased on the left but still moving some air decreased bs until the upper 3/4 of the left lung.  They also sound good on the right.   CV: RRR  Abd: +BS, soft, nontender  Extremities: trace LE edema, has tremor bilateral  Skin: no rash on exposed skin including upper torso and ankles/tibia  Access: no access    Labs:  Lab Results   Component Value Date    WBC 16.7 (H) 04/15/2021    ANEU 14.5 (H) 04/15/2021    HGB 16.2 04/15/2021    HCT 48.2 04/15/2021     04/15/2021     04/15/2021    POTASSIUM 4.4 04/15/2021    CHLORIDE 108 04/15/2021    CO2 25 04/15/2021     (H) 04/15/2021    BUN 17 04/15/2021    CR 0.84 04/15/2021    MAG 2.0 04/15/2021    INR 1.07 03/10/2021    BILITOTAL 0.6 04/15/2021    AST 21 04/15/2021    ALT 32 04/15/2021    ALKPHOS 90 04/15/2021    PROTTOTAL 7.0 04/15/2021     ALBUMIN 3.4 04/15/2021          A/P: Yg Russo is a 58 yo M 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 Etiology unclear but empirically treating as possible complication of GVHD.     1. 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 Heme  - Counts stable. Ongoing leukocytosis due to steroids and maybe acute stress with lung strain.   - baby ASA     3. ID: afebrile  - pleural fluid cx neg for infectious etiology, last tested 1/26/2021  - prophy: ACV, levo, fluc and bactrim.     4. 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 Previously on CSA 25mg BID and pred 15mg daily   - 10/27/20 increased pred to 40mg daily for inflammatory pleural effusion. In view of lack of response will Change to 40 mg alternating with 20  Mg daily.    - CSA 200mg BID. Decrease to 175 mg BID, CSA level=481 on 3/24. and then per telephone/pharmacy note:  Run CSA level closer to 200 since the higher doses have not made a significant difference.    Requested pt hold 3 doses CSA, then resume at 100mg BID-. Level from today pending.  - 3/24:CXR shows large L effusion and no  shift of trachea to right than 3/4/21.  CT from today shows large left pleural affusion, collapsed left lung and slight tracheal shift to right. Will schedule for thoracentesis for 4/22 unless worsening of sxs in which case he will contact [facility] or go to ER.    5. Pulm:   - had a large, left pleural effusion with total collapse of left lung and mass effect on mediastinum. Suspect d/t GVHD.  Pulm placed pigtail catheter 10/23 (due to large volume fluid, can't all be drained in one day via thoracentesis). This has since been removed.  -Scheduled for pleural tap on 4/1.  Last tap was on 3/10, removed 2 liters..   -   Sats stable ot 95% but still remains on 3 liters.    -Last saw Dr Brady on 3/9 when he talks about a pler-x catheter and if insurance will pay for it. He also notes that this  type of cGvHD serositis responds poorly to immunosuppression  IR says that insurance will not pay for pleurx catheter unless in hospice but Yg says his insurance company says there has been no inquires from the U.  Will message Dr Brady and clarify what he knows about the catheter and if we put something thru insurance so we can answer this question. Sees Dr Reynolds 4-21 for consideration of pleurodesis but highly unlikely candidate.    Home O2:  Has home and portable O2 concentrators through [employer] in [place]; 3L- stable      6. Endo  - on synthroid    7. CV  - on ASA, crestor, metoprolol    8. FEN/renal  - creat, lytes wnl  - cont MgOx bid (help with mild consitpation).    9. GVHD of the eyes: resume drop rx'd by ophthalmology.  Due to covid, he is following up only if needed.    10. GVHD of the mouth: no sores at this time. Using biotene.  Doesn't have sores so doesn't want to do dex swish/spit at this point. Also, he had a terrible case of thrush that led to bad gingivitis, so he wants to avoid steroid for that reason as well.     RTC: in 2 weeks for labs, May 6 Fernie with CXR.    30 minutes spent on the date of the encounter doing chart review, review of test results, interpretation of tests, patient visit and documentation     Uli Enciso M.D.

## 2021-04-15 NOTE — NURSING NOTE
Chief Complaint   Patient presents with     Blood Draw     labs drawn via  by RN in lab      /89   Pulse 87   Temp 98.2  F (36.8  C) (Oral)   Resp 18   Wt 106.5 kg (234 lb 14.4 oz)   SpO2 97%   BMI 31.86 kg/m      Labs drawn via right antecubital venipuncture. Pt tolerated well & checked in for next appointment.    Zeynep Hernández RN on 4/15/2021 at 2:19 PM

## 2021-04-15 NOTE — LETTER
4/15/2021         RE: Byron Russo  [address] [facility] [facility] [address]        Dear Colleague,    Thank you for referring your patient, Byron Russo, to the [facility]LOOD [facility]ARR[facility]ANSPLANT PRO[facility] [place]. Please see a copy of my visit note below.    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 Etiology unclear but empirically treating as possible complication of GVHD.     HPI: Yg returns for follow up. He is stable on his 3L of oxygen.  Feells slightly more SOB than the week after the thoracentesis but not so much that he fells he need thoracentesis yet. Not much of a cough.  Eyes are dry and mouth is dry without lesions.  Declines dex s/s.  He can eat acidic food and no difficulty swallowing.  No rash or tightness of skin. He is gearing up to start exercising more and purchasing some exercise equipment but is limited by his breathing.          ROS: 8 point ROS negative except as stated above    Physical Exam  /89   Pulse 87   Temp 98.2  F (36.8  C) (Oral)   Resp 18   Wt 106.5 kg (234 lb 14.4 oz)   SpO2 97%   BMI 31.86 kg/m      Gen: A&O, NAD, conversant  OP: Mucosa very dry without lesions, no OP erythema  Pulm: gets out of breath today when he speaks but breathing comfortably at rest on 3L.Lung sounds are decreased on the left but still moving some air decreased bs until the upper 3/4 of the left lung.  They also sound good on the right.   CV: RRR  Abd: +BS, soft, nontender  Extremities: trace LE edema, has tremor bilateral  Skin: no rash on exposed skin including upper torso and ankles/tibia  Access: no access    Labs:  Lab Results   Component Value Date    WBC 16.7 (H) 04/15/2021    ANEU 14.5 (H) 04/15/2021    HGB 16.2 04/15/2021    HCT 48.2 04/15/2021     04/15/2021     04/15/2021    POTASSIUM 4.4  04/15/2021    CHLORIDE 108 04/15/2021    CO2 25 04/15/2021     (H) 04/15/2021    BUN 17 04/15/2021    CR 0.84 04/15/2021    MAG 2.0 04/15/2021    INR 1.07 03/10/2021    BILITOTAL 0.6 04/15/2021    AST 21 04/15/2021    ALT 32 04/15/2021    ALKPHOS 90 04/15/2021    PROTTOTAL 7.0 04/15/2021    ALBUMIN 3.4 04/15/2021          A/P: Yg Russo is a 60 yo M 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 Etiology unclear but empirically treating as possible complication of GVHD.     1. 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 Heme  - Counts stable. Ongoing leukocytosis due to steroids and maybe acute stress with lung strain.   - baby ASA     3. ID: afebrile  - pleural fluid cx neg for infectious etiology, last tested 1/26/2021  - prophy: ACV, levo, fluc and bactrim.     4. 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 Previously on CSA 25mg BID and pred 15mg daily   - 10/27/20 increased pred to 40mg daily for inflammatory pleural effusion. In view of lack of response will Change to 40 mg alternating with 20  Mg daily.    - CSA 200mg BID. Decrease to 175 mg BID, CSA level=481 on 3/24. and then per telephone/pharmacy note:  Run CSA level closer to 200 since the higher doses have not made a significant difference.    Requested pt hold 3 doses CSA, then resume at 100mg BID-. Level from today pending.  - 3/24:CXR shows large L effusion and no  shift of trachea to right than 3/4/21.  CT from today shows large left pleural affusion, collapsed left lung and slight tracheal shift to right. Will schedule for thoracentesis for 4/22 unless worsening of sxs in which case he will contact [facility] or go to ER.    5. Pulm:   - had a large, left pleural effusion with total collapse of left lung and mass effect on mediastinum. Suspect d/t GVHD.  Pulm placed pigtail catheter 10/23 (due to large volume fluid, can't all be drained in one day via  thoracentesis). This has since been removed.  -Scheduled for pleural tap on 4/1.  Last tap was on 3/10, removed 2 liters..   -   Sats stable ot 95% but still remains on 3 liters.    -Last saw Dr Brady on 3/9 when he talks about a pler-x catheter and if insurance will pay for it. He also notes that this type of cGvHD serositis responds poorly to immunosuppression  IR says that insurance will not pay for pleurx catheter unless in hospice but Yg says his insurance company says there has been no inquires from the U.  Will message Dr Brady and clarify what he knows about the catheter and if we put something thru insurance so we can answer this question. Sees Dr Reynolds 4-21 for consideration of pleurodesis but highly unlikely candidate.    Home O2:  Has home and portable O2 concentrators through [employer] in [place]; 3L- stable      6. Endo  - on synthroid    7. CV  - on ASA, crestor, metoprolol    8. FEN/renal  - creat, lytes wnl  - cont MgOx bid (help with mild consitpation).    9. GVHD of the eyes: resume drop rx'd by ophthalmology.  Due to covid, he is following up only if needed.    10. GVHD of the mouth: no sores at this time. Using biotene.  Doesn't have sores so doesn't want to do dex swish/spit at this point. Also, he had a terrible case of thrush that led to bad gingivitis, so he wants to avoid steroid for that reason as well.     RTC: in 2 weeks for labs, May 6 Fernie with CXR.    30 minutes spent on the date of the encounter doing chart review, review of test results, interpretation of tests, patient visit and documentation     Uli Enciso M.D.        Again, thank you for allowing me to participate in the care of your patient.        Sincerely,        Uli Enciso MD

## 2021-04-20 NOTE — PROGRESS NOTES
Yg is a 60 year old who is being evaluated via a billable video visit.      How would you like to obtain your AVS? MyChart  If the video visit is dropped, the invitation should be resent by: Text to cell phone: [phone]  Will anyone else be joining your video visit? No      Video Start Time: 2:44 pm  Video-Visit Details    Type of service:  Video Visit    Video End Time: 3:31 pm    Originating Location (pt. Location): Home    Distant Location (provider location):  [facility]     Platform used for Video Visit: Migdalia Esteban CMA on 4/21/2021 at 1:27 PM      THORACIC SURGERY - NEW PATIENT OFFICE VISIT      Dear Cole Toro,    I saw Mr. Russo at Dr. 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     HPI  Mr. 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 He was hospitalized in Oct 2020 and a chest tube was placed but unfortunately the lung never expanded. He has had many therapeutic thoracenteses since then. Recently underwent his 9th thoracentesis. He does initially feel relief after the procedure. He is more energetic and able to get around the house. He feels okay until about day 4, when his symptoms of fatigue and dyspnea return. He has been getting them done every three weeks. His home O2 requirement is usually 3L. He notes that the thoracenteses are quite painful and he is on the couch for 2 days after the procedure due to pain. For immunosuppression he is currently taking cyclosporine 100 mg BID and 40 mg and 20 mg prednisone every other day.     Previsit Tests     PFTs Oct 2020: FEV1 1.03 L (27%), DLCO 52%     CT Chest 4/15/2021   Large left hydropneumothroax unchanged from Oct 25, 2020         CXR 3/24/2021        CT Chest 10/25/2021           [facility]  Leukemia s/p bone marrow transplant 2016 - on immunosuppression   Chronic  kvhdu-zpwiva-cpkl disease   Pleural-parenchymal fibroelastosis  CAD sp stent x5    Past Medical History:   Diagnosis Date     Blepharitis      CAD (coronary artery disease) 2001     Lvxlz-cfvxpd-eflh disease (H)      Hyperlipidemia      Hypertension     Controled by medication     Hypothyroidism      Obesity      Oxygen dependent     Indogen portable      Pulmonary embolism (H) 2/2016     Varicose veins         ETOH rare  TOB infrequently as a teen. Currently a non smoker.     Physical examination  BMI 32  Appears his age. Wearing home O2.     From a personal perspective, he lives in [place] with his wife Latonia. He has 3 children one of which is still at home.       IMPRESSION (J98.19) Collapsed lung  (primary encounter diagnosis)  (J98.19) Trapped lung    60 year old year-old male with trapped left lung and recurrent pleural effusions.     PLAN  I spent 60 min on the date of the encounter in chart review, patient visit, review of tests, documentation and/or discussion with other providers about the issues documented above. I reviewed the plan as follows:      I'll discuss his case with Dr. Enciso - we would like to decrease immunosuppression if possible.   CXR post thoracentesis   Follow up in 2 weeks     They had all their questions answered and were in agreement with the plan.  I appreciate the opportunity to participate in the care of your patient and will keep you updated.  Sincerely,

## 2021-04-21 NOTE — LETTER
4/21/2021         RE: Byron Russo  [address] [address] [facility] [address]        Dear Colleague,    Thank you for referring your patient, Byron Russo, to the [facility] CANCER [facility]. Please see a copy of my visit note below.    Yg is a 60 year old who is being evaluated via a billable video visit.      How would you like to obtain your AVS? MyChart  If the video visit is dropped, the invitation should be resent by: Text to cell phone: [phone]  Will anyone else be joining your video visit? No      Video Start Time: 2:44 pm  Video-Visit Details    Type of service:  Video Visit    Video End Time: 3:31 pm    Originating Location (pt. Location): Home    Distant Location (provider location):  [facility]     Platform used for Video Visit: Migdalia Esteban CMA on 4/21/2021 at 1:27 PM      THORACIC SURGERY - NEW PATIENT OFFICE VISIT      Dear Cole Toro,    I saw Mr. Russo at Dr. 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     HPI  Mr. 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 He was hospitalized in Oct 2020 and a chest tube was placed but unfortunately the lung never expanded. He has had many therapeutic thoracenteses since then. Recently underwent his 9th thoracentesis. He does initially feel relief after the procedure. He is more energetic and able to get around the house. He feels okay until about day 4, when his symptoms of fatigue and dyspnea return. He has been getting them done every three weeks. His home O2 requirement is usually 3L. He notes that the thoracenteses are quite painful and he is on the couch for 2 days after the procedure due to pain. For immunosuppression he is currently taking cyclosporine 100 mg BID and 40 mg and 20 mg prednisone every other day.     Previsit Tests      PFTs Oct 2020: FEV1 1.03 L (27%), DLCO 52%     CT Chest 4/15/2021   Large left hydropneumothroax unchanged from Oct 25, 2020         CXR 3/24/2021        CT Chest 10/25/2021           [facility]  Leukemia s/p bone marrow transplant 2016 - on immunosuppression   Chronic gealm-fsebkt-dxsd disease   Pleural-parenchymal fibroelastosis  CAD sp stent x5    Past Medical History:   Diagnosis Date     Blepharitis      CAD (coronary artery disease) 2001     Oedrd-rzmpkv-zfhq disease (H)      Hyperlipidemia      Hypertension     Controled by medication     Hypothyroidism      Obesity      Oxygen dependent     Indogen portable      Pulmonary embolism (H) 2/2016     Varicose veins         ETOH rare  TOB infrequently as a teen. Currently a non smoker.     Physical examination  BMI 32  Appears his age. Wearing home O2.     From a personal perspective, he lives in [place] with his wife Latonia. He has 3 children one of which is still at home.       IMPRESSION (J98.19) Collapsed lung  (primary encounter diagnosis)  (J98.19) Trapped lung    60 year old year-old male with trapped left lung and recurrent pleural effusions.     PLAN  I spent 60 min on the date of the encounter in chart review, patient visit, review of tests, documentation and/or discussion with other providers about the issues documented above. I reviewed the plan as follows:      I'll discuss his case with Dr. Enciso - we would like to decrease immunosuppression if possible.   CXR post thoracentesis   Follow up in 2 weeks     They had all their questions answered and were in agreement with the plan.  I appreciate the opportunity to participate in the care of your patient and will keep you updated.  Sincerely,        Again, thank you for allowing me to participate in the care of your patient.        Sincerely,        Lobo Reynolds MD

## 2021-04-22 NOTE — DISCHARGE INSTRUCTIONS
Discharge Instructions for Paracentesis or Thoracentesis     Paracentesis is a procedure to remove extra fluid from your belly (abdomen). Thoracentesis is a procedure to remove extra fluid from your chest.  This fluid buildup is called ascites. The procedure may have been done to take a sample of the fluid. Or, it may have been done to drain the extra fluid from your abdomen or chest to help make you more comfortable.     Home care    If you have pain after the procedure, your healthcare provider can prescribe or recommend pain medicines. Take these exactly as directed. If you stopped taking other medicines before the procedure, ask your provider when you can start them again.    Avoid strenuous activity for 48 hours.    You will have a small bandage over the puncture site. Adhesive tapes, adhesive strips, or surgical glue may also be used to close the incision. They also help stop bleeding and promote healing. You may take the bandage off in 24-48 hours. Once there is a scab over the site, no bandages are required.    Check the puncture site for the signs of infection listed below.     Follow-up care  Make a follow-up appointment with your healthcare provider as directed. During your follow-up visit, your provider will check your healing. Let your provider know how you are feeling. You can also discuss the cause of your fluid, and if you need any further treatment.    When to seek medical advice:  Call your healthcare provider if you have any of the following after the procedure:    A fever of 100.4 F (38.0 C) or higher    Trouble breathing    Abdominal pain that is worse than expected    Belly Abdominal pain not caused by having the skin punctured that is worse than expected    Persistent bleeding from the puncture site    More than a small amount of fluid leaking from the puncture site    Swollen belly    Signs of infection at the puncture site. These include increased pain, redness, or swelling, warmth, or  bad-smelling drainage.    Feeling dizzy or lightheaded, or fainting     Call our Interventional Radiology (IR) service if:  If you start bleeding from the procedure site.  If you do start to bleed from the site, lie down and hold some pressure on the site.  Our radiology provider can help you decide if you need to return to the hospital.  If you have new or worsening pain related to the procedure.  If you have pronounced swelling at the procedure site.  If you develop persistent nausea or vomiting.  If you develop hives or a rash or any unexplained itching.  If you have a fever of greater than 100.4  F and chills in the first 5 days after procedure.  Any other concerns related to your procedure.      [facility]  Interventional Radiology (IR)  [address] [facility] [place] [address]    Contact Number:  [phone]  (IR control desk)  Monday - Friday 8:00 am - 4:30 pm    After hours for urgent concerns:  [phone]  After 4:30 pm Monday - Friday, Weekends and Holidays.   Ask for Interventional Radiology on-call.  Someone is available 24 hours a day.  [facility] toll free number:  [phone]

## 2021-04-22 NOTE — PROCEDURES
Interventional Radiology Brief Post Procedure Note    Procedure: IR THORACENTESIS    Proceduralist: Yusef Mercado PA-C    Assistant: Linda Dolan PA-C    Time Out: Prior to the start of the procedure and with procedural staff participation, I verbally confirmed the patient s identity using two indicators, relevant allergies, that the procedure was appropriate and matched the consent or emergent situation, and that the correct equipment/implants were available. Immediately prior to starting the procedure I conducted the Time Out with the procedural staff and re-confirmed the patient s name, procedure, and site/side. (The Joint Commission universal protocol was followed.)  Yes    Sedation: None. Local Anesthestic used    Findings: Completed ultrasound-guided left therapeutic thoracentesis. A total of 1300 mL dark brown but clear fluid drained from the left pleural space. Evidence of trapped lung with negative pressure pulling fluid and air back into pleural space. Procedure aborted with small-to-moderate effusion remaining.     Estimated Blood Loss: Minimal    Specimens: None    Complications: 1. None     Condition: Stable    Plan: Follow up per primary team. Return to IR as needed.    Comments: See dictated procedure note for full details.    Yusef Mercado PA-C  Interventional Radiology  [phone]

## 2021-05-05 NOTE — PROGRESS NOTES
THORACIC SURGERY - FOLLOW-UP OFFICE VISIT       Dear Cole Toro,     I saw Mr. 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      HPI  Mr. 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 He was hospitalized in Oct 2020 and a chest tube was placed but unfortunately the lung never expanded. He has had many therapeutic thoracenteses since then. Recently underwent his 10th thoracentesis when 1300 ml fluid was drained but they had to stop because the lung was trapped. He does initially feel relief after the procedure. He is more energetic and able to get around the house. He feels okay until about day 4, when his symptoms of fatigue and dyspnea return. He has been getting them done every three weeks. His home O2 requirement is usually 3L.  For immunosuppression he is currently taking cyclosporine 100 mg BID and 40 mg and 20 mg prednisone every other day.      Previsit Tests      PFTs Oct 2020: FEV1 1.03 L (27%), DLCO 52%     CXR (5/5/2021):      CT Chest 4/15/2021   Large left hydropneumothroax unchanged from Oct 25, 2020           CXR 3/24/2021          CT Chest 10/25/2021              PMH  Leukemia s/p bone marrow transplant 2016 - on immunosuppression   Chronic wbwwk-blnluc-ayrb disease   Pleural-parenchymal fibroelastosis  CAD sp stent x5     Past Medical History        Past Medical History:   Diagnosis Date     Blepharitis       CAD (coronary artery disease) 2001     Ejgur-haeadu-qyzh disease (H)       Hyperlipidemia       Hypertension       Controled by medication     Hypothyroidism       Obesity       Oxygen dependent       Indogen portable      Pulmonary embolism (H) 2/2016     Varicose veins              ETOH rare  TOB infrequently as a teen. Currently a non smoker.      Physical examination  BP (!) 140/92   Pulse 119    Temp 97.5  F (36.4  C) (Oral)   Resp 16   Wt 107.5 kg (237 lb)   SpO2 98%   PF (!) 3 L/min   BMI 32.14 kg/m    Wearing oxygen by nasal canula  Chest: decrased breath sounds left chest    From a personal perspective, he lives in [place] with his wife Latonia. He has 3 children one of which is still at home.         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   GVHD  Pleural-parenchymal fibroelastosis     PLAN  I spent 25 min on the date of the encounter in chart review, patient visit, review of tests, documentation and/or discussion with other providers about the issues documented above. I reviewed the plan as follows:        At this time our options are very limited, since he is still on a high dose of prednisone. Mr. Russo wishes to continue with thoracenteses as needed and to wait to see if his immunosuppression can be reduced. He used to be on no prednisone. He will contact us in the next few months if his prednisone dose is lower. At that time we may be able to place a Pleurx catheter.       They had all their questions answered and were in agreement with the plan.  I appreciate the opportunity to participate in the care of your patient and will keep you updated.  Sincerely,

## 2021-05-05 NOTE — NURSING NOTE
Oncology Rooming Note    May 5, 2021 3:23 PM   Byron Russo is a 60 year old male who presents for:    Chief Complaint   Patient presents with     Blood Draw     Labs drawn via  by RN in lab. VS taken.      Oncology Clinic Visit     RETURN; ILD     Initial Vitals: BP (!) 140/92   Pulse 119   Temp 97.5  F (36.4  C) (Oral)   Resp 16   Wt 107.5 kg (237 lb)   SpO2 98%   PF (!) 3 L/min   BMI 32.14 kg/m   Estimated body mass index is 32.14 kg/m  as calculated from the following:    Height as of 4/22/21: 1.829 m (6').    Weight as of this encounter: 107.5 kg (237 lb). Body surface area is 2.34 meters squared.  No Pain (0) Comment: Data Unavailable   No LMP for male patient.  Allergies reviewed: Yes  Medications reviewed: Yes    Medications: Medication refills not needed today.  Pharmacy name entered into PPTV: [place], [place] - [address] [facility] [id number]    Clinical concerns: No new concerns.        Cecilia Matt, CMA

## 2021-05-05 NOTE — NURSING NOTE
Chief Complaint   Patient presents with     Blood Draw     Labs drawn via  by RN in lab. VS taken.      Stef Keller RN

## 2021-05-05 NOTE — LETTER
5/5/2021         RE: Byron Russo  [address] [address] [facility] [address]        Dear Colleague,    Thank you for referring your patient, Byron Russo, to the [facility]. Please see a copy of my visit note below.    THORACIC SURGERY - FOLLOW-UP OFFICE VISIT       Dear Cole Toro,     I saw Mr. 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      HPI  Mr. 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 He was hospitalized in Oct 2020 and a chest tube was placed but unfortunately the lung never expanded. He has had many therapeutic thoracenteses since then. Recently underwent his 10th thoracentesis when 1300 ml fluid was drained but they had to stop because the lung was trapped. He does initially feel relief after the procedure. He is more energetic and able to get around the house. He feels okay until about day 4, when his symptoms of fatigue and dyspnea return. He has been getting them done every three weeks. His home O2 requirement is usually 3L.  For immunosuppression he is currently taking cyclosporine 100 mg BID and 40 mg and 20 mg prednisone every other day.      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 Currently a non smoker.      Physical examination  BP (!) 140/92   Pulse 119   Temp 97.5  F (36.4  C) (Oral)   Resp 16   Wt 107.5 kg (237 lb)   SpO2 98%   PF (!) 3 L/min   BMI 32.14 kg/m    Wearing oxygen by nasal canula  Chest: decrased breath sounds left chest    From a personal perspective, he lives in [place] with his wife Latonia. He has 3 children one of which is still at home.         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   GVHD  Pleural-parenchymal fibroelastosis     PLAN  I spent 25 min on the date of the encounter in chart review, patient visit, review of tests, documentation and/or discussion with other providers about the issues documented above. I reviewed the plan as follows:        At this time our options are very limited, since he is still on a high dose of prednisone. Mr. Russo wishes to continue with thoracenteses as needed and to wait to see if his immunosuppression can be reduced. He used to be on no prednisone. He will contact us in the next few months if his prednisone dose is lower. At that time we may be able to place a Pleurx catheter.       They had all their questions answered and were in agreement with the plan.  I appreciate the opportunity to participate in the care of your patient and will keep you updated.  Sincerely,        Again, thank you for allowing me to participate in the care of your patient.        Sincerely,        Lobo Reynolds MD

## 2021-05-06 NOTE — PROGRESS NOTES
Prednisone Taper Calendar    Emailed to patient    [place] Monday Tuesday Wednesday Thursday Friday Saturday   2-May-2021 3-May-2021 4-May-2021 5-May-2021 6-May-2021 7-May-2021 8-May-2021        30 mg  20 mg   9-May-2021 10-May-2021 11-May-2021 12-May-2021 13-May-2021 14-May-2021 15-May-2021   30 mg 20 mg 30 mg  20 mg 30 mg 20 mg 20 mg   16-May-2021 17-May-2021 18-May-2021 19-May-2021 20-May-2021 21-May-2021 22-May-2021   20 mg 20 mg 20 mg 20 mg 20 mg  10 mg 20 mg   23-May-2021 24-May-2021 25-May-2021 26-May-2021 27-May-2021 28-May-2021 29-May-2021   10 mg 20 mg 10 mg 20 mg 10 mg 10 mg 10 mg   30-May-2021 31-May-2021 1-Yimi-2021 2-Yimi-2021 3-Yimi-2021 4-Yimi-2021 5-Yimi-2021   10 mg 10 mg 10 mg 10 mg 10 mg 10 mg  Continue 10 mg daily     Maranda Clark, PharmD   PGY-2 Oncology Pharmacy Resident

## 2021-05-06 NOTE — PROGRESS NOTES
Yg is a 60 year old who is being evaluated via a billable video visit.      How would you like to obtain your AVS? MyChart  If the video visit is dropped, the invitation should be resent by: Text to cell phone: [phone]  Will anyone else be joining your video visit? No    Vitals - Patient Reported  Weight (Patient Reported): 107.5 kg (237 lb)  Height (Patient Reported): 182.9 cm (6')  BMI (Based on Pt Reported Ht/Wt): 32.14  Pain Score: No Pain (0)     Video-Visit Details    Type of service:  Video Visit 15 min long    VDistant Location (provider location):  [facility] BLOOD AND MARROW TRANSPLANT PROGRAM [place]       Nusrat Zayas MA      I saw Byron Russo today in followup of his myeloablative allo-sib transplant for MDS in 05/2016 and his ongoing complication of perhaps chronic GVHD-related recurrent accumulation of pleural effusion and hydropneumothorax of the left.  He was seen by Dr. Reynolds yesterday.  He feels that he is a candidate for a PleurX catheter but wants the prednisone to be at a lower dose, preferably about 10 mg a day, before he would feel it would be safe to perform the procedure.  Byron is currently taking 40 alternating with 20 mg a day of prednisone and is on 100 mg of cyclosporine b.i.d.  He states that in general he feels well.  He does feel a little shorter of breath since he had his last pleural tap approximately 12 days ago.  However, he has not had any increased oxygen requirements and he remains at 3 liters per minute and sats in the upper 90s.    REVIEW OF SYSTEMS:  No fevers, chills, nausea, vomiting, diarrhea, cough, hemoptysis, shortness of breath as noted, dysuria, hematuria, bruising or bleeding to report.  Appetite is good.  Weight is stable.      PHYSICAL EXAMINATION:  On visual physical examination, he had no visible erythema or discharge from his eyes.  His breathing was not labored.  He was alert and oriented. The skin of his face appeared  normal.    His repeat chest x-ray showed decrease in the pneumothorax component of the left lung with a slightly bigger effusion level.  His white count was 17,300, hemoglobin 16.4, platelets 283,000.  His electrolyte panel was normal with a creatinine of 0.9.  His liver function tests were normal.    ASSESSMENT AND PLAN:    1.  Approximately 5 years post-myeloablative allo-sib transplant for MDS with no evidence of recurrence.  2.  Persisting lung infiltrates and hydropneumothorax, which is perhaps a complication of chronic drzob-pqeild-xsdk disease.  We have tried immunosuppression, increasing it quite significantly, and it really has made no difference in the intervals between his pleural taps.  I, therefore, feel comfortable dropping the prednisone.  PharmD will develop a schedule which will get him down from the current level to 10 mg every other day in 4-6 weeks.  This will be faxed to the patient. I will plan on having him seen by a PA on 05/19 with chest x-ray and labs beforehand and me on 06/03 with chest x-ray and labs beforehand.    Uli Enciso M.D.

## 2021-05-06 NOTE — LETTER
5/6/2021         RE: Byron Russo  [address] [facility] [facility] [address]        Dear Colleague,    Thank you for referring your patient, Byron Russo, to the [facility] BLOOD AND MARROW TRANSPLANT PROGRAM [place]. Please see a copy of my visit note below.    Yg is a 60 year old who is being evaluated via a billable video visit.      How would you like to obtain your AVS? MyChart  If the video visit is dropped, the invitation should be resent by: Text to cell phone: [phone]  Will anyone else be joining your video visit? No    Vitals - Patient Reported  Weight (Patient Reported): 107.5 kg (237 lb)  Height (Patient Reported): 182.9 cm (6')  BMI (Based on Pt Reported Ht/Wt): 32.14  Pain Score: No Pain (0)     Video-Visit Details    Type of service:  Video Visit 15 min long    VDistant Location (provider location):  [facility] BLOOD AND MARROW TRANSPLANT PROGRAM [place]       Nusrat Zayas MA      I saw Byron Russo today in followup of his myeloablative allo-sib transplant for MDS in 05/2016 and his ongoing complication of perhaps chronic GVHD-related recurrent accumulation of pleural effusion and hydropneumothorax of the left.  He was seen by Dr. Reynolds yesterday.  He feels that he is a candidate for a PleurX catheter but wants the prednisone to be at a lower dose, preferably about 10 mg a day, before he would feel it would be safe to perform the procedure.  Byron is currently taking 40 alternating with 20 mg a day of prednisone and is on 100 mg of cyclosporine b.i.d.  He states that in general he feels well.  He does feel a little shorter of breath since he had his last pleural tap approximately 12 days ago.  However, he has not had any increased oxygen requirements and he remains at 3 liters per minute and sats in the upper 90s.    REVIEW OF SYSTEMS:  No fevers, chills, nausea, vomiting, diarrhea, cough, hemoptysis, shortness of breath as noted,  dysuria, hematuria, bruising or bleeding to report.  Appetite is good.  Weight is stable.      PHYSICAL EXAMINATION:  On visual physical examination, he had no visible erythema or discharge from his eyes.  His breathing was not labored.  He was alert and oriented. The skin of his face appeared normal.    His repeat chest x-ray showed decrease in the pneumothorax component of the left lung with a slightly bigger effusion level.  His white count was 17,300, hemoglobin 16.4, platelets 283,000.  His electrolyte panel was normal with a creatinine of 0.9.  His liver function tests were normal.    ASSESSMENT AND PLAN:    1.  Approximately 5 years post-myeloablative allo-sib transplant for MDS with no evidence of recurrence.  2.  Persisting lung infiltrates and hydropneumothorax, which is perhaps a complication of chronic hkjzh-jqunee-nyqv disease.  We have tried immunosuppression, increasing it quite significantly, and it really has made no difference in the intervals between his pleural taps.  I, therefore, feel comfortable dropping the prednisone.  PharmD will develop a schedule which will get him down from the current level to 10 mg every other day in 4-6 weeks.  This will be faxed to the patient. I will plan on having him seen by a PA on 05/19 with chest x-ray and labs beforehand and me on 06/03 with chest x-ray and labs beforehand.    Uli Enciso M.D.            Prednisone Taper Calendar    Emailed to patient    [place] Monday Tuesday Wednesday Thursday Friday Saturday   2-May-2021 3-May-2021 4-May-2021 5-May-2021 6-May-2021 7-May-2021 8-May-2021        30 mg  20 mg   9-May-2021 10-May-2021 11-May-2021 12-May-2021 13-May-2021 14-May-2021 15-May-2021   30 mg 20 mg 30 mg  20 mg 30 mg 20 mg 20 mg   16-May-2021 17-May-2021 18-May-2021 19-May-2021 20-May-2021 21-May-2021 22-May-2021   20 mg 20 mg 20 mg 20 mg 20 mg  10 mg 20 mg   23-May-2021 24-May-2021 25-May-2021 26-May-2021 27-May-2021 28-May-2021 29-May-2021   10 mg  20 mg 10 mg 20 mg 10 mg 10 mg 10 mg   30-May-2021 31-May-2021 1-Yimi-2021 2-Yimi-2021 3-Yimi-2021 4-Yimi-2021 5-Yimi-2021   10 mg 10 mg 10 mg 10 mg 10 mg 10 mg  Continue 10 mg daily     Maranda Clark, PharmD   PGY-2 Oncology Pharmacy Resident       Again, thank you for allowing me to participate in the care of your patient.        Sincerely,        Uli Enciso MD

## 2021-05-21 NOTE — DISCHARGE INSTRUCTIONS
Discharge Instructions for Paracentesis or Thoracentesis     Paracentesis is a procedure to remove extra fluid from your belly (abdomen). Thoracentesis is a procedure to remove extra fluid from your chest.  This fluid buildup is called ascites. The procedure may have been done to take a sample of the fluid. Or, it may have been done to drain the extra fluid from your abdomen or chest to help make you more comfortable.     Home care    If you have pain after the procedure, your healthcare provider can prescribe or recommend pain medicines. Take these exactly as directed. If you stopped taking other medicines before the procedure, ask your provider when you can start them again.    Avoid strenuous activity for 48 hours.    You will have a small bandage over the puncture site. Adhesive tapes, adhesive strips, or surgical glue may also be used to close the incision. They also help stop bleeding and promote healing. You may take the bandage off in 24-48 hours. Once there is a scab over the site, no bandages are required.    Check the puncture site for the signs of infection listed below.     Follow-up care  Make a follow-up appointment with your healthcare provider as directed. During your follow-up visit, your provider will check your healing. Let your provider know how you are feeling. You can also discuss the cause of your fluid, and if you need any further treatment.    When to seek medical advice:  Call your healthcare provider if you have any of the following after the procedure:    A fever of 100.4 F (38.0 C) or higher    Trouble breathing    Abdominal pain that is worse than expected    Belly Abdominal pain not caused by having the skin punctured that is worse than expected    Persistent bleeding from the puncture site    More than a small amount of fluid leaking from the puncture site    Swollen belly    Signs of infection at the puncture site. These include increased pain, redness, or swelling, warmth, or  bad-smelling drainage.    Feeling dizzy or lightheaded, or fainting     Call our Interventional Radiology (IR) service if:  If you start bleeding from the procedure site.  If you do start to bleed from the site, lie down and hold some pressure on the site.  Our radiology provider can help you decide if you need to return to the hospital.  If you have new or worsening pain related to the procedure.  If you have pronounced swelling at the procedure site.  If you develop persistent nausea or vomiting.  If you develop hives or a rash or any unexplained itching.  If you have a fever of greater than 100.4  F and chills in the first 5 days after procedure.  Any other concerns related to your procedure.      [facility]  Interventional Radiology (IR)  [address]  [facility] [place] [address]    Contact Number:  [phone]  (IR control desk)  Monday - Friday 8:00 am - 4:30 pm    After hours for urgent concerns:  [phone]  After 4:30 pm Monday - Friday, Weekends and Holidays.   Ask for Interventional Radiology on-call.  Someone is available 24 hours a day.  [facility] toll free number:  [phone]

## 2021-05-21 NOTE — PROCEDURES
Interventional Radiology Brief Post Procedure Note    Procedure:  IR Thoracentesis    Proceduralist: Aquiles Powers PA-C    Assistant: None    Time Out: Prior to the start of the procedure and with procedural staff participation, I verbally confirmed the patient s identity using two indicators, relevant allergies, that the procedure was appropriate and matched the consent or emergent situation, and that the correct equipment/implants were available. Immediately prior to starting the procedure I conducted the Time Out with the procedural staff and re-confirmed the patient s name, procedure, and site/side. (The Joint Commission universal protocol was followed.)  Yes        Sedation: None. Local Anesthestic used    Findings: Completed image guided therapeutic thoracentesis. A total of 1300 ml of clear dark khadra fluid was removed without difficulty. Patient has a small residual effusion remaining - drainage stopped due to pressure. Patient tolerated the procedure well. No immediate complication    Estimated Blood Loss: Minimal    SPECIMENS: None    Complications: 1. None     Condition: Stable    Plan: Follow-up per primary team.     Comments: See dictated procedure note for full details.    Aquiles Powers PA-C

## 2021-06-03 NOTE — LETTER
6/3/2021         RE: Byron Russo  [address] [facility] [id number]        Dear Colleague,    Thank you for referring your patient, Byron Russo, to the [facility] [place]. Please see a copy of my visit note below.    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  He has had several thoracenteses over the last months.  He was seen by Dr. Reynolds in early May who thought he would be a candidate for a PleurX catheter but wanted the prednisone to be at a lower dose than it was.  He has been placed on a taper and is now at the target dose of 10 mg a day that he had requested.  He has been taking that dose for a week and has not noticed any difference in his symptoms other than gradual worsening, especially over the last 5 days since his last thoracentesis was performed approximately 2-1/2 weeks ago.  He continues to take 100 mg of cyclosporine b.i.d. and his 10 mg of prednisone.  He says he feels like it is time for scheduling a thoracentesis again for next week.    REVIEW OF SYSTEMS:  No fevers, chills, nausea, vomiting, diarrhea, cough, hemoptysis, shortness of breath, hematuria, dysuria, bruising or bleeding.  Appetite is good.  Weight is stable.  He definitely notices dyspnea on exertion, especially over the last 5 days.      PHYSICAL EXAMINATION:  His initial vital signs showed a respiratory rate of 36 with a saturation of 94%.  Repeat vitals performed after he had sat for 10 minutes in the room showed a respiratory rate of 20 and sats of 96%.  On physical examination, he had no injection or erythema of the eye.  Oropharynx showed very subtle lichenoid changes.  There were severely decreased breath sounds over the left hemithorax.  No  rubs.  Cardiac exam without S3, rub or murmur.  Abdomen nondistended.  Bowel sounds present.  No organomegaly.  Trace pedal edema.  No skin rash.      His labs included a CBC showing white count of 15,000, hemoglobin 16.8, platelets 267,000.  Electrolyte panel was normal with a creatinine of 1.20.  Liver function tests were normal.  Chest x-ray was not officially read but looked to me as though he had a larger pleural effusion than he did on 05/05/2021.    ASSESSMENT AND PLAN:    1.  Approximately 5 years status post myeloablative allotransplant for MDS.  No evidence of recurrence.  2.  Persisting lung infiltrates and hydropneumothorax which perhaps is a complication of chronic kdcjb-rxmykh-fyoi disease, increasing immunosuppression seems to have made no difference.  He is now at his target level of 10 mg daily.  We will contact Dr. Reynolds and let him know that Dwight ready to go with a PleurX catheter placement.  I will schedule him for a repeat thoracentesis for next time. He requests scheduling so that his COVID test does not have to be done over the weekend.  I would then have him see either the doctor of the month or his physician, Dr. Cristal Arrnigton, who is overtaking the patient from me in approximately 2 weeks, at which point a repeat 2-view chest x-ray, comp, CBC and cyclosporine level should be drawn.    Uli Enciso M.D.              Again, thank you for allowing me to participate in the care of your patient.        Sincerely,        BMT DOM

## 2021-06-03 NOTE — PROGRESS NOTES
I saw Byron Russo today for followup of his myeloablative allo-sib transplant for MDS in 2016 and ongoing complication of possible chronic minzw-kbmhvc-syml disease-related accumulation of pleural effusion and hydropneumothorax of the left lung.  He has had several thoracenteses over the last months.  He was seen by Dr. Reynolds in early May who thought he would be a candidate for a PleurX catheter but wanted the prednisone to be at a lower dose than it was.  He has been placed on a taper and is now at the target dose of 10 mg a day that he had requested.  He has been taking that dose for a week and has not noticed any difference in his symptoms other than gradual worsening, especially over the last 5 days since his last thoracentesis was performed approximately 2-1/2 weeks ago.  He continues to take 100 mg of cyclosporine b.i.d. and his 10 mg of prednisone.  He says he feels like it is time for scheduling a thoracentesis again for next week.    REVIEW OF SYSTEMS:  No fevers, chills, nausea, vomiting, diarrhea, cough, hemoptysis, shortness of breath, hematuria, dysuria, bruising or bleeding.  Appetite is good.  Weight is stable.  He definitely notices dyspnea on exertion, especially over the last 5 days.      PHYSICAL EXAMINATION:  His initial vital signs showed a respiratory rate of 36 with a saturation of 94%.  Repeat vitals performed after he had sat for 10 minutes in the room showed a respiratory rate of 20 and sats of 96%.  On physical examination, he had no injection or erythema of the eye.  Oropharynx showed very subtle lichenoid changes.  There were severely decreased breath sounds over the left hemithorax.  No rubs.  Cardiac exam without S3, rub or murmur.  Abdomen nondistended.  Bowel sounds present.  No organomegaly.  Trace pedal edema.  No skin rash.      His labs included a CBC showing white count of 15,000, hemoglobin 16.8, platelets 267,000.  Electrolyte panel was normal with a creatinine of 1.20.   Liver function tests were normal.  Chest x-ray was not officially read but looked to me as though he had a larger pleural effusion than he did on 05/05/2021.    ASSESSMENT AND PLAN:    1.  Approximately 5 years status post myeloablative allotransplant for MDS.  No evidence of recurrence.  2.  Persisting lung infiltrates and hydropneumothorax which perhaps is a complication of chronic qlvsg-rvycxj-kwsr disease, increasing immunosuppression seems to have made no difference.  He is now at his target level of 10 mg daily.  We will contact Dr. Reynolds and let him know that Dwight ready to go with a PleurX catheter placement.  I will schedule him for a repeat thoracentesis for next time. He requests scheduling so that his COVID test does not have to be done over the weekend.  I would then have him see either the doctor of the month or his physician, Dr. Cristal Arrington, who is overtaking the patient from me in approximately 2 weeks, at which point a repeat 2-view chest x-ray, comp, CBC and cyclosporine level should be drawn.    Uli Enciso M.D.

## 2021-06-03 NOTE — NURSING NOTE
Chief Complaint   Patient presents with     Lab Only     venipuncture, vitals checked     Pt states he is breathing heavy because he was talking and walking, O2 at 3 liters YOHANA Henry RN on 6/3/2021 at 11:50 AM

## 2021-06-03 NOTE — NURSING NOTE
Oncology Rooming Note    Sadia 3, 2021 11:56 AM   Byron Russo is a 60 year old male who presents for:    Chief Complaint   Patient presents with     Lab Only     venipuncture, vitals checked     RECHECK     MDS     Initial Vitals: BP (!) 140/88   Pulse 116   Temp 98.1  F (36.7  C)   Resp (!) 36   Wt 108 kg (238 lb)   SpO2 96%   BMI 32.28 kg/m   Estimated body mass index is 32.28 kg/m  as calculated from the following:    Height as of 5/21/21: 1.829 m (6').    Weight as of this encounter: 108 kg (238 lb). Body surface area is 2.34 meters squared.  No Pain (0) Comment: Data Unavailable   No LMP for male patient.  Allergies reviewed: Yes  Medications reviewed: Yes    Medications: Medication refills not needed today.  Pharmacy name entered into [employer]: [facility] [place], [place] - [address] [facility] [phone]    Clinical concerns: NONE       Erica Fuentes CMA

## 2021-06-11 NOTE — BRIEF OP NOTE
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  Complications: None.  Implants: * No implants in log *      Ultrasound guided left thoracentesis with 5 Maltese Yueh pigtail catheter needle. 1300 mL left pleural effusion aspirated. No immediate complication.

## 2021-06-11 NOTE — DISCHARGE INSTRUCTIONS
.Discharge Instructions for Paracentesis or Thoracentesis     Paracentesis is a procedure to remove extra fluid from your belly (abdomen). Thoracentesis is a procedure to remove extra fluid from your chest.  This fluid buildup is called ascites. The procedure may have been done to take a sample of the fluid. Or, it may have been done to drain the extra fluid from your abdomen or chest to help make you more comfortable.     Home care    If you have pain after the procedure, your healthcare provider can prescribe or recommend pain medicines. Take these exactly as directed. If you stopped taking other medicines before the procedure, ask your provider when you can start them again.    Avoid strenuous activity for 48 hours.    You will have a small bandage over the puncture site. Adhesive tapes, adhesive strips, or surgical glue may also be used to close the incision. They also help stop bleeding and promote healing. You may take the bandage off in 24-48 hours. Once there is a scab over the site, no bandages are required.    Check the puncture site for the signs of infection listed below.     Follow-up care  Make a follow-up appointment with your healthcare provider as directed. During your follow-up visit, your provider will check your healing. Let your provider know how you are feeling. You can also discuss the cause of your fluid, and if you need any further treatment.    When to seek medical advice:  Call your healthcare provider if you have any of the following after the procedure:    A fever of 100.4 F (38.0 C) or higher    Trouble breathing    Abdominal pain that is worse than expected    Belly Abdominal pain not caused by having the skin punctured that is worse than expected    Persistent bleeding from the puncture site    More than a small amount of fluid leaking from the puncture site    Swollen belly    Signs of infection at the puncture site. These include increased pain, redness, or swelling, warmth, or  bad-smelling drainage.    Feeling dizzy or lightheaded, or fainting     Call our Interventional Radiology (IR) service if:  If you start bleeding from the procedure site.  If you do start to bleed from the site, lie down and hold some pressure on the site.  Our radiology provider can help you decide if you need to return to the hospital.  If you have new or worsening pain related to the procedure.  If you have pronounced swelling at the procedure site.  If you develop persistent nausea or vomiting.  If you develop hives or a rash or any unexplained itching.  If you have a fever of greater than 100.4  F and chills in the first 5 days after procedure.  Any other concerns related to your procedure.      [facility]  Interventional Radiology (IR)  [address]  [facility] [place] [address]    Contact Number:  [phone]  (IR control desk)  Monday - Friday 8:00 am - 4:30 pm    After hours for urgent concerns:  [phone]  After 4:30 pm Monday - Friday, Weekends and Holidays.   Ask for Interventional Radiology on-call.  Someone is available 24 hours a day.  [facility] toll free number:  [phone]

## 2021-06-11 NOTE — H&P
Patient presents for therapeutic left thoracentesis.    No interval change to H&P.    Consent obtained.

## 2021-06-11 NOTE — OR NURSING
Patient wanted to know if he could leave after 30 min of Bedrest, paged Dr Lema and he said if patient is feeling fine he can leave after 30 Minutes.

## 2021-06-25 NOTE — PROGRESS NOTES
BMT Daily Progress Note   06/25/2021    Patient ID:  Byron Russo is a 60 year old male, currently day 5 years 1 monht of his HCT.     Diagnosis AMLU Acute myelogeneous leukemia, Unknown  HCT Type Allogeneic    Prep Regimen Cytoxan  Fludarabine  TBI   Donor Source Related PBSC    GVHD Prophylaxis Cyclosporine  Mycophenolate  Primary BMT MD Cohen/Nury     Last seen by Dr. Enciso in 6/3/2021 per his note:  S/p myeloablative allo-sib transplant for MDS in 2016 and ongoing complication of possible chronic xgicn-xepbjl-etcx disease-related accumulation of pleural effusion and hydropneumothorax of the left lung.  He has had several thoracenteses over the last months.  He was seen by Dr. Reynolds in early May who thought he would be a candidate for a PleurX catheter but wanted the prednisone to be at a lower dose than it was.  He has been placed on a taper and is now at the target dose of 10 mg a day that he had requested.    He continues to take 100 mg of cyclosporine b.i.d. and his 10 mg of prednisone.   6/11/2021 s/p Ultrasound guided left thoracentesis with 5 Chinese Yueh pigtail catheter needle. 1300 mL left pleural effusion aspirated. No immediate complication.  Plan for next one on 6/30  Then will see pulmonary for PleurX catheter on 7/7, now that he is at goal of 10 mg for procedure        INTERVAL  HISTORY     He feel more SOB now with fluid accumulating before his thoracentesis. No fevers, chills, nausea, vomiting, diarrhea, cough, hematuria, dysuria, bruising or bleeding. NO oral sores. Appetite is good.    On stable Cyclosporine 100mg BID  Review of Systems: 10 point ROS negative except as noted above.    Scheduled Medications    Current Outpatient Medications   Medication     acetaminophen (TYLENOL) 325 MG tablet     acyclovir (ZOVIRAX) 800 MG tablet     amLODIPine (NORVASC) 5 MG tablet     aspirin 81 MG EC tablet     cycloSPORINE modified (GENERIC EQUIVALENT) 100 MG capsule     cycloSPORINE modified  (GENERIC EQUIVALENT) 25 MG capsule     fluconazole (DIFLUCAN) 100 MG tablet     furosemide (LASIX) 20 MG tablet     levofloxacin (LEVAQUIN) 250 MG tablet     levothyroxine (SYNTHROID/LEVOTHROID) 150 MCG tablet     magnesium oxide (MAG-OX) 400 (241.3 Mg) MG tablet     metoprolol tartrate (LOPRESSOR) 25 MG tablet     order for DME     order for DME     order for DME     potassium chloride ER (KLOR-CON M) 20 MEQ CR tablet     predniSONE (DELTASONE) 20 MG tablet     rosuvastatin (CRESTOR) 5 MG tablet     sulfamethoxazole-trimethoprim (BACTRIM DS) 800-160 MG tablet     No current facility-administered medications for this visit.        PHYSICAL EXAM     Weight In/Out     Wt Readings from Last 3 Encounters:   06/11/21 107.5 kg (237 lb)   06/03/21 108 kg (238 lb)   05/21/21 107.5 kg (237 lb)      [unfilled]       KPS:  60-70    There were no vitals taken for this visit.     /88 (BP Location: Right arm, Patient Position: Sitting, Cuff Size: Adult Regular)   Pulse 115   Temp 98.3  F (36.8  C) (Oral)   Resp 16   Wt 106.7 kg (235 lb 3.2 oz)   SpO2 96%   BMI 31.90 kg/m      General: Mildly dyspneic at rest which the patient reports is stable for him especially when he is speaking in full sentences continuously.  He has his oxygen with him.  Eyes: : JEREMY, sclera anicteric   Nose/Mouth/Throat: OP clear, buccal mucosa moist, no ulcerations, significant gingivitis but no ulcers or lichenoid changes  Lungs: Decreased breath sounds over the right mid to lower lung fields  Cardiovascular: RRR, no M/R/G   Abdominal/Rectal: +BS, soft, NT, ND, No HSM   Lymphatics: no edema  Skin: no rashes or petechaie no sclerotic changes noted  Neuro: A&O     LABS AND IMAGING: I have assessed all abnormal lab values for their clinical significance and any values considered clinically significant have been addressed in the assessment and plan.        Lab Results   Component Value Date    WBC 15.0 (H) 06/03/2021    ANEU 10.4 (H)  06/03/2021    HGB 16.8 06/03/2021    HCT 50.9 06/03/2021     06/03/2021     06/03/2021    POTASSIUM 3.9 06/03/2021    CHLORIDE 104 06/03/2021    CO2 24 06/03/2021     (H) 06/03/2021    BUN 22 06/03/2021    CR 1.20 06/03/2021    MAG 2.0 04/15/2021    INR 1.06 05/21/2021           ASSESSMENT AND PLAN   Byron Russo is a 60 year old male, currently day 5 years 1 month of his HCT.  1.  5 years status post myeloablative allotransplant for MDS.  No evidence of recurrence.  2.  Patient with complex pulmonary history followed closely by them.  He has rather chronic persisting lung infiltrates and hydropneumothorax which is thought to be complication of chronic asmtd-dtirkk-uhyu disease that has been managed with cyclosporine and prednisone now on a target level of 10 mg daily of prednisone prior to proceeding with a Pleurx catheter placement due to recurrent need for thoracentesis.  He has an appointment on 7/7 Dr. Reynolds for PleurX catheter placement.    Patient is transitioning his care to Dr. Cristal Arrington, I will schedule an appointment with her after the appointment with pulmonary with repeat 2-view chest x-ray, comp, CBC and cyclosporine level should be drawn.  Patient would like to get off Lasix I advised him that this would be best done after he has the catheter placed to avoid rapid reaccumulation of fluid in the meantime however he does not think that the Lasix has helped with fluid reaccumulation.  I recommended that the patient schedules an appointment with Dr. Weems to assess for ocular GVHD.  After Pleurx catheter placement I also recommend pulmonary function test to objectively assess for lung volumes and GVHD involvement.  Obtaining Schirmer's test as well for GVHD assessment would be helpful that can be performed by ophthalmology.    Known issues that I take into account for medical decisions, with salient changes to the plan considering these complexities noted above.  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     Elham Alan

## 2021-06-25 NOTE — LETTER
6/25/2021         RE: Byron Russo  [address] [facility] [address]        Dear Colleague,    Thank you for referring your patient, Byron Russo, to the [facility] BLOOD AND MARROW TRANSPLANT PROGRAM [place]. Please see a copy of my visit note below.    BMT Daily Progress Note   06/25/2021    Patient ID:  Byron Russo is a 60 year old male, currently day 5 years 1 monht of his HCT.     Diagnosis AMLU Acute myelogeneous leukemia, Unknown  HCT Type Allogeneic    Prep Regimen Cytoxan  Fludarabine  TBI   Donor Source Related PBSC    GVHD Prophylaxis Cyclosporine  Mycophenolate  Primary BMT MD Cohen/Nury     Last seen by Dr. Enciso in 6/3/2021 per his note:  S/p myeloablative allo-sib transplant for MDS in 2016 and ongoing complication of possible chronic lwpng-fyqhot-plfx disease-related accumulation of pleural effusion and hydropneumothorax of the left lung.  He has had several thoracenteses over the last months.  He was seen by Dr. Reynolds in early May who thought he would be a candidate for a PleurX catheter but wanted the prednisone to be at a lower dose than it was.  He has been placed on a taper and is now at the target dose of 10 mg a day that he had requested.    He continues to take 100 mg of cyclosporine b.i.d. and his 10 mg of prednisone.   6/11/2021 s/p Ultrasound guided left thoracentesis with 5 [place] Yueh pigtail catheter needle. 1300 mL left pleural effusion aspirated. No immediate complication.  Plan for next one on 6/30  Then will see pulmonary for PleurX catheter on 7/7, now that he is at goal of 10 mg for procedure        INTERVAL  HISTORY     He feel more SOB now with fluid accumulating before his thoracentesis. No fevers, chills, nausea, vomiting, diarrhea, cough, hematuria, dysuria, bruising or bleeding. NO oral sores. Appetite is good.    On stable Cyclosporine 100mg BID  Review of Systems: 10 point ROS negative except as noted  above.    Scheduled Medications    Current Outpatient Medications   Medication     acetaminophen (TYLENOL) 325 MG tablet     acyclovir (ZOVIRAX) 800 MG tablet     amLODIPine (NORVASC) 5 MG tablet     aspirin 81 MG EC tablet     cycloSPORINE modified (GENERIC EQUIVALENT) 100 MG capsule     cycloSPORINE modified (GENERIC EQUIVALENT) 25 MG capsule     fluconazole (DIFLUCAN) 100 MG tablet     furosemide (LASIX) 20 MG tablet     levofloxacin (LEVAQUIN) 250 MG tablet     levothyroxine (SYNTHROID/LEVOTHROID) 150 MCG tablet     magnesium oxide (MAG-OX) 400 (241.3 Mg) MG tablet     metoprolol tartrate (LOPRESSOR) 25 MG tablet     order for DME     order for DME     order for DME     potassium chloride ER (KLOR-CON M) 20 MEQ CR tablet     predniSONE (DELTASONE) 20 MG tablet     rosuvastatin (CRESTOR) 5 MG tablet     sulfamethoxazole-trimethoprim (BACTRIM DS) 800-160 MG tablet     No current facility-administered medications for this visit.        PHYSICAL EXAM     Weight In/Out     Wt Readings from Last 3 Encounters:   06/11/21 107.5 kg (237 lb)   06/03/21 108 kg (238 lb)   05/21/21 107.5 kg (237 lb)      [unfilled]       KPS:  60-70    There were no vitals taken for this visit.     /88 (BP Location: Right arm, Patient Position: Sitting, Cuff Size: Adult Regular)   Pulse 115   Temp 98.3  F (36.8  C) (Oral)   Resp 16   Wt 106.7 kg (235 lb 3.2 oz)   SpO2 96%   BMI 31.90 kg/m      General: Mildly dyspneic at rest which the patient reports is stable for him especially when he is speaking in full sentences continuously.  He has his oxygen with him.  Eyes: : JEREMY, sclera anicteric   Nose/Mouth/Throat: OP clear, buccal mucosa moist, no ulcerations, significant gingivitis but no ulcers or lichenoid changes  Lungs: Decreased breath sounds over the right mid to lower lung fields  Cardiovascular: RRR, no M/R/G   Abdominal/Rectal: +BS, soft, NT, ND, No HSM   Lymphatics: no edema  Skin: no rashes or petechaie no sclerotic  changes noted  Neuro: A&O     LABS AND IMAGING: I have assessed all abnormal lab values for their clinical significance and any values considered clinically significant have been addressed in the assessment and plan.        Lab Results   Component Value Date    WBC 15.0 (H) 06/03/2021    ANEU 10.4 (H) 06/03/2021    HGB 16.8 06/03/2021    HCT 50.9 06/03/2021     06/03/2021     06/03/2021    POTASSIUM 3.9 06/03/2021    CHLORIDE 104 06/03/2021    CO2 24 06/03/2021     (H) 06/03/2021    BUN 22 06/03/2021    CR 1.20 06/03/2021    MAG 2.0 04/15/2021    INR 1.06 05/21/2021           ASSESSMENT AND PLAN   Byron Russo is a 60 year old male, currently day 5 years 1 month of his HCT.  1.  5 years status post myeloablative allotransplant for MDS.  No evidence of recurrence.  2.  Patient with complex pulmonary history followed closely by them.  He has rather chronic persisting lung infiltrates and hydropneumothorax which is thought to be complication of chronic xcnsv-osgtkw-hihg disease that has been managed with cyclosporine and prednisone now on a target level of 10 mg daily of prednisone prior to proceeding with a Pleurx catheter placement due to recurrent need for thoracentesis.  He has an appointment on 7/7 Dr. Reynolds for PleurX catheter placement.    Patient is transitioning his care to Dr. Cristal Arrington, I will schedule an appointment with her after the appointment with pulmonary with repeat 2-view chest x-ray, comp, CBC and cyclosporine level should be drawn.  Patient would like to get off Lasix I advised him that this would be best done after he has the catheter placed to avoid rapid reaccumulation of fluid in the meantime however he does not think that the Lasix has helped with fluid reaccumulation.  I recommended that the patient schedules an appointment with Dr. Weems to assess for ocular GVHD.  After Pleurx catheter placement I also recommend pulmonary function test to objectively assess for  lung volumes and GVHD involvement.  Obtaining Schirmer's test as well for GVHD assessment would be helpful that can be performed by ophthalmology.    Known issues that I take into account for medical decisions, with salient changes to the plan considering these complexities noted above.  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     Elham Alan              Again, thank you for allowing me to participate in the care of your patient.        Sincerely,        BMT DOM

## 2021-06-30 NOTE — PROGRESS NOTES
Patient prefers a 30 minute recovery as he has had multiple thoracentesis. Dr. Lema notified and is okay with the patient leaving after 30 minute recovery instead of the ordered 60 minute recovery.    Dressing changed on upper puncture site due to moist drainage. No new drainage noted on new dressing. Other dressings intact.

## 2021-06-30 NOTE — DISCHARGE INSTRUCTIONS
Discharge Instructions for Paracentesis or Thoracentesis     Paracentesis is a procedure to remove extra fluid from your belly (abdomen). Thoracentesis is a procedure to remove extra fluid from your chest.  This fluid buildup is called ascites. The procedure may have been done to take a sample of the fluid. Or, it may have been done to drain the extra fluid from your abdomen or chest to help make you more comfortable.     Home care    If you have pain after the procedure, your healthcare provider can prescribe or recommend pain medicines. Take these exactly as directed. If you stopped taking other medicines before the procedure, ask your provider when you can start them again.    Avoid strenuous activity for 48 hours.    You will have a small bandage over the puncture site. Adhesive tapes, adhesive strips, or surgical glue may also be used to close the incision. They also help stop bleeding and promote healing. You may take the bandage off in 24-48 hours. Once there is a scab over the site, no bandages are required.    Check the puncture site for the signs of infection listed below.     Follow-up care  Make a follow-up appointment with your healthcare provider as directed. During your follow-up visit, your provider will check your healing. Let your provider know how you are feeling. You can also discuss the cause of your fluid, and if you need any further treatment.    When to seek medical advice:  Call your healthcare provider if you have any of the following after the procedure:    A fever of 100.4 F (38.0 C) or higher    Trouble breathing    Abdominal pain that is worse than expected    Belly Abdominal pain not caused by having the skin punctured that is worse than expected    Persistent bleeding from the puncture site    More than a small amount of fluid leaking from the puncture site    Swollen belly    Signs of infection at the puncture site. These include increased pain, redness, or swelling, warmth, or  bad-smelling drainage.    Feeling dizzy or lightheaded, or fainting     Call our Interventional Radiology (IR) service if:  If you start bleeding from the procedure site.  If you do start to bleed from the site, lie down and hold some pressure on the site.  Our radiology provider can help you decide if you need to return to the hospital.  If you have new or worsening pain related to the procedure.  If you have pronounced swelling at the procedure site.  If you develop persistent nausea or vomiting.  If you develop hives or a rash or any unexplained itching.  If you have a fever of greater than 100.4  F and chills in the first 5 days after procedure.  Any other concerns related to your procedure.      [facility]  Interventional Radiology (IR)  [address]  [facility] [place] [address]    Contact Number:  [phone]  (IR control desk)  Monday - Friday 8:00 am - 4:30 pm    After hours for urgent concerns:  [phone]  After 4:30 pm Monday - Friday, Weekends and Holidays.   Ask for Interventional Radiology on-call.  Someone is available 24 hours a day.  [facility] toll free number:  [phone]

## 2021-06-30 NOTE — BRIEF OP NOTE
[facility]    Brief Operative Note    Pre-operative diagnosis: Pleural effusion [J90]  Post-operative diagnosis Same as pre-operative diagnosis    Procedure: Procedure(s):  THORACENTESIS  Surgeon: Surgeon(s) and Role:     * Mikal Lema MD - Primary     * Carl Singh PA-C - Assisting  Anesthesia: Local   Estimated blood loss: Minimal  Drains: None  Specimens: * No specimens in log *  Findings:   None.  Complications: None.  Implants: * No implants in log *    Multiloculated left pleural effusion. Ultrasound guided pleural catheter placement x3 unable to completely aspirate the collection. 900 mL able to be aspirated. No immediate complication.

## 2021-07-07 NOTE — LETTER
7/7/2021         RE: Byron Russo  [address] [facility] [address]        Dear Colleague,    Thank you for referring your patient, Byron Russo, to the [facility]. Please see a copy of my visit note below.    THORACIC SURGERY - FOLLOW-UP OFFICE VISIT       Dear Dr. Duong,      I saw Mr. 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      HPI  Mr. 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 He was hospitalized in Oct 2020 and a chest tube was placed but unfortunately the lung never expanded. He has had many therapeutic thoracenteses since then. Recently underwent his 10th thoracentesis when 1300 ml fluid was drained but they had to stop because the lung was trapped. He does initially feel relief after the procedure. He is more energetic and able to get around the house. He feels okay until about day 4, when his symptoms of fatigue and dyspnea return. He has been getting them done every three weeks. His home O2 requirement is usually 3L.  For immunosuppression he is currently taking cyclosporine 100 mg and 10 mg prednisone every day.      Previsit Tests      PFT Oct 2020: FEV1 1.03 L (27%), DLCO 52%   CXR (7/7/2021): unchanged    CXR (5/5/2021):       CT Chest 4/15/2021   Large left hydropneumothroax unchanged from Oct 25, 2020           CXR 3/24/2021          CT Chest 10/25/2021              [facility]  Leukemia s/p bone marrow transplant 2016 - on immunosuppression   Chronic rwxcp-qnszhh-rhsw disease   Pleural-parenchymal fibroelastosis  CAD sp stent x5     Past Medical History           Past Medical History:   Diagnosis Date     Blepharitis       CAD (coronary artery disease) 2001     Nodum-atwsqh-uxqg disease (H)       Hyperlipidemia       Hypertension       Controled by  medication     Hypothyroidism       Obesity       Oxygen dependent       Indogen portable      Pulmonary embolism (H) 2/2016     Varicose veins              ETOH rare  TOB infrequently as a teen. Currently a non smoker.      Physical examination    Wearing oxygen by nasal canula  Chest: decrased breath sounds left chest     From a personal perspective, he lives in [place] with his wife Latonia. He has 3 children one of which is still at home.         IMPRESSION   (J98.19) Trapped lung  (primary encounter diagnosis)  (D89.813) Qnqxj-xrdfdl-hbhx disease (H)  (I42.4) Fibroelastosis (H)       60 year old year-old male with trapped LEFT lung and recurrent pleural effusions.   GVHD  Pleural-parenchymal fibroelastosis     PLAN  I spent 30 min on the date of the encounter in chart review, patient visit, review of tests, documentation and/or discussion with other providers about the issues documented above. I reviewed the plan as follows:     1) LEFT VATS indwelling pleural catheter placement (PleurX): on the week of August 16th, 2021, which would work best for his every 3 week thoracentesis.  2) Prednisone: although 10 mg/day is OK, it would be preferable to get him to 5 mg/day to minimize the chance of infection.  3) PAC: In-person visit  4) DVT prophylaxis: pneumatic compression devices        They had all their questions answered and were in agreement with the plan.  I appreciate the opportunity to participate in the care of your patient and will keep you updated.  Sincerely,        Again, thank you for allowing me to participate in the care of your patient.        Sincerely,        Lobo Reynolds MD

## 2021-07-07 NOTE — PROGRESS NOTES
BMT Daily Progress Note   07/07/2021    Patient ID:  Byron Russo is a 60 year old male, currently day 5 years 1 month of his HCT.     Diagnosis AMLU Acute myelogeneous leukemia, Unknown  HCT Type Allogeneic    Prep Regimen Cytoxan  Fludarabine  TBI   Donor Source Related PBSC    GVHD Prophylaxis Cyclosporine  Mycophenolate  Primary BMT MD Dunn/Nury     L:  S/p myeloablative allo-sib transplant for MDS in 2016 and ongoing complication of possible chronic qrauh-nzyafd-eejk disease-related accumulation of pleural effusion and hydropneumothorax of the left lung.  He has had several thoracenteses over the last months.  He was seen by Dr. Reynolds in early May who thought he would be a candidate for a PleurX catheter but wanted the prednisone to be at a lower dose than it was.  He has been placed on a taper and is now at a dose of 10 mg a day with the goal of 5 mg/ day by mid August, when he is planned for the procedure. He follows with Dr. Brady as well who felt that pleurodesis would not be successful in his case given the rapid accumulation.  He currently is on prednisone at 10mg/day and CSA at 100 mg bid    6/30/2021 s/p Ultrasound guided left thoracentesis with 5 [place] Yueh pigtail catheter needle.  No immediate complication.  Will need to schedule the next procedure in 2 weeks (gets thoracentesis every 3 weeks)  Saw Dr. Reynolds for PleurX catheter on 7/7, needs to be  at goal of 5 mg of prednisone for procedure. Planned for in mid August        INTERVAL  HISTORY     He feel stable SOB s. No fevers, chills, nausea, vomiting, diarrhea, cough, hematuria, dysuria, bruising or bleeding. NO oral sores. Appetite is good.    On stable Cyclosporine 100mg BID, pre 10 mg/day  Review of Systems: 10 point ROS negative except as noted above.    Scheduled Medications    Current Outpatient Medications   Medication     predniSONE (DELTASONE) 5 MG tablet     acetaminophen (TYLENOL) 325 MG tablet     acyclovir (ZOVIRAX) 800  MG tablet     amLODIPine (NORVASC) 5 MG tablet     aspirin 81 MG EC tablet     cycloSPORINE modified (GENERIC EQUIVALENT) 100 MG capsule     cycloSPORINE modified (GENERIC EQUIVALENT) 25 MG capsule     fluconazole (DIFLUCAN) 100 MG tablet     furosemide (LASIX) 20 MG tablet     levofloxacin (LEVAQUIN) 250 MG tablet     levothyroxine (SYNTHROID/LEVOTHROID) 150 MCG tablet     magnesium oxide (MAG-OX) 400 (241.3 Mg) MG tablet     metoprolol tartrate (LOPRESSOR) 25 MG tablet     order for DME     order for DME     order for DME     potassium chloride ER (KLOR-CON M) 20 MEQ CR tablet     predniSONE (DELTASONE) 20 MG tablet     rosuvastatin (CRESTOR) 5 MG tablet     sulfamethoxazole-trimethoprim (BACTRIM DS) 800-160 MG tablet     No current facility-administered medications for this visit.        PHYSICAL EXAM     Weight In/Out     Wt Readings from Last 3 Encounters:   07/07/21 106.1 kg (233 lb 12.8 oz)   07/07/21 106.4 kg (234 lb 9.6 oz)   06/30/21 106.6 kg (235 lb)      [unfilled]       KPS:  60-70    BP (!) 140/87 (BP Location: Left arm, Patient Position: Sitting, Cuff Size: Adult Regular)   Pulse 113   Temp 98.1  F (36.7  C) (Oral)   Resp 16   Wt 106.1 kg (233 lb 12.8 oz)   SpO2 98%   BMI 33.31 kg/m       BP (!) 140/87 (BP Location: Left arm, Patient Position: Sitting, Cuff Size: Adult Regular)   Pulse 113   Temp 98.1  F (36.7  C) (Oral)   Resp 16   Wt 106.1 kg (233 lb 12.8 oz)   SpO2 98%   BMI 33.31 kg/m      General: Mildly dyspneic at rest which the patient reports is stable for him especially when he is speaking in full sentences continuously.  He has his oxygen with him.  Eyes: : JEREMY, sclera anicteric   Nose/Mouth/Throat: OP clear, buccal mucosa moist, no ulcerations, significant gingivitis but no ulcers or lichenoid changes  Lungs: Decreased breath sounds over the right mid to lower lung fields  Cardiovascular: RRR, no M/R/G   Abdominal/Rectal: +BS, soft, NT, ND, No HSM   Lymphatics: no  edema  Skin: no rashes or petechaie no sclerotic changes noted  Neuro: A&O     LABS AND IMAGING: I have assessed all abnormal lab values for their clinical significance and any values considered clinically significant have been addressed in the assessment and plan.        Lab Results   Component Value Date    WBC 15.6 (H) 07/07/2021    ANEU 13.0 (H) 07/07/2021    HGB 17.2 07/07/2021    HCT 51.7 07/07/2021     07/07/2021     07/07/2021    POTASSIUM 4.3 07/07/2021    CHLORIDE 106 07/07/2021    CO2 23 07/07/2021     (H) 07/07/2021    BUN 15 07/07/2021    CR 1.00 07/07/2021    MAG 2.0 04/15/2021    INR 1.06 05/21/2021           ASSESSMENT AND PLAN   Byron Russo is a 60 year old male, currently day 5 years 1 month of his HCT.  1.  5 years status post myeloablative allotransplant for MDS.  No evidence of recurrence.  2.  Patient with complex pulmonary history followed closely by them.  He has rather chronic persisting lung infiltrates and hydropneumothorax which is thought to be complication of chronic flrqu-ugflgr-nxhq disease that has been managed with cyclosporine and prednisone now on 10 mg daily of prednisone prior to proceeding with a Pleurx catheter placement due to recurrent need for thoracentesis.  He has an appointment on 7/7 Dr. Reynolds for PleurX catheter placement.    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  He feels that the lasix is not helping at all. We will discontinue lasix and potassium for now   Known issues that I take into account for medical decisions, with salient changes to the plan considering these complexities noted above.  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)     Acute myeloid leukemia (H)     MDS (myelodysplastic syndrome) (H)     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)     Chronic GVHD (H)     Pneumonia     Acute respiratory failure with hypoxia (H)     Aspiration into airway      Oropharyngeal dysphagia     ILD (interstitial lung disease) (H)     Pleural effusion     Large pleural effusion     I spent 30 minutes in the care of this patient today, which included time necessary for preparation for the visit, obtaining history, ordering medications/tests/procedures as medically indicated, review of pertinent medical literature, counseling of the patient, communication of recommendations to the care team, and documentation time.     Cristal Arrington    Plan:    Due for PleurX catheter in mid-August  Due for thoracentesis in 2 weeks  Stop lasix and potassium  Decrease prednisone to 7.5mg every day- in 2 weeks we will decrease to 5 mg/ day    The chest X ray is reported as 1.  Left loculated pleural effusion with new multifocal bubbly lucency  in the left apex and left base. This could represent empyema.  2.  Unchanged interstitial opacity in the right lung.    I called the patient to discuss this result and recommended admission to expedite a diagnostic tap and appropriate management. The patient refused admission. He is scheduled for a thoracentesis on Thursday (this is the earliest it can be scheduled) - and is agreeable to do this. He will in the interim start Augmentin today.    Cristal Arrington

## 2021-07-07 NOTE — PROGRESS NOTES
THORACIC SURGERY - FOLLOW-UP OFFICE VISIT       Dear Dr. Duong,      I saw Mr. 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      HPI  Mr. 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 He was hospitalized in Oct 2020 and a chest tube was placed but unfortunately the lung never expanded. He has had many therapeutic thoracenteses since then. Recently underwent his 10th thoracentesis when 1300 ml fluid was drained but they had to stop because the lung was trapped. He does initially feel relief after the procedure. He is more energetic and able to get around the house. He feels okay until about day 4, when his symptoms of fatigue and dyspnea return. He has been getting them done every three weeks. His home O2 requirement is usually 3L.  For immunosuppression he is currently taking cyclosporine 100 mg and 10 mg prednisone every day.      Previsit Tests      PFT Oct 2020: FEV1 1.03 L (27%), DLCO 52%   CXR (7/7/2021): unchanged    CXR (5/5/2021):       CT Chest 4/15/2021   Large left hydropneumothroax unchanged from Oct 25, 2020           CXR 3/24/2021          CT Chest 10/25/2021              PMH  Leukemia s/p bone marrow transplant 2016 - on immunosuppression   Chronic yryhz-uadfrp-civp disease   Pleural-parenchymal fibroelastosis  CAD sp stent x5     Past Medical History           Past Medical History:   Diagnosis Date     Blepharitis       CAD (coronary artery disease) 2001     Fdixr-gvlckq-cvyk disease (H)       Hyperlipidemia       Hypertension       Controled by medication     Hypothyroidism       Obesity       Oxygen dependent       Indogen portable      Pulmonary embolism (H) 2/2016     Varicose veins              ETOH rare  TOB infrequently as a teen. Currently a non smoker.      Physical examination    Wearing oxygen by nasal  canula  Chest: decrased breath sounds left chest     From a personal perspective, he lives in [place] with his wife Latonia. He has 3 children one of which is still at home.         IMPRESSION   (J98.19) Trapped lung  (primary encounter diagnosis)  (D89.813) Itcfk-nhkbuv-axgf disease (H)  (I42.4) Fibroelastosis (H)       60 year old year-old male with trapped LEFT lung and recurrent pleural effusions.   GVHD  Pleural-parenchymal fibroelastosis     PLAN  I spent 30 min on the date of the encounter in chart review, patient visit, review of tests, documentation and/or discussion with other providers about the issues documented above. I reviewed the plan as follows:     1) LEFT VATS indwelling pleural catheter placement (PleurX): on the week of August 16th, 2021, which would work best for his every 3 week thoracentesis.  2) Prednisone: although 10 mg/day is OK, it would be preferable to get him to 5 mg/day to minimize the chance of infection.  3) PAC: In-person visit  4) DVT prophylaxis: pneumatic compression devices        They had all their questions answered and were in agreement with the plan.  I appreciate the opportunity to participate in the care of your patient and will keep you updated.  Sincerely,

## 2021-07-07 NOTE — LETTER
7/7/2021         RE: Byron Russo  [id number] [facility] [id number]        Dear Colleague,    Thank you for referring your patient, Byron Russo, to the [facility]LO[facility]ARROW TRANSPLANT PRO[facility] [place]. Please see a copy of my visit note below.    BMT Daily Progress Note   07/07/2021    Patient ID:  Byron Russo is a 60 year old male, currently day 5 years 1 month of his HCT.     Diagnosis AMLU Acute myelogeneous leukemia, Unknown  HCT Type Allogeneic    Prep Regimen Cytoxan  Fludarabine  TBI   Donor Source Related PBSC    GVHD Prophylaxis Cyclosporine  Mycophenolate  Primary BMT MD Dunn/Nury     L:  S/p myeloablative allo-sib transplant for MDS in 2016 and ongoing complication of possible chronic ghitu-wmbwvk-ttii disease-related accumulation of pleural effusion and hydropneumothorax of the left lung.  He has had several thoracenteses over the last months.  He was seen by Dr. Reynolds in early May who thought he would be a candidate for a PleurX catheter but wanted the prednisone to be at a lower dose than it was.  He has been placed on a taper and is now at a dose of 10 mg a day with the goal of 5 mg/ day by mid August, when he is planned for the procedure. He follows with Dr. Brady as well who felt that pleurodesis would not be successful in his case given the rapid accumulation.  He currently is on prednisone at 10mg/day and CSA at 100 mg bid    6/30/2021 s/p Ultrasound guided left thoracentesis with 5 Greek Yueh pigtail catheter needle.  No immediate complication.  Will need to schedule the next procedure in 2 weeks (gets thoracentesis every 3 weeks)  Saw Dr. Reynolds for PleurX catheter on 7/7, needs to be  at goal of 5 mg of prednisone for procedure. Planned for in mid August        INTERVAL  HISTORY     He feel stable SOB s. No fevers, chills, nausea, vomiting, diarrhea, cough, hematuria, dysuria, bruising or bleeding. NO oral sores. Appetite is  good.    On stable Cyclosporine 100mg BID, pre 10 mg/day  Review of Systems: 10 point ROS negative except as noted above.    Scheduled Medications    Current Outpatient Medications   Medication     predniSONE (DELTASONE) 5 MG tablet     acetaminophen (TYLENOL) 325 MG tablet     acyclovir (ZOVIRAX) 800 MG tablet     amLODIPine (NORVASC) 5 MG tablet     aspirin 81 MG EC tablet     cycloSPORINE modified (GENERIC EQUIVALENT) 100 MG capsule     cycloSPORINE modified (GENERIC EQUIVALENT) 25 MG capsule     fluconazole (DIFLUCAN) 100 MG tablet     furosemide (LASIX) 20 MG tablet     levofloxacin (LEVAQUIN) 250 MG tablet     levothyroxine (SYNTHROID/LEVOTHROID) 150 MCG tablet     magnesium oxide (MAG-OX) 400 (241.3 Mg) MG tablet     metoprolol tartrate (LOPRESSOR) 25 MG tablet     order for DME     order for DME     order for DME     potassium chloride ER (KLOR-CON M) 20 MEQ CR tablet     predniSONE (DELTASONE) 20 MG tablet     rosuvastatin (CRESTOR) 5 MG tablet     sulfamethoxazole-trimethoprim (BACTRIM DS) 800-160 MG tablet     No current facility-administered medications for this visit.        PHYSICAL EXAM     Weight In/Out     Wt Readings from Last 3 Encounters:   07/07/21 106.1 kg (233 lb 12.8 oz)   07/07/21 106.4 kg (234 lb 9.6 oz)   06/30/21 106.6 kg (235 lb)      [unfilled]       KPS:  60-70    BP (!) 140/87 (BP Location: Left arm, Patient Position: Sitting, Cuff Size: Adult Regular)   Pulse 113   Temp 98.1  F (36.7  C) (Oral)   Resp 16   Wt 106.1 kg (233 lb 12.8 oz)   SpO2 98%   BMI 33.31 kg/m       BP (!) 140/87 (BP Location: Left arm, Patient Position: Sitting, Cuff Size: Adult Regular)   Pulse 113   Temp 98.1  F (36.7  C) (Oral)   Resp 16   Wt 106.1 kg (233 lb 12.8 oz)   SpO2 98%   BMI 33.31 kg/m      General: Mildly dyspneic at rest which the patient reports is stable for him especially when he is speaking in full sentences continuously.  He has his oxygen with him.  Eyes: : JEREMY, sclera  anicteric   Nose/Mouth/Throat: OP clear, buccal mucosa moist, no ulcerations, significant gingivitis but no ulcers or lichenoid changes  Lungs: Decreased breath sounds over the right mid to lower lung fields  Cardiovascular: RRR, no M/R/G   Abdominal/Rectal: +BS, soft, NT, ND, No HSM   Lymphatics: no edema  Skin: no rashes or petechaie no sclerotic changes noted  Neuro: A&O     LABS AND IMAGING: I have assessed all abnormal lab values for their clinical significance and any values considered clinically significant have been addressed in the assessment and plan.        Lab Results   Component Value Date    WBC 15.6 (H) 07/07/2021    ANEU 13.0 (H) 07/07/2021    HGB 17.2 07/07/2021    HCT 51.7 07/07/2021     07/07/2021     07/07/2021    POTASSIUM 4.3 07/07/2021    CHLORIDE 106 07/07/2021    CO2 23 07/07/2021     (H) 07/07/2021    BUN 15 07/07/2021    CR 1.00 07/07/2021    MAG 2.0 04/15/2021    INR 1.06 05/21/2021           ASSESSMENT AND PLAN   Byron Russo is a 60 year old male, currently day 5 years 1 month of his HCT.  1.  5 years status post myeloablative allotransplant for MDS.  No evidence of recurrence.  2.  Patient with complex pulmonary history followed closely by them.  He has rather chronic persisting lung infiltrates and hydropneumothorax which is thought to be complication of chronic faodw-tfrwui-fqvh disease that has been managed with cyclosporine and prednisone now on 10 mg daily of prednisone prior to proceeding with a Pleurx catheter placement due to recurrent need for thoracentesis.  He has an appointment on 7/7 Dr. Reynolds for PleurX catheter placement.    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  He feels that the lasix is not helping at all. We will discontinue lasix and potassium for now   Known issues that I take into account for medical decisions, with salient changes to the plan considering these complexities noted above.  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     Cristal Arrington    Plan:    Due for PleurX catheter in mid-August  Due for thoracentesis in 2 weeks  Stop lasix and potassium  Decrease prednisone to 7.5mg every day- in 2 weeks we will decrease to 5 mg/ day    The chest X ray is reported as 1.  Left loculated pleural effusion with new multifocal bubbly lucency  in the left apex and left base. This could represent empyema.  2.  Unchanged interstitial opacity in the right lung.    I called the patient to discuss this result and recommended admission to expedite a diagnostic tap and appropriate management. The patient refused admission. He is scheduled for a thoracentesis on Thursday (this is the earliest it can be scheduled) - and is agreeable to do this. He will in the interim start Augmentin today.    Cristal Arrington      Again, thank you for allowing me to participate in the care of your patient.        Sincerely,        BMT DOM

## 2021-07-07 NOTE — NURSING NOTE
"Oncology Rooming Note    July 7, 2021 2:32 PM   Byron Russo is a 60 year old male who presents for:    Chief Complaint   Patient presents with     Oncology Clinic Visit     myelodysplastic syndrome     Initial Vitals: /86 (BP Location: Right arm, Patient Position: Sitting, Cuff Size: Adult Large)   Pulse 116   Temp 98.7  F (37.1  C) (Oral)   Wt 106.4 kg (234 lb 9.6 oz)   SpO2 95%   BMI 33.42 kg/m   Estimated body mass index is 33.42 kg/m  as calculated from the following:    Height as of 6/30/21: 1.784 m (5' 10.25\").    Weight as of this encounter: 106.4 kg (234 lb 9.6 oz). Body surface area is 2.3 meters squared.  No Pain (0) Comment: Data Unavailable   No LMP for male patient.  Allergies reviewed: Yes  Medications reviewed: Yes    Medications: Medication refills not needed today.  Pharmacy name entered into [facility]: [place], [place] - [address] [place] [address] [phone]    Clinical concerns: No new concerns.        Raisa Clements LPN July 7, 2021 2:33 PM               "

## 2021-07-09 NOTE — TELEPHONE ENCOUNTER
Reviewed plan for thoracentesis with cultures next Thursday. Yg has already spoken with Dr. Arrington regarding her recommendation for admission. Yg will  antibiotics in clinic and was encouraged to check his temperature regularly for fevers. He was encouraged to go to the [facility] ED if symptoms progressively worsened. Will get pre-procedure COVID swab in clinic Monday after visit with Dr. Arrington. No further questions or concerns.

## 2021-07-12 NOTE — PROGRESS NOTES
BMT Daily Progress Note   07/12/2021    Patient ID:  Byron Russo is a 60 year old male, currently day 5 years 1 month of his HCT.     Diagnosis AMLU Acute myelogeneous leukemia, Unknown  HCT Type Allogeneic    Prep Regimen Cytoxan  Fludarabine  TBI   Donor Source Related PBSC    GVHD Prophylaxis Cyclosporine  Mycophenolate  Primary BMT MD Dunn/Nury       S/p myeloablative allo-sib transplant for MDS in 2016 and ongoing complication of possible chronic zywey-znczwx-wqme disease-related accumulation of pleural effusion and hydropneumothorax of the left lung.  He has had several thoracenteses over the last months.  He was seen by Dr. Reynolds in early May who thought he would be a candidate for a PleurX catheter but wanted the prednisone to be at a lower dose than it was.  He has been placed on a taper and is now at a dose of 10 mg a day with the goal of 5 mg/ day by mid August, when he is planned for the procedure. He follows with Dr. Brady as well who felt that pleurodesis would not be successful in his case given the rapid accumulation.  He currently is on prednisone at 7.5mg/day and CSA at 100 mg bid    6/30/2021 s/p Ultrasound guided left thoracentesis with 5 Lao Yueh pigtail catheter needle.  No immediate complication.  Will need to schedule the next procedure in 2 weeks (gets thoracentesis every 3 weeks)  Saw Dr. Reynolds for PleurX catheter on 7/7, needs to be  at goal of 5 mg of prednisone for procedure. Planned for in mid August    I saw him 7/7 when he also had a CXR- The chest X ray showed Left loculated pleural effusion with new multifocal bubbly lucency in the left apex and left base. This could represent empyema.  Unchanged interstitial opacity in the right lung.  I called the patient to discuss this result and recommended admission to expedite a diagnostic tap and appropriate management. The patient refused admission. He is scheduled for a thoracentesis on Thursday (this is the earliest  it can be scheduled) - and is agreeable to do this. He was in the interim started on Augmentin. He is afebrile and has no new symptoms        INTERVAL  HISTORY     He feel stable SOB . No fevers, chills, nausea, vomiting, diarrhea, cough, hematuria, dysuria, bruising or bleeding. NO oral sores. Appetite is good.    On stable Cyclosporine 100mg BID, pre 10 mg/day  Review of Systems: 10 point ROS negative except as noted above.    Scheduled Medications    Current Outpatient Medications   Medication     acetaminophen (TYLENOL) 325 MG tablet     acyclovir (ZOVIRAX) 800 MG tablet     amLODIPine (NORVASC) 5 MG tablet     amoxicillin-clavulanate (AUGMENTIN) 875-125 MG tablet     aspirin 81 MG EC tablet     cycloSPORINE modified (GENERIC EQUIVALENT) 100 MG capsule     cycloSPORINE modified (GENERIC EQUIVALENT) 25 MG capsule     fluconazole (DIFLUCAN) 100 MG tablet     furosemide (LASIX) 20 MG tablet     levofloxacin (LEVAQUIN) 250 MG tablet     levothyroxine (SYNTHROID/LEVOTHROID) 150 MCG tablet     magnesium oxide (MAG-OX) 400 (241.3 Mg) MG tablet     metoprolol tartrate (LOPRESSOR) 25 MG tablet     order for DME     order for DME     order for DME     potassium chloride ER (KLOR-CON M) 20 MEQ CR tablet     predniSONE (DELTASONE) 5 MG tablet     rosuvastatin (CRESTOR) 5 MG tablet     sulfamethoxazole-trimethoprim (BACTRIM DS) 800-160 MG tablet     predniSONE (DELTASONE) 20 MG tablet     No current facility-administered medications for this visit.       PHYSICAL EXAM     Weight In/Out     Wt Readings from Last 3 Encounters:   07/12/21 106.5 kg (234 lb 14.4 oz)   07/07/21 106.1 kg (233 lb 12.8 oz)   07/07/21 106.4 kg (234 lb 9.6 oz)      [unfilled]       [facility]:  60-70    /85   Pulse 120   Temp 97.3  F (36.3  C) (Oral)   Resp 18   Wt 106.5 kg (234 lb 14.4 oz)   SpO2 95%   BMI 33.47 kg/m       /85   Pulse 120   Temp 97.3  F (36.3  C) (Oral)   Resp 18   Wt 106.5 kg (234 lb 14.4 oz)   SpO2 95%   BMI  33.47 kg/m      General: Mildly dyspneic at rest which the patient reports is stable for him especially when he is speaking in full sentences continuously.  He has his oxygen with him.  Eyes: : JEREMY, sclera anicteric   Nose/Mouth/Throat: OP clear, buccal mucosa moist, no ulcerations, significant gingivitis but no ulcers or lichenoid changes  Lungs: Decreased breath sounds over the right mid to lower lung fields  Cardiovascular: RRR, no M/R/G   Abdominal/Rectal: +BS, soft, NT, ND, No HSM   Lymphatics: no edema  Skin: no rashes or petechaie no sclerotic changes noted  Neuro: A&O     LABS AND IMAGING: I have assessed all abnormal lab values for their clinical significance and any values considered clinically significant have been addressed in the assessment and plan.        Lab Results   Component Value Date    WBC 15.1 (H) 07/12/2021    ANEU 13.0 (H) 07/07/2021    HGB 16.1 07/12/2021    HCT 46.4 07/12/2021     07/12/2021     07/12/2021     07/12/2021    POTASSIUM 3.9 07/12/2021    POTASSIUM 3.9 07/12/2021    CHLORIDE 111 (H) 07/12/2021    CHLORIDE 111 (H) 07/12/2021    CO2 22 07/12/2021     (H) 07/12/2021    BUN 15 07/12/2021    CR 0.88 07/12/2021    MAG 1.9 07/12/2021    INR 1.06 05/21/2021           ASSESSMENT AND PLAN   Byron Russo is a 60 year old male, currently day 5 years 1 month of his HCT.  1.  5 years status post myeloablative allotransplant for MDS.  No evidence of recurrence.  2.  Patient with complex pulmonary history followed closely by them.  He has rather chronic persisting lung infiltrates and hydropneumothorax which is thought to be complication of chronic eapau-aniqqf-ltxi disease that has been managed with cyclosporine and prednisone now on 10 mg daily of prednisone prior to proceeding with a Pleurx catheter placement due to recurrent need for thoracentesis.  He has an appointment on 7/7 Dr. Reynolds for PleurX catheter placement.    At this time he has CGVHD involving  his eyes and serositis.  He feels that the lasix is not helping at all. We will discontinue lasix and potassium for now   Known issues that I take into account for medical decisions, with salient changes to the plan considering these complexities noted above.  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     Cristal Arrington    Plan:    Due for PleurX catheter in mid-August  Due for thoracentesis in 2 weeks  Stop lasix and potassium  Decrease prednisone to 7.5mg every day- in 2 weeks (on 21st) he will decrease to 5 mg/ day  RTC to see me on 28th. Once the pleur X catheter is placed, we will reassess his PFTs and attempt to change his IST (either Ibrutinib or Ruxolitinib would be preferred agents)      Cristal Arrington

## 2021-07-12 NOTE — LETTER
7/12/2021         RE: Byron Russo  [address] [facility] [address]        Dear Colleague,    Thank you for referring your patient, Byron Russo, to the [facility] BLOOD AND MARROW TRANSPLANT PROGRAM [place]. Please see a copy of my visit note below.          BMT Daily Progress Note   07/12/2021    Patient ID:  Byron Russo is a 60 year old male, currently day 5 years 1 month of his HCT.     Diagnosis AMLU Acute myelogeneous leukemia, Unknown  HCT Type Allogeneic    Prep Regimen Cytoxan  Fludarabine  TBI   Donor Source Related PBSC    GVHD Prophylaxis Cyclosporine  Mycophenolate  Primary BMT MD Dunn/Nury       S/p myeloablative allo-sib transplant for MDS in 2016 and ongoing complication of possible chronic npinx-crsvud-vwpp disease-related accumulation of pleural effusion and hydropneumothorax of the left lung.  He has had several thoracenteses over the last months.  He was seen by Dr. Reynolds in early May who thought he would be a candidate for a PleurX catheter but wanted the prednisone to be at a lower dose than it was.  He has been placed on a taper and is now at a dose of 10 mg a day with the goal of 5 mg/ day by mid August, when he is planned for the procedure. He follows with Dr. Brady as well who felt that pleurodesis would not be successful in his case given the rapid accumulation.  He currently is on prednisone at 7.5mg/day and CSA at 100 mg bid    6/30/2021 s/p Ultrasound guided left thoracentesis with 5 Mong[place] Yueh pigtail catheter needle.  No immediate complication.  Will need to schedule the next procedure in 2 weeks (gets thoracentesis every 3 weeks)  Saw Dr. Reynolds for PleurX catheter on 7/7, needs to be  at goal of 5 mg of prednisone for procedure. Planned for in mid August    I saw him 7/7 when he also had a CXR- The chest X ray showed Left loculated pleural effusion with new multifocal bubbly lucency in the left apex and left base. This  could represent empyema.  Unchanged interstitial opacity in the right lung.  I called the patient to discuss this result and recommended admission to expedite a diagnostic tap and appropriate management. The patient refused admission. He is scheduled for a thoracentesis on Thursday (this is the earliest it can be scheduled) - and is agreeable to do this. He was in the interim started on Augmentin. He is afebrile and has no new symptoms        INTERVAL  HISTORY     He feel stable SOB . No fevers, chills, nausea, vomiting, diarrhea, cough, hematuria, dysuria, bruising or bleeding. NO oral sores. Appetite is good.    On stable Cyclosporine 100mg BID, pre 10 mg/day  Review of Systems: 10 point ROS negative except as noted above.    Scheduled Medications    Current Outpatient Medications   Medication     acetaminophen (TYLENOL) 325 MG tablet     acyclovir (ZOVIRAX) 800 MG tablet     amLODIPine (NORVASC) 5 MG tablet     amoxicillin-clavulanate (AUGMENTIN) 875-125 MG tablet     aspirin 81 MG EC tablet     cycloSPORINE modified (GENERIC EQUIVALENT) 100 MG capsule     cycloSPORINE modified (GENERIC EQUIVALENT) 25 MG capsule     fluconazole (DIFLUCAN) 100 MG tablet     furosemide (LASIX) 20 MG tablet     levofloxacin (LEVAQUIN) 250 MG tablet     levothyroxine (SYNTHROID/LEVOTHROID) 150 MCG tablet     magnesium oxide (MAG-OX) 400 (241.3 Mg) MG tablet     metoprolol tartrate (LOPRESSOR) 25 MG tablet     order for DME     order for DME     order for DME     potassium chloride ER (KLOR-CON M) 20 MEQ CR tablet     predniSONE (DELTASONE) 5 MG tablet     rosuvastatin (CRESTOR) 5 MG tablet     sulfamethoxazole-trimethoprim (BACTRIM DS) 800-160 MG tablet     predniSONE (DELTASONE) 20 MG tablet     No current facility-administered medications for this visit.       PHYSICAL EXAM     Weight In/Out     Wt Readings from Last 3 Encounters:   07/12/21 106.5 kg (234 lb 14.4 oz)   07/07/21 106.1 kg (233 lb 12.8 oz)   07/07/21 106.4 kg (234 lb  9.6 oz)      [unfilled]       S:  60-70    /85   Pulse 120   Temp 97.3  F (36.3  C) (Oral)   Resp 18   Wt 106.5 kg (234 lb 14.4 oz)   SpO2 95%   BMI 33.47 kg/m       /85   Pulse 120   Temp 97.3  F (36.3  C) (Oral)   Resp 18   Wt 106.5 kg (234 lb 14.4 oz)   SpO2 95%   BMI 33.47 kg/m      General: Mildly dyspneic at rest which the patient reports is stable for him especially when he is speaking in full sentences continuously.  He has his oxygen with him.  Eyes: : JEREMY, sclera anicteric   Nose/Mouth/Throat: OP clear, buccal mucosa moist, no ulcerations, significant gingivitis but no ulcers or lichenoid changes  Lungs: Decreased breath sounds over the right mid to lower lung fields  Cardiovascular: RRR, no M/R/G   Abdominal/Rectal: +BS, soft, NT, ND, No HSM   Lymphatics: no edema  Skin: no rashes or petechaie no sclerotic changes noted  Neuro: A&O     LABS AND IMAGING: I have assessed all abnormal lab values for their clinical significance and any values considered clinically significant have been addressed in the assessment and plan.        Lab Results   Component Value Date    WBC 15.1 (H) 07/12/2021    ANEU 13.0 (H) 07/07/2021    HGB 16.1 07/12/2021    HCT 46.4 07/12/2021     07/12/2021     07/12/2021     07/12/2021    POTASSIUM 3.9 07/12/2021    POTASSIUM 3.9 07/12/2021    CHLORIDE 111 (H) 07/12/2021    CHLORIDE 111 (H) 07/12/2021    CO2 22 07/12/2021     (H) 07/12/2021    BUN 15 07/12/2021    CR 0.88 07/12/2021    MAG 1.9 07/12/2021    INR 1.06 05/21/2021           ASSESSMENT AND PLAN   Byron Russo is a 60 year old male, currently day 5 years 1 month of his HCT.  1.  5 years status post myeloablative allotransplant for MDS.  No evidence of recurrence.  2.  Patient with complex pulmonary history followed closely by them.  He has rather chronic persisting lung infiltrates and hydropneumothorax which is thought to be complication of chronic  gwuqh-jjidfp-olmj disease that has been managed with cyclosporine and prednisone now on 10 mg daily of prednisone prior to proceeding with a Pleurx catheter placement due to recurrent need for thoracentesis.  He has an appointment on 7/7 Dr. Reynolds for PleurX catheter placement.    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  He feels that the lasix is not helping at all. We will discontinue lasix and potassium for now   Known issues that I take into account for medical decisions, with salient changes to the plan considering these complexities noted above.  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     Critsal Arrington    Plan:    Due for PleurX catheter in mid-August  Due for thoracentesis in 2 weeks  Stop lasix and potassium  Decrease prednisone to 7.5mg every day- in 2 weeks (on 21st) he will decrease to 5 mg/ day  RTC to see me on 28th. Once the pleur X catheter is placed, we will reassess his PFTs and attempt to change his IST (either Ibrutinib or Ruxolitinib would be preferred agents)      Cristal Arrington      Again, thank you for allowing me to participate in the care of your patient.        Sincerely,        BMT DOM

## 2021-07-15 NOTE — OR NURSING
Pt here for thoracentesis.  Labs ordered for fluid removed from the upper apex for a possible different fluid collection.  Fluid obtained, 520mL, 120mL sent for labs.      Upper apex-520mL removed  Lower pleural fluid amount-20mL    Mary Beth Jovel RN

## 2021-07-15 NOTE — DISCHARGE INSTRUCTIONS
Discharge Instructions for Thoracentesis  Thoracentesis is a procedure that removes extra fluid from the pleural space. The space is between the outside surface of the lungs (pleura) and the chest wall. The extra fluid is called pleural effusion. The procedure may be done to take a sample of the fluid. This is so it can be tested to help find the cause. Or it may be done to drain the extra fluid if you are having trouble breathing.     Home care    You may have some pain after the procedure. Your provider can prescribe or advise what pain medicine for you to take at home, if needed. Take these exactly as directed.    If you stopped taking other medicines before the procedure, ask your provider when you can start them again.    Take it easy for 48 hours after the procedure. Don't do anything active until your provider says it s OK.    Don't do strenuous activities, such as lifting, until your provider says it s OK.    You will have a small bandage over the puncture site. You may remove the bandage in 24 hours.    Check the puncture site for the signs of infection listed below.    Follow-up  Make a follow-up appointment with your provider as directed. During your follow-up visit, your provider will check your healing. Be sure to let your provider know how you are feeling.   When to call your healthcare provider  Call your provider right away if you have any of these:     Coughing up blood    Chest pain (if chest pain suddenly gets worse, call 911)    Shortness of breath    Fever of 100.4 F (38 C) or higher, or as directed by your provider    Pain that doesn't get better after taking pain medicine    Signs of infection at the puncture site. These include increased pain, redness, warmth, swelling, or fluid leaking that is green or yellow or smells bad    Fluid draining from the puncture site    Bleeding from the puncture site  [employer] last reviewed this educational content on 3/1/2020    [id number] [employer]. All rights reserved. This information is not intended as a substitute for professional medical care. Always follow your healthcare professional's instructions.

## 2021-07-15 NOTE — PROCEDURES
Interventional Radiology Brief Post Procedure Note    Procedure: IR thoracentesis Left    Proceduralist: Dr. Mikal Lema and Linda Dolan PA-C     Time Out: Prior to the start of the procedure and with procedural staff participation, I verbally confirmed the patient s identity using two indicators, relevant allergies, that the procedure was appropriate and matched the consent or emergent situation, and that the correct equipment/implants were available. Immediately prior to starting the procedure I conducted the Time Out with the procedural staff and re-confirmed the patient s name, procedure, and site/side. (The Joint Commission universal protocol was followed.)  Yes    Sedation: None. Local Anesthestic used    Findings: Completed US guided LEFT thoracentesis with removal of 520 ml khadra colored fluid via posterior apical pleural fluid approach and removal of 20ml of serosanguinous fluid at via posterior basilar approach.  Dry sterile dressing placed.      Estimated Blood Loss: Minimal    SPECIMENS: Fluid and/or tissue for laboratory analysis and culture    Complications: 1. None     Condition: Stable    Plan: Return to recovery area for recovery.  Okay to discharge in 30 minutes    Comments: See dictated procedure note for full details.    Linda Dolan PA-C

## 2021-07-15 NOTE — H&P
Patient presents for thoracentesis. New concern for possible infection and diagnostic sampling requested.    Chest x-ray from 7/7/2021 reviewed.    Dr. Arrington's note from 7/12/2021 reviewed.     No change in H&P.    Consent obtained.

## 2021-07-16 NOTE — TELEPHONE ENCOUNTER
Received VM this morning from Yg describing symptoms suggestive of an allergic reaction. Yg reports a faint pink rash that extends from upper chest/torso down to knee area. He describes this as very itchy. He reports the symptoms started approximately 3 days ago (very close to start of Augmentin) and he denies any other new medications, foods, soap/detergents, etc. Yg reports a tolerance to amoxicillin. Dr. Arrington notified.    Added Augmentin to allergy list. Reviewed tolerance of amoxiciillin with Dr. Arrington. Sent Rx for Q8h Amoxicillin x10 days. Sent to pharmacy. Reviewed with Yg. No further questions or concerns.

## 2021-07-28 NOTE — LETTER
7/28/2021         RE: Byron Russo  [id number] [facility] [address]        Dear Colleague,    Thank you for referring your patient, Byron Russo, to the [facility] BLOOD AND MARROW TRANSPLANT PROGRAM [place]. Please see a copy of my visit note below.          BMT Daily Progress Note   07/28/2021    Patient ID:  Byron Russo is a 60 year old male, currently day 5 years 2 month of his HCT.     Diagnosis AMLU Acute myelogeneous leukemia, Unknown  HCT Type Allogeneic    Prep Regimen Cytoxan  Fludarabine  TBI   Donor Source Related PBSC    GVHD Prophylaxis Cyclosporine  Mycophenolate  Primary BMT MD Dunn/Nury       S/p myeloablative allo-sib transplant for MDS in 2016 and ongoing complication of chronic nudlp-cbquse-vkyn disease-related accumulation of pleural effusion and hydropneumothorax of the left lung.  He has had several thoracenteses over the last months.  He was seen by Dr. Reynolds in early May who thought he would be a candidate for a PleurX catheter but wanted the prednisone to be at a lower dose than it was.  He has been placed on a taper and is now at a dose of 10 mg a day with the goal of 5 mg/ day by mid August, when he is planned for the procedure. He follows with Dr. Brady as well who felt that pleurodesis would not be successful in his case given the rapid accumulation.  He currently is on prednisone at 5mg/day (since 7/21) and CSA at 100 mg bid    6/30/2021 s/p Ultrasound guided left thoracentesis with 5 [place] Yueh pigtail catheter needle.  No immediate complication.  Will need to schedule the next procedure in 2 weeks (gets thoracentesis every 3 weeks)  Saw Dr. Reynolds for PleurX catheter on 7/7, needs to be  at goal of 5 mg of prednisone for procedure. Planned for in mid August    I saw him 7/7 when he also had a CXR- The chest X ray showed Left loculated pleural effusion with new multifocal bubbly lucency in the left apex and left base. This  could represent empyema.  Unchanged interstitial opacity in the right lung.  I called the patient to discuss this result and he subsequently underwent thoracentesis which was not infected. He was in the interim started on Augmentin. He is afebrile and has no new symptoms        INTERVAL  HISTORY     He feel stable SOB . No fevers, chills, nausea, vomiting, diarrhea, cough, hematuria, dysuria, bruising or bleeding. NO oral sores. Appetite is good.    On stable Cyclosporine 100mg BID, pre 10 mg/day  Review of Systems: 10 point ROS negative except as noted above.    Scheduled Medications    Current Outpatient Medications   Medication     acetaminophen (TYLENOL) 325 MG tablet     acyclovir (ZOVIRAX) 800 MG tablet     amLODIPine (NORVASC) 5 MG tablet     aspirin 81 MG EC tablet     cycloSPORINE modified (GENERIC EQUIVALENT) 100 MG capsule     cycloSPORINE modified (GENERIC EQUIVALENT) 25 MG capsule     fluconazole (DIFLUCAN) 100 MG tablet     furosemide (LASIX) 20 MG tablet     levofloxacin (LEVAQUIN) 250 MG tablet     levothyroxine (SYNTHROID/LEVOTHROID) 150 MCG tablet     magnesium oxide (MAG-OX) 400 (241.3 Mg) MG tablet     metoprolol tartrate (LOPRESSOR) 25 MG tablet     order for DME     order for DME     order for DME     potassium chloride ER (KLOR-CON M) 20 MEQ CR tablet     predniSONE (DELTASONE) 20 MG tablet     predniSONE (DELTASONE) 5 MG tablet     rosuvastatin (CRESTOR) 5 MG tablet     sulfamethoxazole-trimethoprim (BACTRIM DS) 800-160 MG tablet     No current facility-administered medications for this visit.       PHYSICAL EXAM     Weight In/Out     Wt Readings from Last 3 Encounters:   07/28/21 105.7 kg (233 lb)   07/15/21 106.6 kg (235 lb)   07/12/21 106.5 kg (234 lb 14.4 oz)      [unfilled]       KPS:  60-70    /81 (BP Location: Right arm, Patient Position: Sitting, Cuff Size: Adult Large)   Pulse 110   Temp 98.3  F (36.8  C) (Oral)   Resp 16   Wt 105.7 kg (233 lb)   SpO2 96%   BMI 32.96  kg/m       /81 (BP Location: Right arm, Patient Position: Sitting, Cuff Size: Adult Large)   Pulse 110   Temp 98.3  F (36.8  C) (Oral)   Resp 16   Wt 105.7 kg (233 lb)   SpO2 96%   BMI 32.96 kg/m      General: Mildly dyspneic at rest which the patient reports is stable for him especially when he is speaking in full sentences continuously.  He has his oxygen with him.  Eyes: : JEREMY, sclera anicteric   Nose/Mouth/Throat: OP clear, buccal mucosa moist, no ulcerations, significant gingivitis but no ulcers or lichenoid changes  Lungs: Decreased breath sounds over the right mid to lower lung fields  Cardiovascular: RRR, no M/R/G   Abdominal/Rectal: +BS, soft, NT, ND, No HSM   Lymphatics: no edema  Skin: no rashes or petechaie no sclerotic changes noted  Neuro: A&O     LABS AND IMAGING: I have assessed all abnormal lab values for their clinical significance and any values considered clinically significant have been addressed in the assessment and plan.        Lab Results   Component Value Date    WBC 15.9 (H) 07/15/2021    ANEU 13.0 (H) 07/07/2021    HGB 16.1 07/15/2021    HCT 46.8 07/15/2021     07/15/2021     07/12/2021     07/12/2021    POTASSIUM 3.9 07/12/2021    POTASSIUM 3.9 07/12/2021    CHLORIDE 111 (H) 07/12/2021    CHLORIDE 111 (H) 07/12/2021    CO2 22 07/12/2021    CO2 22 07/12/2021     (H) 07/12/2021     (H) 07/12/2021    BUN 15 07/12/2021    BUN 15 07/12/2021    CR 0.88 07/12/2021    CR 0.90 07/12/2021    MAG 1.9 07/12/2021    INR 1.12 07/15/2021           ASSESSMENT AND PLAN   Byron Russo is a 60 year old male, currently day 5 years 2 month of his HCT.  1.  5 years status post myeloablative allotransplant for MDS.  No evidence of recurrence.  2.  Patient with complex pulmonary history followed closely by them.  He has rather chronic persisting lung infiltrates and hydropneumothorax which is thought to be complication of chronic mifqs-rmwnul-tqxs disease that  has been managed with cyclosporine and prednisone now on 5 mg daily of prednisone prior to proceeding with a Pleurx catheter placement due to recurrent need for thoracentesis.  He has an appointment on 7/7 Dr. Reynolds for PleurX catheter placement.    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  He feels that the lasix is not helping at all. We will discontinue lasix and potassium for now   Known issues that I take into account for medical decisions, with salient changes to the plan considering these complexities noted above.  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     Cristal Arrington    Plan:    Due for PleurX catheter   Due for thoracentesis in next week  Decreased prednisone to 5mg every day (on 21st)   RTC to see me on 8/13h. Once the pleur X catheter is placed, we will reassess his PFTs and attempt to change his IST (either Ibrutinib or Ruxolitinib would be preferred agents)      Cristal Arrington      Again, thank you for allowing me to participate in the care of your patient.        Sincerely,        BMT DOM

## 2021-07-28 NOTE — NURSING NOTE
Chief Complaint   Patient presents with     Blood Draw     Labs drawn via  by RN in lab. VS taken.     Labs collected from venipuncture by RN. Vitals taken. Checked in for appointment(s).    Porsha Dodson RN

## 2021-07-28 NOTE — PROGRESS NOTES
BMT Daily Progress Note   07/28/2021    Patient ID:  Byron Russo is a 60 year old male, currently day 5 years 2 month of his HCT.     Diagnosis AMLU Acute myelogeneous leukemia, Unknown  HCT Type Allogeneic    Prep Regimen Cytoxan  Fludarabine  TBI   Donor Source Related PBSC    GVHD Prophylaxis Cyclosporine  Mycophenolate  Primary BMT MD Dunn/Nury       S/p myeloablative allo-sib transplant for MDS in 2016 and ongoing complication of chronic jmrds-dznlic-epim disease-related accumulation of pleural effusion and hydropneumothorax of the left lung.  He has had several thoracenteses over the last months.  He was seen by Dr. Reynolds in early May who thought he would be a candidate for a PleurX catheter but wanted the prednisone to be at a lower dose than it was.  He has been placed on a taper and is now at a dose of 10 mg a day with the goal of 5 mg/ day by mid August, when he is planned for the procedure. He follows with Dr. Brady as well who felt that pleurodesis would not be successful in his case given the rapid accumulation.  He currently is on prednisone at 5mg/day (since 7/21) and CSA at 100 mg bid    6/30/2021 s/p Ultrasound guided left thoracentesis with 5 Tristanian Yueh pigtail catheter needle.  No immediate complication.  Will need to schedule the next procedure in 2 weeks (gets thoracentesis every 3 weeks)  Saw Dr. Reynolds for PleurX catheter on 7/7, needs to be  at goal of 5 mg of prednisone for procedure. Planned for in mid August    I saw him 7/7 when he also had a CXR- The chest X ray showed Left loculated pleural effusion with new multifocal bubbly lucency in the left apex and left base. This could represent empyema.  Unchanged interstitial opacity in the right lung.  I called the patient to discuss this result and he subsequently underwent thoracentesis which was not infected. He was in the interim started on Augmentin. He is afebrile and has no new symptoms        INTERVAL  HISTORY      He feel stable SOB . No fevers, chills, nausea, vomiting, diarrhea, cough, hematuria, dysuria, bruising or bleeding. NO oral sores. Appetite is good.    On stable Cyclosporine 100mg BID, pre 10 mg/day  Review of Systems: 10 point ROS negative except as noted above.    Scheduled Medications    Current Outpatient Medications   Medication     acetaminophen (TYLENOL) 325 MG tablet     acyclovir (ZOVIRAX) 800 MG tablet     amLODIPine (NORVASC) 5 MG tablet     aspirin 81 MG EC tablet     cycloSPORINE modified (GENERIC EQUIVALENT) 100 MG capsule     cycloSPORINE modified (GENERIC EQUIVALENT) 25 MG capsule     fluconazole (DIFLUCAN) 100 MG tablet     furosemide (LASIX) 20 MG tablet     levofloxacin (LEVAQUIN) 250 MG tablet     levothyroxine (SYNTHROID/LEVOTHROID) 150 MCG tablet     magnesium oxide (MAG-OX) 400 (241.3 Mg) MG tablet     metoprolol tartrate (LOPRESSOR) 25 MG tablet     order for DME     order for DME     order for DME     potassium chloride ER (KLOR-CON M) 20 MEQ CR tablet     predniSONE (DELTASONE) 20 MG tablet     predniSONE (DELTASONE) 5 MG tablet     rosuvastatin (CRESTOR) 5 MG tablet     sulfamethoxazole-trimethoprim (BACTRIM DS) 800-160 MG tablet     No current facility-administered medications for this visit.       PHYSICAL EXAM     Weight In/Out     Wt Readings from Last 3 Encounters:   07/28/21 105.7 kg (233 lb)   07/15/21 106.6 kg (235 lb)   07/12/21 106.5 kg (234 lb 14.4 oz)      [unfilled]       KPS:  60-70    /81 (BP Location: Right arm, Patient Position: Sitting, Cuff Size: Adult Large)   Pulse 110   Temp 98.3  F (36.8  C) (Oral)   Resp 16   Wt 105.7 kg (233 lb)   SpO2 96%   BMI 32.96 kg/m       /81 (BP Location: Right arm, Patient Position: Sitting, Cuff Size: Adult Large)   Pulse 110   Temp 98.3  F (36.8  C) (Oral)   Resp 16   Wt 105.7 kg (233 lb)   SpO2 96%   BMI 32.96 kg/m      General: Mildly dyspneic at rest which the patient reports is stable for him  especially when he is speaking in full sentences continuously.  He has his oxygen with him.  Eyes: : JEREMY, sclera anicteric   Nose/Mouth/Throat: OP clear, buccal mucosa moist, no ulcerations, significant gingivitis but no ulcers or lichenoid changes  Lungs: Decreased breath sounds over the right mid to lower lung fields  Cardiovascular: RRR, no M/R/G   Abdominal/Rectal: +BS, soft, NT, ND, No HSM   Lymphatics: no edema  Skin: no rashes or petechaie no sclerotic changes noted  Neuro: A&O     LABS AND IMAGING: I have assessed all abnormal lab values for their clinical significance and any values considered clinically significant have been addressed in the assessment and plan.        Lab Results   Component Value Date    WBC 15.9 (H) 07/15/2021    ANEU 13.0 (H) 07/07/2021    HGB 16.1 07/15/2021    HCT 46.8 07/15/2021     07/15/2021     07/12/2021     07/12/2021    POTASSIUM 3.9 07/12/2021    POTASSIUM 3.9 07/12/2021    CHLORIDE 111 (H) 07/12/2021    CHLORIDE 111 (H) 07/12/2021    CO2 22 07/12/2021    CO2 22 07/12/2021     (H) 07/12/2021     (H) 07/12/2021    BUN 15 07/12/2021    BUN 15 07/12/2021    CR 0.88 07/12/2021    CR 0.90 07/12/2021    MAG 1.9 07/12/2021    INR 1.12 07/15/2021           ASSESSMENT AND PLAN   Byron Russo is a 60 year old male, currently day 5 years 2 month of his HCT.  1.  5 years status post myeloablative allotransplant for MDS.  No evidence of recurrence.  2.  Patient with complex pulmonary history followed closely by them.  He has rather chronic persisting lung infiltrates and hydropneumothorax which is thought to be complication of chronic atcuc-dejbmp-jwic disease that has been managed with cyclosporine and prednisone now on 5 mg daily of prednisone prior to proceeding with a Pleurx catheter placement due to recurrent need for thoracentesis.  He has an appointment on 7/7 Dr. Reynolds for PleurX catheter placement.    At this time he has CGVHD involving his  eyes and serositis.  He feels that the lasix is not helping at all. We will discontinue lasix and potassium for now   Known issues that I take into account for medical decisions, with salient changes to the plan considering these complexities noted above.  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     Cristal Arrington    Plan:    Due for PleurX catheter   Due for thoracentesis in next week  Decreased prednisone to 5mg every day (on 21st)   RTC to see me on 8/13h. Once the pleur X catheter is placed, we will reassess his PFTs and attempt to change his IST (either Ibrutinib or Ruxolitinib would be preferred agents)      Cristal Arrington

## 2021-07-28 NOTE — NURSING NOTE
"Oncology Rooming Note    July 28, 2021 12:44 PM   Byron Russo is a 60 year old male who presents for:    Chief Complaint   Patient presents with     Blood Draw     Labs drawn via  by RN in lab. VS taken.     Oncology Clinic Visit     myelodysplastic syndrome     Initial Vitals: /81 (BP Location: Right arm, Patient Position: Sitting, Cuff Size: Adult Large)   Pulse 110   Temp 98.3  F (36.8  C) (Oral)   Resp 16   Wt 105.7 kg (233 lb)   SpO2 96%   BMI 32.96 kg/m   Estimated body mass index is 32.96 kg/m  as calculated from the following:    Height as of 7/15/21: 1.791 m (5' 10.5\").    Weight as of this encounter: 105.7 kg (233 lb). Body surface area is 2.29 meters squared.  No Pain (0) Comment: Data Unavailable   No LMP for male patient.  Allergies reviewed: Yes  Medications reviewed: Yes    Medications: Medication refills not needed today.  Pharmacy name entered into [employer]: [place], [place] -  [facility] [phone]    Clinical concerns: none       Kamla Hurt CMA            "

## 2021-07-29 PROBLEM — J98.19 TRAPPED LUNG: Status: ACTIVE | Noted: 2021-01-01

## 2021-07-29 NOTE — TELEPHONE ENCOUNTER
Spoke with patient to schedule procedure with Dr. Lobo Reynolds   Procedure was scheduled on 08/04 at [facility] OR  Patient will have H&P with PAC - Video 08/02    Patient is aware a COVID-19 test is needed before their procedure. The test should be with-in 4 days of their procedure.   Test Details: Date 08/02 Location [facility]    Patient is aware a / is needed day of surgery.   Surgery letter was sent via [employer], patient has my direct contact information for any further questions.

## 2021-07-29 NOTE — PROGRESS NOTES
Patient is coming in of a pr op covid test   Please place orders as needed    thank you  Yanira Headley (MLT) [facility]

## 2021-07-29 NOTE — TELEPHONE ENCOUNTER
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

## 2021-07-30 NOTE — PROGRESS NOTES
Preoperative Assessment Center Medication History Note    Medication history completed on July 30, 2021 by this writer. See Epic admission navigator for prior to admission medications. Operating room staff will still need to confirm medications and last dose information on day of surgery.     Medication history interview sources  Patient interview: Yes  Care Everywhere records: No  Surescripts pharmacy refill records: Yes  Other (if applicable):     Changes made to PTA medication list (reason)  Added: refresh tears.   Deleted: KCL, furosemide - stopped by MD about 1 month ago. Prednisone duplicate incorrect dose.   Changed: prednisone dose/sig,     Additional medication history information (including reliability of information, actions taken by pharmacist):    -- was on amoxicillin/augmentin for a month for possible PNA - finished on Tuesday this week and went back on levofloxacin (prophylaxis).   -- is actively on daily steroids - prednisone 5 mg daily.   -- Patient declines being on any other prescription or over-the-counter medications    Prior to Admission medications    Medication Sig Last Dose Taking? Auth Provider   acetaminophen (TYLENOL) 325 MG tablet Take 1-2 tablets (325-650 mg) by mouth every 4 hours as needed for mild pain or fever Taking Yes Gerald Doty   acyclovir (ZOVIRAX) 800 MG tablet Take 1 tablet (800 mg) by mouth 2 times daily Taking Yes Uli Enciso MD   amLODIPine (NORVASC) 5 MG tablet Take 1 tablet (5 mg) by mouth daily Taking Yes Uli Enciso MD   aspirin 81 MG EC tablet Take 81 mg by mouth daily Reported on 5/12/2017 Taking Yes Aleta Donovan PA-C   carboxymethylcellulose PF (REFRESH PLUS) 0.5 % ophthalmic solution Place 1 drop into both eyes 3 times daily as needed for dry eyes Taking Yes Unknown, Entered By History   cycloSPORINE modified (GENERIC EQUIVALENT) 100 MG capsule Take 1 capsule (100 mg) by mouth 2 times daily Taking Yes Angela Bunch PA-C   fluconazole (DIFLUCAN)  100 MG tablet Take 1 tablet (100 mg) by mouth daily  Patient taking differently: Take 100 mg by mouth every evening  Taking Yes Uli Enciso MD   levofloxacin (LEVAQUIN) 250 MG tablet Take 1 tablet (250 mg) by mouth daily Taking Yes Uli Enciso MD   levothyroxine (SYNTHROID/LEVOTHROID) 150 MCG tablet Take 1 tablet (150 mcg) by mouth daily Taking Yes Uli Enciso MD   magnesium oxide (MAG-OX) 400 (241.3 Mg) MG tablet Take 800 mg by mouth every evening  Taking Yes Maranda Mayes PA-C   metoprolol tartrate (LOPRESSOR) 25 MG tablet Take 1 tablet (25 mg) by mouth daily  Patient taking differently: Take 25 mg by mouth every evening  Taking Yes Uli Enciso MD   predniSONE (DELTASONE) 5 MG tablet Take 1 and 0.5 tablets every day (7.5 mg every day)  Patient taking differently: Take 5 mg by mouth daily  Taking Yes Cristal Arrington MD   rosuvastatin (CRESTOR) 5 MG tablet Take 1 tablet (5 mg) by mouth daily  Patient taking differently: Take 5 mg by mouth every evening  Taking Yes Uli Enciso MD   sulfamethoxazole-trimethoprim (BACTRIM DS) 800-160 MG tablet Take 1 tablet by mouth 2 times daily On Monday's and Tuesday's only Taking Yes Uli Enciso MD   cycloSPORINE modified (GENERIC EQUIVALENT) 25 MG capsule On HOLD, current dose is 200mg twice daily.  Patient not taking: Reported on 7/30/2021 Not Taking  Angela Bunch PA-C   order for DME Equipment being ordered: Oxygen. Please provide patient with continuous flow oxygen at 3 LPM via nasal cannula for activity and while sleeping. Patient will need concentrator, conserving device, and portable oxygen.   Uli Enciso MD   order for DME Equipment being ordered: Oxygen. Please provide patient with continuous flow oxygen at 3 LPM via nasal cannula. Patient will need concentrator, conserving device, and portable oxygen. Length of need is up to 6 months; will continue to reassess. Patient will need transportation oxygen delivered to the hospital,  [facility], [address] [place] [id number]. [address], Room 5429.   Ryan Chan MD   order for DME Equipment being ordered: Please provide portable oxygen at 2 LPM with activity and with sleep via nasal canula. Patient requesting small tanks he can wear with back pack for his work.   Travis Brady MD     Medication history completed by: Rogerio Sifuentes [facility]

## 2021-07-30 NOTE — TELEPHONE ENCOUNTER
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

## 2021-08-02 NOTE — H&P
Pre-Operative H & P     CC:  Preoperative exam to assess for increased cardiopulmonary risk while undergoing surgery and anesthesia.    Date of Encounter: 8/2/2021  Primary Care Physician:  Cole Duong  Associated Diagnosis: left trapped lung        Video-Visit Details    Type of service:  Video Visit    Patient verbally consented to video service today: YES      Video Start Time: 1201  Video End Time (time video stopped): 1217    Originating Location (pt. Location): Home    Distant Location (provider location): home    Mode of Communication:  Video Conference via [facility]  Byron Russo is a 60 year old male who presents for pre-operative H & P in preparation for LEFT THORACOSCOPIC PLEURAL TUBE PLACEMENT with Dr. Rincon on 8/4/21 at [facility].     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    Mr Russo uses 3 L supplemental oxygen and has significant shortness of breath and dyspnea on exertion due to his recurrent left pleural effusion and trapped lung.  He has had many therapeutic thoracenteses.  He feels better for about a week after a thoracentesis and is able to be up and moving about more.  Currently he is over 3 weeks from his last thoracentesis and is very short of breath with minimal activity.  He is ready to proceed with the above procedure.    History is obtained from the patient and the medical record.    Past Medical History  Past Medical History:   Diagnosis Date     Blepharitis      CAD (coronary artery disease) 2001     Pmwct-butwmx-tzze disease (H)      Hyperlipidemia      Hypertension     Controled by medication      Hypothyroidism      Obesity      Oxygen dependent     Indogen portable      Pulmonary embolism (H) 2/2016     Varicose veins        Past Surgical History  Past Surgical History:   Procedure Laterality Date     APPENDECTOMY       ARTHROSCOPY KNEE WITH MEDIAL MENISCECTOMY  6/20/2011    Procedure:ARTHROSCOPY KNEE WITH MEDIAL MENISCECTOMY; Surgeon:SACHIN SAMANO; Location:[facility]     BRONCHOSCOPY (RIGID OR FLEXIBLE), DIAGNOSTIC N/A 2/6/2019    Procedure: COMBINED BRONCHOSCOPY (RIGID OR FLEXIBLE), LAVAGE;  Surgeon: Louise Rogel MD;  Location: [facility]     coronary artery stents placed x 5 2001     IR THORACENTESIS  11/27/2020     IR THORACENTESIS  12/18/2020     IR THORACENTESIS  12/23/2020     IR THORACENTESIS  1/8/2021     IR THORACENTESIS  3/10/2021     IR THORACENTESIS  4/1/2021     IR THORACENTESIS  4/22/2021     IR THORACENTESIS  5/21/2021     IR THORACENTESIS  6/11/2021     IR THORACENTESIS  6/30/2021     IR THORACENTESIS  7/15/2021     THORACENTESIS Left 11/27/2020    Procedure: THORACENTESIS;  Surgeon: rFed Hahn MD;  Location: [facility] OR     THORACENTESIS Left 12/18/2020    Procedure: THORACENTESIS @1000;  Surgeon: Nasir Greene MD;  Location: [facility] OR     THORACENTESIS Left 12/23/2020    Procedure: THORACENTESIS;  Surgeon: Dary Wilkinson MD;  Location: [facility] OR     THORACENTESIS Left 1/8/2021    Procedure: THORACENTESIS;  Surgeon: Carl Singh PA-C;  Location: [facility] OR     THORACENTESIS Left 3/10/2021    Procedure: THORACENTESIS;  Surgeon: Dary Wilkinson MD;  Location: [facility] OR     THORACENTESIS Left 4/1/2021    Procedure: THORACENTESIS @1000;  Surgeon: Mikal Lema MD;  Location: [facility] OR     THORACENTESIS Left 4/22/2021    Procedure: THORACENTESIS;  Surgeon: Vladimir Mercado PA-C;  Location: [facility] OR     THORACENTESIS N/A 5/21/2021    Procedure: THORACENTESIS;  Surgeon: Aquiles Powers PA-C;  Location: [facility] OR     THORACENTESIS Left 6/11/2021    Procedure:  THORACENTESIS LEFT;  Surgeon: Mikal Lema MD;  Location: [facility] OR     THORACENTESIS Left 6/30/2021    Procedure: THORACENTESIS;  Surgeon: Mikal Lema MD;  Location: [facility] OR     THORACENTESIS Left 7/15/2021    Procedure: THORACENTESIS LEFT;  Surgeon: Mikal Lema MD;  Location: [facility] OR       Hx of Blood transfusions/reactions: yes, no reported reaction     Hx of abnormal bleeding or anti-platelet use: ASA 81mg    Menstrual history: No LMP for male patient.:     Steroid use in the last year: prednisone daily, currently 5mg    Personal or FH with difficulty with Anesthesia:  denies    Prior to Admission Medications  Current Outpatient Medications   Medication Sig Dispense Refill     acetaminophen (TYLENOL) 325 MG tablet Take 1-2 tablets (325-650 mg) by mouth every 4 hours as needed for mild pain or fever       acyclovir (ZOVIRAX) 800 MG tablet Take 1 tablet (800 mg) by mouth 2 times daily 180 tablet 4     amLODIPine (NORVASC) 5 MG tablet Take 1 tablet (5 mg) by mouth daily 90 tablet 4     aspirin 81 MG EC tablet Take 81 mg by mouth daily Reported on 5/12/2017       carboxymethylcellulose PF (REFRESH PLUS) 0.5 % ophthalmic solution Place 1 drop into both eyes 3 times daily as needed for dry eyes       cycloSPORINE modified (GENERIC EQUIVALENT) 100 MG capsule Take 1 capsule (100 mg) by mouth 2 times daily 60 capsule 11     fluconazole (DIFLUCAN) 100 MG tablet Take 1 tablet (100 mg) by mouth daily (Patient taking differently: Take 100 mg by mouth every evening ) 90 tablet 4     levofloxacin (LEVAQUIN) 250 MG tablet Take 1 tablet (250 mg) by mouth daily 90 tablet 4     levothyroxine (SYNTHROID/LEVOTHROID) 150 MCG tablet Take 1 tablet (150 mcg) by mouth daily 90 tablet 4     magnesium oxide (MAG-OX) 400 (241.3 Mg) MG tablet Take 800 mg by mouth every evening        metoprolol tartrate (LOPRESSOR) 25 MG tablet Take 1 tablet (25 mg) by mouth daily (Patient taking differently: Take 25 mg by mouth every  evening ) 90 tablet 4     predniSONE (DELTASONE) 5 MG tablet Take 1 and 0.5 tablets every day (7.5 mg every day) (Patient taking differently: Take 5 mg by mouth daily ) 60 tablet 3     rosuvastatin (CRESTOR) 5 MG tablet Take 1 tablet (5 mg) by mouth daily (Patient taking differently: Take 5 mg by mouth every evening ) 90 tablet 4     sulfamethoxazole-trimethoprim (BACTRIM DS) 800-160 MG tablet Take 1 tablet by mouth 2 times daily On Monday's and Tuesday's only 48 tablet 4     cycloSPORINE modified (GENERIC EQUIVALENT) 25 MG capsule On HOLD, current dose is 200mg twice daily. (Patient not taking: Reported on 7/30/2021) 120 capsule 4     order for DME Equipment being ordered: Oxygen. Please provide patient with continuous flow oxygen at 3 LPM via nasal cannula for activity and while sleeping. Patient will need concentrator, conserving device, and portable oxygen. 1 each 0     order for DME Equipment being ordered: Oxygen. Please provide patient with continuous flow oxygen at 3 LPM via nasal cannula. Patient will need concentrator, conserving device, and portable oxygen. Length of need is up to 6 months; will continue to reassess. Patient will need transportation oxygen delivered to the [facility], [address] [place] [id number]. [address], Room [address]. 1 each 0     order for DME Equipment being ordered: Please provide portable oxygen at 2 LPM with activity and with sleep via nasal canula. Patient requesting small tanks he can wear with back pack for his work. 1 Device 0       Allergies  Allergies   Allergen Reactions     Atorvastatin Other (See Comments)     Back pain  Tolerates rosuvastatin     Augmentin [Amoxicillin-Pot Clavulanate] Itching and Rash     Tolerated amoxicillin on its own 7/2020     Lifitegrast      Eyes stinging when using this for about 15 min after use.        Social History  Social History     Socioeconomic History     Marital status:      Spouse name: Not on file      Number of children: Not on file     Years of education: Not on file     Highest education level: Not on file   Occupational History     Not on file   Tobacco Use     Smoking status: Never Smoker     Smokeless tobacco: Former User   Substance and Sexual Activity     Alcohol use: Yes     Alcohol/week: 0.0 standard drinks     Comment: Rare     Drug use: No     Sexual activity: Not on file   Other Topics Concern     Parent/sibling w/ CABG, MI or angioplasty before 65F 55M? Not Asked   Social History Narrative     Not on file     Social Determinants of Health     Financial Resource Strain:      Difficulty of Paying Living Expenses:    Food Insecurity:      Worried About Running Out of Food in the Last Year:      Ran Out of Food in the Last Year:    Transportation Needs:      Lack of Transportation (Medical):      Lack of Transportation (Non-Medical):    Physical Activity:      Days of Exercise per Week:      Minutes of Exercise per Session:    Stress:      Feeling of Stress :    Social Connections:      Frequency of Communication with Friends and Family:      Frequency of Social Gatherings with Friends and Family:      Attends Baptism Services:      Active Member of Clubs or Organizations:      Attends Club or Organization Meetings:      Marital Status:    Intimate Partner Violence: Not At Risk     Fear of Current or Ex-Partner: No     Emotionally Abused: No     Physically Abused: No     Sexually Abused: No       Family History  Family History   Problem Relation Age of Onset     Myocardial Infarction Father 58     Coronary Artery Disease Father      Heart Failure Mother      Glaucoma Mother      Coronary Artery Disease Brother      Coronary Artery Disease Brother      Cancer Brother         GIST     Skin Cancer No family hx of      Melanoma No family hx of      Macular Degeneration No family hx of            Anesthesia Evaluation   Pt has had prior anesthetic.     No history of anesthetic complications        ROS/MED HX  ENT/Pulmonary: Comment: Chronic pleural effusion  S/p multiple thoracenteses  Trapped left lung    On supplemental O2 at 3L    (+) BERNICE risk factors, snores loudly, hypertension, obese,  (-) tobacco use   Neurologic:  - neg neurologic ROS     Cardiovascular:     (+) hypertension--CAD --stent-2011. 5 Taking blood thinners Instructions Given to patient: may continue ASA 81mg. Previous cardiac testing   Echo: Date: 1/9/2020 Results:  Interpretation Summary  Left ventricular function, chamber size, wall motion, and wall thickness are  normal.The EF is 55-60%.  The right ventricle is normal size. Global right ventricular function is  mildly reduced. RVFAC 32%.  No significant valvular abnormalities were noted.  Right ventricular systolic pressure is 27mmHg above the right atrial pressure.  No pericardial effusion is present.  This study was compared with the study from 4/20/16 (stress): RV function has  decreased slightly compared with the resting images on the prior stress study.  Stress Test: Date: Results:    ECG Reviewed: Date: 10/2020 Results:  Sinus rhythm Possible Left atrial enlargement Low voltage QRS Left anterior fascicular block Cannot rule out Inferior infarct , age undetermined Abnormal ECG    Cath: Date: Results:      METS/Exercise Tolerance:     Hematologic:     (+) History of blood clots, pt is not anticoagulated, history of blood transfusion, no previous transfusion reaction,     Musculoskeletal:  - neg musculoskeletal ROS     GI/Hepatic:  - neg GI/hepatic ROS  (-) GERD   Renal/Genitourinary:  - neg Renal ROS     Endo:     (+) thyroid problem, hypothyroidism, Chronic steroid usage for Post Transplant Immunosuppression. 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        Other:  - neg other ROS           The complete review of systems is negative other than noted in the HPI or here.      Physical Exam    Please refer to the physical examination documented by the anesthesiologist in the anesthesia record on the day of surgery    Constitutional: Awake, alert, cooperative, no apparent distress, and appears stated age.  Respiratory: wearing NC, speaking in complete sentences.  Non labored breathing  Neurologic: Awake, alert, oriented to name, place and time.    Neuropsychiatric: Calm, cooperative. Normal affect.     PRIOR LABS/DIAGNOSTIC STUDIES:  All labs and imaging personally reviewed    Component      Latest Ref Rng & Units 7/28/2021   Sodium      133 - 144 mmol/L 137   Potassium      3.4 - 5.3 mmol/L 3.8   Chloride      94 - 109 mmol/L 108   Carbon Dioxide      20 - 32 mmol/L 24   Anion Gap      3 - 14 mmol/L 5   Urea Nitrogen      7 - 30 mg/dL 15   Creatinine      0.66 - 1.25 mg/dL 0.88   Calcium      8.5 - 10.1 mg/dL 9.5   Glucose      70 - 99 mg/dL 105 (H)   Alkaline Phosphatase      40 - 150 U/L 142   AST      0 - 45 U/L 26   ALT      0 - 70 U/L 25   Protein Total      6.8 - 8.8 g/dL 7.3   Albumin      3.4 - 5.0 g/dL 3.7   Bilirubin Total      0.2 - 1.3 mg/dL 0.8   GFR Estimate      >60 mL/min/1.73m2 >90     Component      Latest Ref Rng & Units 7/28/2021   WBC      4.0 - 11.0 10e3/uL 16.1 (H)   RBC Count      4.40 - 5.90 10e6/uL 4.87   Hemoglobin      13.3 - 17.7 g/dL 16.0   Hematocrit      40.0 - 53.0 % 46.4   MCV      78 - 100 fL 95   MCH      26.5 - 33.0 pg 32.9   MCHC      31.5 - 36.5 g/dL 34.5   RDW      10.0 - 15.0 % 13.3   Platelet Count      150 - 450 10e3/uL 287   % Neutrophils      % 70   % Lymphocytes      % 12   % Monocytes      % 11   % Eosinophils      % 5   % Basophils      % 1   % Immature Granulocytes      % 1   NRBCs per 100 WBC      <1 /100 0   Absolute Neutrophils      1.6 - 8.3 10e3/uL 11.4 (H)   Absolute Lymphocytes      0.8 - 5.3 10e3/uL 2.0   Absolute Monocytes      0.0 - 1.3  10e3/uL 1.8 (H)   Absolute Eosinophils      0.0 - 0.7 10e3/uL 0.7   Absolute Basophils      0.0 - 0.2 10e3/uL 0.1   Absolute Immature Granulocytes      <=0.0 10e3/uL 0.1 (H)   Absolute NRBCs      10e3/uL 0.0     Exam: XR CHEST 2 VW, 7/7/2021   Impression:   1.  Left loculated pleural effusion with new multifocal bubbly lucency  in the left apex and left base. This could represent empyema.  2.  Unchanged interstitial opacity in the right lung.    EXAMINATION: CT CHEST W/O CONTRAST  4/15/2021 1:48 PM  IMPRESSION:    1. Overall unchanged fluid component and slight increase in scattered  foci of gas of large left hydropneumothorax with unchanged atelectasis  throughout the left lung and when compared to prior CT from  10/25/2020.   2. No significant change in basilar predominant right  peribronchovascular nodular consolidative opacities. Findings can be  seen in the setting of infection (fungal), drug reaction, or  organizing pneumonia.  3. Trace right pleural effusion, decreased from 10/25/2020.  4. Severe coronary artery calcifications.    EKG: 10/2020  Sinus rhythm   Possible Left atrial enlargement   Low voltage QRS   Left anterior fascicular block   Cannot rule out Inferior infarct , age undetermined   Abnormal ECG    Cardiac echo: 1/9/2020  Interpretation Summary  Left ventricular function, chamber size, wall motion, and wall thickness are  normal.The EF is 55-60%.  The right ventricle is normal size. Global right ventricular function is  mildly reduced. RVFAC 32%.  No significant valvular abnormalities were noted.  Right ventricular systolic pressure is 27mmHg above the right atrial pressure.  No pericardial effusion is present.  This study was compared with the study from 4/20/16 (stress): RV function has  decreased slightly compared with the resting images on the prior stress study.        PFT's: 10/22/2020  FVC-Pred L 4.76      FVC-Pre L 1.28     FVC-%Pred-Pre % 27     FEV1-Pre L 1.03     FEV1-%Pred-Pre % 27   "   FEV1FVC-Pred % 78     FEV1FVC-Pre % 80     FEFMax-Pred L/sec 9.43     FEFMax-Pre L/sec 2.82     FEFMax-%Pred-Pre % 29     FEF2575-Pred L/sec 3.17     FEF2575-Pre L/sec 0.95     XSM0286-%Pred-Pre % 30     FEF2575-Post L/sec 1.38     RQE5866-%Pred-Post % 43     ExpTime-Pre sec 5.72     FIFMax-Pre L/sec 2.89     VC-Pred L 5.06     VC-Pre L 1.40     VC-%Pred-Pre % 27     IC-Pred L 4.08     IC-Pre L 1.09     IC-%Pred-Pre % 26     ERV-Pred L 0.98     ERV-Pre L 0.31     ERV-%Pred-Pre % 31     FEV1FEV6-Pred % 79     FEV1FEV6-Pre % 80     FRCPleth-Pred L 3.58     FRCPleth-Pre L 1.37     FRCPleth-%Pred-Pre % 38     RVPleth-Pred L 2.35     RVPleth-Pre L 1.06     RVPleth-%Pred-Pre % 45     TLCPleth-Pred L 7.13     TLCPleth-Pre L 2.46     TLCPleth-%Pred-Pre % 34     DLCOunc-Pred ml/min/mmHg 28.16     DLCOunc-Pre ml/min/mmHg 14.68     DLCOunc-%Pred-Pre % 52     VA-Pre L 2.25     VA-%Pred-Pre % 34     FIO2-Pre % 21.00     FEV1SVC-Pred % 73     FEV1SVC-Pre % 74    Resulting Agency  [facility]      Narrative  Performed by: [facility]  The FEV1 and FVC are reduced, but the FEV1/FVC ratio is normal.  The lung volumes are reduced.  Following administration of bronchodilators, there is no significant response.  The diffusing capacity is reduced. However, the diffusing capacity was not   corrected for the patient's hemoglobin.   IMPRESSION:   Very severe restriction.   Moderate diffusion defect.   No significant bronchodilator resposne.   Compared to prior testing on 12/23/19, the FVC has declined by 1190 ml and the FEV1 has declined by 1010 ml.   6MWT: Walk distance is reduced on room air. There is significant desaturation without hypoxemia.          Outside records reviewed from: [facility] everywhere    ASSESSMENT and PLAN  \"Yg\" Rafaela is a 60 year old male scheduled for LEFT THORACOSCOPIC PLEURAL TUBE PLACEMENT on 8/4/21 by Dr. Rincon in treatment of trapped lung, left.  PAC referral for risk assessment and optimization for " anesthesia:    Pre-operative considerations:  1.  Cardiac:  Functional status- METS 1.  Pt is severely limited by his CUELLAR from trapped lung. He is over 3 weeks since last thoracentesis and feels his SOB has worsened as a result. Intermediate risk surgery with 0.9% (RCRI #) risk of major adverse cardiac event.   --denies chest pain, chest pressure, palpitations, orthopnea  --hx of CAD, s/p stent x5 2011  --echo 1/9/2020, EF 55-60%, RV function mildly decreased    2.  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  Usually uses daytime hours but has been using at night recently  --PFT Oct 2020: FEV1 1.03 L (27%), DLCO 52%  --recent course of augmentin completed for possible pneumonia/empyema.  Culture of pleural fluid negative.  --Taking levofloxacin daily for ID prophylaxis.      3.  GI:  Risk of PONV score = 2.  If > 2, anti-emetic intervention recommended.    4.  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  Bactrim for ID prophylaxis.    5.  Endo:  --hypothroidism, continue levothyroxine    VTE risk: 1.8%    Patient is optimized and is acceptable candidate for the proposed procedure.  No further diagnostic evaluation is needed.     Patient discussed with Dr. Kay.  Final plan per attending anesthesiologist on DOS.      Anyi Campo PA-C  Preoperative Assessment Center  [facility]  Phone: [phone]  Fax: [phone]

## 2021-08-02 NOTE — PATIENT INSTRUCTIONS
Preparing for Your Surgery      Name:  Byron Russo   MRN:  [id number]   :  1960   Today's Date:  2021       Arriving for surgery:  Surgery date:  21  Arrival time:  5:45AM    Restrictions due to COVID 19:       One visitor is allowed in the Pre Op area. When you go into surgery, one visitor is allowed to wait in the Surgery Waiting Room       (provided there is enough space to social distance).   After surgery- Two visitors are allowed at a time if you have a private room and one visitor is allowed for those in a semi-private room.   Every 4 days the visitor(s) can rotate. During the 4 day period, the visitor(s) must be consistent. No visitors under the age of 18 years old.   Visiting Hours: 8 am - 8:30 pm   No ill visitors.   All visitors must wear face mask.    [employer] parking is available for anyone with mobility limitations or disabilities.  ([place]  24 hours/ 7 days a week; [facility]  7 am- 3:30 pm, Mon- Fri)    Please come to:     [facility] [address]  [address] [place] [address]    When entering the hospital you will be asked COVID screening questions, you will then be directed to Registration.  Registration will direct you to the 3rd floor Surgery waiting room. Please ask if you need an escort or a wheelchair to the Surgery Waiting Room.  Preop number- [phone]?     What can I eat or drink?  -  You may eat and drink normally for up to 8 hours before your surgery. (Until 8/3/21, 11:45PM)  -  You may have clear liquids until 2 hours before surgery. (Until 21, 5:45AM)    Examples of clear liquids:  Water  Clear broth  Juices (apple, white grape, white cranberry  and cider) without pulp  Noncarbonated, powder based beverages  (lemonade and Russ-Aid)  Sodas (Sprite, 7-Up, ginger ale and seltzer)  Coffee or tea (without milk or cream)  Gatorade    -  No Alcohol for at least 24 hours before surgery     Which  medicines can I take?    Hold Multivitamins for 7 days before surgery.  Hold Supplements for 7 days before surgery.  Hold Ibuprofen (Advil, Motrin) for 1 day before surgery--unless otherwise directed by surgeon.  Hold Naproxen (Aleve) for 4 days before surgery.    -  DO NOT take these medications the day of surgery:    Magnesium Oxide    -  PLEASE TAKE these medications the day of surgery:    Acetaminophen(Tylenol) as needed   Aspirin    Acyclovir(Zovirax)     Refresh eye drops as needed    Cyclosporine      Levofloxacin    Levothyroxine      Metoprolol    Prednisone    How do I prepare myself?  - Please take 2 showers before surgery using Scrubcare or Hibiclens soap.    Use this soap only from the neck to your toes.     Leave the soap on your skin for one minute--then rinse thoroughly.      You may use your own shampoo and conditioner; no other hair products.   - Please remove all jewelry and body piercings.  - No lotions, deodorants or fragrance.   - Bring your ID and insurance card.    - All patients are required to have a Covid-19 test within 4 days of surgery/procedure.      -Patients will be contacted by the [facility] scheduling team within 1 week of surgery to make an appointment.      - Patients may call the Scheduling team at [phone] if they have not been scheduled within 4 days of  surgery.          Questions or Concerns:    - For any questions regarding the day of surgery or your hospital stay, please contact the Pre Admission Nursing Office at [phone].       - If you have health changes between today and your surgery please call your surgeon.       For questions after surgery please call your surgeons office.

## 2021-08-02 NOTE — H&P (VIEW-ONLY)
Pre-Operative H & P     CC:  Preoperative exam to assess for increased cardiopulmonary risk while undergoing surgery and anesthesia.    Date of Encounter: 8/2/2021  Primary Care Physician:  Cole Duong  Associated Diagnosis: left trapped lung        Video-Visit Details    Type of service:  Video Visit    Patient verbally consented to video service today: YES      Video Start Time: 1201  Video End Time (time video stopped): 1217    Originating Location (pt. Location): Home    Distant Location (provider location): home    Mode of Communication:  Video Conference via [facility]  Byron Russo is a 60 year old male who presents for pre-operative H & P in preparation for LEFT THORACOSCOPIC PLEURAL TUBE PLACEMENT with Dr. Rincon on 8/4/21 at [facility].     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    Mr Russo uses 3 L supplemental oxygen and has significant shortness of breath and dyspnea on exertion due to his recurrent left pleural effusion and trapped lung.  He has had many therapeutic thoracenteses.  He feels better for about a week after a thoracentesis and is able to be up and moving about more.  Currently he is over 3 weeks from his last thoracentesis and is very short of breath with minimal activity.  He is ready to proceed with the above procedure.    History is obtained from the patient and the medical record.    Past Medical History  Past Medical History:   Diagnosis Date     Blepharitis      CAD (coronary artery disease) 2001     Jihyo-pnqcgi-rpnp disease (H)      Hyperlipidemia      Hypertension     Controled by medication      Hypothyroidism      Obesity      Oxygen dependent     Indogen portable      Pulmonary embolism (H) 2/2016     Varicose veins        Past Surgical History  Past Surgical History:   Procedure Laterality Date     APPENDECTOMY       ARTHROSCOPY KNEE WITH MEDIAL MENISCECTOMY  6/20/2011    Procedure:ARTHROSCOPY KNEE WITH MEDIAL MENISCECTOMY; Surgeon:SACHIN SAMANO; Location:[facility]     BRONCHOSCOPY (RIGID OR FLEXIBLE), DIAGNOSTIC N/A 2/6/2019    Procedure: COMBINED BRONCHOSCOPY (RIGID OR FLEXIBLE), LAVAGE;  Surgeon: Louise Rogel MD;  Location: [facility]     coronary artery stents placed x 5 2001     IR THORACENTESIS  11/27/2020     IR THORACENTESIS  12/18/2020     IR THORACENTESIS  12/23/2020     IR THORACENTESIS  1/8/2021     IR THORACENTESIS  3/10/2021     IR THORACENTESIS  4/1/2021     IR THORACENTESIS  4/22/2021     IR THORACENTESIS  5/21/2021     IR THORACENTESIS  6/11/2021     IR THORACENTESIS  6/30/2021     IR THORACENTESIS  7/15/2021     THORACENTESIS Left 11/27/2020    Procedure: THORACENTESIS;  Surgeon: Fred Hahn MD;  Location: [facility] OR     THORACENTESIS Left 12/18/2020    Procedure: THORACENTESIS @1000;  Surgeon: Nasir Greene MD;  Location: [facility] OR     THORACENTESIS Left 12/23/2020    Procedure: THORACENTESIS;  Surgeon: Dary Wilkinson MD;  Location: [facility] OR     THORACENTESIS Left 1/8/2021    Procedure: THORACENTESIS;  Surgeon: Carl Singh PA-C;  Location: [facility] OR     THORACENTESIS Left 3/10/2021    Procedure: THORACENTESIS;  Surgeon: Dary Wilkinson MD;  Location: [facility] OR     THORACENTESIS Left 4/1/2021    Procedure: THORACENTESIS @1000;  Surgeon: Mikal Lema MD;  Location: [facility] OR     THORACENTESIS Left 4/22/2021    Procedure: THORACENTESIS;  Surgeon: Vladimir Mercado PA-C;  Location: [facility] OR     THORACENTESIS N/A 5/21/2021    Procedure: THORACENTESIS;  Surgeon: Aquiles Powers PA-C;  Location: [facility] OR     THORACENTESIS Left 6/11/2021    Procedure:  THORACENTESIS LEFT;  Surgeon: Mikal Lema MD;  Location: [facility] OR     THORACENTESIS Left 6/30/2021    Procedure: THORACENTESIS;  Surgeon: Mikal Lema MD;  Location: [facility] OR     THORACENTESIS Left 7/15/2021    Procedure: THORACENTESIS LEFT;  Surgeon: Mikal Lema MD;  Location: [facility] OR       Hx of Blood transfusions/reactions: yes, no reported reaction     Hx of abnormal bleeding or anti-platelet use: ASA 81mg    Menstrual history: No LMP for male patient.:     Steroid use in the last year: prednisone daily, currently 5mg    Personal or FH with difficulty with Anesthesia:  denies    Prior to Admission Medications  Current Outpatient Medications   Medication Sig Dispense Refill     acetaminophen (TYLENOL) 325 MG tablet Take 1-2 tablets (325-650 mg) by mouth every 4 hours as needed for mild pain or fever       acyclovir (ZOVIRAX) 800 MG tablet Take 1 tablet (800 mg) by mouth 2 times daily 180 tablet 4     amLODIPine (NORVASC) 5 MG tablet Take 1 tablet (5 mg) by mouth daily 90 tablet 4     aspirin 81 MG EC tablet Take 81 mg by mouth daily Reported on 5/12/2017       carboxymethylcellulose PF (REFRESH PLUS) 0.5 % ophthalmic solution Place 1 drop into both eyes 3 times daily as needed for dry eyes       cycloSPORINE modified (GENERIC EQUIVALENT) 100 MG capsule Take 1 capsule (100 mg) by mouth 2 times daily 60 capsule 11     fluconazole (DIFLUCAN) 100 MG tablet Take 1 tablet (100 mg) by mouth daily (Patient taking differently: Take 100 mg by mouth every evening ) 90 tablet 4     levofloxacin (LEVAQUIN) 250 MG tablet Take 1 tablet (250 mg) by mouth daily 90 tablet 4     levothyroxine (SYNTHROID/LEVOTHROID) 150 MCG tablet Take 1 tablet (150 mcg) by mouth daily 90 tablet 4     magnesium oxide (MAG-OX) 400 (241.3 Mg) MG tablet Take 800 mg by mouth every evening        metoprolol tartrate (LOPRESSOR) 25 MG tablet Take 1 tablet (25 mg) by mouth daily (Patient taking differently: Take 25 mg by mouth every  evening ) 90 tablet 4     predniSONE (DELTASONE) 5 MG tablet Take 1 and 0.5 tablets every day (7.5 mg every day) (Patient taking differently: Take 5 mg by mouth daily ) 60 tablet 3     rosuvastatin (CRESTOR) 5 MG tablet Take 1 tablet (5 mg) by mouth daily (Patient taking differently: Take 5 mg by mouth every evening ) 90 tablet 4     sulfamethoxazole-trimethoprim (BACTRIM DS) 800-160 MG tablet Take 1 tablet by mouth 2 times daily On Monday's and Tuesday's only 48 tablet 4     cycloSPORINE modified (GENERIC EQUIVALENT) 25 MG capsule On HOLD, current dose is 200mg twice daily. (Patient not taking: Reported on 7/30/2021) 120 capsule 4     order for DME Equipment being ordered: Oxygen. Please provide patient with continuous flow oxygen at 3 LPM via nasal cannula for activity and while sleeping. Patient will need concentrator, conserving device, and portable oxygen. 1 each 0     order for DME Equipment being ordered: Oxygen. Please provide patient with continuous flow oxygen at 3 LPM via nasal cannula. Patient will need concentrator, conserving device, and portable oxygen. Length of need is up to 6 months; will continue to reassess. Patient will need transportation oxygen delivered to the [facility], [address] [place] [id number]. [address], [address]. 1 each 0     order for DME Equipment being ordered: Please provide portable oxygen at 2 LPM with activity and with sleep via nasal canula. Patient requesting small tanks he can wear with back pack for his work. 1 Device 0       Allergies  Allergies   Allergen Reactions     Atorvastatin Other (See Comments)     Back pain  Tolerates rosuvastatin     Augmentin [Amoxicillin-Pot Clavulanate] Itching and Rash     Tolerated amoxicillin on its own 7/2020     Lifitegrast      Eyes stinging when using this for about 15 min after use.        Social History  Social History     Socioeconomic History     Marital status:      Spouse name: Not on file      Number of children: Not on file     Years of education: Not on file     Highest education level: Not on file   Occupational History     Not on file   Tobacco Use     Smoking status: Never Smoker     Smokeless tobacco: Former User   Substance and Sexual Activity     Alcohol use: Yes     Alcohol/week: 0.0 standard drinks     Comment: Rare     Drug use: No     Sexual activity: Not on file   Other Topics Concern     Parent/sibling w/ CABG, MI or angioplasty before 65F 55M? Not Asked   Social History Narrative     Not on file     Social Determinants of Health     Financial Resource Strain:      Difficulty of Paying Living Expenses:    Food Insecurity:      Worried About Running Out of Food in the Last Year:      Ran Out of Food in the Last Year:    Transportation Needs:      Lack of Transportation (Medical):      Lack of Transportation (Non-Medical):    Physical Activity:      Days of Exercise per Week:      Minutes of Exercise per Session:    Stress:      Feeling of Stress :    Social Connections:      Frequency of Communication with Friends and Family:      Frequency of Social Gatherings with Friends and Family:      Attends Voodoo Services:      Active Member of Clubs or Organizations:      Attends Club or Organization Meetings:      Marital Status:    Intimate Partner Violence: Not At Risk     Fear of Current or Ex-Partner: No     Emotionally Abused: No     Physically Abused: No     Sexually Abused: No       Family History  Family History   Problem Relation Age of Onset     Myocardial Infarction Father 58     Coronary Artery Disease Father      Heart Failure Mother      Glaucoma Mother      Coronary Artery Disease Brother      Coronary Artery Disease Brother      Cancer Brother         GIST     Skin Cancer No family hx of      Melanoma No family hx of      Macular Degeneration No family hx of            Anesthesia Evaluation   Pt has had prior anesthetic.     No history of anesthetic complications        ROS/MED HX  ENT/Pulmonary: Comment: Chronic pleural effusion  S/p multiple thoracenteses  Trapped left lung    On supplemental O2 at 3L    (+) BERNICE risk factors, snores loudly, hypertension, obese,  (-) tobacco use   Neurologic:  - neg neurologic ROS     Cardiovascular:     (+) hypertension--CAD --stent-2011. 5 Taking blood thinners Instructions Given to patient: may continue ASA 81mg. Previous cardiac testing   Echo: Date: 1/9/2020 Results:  Interpretation Summary  Left ventricular function, chamber size, wall motion, and wall thickness are  normal.The EF is 55-60%.  The right ventricle is normal size. Global right ventricular function is  mildly reduced. RVFAC 32%.  No significant valvular abnormalities were noted.  Right ventricular systolic pressure is 27mmHg above the right atrial pressure.  No pericardial effusion is present.  This study was compared with the study from 4/20/16 (stress): RV function has  decreased slightly compared with the resting images on the prior stress study.  Stress Test: Date: Results:    ECG Reviewed: Date: 10/2020 Results:  Sinus rhythm Possible Left atrial enlargement Low voltage QRS Left anterior fascicular block Cannot rule out Inferior infarct , age undetermined Abnormal ECG    Cath: Date: Results:      METS/Exercise Tolerance:     Hematologic:     (+) History of blood clots, pt is not anticoagulated, history of blood transfusion, no previous transfusion reaction,     Musculoskeletal:  - neg musculoskeletal ROS     GI/Hepatic:  - neg GI/hepatic ROS  (-) GERD   Renal/Genitourinary:  - neg Renal ROS     Endo:     (+) thyroid problem, hypothyroidism, Chronic steroid usage for Post Transplant Immunosuppression. 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        Other:  - neg other ROS           The complete review of systems is negative other than noted in the HPI or here.      Physical Exam    Please refer to the physical examination documented by the anesthesiologist in the anesthesia record on the day of surgery    Constitutional: Awake, alert, cooperative, no apparent distress, and appears stated age.  Respiratory: wearing NC, speaking in complete sentences.  Non labored breathing  Neurologic: Awake, alert, oriented to name, place and time.    Neuropsychiatric: Calm, cooperative. Normal affect.     PRIOR LABS/DIAGNOSTIC STUDIES:  All labs and imaging personally reviewed    Component      Latest Ref Rng & Units 7/28/2021   Sodium      133 - 144 mmol/L 137   Potassium      3.4 - 5.3 mmol/L 3.8   Chloride      94 - 109 mmol/L 108   Carbon Dioxide      20 - 32 mmol/L 24   Anion Gap      3 - 14 mmol/L 5   Urea Nitrogen      7 - 30 mg/dL 15   Creatinine      0.66 - 1.25 mg/dL 0.88   Calcium      8.5 - 10.1 mg/dL 9.5   Glucose      70 - 99 mg/dL 105 (H)   Alkaline Phosphatase      40 - 150 U/L 142   AST      0 - 45 U/L 26   ALT      0 - 70 U/L 25   Protein Total      6.8 - 8.8 g/dL 7.3   Albumin      3.4 - 5.0 g/dL 3.7   Bilirubin Total      0.2 - 1.3 mg/dL 0.8   GFR Estimate      >60 mL/min/1.73m2 >90     Component      Latest Ref Rng & Units 7/28/2021   WBC      4.0 - 11.0 10e3/uL 16.1 (H)   RBC Count      4.40 - 5.90 10e6/uL 4.87   Hemoglobin      13.3 - 17.7 g/dL 16.0   Hematocrit      40.0 - 53.0 % 46.4   MCV      78 - 100 fL 95   MCH      26.5 - 33.0 pg 32.9   MCHC      31.5 - 36.5 g/dL 34.5   RDW      10.0 - 15.0 % 13.3   Platelet Count      150 - 450 10e3/uL 287   % Neutrophils      % 70   % Lymphocytes      % 12   % Monocytes      % 11   % Eosinophils      % 5   % Basophils      % 1   % Immature Granulocytes      % 1   NRBCs per 100 WBC      <1 /100 0   Absolute Neutrophils      1.6 - 8.3 10e3/uL 11.4 (H)   Absolute Lymphocytes      0.8 - 5.3 10e3/uL 2.0   Absolute Monocytes      0.0 - 1.3  10e3/uL 1.8 (H)   Absolute Eosinophils      0.0 - 0.7 10e3/uL 0.7   Absolute Basophils      0.0 - 0.2 10e3/uL 0.1   Absolute Immature Granulocytes      <=0.0 10e3/uL 0.1 (H)   Absolute NRBCs      10e3/uL 0.0     Exam: XR CHEST 2 VW, 7/7/2021   Impression:   1.  Left loculated pleural effusion with new multifocal bubbly lucency  in the left apex and left base. This could represent empyema.  2.  Unchanged interstitial opacity in the right lung.    EXAMINATION: CT CHEST W/O CONTRAST  4/15/2021 1:48 PM  IMPRESSION:    1. Overall unchanged fluid component and slight increase in scattered  foci of gas of large left hydropneumothorax with unchanged atelectasis  throughout the left lung and when compared to prior CT from  10/25/2020.   2. No significant change in basilar predominant right  peribronchovascular nodular consolidative opacities. Findings can be  seen in the setting of infection (fungal), drug reaction, or  organizing pneumonia.  3. Trace right pleural effusion, decreased from 10/25/2020.  4. Severe coronary artery calcifications.    EKG: 10/2020  Sinus rhythm   Possible Left atrial enlargement   Low voltage QRS   Left anterior fascicular block   Cannot rule out Inferior infarct , age undetermined   Abnormal ECG    Cardiac echo: 1/9/2020  Interpretation Summary  Left ventricular function, chamber size, wall motion, and wall thickness are  normal.The EF is 55-60%.  The right ventricle is normal size. Global right ventricular function is  mildly reduced. RVFAC 32%.  No significant valvular abnormalities were noted.  Right ventricular systolic pressure is 27mmHg above the right atrial pressure.  No pericardial effusion is present.  This study was compared with the study from 4/20/16 (stress): RV function has  decreased slightly compared with the resting images on the prior stress study.        PFT's: 10/22/2020  FVC-Pred L 4.76      FVC-Pre L 1.28     FVC-%Pred-Pre % 27     FEV1-Pre L 1.03     FEV1-%Pred-Pre % 27   "   FEV1FVC-Pred % 78     FEV1FVC-Pre % 80     FEFMax-Pred L/sec 9.43     FEFMax-Pre L/sec 2.82     FEFMax-%Pred-Pre % 29     FEF2575-Pred L/sec 3.17     FEF2575-Pre L/sec 0.95     TUG4192-%Pred-Pre % 30     FEF2575-Post L/sec 1.38     DFE6034-%Pred-Post % 43     ExpTime-Pre sec 5.72     FIFMax-Pre L/sec 2.89     VC-Pred L 5.06     VC-Pre L 1.40     VC-%Pred-Pre % 27     IC-Pred L 4.08     IC-Pre L 1.09     IC-%Pred-Pre % 26     ERV-Pred L 0.98     ERV-Pre L 0.31     ERV-%Pred-Pre % 31     FEV1FEV6-Pred % 79     FEV1FEV6-Pre % 80     FRCPleth-Pred L 3.58     FRCPleth-Pre L 1.37     FRCPleth-%Pred-Pre % 38     RVPleth-Pred L 2.35     RVPleth-Pre L 1.06     RVPleth-%Pred-Pre % 45     TLCPleth-Pred L 7.13     TLCPleth-Pre L 2.46     TLCPleth-%Pred-Pre % 34     DLCOunc-Pred ml/min/mmHg 28.16     DLCOunc-Pre ml/min/mmHg 14.68     DLCOunc-%Pred-Pre % 52     VA-Pre L 2.25     VA-%Pred-Pre % 34     FIO2-Pre % 21.00     FEV1SVC-Pred % 73     FEV1SVC-Pre % 74    Resulting Agency  [facility]      Narrative  Performed by: [facility]  The FEV1 and FVC are reduced, but the FEV1/FVC ratio is normal.  The lung volumes are reduced.  Following administration of bronchodilators, there is no significant response.  The diffusing capacity is reduced. However, the diffusing capacity was not   corrected for the patient's hemoglobin.   IMPRESSION:   Very severe restriction.   Moderate diffusion defect.   No significant bronchodilator resposne.   Compared to prior testing on 12/23/19, the FVC has declined by 1190 ml and the FEV1 has declined by 1010 ml.   6MWT: Walk distance is reduced on room air. There is significant desaturation without hypoxemia.          Outside records reviewed from: [place] everywhere    ASSESSMENT and PLAN  \"Yg\" Rafaela is a 60 year old male scheduled for LEFT THORACOSCOPIC PLEURAL TUBE PLACEMENT on 8/4/21 by Dr. Rincon in treatment of trapped lung, left.  PAC referral for risk assessment and optimization for " anesthesia:    Pre-operative considerations:  1.  Cardiac:  Functional status- METS 1.  Pt is severely limited by his CUELLAR from trapped lung. He is over 3 weeks since last thoracentesis and feels his SOB has worsened as a result. Intermediate risk surgery with 0.9% (RCRI #) risk of major adverse cardiac event.   --denies chest pain, chest pressure, palpitations, orthopnea  --hx of CAD, s/p stent x5 2011  --echo 1/9/2020, EF 55-60%, RV function mildly decreased    2.  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  Usually uses daytime hours but has been using at night recently  --PFT Oct 2020: FEV1 1.03 L (27%), DLCO 52%  --recent course of augmentin completed for possible pneumonia/empyema.  Culture of pleural fluid negative.  --Taking levofloxacin daily for ID prophylaxis.      3.  GI:  Risk of PONV score = 2.  If > 2, anti-emetic intervention recommended.    4.  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  Bactrim for ID prophylaxis.    5.  Endo:  --hypothroidism, continue levothyroxine    VTE risk: 1.8%    Patient is optimized and is acceptable candidate for the proposed procedure.  No further diagnostic evaluation is needed.     Patient discussed with Dr. Kay.  Final plan per attending anesthesiologist on DOS.      Anyi Campo PA-C  Preoperative Assessment Center  [facility]  Phone: [phone]  Fax: [phone]

## 2021-08-02 NOTE — ANESTHESIA PREPROCEDURE EVALUATION
Anesthesia Pre-Procedure Evaluation    Patient: Byron Russo   MRN: [id number] : 1960        Preoperative Diagnosis: * No surgery found *   Procedure :      Past Medical History:   Diagnosis Date     Blepharitis      CAD (coronary artery disease)      Spmur-myppdl-kdfj disease (H)      Hyperlipidemia      Hypertension     Controled by medication     Hypothyroidism      Obesity      Oxygen dependent     Indogen portable      Pulmonary embolism (H) 2016     Varicose veins       Past Surgical History:   Procedure Laterality Date     APPENDECTOMY       ARTHROSCOPY KNEE WITH MEDIAL MENISCECTOMY  2011    Procedure:ARTHROSCOPY KNEE WITH MEDIAL MENISCECTOMY; Surgeon:SACHIN SAMANO; Location:[facility]     BRONCHOSCOPY (RIGID OR FLEXIBLE), DIAGNOSTIC N/A 2019    Procedure: COMBINED BRONCHOSCOPY (RIGID OR FLEXIBLE), LAVAGE;  Surgeon: Louise Rogel MD;  Location: [facility]     coronary artery stents placed x 5       IR THORACENTESIS  2020     IR THORACENTESIS  2020     IR THORACENTESIS  2020     IR THORACENTESIS  2021     IR THORACENTESIS  3/10/2021     IR THORACENTESIS  2021     IR THORACENTESIS  2021     IR THORACENTESIS  2021     IR THORACENTESIS  2021     IR THORACENTESIS  2021     IR THORACENTESIS  7/15/2021     THORACENTESIS Left 2020    Procedure: THORACENTESIS;  Surgeon: Fred Hahn MD;  Location: [facility]     THORACENTESIS Left 2020    Procedure: THORACENTESIS @1000;  Surgeon: Nasir Greene MD;  Location: [facility]     THORACENTESIS Left 2020    Procedure: THORACENTESIS;  Surgeon: Dary Wilkinson MD;  Location: [facility]     THORACENTESIS Left 2021    Procedure: THORACENTESIS;  Surgeon: Carl Singh PA-C;  Location: [facility]     THORACENTESIS Left 3/10/2021    Procedure: THORACENTESIS;  Surgeon: Dary Wilkinson MD;  Location: [facility]     THORACENTESIS Left 2021    Procedure: THORACENTESIS @1000;   Surgeon: Mikal Lema MD;  Location: [facility]     THORACENTESIS Left 4/22/2021    Procedure: THORACENTESIS;  Surgeon: Vladimir Mercado PA-C;  Location: [facility]     THORACENTESIS N/A 5/21/2021    Procedure: THORACENTESIS;  Surgeon: Aquiles Powers PA-C;  Location: [facility]     THORACENTESIS Left 6/11/2021    Procedure: THORACENTESIS LEFT;  Surgeon: Mikal Lema MD;  Location: [facility]     THORACENTESIS Left 6/30/2021    Procedure: THORACENTESIS;  Surgeon: Mikal Lema MD;  Location: [facility]     THORACENTESIS Left 7/15/2021    Procedure: THORACENTESIS LEFT;  Surgeon: Mikal Lema MD;  Location: [facility]      Allergies   Allergen Reactions     Atorvastatin Other (See Comments)     Back pain  Tolerates rosuvastatin     Augmentin [Amoxicillin-Pot Clavulanate] Itching and Rash     Tolerated amoxicillin on its own 7/2020     Lifitegrast      Eyes stinging when using this for about 15 min after use.       Social History     Tobacco Use     Smoking status: Never Smoker     Smokeless tobacco: Former User   Substance Use Topics     Alcohol use: Yes     Alcohol/week: 0.0 standard drinks     Comment: Rare      Wt Readings from Last 1 Encounters:   07/28/21 105.7 kg (233 lb)        Anesthesia Evaluation   Pt has had prior anesthetic. Type: MAC.    No history of anesthetic complications       ROS/MED HX  ENT/Pulmonary: Comment: Chronic pleural effusion  S/p multiple thoracenteses  Trapped left lung    On supplemental O2 at 3L    (+) BERNICE risk factors, snores loudly, hypertension, obese,  (-) tobacco use   Neurologic:  - neg neurologic ROS     Cardiovascular:     (+) hypertension--CAD --stent-2011. 5 Taking blood thinners Instructions Given to patient: may continue ASA 81mg. Previous cardiac testing   Echo: Date: 1/9/2020 Results:  Interpretation Summary  Left ventricular function, chamber size, wall motion, and wall thickness are  normal.The EF is 55-60%.  The right ventricle is normal size. Global  right ventricular function is  mildly reduced. RVFAC 32%.  No significant valvular abnormalities were noted.  Right ventricular systolic pressure is 27mmHg above the right atrial pressure.  No pericardial effusion is present.  This study was compared with the study from 4/20/16 (stress): RV function has  decreased slightly compared with the resting images on the prior stress study.  Stress Test: Date: Results:    ECG Reviewed: Date: 10/2020 Results:  Sinus rhythm Possible Left atrial enlargement Low voltage QRS Left anterior fascicular block Cannot rule out Inferior infarct , age undetermined Abnormal ECG    Cath: Date: Results:      METS/Exercise Tolerance:     Hematologic:     (+) History of blood clots, pt is not anticoagulated, history of blood transfusion, no previous transfusion reaction,     Musculoskeletal:  - neg musculoskeletal ROS     GI/Hepatic:  - neg GI/hepatic ROS  (-) GERD   Renal/Genitourinary:  - neg Renal ROS     Endo:     (+) thyroid problem, hypothyroidism, Chronic steroid usage for Post Transplant Immunosuppression. 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        Other:  - neg other ROS          Physical Exam    Airway        Mallampati: III   TM distance: > 3 FB   Neck ROM: full   Mouth opening: > 3 cm    Respiratory Devices and Support         Dental         B=Bridge, C=Chipped, L=Loose, M=Missing    Cardiovascular   cardiovascular exam normal          Pulmonary           (+) decreased breath sounds           OUTSIDE LABS:  CBC:   Lab Results   Component Value Date    WBC 16.1 (H) 07/28/2021    WBC 15.9 (H) 07/15/2021    HGB 16.0 07/28/2021    HGB 16.1 07/15/2021    HCT 46.4 07/28/2021    HCT 46.8 07/15/2021     07/28/2021     07/15/2021     BMP:   Lab Results   Component Value Date      07/28/2021     07/12/2021     07/12/2021    POTASSIUM 3.8 07/28/2021    POTASSIUM 3.9 07/12/2021    POTASSIUM 3.9 07/12/2021    CHLORIDE 108 07/28/2021    CHLORIDE 111 (H) 07/12/2021    CHLORIDE 111 (H) 07/12/2021    CO2 24 07/28/2021    CO2 22 07/12/2021    CO2 22 07/12/2021    BUN 15 07/28/2021    BUN 15 07/12/2021    BUN 15 07/12/2021    CR 0.88 07/28/2021    CR 0.88 07/12/2021    CR 0.90 07/12/2021     (H) 07/28/2021     (H) 07/12/2021     (H) 07/12/2021     COAGS:   Lab Results   Component Value Date    PTT 40 (H) 10/22/2020    INR 1.12 07/15/2021    FIBR 860 (H) 01/08/2020     POC: No results found for: BGM, HCG, HCGS  HEPATIC:   Lab Results   Component Value Date    ALBUMIN 3.7 07/28/2021    PROTTOTAL 7.3 07/28/2021    ALT 25 07/28/2021    AST 26 07/28/2021    ALKPHOS 142 07/28/2021    BILITOTAL 0.8 07/28/2021     OTHER:   Lab Results   Component Value Date    PH 7.39 01/09/2020    LACT 1.8 01/08/2020    TRACE 9.5 07/28/2021    PHOS 3.9 01/15/2020    MAG 1.9 07/12/2021    TSH 0.47 05/11/2018    T4 1.39 05/11/2018    CRP 8.2 (H) 02/06/2019    SED 66 (H) 02/06/2019       Anesthesia Plan    ASA Status:  4      Anesthesia Type: MAC.     - Reason for MAC: straight local not clinically adequate              Consents    Anesthesia Plan(s) and associated risks, benefits, and realistic alternatives discussed. Questions answered and patient/representative(s) expressed understanding.     - Discussed with:  Patient      - Extended Intubation/Ventilatory Support Discussed: Yes.      - Patient is DNR/DNI Status: No    Use of blood products discussed: No .     Postoperative Care    Post procedure pain management: Local.   PONV prophylaxis: Ondansetron (or other 5HT-3)     Comments:    Patient seen and examined.  Risks, benefits and alternatives to MAC with possibility of GETA discussed.  Patient made aware of the possibility of periprocedural awareness given MAC.  Questions answered and  "patient wishes to proceed            PAC Discussion and Assessment    ASA Classification: 3  Case is suitable for: [place]  Anesthetic techniques and relevant risks discussed: GA  Invasive monitoring and risk discussed: No    Possibility and Risk of blood transfusion discussed: Yes            PAC Resident/NP Anesthesia Assessment: \"Yg\" Rafaela is a 60 year old male scheduled for LEFT THORACOSCOPIC PLEURAL TUBE PLACEMENT on 8/4/21 by Dr. Rincon in treatment of trapped lung, left.  PAC referral for risk assessment and optimization for anesthesia:    Pre-operative considerations:  1.  Cardiac:  Functional status- METS 1.  Pt is severely limited by his CUELLAR from trapped lung. He is over 3 weeks since last thoracentesis and feels his SOB has worsened as a result. Intermediate risk surgery with 0.9% (RCRI #) risk of major adverse cardiac event.   --denies chest pain, chest pressure, palpitations, orthopnea  --hx of CAD, s/p stent x5 2011  --echo 1/9/2020, EF 55-60%, RV function mildly decreased    2.  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  Usually uses daytime hours but has been using at night recently  --PFT Oct 2020: FEV1 1.03 L (27%), DLCO 52%  --recent course of augmentin completed for possible pneumonia/empyema.  Culture of pleural fluid negative.  --Taking levofloxacin daily for ID prophylaxis.      3.  GI:  Risk of PONV score = 2.  If > 2, anti-emetic intervention recommended.    4.  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  Bactrim for ID prophylaxis.    5.  Endo:  --hypothroidism, continue levothyroxine    VTE risk: 1.8%    Patient is optimized and is " acceptable candidate for the proposed procedure.  No further diagnostic evaluation is needed.     Patient discussed with Dr. Kay.  Final plan per attending anesthesiologist on DOS.    **For further details of assessment, testing, and physical exam please see H and P completed on same date.          Anyi Campo PA-C, [facility]    Reviewed and Signed by PAC Mid-Level Provider/Resident  Mid-Level Provider/Resident: Anyi Campo  Date: 8/2/21      Attending Anesthesiologist Anesthesia Assessment: Consider Stress dose steroids per attending anesthesiologist  Reviewed and Signed by PAC Anesthesiologist  Anesthesiologist: Rohit Stapleton  Date: 8/2/21                     Anyi Campo PA-C

## 2021-08-02 NOTE — PROGRESS NOTES
Yg is a 60 year old who is being evaluated via a billable video visit.      How would you like to obtain your AVS? MyChart    HPI       Review of Systems         Objective    Vitals - Patient Reported  Pain Score: Mild Pain (3)  Pain Loc: Shoulder        Physical Exam       N Darian COHEN

## 2021-08-04 NOTE — OR NURSING
Art line placed by Dr. Harrell,   ABG done: pH 7.4, pCO2 31.4, pO2 43.8, HCo3 22.3.  Pt intubated at 1730.

## 2021-08-04 NOTE — PROVIDER NOTIFICATION
Resident Grecia paged regarding CXR done, confirmed CXR results are expected and ok to transfer to floor when bed available.

## 2021-08-04 NOTE — OR NURSING
Paged 1ST CALL THORACIC RESIDENT IN/OUTPTS SURGERY about XR.  Dr. Harrell saw CXR done and reviewed findings with Dr. Reynolds. Order for 40 mg IV lasix given at 1550. Desai placed in PACU.     1610: Dr. Harrell at bedside ordered a stat neb via Bipap.

## 2021-08-04 NOTE — OP NOTE
Procedure Date: 08/04/2021    PREOPERATIVE DIAGNOSES:    1.  Pleuroparenchymal fibroelastosis.  2.  Graft versus host disease.  3.  Left trapped lung and chronic left pleural effusion.    POSTOPERATIVE DIAGNOSES:  1.  Pleuroparenchymal fibroelastosis.  2.  Graft versus host disease.  3.  Left trapped lung and chronic left pleural effusion.    PROCEDURE PERFORMED:  Left thoracoscopic partial decortication and indwelling pleural catheter placement (PleurX).    SURGEON:  Lobo Reynolds MD (present for the entire procedure)    RESIDENT SURGEON:  Casper Paige MD     ANESTHESIA:  Sedation and local.    FINDINGS:  The left lung was trapped as expected with a thick rind overlying it that we did not remove.  We then broke up all the loculations and did a partial decortication of the chest wall, so that the cavity was completely free at the end of the procedure.    DESCRIPTION OF PROCEDURE:  With the patient in the supine position under sedation, the left chest and upper abdomen were prepared and draped in the conventional fashion.  We first made a 1 cm incision and entered the thoracic cavity without difficulty and placed a 5 mm port and examined the pleural cavity.  There was a lot of debris, loculations and mucinous material.  We placed a second port and then completely emptied everything out, cleaned it all out using ring clamps and partially decorticated the chest wall.  We did not decorticate the lung because the intention was not to try to get the lung to reexpand at this time due to the patient's severe comorbidities.    After we had completely emptied out the left pleural cavity, we placed a PleurX catheter as well as a 24 [place]ian straight chest tube.  We placed a straight chest tube as an additional precaution for the next day or 2.    We then ensured hemostasis and closed with absorbable sutures in the conventional fashion.    Of note, we tunneled the PleurX subcutaneously for about 4-5 cm.      Lobo ROQUE  MD Rodolfo        D: 2021   T: 2021   MT: JARRED    Name:     BRIGITTE JUNIOR  MRN:      [id number]        Account:        [id number]   :      1960           Procedure Date: 2021     Document: [id number]

## 2021-08-04 NOTE — PROVIDER NOTIFICATION
Dr. Harrell paged regarding increase in HR to 130s, increasing SOB and labored breathing. Told to call surgery team.    Dr. Paige paged regarding pt's status in PACU, orders for stat CXR and ok to hook CT up to -20cm suction in the meantime. Will page thoracic resident following CXR.

## 2021-08-04 NOTE — ANESTHESIA POSTPROCEDURE EVALUATION
Patient: Byron Russo    Procedure(s):  Left Video Assisted Thoracic Surgery, Placement of PleurX, Partial Decortication    Diagnosis:Trapped lung [J98.19]  Diagnosis Additional Information: No value filed.    Anesthesia Type:  MAC    Note:  Disposition: Admission   Postop Pain Control: Uneventful            Sign Out: Well controlled pain   PONV: No   Neuro/Psych: Uneventful            Sign Out: Acceptable/Baseline neuro status   Airway/Respiratory: Uneventful            Sign Out: ABNORMAL respiratory status; O2 supplementation               Respiratory Exam: Normal RR               Oxygen: BiPAP with FiO2 of 0.9.   CV/Hemodynamics: Uneventful            Sign Out: Acceptable CV status; No obvious hypovolemia; No obvious fluid overload   Other NRE: NONE   DID A NON-ROUTINE EVENT OCCUR? No    Event details/Postop Comments:  Patient appears much improved in PACU as compared to preop pertaining to his respiratory status.  Currently on BiPAP for stuck lung recruitment however requiring high FiO2(0.9) at this time.  Awake, alert, oriented.  Denies pain.  Wants to eat.  Understands that ongoing recruitment and weaning will be necessary.           Last vitals:  Vitals Value Taken Time   /68 08/04/21 1130   Temp     Pulse 112 08/04/21 1147   Resp     SpO2 94 % 08/04/21 1147   Vitals shown include unvalidated device data.    Electronically Signed By: David Harrell MD  August 4, 2021  11:48 AM

## 2021-08-04 NOTE — PROVIDER NOTIFICATION
Dr. Harrell paged regarding SBPs in the 80s, MAPS maintaining in the 70s; otherwise pt asymptomatic. Order for 250cc of albumin. Ok to give 25mcg of fentanyl for pain at chest tube site.

## 2021-08-04 NOTE — H&P
SURGICAL ICU ADMISSION NOTE  8/4/2021    PRIMARY TEAM: Thoracic  PRIMARY PHYSICIAN: Dr. Reynolds    REASON FOR CRITICAL CARE ADMISSION: Mechanical ventilatory support  ADMITTING PHYSICIAN: Dr. Nowak    ASSESSMENT: 59 yo male with h/o BMT c/b GVHD, pleural parenchymal fibroelastosis, PAH, CAD, PE, trapped left lung and recurrent pleural effusions, now s/p VATS partial left decortication and pleurex placement on 8/4/21 who was hypoxic in the PACU, failed BiPAP, requiring intubation.     PLAN:   Neuro/ pain/ sedation:  -Monitor neurological status. Notify the MD for any acute changes in exam.  -fentantyl gtt at 25 mcg/hr for pain.  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 On 3 L O2 at home  - PFTs Oct 2020: FEV1 1.03L (27%), DLCO 52%  Respiratory acidosis 7.23 / 62 / 58 / 26  CXR with persistent b/l pleural effusions, L > R  - mechanically ventilated: AC 20 / 10 / 350 / 100%  - flolan  - hydrocortisone 125 mg x1  - L chest tube to -20 mmHg suction       Cardiovascular:    #Pulmonary artery hypertension  #Hypotension  Tachycardic to 150s, reportedly 110s at baseline.   Last echo Jan 2020: EF 55-60%, RV pressure 27 mmHg above RA, no significant valvular abnormalities  - flolan  - levophed vasopressin     GI care:   -NPO except ice chips and medications.  -NGT to LIS, f/u CXR after placement     Fluids/ Electrolytes/ Nutrition:   -hold IVF  -ICU electrolyte replacement protocol  -No indication for parenteral nutrition.       Renal/ Fluid Balance:     in OR and PACU, diuresed in PACU  -Will continue to monitor intake and output.  -monika     Endocrine:    - Normal BG, sliding scale insulin available  - hydrocortisone 125 mg x1  - pta levothyroxine     ID/  Antibiotics:  Immunosuppressed with PTA cyclosporine and prednisone   Fever to 102  WBC 18.3  Blood, sputum, and urine cx pending  Vanc/zosyn       Heme:     #Myelodysplastic syndrome s/p BMT 2016  -BMT consult       Prophylaxis:    -Mechanical prophylaxis for DVT.  - reassess need for chemical ppx tomorrow     MSK:    -PT and OT consulted. Appreciate recs.     Lines/ tubes/ drains:  -PIV x2, Left chest tubes x 2 (15.5 Fr and 24 Fr), arterial line, ETT, frank     Disposition:  -Surgical ICU.    Patient seen, findings and plan discussed with surgical ICU staff.    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 On 3 L O2 at home with SOB and CUELLAR at baseline. He has a trapped left lung s/p multiple thoracenteses and follows with pulmonology here. Now s/p left partial decortication and PleurX catheter placement. Reportedly no intraop issues. Case was done under MAC to avoid intubation, however he grew increasingly hypoxic in PACU, failed BiPAP, and required intubation.        REVIEW OF SYSTEMS: Unable to obtain ROS in this intubated and sedated patient.    PAST MEDICAL HISTORY:    has a past medical history of Blepharitis, CAD (coronary artery disease) (2001), Rcbzb-sculbv-ilyh disease (H), Hyperlipidemia, Hypertension, Hypothyroidism, Obesity, Oxygen dependent, Pulmonary embolism (H) (2/2016), and Varicose veins. He also has no past medical history of Chronic infection, Complication of anesthesia, COPD (chronic obstructive  pulmonary disease) (H), Diabetes (H), Gastroesophageal reflux disease, Heart murmur, Irregular heart beat, Liver disease, Other chronic pain, Renal disease, or Uncomplicated asthma.    SURGICAL HISTORY:    has a past surgical history that includes Arthroscopy knee with medial meniscectomy (6/20/2011); coronary artery stents placed x 5 (2001); appendectomy; Bronchoscopy (rigid or flexible), diagnostic (N/A, 2/6/2019); IR Thoracentesis (11/27/2020); Thoracentesis (Left, 11/27/2020); IR Thoracentesis (12/18/2020); Thoracentesis (Left, 12/18/2020); Thoracentesis (Left, 12/23/2020); IR Thoracentesis (12/23/2020); IR Thoracentesis (1/8/2021); Thoracentesis (Left, 1/8/2021); IR Thoracentesis (3/10/2021); Thoracentesis (Left, 3/10/2021); IR Thoracentesis (4/1/2021); Thoracentesis (Left, 4/1/2021); IR Thoracentesis (4/22/2021); Thoracentesis (Left, 4/22/2021); IR Thoracentesis (5/21/2021); Thoracentesis (N/A, 5/21/2021); IR Thoracentesis (6/11/2021); Thoracentesis (Left, 6/11/2021); IR Thoracentesis (6/30/2021); Thoracentesis (Left, 6/30/2021); IR Thoracentesis (7/15/2021); and Thoracentesis (Left, 7/15/2021).    SOCIAL HISTORY:    reports that he has never smoked. He has quit using smokeless tobacco. He reports current alcohol use. He reports that he does not use drugs.    FAMILY HISTORY: No bleeding/clotting disorders nor problems with anesthesia.    ALLERGIES:      Allergies   Allergen Reactions     Atorvastatin Other (See Comments)     Back pain  Tolerates rosuvastatin     Augmentin [Amoxicillin-Pot Clavulanate] Itching and Rash     Tolerated amoxicillin on its own 7/2020     Lifitegrast      Eyes stinging when using this for about 15 min after use.        MEDICATIONS:  No current facility-administered medications on file prior to encounter.  acetaminophen (TYLENOL) 325 MG tablet, Take 1-2 tablets (325-650 mg) by mouth every 4 hours as needed for mild pain or fever  acyclovir (ZOVIRAX) 800 MG tablet, Take 1 tablet (800  mg) by mouth 2 times daily  amLODIPine (NORVASC) 5 MG tablet, Take 1 tablet (5 mg) by mouth daily  aspirin 81 MG EC tablet, Take 81 mg by mouth daily Reported on 5/12/2017  cycloSPORINE modified (GENERIC EQUIVALENT) 100 MG capsule, Take 1 capsule (100 mg) by mouth 2 times daily  cycloSPORINE modified (GENERIC EQUIVALENT) 25 MG capsule, On HOLD, current dose is 200mg twice daily.  fluconazole (DIFLUCAN) 100 MG tablet, Take 1 tablet (100 mg) by mouth daily (Patient taking differently: Take 100 mg by mouth every evening )  levofloxacin (LEVAQUIN) 250 MG tablet, Take 1 tablet (250 mg) by mouth daily  levothyroxine (SYNTHROID/LEVOTHROID) 150 MCG tablet, Take 1 tablet (150 mcg) by mouth daily  magnesium oxide (MAG-OX) 400 (241.3 Mg) MG tablet, Take 800 mg by mouth every evening   metoprolol tartrate (LOPRESSOR) 25 MG tablet, Take 1 tablet (25 mg) by mouth daily (Patient taking differently: Take 25 mg by mouth every evening )  predniSONE (DELTASONE) 5 MG tablet, Take 1 and 0.5 tablets every day (7.5 mg every day) (Patient taking differently: Take 5 mg by mouth daily )  rosuvastatin (CRESTOR) 5 MG tablet, Take 1 tablet (5 mg) by mouth daily (Patient taking differently: Take 5 mg by mouth every evening )  sulfamethoxazole-trimethoprim (BACTRIM DS) 800-160 MG tablet, Take 1 tablet by mouth 2 times daily On Monday's and Tuesday's only  order for DME, Equipment being ordered: Oxygen. Please provide patient with continuous flow oxygen at 3 LPM via nasal cannula for activity and while sleeping. Patient will need concentrator, conserving device, and portable oxygen.  order for DME, Equipment being ordered: Oxygen. Please provide patient with continuous flow oxygen at 3 LPM via nasal cannula. Patient will need concentrator, conserving device, and portable oxygen. Length of need is up to 6 months; will continue to reassess. Patient will need transportation oxygen delivered to the hospitals, [facility], [address] [place] [address]  [facility] [id number]. [address], Room 5429.  order for DME, Equipment being ordered: Please provide portable oxygen at 2 LPM with activity and with sleep via nasal canula. Patient requesting small tanks he can wear with back pack for his work.        PHYSICAL EXAMINATION:  Temp:  [98.6  F (37  C)-102  F (38.9  C)] 100.8  F (38.2  C)  Pulse:  [107-156] 120  Resp:  [21-49] 28  BP: ()/(59-96) 126/72  MAP:  [53 mmHg-97 mmHg] 75 mmHg  Arterial Line BP: ()/(42-78) 96/63  FiO2 (%):  [70 %-100 %] 100 %  SpO2:  [76 %-99 %] 84 %      Gen: intubated, sedated  HEENT: Atraumatic, normocephalic, ETT in place  Neuro: Sedated  Pulm: mechanically ventilated, overbreathing vent, left chest tubes in place with small amount of bright red blood in pleurevac  CV: tachycardic, regular rate  ABD: soft, nondistended, no grimacing with deep palpation  Skin: Warm, no obvious skin defects      LABS: Reviewed.   Arterial Blood Gases   Recent Labs   Lab 08/04/21  1833   PH 7.23*   PCO2 62*   PO2 58*   HCO3 26     Complete Blood Count   Recent Labs   Lab 08/04/21  1735   WBC 18.3*   HGB 14.7        Basic Metabolic Panel  Recent Labs   Lab 08/04/21  1940 08/04/21  1900 08/04/21  1825 08/04/21  1735 08/04/21  0713   NA  --   --   --  139  --    POTASSIUM  --   --   --  4.0  --    CHLORIDE  --   --   --  107  --    CO2  --   --   --  23  --    BUN  --   --   --  23  --    CR  --  1.08  --  1.06  --    *  --  131* 120* 110*     Liver Function Tests  No lab results found in last 7 days.  Pancreatic Enzymes  No lab results found in last 7 days.  Coagulation Profile  No lab results found in last 7 days.  Lactate  Invalid input(s): LACTATE    IMAGING:  Recent Results (from the past 24 hour(s))   XR Chest Port 1 View    Narrative    Portable chest    INDICATION: Status post decortication, Pleurx catheter and chest tube  placement    COMPARISON: 7/7/2021    FINDINGS: Heart size appears normal. Loculated pleural effusion  appears  slightly decreased. Bubbly lucencies in the left apex appear  more confluently well aerated. There is new left chest wall  subcutaneous emphysema. Left basilar thoracostomy tube. Left mid  thoracostomy tube. Left VATS. Possible loculated pneumothoraces.  Increased patchy opacities in the right lung left costophrenic angle  obscured as well.      Impression    IMPRESSION: Status post left VATS. Decreased effusion/atelectasis with  possible loculated pneumothoraces and multiple left chest tubes. New  left chest wall subcutaneous emphysema. Increased patchy opacities in  the right lung concerning for edema, atelectasis or infection with  probable small right pleural effusion.    YESSENIA POLANCO MD         SYSTEM ID:  [id number]   XR Chest Port 1 View    Narrative    EXAM: XR CHEST PORT 1 VIEW  8/4/2021 3:42 PM     HISTORY:  Increasing SOB, 02 needs, labored breathing       COMPARISON:  Chest radiograph earlier today 8/4/2021    FINDINGS:     Portable AP view of the chest. Status post left VATS. 2 left chest  tubes are seen and appear similar in position to prior x-ray earlier  8/4/2021. Unchanged appearance of loculated left pleural  effusion/atelectasis with possible loculated pneumothoraces and left  chest wall subcutaneous emphysema as compared to prior. Increased  patchy airspace opacities of the right lung compared to prior. Right  costophrenic angle continues to be obscured. Cardiomediastinal  silhouette is partially obscured. No acute osseous abnormality.      Impression    IMPRESSION:    1. Increased pulmonary opacity in the right mid and lower zone  representing pulmonary edema, atelectasis or infection.  2. Persistent small right pleural effusion.  3. Post left VATS. Persistent left pleural effusion/atelectasis with  possible loculated pneumothoraces and unchanged left chest tubes.  4. Persistent left chest wall subcutaneous emphysema        I have personally reviewed the examination and initial  interpretation  and I agree with the findings.    JOY FLORIAN MD         SYSTEM ID:  [id number]   XR Chest Port 1 View    Impression    RESIDENT PRELIMINARY INTERPRETATION  IMPRESSION:   1. Endotracheal tube tip projects over the mid trachea.  2. No significant change in the mixed bilateral opacities, may  represent atelectasis versus infection.  3. Stable left-sided chest tubes with question mild loculated left  hydropneumothorax.   XR Chest Port 1 View    Impression    RESIDENT PRELIMINARY INTERPRETATION  IMPRESSION:  1. Persistent bilateral pleural effusions with associated compressive  atelectasis, left greater than right.  2. Stable slightly increased consolidative airspace opacities,  predominantly in the right lung.   XR Abdomen Port 1 View    Impression    RESIDENT PRELIMINARY INTERPRETATION  IMPRESSION: Feeding tube tip projects over the stomach, sidehole  projects over the GE junction, consider advancing by 3 cm.

## 2021-08-04 NOTE — ANESTHESIA CARE TRANSFER NOTE
Patient: Byron Russo    Procedure(s):  Left Video Assisted Thoracic Surgery, Placement of PleurX, Partial Decortication    Diagnosis: Trapped lung [J98.19]  Diagnosis Additional Information: No value filed.    Anesthesia Type:   MAC     Note:    Oropharynx: spontaneously breathing, oropharynx clear of all foreign objects and CPAP/BIPAP  Level of Consciousness: awake      Independent Airway: airway patency satisfactory and stable  Dentition: dentition unchanged  Vital Signs Stable: post-procedure vital signs reviewed and stable  Report to RN Given: handoff report given  Patient transferred to: PACU  Comments: Pt recovering from his MAC anesthetic in the PACU. Pt on CPAP 10/5 as of now. Pt VSS upon arrival to the PACU. Pt has no c/o pain/N/V. Pt care report given to receiving RN.   Handoff Report: Identifed the Patient, Identified the Reponsible Provider, Reviewed the pertinent medical history, Discussed the surgical course, Reviewed Intra-OP anesthesia mangement and issues during anesthesia, Set expectations for post-procedure period and Allowed opportunity for questions and acknowledgement of understanding      Vitals:  Vitals Value Taken Time   BP     Temp     Pulse     Resp     SpO2         Electronically Signed By: CASEY Pepe CRNA  August 4, 2021  11:23 AM

## 2021-08-04 NOTE — OR NURSING
"Paged Thoracic surgery resident:     \"pt Zac pacu 11, will need transfer order to ICU at this point, still monitoring pt, HR still 140s, sats in the 80s. MDA still watching pt closely. Jazlyn YU. [phone]\"  "

## 2021-08-04 NOTE — ANESTHESIA CARE TRANSFER NOTE
Patient: Byron Russo    * No procedures listed *    Diagnosis: * No pre-op diagnosis entered *  Diagnosis Additional Information: No value filed.    Anesthesia Type:   No value filed.     Note:    Oropharynx: endotracheal tube in place  Level of Consciousness: iatrogenic sedation      Independent Airway: airway patency not satisfactory and stable  Dentition: dentition unchanged  Vital Signs Stable: post-procedure vital signs reviewed and stable  Report to RN Given: handoff report given  Patient transferred to: PACU  Comments: Propofol infusion started, patient fighting the ventilator so 50 mg of rocuronium administered.   Handoff Report: Identifed the Patient, Identified the Reponsible Provider, Reviewed the pertinent medical history, Discussed the surgical course, Reviewed Intra-OP anesthesia mangement and issues during anesthesia, Set expectations for post-procedure period and Allowed opportunity for questions and acknowledgement of understanding      Vitals:  Vitals Value Taken Time   BP     Temp     Pulse 151 08/04/21 1756   Resp     SpO2 91 % 08/04/21 1756   Vitals shown include unvalidated device data.    Electronically Signed By: Carolynn Magaña MD  August 4, 2021  5:56 PM

## 2021-08-04 NOTE — ADDENDUM NOTE
Addendum  created 08/04/21 1327 by David Harrell MD    Actions taken from a BestPractice Advisory, Order list changed

## 2021-08-04 NOTE — ANESTHESIA PROCEDURE NOTES
Airway       Patient location during procedure: PACU       Procedure Start/Stop Times: 8/4/2021 5:30 PM  Staff -        Anesthesiologist:  Harpreet Fischer MD       Resident/Fellow: Carolynn Magaña MD       Performed By: resident  Consent for Airway        Urgency: emergent       Consent: The procedure was performed in an emergent situation.  Report Obtained from Primary Care Team       History regarding most recent potassium obtained: Yes       History regarding presence/absence of renal failure obtained:Yes       History regarding stroke/CVA obtained:Yes       History regarding presence/absence of NM disorder: YesIndications and Patient Condition       Indications for airway management: respiratory insufficiency       Mallampati: Not Assessed     Induction type:RSI       Mask difficulty assessment: 0 - not attempted    Final Airway Details       Final airway type: endotracheal airway       Successful airway: ETT - single  Endotracheal Airway Details        ETT size (mm): 8.0       Cuffed: yes       Successful intubation technique: video laryngoscopy       VL Blade Size: MAC 4       Grade View of Cords: 2       Adjucts: stylet       Position: Center       Measured from: gums/teeth       Secured at (cm): 23    Post intubation assessment        ETT secured, Vent settings by primary/ICU team, Primary/ICU team to review CXR, Sedation to be ordered by primary/ICU team and No apparent complications       Placement verified by: capnometry, equal breath sounds and chest rise        Number of attempts at approach: 1       Number of other approaches attempted: 0       Secured with: commercial tube tom       Ease of procedure: easy       Dentition: Intact and Unchanged    Medication(s) Administered   propofol (DIPRIVAN) injection 10 mg/mL vial, 50 mg  succinylcholine (ANECTINE) 20 mg/mL injection, 100 mg  rocuronium (ZEMURON) 10 mg/mL injection, 50 mg  Medication Administration Time: 8/4/2021 5:35  PM

## 2021-08-04 NOTE — BRIEF OP NOTE
[facility]    Brief Operative Note    Pre-operative diagnosis: Trapped lung [J98.19]  Post-operative diagnosis Same as pre-operative diagnosis    Procedure: Procedure(s):  Left Video Assisted Thoracic Surgery, Placement of PleurX, Partial Decortication  Surgeon: Surgeon(s) and Role:     * Lobo Reynolds MD - Primary     * Casper Paige MD - Resident - Assisting  Anesthesia: General   Estimated blood loss: 20 mL  Drains: Left PleurX catheter, left 24Fr chest tube  Specimens:   ID Type Source Tests Collected by Time Destination   A : Pleural Peel Tissue Pleural Cavity, Left SURGICAL PATHOLOGY EXAM Lobo Reynolds MD 8/4/2021 10:09 AM      Findings:   see operative report.  Complications: None.  Implants: * No implants in log *

## 2021-08-05 NOTE — PLAN OF CARE
Major Shift Events: Sedated and unresponsive, RASS goal -4 to -5, propofol/fentanyl/versed gtt, weaning versed. Pupils 2mm- sluggish/round. Vecuronium bolus given overnight. SR-ST  bpm, hypotensive on levo and titrating down, vaso stopped. T-max 100.6, hypothermia via esophageal probe, bear hugger initiated. Intubated, %/30RR/350TV/PEEP8, high PIP 30-40. Blood gas and SPO2 improving. Veletri @ 20. OGT for meds, hypoactive bowels. Desai with 10-75 ml/hr, cloudy/yellow. New left chest tube drsg, to waterseal, 30 ml serosang output. PleurX drsg CDI. New right IJ. R internal jugular infusing levo, propofol, fentanyl and versed. 2x right PIVs, TKO for int abx- zosyn/vanco/levofloxacin. L art line. Chest XR to verify central line placement. Blood cultures, UA/UC sent. Pt is now DNR per MD update with wife. Code status ordered and wrist band updated.    Plan: Continue to monitor RASS, wean pressors/sedation and monitor O2 status- blood gas q6h. For vital signs and complete assessments, please see documentation flowsheets.

## 2021-08-05 NOTE — PHARMACY-CONSULT NOTE
Pharmacy Tube Feeding Consult    Medication reviewed for administration by feeding tube and for potential food/drug interactions.    Recommendation: No changes needed at this time.    Pharmacy will continue to follow as new medications are ordered.    Denisha Leon, PharmD Resident

## 2021-08-05 NOTE — PHARMACY-ADMISSION MEDICATION HISTORY
Admission Medication History Completed by Pharmacy    See [facility] Admission Navigator for allergy information, preferred outpatient pharmacy, prior to admission medications and immunization status.     Medication history completed in pre-operative assessment center on 7/30/21, see note from 8/2/21 for details.     Medication history completed by: Erik Calvillo RP

## 2021-08-05 NOTE — ANESTHESIA POSTPROCEDURE EVALUATION
Patient: Byron Russo    Procedure(s):  Left Video Assisted Thoracic Surgery, Placement of PleurX, Partial Decortication    Diagnosis:Trapped lung [J98.19]  Diagnosis Additional Information: No value filed.    Anesthesia Type:  MAC    Note:  Disposition: ICU; Admission            ICU Sign Out: Anesthesiologist/ICU physician sign out WAS performed   Postop Pain Control: Uneventful            Sign Out: Well controlled pain   PONV: No   Neuro/Psych: Uneventful            Sign Out: Acceptable/Baseline neuro status   Airway/Respiratory:             Events: PACU Reintubation            Sign Out: AIRWAY IN SITU/Resp. Support               Airway in situ/Resp. Support: ETT                 Reason: Unplanned   CV/Hemodynamics:    Other NRE: NONE   DID A NON-ROUTINE EVENT OCCUR? YES    Event details/Postop Comments:  Patient was being held in PACU for lack of bed availability on the floor.  After several hours he began to complain of pain.  He was given a small dose of fentanyl which relieved his pain however, once asleep his blood pressure lowered to the mid 80's systolic.  He had only received 500cc of LR for the entire day and the decision was made to bolus him 250cc over 30 minutes.  His pressures improved and continued to express that he was feeling well.  Approximately two hours later he sat up to urinate (in urinal) and suddenly began to feel unwell.  His breathing worsened and he required higher settings on his BiPaP (which was being weaned).  His respiratory rate increased and his oxygen saturations fell to the low 80's.  A chest xray was ordered which showed some pulmonary edema on the right.  The thoracic team was immediately paged and after a discussion with Dr. Reynolds it was decided that he would be diureses and furosemide was given.  He diuresed well and stated that he began to feel somewhat better however his heart rate continued to be high (in the 140's-sinus) and oxygen levels low.  An arterial line was  placed and his pO2 was low and the decision was made to intubate after a discussion with the patient (who was lucid, A&Ox3), the ICU and the thoracic team.  Uneventful intubation.  Full report given to ICU attending.           Last vitals:  Vitals Value Taken Time   /72 08/04/21 1715   Temp 38.9  C (102  F) 08/04/21 1800   Pulse 152 08/04/21 1800   Resp 21 08/04/21 1800   SpO2 91 % 08/04/21 1800       Electronically Signed By: David Harrell MD  August 5, 2021  9:33 AM

## 2021-08-05 NOTE — PROCEDURES
Small Bowel Feeding Tube Placement Assessment  Reason for Feeding Tube Placement: request fromt eam for ppFT  Cortrak Start Time: 10:45   Cortrak End Time: 11:30  Medicine Delivered During Procedure: lidocaine  Placement Successful: Presume post-pyloric (pending AXR confirmation).    Procedure Complications: none  Final Placement Anibal at exit of nare: 105 cm  Face to Face time with patient:45 min     Bridle Placement:   Reason for bridle placement: securement of ppFT   Medicine delivered during procedure: lubricating jelly  Procedure: Successful  Location of top of clip on FT: @ 106 cm marker   Condition of nose/skin at time of bridle placement: Unremarkable   Face to Face time with patient:<5 minutes.    Era Min, OSKAR, MS, LD  SICU: [id number]

## 2021-08-05 NOTE — PROGRESS NOTES
Brief SICU progress note    Updated wife regarding worsening respiratory status, central line placement, and suspected septic shock requiring pressors. All questions were answered and she verbalized understanding of his condition at this point in time. She does request DNR status at this time. With further discussion into what this means, she agreed that he would want DNR status at this time meaning no compressions or shocks. She consents to his current cares and agrees to continue to escalate medical cares as necessary aside from CPR.     DNR order placed.    Gris Castaneda DO  General Surgery PGY2

## 2021-08-05 NOTE — PROGRESS NOTES
SURGICAL ICU PROGRESS NOTE  August 5, 2021      ASSESSMENT: 59 yo male with h/o BMT c/b GVHD, pleural parenchymal fibroelastosis, PAH, CAD, PE, trapped left lung and recurrent pleural effusions, now s/p VATS partial left decortication and pleurex placement on 8/4/21 who was hypoxic in the PACU, failed BiPAP, requiring mechanical ventilation and appeared to be in septic shock with hypotension, fever, hypoxia, tachypnea.     TODAY'S PROGRESS/PLANS:   -Wean pressors as able  -Advance OJ to postpyloric position and start trickle feeds  -Pulmonary consult  -MRSA Swab  -left CT to suction  -q6 ABGs  - continue infectious work up including fungal  -stop steroids until fungitell negative  -discontinue levofloxacin      PLAN:   Neuro/ pain/ sedation:  Paralyzed with vec 10 mg and deep sedation started overnight with improved vent synchrony  -Monitor neurological status. Notify the MD for any acute changes in exam.  -Pain: fentantyl gtt at 100 mcg/hr for pain.   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 On 3 L O2 at home. PFTs Oct 2020: FEV1 1.03L (27%), DLCO 52%.  - Improved vent synchrony overnight with deep sedation and paralysis, respiratory acidosis now resolved   - Pulm consult, recs appreciated. Per discussion with their team, unlikely to be PPFE which usually does not present with exacerbation. More likely pneumonia with underlying capillaritis 2/2 GVHD.       - Start fungal work-up       - hold steroids until fungal work up negative       - Consider bronch if vent settings improved in coming days  - remains mechanically ventilated: AC 20 / 10 / 350 / 100%   -Lowered PEEP due to restriction   -Placed CT to suction  -Veletri 20  -Last blood gas  (1008) 7.36, 42, 76, 24 better than previous  -s/p hydrocortisone 125 mg x1  -L chest tube to -20 mmHg suction, 210 ml out since placement        Cardiovascular:    #Pulmonary artery hypertension  #Hypotension  -Tachycardic to 150s post-op, resolved overnight, now 120s with MAPs in 60s  -Pressor requirement improving, weaned off vasopressin, remains on 0.12 levo  -Last echo Jan 2020: EF 55-60%, RV pressure 27 mmHg above RA, no significant valvular abnormalities  -Repeat echo today for right heart strain 2/2 pulm hypertension  - israeli neb       GI care:   -NPO  -OG tube advanced, now post-pyloric  -start TF, goal rate 50 ml/h  -Protonix qd     Fluids/ Electrolytes/ Nutrition:   -hold IVF  -ICU electrolyte replacement protocol  -No indication for parenteral nutrition  -Start TF via OJT      Renal/ Fluid Balance:    Adequate UOP, 1800 ml/24 h, net +1 L since admission  -holding Lasix will re-evaluate this afternoon  -Will continue to monitor intake and output.  -Desai in place      Endocrine:    #Stress and steroid induced hyperglycemia  - -160, sliding scale insulin available (8 units)  - hydrocortisone 125 mg x1  - hold steroids   - pta levothyroxine      ID/ Antibiotics:  #Septic Shock  -Fever Curve Tmax 102 at 1800, 94.8 0400  (96.6)  -Immunosuppressed with PTA cyclosporine and prednisone   -WBC trend peak 22.2, currently 19.7 (baseline 16)   -Wife reports that he had been ill before procedure  -Blood, sputum, pleural fluid, and urine cx pending  -Fungal work-up added per pulmonology: fungitel, urine histoplasma antigen, and fungal cx as above   -Potential for an empyema (last CT 15) if can not get source consider CT  -Vanc/zosyn (8/4 - )  -Stopped levaquin        Heme:     #Myelodysplastic syndrome s/p BMT 2016  -BMT consult, recs appreciated  -HgB stable      Prophylaxis:    -DVT PPX: Lovonox, SCDs      MSK:    -PT and OT consulted. Appreciate recs.      Lines/ tubes/ drains:  -PIV x2, Left chest tubes x  2 (15.5 Fr and 24 Fr), arterial line, ETT, frank. OJ      Disposition:  -Surgical ICU      =========================================    Patient seen, findings and plan discussed with surgical ICU staff    Surendra Blanc  Medical Student    ____________________________________  I saw and examined the patient with the medical student and agree with the findings as documented. I have edited the note to reflect my findings and plan where necessary.    Sam Beltrán MD  Surgery resident     ====================================    SUBJECTIVE:   Admitted last night following partial left decortication of lung for trapped lung. Case was done under mac to avoid intubation, however patient became hypoxic in PACU eventually requiring mechanical ventilation. Some concern about possible aspiration intra-op by anesthesia. Febrile, hypotensive with MAPs 40-50s, febrile to 102F. Started on levophed and vasopressin. Improved oxygenation and ventilation with deep sedation and paralyzed with vec. Vasopressin since weaned off. Continuing infectious work up and adding fungal work up today.      OBJECTIVE:   1. VITAL SIGNS:   Temp:  [94.8  F (34.9  C)-102  F (38.9  C)] 95.7  F (35.4  C)  Pulse:  [] 82  Resp:  [21-49] 30  BP: ()/(59-96) 126/72  MAP:  [53 mmHg-97 mmHg] 83 mmHg  Arterial Line BP: ()/(42-78) 99/69  FiO2 (%):  [70 %-100 %] 100 %  SpO2:  [76 %-99 %] 95 %  Ventilation Mode: CMV/AC  (Continuous Mandatory Ventilation/ Assist Control)  FiO2 (%): 100 %  Rate Set (breaths/minute): (S) 30 breaths/min  Tidal Volume Set (mL): 350 mL  PEEP (cm H2O): 8 cmH2O  Oxygen Concentration (%): 100 %  Resp: 30      2. INTAKE/ OUTPUT:   I/O last 3 completed shifts:  In: 1190.32 [I.V.:850.32; NG/GT:90]  Out: 1165 [Urine:1000; Blood:20; Chest Tube:145]    3. PHYSICAL EXAMINATION:   General: intubates and sedated lying in bed  Neuro: sedated  Resp: mechanically ventilated, AC  CV: sinus tachycardia on monitor  Abdomen: Soft,  Non-distended, no grimacing with deep palpation  Extremities: cool, normal cap refill      4. INVESTIGATIONS:   Arterial Blood Gases   Recent Labs   Lab 08/05/21  0420 08/05/21  0118 08/04/21 2120 08/04/21  1833   PH 7.36 7.34* 7.33* 7.23*   PCO2 35 37 45 62*   PO2 62* 75* 49* 58*   HCO3 20* 20* 24 26     Complete Blood Count   Recent Labs   Lab 08/05/21  0340 08/04/21 2128 08/04/21  1735   WBC 19.7* 22.2* 18.3*   HGB 14.1 14.8 14.7    286 299     Basic Metabolic Panel  Recent Labs   Lab 08/05/21  0345 08/05/21  0340 08/05/21  0118 08/05/21  0034 08/04/21 2129 08/04/21  1900 08/04/21  1735   NA  --  138 140  --  138  --  139   POTASSIUM  --  4.0 4.5  --  5.0  --  4.0   CHLORIDE  --  108 109  --  109  --  107   CO2  --  20 20  --  21  --  23   BUN  --  28 27  --  25  --  23   CR  --  1.23 1.31*  --  1.23 1.08 1.06   * 217* 214* 213* 146*  --  120*     Liver Function Tests  Recent Labs   Lab 08/05/21  0340   AST 24   ALT 16   ALKPHOS 114   BILITOTAL 1.3   ALBUMIN 2.4*     Pancreatic Enzymes  No lab results found in last 7 days.  Coagulation Profile  No lab results found in last 7 days.  Lactate  Invalid input(s): LACTATE    5. RADIOLOGY:   Recent Results (from the past 24 hour(s))   XR Chest Port 1 View    Narrative    Portable chest    INDICATION: Status post decortication, Pleurx catheter and chest tube  placement    COMPARISON: 7/7/2021    FINDINGS: Heart size appears normal. Loculated pleural effusion  appears slightly decreased. Bubbly lucencies in the left apex appear  more confluently well aerated. There is new left chest wall  subcutaneous emphysema. Left basilar thoracostomy tube. Left mid  thoracostomy tube. Left VATS. Possible loculated pneumothoraces.  Increased patchy opacities in the right lung left costophrenic angle  obscured as well.      Impression    IMPRESSION: Status post left VATS. Decreased effusion/atelectasis with  possible loculated pneumothoraces and multiple left chest  tubes. New  left chest wall subcutaneous emphysema. Increased patchy opacities in  the right lung concerning for edema, atelectasis or infection with  probable small right pleural effusion.    YESSENIA POLANCO MD         SYSTEM ID:  [id number]   XR Chest Port 1 View    Narrative    EXAM: XR CHEST PORT 1 VIEW  8/4/2021 3:42 PM     HISTORY:  Increasing SOB, 02 needs, labored breathing       COMPARISON:  Chest radiograph earlier today 8/4/2021    FINDINGS:     Portable AP view of the chest. Status post left VATS. 2 left chest  tubes are seen and appear similar in position to prior x-ray earlier  8/4/2021. Unchanged appearance of loculated left pleural  effusion/atelectasis with possible loculated pneumothoraces and left  chest wall subcutaneous emphysema as compared to prior. Increased  patchy airspace opacities of the right lung compared to prior. Right  costophrenic angle continues to be obscured. Cardiomediastinal  silhouette is partially obscured. No acute osseous abnormality.      Impression    IMPRESSION:    1. Increased pulmonary opacity in the right mid and lower zone  representing pulmonary edema, atelectasis or infection.  2. Persistent small right pleural effusion.  3. Post left VATS. Persistent left pleural effusion/atelectasis with  possible loculated pneumothoraces and unchanged left chest tubes.  4. Persistent left chest wall subcutaneous emphysema        I have personally reviewed the examination and initial interpretation  and I agree with the findings.    JOY FLORIAN MD         SYSTEM ID:  [id number]   XR Chest Port 1 View    Narrative    EXAMINATION:  XR CHEST PORT 1 VIEW 8/4/2021 6:03 PM.    COMPARISON: 8/4/2021.    HISTORY:  Endotracheal tube positioning    FINDINGS: Portable AP view of the chest. Endotracheal tube tip  projects over mid thoracic trachea. Stable left basilar and apical  chest tubes. Midline trachea. Cardiomediastinal silhouette is  obscured. Unchanged patchy bilateral perihilar  and bibasilar airspace  opacities. Stable loculated left pleural effusion with question of  hydropneumothorax component. No definite right apical pneumothorax.  Osseous structures are stable.      Impression    IMPRESSION:   1. Endotracheal tube tip projects over the mid trachea.  2. No significant change in the mixed bilateral opacities, may  represent atelectasis versus infection.  3. Stable left-sided chest tubes with question mild loculated left  hydropneumothorax.    I have personally reviewed the examination and initial interpretation  and I agree with the findings.    AUSTEN CALDERA MD         SYSTEM ID:  [id number]   XR Chest Port 1 View    Narrative    XR CHEST PORT 1 VIEW  8/4/2021 7:09 PM      HISTORY: desatting    COMPARISON: Same day    FINDINGS: AP chest radiograph. Endotracheal tube tip projects over the  midthoracic trachea. Feeding tube courses below the diaphragm. Cardiac  silhouette is obscured. Bilateral pleural effusions with associated  compressive atelectasis, left greater than right. Left large bore  chest tube. Stable slightly increased consolidative airspace  opacities.      Impression    IMPRESSION:  1. Persistent bilateral pleural effusions with associated compressive  atelectasis, left greater than right.  2. Stable slightly increased consolidative airspace opacities,  predominantly in the right lung.    I have personally reviewed the examination and initial interpretation  and I agree with the findings.    AUSTEN CALDERA MD         SYSTEM ID:  [id number]   XR Abdomen Port 1 View    Narrative    XR ABDOMEN PORT 1 VIEWS  8/4/2021 7:12 PM      HISTORY: OGT    COMPARISON: None available    FINDINGS: Frontal abdominal radiograph. Gastric tube tip and sidehole  projecting over the stomach. No obstructive bowel gas pattern.      Impression    IMPRESSION: Feeding tube tip projects over the stomach, sidehole  projects over the GE junction, consider advancing by 3 cm.    I have personally reviewed the  examination and initial interpretation  and I agree with the findings.    AUSTEN CALDERA MD         SYSTEM ID:  [id number]   XR Chest Port 1 View    Narrative    XR CHEST PORT 1 VIEW  8/4/2021 8:31 PM      HISTORY: IJ insertion    COMPARISON: Same day    FINDINGS: AP chest radiograph. Endotracheal tube tip is over the  midthoracic trachea. Gastric tube courses below the field-of-view.  Right IJ CVC tip projects over the right atrium. Otherwise stable  exam.      Impression    IMPRESSION: Right IJ CVC tip projects over the right atrium.    I have personally reviewed the examination and initial interpretation  and I agree with the findings.    AUSTEN CALDERA MD         SYSTEM ID:  [id number]

## 2021-08-05 NOTE — PLAN OF CARE
OT 4AB: Cancel - OT consult received, pt not medically appropriate for therapies due to tenuous respiratory status. Will reschedule.

## 2021-08-05 NOTE — PROGRESS NOTES
Thoracic Surgery Progress Note  08/05/2021       Subjective:  POD 1 from partial decortication, pleurx catheter and chest tube placement. Post-operatively noted to have worsening respiratory status requiring 100% FiO2 on BiPAP with saturation down as low as 70s. Also with significant tachypnea to 50. Attempt at diuresis based on CXR without improvement. Ultimately required intubation. In ICU, noted to be hypotensive requiring large amounts of vasopressors. Also stress dose steroids, broad spectrum antibiotics and Flolan.     Currently has made improvements with weaning off pressors. Still with significant oxygen requirements and concern for sepsis with fever and hypothermia. After discussion with wife yesterday, states that patient has not been doing well since past weekend, she noticed new cough and more shortness of breath than usual.     Objective:  Temp:  [94.8  F (34.9  C)-102  F (38.9  C)] 97.2  F (36.2  C)  Pulse:  [] 114  Resp:  [21-49] 30  BP: ()/(59-96) 126/72  MAP:  [53 mmHg-97 mmHg] 65 mmHg  Arterial Line BP: ()/(42-78) 81/55  FiO2 (%):  [70 %-100 %] 100 %  SpO2:  [76 %-99 %] 92 %    I/O last 3 completed shifts:  In: 2512.97 [I.V.:2112.97; NG/GT:150]  Out: 1620 [Urine:1430; Blood:20; Chest Tube:170]     Vasopressin off  Levo 0.07  Flolan 20  Fentanyl 150  Versed 4  Propofol 30    UOP 1.0L, 340 mL   mL, 25 mL    Gen: Intubated, sedated  Resp: mechanically ventilated CMV 30/350/8/100  Chest: Pleurx dressing intact, left chest tube with minimal serosanguinous output  Abd: soft, nondistended  Incision: c/d/I  Ext: WWP, no edema     Labs:  Recent Labs   Lab 08/05/21  0340 08/04/21 2128 08/04/21  1735   WBC 19.7* 22.2* 18.3*   HGB 14.1 14.8 14.7    286 299       Recent Labs   Lab 08/05/21  0345 08/05/21  0340 08/05/21  0118 08/04/21 2129   NA  --  138 140 138   POTASSIUM  --  4.0 4.5 5.0   CHLORIDE  --  108 109 109   CO2  --  20 20 21   BUN  --  28 27 25   CR  --  1.23 1.31*  1.23   * 217* 214* 146*   TRACE  --  8.1* 8.1* 8.0*   MAG  --  2.0  --  1.7   PHOS  --  2.9  --  4.8*       Imaging:  CXR - bilateral opacities     Assessment/Plan:   60 year old male with 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 Reynolds on 8/4. Post-operatively with worsening respiratory status ultimately requiring intubation. Overnight with worsening hypotension due to presumed septic shock on multiple vasopressors. Has been weaning but still requiring significant respiratory support.     - Appreciate SICU care  - Sedation/pain per ICU  - Wean ventilator as able  - Consider further diuresis  - At this time, ventilator requirements to high for bronchoscopy  - Recommend pulmonology consult given history of PPFE  - Continue left chest tube to suction  - Pleurx catheter dressing to remain in place  - BMT consulted 8/4 for assistance with management, appreciate recs  - Agree with stress dose steroids  - Agree with broad spectrum antibiotics  - Awaiting echocardiogram    Seen, examined, and discussed with chief resident, who will discuss with staff.  - - - - - - - - - - - - - - - - - -  Casper Paige MD  PGY-3, General Surgery

## 2021-08-05 NOTE — PROGRESS NOTES
Admitted/transferred from: PACU  Reason for admission/transfer: Intubated, hypotensive, desaturation   Patient status upon admission/transfer, interventions, plan: unstable, see continued note  Interventions: see continued note  2 RN skin assessment: completed by Brittany HILL RN and Cat CH RN  Result of skin assessment and interventions/actions: Preventative Mepi  Height, weight, drug calc weight: done  Patient belongings: Not transferred with patient   MDRO education (if applicable): NA    Arrived from PACU: -150's, intubated on % P10 with RR 30's desaturating to 80s-70s.     N: Sedated on propofol, fentanyl gtt + one time dose ordered and given. Opens eyes to stimulation and localizing to ETT  C: ST up to 150's, one time metoprolol resulting in HR down to 120's. Hypotensive with sedation, levophed and vaso ordered and started. Temp 102 axillary - ice packs applied.   R: % P 10, PIPs 40s. RR 20-40's. Chest xray completed. Desaturating to 70's, ABG sent with critical results. Veletri ordered. Lung sounds diminished throughout. Chest tube to suction. PleurX catheter under dressing - CDI.    GI: OGT, xray completed - not yet verified. Abdomen rounded but soft  : Desai in place. One time lasix ordered and given.   ACCESS: Toney - MD aware, central line to be placed. Arterial line.    SICU staff at bedside, continuing to direct care at time of handoff.

## 2021-08-05 NOTE — CONSULTS
[facility]   Pulmonary   Consult Note  Byron Russo MRN: [id number]  1960  Date of Admission:8/4/2021    We were consulted for evaluation of PPFE and respiratory failure .     Assessment & Plan      Pleuralparanchemal Fibroelastosis   Acute on Chronic hypoxic respiratory failure   S/P VATS with Pleurx and decortication   Severe restrictive lung disease with moderate diffusion impairment      Discussion:   60 year old male with PMHx most significant for MDS with history of BMT complicated by slixl-loptiu-gptm disease, pleural parenchymal fibroelastosis, on 4 L of oxygen, pulmonary hypertension, coronary disease history of PE, trapped lung with recurrent pleural effusions now status post VATS without lung decortication with Pleurx catheter placement complicated by postoperative hypoxia requiring intubation    Unclear etiology of postoperative acute respiratory failure in addition to vasopressor dependent hypotension.  Do not think that his known PPFE would have acutely exacerbated, this disease process is usually not responsive to steroids and is a chronic slowly progressive disorder rarely seen and post bone marrow transplant patients.  His history of serositis with recurrent pleural effusions is of unclear etiology, likely some component due to his trapped lung.  This does raise concern for an inflammatory process including capillary leak syndrome which can be rarely seen in bone marrow transplant patients, high dose glucocorticoids can be used to treat this.  Additionally he is immunosuppressed on chronic cyclosporine given his acute decompensation with fever and elevated white blood cell count septic shock is definitely likely and probably most likely.  He was also demonstrated some mild improvement with current antibiotic and stress dose steroid therapies.  Would complete infectious work-up and consider antifungal therapy      Recommendations:    -Do not think his chronic BPAP is etiology of  his acute respiratory failure   -Agree with broad-spectrum and biotics and recommend fungal work-up including blastomycosis antigen histoplasmosis antigen, galactomannan and Fungitell    Consider empiric fungal therapy    Consider bronchoscopy if oxygenation requirements are improved   -If infectious work-up is unrevealing may consider high-dose glucocorticoids currently on moderate dose with stress dose hydrocortisone      We will continue to follow     Patient seen & discussed w/  Dr. Donald M.D., who is in agreement.     Gentry Munoz MD   Pulmonary Fellow   Pager #[phone]           History of Present Illness:   60 year old male with PMHx most significant for MDS with history of BMT complicated by canru-ansxos-yreb disease, pleural parenchymal fibroelastosis, pulmonary hypertension, coronary disease history of PE, trapped lung with recurrent pleural effusions now status post VATS without lung decortication with Pleurx catheter placement complicated by postoperative hypoxia requiring intubation            Review of Symptoms:   10-point ROS reviewed, & found negative w/ exceptions noted in the HPI.          Past Medical History:     Past Medical History:   Diagnosis Date     Blepharitis      CAD (coronary artery disease) 2001     Smbui-fzmdja-kjim disease (H)      Hyperlipidemia      Hypertension     Controled by medication     Hypothyroidism      Obesity      Oxygen dependent     Indogen portable      Pulmonary embolism (H) 2/2016     Varicose veins        Past Surgical History:   Procedure Laterality Date     APPENDECTOMY       ARTHROSCOPY KNEE WITH MEDIAL MENISCECTOMY  6/20/2011    Procedure:ARTHROSCOPY KNEE WITH MEDIAL MENISCECTOMY; Surgeon:SACHIN SAMANO; Location:[facility]     BRONCHOSCOPY (RIGID OR FLEXIBLE), DIAGNOSTIC N/A 2/6/2019    Procedure: COMBINED BRONCHOSCOPY (RIGID OR FLEXIBLE), LAVAGE;  Surgeon: Louise Rogel MD;  Location: [facility]     coronary artery stents placed x 5  2001     IR  THORACENTESIS  11/27/2020     IR THORACENTESIS  12/18/2020     IR THORACENTESIS  12/23/2020     IR THORACENTESIS  1/8/2021     IR THORACENTESIS  3/10/2021     IR THORACENTESIS  4/1/2021     IR THORACENTESIS  4/22/2021     IR THORACENTESIS  5/21/2021     IR THORACENTESIS  6/11/2021     IR THORACENTESIS  6/30/2021     IR THORACENTESIS  7/15/2021     THORACENTESIS Left 11/27/2020    Procedure: THORACENTESIS;  Surgeon: Fred Hahn MD;  Location: [facility] OR     THORACENTESIS Left 12/18/2020    Procedure: THORACENTESIS @1000;  Surgeon: Nasir Greene MD;  Location: [facility] OR     THORACENTESIS Left 12/23/2020    Procedure: THORACENTESIS;  Surgeon: Dary Wilkinson MD;  Location: [facility] OR     THORACENTESIS Left 1/8/2021    Procedure: THORACENTESIS;  Surgeon: Carl Singh PA-C;  Location: [facility] OR     THORACENTESIS Left 3/10/2021    Procedure: THORACENTESIS;  Surgeon: Dary Wilkinson MD;  Location: [facility] OR     THORACENTESIS Left 4/1/2021    Procedure: THORACENTESIS @1000;  Surgeon: Mikal Lema MD;  Location: [facility] OR     THORACENTESIS Left 4/22/2021    Procedure: THORACENTESIS;  Surgeon: Vladimir Mercado PA-C;  Location: [facility] OR     THORACENTESIS N/A 5/21/2021    Procedure: THORACENTESIS;  Surgeon: Aquiles Powers PA-C;  Location: [facility] OR     THORACENTESIS Left 6/11/2021    Procedure: THORACENTESIS LEFT;  Surgeon: Mikal Lema MD;  Location: [facility]     THORACENTESIS Left 6/30/2021    Procedure: THORACENTESIS;  Surgeon: Mikal Lema MD;  Location: [facility]     THORACENTESIS Left 7/15/2021    Procedure: THORACENTESIS LEFT;  Surgeon: Mikal Lema MD;  Location: [facility] OR     THORACOSCOPIC INSERTION DRAINAGE CATHETER Left 8/4/2021    Procedure: Left Video Assisted Thoracic Surgery, Placement of PleurX, Partial Decortication;  Surgeon: Lobo Reynolds MD;  Location: [facility]            Allergies:     Allergies   Allergen Reactions     Atorvastatin Other (See Comments)      Back pain  Tolerates rosuvastatin     Augmentin [Amoxicillin-Pot Clavulanate] Itching and Rash     Tolerated amoxicillin on its own 7/2020     Lifitegrast      Eyes stinging when using this for about 15 min after use.              Outpatient Medications:     No current facility-administered medications on file prior to encounter.  acetaminophen (TYLENOL) 325 MG tablet, Take 1-2 tablets (325-650 mg) by mouth every 4 hours as needed for mild pain or fever  acyclovir (ZOVIRAX) 800 MG tablet, Take 1 tablet (800 mg) by mouth 2 times daily  amLODIPine (NORVASC) 5 MG tablet, Take 1 tablet (5 mg) by mouth daily  aspirin 81 MG EC tablet, Take 81 mg by mouth daily Reported on 5/12/2017  cycloSPORINE modified (GENERIC EQUIVALENT) 100 MG capsule, Take 1 capsule (100 mg) by mouth 2 times daily  cycloSPORINE modified (GENERIC EQUIVALENT) 25 MG capsule, On HOLD, current dose is 200mg twice daily.  fluconazole (DIFLUCAN) 100 MG tablet, Take 1 tablet (100 mg) by mouth daily (Patient taking differently: Take 100 mg by mouth every evening )  levofloxacin (LEVAQUIN) 250 MG tablet, Take 1 tablet (250 mg) by mouth daily  levothyroxine (SYNTHROID/LEVOTHROID) 150 MCG tablet, Take 1 tablet (150 mcg) by mouth daily  magnesium oxide (MAG-OX) 400 (241.3 Mg) MG tablet, Take 800 mg by mouth every evening   metoprolol tartrate (LOPRESSOR) 25 MG tablet, Take 1 tablet (25 mg) by mouth daily (Patient taking differently: Take 25 mg by mouth every evening )  predniSONE (DELTASONE) 5 MG tablet, Take 1 and 0.5 tablets every day (7.5 mg every day) (Patient taking differently: Take 5 mg by mouth daily )  rosuvastatin (CRESTOR) 5 MG tablet, Take 1 tablet (5 mg) by mouth daily (Patient taking differently: Take 5 mg by mouth every evening )  sulfamethoxazole-trimethoprim (BACTRIM DS) 800-160 MG tablet, Take 1 tablet by mouth 2 times daily On Monday's and Tuesday's only  order for DME, Equipment being ordered: Oxygen. Please provide patient with continuous  "flow oxygen at 3 LPM via nasal cannula for activity and while sleeping. Patient will need concentrator, conserving device, and portable oxygen.  order for DME, Equipment being ordered: Oxygen. Please provide patient with continuous flow oxygen at 3 LPM via nasal cannula. Patient will need concentrator, conserving device, and portable oxygen. Length of need is up to 6 months; will continue to reassess. Patient will need transportation oxygen delivered to the [facility], [address] [place] [id number]. [address], Room 5429.  order for DME, Equipment being ordered: Please provide portable oxygen at 2 LPM with activity and with sleep via nasal canula. Patient requesting small tanks he can wear with back pack for his work.              Family History:     Family History   Problem Relation Age of Onset     Myocardial Infarction Father 58     Coronary Artery Disease Father      Heart Failure Mother      Glaucoma Mother      Coronary Artery Disease Brother      Coronary Artery Disease Brother      Cancer Brother         GIST     Skin Cancer No family hx of      Melanoma No family hx of      Macular Degeneration No family hx of                Social History:     Social History     Tobacco Use     Smoking status: Never Smoker     Smokeless tobacco: Former User   Substance Use Topics     Alcohol use: Yes     Alcohol/week: 0.0 standard drinks     Comment: Rare     Drug use: No             Physical Exam:   /68   Pulse (!) 121   Temp 97.5  F (36.4  C) (Esophageal)   Resp 30   Ht 1.816 m (5' 11.5\")   Wt 101.8 kg (224 lb 6.9 oz)   SpO2 93%   BMI 30.87 kg/m      Intubated deeply sedated  Cardiac: Regular rate  Pulmonary minimal lung sounds on left right is clear without wheeze  Abdomen is obese  Extremities without edema          Data:   Labs (all laboratory studies reviewed by me):     Lab Results   Component Value Date    WBC 17.3 (H) 08/05/2021    HGB 13.9 08/05/2021    HCT 40.9 08/05/2021 "     08/05/2021     08/05/2021    POTASSIUM 3.7 08/05/2021    CHLORIDE 110 (H) 08/05/2021    CO2 24 08/05/2021    BUN 31 (H) 08/05/2021    CR 1.18 08/05/2021     (H) 08/05/2021    SED 66 (H) 02/06/2019    NTBNPI 642 08/04/2021    AST 24 08/05/2021    ALT 16 08/05/2021    ALKPHOS 114 08/05/2021    BILITOTAL 1.3 08/05/2021    INR 1.12 07/15/2021         Imaging (all imaging studies reviewed by me):  Chest x-ray reviewed    ECHO and PFTs     Left ventricular size, and wall motion are normal. The function is  hyperdynamic with an estimated ejection fraction is > 65%.     Diastolic Doppler findings (E/E' ratio and/or other parameters) suggest left  ventricular filling pressures are increased.     Global right ventricular function is mildly to moderately reduced while there  is presevered wall motion and size.     Right ventricular systolic pressure is 26.7mmHg above the right atrial  pressure which was not estimated due to the patient being on a ventilator.     10/2020

## 2021-08-05 NOTE — PHARMACY-VANCOMYCIN DOSING SERVICE
"Pharmacy Vancomycin Initial Note  Date of Service 2021  Patient's  1960  60 year old, male    Indication: Sepsis    Current estimated CrCl = Estimated Creatinine Clearance: 91.3 mL/min (based on SCr of 1.08 mg/dL).    Creatinine for last 3 days  2021:  5:35 PM Creatinine 1.06 mg/dL;  7:00 PM Creatinine 1.08 mg/dL    Recent Vancomycin Level(s) for last 3 days  No results found for requested labs within last 72 hours.      Vancomycin IV Administrations (past 72 hours)      No vancomycin orders with administrations in past 72 hours.                Nephrotoxins and other renal medications (From now, onward)    Start     Dose/Rate Route Frequency Ordered Stop    21 0800  vancomycin 1250 mg in 0.9% NaCl 250 mL intermittent infusion 1,250 mg      1,250 mg  over 90 Minutes Intravenous EVERY 12 HOURS 21  vancomycin (VANCOCIN) 2,500 mg in sodium chloride 0.9 % 500 mL intermittent infusion      2,500 mg  over 120 Minutes Intravenous ONCE 21  acyclovir (ZOVIRAX) tablet 800 mg      800 mg Oral 2 TIMES DAILY 21  cycloSPORINE modified (GENERIC EQUIVALENT) capsule 100 mg      100 mg Oral 2 TIMES DAILY 21  piperacillin-tazobactam (ZOSYN) 4.5 g vial to attach to  mL bag     Note to Pharmacy: For [facility], [facility] and [facility]: For Zosyn-naive patients, use the \"Zosyn initial dose + extended infusion\" order panel.    4.5 g  over 30 Minutes Intravenous EVERY 6 HOURS 21  norepinephrine (LEVOPHED) 16 mg in  mL infusion MAX CONC CENTRAL LINE      0.01-0.6 mcg/kg/min × 103.2 kg (Dosing Weight)  1-58.1 mL/hr  Intravenous CONTINUOUS 21  vasopressin 0.2 units/mL in NS (PITRESSIN) standard conc infusion      2.4 Units/hr  12 mL/hr  Intravenous CONTINUOUS 21            Contrast Orders - past 72 hours (72h ago, onward)    None    "       Loading dose: 2500 mg at 21:00 08/04/2021.  Regimen: 1250 mg IV every 12 hours.  Start time: 19:43 on 08/04/2021  Exposure target: AUC24 (range)400-600 mg/L.hr   AUC24,ss: 593 mg/L.hr  Probability of AUC24 > 400: 86 %  Ctrough,ss: 19.4 mg/L  Probability of Ctrough,ss > 20: 47 %  Probability of nephrotoxicity (Lodise ANASTACIA 2009): 16 %        Plan:  1. Start vancomycin  2500 mg (25 mg/kg) IV once then 1250 mg Q12H.   2. Vancomycin monitoring method: AUC  3. Vancomycin therapeutic monitoring goal: 400-600 mg*h/L  4. Pharmacy will check vancomycin levels as appropriate in 1-3 Days.    5. Serum creatinine levels will be ordered daily for the first week of therapy and at least twice weekly for subsequent weeks.      Leif Montero RP

## 2021-08-05 NOTE — PROCEDURES
[facility]    Central line    Date/Time: 8/4/2021 9:12 PM  Performed by: Gris Castaneda MD  Authorized by: Gris Castaneda MD   Indications: vascular access    UNIVERSAL PROTOCOL   Site Marked: NA  Prior Images Obtained and Reviewed:  NA  Required items: Required blood products, implants, devices and special equipment available    Patient identity confirmed:  Arm band and provided demographic data  Patient was reevaluated immediately before administering moderate or deep sedation or anesthesia  Confirmation Checklist:  Patient's identity using two indicators, relevant allergies, procedure was appropriate and matched the consent or emergent situation and correct equipment/implants were available  Time out: Immediately prior to the procedure a time out was called    Universal Protocol: the Joint Commission Universal Protocol was followed    Preparation: Patient was prepped and draped in usual sterile fashion    ESBL (mL):  2        SEDATION    Patient Sedated: Yes    Sedation Type:  Deep  Sedation:  Midazolam, fentanyl and propofol  Vital signs: Vital signs monitored during sedation      Preparation: skin prepped with 2% chlorhexidine  Skin prep agent dried: skin prep agent completely dried prior to procedure  Sterile barriers: all five maximum sterile barriers used - cap, mask, sterile gown, sterile gloves, and large sterile sheet  Hand hygiene: hand hygiene performed prior to central venous catheter insertion  Patient position: reverse Trendelenburg  Catheter type: triple lumen  Pre-procedure: landmarks identified  Ultrasound guidance: yes  Sterile ultrasound techniques: sterile gel and sterile probe covers were used  Number of attempts: 1  Post-procedure: line sutured and dressing applied  Assessment: blood return through all ports,  free fluid flow and placement verified by x-ray    PROCEDURE   Patient Tolerance:  Patient tolerated the procedure well with no immediate  complications    Length of time physician/provider present for 1:1 monitoring during sedation: 20

## 2021-08-05 NOTE — PROGRESS NOTES
"CLINICAL NUTRITION SERVICES - ASSESSMENT NOTE     Nutrition Prescription    RECOMMENDATIONS FOR MDs/PROVIDERS TO ORDER:  Fluids and bowel regimen eer team     Malnutrition Status:    Unable to assess    Recommendations already ordered by Registered Dietitian (RD):  1. Once new TF is placed and confirmed by XR, begin TF with Twocal HN @ 10 ml/hr and adv by 10 ml Q8, ( ONLY advance if K+/mg++ WNL and Phos >1.9) to goal @ 50 ml/hr.       - TwoCal HN @ 50 mL/hr (1200 mL/day) to provide 2400 kcals (28 kcal/kg/day), 101 g PRO (1.2 g/kg/day), 840 mL H2O, 263 g CHO and 6 g Fiber daily.  + 3 pkts prosource: 2520 kcals( 29), 134 g pro ( 1.6)        2. Monitor K+/Mg++/Phos daily with TF start and advancement to goal infusion to evaluate for refeeding risk, replace per protocol       3. Order free water flushes 30 ml every 4 hours for tube patency.      4. Recommend Certavite (15 ml/day via FT) to ensure adequate micronutrient needs being met.        Future/Additional Recommendations:  Monitor propofol and pressor use.   Monitor blood glucose with steroid use      REASON FOR ASSESSMENT  Byron BEATRICE Russo is a/an 60 year old male assessed by the dietitian for Provider Order - Registered Dietitian to Assess and Order TF per Medical Nutrition Therapy Protocol    NUTRITION HISTORY  Unable to obtain diet hx, pt intubated/ sedated.    Per RD note from 10/19/2020, RD reported\" Pt is tolerating diet, eating well per nursing documentation, flowsheet, and meal ordering system.\"     CURRENT NUTRITION ORDERS  Diet: NPO    LABS  Labs reviewed    MEDICATIONS  Medications reviewed, norepinephrine, propofol, fentanyl, miralax, senna     ANTHROPOMETRICS  Height: 181.6 cm (5' 11.5\")  Most Recent Weight: 101.8 kg (224 lb 6.9 oz)    IBW: 80.9 kg  BMI: Obesity Grade I BMI 30-34.9  Weight History:   Wt Readings from Last 10 Encounters:   08/04/21 101.8 kg (224 lb 6.9 oz)   07/28/21 105.7 kg (233 lb)   07/15/21 106.6 kg (235 lb)   07/12/21 106.5 kg " (234 lb 14.4 oz)   07/07/21 106.1 kg (233 lb 12.8 oz)   07/07/21 106.4 kg (234 lb 9.6 oz)   06/30/21 106.6 kg (235 lb)   06/25/21 106.7 kg (235 lb 3.2 oz)   06/11/21 107.5 kg (237 lb)   06/03/21 108 kg (238 lb)     Dosing Weight: 86 kg (adjusted based on weight of 101.8 kg)     ASSESSED NUTRITION NEEDS  Estimated Energy Needs:6389-0594 kcals/day (25 - 30 kcals/kg)  Justification: Maintenance and Vented  Estimated Protein Needs: 129-172 grams protein/day (1.5 - 2 grams of pro/kg)  Justification: Hypercatabolism with critical illness and Increased needs  Estimated Fluid Needs: 1 mL/kcal/day    Justification: Maintenance and Per provider pending fluid status    PHYSICAL FINDINGS  See malnutrition section below.    MALNUTRITION  % Intake: Unable to assess  % Weight Loss: None noted  Subcutaneous Fat Loss: None observed  Muscle Loss: Temporal:  mild, Thoracic region (clavicle, acromium bone, deltoid, trapezius, pectoral):  mild and Upper arm (bicep, tricep):  mild  Fluid Accumulation/Edema: Does not meet criteria  Malnutrition Diagnosis: Unable to determine due to RD unable to obtain diet hx.    NUTRITION DIAGNOSIS  Inadequate oral intake related to intubation inhibiting PO intakes as evidenced by need for EN via OGT.    INTERVENTIONS  Implementation  Nutrition Education: Not appropriate at this time due to patient condition   Enteral Nutrition - Initiate     Goals  Total avg nutritional intake to meet a minimum of 25 kcal/kg and 1.5 g PRO/kg daily (per dosing wt 86 kg).     Monitoring/Evaluation  Progress toward goals will be monitored and evaluated per protocol.      Era Min, RD, MS, LD  SICU: [id number]

## 2021-08-05 NOTE — PLAN OF CARE
D/I: Pt remains vented and sedated on propofol, versed, and fentanyl gtt.  FiO2 continues at 100%, O2 saturating low 90s. ABG checked Q6hrs.   A: -120s (130s for an hour) Titrating Norepinephrine gtt to keep MAP >65. Currently at 0.14mcg/kg/min. On Veletri at 20ng/kg/min.  Chest Tube placed on -20cm suction.  (see I&O) UO 60cc/hr.. now trending down to 25cc/hr. (SICU notified and to monitor for the next couple of hours then update MD as needed)  TF placed at bedside (post pyloric) Abd xray completed and confirmed placement. TF started at 10cc/hr.   P: Continue to monitor VS, labs, respiratory/vent, etc.. see flow sheet for details.

## 2021-08-06 NOTE — PROGRESS NOTES
Major Shift Events: Pt moved back to 100% FiO2 after period of not tolerating 80%. Pt remains above MAP goal of 65 while on levo at 0.07. ST with little to no ectopy. Pt remains sedated on propofol, but versed was discontinued this shift. Pt responds to vigorous stimulation/pain. Pupils are equal, sluggish, and reactive (+2). Vent settings changed to ARPV this shift and tolerates well with sats above 90%. Tube feeds increased to 40 mL/hr. Pt maintains adequate urine output. Abnormal ABGs reported to provider.  Plan: Continue attempts to wean vent.   For vital signs and complete assessments, please see documentation flowsheets.   ?

## 2021-08-06 NOTE — PROGRESS NOTES
Thoracic Surgery Progress Note  08/06/2021       Subjective:  Remains critically ill     Objective:  Temp:  [97.2  F (36.2  C)-98.5  F (36.9  C)] 98.4  F (36.9  C)  Pulse:  [111-130] 124  Resp:  [30] 30  BP: ()/(60-83) 119/78  MAP:  [61 mmHg-81 mmHg] 74 mmHg  Arterial Line BP: ()/(52-65) 98/60  FiO2 (%):  [80 %-100 %] 80 %  SpO2:  [89 %-95 %] 92 %    I/O last 3 completed shifts:  In: 3029.5 [I.V.:2429.5; NG/GT:300]  Out: 1420 [Urine:1300; Chest Tube:120]     Vasopressin off  Levo 0.07, stable from yesterday  Flolan remains on board    Making uring    Gen: Intubated, sedated  Resp: mechanically ventilated 100% FiO2  Chest: Pleurx dressing intact, left chest tube with minimal serosanguinous output  Abd: soft, nondistended  Incision: c/d/I  Ext: WWP, no edema     Labs:  Recent Labs   Lab 08/06/21  0410 08/05/21  2143 08/05/21  1601   WBC 22.4* 24.3* 22.1*   HGB 13.3 13.4 14.0    299 300       Recent Labs   Lab 08/06/21  0828 08/06/21  0425 08/06/21  0410 08/06/21  0001 08/05/21  2143 08/05/21  1601 08/05/21  1000 08/05/21  0340   NA  --   --   --   --  148* 143 142 138   POTASSIUM  --   --  3.7  --  3.2* 4.0  4.0 3.7 4.0   CHLORIDE  --   --   --   --  120* 113* 110* 108   CO2  --   --   --   --  20 24 24 20   BUN  --   --   --   --  35* 35* 31* 28   CR  --   --   --   --  1.06 1.27* 1.18 1.23   * 208*  --  203* 168* 180* 168* 217*   TRACE  --   --   --   --  7.4* 8.7 8.8 8.1*   MAG  --   --  2.2  --   --   --  2.3 2.0   PHOS  --   --  2.8  --   --  3.9  --  2.9       Imaging:  CXR - bilateral opacities     Assessment/Plan:   60 year old male with 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 Reynolds on 8/4. Post-operatively with worsening respiratory status ultimately requiring intubation. Overnight with worsening hypotension due  to presumed septic shock on multiple vasopressors. Has been weaning but still requiring significant respiratory support.     - Appreciate SICU care  - Sedation/pain per ICU  - Wean ventilator/flolan as able  - Consider further diuresis  - At this time, ventilator requirements to high for bronchoscopy  -Appreciate pulmonology recommendations -work-up pending  - Continue left chest tube to suction  - Pleurx catheter dressing to remain in place  - BMT consulted 8/4 for assistance with management, appreciate recs  - Agree with stress dose steroids  - Agree with broad spectrum antibiotics  -Echocardiogram completed    Discussed with staff    Josue Lebron PA-C  Thoracic & Foregut Surgery

## 2021-08-06 NOTE — PROGRESS NOTES
[facility]   Pulmonary   Progress Note  Byron Russo MRN: [id number]  1960  Date of Admission:8/4/2021  Date of Service: 08/06/2021        Assessment & Plan         History of Pleuralparanchemal Fibroelastosis   Acute on Chronic hypoxic respiratory failure   S/P VATS with Pleurx and thoracic (not lung) decortication   Severe restrictive lung disease with moderate diffusion impairment        Discussion:   60 year old male with PMHx most significant for MDS with history of BMT complicated by xihwv-hpnvtz-kojx disease, pleural parenchymal fibroelastosis, on 4 L of oxygen, pulmonary hypertension, coronary disease history of PE, trapped lung with recurrent pleural effusions now status post VATS without lung decortication with Pleurx catheter placement complicated by postoperative hypoxia requiring intubation     Mild clinical improvement with decrease in vasopressor and ventilator support.  Seems disappointed this is primarily an infectious process.  Would favor bronchoscopy when able to further direct therapies.  No additional indication for steroids at this time     Recommendations:               -Do not think his chronic BPAP is etiology of his acute respiratory failure              -Agree with broad-spectrum and biotics and recommend fungal work-up including blastomycosis antigen histoplasmosis antigen, galactomannan and Fungitell                          Consider empiric fungal therapy                          Consider bronchoscopy if oxygenation requirements are improved        We will follow peripherally over the weekend please page mICU fellow and team as needed     Patient seen & discussed w/  Dr. Donald M.D., who is in agreement    Gentry Munoz MD   Pulmonary Fellow   Pager #[phone]     Interval History      - Nursing notes reviewed.   No acute events       Physical Exam Temp:  [97.2  F (36.2  C)-98.5  F (36.9  C)] 98.2  F (36.8  C)  Pulse:  [110-130] 117  Resp:  [24-30] 24  BP:  ()/(60-83) 104/67  MAP:  [61 mmHg-81 mmHg] 70 mmHg  Arterial Line BP: ()/(52-67) 90/60  FiO2 (%):  [80 %-100 %] 80 %  SpO2:  [88 %-97 %] 89 %  I/O last 3 completed shifts:  In: 3029.5 [I.V.:2429.5; NG/GT:300]  Out: 1420 [Urine:1300; Chest Tube:120]  Wt Readings from Last 1 Encounters:   08/06/21 99.4 kg (219 lb 2.2 oz)    Body mass index is 30.14 kg/m . Ventilation Mode: CMV/AC  (Continuous Mandatory Ventilation/ Assist Control)  FiO2 (%): 80 %  Rate Set (breaths/minute): 30 breaths/min  Tidal Volume Set (mL): 350 mL  PEEP (cm H2O): 8 cmH2O  Oxygen Concentration (%): 100 %  Peak Inspiratory Pressure (cm H2O) (Drager Hoda): 39  Resp: 24      Exam:  General: Sedated  HEENT: Intubated  CV: Regular  Resp: Decreased breath sounds left chest tube and Pleurx catheter in place serous drainage  Extremities: WWP, no LE edema  Neuro: Sedated    Data   Labs: reviewed in EMR and notable labs listed below.      Imaging: reviewed in EMR and notable imaging listed below.        Medications     acetaminophen  975 mg Oral Q8H     acyclovir  800 mg Oral BID     artificial tears   Both Eyes At Bedtime     cycloSPORINE modified  100 mg Oral BID IS     enoxaparin ANTICOAGULANT  40 mg Subcutaneous Q24H     levothyroxine  150 mcg Oral Daily     micafungin  100 mg Intravenous Q24H     morphine  2 mg Intravenous Once     multivitamins w/minerals  15 mL Per Feeding Tube Daily     pantoprazole  40 mg Oral QAM AC    Or     pantoprazole  40 mg Per Feeding Tube QAM AC    Or     pantoprazole (PROTONIX) IV  40 mg Intravenous QAM AC     piperacillin-tazobactam  4.5 g Intravenous Q6H     polyethylene glycol  17 g Oral Daily     protein modular  1 packet Per Feeding Tube TID     rosuvastatin  5 mg Oral QPM     senna-docusate  1 tablet Oral BID     sodium chloride (PF)  3 mL Intracatheter Q8H

## 2021-08-06 NOTE — CONSULTS
BMT Consults     Admission  Name: Byron Russo  Date:  8/6/2021  Service: BMT   Resuscitation Status: Full Code    Patient ID:  Byron Russo is a 60 year old male,       Transplant Essential Data:  Diagnosis AMLU Acute myelogeneous leukemia, Unknown  HCT Type Allogeneic    Prep Regimen Cytoxan  Fludarabine  TBI   Donor Source Related [facility]SC    GVHD Prophylaxis Cyclosporine  Mycophenolate  Clinical Trials          ASSESSMENT AND PLAN    Byron Russo is a 60 year old male who is 5 years and 2 months of his HCT for Myeloproliferative disease. 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     He is  Currently admitted in the ICU with acute hypoxic respiratory failure and possible septic shock.     1. S/p Myeloablative HSCT for Myelodysplastic syndrome (5yrs and 2 months)  2. Chronic GVHD   3. 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 Acute on Chronic Hypoxic respiratory failure and possible Septic shock    Patient with chronic graft verus host disease and pulmonary complications  Who required  4 L oxygen in the baseline . Following decortication and PleurX catheter placement , he decompensated and developed worsening hypoxia requiring mechanical ventilation . There is concern for possible fungal infection as he was on immunosuppression .   PLAN :   - Recommend holding immunosuppressive therapy with Cyclosporine in the setting of possible fungal infection   - Agree with Infectious work up and Continue Micafungin and Zosyn for broad spectrum coverage.   - Recommend Transplant Infectious disease consult.   - Agree with Stress dose steroids   - We will continue to follow , Thank you for the consult.       HPI :     Yg Russo is a 60 year old gentle man  S/p myeloablative allo-sib transplant for MDS in 2016 and ongoing complication of chronic vjuel-fwzueq-hpwq disease-related accumulation of pleural effusion and hydropneumothorax of the left  lung.      He has been on 3-4 Liters of oxygen at home with shortness of breath and CUELLAR in the baseline.     He has trapped left lung and  had several thoracenteses over the last months.  He was seen by Dr. Reynolds in early May who thought he would be a candidate for a PleurX catheter.He was placed on a taper dose of prednisone and was at a dose of 10 mg a day with the goal of 5 mg/ day by mid August, when he is planned for the procedure.     He follows Pulmonology with Dr. Brady as well who felt that pleurodesis would not be successful in his case given the rapid accumulation.  He was on prednisone at 5mg/day (since 7/21) and CSA at 100 mg bid     CXR In July 7,2021 - The chest X ray showed Left loculated pleural effusion with new multifocal bubbly lucency in the left apex and left base. Concern for empyema.   Unchanged interstitial opacity in the right lung. he subsequently underwent thoracentesis which was not infected. He was placed on Augmentin in July.     He underwent Left partial Decortication and PleurX catheter placement on 8/4/2021, intra operatively he was doing well. However following the procedure he was increasingly hypoxic and tachypenic and required mechanical intubation and ventilator support.     There is  Concern for fungal infection versus aspiration pneumonia. He is on Zosyn and Micafungin. Pulmonology is following.         I have assessed all abnormal lab values for their clinical significance and any values considered clinically significant have been addressed in the assessment and plan    Family History:   Family History   Problem Relation Age of Onset     Myocardial Infarction Father 58     Coronary Artery Disease Father      Heart Failure Mother      Glaucoma Mother      Coronary Artery Disease Brother      Coronary Artery Disease Brother      Cancer Brother         GIST     Skin Cancer No family hx of      Melanoma No family hx of      Macular Degeneration No family hx of        Social  History:   Social History     Socioeconomic History     Marital status:      Spouse name: Not on file     Number of children: Not on file     Years of education: Not on file     Highest education level: Not on file   Occupational History     Not on file   Tobacco Use     Smoking status: Never Smoker     Smokeless tobacco: Former User   Substance and Sexual Activity     Alcohol use: Yes     Alcohol/week: 0.0 standard drinks     Comment: Rare     Drug use: No     Sexual activity: Not on file   Other Topics Concern     Parent/sibling w/ CABG, MI or angioplasty before 65F 55M? Not Asked   Social History Narrative     Not on file     Social Determinants of Health     Financial Resource Strain:      Difficulty of Paying Living Expenses:    Food Insecurity:      Worried About Running Out of Food in the Last Year:      Ran Out of Food in the Last Year:    Transportation Needs:      Lack of Transportation (Medical):      Lack of Transportation (Non-Medical):    Physical Activity:      Days of Exercise per Week:      Minutes of Exercise per Session:    Stress:      Feeling of Stress :    Social Connections:      Frequency of Communication with Friends and Family:      Frequency of Social Gatherings with Friends and Family:      Attends Congregation Services:      Active Member of Clubs or Organizations:      Attends Club or Organization Meetings:      Marital Status:    Intimate Partner Violence: Not At Risk     Fear of Current or Ex-Partner: No     Emotionally Abused: No     Physically Abused: No     Sexually Abused: No       Past Medical History:   Past Medical History:   Diagnosis Date     Blepharitis      CAD (coronary artery disease) 2001     Jluvl-nexnla-wkyi disease (H)      Hyperlipidemia      Hypertension     Controled by medication     Hypothyroidism      Obesity      Oxygen dependent     Indogen portable      Pulmonary embolism (H) 2/2016     Varicose veins         Past Surgical History:   Past Surgical  History:   Procedure Laterality Date     APPENDECTOMY       ARTHROSCOPY KNEE WITH MEDIAL MENISCECTOMY  6/20/2011    Procedure:ARTHROSCOPY KNEE WITH MEDIAL MENISCECTOMY; Surgeon:SACHIN SAMANO; Location:[facility]     BRONCHOSCOPY (RIGID OR FLEXIBLE), DIAGNOSTIC N/A 2/6/2019    Procedure: COMBINED BRONCHOSCOPY (RIGID OR FLEXIBLE), LAVAGE;  Surgeon: Louise Rogel MD;  Location: [facility]     coronary artery stents placed x 5 2001     IR THORACENTESIS  11/27/2020     IR THORACENTESIS  12/18/2020     IR THORACENTESIS  12/23/2020     IR THORACENTESIS  1/8/2021     IR THORACENTESIS  3/10/2021     IR THORACENTESIS  4/1/2021     IR THORACENTESIS  4/22/2021     IR THORACENTESIS  5/21/2021     IR THORACENTESIS  6/11/2021     IR THORACENTESIS  6/30/2021     IR THORACENTESIS  7/15/2021     THORACENTESIS Left 11/27/2020    Procedure: THORACENTESIS;  Surgeon: Fred Hahn MD;  Location: [facility] OR     THORACENTESIS Left 12/18/2020    Procedure: THORACENTESIS @1000;  Surgeon: Nasir Greene MD;  Location: [facility] OR     THORACENTESIS Left 12/23/2020    Procedure: THORACENTESIS;  Surgeon: Dary Wilkinson MD;  Location: [facility] OR     THORACENTESIS Left 1/8/2021    Procedure: THORACENTESIS;  Surgeon: Carl Singh PA-C;  Location: [facility] OR     THORACENTESIS Left 3/10/2021    Procedure: THORACENTESIS;  Surgeon: Dary Wilkinson MD;  Location: [facility] OR     THORACENTESIS Left 4/1/2021    Procedure: THORACENTESIS @1000;  Surgeon: Mikal Lema MD;  Location: [facility] OR     THORACENTESIS Left 4/22/2021    Procedure: THORACENTESIS;  Surgeon: Vladimir Mercado PA-C;  Location: [facility] OR     THORACENTESIS N/A 5/21/2021    Procedure: THORACENTESIS;  Surgeon: Aquiles Powers PA-C;  Location: [facility] OR     THORACENTESIS Left 6/11/2021    Procedure: THORACENTESIS LEFT;  Surgeon: Mikal Lema MD;  Location: [facility] OR     THORACENTESIS Left 6/30/2021    Procedure: THORACENTESIS;  Surgeon: Mikal Lema MD;   Location: [facility] OR     THORACENTESIS Left 7/15/2021    Procedure: THORACENTESIS LEFT;  Surgeon: Mikal Lema MD;  Location: [facility] OR     THORACOSCOPIC INSERTION DRAINAGE CATHETER Left 8/4/2021    Procedure: Left Video Assisted Thoracic Surgery, Placement of PleurX, Partial Decortication;  Surgeon: Lobo Reynolds MD;  Location: [facility]       Allergies:   Allergies   Allergen Reactions     Atorvastatin Other (See Comments)     Back pain  Tolerates rosuvastatin     Augmentin [Amoxicillin-Pot Clavulanate] Itching and Rash     Tolerated amoxicillin on its own 7/2020     Lifitegrast      Eyes stinging when using this for about 15 min after use.        Home Medications      Prior to Admission medications    Medication Sig Start Date End Date Taking? Authorizing Provider   acetaminophen (TYLENOL) 325 MG tablet Take 1-2 tablets (325-650 mg) by mouth every 4 hours as needed for mild pain or fever 2/7/19  Yes Gerald Doty   acyclovir (ZOVIRAX) 800 MG tablet Take 1 tablet (800 mg) by mouth 2 times daily 3/18/21  Yes Uli Enciso MD   amLODIPine (NORVASC) 5 MG tablet Take 1 tablet (5 mg) by mouth daily 1/7/21  Yes Uli Enciso MD   aspirin 81 MG EC tablet Take 81 mg by mouth daily Reported on 5/12/2017 7/27/16  Yes Aleta Donovan PA-C   carboxymethylcellulose PF (REFRESH PLUS) 0.5 % ophthalmic solution Place 1 drop into both eyes 3 times daily as needed for dry eyes   Yes Unknown, Entered By History   cycloSPORINE modified (GENERIC EQUIVALENT) 100 MG capsule Take 1 capsule (100 mg) by mouth 2 times daily 3/25/21  Yes Angela Bunch PA-C   cycloSPORINE modified (GENERIC EQUIVALENT) 25 MG capsule On HOLD, current dose is 200mg twice daily. 3/25/21  Yes Angela Bunch PA-C   fluconazole (DIFLUCAN) 100 MG tablet Take 1 tablet (100 mg) by mouth daily  Patient taking differently: Take 100 mg by mouth every evening  3/18/21  Yes Uli Enciso MD   levofloxacin (LEVAQUIN) 250 MG tablet Take 1 tablet (250 mg)  by mouth daily 3/18/21  Yes Uli Enciso MD   levothyroxine (SYNTHROID/LEVOTHROID) 150 MCG tablet Take 1 tablet (150 mcg) by mouth daily 3/18/21  Yes Uli Enciso MD   magnesium oxide (MAG-OX) 400 (241.3 Mg) MG tablet Take 800 mg by mouth every evening  10/29/20  Yes Maranda Mayes PA-C   metoprolol tartrate (LOPRESSOR) 25 MG tablet Take 1 tablet (25 mg) by mouth daily  Patient taking differently: Take 25 mg by mouth every evening  3/18/21  Yes Uli Enciso MD   predniSONE (DELTASONE) 5 MG tablet Take 1 and 0.5 tablets every day (7.5 mg every day)  Patient taking differently: Take 5 mg by mouth daily  7/7/21 8/7/21 Yes Cristal Arrington MD   rosuvastatin (CRESTOR) 5 MG tablet Take 1 tablet (5 mg) by mouth daily  Patient taking differently: Take 5 mg by mouth every evening  3/18/21  Yes Uli Enciso MD   sulfamethoxazole-trimethoprim (BACTRIM DS) 800-160 MG tablet Take 1 tablet by mouth 2 times daily On Monday's and Tuesday's only 3/18/21  Yes Uli Enciso MD   order for DME Equipment being ordered: Oxygen. Please provide patient with continuous flow oxygen at 3 LPM via nasal cannula for activity and while sleeping. Patient will need concentrator, conserving device, and portable oxygen. 3/23/20   Uli Enciso MD   order for DME Equipment being ordered: Oxygen. Please provide patient with continuous flow oxygen at 3 LPM via nasal cannula. Patient will need concentrator, conserving device, and portable oxygen. Length of need is up to 6 months; will continue to reassess. Patient will need transportation oxygen delivered to the [facility], [address] [place] [id number]. [address], Room [address]. 1/15/20   Ryan Chan MD   order for DME Equipment being ordered: Please provide portable oxygen at 2 LPM with activity and with sleep via nasal canula. Patient requesting small tanks he can wear with back pack for his work. 1/7/20   Travis Brady MD       Review  "of Systems      Unable to do as patient was intubated     PHYSICAL EXAM      Weight     Wt Readings from Last 3 Encounters:   08/06/21 99.4 kg (219 lb 2.2 oz)   07/28/21 105.7 kg (233 lb)   07/15/21 106.6 kg (235 lb)          /67   Pulse 104   Temp 98.2  F (36.8  C) (Axillary)   Resp 24   Ht 1.816 m (5' 11.5\")   Wt 99.4 kg (219 lb 2.2 oz)   SpO2 (!) 87%   BMI 30.14 kg/m       General: Intubated and sedated   Respiratory : using mechanical ventilation  Cardiovascular: RRR, no M/R/G   Abdominal/Rectal: +BS  Lymphatics: No edema  Skin: No rashes or petechaie      Jackeline Yap MD  Hematology/Oncology/Transplant Fellow  Pgr : [phone]  "

## 2021-08-06 NOTE — PHARMACY-VANCOMYCIN DOSING SERVICE
"Pharmacy Vancomycin Note  Date of Service 2021  Patient's  1960   60 year old, male    Indication: Sepsis  Day of Therapy: 3  Current vancomycin regimen:  1250 mg IV q12h  Current vancomycin monitoring method: AUC  Current vancomycin therapeutic monitoring goal: 400-600 mg*h/L    Current estimated CrCl = Estimated Creatinine Clearance: 92.1 mL/min (based on SCr of 1.06 mg/dL).    Creatinine for last 3 days  2021:  5:35 PM Creatinine 1.06 mg/dL;  7:00 PM Creatinine 1.08 mg/dL;  9:29 PM Creatinine 1.23 mg/dL  2021:  1:18 AM Creatinine 1.31 mg/dL;  3:40 AM Creatinine 1.23 mg/dL; 10:00 AM Creatinine 1.18 mg/dL;  4:01 PM Creatinine 1.27 mg/dL;  9:43 PM Creatinine 1.06 mg/dL    Recent Vancomycin Levels (past 3 days)  2021:  6:30 AM Vancomycin 18.4 mg/L    Vancomycin IV Administrations (past 72 hours)                   vancomycin 1250 mg in 0.9% NaCl 250 mL intermittent infusion 1,250 mg (mg) 1,250 mg New Bag 21 194     1,250 mg New Bag  0817    vancomycin (VANCOCIN) 2,500 mg in sodium chloride 0.9 % 500 mL intermittent infusion (mg) 2,500 mg New Bag 21 2223                Nephrotoxins and other renal medications (From now, onward)    Start     Dose/Rate Route Frequency Ordered Stop    21 0800  vancomycin 1250 mg in 0.9% NaCl 250 mL intermittent infusion 1,250 mg      1,250 mg  over 90 Minutes Intravenous EVERY 12 HOURS 21  cycloSPORINE modified (GENERIC EQUIVALENT) microemulsion solution 100 mg      100 mg Oral 2 TIMES DAILY. 21  acyclovir (ZOVIRAX) tablet 800 mg      800 mg Oral 2 TIMES DAILY 21 18421  piperacillin-tazobactam (ZOSYN) 4.5 g vial to attach to  mL bag     Note to Pharmacy: For [facility], [facility] and [facility]: For Zosyn-naive patients, use the \"Zosyn initial dose + extended infusion\" order panel.    4.5 g  over 30 Minutes Intravenous EVERY 6 HOURS 21  " norepinephrine (LEVOPHED) 16 mg in  mL infusion MAX CONC CENTRAL LINE      0.01-0.6 mcg/kg/min × 103.2 kg (Dosing Weight)  1-58.1 mL/hr  Intravenous CONTINUOUS 08/04/21 1908      08/04/21 1930  vasopressin 0.2 units/mL in NS (PITRESSIN) standard conc infusion      2.4 Units/hr  12 mL/hr  Intravenous CONTINUOUS 08/04/21 1921               Contrast Orders - past 72 hours (72h ago, onward)    None          Interpretation of levels and current regimen:  Vancomycin level is reflective of -600    Has serum creatinine changed greater than 50% in last 72 hours: No    Urine output:  diminished urine output (~0.5 mL/kg/hr yesterday)    Renal Function: stable to improving    Loading dose: 2500 mg at 21:00 08/04/2021.  Regimen: 1250 mg IV every 12 hours.  Start time: 07:46 on 08/06/2021  Exposure target: AUC24 (range)400-600 mg/L.hr   AUC24,ss: 581 mg/L.hr  Probability of AUC24 > 400: 97 %  Ctrough,ss: 18.9 mg/L  Probability of Ctrough,ss > 20: 43 %  Probability of nephrotoxicity (Lodise ANASTACIA 2009): 15 %    Plan:  1. Continue Current Dose  2. Vancomycin monitoring method: AUC  3. Vancomycin therapeutic monitoring goal: 400-600 mg*h/L  4. Pharmacy will check vancomycin levels as appropriate in 1-3 Days.  5. Serum creatinine levels will be ordered daily for the first week of therapy and at least twice weekly for subsequent weeks.    Denisha Leon, PharmD Resident

## 2021-08-06 NOTE — PROGRESS NOTES
"Bronchoscopy Risk Assessment Guidelines      A. Patient symptoms to consider when assessing pulmonary TB risk are:    I. Cough greater than 3 weeks; and fever, hemoptysis, pleuritic chest    pain, weight loss greater than 10 lbs, night sweats, fatigue, infiltrates on    upper lobes or superior segments of lower lobes, cavitation on chest    x-ray.   B. Patient risk factors to consider when assessing pulmonary TB risk are:    I. Exposure to known TB case, foreign-born persons (within 5 years of    arrival to [place]), residence in a crowded setting (correctional facility,     long-term care center, etc.), persons with HIV or immunosuppression.    Patients with symptoms and risk factors should generally be considered \"suspect risk\" and bronchoscopies should be performed in airborne precautions.    This patient has NO KNOWN RISK of Tuberculosis (proceed with bronchoscopy)    Specimens sent: yes  Complications: None  Scope used: #[id number] Slim  Attending Physician: Dr. Jose Angel Lane, RT on 8/6/2021 at 2:13 PM  "

## 2021-08-06 NOTE — CONSULTS
[facility]  Transplant Infectious Disease Consult Note     Patient:  Byron Russo, Date of birth 1960, Medical record number [id number]  Date of Visit:  08/06/2021  Consult requested by Dr. Beltrán for evaluation of pneumonia         Assessment and Recommendations:   Recommendations:  1) BD-Glucan, Aspergillosis Galactomannan pending   2) Histoplasma and Blastomyces urinary antigen pending   3) Cryptococcal serum antigen pending   4) Recommend repeating CT scan - would like to evaluate right pulmonary opacities previously noted in 4/2021   5) If respiratory status allows would recommend a bronchoscopy - cell count, gram stain, KOH stain, aerobic/anaerobic culture, fungal, culture, AFB stain and culture, Nocardia/actinomyces culture  6) Would recommend increasing Micafungin at this time from 100 mg to 150 IV daily   7) Continue Pip/Tazo   8) Continue ppx Acyclovir and Bactrim     Assessment: Byron Russo is a 61 y/o man with a history of Myeloablative HSCT for Myelodysplastic syndrome (5yrs and 2 months), 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 Post-operatively developed acute on chronic hypoxic respiratory failure and hypotension.     Infectious Disease issues include:  - Acute on chronic respiratory failure - with concern for opportunistic infection given chronic GVHD of the lungs. Has had two previous thoracenteses on 10/23/2020 and 7/15/2021 with negative cultures. Previous CT scans done in 10/2020 and 04/2021 did not show very significant changes. There were noted persistent right nodular consolidative opacities. While these pulmonary nodules had not changed in appearance from 10/2020 to 04/2021 given his acute  decompensation would be good to reassess these pulmonary nodules. A CT scan would be a good start and I agree with Pulmonary to do a bronch.     Other issues:  - PCP prophylaxis: Bactrim   - Fungal prophylaxis: Previously on Fluconazole, now on Micafungin   - Serostatus: CMV- EBV+ HSV1+/HSV2- on Acyclovir   - Gamma globulin status: 1228 on 1/8/2020  - Isolation status:  Good hand hygiene. 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 Post-operatively developed acute on chronic hypoxic respiratory failure and hypotension.     Review of Systems:  Unable to obtain a complete ROS 2/2 to intubation and sedation.     Past Medical History:   Diagnosis Date     Blepharitis      CAD (coronary artery disease) 2001     Otzvb-rchcqg-jsqg disease (H)      Hyperlipidemia      Hypertension     Controled by medication     Hypothyroidism      Obesity      Oxygen dependent     Indogen portable      Pulmonary embolism (H) 2/2016     Varicose veins      Past Surgical History:   Procedure Laterality Date     APPENDECTOMY       ARTHROSCOPY KNEE WITH MEDIAL MENISCECTOMY  6/20/2011    Procedure:ARTHROSCOPY KNEE WITH MEDIAL MENISCECTOMY; Surgeon:SACHIN SAMANO; Location:[facility]     BRONCHOSCOPY (RIGID OR FLEXIBLE), DIAGNOSTIC N/A 2/6/2019    Procedure: COMBINED BRONCHOSCOPY (RIGID OR FLEXIBLE), LAVAGE;  Surgeon: Louise Rogel MD;  Location: [facility]     coronary artery stents placed x 5  2001     IR THORACENTESIS  11/27/2020     IR THORACENTESIS   12/18/2020     IR THORACENTESIS  12/23/2020     IR THORACENTESIS  1/8/2021     IR THORACENTESIS  3/10/2021     IR THORACENTESIS  4/1/2021     IR THORACENTESIS  4/22/2021     IR THORACENTESIS  5/21/2021     IR THORACENTESIS  6/11/2021     IR THORACENTESIS  6/30/2021     IR THORACENTESIS  7/15/2021     THORACENTESIS Left 11/27/2020    Procedure: THORACENTESIS;  Surgeon: Fred Hahn MD;  Location: [facility] OR     THORACENTESIS Left 12/18/2020    Procedure: THORACENTESIS @1000;  Surgeon: Nasir Greene MD;  Location: [facility] OR     THORACENTESIS Left 12/23/2020    Procedure: THORACENTESIS;  Surgeon: Dary Wilkinson MD;  Location: [facility] OR     THORACENTESIS Left 1/8/2021    Procedure: THORACENTESIS;  Surgeon: Carl Singh PA-C;  Location: [facility] OR     THORACENTESIS Left 3/10/2021    Procedure: THORACENTESIS;  Surgeon: Dary Wilkinson MD;  Location: [facility] OR     THORACENTESIS Left 4/1/2021    Procedure: THORACENTESIS @1000;  Surgeon: Mikal Lema MD;  Location: [facility] OR     THORACENTESIS Left 4/22/2021    Procedure: THORACENTESIS;  Surgeon: Vladimir Mercado PA-C;  Location: [facility] OR     THORACENTESIS N/A 5/21/2021    Procedure: THORACENTESIS;  Surgeon: Aquiles Powers PA-C;  Location: [facility] OR     THORACENTESIS Left 6/11/2021    Procedure: THORACENTESIS LEFT;  Surgeon: Mikal Lema MD;  Location: [facility] OR     THORACENTESIS Left 6/30/2021    Procedure: THORACENTESIS;  Surgeon: Mikal Lema MD;  Location: [facility] OR     THORACENTESIS Left 7/15/2021    Procedure: THORACENTESIS LEFT;  Surgeon: Mikal Lema MD;  Location: [facility] OR     THORACOSCOPIC INSERTION DRAINAGE CATHETER Left 8/4/2021    Procedure: Left Video Assisted Thoracic Surgery, Placement of PleurX, Partial Decortication;  Surgeon: Lobo Reynolds MD;  Location: [facility] OR     Family History   Problem Relation Age of Onset     Myocardial Infarction Father 58     Coronary Artery Disease Father      Heart Failure  Mother      Glaucoma Mother      Coronary Artery Disease Brother      Coronary Artery Disease Brother      Cancer Brother         GIST     Skin Cancer No family hx of      Melanoma No family hx of      Macular Degeneration No family hx of      Social History     Tobacco Use     Smoking status: Never Smoker     Smokeless tobacco: Former User   Substance Use Topics     Alcohol use: Yes     Alcohol/week: 0.0 standard drinks     Comment: Rare     Drug use: No     Immunization History   Administered Date(s) Administered     COVID-19,PF,Moderna 04/27/2021, 05/25/2021     Influenza Vaccine IM > 6 months Valent IIV4 12/07/2016, 11/15/2017, 11/14/2018     Influenza Vaccine, 6+MO IM (QUADRIVALENT W/PRESERVATIVES) 09/17/2019     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)     Acute myeloid leukemia (H)     MDS (myelodysplastic syndrome) (H)     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)     Chronic GVHD (H)     Pneumonia     Acute respiratory failure with hypoxia (H)     Aspiration into airway     Oropharyngeal dysphagia     ILD (interstitial lung disease) (H)     Pleural effusion     Large pleural effusion     Trapped lung     Current Facility-Administered Medications   Medication     [START ON 8/7/2021] acetaminophen (TYLENOL) tablet 650 mg     acetaminophen (TYLENOL) tablet 975 mg     acyclovir (ZOVIRAX) tablet 800 mg     artificial tears ophthalmic ointment     bisacodyl (DULCOLAX) Suppository 10 mg     carboxymethylcellulose PF (REFRESH PLUS) 0.5 % ophthalmic solution 1 drop     [Held by provider] cycloSPORINE modified (GENERIC EQUIVALENT) microemulsion solution 100 mg     dexmedetomidine (PRECEDEX) 400 mcg in 0.9% sodium chloride 100 mL     dextrose 10% infusion     glucose gel 15-30 g    Or     dextrose 50 % injection 25-50 mL    Or     glucagon injection 1 mg     enoxaparin ANTICOAGULANT (LOVENOX) injection 40 mg     epoprostenol (VELETRI) inhalation solution      fentaNYL (SUBLIMAZE) infusion     hydrALAZINE (APRESOLINE) injection 10 mg     HYDROmorphone (DILAUDID) injection 0.2 mg    Or     HYDROmorphone (DILAUDID) injection 0.4 mg     hydrOXYzine (ATARAX) tablet 25 mg     insulin 1 unit/mL in saline (NovoLIN, HumuLIN Regular) drip - ADULT IV Infusion     ipratropium - albuterol 0.5 mg/2.5 mg/3 mL (DUONEB) neb solution 3 mL     labetalol (NORMODYNE/TRANDATE) injection 10-40 mg     levothyroxine (SYNTHROID/LEVOTHROID) tablet 150 mcg     lidocaine (LMX4) cream     lidocaine 1 % 0.1-1 mL     magnesium hydroxide (MILK OF MAGNESIA) suspension 30 mL     micafungin (MYCAMINE) 100 mg in sodium chloride 0.9 % 100 mL intermittent infusion     midazolam (VERSED) injection 2 mg     morphine (PF) injection 2 mg     multivitamins w/minerals (CERTAVITE) liquid 15 mL     naloxone (NARCAN) injection 0.2 mg    Or     naloxone (NARCAN) injection 0.4 mg    Or     naloxone (NARCAN) injection 0.2 mg    Or     naloxone (NARCAN) injection 0.4 mg     norepinephrine (LEVOPHED) 16 mg in  mL infusion MAX CONC CENTRAL LINE     ondansetron (ZOFRAN-ODT) ODT tab 4 mg    Or     ondansetron (ZOFRAN) injection 4 mg     oxyCODONE (ROXICODONE) tablet 5 mg    Or     oxyCODONE IR (ROXICODONE) tablet 10 mg     pantoprazole (PROTONIX) EC tablet 40 mg    Or     pantoprazole (PROTONIX) 2 mg/mL suspension 40 mg    Or     pantoprazole (PROTONIX) IV push injection 40 mg     piperacillin-tazobactam (ZOSYN) 4.5 g vial to attach to  mL bag     polyethylene glycol (MIRALAX) Packet 17 g     prochlorperazine (COMPAZINE) injection 10 mg    Or     prochlorperazine (COMPAZINE) tablet 10 mg     protein modular (PROSOURCE TF) 1 packet     rosuvastatin (CRESTOR) tablet 5 mg     senna-docusate (SENOKOT-S/PERICOLACE) 8.6-50 MG per tablet 1 tablet     sodium chloride (PF) 0.9% PF flush 3 mL     sodium chloride (PF) 0.9% PF flush 3 mL     vasopressin 0.2 units/mL in NS (PITRESSIN) standard conc infusion     Allergies  "  Allergen Reactions     Atorvastatin Other (See Comments)     Back pain  Tolerates rosuvastatin     Augmentin [Amoxicillin-Pot Clavulanate] Itching and Rash     Tolerated amoxicillin on its own 7/2020     Lifitegrast      Eyes stinging when using this for about 15 min after use.             Physical Exam:   Vitals were reviewed.  All vitals stable  /67   Pulse 107   Temp 98.2  F (36.8  C) (Axillary)   Resp 24   Ht 1.816 m (5' 11.5\")   Wt 99.4 kg (219 lb 2.2 oz)   SpO2 90%   BMI 30.14 kg/m      Exam:  GENERAL: Intubated, sedated, critically ill  HEENT:  Head is normocephalic, atraumatic   EYES:  Eyes have anicteric sclerae.    ENT:  Et tube in place   NECK:  Supple.  LUNGS:  Minimal breath sounds on the left, decreased breath sounds on the lower right lobe, no significant wheezing   CARDIOVASCULAR:  Regular rate and rhythm with no murmurs, gallops or rubs.  ABDOMEN:  Normal bowel sounds, soft, nontender.  SKIN:  No acute rashes.    NEUROLOGIC: Sedated         Laboratory Data:     Absolute CD4   Date Value Ref Range Status   05/12/2017 679 441 - 2,156 cells/uL Final   11/16/2016 288 (L) 441 - 2,156 cells/uL Final   08/19/2016 491 441 - 2,156 cells/uL Final     Inflammatory Markers    Recent Labs   Lab Test 02/06/19  0605   SED 66*   CRP 8.2*     Immune Globulin Studies      Recent Labs   Lab Test 01/08/20  1526 09/25/19  1522 02/13/19  1525 11/14/18  1515 07/18/18  1539 11/15/17  1502 02/21/17  1625 08/19/16  1017   IGG 1,228 1,150 1,330 1,110 1,230 742 359* 1,080   IGM  --   --   --   --   --   --  60 72   IGE  --   --   --   --   --   --   --  3   IGA  --   --   --   --   --   --   --  261     Metabolic Studies     Recent Labs   Lab Test 08/06/21  1524 08/06/21  1522 08/06/21  1405 08/06/21  1307 08/06/21  1203 08/06/21  1059 08/06/21  0410 08/05/21  2143 08/05/21  1601 08/05/21  1000 08/05/21  0340   *  --   --   --   --   --  145* 148* 143 142 138   POTASSIUM 4.8  --   --   --   --   --  3.8  " 3.7 3.2* 4.0  4.0 3.7 4.0   CHLORIDE 118*  --   --   --   --   --  118* 120* 113* 110* 108   CO2 26  --   --   --   --   --  22 20 24 24 20   ANIONGAP 4  --   --   --   --   --  5 8 6 8 10   BUN 49*  --   --   --   --   --  41* 35* 35* 31* 28   CR 1.23  --   --   --   --   --  1.11 1.06 1.27* 1.18 1.23   GFRESTIMATED 64  --   --   --   --   --  73 77 62 68 64   * 155* 127* 144* 156* 148* 208* 168* 180* 168* 217*   TRACE 8.7  --   --   --   --   --  8.0* 7.4* 8.7 8.8 8.1*   PHOS  --   --   --   --   --   --  2.8  --  3.9  --  2.9   MAG  --   --   --   --   --   --  2.2  --   --  2.3 2.0     Hepatic Studies    Recent Labs   Lab Test 08/05/21  0340 07/28/21  1215 07/12/21  1409 07/07/21  1515 06/25/21  1417 06/03/21  1137 10/23/20  0332 01/10/20  0336   BILITOTAL 1.3 0.8 0.8 0.7 0.7 0.8  --   --    ALKPHOS 114 142 113 119 114 116  --   --    ALBUMIN 2.4* 3.7 3.6 3.9 3.8 3.6  --   --    AST 24 26 24 21 26 24  --   --    ALT 16 25 21 22 24 28  --   --    LDH  --   --   --   --   --   --  232* 291*     Pancreatitis testing    Recent Labs   Lab Test 10/23/20  1330 08/10/16  1239   TRIG  --  435*   FAMY 18  --      Gout Labs      Recent Labs   Lab Test 05/04/16  1129 04/18/16  1205   URIC 6.6 6.9     Hematology Studies     Recent Labs   Lab Test 08/06/21  1524 08/06/21  0410 08/05/21  2143 08/05/21  1601 08/05/21  1000 08/05/21  0340 07/12/21  1409 07/07/21  1515 06/25/21  1417 06/25/21  1417 06/03/21  1137 05/05/21  1432 04/15/21  1412 03/30/21  1307   WBC 22.5* 22.4* 24.3* 22.1* 17.3* 19.7*  --  15.6*   < > 14.0* 15.0* 17.3* 16.7* 16.2*   ANEU  --   --   --   --   --   --   --  13.0*  --  11.0* 10.4* 14.6* 14.5* 13.5*   ALYM  --   --   --   --   --   --   --  1.2  --  1.6 2.4 1.5 1.0 1.5   SARA  --   --   --   --   --   --   --  1.2  --  1.1 1.7* 0.7 0.7 0.9   AEOS  --   --   --   --   --   --   --  0.1  --  0.1 0.3 0.0 0.0 0.0   HGB 12.8* 13.3 13.4 14.0 13.9 14.1   < > 17.2   < > 16.6 16.8 16.4 16.2 17.1   HCT  39.6* 40.2 40.4 41.3 40.9 42.4  --  51.7   < > 48.8 50.9 49.8 48.2 50.7    302 299 300 282 275  --  287   < > 275 267 283 235 230    < > = values in this interval not displayed.     Clotting Studies    Recent Labs   Lab Test 07/15/21  0859 05/21/21  0632 03/10/21  1027 01/08/21  0646 10/22/20  1716 01/08/20  1743 02/04/19  2050 05/09/16  0230 05/04/16  1129   INR 1.12 1.06 1.07 1.03 1.28* 1.46* 1.11   < > 1.16*   PTT  --   --   --   --  40* 42* 31  --  31    < > = values in this interval not displayed.     Arterial Blood Gas Testing    Recent Labs   Lab Test 08/06/21  1523 08/06/21  1008 08/06/21  0410 08/05/21  2143 08/05/21  1650   PH 7.29* 7.31* 7.35 7.35 7.36   PCO2 54* 49* 43 42 42   PO2 63* 69* 72* 85 62*   HCO3 26 25 24 23 23   O2PER 100 80 100 100 100      Thyroid Studies     Recent Labs   Lab Test 05/11/18  1130 05/12/17  1126   TSH 0.47 0.43   T4 1.39 1.15     Urine Studies    Recent Labs   Lab Test 08/04/21 2028 01/28/20  1543 03/13/17  1624 03/10/17  1526 04/18/16  1132   URINEPH 5.0 5.0 5.0 5.0 5.0   NITRITE Negative Negative Negative Negative Negative   LEUKEST Negative Negative Negative Negative Negative   WBCU 3 4 4* 5* 10*     Vancomycin Levels     Recent Labs   Lab Test 08/06/21  0630 02/06/19  1936   VANCOMYCIN 18.4 15.5     CSF testing     Recent Labs   Lab Test 04/22/16  1120   CWBC 1   CRBC 7*   CGLU 55   CTP 47     Microbiology:  Last 6 Culture results with specimen source  Culture Micro   Date Value Ref Range Status   01/26/2021 No growth  Final   10/23/2020 No growth  Final   10/23/2020   Final    No anaerobes isolated  Since this specimen was not transported in the proper anaerobic transport media, the   absence of anaerobes in this culture does not rule out the presence of anaerobes in this   specimen.     10/23/2020 Culture negative after 4 weeks  Final   01/28/2020 No growth  Final   01/09/2020 Heavy growth  Normal karl    Final   01/09/2020 Heavy growth  Enterococcus faecalis    (A)  Final    Specimen Description   Date Value Ref Range Status   01/26/2021 Blood Right Peripheral blood  Final   10/23/2020 Fluid Pleural fluid  Final   10/23/2020 Fluid Pleural fluid  Final   10/23/2020 Fluid Pleural fluid  Final   10/23/2020 Fluid Pleural fluid  Final   01/28/2020 Midstream Urine  Final        Last check of C difficile  C Diff Toxin B PCR   Date Value Ref Range Status   01/11/2020 Negative NEG^Negative Final     Comment:     Negative: C. difficile target DNA sequences NOT detected, presumed negative   for C.difficile toxin B or the number of bacteria present may be below the   limit of detection for the test.  FDA approved assay performed using [employer] real-time PCR.  A negative result does not exclude actual disease due to Clostridium difficile   and may be due to improper collection, handling and storage of the specimen   or the number of organisms in the specimen is below the detection limit of the   assay.       Virology:  CMV viral loads    Recent Labs   Lab Test 10/15/20  1506 01/09/20  0507 02/06/19  1123 03/21/18  1501 01/17/18  1502 09/13/17  1534 07/12/17  1508 04/19/17  1519 03/08/17  1559   CSPEC Plasma, EDTA anticoagulant EDTA PLASMA Bronchial lavage  Bronchial lavage Plasma, EDTA anticoagulant Plasma, EDTA anticoagulant EDTA PLASMA Plasma, EDTA anticoagulant Plasma, EDTA anticoagulant Plasma, EDTA anticoagulant     EBV viral loads     Recent Labs   Lab Test 02/01/17  1534 01/04/17  1028 11/30/16  1342 11/16/16  0948   EBRES EBV DNA Not Detected 585* 2,452* 742*     EBV DNA Copies/mL   Date Value Ref Range Status   02/01/2017 EBV DNA Not Detected EBVNEG [Copies]/mL Final   01/04/2017 585 (A) EBVNEG [Copies]/mL Final   11/30/2016 2,452 (A) EBVNEG [Copies]/mL Final   11/16/2016 742 (A) EBVNEG [Copies]/mL Final     Human Herpes Virus 6 viral loads    HHV6 DNA Result   Date Value Ref Range Status   05/31/2016 No HHV6 DNA detected Copies/mL Final      HHV6 DNA Specimen Type    Date Value Ref Range Status   2016 Whole blood, EDTA anticoagulant  Final     Hepatitis B Testing     Recent Labs   Lab Test 17  1126 16  1205   HBCAB Nonreactive Nonreactive   HEPBANG Nonreactive Nonreactive       Hepatitis C Antibody   Date Value Ref Range Status   2017  NR Final    Nonreactive   Assay performance characteristics have not been established for newborns,   infants, and children     2016  NR Final    Nonreactive   Assay performance characteristics have not been established for newborns,   infants, and children       Respiratory Virus Testing    RSV Rapid Antigen Result   Date Value Ref Range Status   2020 Negative NEG^Negative Final     Comment:     Test results must be correlated with clinical data. If necessary, results   should be confirmed by a molecular assay or viral culture.      No results found for: RESPEC     CMV Antibody IgG   Date Value Ref Range Status   2016 0.8 0.0 - 0.8 AI Final     Comment:     Negative   Antibody index (AI) values reflect qualitative changes in antibody   concentration that cannot be directly associated with clinical condition or   disease state.       Imagin/15/21: CT Chest - Compared to CT Chest done on 10/25/2020  IMPRESSION:    1. Overall unchanged fluid component and slight increase in scattered  foci of gas of large left hydropneumothorax with unchanged atelectasis  throughout the left lung and when compared to prior CT from  10/25/2020.   2. No significant change in basilar predominant right  peribronchovascular nodular consolidative opacities Findings can be seen in the setting of infection (fungal), drug reaction, or organizing pneumonia.  3. Trace right pleural effusion, decreased from 10/25/2020.  4. Severe coronary artery calcifications.    10/25/2020 CT Chest   IMPRESSION:   1. Slight increased aeration of the left lung with slight decrease  size of the left-sided hydropneumothorax with a large pleural  effusion component and now a few small foci of air. Left basilar chest tube in place.  2. Right lung demonstrates scattered peribronchovascular areas of consolidation which are similar to exam 10/22/2020. Trace right-sided pleural effusion. Findings may represent organizing pneumonia, infection, ijszd-gukkvr-[id number] disease, drug reaction or other etiology.  3. Heavy coronary artery calcifications

## 2021-08-06 NOTE — PROGRESS NOTES
SURGICAL ICU PROGRESS NOTE  August 6, 2021      ASSESSMENT: 59 yo male with h/o BMT c/b GVHD, pleural parenchymal fibroelastosis, PAH, CAD, PE, trapped left lung and recurrent pleural effusions, now s/p VATS partial left decortication and pleurex placement on 8/4/21 who was hypoxic in the PACU, failed BiPAP, requiring mechanical ventilation and appeared to be in septic shock with hypotension, fever, hypoxia, tachypnea.      TODAY'S PROGRESS/PLANS:   -Versed drip stopped  -Added versed pushes  -Wean propofol  -Wean vent  -Start Insulin gtt  -20mg Lasix   -Discontinue Vancomycin after MRSA     PLAN:   Neuro/ pain/ sedation:  Remains sedated with RASS -4 to -5 Paralyzed with vec 10 mg and deep sedation started overnight with improved vent synchrony  -Monitor neurological status. Notify the MD for any acute changes in exam.  -Pain: fentantyl gtt at 100 mcg/hr for pain.   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 On 3 L O2 at home. PFTs Oct 2020: FEV1 1.03L (27%), DLCO 52%.  - Improved vent synchrony overnight with deep sedation and paralysis, respiratory acidosis now resolved   - Pulm consult, recs appreciated. Per discussion with their team, unlikely to be PPFE which usually does not present with exacerbation. More likely pneumonia with underlying capillaritis 2/2 GVHD.       - Start fungal work-up       - hold steroids until fungal work up negative       - Consider bronch if vent settings improved in coming days  - remains mechanically ventilated: AC 30 / 8 / 350 / 80%              -Lowered PEEP due to restriction              -Placed CT to suction   -Lowered FiO2   -Attempted to lower RR became acidotic and  hypercarbic  -Veletri 20  -Last blood gas (1008) 7.31, 49, 69, 24 better than previous   - q12 ABGs  -s/p hydrocortisone 125 mg x1  -L chest tube to -20 mmHg suction, 125 yesterday, 20 this morning         Cardiovascular:    #Pulmonary artery hypertension  #Hypotension  -Tachycardic to 150s post-op, resolved overnight, now 120s with MAPs in 60s  -Pressor requirement worsening restarted vaso 2.4 weaned overnight now on 0.07 levo  -Last echo Jan 2020: EF 55-60%, RV pressure 27 mmHg above RA, no significant valvular abnormalities  -Repeat echo today for right heart strain 2/2 pulm hypertension  - velitri neb      GI care:   -NPO  -OG tube advanced, now post-pyloric  -start TF, goal rate 50 ml/h, currently at 30 ml/h  -Protonix qd      Fluids/ Electrolytes/ Nutrition:   -hold IVF  -ICU electrolyte replacement protocol  -No indication for parenteral nutrition  -Start TF via OJT      Renal/ Fluid Balance:    Adequate UOP, 1200 ml/24 h, net +2 L since admission  -Lasix 20 BID  -Will continue to monitor intake and output.  -Desai in place      Endocrine:    #Stress and steroid induced hyperglycemia  - -180, sliding scale insulin available (10 units yesterday)   - Insulin gtt  - hydrocortisone 125 mg x1  - hold steroids   - pta levothyroxine      ID/ Antibiotics:  #Septic Shock  -Fever Curve Tmax 98.5   -Immunosuppressed with PTA cyclosporine and prednisone   -WBC trend peak 22.4 (baseline 16)              -Wife reports that he had been ill before procedure  -Blood, sputum, pleural fluid, and urine cx pending  -Fungal work-up added per pulmonology: fungitel, urine histoplasma antigen, and fungal cx as above              -Potential for an empyema (last CT 15) if can not get source consider CT  -Vanc/zosyn (8/4 - )  -Acyclovir  -Micfungin  -Stopped levaquin (8/5)         Heme:     #Myelodysplastic syndrome s/p BMT 2016  -BMT consult, recs appreciated  -HgB stable      Prophylaxis:    -DVT PPX: Lovonox,  SCDs      MSK:    -PT and OT consulted. Appreciate recs.      Lines/ tubes/ drains:  -PIV x2, Left chest tubes x 2 (15.5 Fr and 24 Fr), arterial line, ETT, frank. OJ      Disposition:  -Surgical ICU        =========================================     Patient seen, findings and plan discussed with surgical ICU staff     Surendra Blanc  Medical Student   ====================================    SUBJECTIVE:   - increasing pressor requirements overnight for low MAPs, restarted norepi weaned off.  Currently on levo.     OBJECTIVE:   1. VITAL SIGNS:   Temp:  [97  F (36.1  C)-98.5  F (36.9  C)] 98  F (36.7  C)  Pulse:  [108-130] 124  Resp:  [30] 30  BP: ()/(60-83) 119/78  MAP:  [61 mmHg-81 mmHg] 74 mmHg  Arterial Line BP: ()/(52-65) 98/60  FiO2 (%):  [100 %] 100 %  SpO2:  [89 %-95 %] 92 %  Ventilation Mode: CMV/AC  (Continuous Mandatory Ventilation/ Assist Control)  FiO2 (%): 100 %  Rate Set (breaths/minute): 30 breaths/min  Tidal Volume Set (mL): 350 mL  PEEP (cm H2O): 8 cmH2O  Oxygen Concentration (%): 100 %  Peak Inspiratory Pressure (cm H2O) (Drager Hoda): 32  Resp: 30      2. INTAKE/ OUTPUT:   I/O last 3 completed shifts:  In: 3235.85 [I.V.:2815.85; NG/GT:300]  Out: 1380 [Urine:1255; Chest Tube:125]    3. PHYSICAL EXAMINATION:   Gen: intubated, sedated  Resp: mechanically ventilated 100% FiO2  CV: Sinus tach on monitor  Chest: Pleurx dressing intact, left chest tube with minimal serosanguinous output  Abd: soft, nondistended  Incision: c/d/I  Ext: WWP, no edema    4. INVESTIGATIONS:   Arterial Blood Gases   Recent Labs   Lab 08/06/21  0410 08/05/21  2143 08/05/21  1650 08/05/21  1008   PH 7.35 7.35 7.36 7.36   PCO2 43 42 42 42   PO2 72* 85 62* 76*   HCO3 24 23 23 24     Complete Blood Count   Recent Labs   Lab 08/06/21  0410 08/05/21  2143 08/05/21  1601 08/05/21  1000   WBC 22.4* 24.3* 22.1* 17.3*   HGB 13.3 13.4 14.0 13.9    299 300 282     Basic Metabolic Panel  Recent Labs   Lab 08/06/21  7584  21  0410 21  0001 21  2143 21  2004 21  1601 21  1000 21  0340   NA  --   --   --  148*  --  143 142 138   POTASSIUM  --  3.7  --  3.2*  --  4.0  4.0 3.7 4.0   CHLORIDE  --   --   --  120*  --  113* 110* 108   CO2  --   --   --  20  --  24 24 20   BUN  --   --   --  35*  --  35* 31* 28   CR  --   --   --  1.06  --  1.27* 1.18 1.23   *  --  203* 168* 185* 180* 168* 217*     Liver Function Tests  Recent Labs   Lab 21  0340   AST 24   ALT 16   ALKPHOS 114   BILITOTAL 1.3   ALBUMIN 2.4*     Pancreatic Enzymes  No lab results found in last 7 days.  Coagulation Profile  No lab results found in last 7 days.  Lactate  Invalid input(s): LACTATE    5. RADIOLOGY:   Recent Results (from the past 24 hour(s))   Echo Complete   Result Value    LVEF  > 65%    Narrative    [id number]  [id number]  [id number]  [id number]^BRENDEN^AMANUEL     [facility],[place]  Echocardiography Laboratory  [address] [place], [place] [address]     Name: BRIGITTE JUNIOR  MRN: [id number]  : 1960  Study Date: 2021 09:27 AM  Age: 60 yrs  Gender: Male  Patient Location: [facility]  Reason For Study: Shock  Ordering Physician: AMANUEL WILCOX  Performed By: ADI Alvarenga     BSA: 2.2 m2  Height: 71 in  Weight: 224 lb  HR: 117  BP: 86/56 mmHg  ______________________________________________________________________________  Procedure  Complete Portable Echo Adult.  ______________________________________________________________________________  Interpretation Summary  Technically difficult study due to dressing obscuring some views.     Left ventricular size, and wall motion are normal. The function is  hyperdynamic with an estimated ejection fraction is > 65%.     Diastolic Doppler findings (E/E' ratio and/or other parameters) suggest left  ventricular filling pressures are increased.     Global right ventricular function is mildly to moderately reduced while there  is  presevered wall motion and size.     Right ventricular systolic pressure is 26.7mmHg above the right atrial  pressure which was not estimated due to the patient being on a ventilator.     This study was compared with the study from 1/9/20 .  There has been no significant change.  ______________________________________________________________________________  Left Ventricle  Left ventricular size, wall motion and function are normal. The ejection  fraction is > 65%. Diastolic Doppler findings (E/E' ratio and/or other  parameters) suggest left ventricular filling pressures are increased. Left  ventricular diastolic function is not assessable.     Right Ventricle  The right ventricular wall motion is normal. The right ventricle is normal  size. Global right ventricular function is mildly to moderately reduced.     Atria  The atria cannot be assessed. The atrial septum is intact as assessed by color  Doppler .     Mitral Valve  The mitral valve is normal.     Aortic Valve  Mild aortic valve calcification is present. On Doppler interrogation, which is  limited, there is no significant stenosis or regurgitation.     Tricuspid Valve  The tricuspid valve is normal. Mild tricuspid insufficiency is present. Right  ventricular systolic pressure is 26.7mmHg above the right atrial pressure.     Pulmonic Valve  The valve leaflets are not well visualized. Trace pulmonic insufficiency is  present.     Vessels  The pulmonary artery and bifurcation cannot be assessed. The aorta root is  normal. Sinuses of Valsalva 3.8 cm. Ascending aorta 3.7 cm. Unable to assess  mean RA pressure given the patient is on a ventilator.     Pericardium  Prominent epicardial fat is noted. Trivial pericardial effusion is present.     Miscellaneous  Technically difficult study due to dressing obscuring some views.     Compared to Previous Study  This study was compared with the study from 1/9/20 . There has been no  change.  ______________________________________________________________________________  MMode/2D Measurements & Calculations  IVSd: 0.77 cm  LVIDd: 5.0 cm  LVIDs: 3.2 cm  LVPWd: 0.86 cm  FS: 36.0 %  LV mass(C)d: 140.5 grams  LV mass(C)dI: 63.5 grams/m2  asc Aorta Diam: 3.7 cm  LVOT diam: 2.5 cm  LVOT area: 5.1 cm2  RWT: 0.34  TAPSE: 1.8 cm     Doppler Measurements & Calculations  MV E max john: 90.4 cm/sec  MV A max john: 56.3 cm/sec  MV E/A: 1.6  MV dec slope: 611.1 cm/sec2  MV dec time: 0.15 sec  PA acc time: 0.11 sec     TR max john: 258.4 cm/sec  TR max P.7 mmHg  E/E' av.3  Lateral E/e': 22.0  Medial E/e': 22.5     ______________________________________________________________________________  Report approved by: Jah Templeton 2021 11:48 AM         XR Abdomen Port 1 View    Narrative    EXAMINATION:  XR ABDOMEN PORT 1 VIEWS 2021 12:08 PM     COMPARISON: Abdominal radiograph, 2021.    HISTORY: feeding tube placement.    FINDINGS: Frontal view of the abdomen. The tip of the feeding tube  projects over the third portion of the duodenum. Air-filled dilated  small bowel in the central abdomen. No pneumatosis or portal venous  gas. The lung bases are not completely visualized on this exam. Partly  visualized left chest tube. Vascular calcification of the iliac  vessels. Degenerative changes of the visualized spine.      Impression    IMPRESSION:   1. The tip of the feeding tube projects over the expected location of  the third portion of the duodenum.  2. Air-filled dilated small bowel loop in the central abdomen, which  may be seen with adynamic ileus versus small bowel obstruction.    I have personally reviewed the examination and initial interpretation  and I agree with the findings.    JOY FLORIAN MD         SYSTEM ID:  [id number]       =========================================      Patient seen, findings and plan discussed with surgical ICU staff.    Surendra Blanc  Medical Student

## 2021-08-07 NOTE — PROGRESS NOTES
Thoracic Surgery Progress Note  08/07/2021       Subjective:  No acute events. Bronch yesterday with minimal secretions. Transplant ID, BMT consulted yesterday. Continues on significant support.     Objective:  Temp:  [98  F (36.7  C)-98.8  F (37.1  C)] 98.5  F (36.9  C)  Pulse:  [] 124  Resp:  [20-33] 32  BP: (104-130)/(67-84) 126/83  Cuff Mean (mmHg):  [94] 94  MAP:  [59 mmHg-106 mmHg] 74 mmHg  Arterial Line BP: ()/(50-95) 89/64  FiO2 (%):  [80 %-100 %] 100 %  SpO2:  [81 %-97 %] 94 %    I/O last 3 completed shifts:  In: 2362.76 [I.V.:882.76; NG/GT:530]  Out: 2240 [Urine:2000; Chest Tube:240]     Vasopressin off  Levo 0.09  Flolan 20    Gen: Intubated, sedated  Resp: mechanically ventilated 100% FiO2  Chest: Pleurx dressing intact, left chest tube with minimal serosanguinous output  Abd: soft, nondistended  Incision: c/d/I  Ext: WWP, no edema     Labs:  Recent Labs   Lab 08/07/21  0347 08/06/21  1524 08/06/21  0410   WBC 28.4* 22.5*  22.5* 22.4*   HGB 13.5 12.8* 13.3    318 302       Recent Labs   Lab 08/07/21  0800 08/07/21  0627 08/07/21  0349 08/07/21  0347 08/06/21  1524 08/06/21  0410 08/05/21  1645 08/05/21  1601 08/05/21  1000   NA  --   --   --  149* 148* 145*   < > 143 142   POTASSIUM  --   --   --  4.6  4.6 4.8 3.8  3.7   < > 4.0  4.0 3.7   CHLORIDE  --   --   --  119* 118* 118*   < > 113* 110*   CO2  --   --   --  27 26 22   < > 24 24   BUN  --   --   --  54* 49* 41*   < > 35* 31*   CR  --   --   --  1.13 1.23 1.11   < > 1.27* 1.18   * 162* 176* 166* 162* 208*   < > 180* 168*   TRACE  --   --   --  8.7 8.7 8.0*   < > 8.7 8.8   MAG  --   --   --  2.7*  --  2.2  --   --  2.3   PHOS  --   --   --  3.8  --  2.8  --  3.9  --     < > = values in this interval not displayed.     ABG 7.25/55/31/24    Imaging:  CXR - pending     Assessment/Plan:   60 year old male with MDS s/p BMT 2016 c/b GVHD and recurrent left pleural effusions trapped lung, CAD x5 stents, pleural-parenchymal  fibroelastosis who is now s/p partial decortication, pleurx catheter, chest tube placement with Dr. Reynolds on 8/4. Post-operatively with worsening respiratory status ultimately requiring intubation. Currently concern for septic shock given vasopressor requirements. Still requiring significant respiratory support as well.    - Appreciate SICU care  - Sedation/pain per ICU  - Wean ventilator/flolan as able  - Consider further diuresis if CXR appearing overloaded  - Appreciate pulmonology recommendations -work-up pending  - Continue left chest tube to suction for now  - Pleurx catheter dressing to remain in place  - BMT consulted 8/4 for assistance with management, appreciate recs  - Transplant ID consulted, appreciate recs, work-up for opportunistic infection  - Agree with broad spectrum antibiotics    Discussed with staff    Casper Paige MD  PGY-3, General Surgery

## 2021-08-07 NOTE — PLAN OF CARE
Starting around 1100 patient started desatting while already on 100% fiO2. SICU called, multiple new vent settings attempted, oxygen continued to drop. O2 got as low as low 70s. A bronch was performed along with other breathing techniques, minimal sat improvement. Vecuronium given @1230, no change in O2. Proned by 1300, small improvement in sats. ABGs still poor with CO2s in 80s and pH<7.2

## 2021-08-07 NOTE — PROGRESS NOTES
"Bronchoscopy Risk Assessment Guidelines      A. Patient symptoms to consider when assessing pulmonary TB risk are:    I. Cough greater than 3 weeks; and fever, hemoptysis, pleuritic chest    pain, weight loss greater than 10 lbs, night sweats, fatigue, infiltrates on    upper lobes or superior segments of lower lobes, cavitation on chest    x-ray.   B. Patient risk factors to consider when assessing pulmonary TB risk are:    I. Exposure to known TB case, foreign-born persons (within 5 years of    arrival to [place]), residence in a crowded setting (correctional facility,     long-term care center, etc.), persons with HIV or immunosuppression.    Patients with symptoms and risk factors should generally be considered \"suspect risk\" and bronchoscopies should be performed in airborne precautions.    This patient has NO KNOWN RISK of Tuberculosis (proceed with bronchoscopy)    Specimens sent: yes  Complications: None  Scope used: Disposable    RT Tra on 8/7/2021 at 2:26 PM  "

## 2021-08-07 NOTE — PLAN OF CARE
D:Yg has shaken head no to cold and no to pain.  No belongings at bedside.   Neuro: Followed command to squeeze hands (very weakly), and wiggle toes. Pupils 2/sluggish/conjugate, red. RDZ weakly all warm/normal pulses/unknown sensation but reacts to touch.   VS: Afeb. RR 20-33. -134, BP arterial /58-74 maps 60-85.   CV: Sinus tach. L CT -20mmHg 0 output 500 in container, serosanguinous. L pleurex covered by drsg.   Pulm: APRV 100%, p high 28, lo 0, time hi 5.5,lo 0.44. LS  L coarse,diminished, R clear, diminished. Suctioning thick tan red particulate/streaking .  GI: TF  via NJ at goal 50 ml/hour, free water 30 ml every 4 hours.   : frank with  ml/hour output.  Lines/Gtts:  L arterial line with positional waveform. R internal jugular TL with levophed at 0.09 mcg/kg/min, blue with fentanyl 100 mcg/hour, insulin 1.5units/hour, precedex (started to decrease heart rate(BY MD), at 0.03 mcg/kg/hour, TKO line for meds. R piv saline locked  Skin:   Intact with L chest tube and pleurex drsgs.   Drains: frank, chest tube.  Labs: WBC to 28.4, ABG ph 7.25, PCO2 55, , bicarb 24 excess -3.8, on 100 % fiO2.   P: Continue to monitor respiratory effort, compliance, vent wean when appropriate. Continue to reorient and reassure Yg that he is making improvement. Assist family as able.

## 2021-08-07 NOTE — PROGRESS NOTES
Pt now proned and paralyzed. RT and RN present at bedside. Will do head turns every 2 hours.     Will continue to monitor and provide support.     Atiya Florez, RT

## 2021-08-07 NOTE — PLAN OF CARE
D/I:?Patient on unit 4A Surgical/Neuro ICU. See other note for description of decompensation leading to pronation  Neuro- TOF- 2/4 twitches on 0.4 of vec. BIS 20-30 on 30 of prop and 100 fentanyl.     CV-  Titrated up from 0.9 of levo to .22 and vaso. Does better when right lung is down. Pressures and sats both increase when right lung is down and head is facing towards the left. Currently 0.2 of levo and 2.4 of vaso  Pulm- APRV settings 100%, phigh 40 plow 0, time 5/0.38 with sats in mid to high 80s. PO2 in 50s with pH around 7.2.   GI- Tube feeding held around 6. 3 liquid stools of large volume, rectal pouch placed at 1830  - Desai low UO 30-60/ hour post lasix  Gtts-   Prop- 30, vec- 0.4, bicarb-100, levo-.2, vaso-2.4, fentanyl-100, insulin-2, heparin-1800  Skin- No concerns  IV's/Drains- R internal jugular, R axillary arterial line, 2 R PIVs  Pain- CPOT- no  See flow sheets for further interventions and assessments.   A: Declining, pressor requirement increasing and sats consistently decreasing  P:?Continue to monitor pt closely. Notify MD of significant changes.

## 2021-08-07 NOTE — PROCEDURES
Name of Procedure: Arterial Line Placement for the purpose of hemodynamic monitoring    Date of Procedure: 8/7/2021    Description of Procedure    Under direct ultrasound guidance, a 20-gauge needle was then advanced through the patient's skin and subcutaneous tissue and entered into the patient's right axillary artery. Strong pulsatile blood flow was immediately observed coming from the needle. A guidewire was advanced through the needle without resistance. The needle was subsequently removed. Small longitudinal incision was made at the entry site and the arterial catheter was advanced. The guidewire was then removed and the catheter was attached to a transducer. It was subsequently sutured into place. A biopatch and sterile tegaderm dressing was applied. There were not any immediate complications and the patient tolerated the procedure well.    Number of attempts: 1    Findings: A good waveform was observed on the monitor and the arterial line blood pressure readings matched those obtained via the blood pressure cuff.    Daria De La Mater

## 2021-08-07 NOTE — PROGRESS NOTES
SURGICAL ICU PROGRESS NOTE  August 7, 2021      ASSESSMENT: 59 yo male with h/o BMT c/b GVHD, pleural parenchymal fibroelastosis, PAH, CAD, PE, trapped left lung and recurrent pleural effusions, now s/p VATS partial left decortication and pleurex placement on 8/4/21 who was hypoxic in the PACU, failed BiPAP, requiring mechanical ventilation and appeared to be in septic shock with hypotension, fever, hypoxia, tachypnea. Overall, now with worsening hypercarbic hypoxemic respiratory failure requiring paralysis and proning.      TODAY'S PROGRESS/PLANS:   - Multiple ventilator adjustments  - Paralysis  - Bronchoscopy  - Diuresis  - Proning  - Bicarbonate supplementation  - CXR  - Serial labs  - Replace arterial line  - Start stress dose steroids  - Start vasopressin  - Hold feeds    PLAN:   Neuro/ pain/ sedation:  #Acute post operative pain  #Sedation required in the setting of chemical paralysis  -Monitor neurological status. Notify the MD for any acute changes in exam.  -Pain: fentantyl gtt   -Sedation: Stop precedex infusion, start propofol  - Paralysis: vecuronium. Goal of TOF 2/4. 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 On 3 L O2 at home. PFTs Oct 2020: FEV1 1.03L (27%), DLCO 52%.  - Pulm consult, recs appreciated. Per discussion with their team, unlikely to be PPFE which usually does not present with exacerbation. More likely pneumonia with underlying capillaritis 2/2 GVHD.  - Changed to APRV settings yesterday evening with improvement in severe hypoxemia with some level of expected hypercarbia with an acceptable pH of 7.25. Acutely hypercarbic today. Evaluated with CXR with no new pneumothorax, bedside US (with right lung positive sliding  signs x 3 and left apical sliding sign and none in the left base, unchanged from my personal US 24 hours prior). Given worsening hypercarbia, he was tried on conventional ventilation with acute desaturation at 70%. At this time he was chemically paralyzed without improvement in hypoxia (of note plateau and peak upper 30s). Difficult situation in which he oxygenates better with APRV but ventilation could not be improved to a satisfactory level. Given acute hypoxia, he was returned to essentially BILEVEL ventilation while chemically paralyzed and bicarbonate was supplemented. We utilized a low TL given his underlying restrictive lung disease, and his flow waveforms show a peek expiratory flow rate between 25-50%.   - Bronchoscopy was unrevealing, mucosa looked healthy and minimal secretions, a BAL was obtained.   - CT to suction, intermittent air leak present.   - Continue epoprostenol at 20  - Given acute life threatening decompensation, will start empiric treatment for PE with high intensity heparin. Given prone position, will obtain bilateral lower extremity US in am.      Cardiovascular:    #Septic shock  #Pulmonary artery hypertension  #Sinus tachycardia  -Escalating vasopressor requirements this afternoon, respiratory acidosis contributing.   - Norepinephrine. Vasopressin added at shock dose. MAP goal >65. Start stress dose steroids.   -Last echo Jan 2020: EF 55-60%, RV pressure 27 mmHg above RA, no significant valvular abnormalities.   - Bedside ECHO done by me: hyperdynamic, EF >50%, no evidence of right sided strain, no Vera's sign, no pericardial effusion.   - Check lactate now      GI care:   #Protein calorie malnutrition  -NPO  -Protonix every day  -Hold feeds given escalating pressor requirements.        Renal/ Fluid Balance:    #BETH  #Hyperkalemia  #Hypernatremia  - Developing BETH, now oliguric, had moderate response earlier to Metolazone and Furosemide, now hyperkalemic, hypernatremic with  worsening creatinine. Furosemide repeated with not much response.   - Worsening hypotension now with turns concerning for intravascular hypovolemic in the setting of total fluid overload. Will change concentrated bicarbonate to d5W at 100 ml/hr (3 amps per liter) and will likely need additional amps of bicarb replacement.   -Will continue to monitor intake and output.  -Frank in place      Endocrine:    #Stress and steroid induced hyperglycemia  - insulin gtt  - Stress dose steroids as above.       ID/ Antibiotics:  #Septic Shock  -Immunosuppressed with PTA cyclosporine and prednisone. Currently on hold.   - Persistent leukocytosis  - Current cultures NGTD. BAL sent today.   - CT C/A/P ordered however not completed given pulmonary instability.   - Continue Pip-Tazo, Acyclovir and Micafungin      Heme:     #Myelodysplastic syndrome s/p BMT 2016  -BMT consult, recs appreciated  -HgB stable  - Lovenox stopped. On heparin gtt for presumed PE.       Prophylaxis:    -DVT PPX: PPI, Heparin gtt      MSK:    - None      Lines/ tubes/ drains:  -PIV x2, Left chest tubes x 2 (15.5 Fr and 24 Fr), arterial line, ETT, frank, feeding tube      Disposition:  -Surgical ICU  Discussed with Dr. Nowak  Family updated. Currently  DNR. Prognosis very guarded. On salvage ventilator support with worsening end organ function.      Daria De La Mater   minutes exclusive of procedures     SUBJECTIVE:   Seen  Multiple times today with multiple ventilator adjustments in response to worsening hypercarbia and hypoxia. Escalating pressor requirements.     OBJECTIVE:   1. VITAL SIGNS:   Temp:  [98  F (36.7  C)-99.3  F (37.4  C)] 99.3  F (37.4  C)  Pulse:  [116-142] 137  Resp:  [10-35] 11  BP: (107-134)/(71-88) 113/77  Cuff Mean (mmHg):  [94] 94  MAP:  [60 mmHg-110 mmHg] 65 mmHg  Arterial Line BP: ()/(52-97) 88/52  FiO2 (%):  [100 %] 100 %  SpO2:  [73 %-97 %] 85 %  Ventilation Mode: APRV  (Airway Pressure Release Ventilation)  FiO2  (%): 100 %  Rate Set (breaths/minute): 26 breaths/min  Tidal Volume Set (mL): 400 mL  PEEP (cm H2O): 8 cmH2O  Oxygen Concentration (%): 100 %  Peak Inspiratory Pressure (cm H2O) (Drager Hoda): 12  Resp: 11      2. INTAKE/ OUTPUT:   I/O last 3 completed shifts:  In: 2535.4 [I.V.:875.4; NG/GT:560]  Out: 1923 [Urine:1668; Chest Tube:255]    3. PHYSICAL EXAMINATION:   Gen: intubated, sedated  Resp: mechanically ventilated, no lung sounds on left side, coarse right side. Air leak present, serosanguinous output.   CV: Sinus tach on monitor, extremities warm.   Chest: Pleurx dressing intact, left chest tube with minimal serosanguinous output  Abd: soft, nondistended  : frank in place, yellow urine.   Incision: c/d/I  Ext: WWP, no edema    4. INVESTIGATIONS:   Arterial Blood Gases   Recent Labs   Lab 08/07/21  1518 08/07/21  1338 08/07/21  1231 08/07/21  1142   PH 7.17* 7.17* 7.23* 7.18*   PCO2 82* 86* 70* 75*   PO2 60* 59* 40* 81   HCO3 30* 31* 29* 28     Complete Blood Count   Recent Labs   Lab 08/07/21  1519 08/07/21  1339 08/07/21  0347 08/06/21  1524   WBC 25.4* 26.3* 28.4* 22.5*  22.5*   HGB 13.6 13.8 13.5 12.8*    313 337 318     Basic Metabolic Panel  Recent Labs   Lab 08/07/21  1656 08/07/21  1620 08/07/21  1519 08/07/21  1515 08/07/21  0347 08/06/21  1524 08/06/21  0410   NA  --   --  152*  --  149* 148* 145*   POTASSIUM  --   --  5.5*  --  4.6  4.6 4.8 3.8  3.7   CHLORIDE  --   --  122*  --  119* 118* 118*   CO2  --   --  29  --  27 26 22   BUN  --   --  60*  --  54* 49* 41*   CR  --   --  1.37*  --  1.13 1.23 1.11   * 157* 164* 177* 166* 162* 208*     Liver Function Tests  Recent Labs   Lab 08/05/21  0340   AST 24   ALT 16   ALKPHOS 114   BILITOTAL 1.3   ALBUMIN 2.4*     Pancreatic Enzymes  No lab results found in last 7 days.  Coagulation Profile  No lab results found in last 7 days.  Lactate  Invalid input(s): LACTATE    5. RADIOLOGY:   Recent Results (from the past 24 hour(s))   Echo  Complete   Result Value    LVEF  > 65%    [facility]    [id number]  [id number]  [id number]  [id number]^[email or web address]^AMANUEL     [facility],[place]  Echocardiography Laboratory  [address] [place] [id number]     Name: BRIGITTE JUNIOR  MRN: [id number]  : 1960  Study Date: 2021 09:27 AM  Age: 60 yrs  Gender: Male  Patient Location: [facility]  Reason For Study: Shock  Ordering Physician: AMANUEL WILCOX  Performed By: ADI Alvarenga     BSA: 2.2 m2  Height: 71 in  Weight: 224 lb  HR: 117  BP: 86/56 mmHg  ______________________________________________________________________________  Procedure  Complete Portable Echo Adult.  ______________________________________________________________________________  Interpretation Summary  Technically difficult study due to dressing obscuring some views.     Left ventricular size, and wall motion are normal. The function is  hyperdynamic with an estimated ejection fraction is > 65%.     Diastolic Doppler findings (E/E' ratio and/or other parameters) suggest left  ventricular filling pressures are increased.     Global right ventricular function is mildly to moderately reduced while there  is presevered wall motion and size.     Right ventricular systolic pressure is 26.7mmHg above the right atrial  pressure which was not estimated due to the patient being on a ventilator.     This study was compared with the study from 20 .  There has been no significant change.  ______________________________________________________________________________  Left Ventricle  Left ventricular size, wall motion and function are normal. The ejection  fraction is > 65%. Diastolic Doppler findings (E/E' ratio and/or other  parameters) suggest left ventricular filling pressures are increased. Left  ventricular diastolic function is not assessable.     Right Ventricle  The right ventricular wall motion is normal. The right ventricle is normal  size. Global right  ventricular function is mildly to moderately reduced.     Atria  The atria cannot be assessed. The atrial septum is intact as assessed by color  Doppler .     Mitral Valve  The mitral valve is normal.     Aortic Valve  Mild aortic valve calcification is present. On Doppler interrogation, which is  limited, there is no significant stenosis or regurgitation.     Tricuspid Valve  The tricuspid valve is normal. Mild tricuspid insufficiency is present. Right  ventricular systolic pressure is 26.7mmHg above the right atrial pressure.     Pulmonic Valve  The valve leaflets are not well visualized. Trace pulmonic insufficiency is  present.     Vessels  The pulmonary artery and bifurcation cannot be assessed. The aorta root is  normal. Sinuses of Valsalva 3.8 cm. Ascending aorta 3.7 cm. Unable to assess  mean RA pressure given the patient is on a ventilator.     Pericardium  Prominent epicardial fat is noted. Trivial pericardial effusion is present.     Miscellaneous  Technically difficult study due to dressing obscuring some views.     Compared to Previous Study  This study was compared with the study from 20 . There has been no change.  ______________________________________________________________________________  MMode/2D Measurements & Calculations  IVSd: 0.77 cm  LVIDd: 5.0 cm  LVIDs: 3.2 cm  LVPWd: 0.86 cm  FS: 36.0 %  LV mass(C)d: 140.5 grams  LV mass(C)dI: 63.5 grams/m2  asc Aorta Diam: 3.7 cm  LVOT diam: 2.5 cm  LVOT area: 5.1 cm2  RWT: 0.34  TAPSE: 1.8 cm     Doppler Measurements & Calculations  MV E max john: 90.4 cm/sec  MV A max john: 56.3 cm/sec  MV E/A: 1.6  MV dec slope: 611.1 cm/sec2  MV dec time: 0.15 sec  PA acc time: 0.11 sec     TR max john: 258.4 cm/sec  TR max P.7 mmHg  E/E' av.3  Lateral E/e': 22.0  Medial E/e': 22.5     ______________________________________________________________________________  Report approved by: Jah Templeton 2021 11:48 AM         XR Abdomen Port 1 View     Narrative    EXAMINATION:  XR ABDOMEN PORT 1 VIEWS 8/5/2021 12:08 PM     COMPARISON: Abdominal radiograph, 8/4/2021.    HISTORY: feeding tube placement.    FINDINGS: Frontal view of the abdomen. The tip of the feeding tube  projects over the third portion of the duodenum. Air-filled dilated  small bowel in the central abdomen. No pneumatosis or portal venous  gas. The lung bases are not completely visualized on this exam. Partly  visualized left chest tube. Vascular calcification of the iliac  vessels. Degenerative changes of the visualized spine.      Impression    IMPRESSION:   1. The tip of the feeding tube projects over the expected location of  the third portion of the duodenum.  2. Air-filled dilated small bowel loop in the central abdomen, which  may be seen with adynamic ileus versus small bowel obstruction.    I have personally reviewed the examination and initial interpretation  and I agree with the findings.    JOY FLORIAN MD         SYSTEM ID:  [id number]

## 2021-08-08 NOTE — PROCEDURES
Pre- op: Right Pneumothorax  Post-op: Right Pneumothroax, Right Pleural Effusion  Procedure: Insertion of Right Thoracostomy Tube (24-Fr)  Findings: Rush of air upon entry into pleural space, ~100cc serosanguinous fluid immediately drained, chest tube with trace air leak, post CXR with improvement in PNX  Complications: None  EBL: 2cc  Specimen: Right Chest Pleural Fluid.    Procedure: CXR noted new R PNX, pt on + pressure ventilation (APRV), do to tenuous respiratory status and potential for decompensation emergent chest tube was placed. Patient was on hemodynamic monitoring with telemetry, pulse oximetry, invasive BP monitoring. Right chest prepped with chlorhexadine and draped in sterile fashion, Time out performed confirming correct patient procedure and laterality. Patient was already adequately sedated for mechanical ventilation. 15 blade used to make 2 cm incision in Right anterior axillary line ~ 5th intercostal space. SubQ tissue bluntly dissected and pleura entered bluntly with carrie clamp along superior border of inferior rib. Rush of air noted and rush of clear pleural fluid. Finger sweep confirmed entry into pleural space and lung was freely mobile. Fluid was collected in sterile 10 ml syringe for analysis. 24 Fr CT entered into thoracic cavity and directed posterosuperiorly. Tube placed to atrium and -20mmHg.   Sterile occlusive dressing was applied. Patient tolerated procedure well, no complications encountered. Remained hemodynamically stable throughout.

## 2021-08-08 NOTE — PROGRESS NOTES
Brief SICU note    Repositioned with increasing vasopressor requirement, levo 0.3 and vasopressin. Continued to be acidotic pH 7.28. Gave x2 50 meq of bicarbonate with 10 point improvement in systolic BP. Will increase bicarb gtt 150 mL/hr, add versed and attempt to wean propofol to improve BP and add angiotensin as additional pressor    Discussed with staff, Dr. Bert Paige MD  PGY-3, General Surgery

## 2021-08-08 NOTE — PROCEDURES
BEDSIDE PROCEDURE     Attending: Jacinda Walter MD     Fellow: N/A  Resident: David Sainz MD      SEDATION/PARALYZATION: Propofol, fentanyl, lidocaine 1% with epinephrine      ESTIMATED BLOOD LOSS: Minimal       COMPLICATIONS: None       INDICATIONS FOR PROCEDURE:  The patient has pneumonia for which bronchoscopy is indicated for both diagnostic and therapeutic purposes. The risks, benefits and alternatives were described to the patient s family and they were willing to proceed.       DESCRIPTION OF PROCEDURE: This procedure was performed at the bedside in the intensive care unit. A surgical time-out was undertaken to verify the patient s procedure and site. The patient was adequately sedated with intermittent propofol and fentanyl.      The patient was positioned in a supine position. The flexible bronchoscope was introduced into the trachea via the tracheostomy tube. Both right and left mainstem bronchi as well as branching bronchioles were visualized and suctioned. Bronchiolar lavage was performed and specimen was collected. The bronchoscope was withdrawn. The patient tolerated the procedure well without complications nor desaturation events.      Findings:  Mild-moderate thickness mucous     Complications:  None      David Sainz MD  PGY-1, General Surgery

## 2021-08-08 NOTE — PROGRESS NOTES
BMT Consult Progress note       Patient ID:  Byron Russo is a 60 year old male, 5 years and 2 months of his HCT for Myelodysplastic syndrome.       ASSESSMENT AND PLAN    Byron Russo is a 60 year old male who is 5 years and 2 months of his HCT for Myeloproliferative disease. 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 He had several episodes of pleural effusions and hydropneumothorax     He is currently admitted in the ICU with acute hypoxic respiratory failure and possible septic shock after decortication and pleurX catheter placement on 8/4/2021.     1. S/p Myeloablative HSCT for Myelodysplastic syndrome (5yrs and 2 months)  2. Chronic GVHD   3. 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 Acute on Chronic Hypoxic respiratory failure and possible Septic shock    With his worsening respiratory status he required proning and paralysis, he is still on Vasopressors (levophed and angiotensin).  He is being treated with broad spectrum antibiotics and antifungals.      PLAN :   - Please add Aspergillus Glucomannan and pneumocystis PCR in the BAL. Recommend obtaining Karius test from the blood.   - Continue to hold immunosuppressive therapy with Cyclosporine in the setting of possible fungal infection   - Continue Micafungin and Zosyn for broad spectrum coverage. Appreciate recommendations from Transplant ID.   - No new recommendations from BMT stand point, appreciate the SICU team for taking care of the patient.       HPI :     Yg Russo is a 60 year old gentle man  S/p myeloablative allo-sib transplant for MDS in 2016 and ongoing complication of chronic udnxq-zjvhnd-zdlv disease-related accumulation of pleural effusion and hydropneumothorax of the left lung.      He has been on 3-4 Liters of oxygen at home with shortness of breath and CUELLAR in the baseline.     He has  trapped left lung and  had several thoracenteses over the last months.  He was seen by Dr. Reynolds in early May who thought he would be a candidate for a PleurX catheter.He was placed on a taper dose of prednisone and was at a dose of 10 mg a day with the goal of 5 mg/ day by mid August, when he is planned for the procedure.     He follows Pulmonology with Dr. Brady as well who felt that pleurodesis would not be successful in his case given the rapid accumulation.  He was on prednisone at 5mg/day (since 7/21) and CSA at 100 mg bid     CXR In July 7,2021 - The chest X ray showed Left loculated pleural effusion with new multifocal bubbly lucency in the left apex and left base. Concern for empyema.   Unchanged interstitial opacity in the right lung. he subsequently underwent thoracentesis which was not infected. He was placed on Augmentin in July.     He underwent Left partial Decortication and PleurX catheter placement on 8/4/2021, intra operatively he was doing well. However following the procedure he was increasingly hypoxic and tachypenic and required mechanical intubation and ventilator support. There is  Concern for fungal infection versus aspiration pneumonia. He is on Zosyn and Micafungin. Pulmonology, Transplant ID, Thoracic Surgery following. He is under care of SICU team.      Transplant Essential Data:  Diagnosis AMLU Acute myelogeneous leukemia, Unknown  HCT Type Allogeneic    Prep Regimen Cytoxan  Fludarabine  TBI   Donor Source Related [facility]    GVHD Prophylaxis Cyclosporine  Mycophenolate  Clinical Trials          I have assessed all abnormal lab values for their clinical significance and any values considered clinically significant have been addressed in the assessment and plan    Family History:   Family History   Problem Relation Age of Onset     Myocardial Infarction Father 58     Coronary Artery Disease Father      Heart Failure Mother      Glaucoma Mother      Coronary Artery Disease Brother       Coronary Artery Disease Brother      Cancer Brother         GIST     Skin Cancer No family hx of      Melanoma No family hx of      Macular Degeneration No family hx of        Social History:   Social History     Socioeconomic History     Marital status:      Spouse name: Not on file     Number of children: Not on file     Years of education: Not on file     Highest education level: Not on file   Occupational History     Not on file   Tobacco Use     Smoking status: Never Smoker     Smokeless tobacco: Former User   Substance and Sexual Activity     Alcohol use: Yes     Alcohol/week: 0.0 standard drinks     Comment: Rare     Drug use: No     Sexual activity: Not on file   Other Topics Concern     Parent/sibling w/ CABG, MI or angioplasty before 65F 55M? Not Asked   Social History Narrative     Not on file     Social Determinants of Health     Financial Resource Strain:      Difficulty of Paying Living Expenses:    Food Insecurity:      Worried About Running Out of Food in the Last Year:      Ran Out of Food in the Last Year:    Transportation Needs:      Lack of Transportation (Medical):      Lack of Transportation (Non-Medical):    Physical Activity:      Days of Exercise per Week:      Minutes of Exercise per Session:    Stress:      Feeling of Stress :    Social Connections:      Frequency of Communication with Friends and Family:      Frequency of Social Gatherings with Friends and Family:      Attends [employer] Services:      Active Member of Clubs or Organizations:      Attends Club or Organization Meetings:      Marital Status:    Intimate Partner Violence: Not At Risk     Fear of Current or Ex-Partner: No     Emotionally Abused: No     Physically Abused: No     Sexually Abused: No       Past Medical History:   Past Medical History:   Diagnosis Date     Blepharitis      CAD (coronary artery disease) 2001     Yequu-arugbq-izpz disease (H)      Hyperlipidemia      Hypertension     Controled by medication      Hypothyroidism      Obesity      Oxygen dependent     Indogen portable      Pulmonary embolism (H) 2/2016     Varicose veins         Past Surgical History:   Past Surgical History:   Procedure Laterality Date     APPENDECTOMY       ARTHROSCOPY KNEE WITH MEDIAL MENISCECTOMY  6/20/2011    Procedure:ARTHROSCOPY KNEE WITH MEDIAL MENISCECTOMY; Surgeon:SACHIN SAMANO; Location:[facility]     BRONCHOSCOPY (RIGID OR FLEXIBLE), DIAGNOSTIC N/A 2/6/2019    Procedure: COMBINED BRONCHOSCOPY (RIGID OR FLEXIBLE), LAVAGE;  Surgeon: Louise Rogel MD;  Location: [facility]     coronary artery stents placed x 5 2001     IR THORACENTESIS  11/27/2020     IR THORACENTESIS  12/18/2020     IR THORACENTESIS  12/23/2020     IR THORACENTESIS  1/8/2021     IR THORACENTESIS  3/10/2021     IR THORACENTESIS  4/1/2021     IR THORACENTESIS  4/22/2021     IR THORACENTESIS  5/21/2021     IR THORACENTESIS  6/11/2021     IR THORACENTESIS  6/30/2021     IR THORACENTESIS  7/15/2021     THORACENTESIS Left 11/27/2020    Procedure: THORACENTESIS;  Surgeon: Fred Hahn MD;  Location: [facility] OR     THORACENTESIS Left 12/18/2020    Procedure: THORACENTESIS @1000;  Surgeon: Nasir Greene MD;  Location: [facility] OR     THORACENTESIS Left 12/23/2020    Procedure: THORACENTESIS;  Surgeon: Dary Wilkinson MD;  Location: [facility] OR     THORACENTESIS Left 1/8/2021    Procedure: THORACENTESIS;  Surgeon: Carl Singh PA-C;  Location: [facility] OR     THORACENTESIS Left 3/10/2021    Procedure: THORACENTESIS;  Surgeon: Dary Wilkinson MD;  Location: [facility] OR     THORACENTESIS Left 4/1/2021    Procedure: THORACENTESIS @1000;  Surgeon: Mikal Lema MD;  Location: [facility]     THORACENTESIS Left 4/22/2021    Procedure: THORACENTESIS;  Surgeon: Vladimir Mercado PA-C;  Location: [facility]     THORACENTESIS N/A 5/21/2021    Procedure: THORACENTESIS;  Surgeon: Aquiles Powers PA-C;  Location: [facility] OR     THORACENTESIS Left 6/11/2021    Procedure:  THORACENTESIS LEFT;  Surgeon: Mikal Lema MD;  Location: [facility]     THORACENTESIS Left 6/30/2021    Procedure: THORACENTESIS;  Surgeon: Mikal Lema MD;  Location: [facility]     THORACENTESIS Left 7/15/2021    Procedure: THORACENTESIS LEFT;  Surgeon: Mikal Lema MD;  Location: [facility] OR     THORACOSCOPIC INSERTION DRAINAGE CATHETER Left 8/4/2021    Procedure: Left Video Assisted Thoracic Surgery, Placement of PleurX, Partial Decortication;  Surgeon: Lobo Reynolds MD;  Location: [facility]       Allergies:   Allergies   Allergen Reactions     Atorvastatin Other (See Comments)     Back pain  Tolerates rosuvastatin     Augmentin [Amoxicillin-Pot Clavulanate] Itching and Rash     Tolerated amoxicillin on its own 7/2020     Lifitegrast      Eyes stinging when using this for about 15 min after use.        Home Medications      Prior to Admission medications    Medication Sig Start Date End Date Taking? Authorizing Provider   acetaminophen (TYLENOL) 325 MG tablet Take 1-2 tablets (325-650 mg) by mouth every 4 hours as needed for mild pain or fever 2/7/19  Yes Gerald Doty   acyclovir (ZOVIRAX) 800 MG tablet Take 1 tablet (800 mg) by mouth 2 times daily 3/18/21  Yes Uli Enciso MD   amLODIPine (NORVASC) 5 MG tablet Take 1 tablet (5 mg) by mouth daily 1/7/21  Yes Uli Enciso MD   aspirin 81 MG EC tablet Take 81 mg by mouth daily Reported on 5/12/2017 7/27/16  Yes Aleta Donovan PA-C   carboxymethylcellulose PF (REFRESH PLUS) 0.5 % ophthalmic solution Place 1 drop into both eyes 3 times daily as needed for dry eyes   Yes Unknown, Entered By History   cycloSPORINE modified (GENERIC EQUIVALENT) 100 MG capsule Take 1 capsule (100 mg) by mouth 2 times daily 3/25/21  Yes Angela Bunch PA-C   cycloSPORINE modified (GENERIC EQUIVALENT) 25 MG capsule On HOLD, current dose is 200mg twice daily. 3/25/21  Yes Angela Bunch PA-C   fluconazole (DIFLUCAN) 100 MG tablet Take 1 tablet (100 mg) by  mouth daily  Patient taking differently: Take 100 mg by mouth every evening  3/18/21  Yes Uli Enciso MD   levofloxacin (LEVAQUIN) 250 MG tablet Take 1 tablet (250 mg) by mouth daily 3/18/21  Yes Uli Enciso MD   levothyroxine (SYNTHROID/LEVOTHROID) 150 MCG tablet Take 1 tablet (150 mcg) by mouth daily 3/18/21  Yes Uli Enciso MD   magnesium oxide (MAG-OX) 400 (241.3 Mg) MG tablet Take 800 mg by mouth every evening  10/29/20  Yes Maranda Mayes PA-C   metoprolol tartrate (LOPRESSOR) 25 MG tablet Take 1 tablet (25 mg) by mouth daily  Patient taking differently: Take 25 mg by mouth every evening  3/18/21  Yes Uli Enciso MD   predniSONE (DELTASONE) 5 MG tablet Take 1 and 0.5 tablets every day (7.5 mg every day)  Patient taking differently: Take 5 mg by mouth daily  7/7/21 8/7/21 Yes Cristal Arrington MD   rosuvastatin (CRESTOR) 5 MG tablet Take 1 tablet (5 mg) by mouth daily  Patient taking differently: Take 5 mg by mouth every evening  3/18/21  Yes Uli Enciso MD   sulfamethoxazole-trimethoprim (BACTRIM DS) 800-160 MG tablet Take 1 tablet by mouth 2 times daily On Monday's and Tuesday's only 3/18/21  Yes Uli Enciso MD   order for DME Equipment being ordered: Oxygen. Please provide patient with continuous flow oxygen at 3 LPM via nasal cannula for activity and while sleeping. Patient will need concentrator, conserving device, and portable oxygen. 3/23/20   Uli Enciso MD   order for DME Equipment being ordered: Oxygen. Please provide patient with continuous flow oxygen at 3 LPM via nasal cannula. Patient will need concentrator, conserving device, and portable oxygen. Length of need is up to 6 months; will continue to reassess. Patient will need transportation oxygen delivered to the [facility], [address] [place] [id number]. [address], Room 5429. 1/15/20   Ryan Chan MD   order for DME Equipment being ordered: Please provide portable  "oxygen at 2 LPM with activity and with sleep via nasal canula. Patient requesting small tanks he can wear with back pack for his work. 1/7/20   Travis Brady MD       Review of Systems      Unable to do as patient was intubated     PHYSICAL EXAM      Weight     Wt Readings from Last 3 Encounters:   08/08/21 106.3 kg (234 lb 5.6 oz)   07/28/21 105.7 kg (233 lb)   07/15/21 106.6 kg (235 lb)          BP (!) 117/39   Pulse (!) 129   Temp 98.4  F (36.9  C) (Axillary)   Resp 11   Ht 1.816 m (5' 11.5\")   Wt 106.3 kg (234 lb 5.6 oz)   SpO2 94%   BMI 32.23 kg/m       General: Intubated and sedated   Respiratory : using mechanical ventilation  Cardiovascular: RRR, no M/R/G   Abdominal/Rectal: +BS  Lymphatics: +edema  Skin: No rashes or petechaie      Jackeline Yap MD  Hematology/Oncology/Transplant Fellow  Pgr : [phone]  "

## 2021-08-08 NOTE — PROGRESS NOTES
SURGICAL ICU PROGRESS NOTE  August 8, 2021      ASSESSMENT: 59 yo male with h/o BMT c/b GVHD, pleural parenchymal fibroelastosis, PAH, CAD, PE, trapped left lung and recurrent pleural effusions, now s/p VATS partial left decortication and pleurex placement on 8/4/21 who was hypoxic in the PACU, failed BiPAP, requiring mechanical ventilation and appeared to be in septic shock with hypotension, fever, hypoxia, tachypnea. Overall, now with worsening hypercarbic hypoxemic respiratory failure requiring paralysis and proning.      TODAY'S PROGRESS/PLANS:   - Supinate  - CXR  - Chest tube placement for PTX  - Pleural fluid sample collected for analysis  - BLE DVT scans  - Formal ECHO  - Stop heparin infusion, start subcutaneous heparin  - Stop paralytic  - Stop bicarbonate infusion  - D5W @ 75  - Ok to resume trophic feeds  - Wean Angiotensin to off    PLAN:   Neuro/ pain/ sedation:  #Acute post operative pain  #Sedation required in the setting of chemical paralysis  -Monitor neurological status. Notify the MD for any acute changes in exam.  -Pain: fentantyl gtt   -Sedation: Propofol transitioned to Midazolam overnight given hemodynamic instability.   - Paralysis: vecuronium. Stop today.   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 On 3 L O2 at home. PFTs Oct 2020: FEV1 1.03L (27%), DLCO 52%.  - Pulm consult, recs appreciated. Per discussion with their team, unlikely to be PPFE which usually does not present with exacerbation. More likely pneumonia with underlying capillaritis 2/2 GVHD.  - Over the last 48 hours severely hypoxemic,  changed to APRV settings with improvement in severe hypoxemia with some level  of expected hypercarbia; however, decompensated 18 hours into that change with severe acute hypercarbia. Did not tolerate transition back to conventional ventilation, proning and paralysis with refractory hypoxemia until returned to essentially BILEVEL ventilation since he was still chemically paralyzed with eventual recruitment and improvement in hypercarbia.   - Supinate today with no plans to reprone as it did not affect his oxygenation. Stop the paralytic, he's likely to benefit more from APRV if we maintain his respiratory drive.   - Spontaneous right pneumothorax-> s/p chest thoracostomy with residual pneumothorax. Pleural fluid sent for analysis at the time of insertion.   -  Continue APRV PH 40 PL 0 TH 5 TL 0.38. I utilized a low TL given his underlying restrictive lung disease, and his flow waveforms show a peek expiratory flow rate between 25-50%.   - Bronchoscopy 8/7 was unrevealing, mucosa looked healthy and minimal secretions, a BAL was obtained.   - L CT to suction, intermittent air leak present. L  pleurex catheter to gravity drainage.   - Continue epoprostenol at full strength.   - 8/7 Given acute life threatening decompensation, started treatment for presumed PE. Echocardiogram and BLE DVT scans did not support this diagnosis. Will stop heparin infusion.        Cardiovascular:    #Septic shock  #Pulmonary artery hypertension  #Sinus tachycardia  - Norepinephrine. Vasopressin now off since Angiotensin II was started overnight. Plan to wean Angiotensin to off (ok to let bag run out) and use norepinephrine to support  MAP goal >65. Continue stress dose steroids.   -Last echo Jan 2020: EF 55-60%, RV pressure 27 mmHg above RA, no significant valvular abnormalities.   - Formal ECHO  EF 60-65%, RV function moderately reduced, no pericardial effusion.        GI care:   #Protein calorie malnutrition  - As vasopressor support has lessened, start trophic feeds now.   -Protonix every day       Renal/ Fluid  Balance:    #BETH  #Hyperkalemia  #Hypernatremia  - Developing BETH, oliguria improved with some fluid administration (after diuresis 8/7). Hyperkalemia resolved. Hypernatremia up to 155. Was on bicarbonate infusion overnight (in D5W), serum bicarb up to 34, stop bicarbonate this am, change to D5W at lesser rate 75 ml/hr.    -Will continue to monitor intake and output.  -Frank in place      Endocrine:    #Stress and steroid induced hyperglycemia  - insulin gtt  - Stress dose steroids as above.       ID/ Antibiotics:  #Septic Shock  -Immunosuppressed with PTA cyclosporine and prednisone. Currently on hold.   - Persistent leukocytosis  - Current cultures NGTD. BAL sent 8/7 gram stain and KOH prep with no organisms.   - Pleural fluid sent during CT insertion.   - CT C/A/P ordered 8/7 however not completed given pulmonary instability.   - Continue Pip-Tazo, Acyclovir and Micafungin      Heme:     #Myelodysplastic syndrome s/p BMT 2016  -BMT consult, recs appreciated  -HgB stable  -Stop heparin gtt, start subcutaneous heparin for prophylaxis.       Prophylaxis:    -DVT PPX: PPI, Heparin SQ      MSK:    - None      Lines/ tubes/ drains:  -PIV x2, Left chest tubes x 2 (15.5 Fr and 24 Fr), arterial line, ETT, frnak, feeding tube, R CT, R IJ      Disposition:  -Surgical ICU  Discussed with Dr. Nowak  Family updated. Currently  DNR. Prognosis very guarded.      Daria De La Mater  CC 60 minutes exclusive of procedures     SUBJECTIVE:   Proned, paralyzed unable to obtain ROS. Started on angiotensin overnight.     OBJECTIVE:   1. VITAL SIGNS:   Temp:  [97  F (36.1  C)-99.3  F (37.4  C)] 98.4  F (36.9  C)  Pulse:  [122-147] 129  Resp:  [11-12] 11  BP: ()/(22-85) 117/39  MAP:  [53 mmHg-100 mmHg] 78 mmHg  Arterial Line BP: ()/(45-87) 98/66  FiO2 (%):  [100 %] 100 %  SpO2:  [80 %-96 %] 94 %  Ventilation Mode: APRV  (Airway Pressure Release Ventilation)  FiO2 (%): 100 %  Rate Set (breaths/minute): 26 breaths/min  Tidal  Volume Set (mL): 400 mL  PEEP (cm H2O): 8 cmH2O  Oxygen Concentration (%): 100 %  Resp: 11      2. INTAKE/ OUTPUT:   I/O last 3 completed shifts:  In: 4594.34 [P.O.:3; I.V.:3489.34; NG/GT:552]  Out: 1450 [Urine:1245; Stool:50; Chest Tube:155]    3. PHYSICAL EXAMINATION:   Gen: intubated, sedated  Resp: mechanically ventilated, no lung sounds on left side, coarse right side. Air leak present, serosanguinous output.   CV: Sinus tach on monitor, extremities warm.   Chest: Pleurx dressing intact, left chest tube with minimal serosanguinous output  Abd: soft, nondistended  : frank in place, yellow urine.   Incision: c/d/I  Ext: WWP, no edema    4. INVESTIGATIONS:   Arterial Blood Gases   Recent Labs   Lab 08/08/21  1202 08/08/21  0853 08/08/21  0441 08/08/21  0017   PH 7.41 7.42 7.38 7.28*   PCO2 58* 52* 60* 67*   PO2 71* 84 63* 67*   HCO3 37* 34* 36* 31*     Complete Blood Count   Recent Labs   Lab 08/08/21  0442 08/07/21  1519 08/07/21  1339 08/07/21  0347   WBC 19.1* 25.4* 26.3* 28.4*   HGB 13.1* 13.6 13.8 13.5    316 313 337     Basic Metabolic Panel  Recent Labs   Lab 08/08/21  1201 08/08/21  1042 08/08/21  1041 08/08/21  0854 08/08/21  0604 08/08/21  0442 08/08/21  0018 08/07/21  2024 08/07/21  1733 08/07/21  1519   NA  --   --  155*  --   --  155* 153* 154* 153* 152*   POTASSIUM  --   --   --   --   --  4.3 5.0 4.4 5.4* 5.5*   CHLORIDE  --   --   --   --   --  118* 119* 125*  --  122*   CO2  --   --   --   --   --  34* 31 27  --  29   BUN  --   --   --   --   --  82* 76* 64*  --  60*   CR  --   --   --   --   --  1.90* 1.81* 1.60*  --  1.37*   * 124*  --  127* 151* 153* 199* 119*  --  164*     Liver Function Tests  Recent Labs   Lab 08/08/21  0442 08/07/21  1733 08/05/21  0340   AST 55* 52* 24   ALT 27 28 16   ALKPHOS 130 132 114   BILITOTAL 0.8 0.7 1.3   ALBUMIN 1.9* 2.0* 2.4*   INR  --  1.74*  --      Pancreatic Enzymes  No lab results found in last 7 days.  Coagulation Profile  Recent Labs   Lab  21  1733   INR 1.74*     Lactate  Invalid input(s): LACTATE    5. RADIOLOGY:   Recent Results (from the past 24 hour(s))   Echo Complete   Result Value    LVEF  > 65%    [facility]    [id number]  [id number]  [id number]  [id number]^BRENDEN^AMANUEL     [facility],[place]  Echocardiography Laboratory  [address] [place] [address]     Name: BRIGITTE JUNIOR  MRN: [id number]  : 1960  Study Date: 2021 09:27 AM  Age: 60 yrs  Gender: Male  Patient Location: [facility]  Reason For Study: Shock  Ordering Physician: AMANUEL WILCOX  Performed By: ADI Alvarenga     BSA: 2.2 m2  Height: 71 in  Weight: 224 lb  HR: 117  BP: 86/56 mmHg  ______________________________________________________________________________  Procedure  Complete Portable Echo Adult.  ______________________________________________________________________________  Interpretation Summary  Technically difficult study due to dressing obscuring some views.     Left ventricular size, and wall motion are normal. The function is  hyperdynamic with an estimated ejection fraction is > 65%.     Diastolic Doppler findings (E/E' ratio and/or other parameters) suggest left  ventricular filling pressures are increased.     Global right ventricular function is mildly to moderately reduced while there  is presevered wall motion and size.     Right ventricular systolic pressure is 26.7mmHg above the right atrial  pressure which was not estimated due to the patient being on a ventilator.     This study was compared with the study from 20 .  There has been no significant change.  ______________________________________________________________________________  Left Ventricle  Left ventricular size, wall motion and function are normal. The ejection  fraction is > 65%. Diastolic Doppler findings (E/E' ratio and/or other  parameters) suggest left ventricular filling pressures are increased. Left  ventricular diastolic function is not  assessable.     Right Ventricle  The right ventricular wall motion is normal. The right ventricle is normal  size. Global right ventricular function is mildly to moderately reduced.     Atria  The atria cannot be assessed. The atrial septum is intact as assessed by color  Doppler .     Mitral Valve  The mitral valve is normal.     Aortic Valve  Mild aortic valve calcification is present. On Doppler interrogation, which is  limited, there is no significant stenosis or regurgitation.     Tricuspid Valve  The tricuspid valve is normal. Mild tricuspid insufficiency is present. Right  ventricular systolic pressure is 26.7mmHg above the right atrial pressure.     Pulmonic Valve  The valve leaflets are not well visualized. Trace pulmonic insufficiency is  present.     Vessels  The pulmonary artery and bifurcation cannot be assessed. The aorta root is  normal. Sinuses of Valsalva 3.8 cm. Ascending aorta 3.7 cm. Unable to assess  mean RA pressure given the patient is on a ventilator.     Pericardium  Prominent epicardial fat is noted. Trivial pericardial effusion is present.     Miscellaneous  Technically difficult study due to dressing obscuring some views.     Compared to Previous Study  This study was compared with the study from 20 . There has been no change.  ______________________________________________________________________________  MMode/2D Measurements & Calculations  IVSd: 0.77 cm  LVIDd: 5.0 cm  LVIDs: 3.2 cm  LVPWd: 0.86 cm  FS: 36.0 %  LV mass(C)d: 140.5 grams  LV mass(C)dI: 63.5 grams/m2  asc Aorta Diam: 3.7 cm  LVOT diam: 2.5 cm  LVOT area: 5.1 cm2  RWT: 0.34  TAPSE: 1.8 cm     Doppler Measurements & Calculations  MV E max john: 90.4 cm/sec  MV A max john: 56.3 cm/sec  MV E/A: 1.6  MV dec slope: 611.1 cm/sec2  MV dec time: 0.15 sec  PA acc time: 0.11 sec     TR max john: 258.4 cm/sec  TR max P.7 mmHg  E/E' av.3  Lateral E/e': 22.0  Medial E/e': 22.5      ______________________________________________________________________________  Report approved by: Jah Templeton 08/05/2021 11:48 AM         XR Abdomen Port 1 View    Narrative    EXAMINATION:  XR ABDOMEN PORT 1 VIEWS 8/5/2021 12:08 PM     COMPARISON: Abdominal radiograph, 8/4/2021.    HISTORY: feeding tube placement.    FINDINGS: Frontal view of the abdomen. The tip of the feeding tube  projects over the third portion of the duodenum. Air-filled dilated  small bowel in the central abdomen. No pneumatosis or portal venous  gas. The lung bases are not completely visualized on this exam. Partly  visualized left chest tube. Vascular calcification of the iliac  vessels. Degenerative changes of the visualized spine.      Impression    IMPRESSION:   1. The tip of the feeding tube projects over the expected location of  the third portion of the duodenum.  2. Air-filled dilated small bowel loop in the central abdomen, which  may be seen with adynamic ileus versus small bowel obstruction.    I have personally reviewed the examination and initial interpretation  and I agree with the findings.    JOY FLORIAN MD         SYSTEM ID:  [id number]

## 2021-08-08 NOTE — PROGRESS NOTES
[facility]   Pulmonary   Progress Note  Byron Russo MRN: [id number]  1960  Date of Admission:8/4/2021  Date of Service: 08/08/2021        Assessment & Plan         History of Pleuralparanchemal Fibroelastosis   Acute on Chronic hypoxic respiratory failure   S/P VATS with Pleurx and thoracic (not lung) decortication   Severe restrictive lung disease with moderate diffusion impairment        Discussion:   60 year old male with PMHx most significant for MDS with history of BMT complicated by vciro-ibtoye-eghk disease, pleural parenchymal fibroelastosis (PPFE), on 4 L of oxygen, pulmonary hypertension, coronary disease history of PE, trapped lung with recurrent pleural effusions now status post VATS without lung decortication with Pleurx catheter placement complicated by postoperative hypoxia requiring intubation.    Infectious workup thus far unrevealing; further workup including blastomyces ag, histoplasma ag, aspergillus galactomannan in process. His underlying PPFE unlikely to be contributing to his current presentation. Hospital course further complicated by new right-sided pneumothorax now status post chest tube 8/8/2021.     Recommendations:               - no new recommendations, we will continue to follow     Patient seen & discussed w/  Dr. Caitlyn M.D., who is in agreement    Cm Esteban M.D.  Pulmonary and Critical Care Medicine Fellow    Attending note:  Patient seen, examined and discussed with . All data reviewed. Agree with the assessment and plan as outlined in the above note.  Acute respiratory failure after left decortication and placement of PleurX catheter for post HSCT serositis unresponsive to immunosuppression.  Septic shock.  History of presumed PPFE based on CT appearance (no biopsy obtained) but this does not acutely progress.  New RLL infiltrate after procedure, on antibiotics, await BAL results.  New right sided pneumothorax on CXR this afternoon, chest  tube placed.  Continue full vent support; no additional recommendations for PPFE or serositis at this time.    Trista Brady MD  [phone]    08/08/2021    Interval History      Had worsening shock requiring three pressors (angiotensin II added) and acidosis requiring bicarb gtt -> now weaning down on pressors. Remains intubated, currently on APRV. Worsening renal function (creatinine 1.90) and hypernatremia today (sodium 155).       Physical Exam Temp:  [97  F (36.1  C)-99.3  F (37.4  C)] 98.4  F (36.9  C)  Pulse:  [122-147] 129  Resp:  [11-12] 11  BP: ()/(22-85) 117/39  MAP:  [53 mmHg-100 mmHg] 78 mmHg  Arterial Line BP: ()/(45-87) 98/66  FiO2 (%):  [100 %] 100 %  SpO2:  [80 %-96 %] 94 %  I/O last 3 completed shifts:  In: 4594.34 [P.O.:3; I.V.:3489.34; NG/GT:552]  Out: 1450 [Urine:1245; Stool:50; Chest Tube:155]  Wt Readings from Last 1 Encounters:   08/08/21 106.3 kg (234 lb 5.6 oz)    Body mass index is 32.23 kg/m . Ventilation Mode: APRV  (Airway Pressure Release Ventilation)  FiO2 (%): 100 %  Rate Set (breaths/minute): 26 breaths/min  Tidal Volume Set (mL): 400 mL  PEEP (cm H2O): 8 cmH2O  Oxygen Concentration (%): 100 %  Resp: 11      Exam:  General: Sedated  HEENT: Intubated  CV: Regular  Resp: Decreased breath sounds left chest tube and Pleurx catheter in place serosanguinous drainage  Extremities: WWP, no LE edema  Neuro: Sedated    Data   Labs: reviewed in EMR and notable labs listed below.      Imaging: reviewed in EMR and notable imaging listed below.        Medications     acyclovir  800 mg Oral BID     artificial tears   Both Eyes At Bedtime     hydrocortisone sodium succinate PF  50 mg Intravenous Q6H     iopamidol (ISOVUE-370)  135 mL Intravenous Once     levothyroxine  150 mcg Oral Daily     micafungin  150 mg Intravenous Q24H     multivitamins w/minerals  15 mL Per Feeding Tube Daily     pantoprazole  40 mg Per Feeding Tube QAM AC     piperacillin-tazobactam  4.5 g Intravenous Q6H      protein modular  1 packet Per Feeding Tube TID     rosuvastatin  5 mg Oral QPM     sodium chloride (PF)  3 mL Intracatheter Q8H     sodium chloride (PF)  84 mL Intravenous Once

## 2021-08-08 NOTE — PLAN OF CARE
Major Shift Events:  Vec turned off around 1000. Versed off by 1600. Still not awake or withdrawing in extremities.   Sinus tach, pressors weaned down. Instructed to leave angiotensin as last pressor until bag runs out to keep MAPs> 65.   Supined @0800, sats unchanged before and after. Chest xray post supining showing R pneumothorax. Chest tube inserted by SICU following. Sats unchanged throughout. R chest tube now to (-20) suction, minimal output.  Vec off, versed off, vaso off, heparin off  Plan: Continue to wean O2 and pressors as tolerated  For vital signs and complete assessments, please see documentation flowsheets.

## 2021-08-08 NOTE — PLAN OF CARE
D: Proned yesterday 1800, to be supine about 1000. Frequent micro-positioning. Pressor and sedation changed after hypotensive episode about 0200.   Neuro: paralyzed, sedated, unresponsive, pupils 2 round/sluggish. Extremities warm, pale, weak pulses. BIS mid 20's, train of four 2 of 4.   VS: Afeb, tachy 120-140's no ectopy. BP supported to map >65. RR 11 on APRV.   CV: ST.   Pulm: APRV  100%.flolan 20 ng/kg/min.   GI: TF off, free water flush started 250 ml every 4 hours=62 ml every 1 hour.   : Desai with 25-60 ml /hour clear yellow urine.   Lines/Gtts:  R axilla arterial line with good waveform. R hand piv saline locked. R arm piv with TKO med line. R TL internal jugular with blue=vec 0.4 mcg/kg/min, insulin 1 unit/hour, bicarb 150 ml hour. White=angiotensin 20 ng/kg/min, Brown = levophed 0.06mcg/kg/min/vaso 2.4 units/hour, versed 5 mg/hour, heparin 1200 units/hour(Xa due 1215).  Skin:   Intact. Was martinez, now paler.   Drains: Desai. L chest tube(air leak now at 0600), no output this shift. . L pleurex to bottle with no output this shift.   Labs: see labs.   P: continue current cares, assist family as able.

## 2021-08-08 NOTE — PROGRESS NOTES
Thoracic Surgery Progress Note  08/08/2021       Subjective:  Worsening hypotension and respiratory status yesterday requiring proning and paralyzing. Started on bicarb gtt for acidosis and angiotensin added due to high levophed requirements. Most recent gas with some improvements and has been able to wean some on levo. Worsening kidney function and hypernatremia creeping up.     Objective:  Temp:  [97  F (36.1  C)-99.3  F (37.4  C)] 98.4  F (36.9  C)  Pulse:  [124-147] 128  Resp:  [10-35] 11  BP: ()/(22-88) 117/39  MAP:  [53 mmHg-110 mmHg] 78 mmHg  Arterial Line BP: ()/(45-97) 107/65  FiO2 (%):  [100 %] 100 %  SpO2:  [73 %-95 %] 94 %    I/O last 3 completed shifts:  In: 4594.34 [P.O.:3; I.V.:3489.34; NG/GT:552]  Out: 1450 [Urine:1245; Stool:50; Chest Tube:155]   PleurX - 75  CT - 100    Vasopressin 2.4  Levo 0.06  Angiotensin 20  Flolan 20  Bicarb 150    Gen: Intubated, sedated, proned  Resp: mechanically ventilated 100% FiO2 on APRV  Chest: Pleurx dressing intact, left chest tube and PleurX to suction with minimal serosanguinous output  Abd: soft, nondistended  Incision: c/d/I  Ext: WWP, no edema     Labs:  Recent Labs   Lab 08/08/21  0442 08/07/21  1519 08/07/21  1339   WBC 19.1* 25.4* 26.3*   HGB 13.1* 13.6 13.8    316 313       Recent Labs   Lab 08/08/21  0604 08/08/21  0444 08/08/21  0442 08/08/21  0018 08/07/21 2024 08/07/21  0349 08/07/21  0347 08/06/21  0425 08/06/21  0410   NA  --   --  155* 153* 154*   < > 149*   < > 145*   POTASSIUM  --   --  4.3 5.0 4.4   < > 4.6  4.6   < > 3.8  3.7   CHLORIDE  --   --  118* 119* 125*   < > 119*   < > 118*   CO2  --   --  34* 31 27   < > 27   < > 22   BUN  --   --  82* 76* 64*   < > 54*   < > 41*   CR  --   --  1.90* 1.81* 1.60*   < > 1.13   < > 1.11   * 139* 153* 199* 119*   < > 166*   < > 208*   TRACE  --   --  8.3* 8.1* 7.6*   < > 8.7   < > 8.0*   MAG  --   --  3.1*  --   --   --  2.7*  --  2.2   PHOS  --   --  3.7  --   --   --  3.8  --   2.8    < > = values in this interval not displayed.     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 Reynolds on 8/4. Post-operatively with worsening respiratory status ultimately requiring intubation. Ongoing issues with oxygenation/ventilation and hypotension with concern for septic shock in setting of immunosuppresion. Currently requiring proning and paralysis, bicarb gtt.     - Appreciate SICU care  - Sedation/pain per ICU  - Wean ventilator/flolan as able  - Pulmonology, BMT, Transplant ID consulted  - Ongoing infectious and opportunistic work-up  - Continue left chest tube to suction  - Pleurx catheter okay to be used for suction  - Agree with antibiotics  - Prognosis guarded at this point, currently DNR. Will discuss with family today    Discussed with staff    Casper Paige MD  PGY-3, General Surgery

## 2021-08-09 NOTE — PROGRESS NOTES
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 Post-operatively developed acute on chronic hypoxic respiratory failure and hypotension.      Infectious Disease issues include:  - Acute on chronic respiratory failure - with concern for opportunistic infection given chronic GVHD of the lungs. Has had two previous thoracenteses on 10/23/2020 and 7/15/2021 with negative cultures. Previous CT scans done in 10/2020 and 04/2021 did not show very significant changes. There were noted persistent right nodular consolidative opacities. While these pulmonary nodules had not changed in appearance from 10/2020 to 04/2021 given his acute decompensation would be good to reassess these pulmonary nodules. Repeat CT scan would be a good start however understand that ability to obtain CT would be limited by current critical condition, recommend obtaining when feasible.     S/p bronchoscopy 8/07 with negative KOH prep and gram stain. Aerobic culture growing +1 enterococcus faecalis and CoNS (not signifcaint). Further studies  pending.     Right pneumothorax s/p chest tube thoracostomy on 8/08.     Currently - Pleural fluid cultures have also been sent. Serum Aspergillus Galactomannan and Beta-d-glucan negative. 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 VRE    [facility]   Transplant Infectious Diseases   6304         Interval History:   Sedated and unresponsive. APRV 100%   Currently off of paralytics and is no longer proned.   Weaning off of pressors.   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 Post-operatively developed acute on chronic hypoxic respiratory failure and hypotension.          Current Medications & Allergies:       acyclovir  800 mg Oral BID     artificial tears   Both Eyes At Bedtime     heparin ANTICOAGULANT  5,000 Units Subcutaneous Q8H     hydrocortisone sodium succinate PF  50 mg Intravenous Q6H     iopamidol (ISOVUE-370)  135 mL Intravenous Once     levothyroxine  150 mcg Oral Daily     micafungin  150 mg Intravenous Q24H     multivitamins w/minerals  15 mL Per Feeding Tube Daily     pantoprazole  40 mg Per Feeding Tube QAM AC     piperacillin-tazobactam  4.5 g Intravenous Q6H     protein modular  1 packet Per Feeding Tube TID     QUEtiapine  50 mg Oral  or Feeding Tube Q8H     rosuvastatin  5 mg Oral QPM     sodium chloride (PF)  3 mL Intracatheter Q8H     sodium chloride (PF)  84 mL Intravenous Once     Infusions/Drips:    dextrose       dextrose       epoprostenol (VELETRI) 20 mcg/mL in sterile water inhalation solution 20 ng/kg/min (08/09/21 1222)     fentaNYL 100 mcg/hr (08/09/21 1400)     insulin regular 1 Units/hr (08/09/21 1400)     midazolam Stopped (08/08/21 1500)     norepinephrine Stopped (08/09/21 1035)     vasopressin Stopped (08/08/21 0830)     Allergies   Allergen Reactions     Atorvastatin Other (See Comments)     Back pain  Tolerates rosuvastatin     Augmentin [Amoxicillin-Pot Clavulanate] Itching and Rash     Tolerated amoxicillin on its own 7/2020     Lifitegrast      Eyes stinging when using this for about 15 min after use.           Physical Exam:   Ranges for vital signs:  Temp:  [98.1  F (36.7  C)-100.1  F (37.8  C)] 98.4  F (36.9  C)  Pulse:  [110-134] 130  Resp:  [11-40] 35  MAP:  [64 mmHg-94 mmHg] 86 mmHg  Arterial Line BP: ()/(51-78) 112/70  FiO2 (%):  [90 %-100 %] 90 %  SpO2:  [91 %-97 %] 92 %  Vitals:    08/07/21 0330 08/08/21 0300 08/09/21 0000   Weight: 102.6 kg (226 lb 3.1 oz) 106.3 kg (234 lb 5.6 oz) 106.3 kg (234 lb 5.6 oz)       Physical Examination:  GENERAL: intubated, sedated, critically ill  HEAD:  Head is normocephalic, atraumatic   EYES:  Eyes have anicteric sclerae without conjunctival injection   ENT:  ETT +  NECK:  Supple. No cervical lymphadenopathy  LUNGS:  Mechanically ventilated, decreased breath sounds in the left lung  CARDIOVASCULAR:  Regular rate and rhythm with no murmurs, gallops or rubs.  ABDOMEN:  Normal bowel sounds, soft, nontender. No appreciable hepatosplenomegaly.  SKIN:  No acute rashes.    NEUROLOGIC:  Intubated and sedated, unable to assess         Laboratory Data:     Absolute CD4   Date Value Ref Range Status   05/12/2017 679 441 - 2,156 cells/uL Final   11/16/2016 288 (L) 441 - 2,156 cells/uL  Final   08/19/2016 491 441 - 2,156 cells/uL Final       Inflammatory Markers    Recent Labs   Lab Test 02/06/19  0605   SED 66*   CRP 8.2*       Immune Globulin Studies     Recent Labs   Lab Test 01/08/20  1526 09/25/19  1522 02/13/19  1525 11/14/18  1515 07/18/18  1539 11/15/17  1502 02/21/17  1625 08/19/16  1017   IGG 1,228 1,150 1,330 1,110 1,230 742 359* 1,080   IGM  --   --   --   --   --   --  60 72   IGE  --   --   --   --   --   --   --  3   IGA  --   --   --   --   --   --   --  261       Metabolic Studies       Recent Labs   Lab Test 08/09/21  1403 08/09/21  1008 08/09/21  0411 08/09/21  0410 08/08/21  1637 08/08/21  1633 08/08/21  1633 08/06/21  1659 08/06/21  1524 08/05/21  1601 08/05/21  1600 10/23/20  0332 10/22/20  1716   NA  --  148* 150*  150*  --    < >   < > 154*  154*   < > 148*  --   --    < > 138   POTASSIUM  --   --  3.7  3.7  --   --   --  3.7   < > 4.8  --   --    < > 5.2   CHLORIDE  --   --  114*  --   --   --  116*   < > 118*  --   --    < > 104   CO2  --   --  35*  --   --   --  36*   < > 26  --   --    < > 28   ANIONGAP  --   --  1*  --   --   --  2*   < > 4  --   --    < > 6   BUN  --   --  88*  --   --   --  83*   < > 49*  --   --    < > 18   CR  --   --  1.65*  --   --   --  1.85*   < > 1.23  --   --    < > 0.81   GFRESTIMATED  --   --  45*  --   --   --  39*   < > 64  --   --    < > >90   *  --  153*  --   --    < > 146*   < > 162*   < >  --    < > 119*   A1C  --   --   --   --   --   --   --   --  5.6  --   --   --   --    TRACE  --   --  8.6  --   --   --  8.4*   < > 8.7  --   --    < > 9.8   PHOS  --   --  3.7  --   --   --   --    < >  --   --   --    < >  --    MAG  --   --  3.1*  --   --   --   --    < >  --    < >  --    < >  --    LACT  --  1.8  --  2.0   < >  --  1.8   < >  --   --   --    < >  --    PCAL  --   --   --   --   --   --   --   --   --   --   --   --  0.10   FGTL  --   --   --   --   --   --   --   --   --   --  <31  --   --     < > = values in this  interval not displayed.       Hepatic Studies    Recent Labs   Lab Test 08/08/21  0442 08/07/21  1733 08/05/21  0340 10/24/20  0828 10/23/20  0332 01/10/20  0336   BILITOTAL 0.8 0.7 1.3   < >  --   --    DBIL 0.6* 0.4* 0.7*   < >  --   --    ALKPHOS 130 132 114   < >  --   --    PROTTOTAL 5.9* 6.2* 6.1*   < > 7.4  --    ALBUMIN 1.9* 2.0* 2.4*   < >  --   --    AST 55* 52* 24   < >  --   --    ALT 27 28 16   < >  --   --    LDH  --   --   --   --  232* 291*    < > = values in this interval not displayed.       Pancreatitis testing    Recent Labs   Lab Test 08/10/16  1239   TRIG 435*       Gout Labs      Recent Labs   Lab Test 05/04/16  1129 04/18/16  1205   URIC 6.6 6.9       Hematology Studies      Recent Labs   Lab Test 08/09/21  0411 08/08/21  1633 08/08/21  0442 08/07/21  1519 08/07/21  1339 08/07/21  0347 07/12/21  1409 07/07/21  1515 06/25/21  1417 06/25/21  1417   WBC 19.1* 18.5* 19.1* 25.4* 26.3* 28.4*  --  15.6*   < > 14.0*   ANEU  --   --   --   --   --   --   --  13.0*  --  11.0*   ALYM  --   --   --   --   --   --   --  1.2  --  1.6   SARA  --   --   --   --   --   --   --  1.2  --  1.1   AEOS  --   --   --   --   --   --   --  0.1  --  0.1   HGB 12.0* 12.8* 13.1* 13.6 13.8 13.5   < > 17.2   < > 16.6   HCT 37.7* 40.4 41.0 44.8 44.2 42.2  --  51.7   < > 48.8    288 283 316 313 337  --  287   < > 275    < > = values in this interval not displayed.       Clotting Studies    Recent Labs   Lab Test 08/08/21  1303 08/07/21  1733 07/15/21  0859 05/21/21  0632 10/22/20  1716   INR 1.70* 1.74* 1.12 1.06 1.28*   PTT  --   --   --   --  40*       Iron Testing    Recent Labs   Lab Test 08/09/21  0411   *       Arterial Blood Gas Testing    Recent Labs   Lab Test 08/09/21  1007 08/09/21  0410 08/08/21  2150 08/08/21  1758 08/08/21  1633   PH 7.39 7.39 7.40 7.40 7.40   PCO2 62* 60* 59* 60* 61*   PO2 110* 85 74* 85 81   HCO3 38* 37* 36* 38* 38*   O2PER 100 100 100 100 100        Thyroid Studies     Recent  Labs   Lab Test 05/11/18  1130 05/12/17  1126   TSH 0.47 0.43   T4 1.39 1.15       Urine Studies     Recent Labs   Lab Test 08/04/21  2028 01/28/20  1543 03/13/17  1624 03/10/17  1526 04/18/16  1132   URINEPH 5.0 5.0 5.0 5.0 5.0   NITRITE Negative Negative Negative Negative Negative   LEUKEST Negative Negative Negative Negative Negative   WBCU 3 4 4* 5* 10*       CSF testing     Recent Labs   Lab Test 04/22/16  1120   CWBC 1   CRBC 7*   CGLU 55   CTP 47     Medication levels    Recent Labs   Lab Test 08/06/21  0630 07/28/21  1215 01/13/17  1325 01/11/17  1419   VANCOMYCIN 18.4  --    < >  --    CYCLSP  --  181   < >  --    RAPAMY  --   --   --  5.5    < > = values in this interval not displayed.     Body fluid stats    Recent Labs   Lab Test 08/07/21  1305 07/15/21  0935 07/15/21  0934 10/23/20  1330 01/09/20  0821 02/06/19  1123 02/06/19  1122   FTYP  --   --   --  Pleural fluid  --  Bronchial lavage Bronchial lavage   FCOL Colorless Orange*  --  Brown  --  Colorless Colorless   FAPR Cloudy* Clear  --  Cloudy   < > Clear Slightly Cloudy   FWBC 923 5  --  121  --  290 190   FNEU 82 1  --  27   < > 8 4   FLYM 1 79  --  54   < > 39 65   FMONO 17 19  --  17   < >  --  27   FBAS  --   --   --  1  --   --   --    FTP  --   --  3.7 4.4  --   --   --    GS  --   --   --  No organisms seen  No WBC's seen  Few  Red blood cells seen    --  <25 PMNs/low power field  No organisms seen  Gram stain and culture performed on concentrated specimen  --     < > = values in this interval not displayed.       Microbiology:  Fungal testing  Recent Labs   Lab Test 08/06/21  1835 08/05/21  1600 01/10/20  0336 02/06/19  1122 02/04/19  2050   FGTL  --  <31 <31  --  <31   FGTLI  --  Negative Negative  --  Negative   ASPGAI 0.05  --  0.07 0.06 0.05   ASPAG  --   --   --  Negative  --    ASPGAA Negative  --  Negative  --  Negative     Last Culture results with specimen source  Culture   Date Value Ref Range Status   08/08/2021 No growth,  less than 1 day  Preliminary   08/07/2021 Culture in progress  Preliminary   08/07/2021 1+ Enterococcus faecalis (A)  Preliminary   08/07/2021 1+ Staphylococcus haemolyticus (A)  Preliminary     Comment:     Susceptibilities not routinely done   08/07/2021 No growth after 1 day  Preliminary   08/07/2021 No Actinomyces species isolated after 1 day  Preliminary   08/05/2021 No growth after 3 days  Preliminary   08/05/2021 No growth after 3 days  Preliminary   08/05/2021 No growth after 3 days  Preliminary   08/05/2021 No growth after 3 days  Preliminary   08/05/2021 No anaerobic organisms isolated after 3 days  Preliminary   08/04/2021 No Growth  Final   08/04/2021 No growth after 4 days  Preliminary   08/04/2021 No growth after 4 days  Preliminary   07/15/2021 No Growth  Final   07/15/2021 No anaerobic organisms isolated  Final     Culture Micro   Date Value Ref Range Status   01/26/2021 No growth  Final   10/23/2020 No growth  Final   10/23/2020   Final    No anaerobes isolated  Since this specimen was not transported in the proper anaerobic transport media, the   absence of anaerobes in this culture does not rule out the presence of anaerobes in this   specimen.     10/23/2020 Culture negative after 4 weeks  Final   01/28/2020 No growth  Final   01/09/2020 Heavy growth  Normal karl    Final   01/09/2020 Heavy growth  Enterococcus faecalis   (A)  Final   02/06/2019 (A)  Final    Light growth  Actinomyces odontolyticus  This organism is part of normal karl, but on occasion, may be a true pathogen. Clinical   correlation must be applied to interpreting this microbiology result.     02/06/2019 Light growth  Normal respiratory karl    Final   02/06/2019 Susceptibility testing not routinely done  Final   02/06/2019 Culture negative for acid fast bacilli  Final   02/06/2019   Final    Assayed at [facility] [address] [place] [id number] [phone]   02/06/2019 Candida glabrata  isolated   (A)  Final    02/06/2019   Final    No additional fungi cultured after 4 weeks incubation   02/06/2019 No growth after 4 weeks  Final   02/06/2019 Light growth  Normal respiratory karl    Final    Specimen Description   Date Value Ref Range Status   01/26/2021 Blood Right Peripheral blood  Final   10/23/2020 Fluid Pleural fluid  Final   10/23/2020 Fluid Pleural fluid  Final   10/23/2020 Fluid Pleural fluid  Final   10/23/2020 Fluid Pleural fluid  Final   01/28/2020 Midstream Urine  Final   01/11/2020 Feces  Final   01/09/2020 Sputum  Final   01/09/2020 Sputum  Final   01/08/2020 Catheterized Urine  Final   01/08/2020 Urine  Final   01/08/2020 Nares  Final   02/06/2019 Bronchial lavage Right upper lobe  Final   02/06/2019 Bronchial lavage Right upper lobe  Final   02/06/2019 Bronchial lavage Right upper lobe  Final   02/06/2019 Bronchial lavage Right upper lobe  Final   02/06/2019 Bronchial lavage Right upper lobe  Final   02/06/2019 Bronchial lavage Right upper lobe  Final   02/06/2019 Bronchial lavage Right upper lobe  Final        Last check of C difficile  C Diff Toxin B PCR   Date Value Ref Range Status   01/11/2020 Negative NEG^Negative Final     Comment:     Negative: C. difficile target DNA sequences NOT detected, presumed negative   for C.difficile toxin B or the number of bacteria present may be below the   limit of detection for the test.  FDA approved assay performed using [employer] GeneXpert real-time PCR.  A negative result does not exclude actual disease due to Clostridium difficile   and may be due to improper collection, handling and storage of the specimen   or the number of organisms in the specimen is below the detection limit of the   assay.         Infection Studies to assess Diarrhea   Recent Labs   Lab Test 05/13/16  1101 05/11/16 2046   ADENOVIRUSAG Negative Canceled, Test credited   Specimen not labeled   Notification of test cancellation was given to  LEIGH IVORY RN. 05.11.16 2100 GJS  *   EPCAMP Not  Detected Canceled, Test credited   Specimen not labeled   Notification of test cancellation was given to  LEIGH IVORY RN. 05.11.16 2100 GJS  *   EPSALM Not Detected Canceled, Test credited   Specimen not labeled   Notification of test cancellation was given to  LEIGH IVORY RN. 05.11.16 2100 GJS  *   EPSHGL Not Detected Canceled, Test credited   Specimen not labeled   Notification of test cancellation was given to  LEIGH IVORY RN. 05.11.16 2100 GJS  *   EPVIB Not Detected Canceled, Test credited   Specimen not labeled   Notification of test cancellation was given to  LEIGH IVORY RN. 05.11.16 2100 GJS  *   EPROTA Not Detected Canceled, Test credited   Specimen not labeled   Notification of test cancellation was given to  LEIGH IVORY RN. 05.11.16 2100 GJS  *   EPNORO Not Detected Canceled, Test credited   Specimen not labeled   Notification of test cancellation was given to  LEIGH IVORY RN. 05.11.16 2100 GJS  *   EPYER Not Detected Canceled, Test credited   Specimen not labeled   Notification of test cancellation was given to  LEIGH IVORY RN. 05.11.16 2100 GJS  *       Virology:  Coronavirus-19 testing    Recent Labs   Lab Test 08/07/21  1149 08/06/21  1323 08/02/21  1331 07/12/21  1300 06/27/21  1255 06/08/21  1511 05/19/21  1158 04/19/21  1302 10/13/20  1337 05/12/17  1126 11/16/16  0948 11/16/16  0948 08/19/16  1017   SCV2R Negative Negative Negative Negative  --   --   --   --   --   --   --   --   --    [id number]  --   --   --   --  Nasopharyngeal Nasopharyngeal Nasopharyngeal Nasopharyngeal   < >  --   --   --   --    SARSCOVRES  --   --   --   --  NEGATIVE NEGATIVE NEGATIVE NEGATIVE   < >  --   --   --   --    [id number]  --   --   --   --  Nasopharyngeal Nasopharyngeal Nasopharyngeal Nasopharyngeal  --   --    < >  --   --    [id number]  --   --   --   --  Test received-See reflex to IDDL test SARS CoV2 (COVID-19) Virus RT-PCR Test received-See reflex to IDDL test SARS CoV2 (COVID-19) Virus RT-PCR Test  received-See reflex to IDDL test SARS CoV2 (COVID-19) Virus RT-PCR Test received-See reflex to IDDL test SARS CoV2 (COVID-19) Virus RT-PCR  --   --    < >  --   --    CD19  --   --   --   --   --   --   --   --   --  20  --  19 4*   ACD19  --   --   --   --   --   --   --   --   --  297  --  166 44*    < > = values in this interval not displayed.       Respiratory virus (non-coronavirus-19) testing    Recent Labs   Lab Test 01/09/20  0822 01/08/20  1736 12/19/19  1623 02/04/19  1943 02/04/19 1943   RVSPEC  --   --   --   --  Nasopharyngeal   AFLU Negative  --   --   --   --    IFLUA  --  Not Detected Not Detected  --  Negative   FLUAH1  --  Not Detected Not Detected  --  Negative   GV2231  --  Not Detected Not Detected  --  Negative   FLUAH3  --  Not Detected Not Detected  --  Negative   BFLU Negative  --   --   --   --    IFLUB  --  Not Detected Not Detected  --  Negative   PIV1  --  Not Detected Not Detected  --  Negative   PIV2  --  Not Detected Not Detected  --  Negative   PIV3  --  Not Detected Not Detected  --  Negative   PIV4  --  Not Detected Not Detected   < >  --    HRVS  --   --   --   --  Negative   RSVA  --  Not Detected Not Detected  --  Negative   RSVB  --  Not Detected Not Detected  --  Negative   RS Negative  --   --   --   --    HMPV  --  Not Detected Not Detected  --  Negative   SPEC Nasopharyngeal  --   --   --   --    ADVBE  --   --   --   --  Negative   ADVC  --   --   --   --  Negative   ADENOV  --  Not Detected Not Detected   < >  --    CORONA  --  Detected, Abnormal Result* Not Detected   < >  --     < > = values in this interval not displayed.       Log IU/mL of CMVQNT   Date Value Ref Range Status   10/15/2020 Not Calculated <2.1 [Log_IU]/mL Final   01/09/2020 Not Calculated <2.1 [Log_IU]/mL Final   02/06/2019 Not Calculated <2.1 [Log_IU]/mL Final   02/06/2019 Not Calculated <2.1 [Log_IU]/mL Final   03/21/2018 Not Calculated <2.1 [Log_IU]/mL Final   01/17/2018 Not Calculated <2.1  [Log_IU]/mL Final   09/13/2017 Not Calculated <2.1 [Log_IU]/mL Final   07/12/2017 Not Calculated <2.1 [Log_IU]/mL Final   04/19/2017 Not Calculated <2.1 [Log_IU]/mL Final   03/08/2017 Not Calculated <2.1 [Log_IU]/mL Final   02/24/2017 Not Calculated <2.1 [Log_IU]/mL Final   02/15/2017 Not Calculated <2.1 [Log_IU]/mL Final   02/01/2017 Not Calculated <2.1 [Log_IU]/mL Final   01/13/2017 Not Calculated <2.1 [Log_IU]/mL Final   01/04/2017 Not Calculated <2.1 [Log_IU]/mL Final   12/14/2016 Not Calculated <2.1 [Log_IU]/mL Final   12/07/2016 Not Calculated <2.1 [Log_IU]/mL Final   11/16/2016 Not Calculated <2.1 [Log_IU]/mL Final   09/14/2016 Not Calculated <2.1 [Log_IU]/mL Final   08/19/2016 Not Calculated <2.1 [Log_IU]/mL Final   08/10/2016 Not Calculated <2.1 [Log_IU]/mL Final   07/27/2016 Not Calculated <2.1 [Log_IU]/mL Final   07/20/2016 Not Calculated <2.1 [Log_IU]/mL Final   07/13/2016 Not Calculated <2.1 [Log_IU]/mL Final   07/06/2016 Not Calculated <2.1 [Log_IU]/mL Final   06/29/2016 Not Calculated <2.1 [Log_IU]/mL Final   06/13/2016 Not Calculated <2.1 [Log_IU]/mL Final   06/08/2016 Not Calculated <2.1 [Log_IU]/mL Final   05/27/2016 Not Calculated <2.1 [Log_IU]/mL Final   05/18/2016 Not Calculated <2.1 [Log_IU]/mL Final       HHV6 DNA Result   Date Value Ref Range Status   05/31/2016 No HHV6 DNA detected Copies/mL Final       EBV DNA Copies/mL   Date Value Ref Range Status   02/01/2017 EBV DNA Not Detected EBVNEG [Copies]/mL Final   01/04/2017 585 (A) EBVNEG [Copies]/mL Final   11/30/2016 2,452 (A) EBVNEG [Copies]/mL Final   11/16/2016 742 (A) EBVNEG [Copies]/mL Final       Adenovirus Testing    Recent Labs   Lab Test 11/16/16  0948 05/13/16  1101 05/11/16  2046   ADRES No Adenovirus DNA detected.  --   --    ADENOVIRUSAG  --  Negative Canceled, Test credited   Specimen not labeled   Notification of test cancellation was given to  LEIGH IVORY RN. 05.11.16 2100 GJS  *       Imaging:  Recent Results (from the past  48 hour(s))   Echo Limited   Result Value    LVEF  60-65%    [facility]    [id number]  [id number]  [id number]  [id number]^CELESTINA^DANNA^KANDI     [facility],[place]  Echocardiography Laboratory  [address] [place] [address]     Name: BRIGITTE JUNIOR  MRN: [id number]  : 1960  Study Date: 2021 10:37 AM  Age: 60 yrs  Gender: Male  Patient Location: [facility]  Reason For Study: Concern for pulmonary embolism  Ordering Physician: DANNA OSCAR  Performed By: Carol Leach RDCS     BSA: 2.3 m2  Height: 71 in  Weight: 234 lb  HR: 131  BP: 92/66 mmHg  ______________________________________________________________________________  Procedure  Limited Portable Echo Adult. Technically difficult study. Poor acoustic  windows.  ______________________________________________________________________________  Interpretation Summary  Technically difficult study. Poor acoustic windows.     Global and regional left ventricular function is normal with an EF of 60-65%.  The RV is not well visualized, the RV function appears to be moderately  reduced.  No pericardial effusion is present.  This study was compared with the study from 2021. No significant changes  noted.  ______________________________________________________________________________  Left Ventricle  Global and regional left ventricular function is normal with an EF of 60-65%.     Right Ventricle  The RV is not well visualized, the RV function appears to be moderately  reduced.     Mitral Valve  The mitral valve is normal. Trace mitral insufficiency is present.     Tricuspid Valve  Mild tricuspid insufficiency is present. The right ventricular systolic  pressure is approximated at 26.4 mmHg plus the right atrial pressure.     Pericardium  No pericardial effusion is present.     Compared to Previous Study  This study was compared with the study from 2021 . No significant  changes  noted.  ______________________________________________________________________________  MMode/2D Measurements & Calculations  IVSd: 1.2 cm  LVIDd: 3.5 cm  LVIDs: 2.5 cm  LVPWd: 0.92 cm  FS: 29.1 %  LV mass(C)d: 114.8 grams  LV mass(C)dI: 50.9 grams/m2  RWT: 0.52     Doppler Measurements & Calculations  TR max ojhn: 257.0 cm/sec  TR max P.4 mmHg     ______________________________________________________________________________  Report approved by: MD Kervin Hutchison 2021 01:48 PM         US Lower Extremity Venous Bilateral Port    Narrative    EXAMINATION: DOPPLER VENOUS ULTRASOUND OF BILATERAL LOWER EXTREMITIES,  2021 11:22 AM     COMPARISON: None.    HISTORY: Swelling    TECHNIQUE:  Gray-scale evaluation with compression, spectral flow and  color Doppler assessment of the deep venous system of both legs from  groin to knee, and then at the ankles.    FINDINGS:  In both lower extremities, the common femoral, femoral, popliteal and  posterior tibial veins demonstrate normal compressibility and blood  flow.      Impression    IMPRESSION:  No evidence of deep venous thrombosis in either lower extremity.    I have personally reviewed the examination and initial interpretation  and I agree with the findings.    YESSENIA POLANCO MD         SYSTEM ID:  [id number]   XR Chest Port 1 View   Result Value    Radiologist flags Pneumothorax (Urgent)    Narrative    XR CHEST PORT 1 VIEW  2021 12:20 PM      HISTORY: ARDS    COMPARISON: 2021, 2021, 2021    FINDINGS: AP view of the chest. Endotracheal tube is in the mid  thoracic trachea. Right IJ CVC tip is in stable position. Enteric tube  courses beyond the field-of-view. Left-sided chest tubes in place.    Cardiac silhouette is obscured. There is a moderate right  pneumothorax. Stable bilateral pleural effusions. Left costophrenic  angle is collimated off the field-of-view Stable diffuse bilateral  opacities.       Impression    IMPRESSION:   1. Moderate-sized right pneumothorax.  2. Stable bilateral pleural effusions and diffuse airspace opacities.    [Urgent Result: Pneumothorax]    Finding was identified on 8/8/2021 12:31 PM.     Daria Avila was contacted by Dr. Stevenson at 8/8/2021 12:40 PM  and verbalized understanding of the urgent finding.     I have personally reviewed the examination and initial interpretation  and I agree with the findings.    YESSENIA POLANCO MD         SYSTEM ID:  [id number]   XR Chest Port 1 View    Narrative    XR CHEST PORT 1 VIEW  8/8/2021 1:27 PM      HISTORY: post chest tube placement for pneumothorax    COMPARISON: 8/8/2021, 8/7/2021    FINDINGS: AP view of the chest. Endotracheal tube, right IJ CVC,  left-sided chest tubes are stable. Enteric tube courses down the  field-of-view. New right apical chest tube in place. Slightly  decreased size of right-sided pneumothorax. Status post VATS and  Pleurx catheter placement on left unchanged. Small bilateral pleural  effusions with diffuse bilateral opacities.      Impression    IMPRESSION:   1. Slightly decreased right pneumothorax status post chest tube  placement.  2. Stable bilateral pleural effusions and diffuse airspace opacities.    I have personally reviewed the examination and initial interpretation  and I agree with the findings.    SUZE SAEZ MD         SYSTEM ID:  [id number]   XR Chest Port 1 View    Narrative    EXAM: XR CHEST PORT 1 VIEW  8/9/2021 2:15 AM     HISTORY:  ARDS, pneumothorax       COMPARISON:  8/8/2021    FINDINGS:   Portable supine radiograph of the chest. Endotracheal tube tip  projects over the upper thoracic trachea. Right internal jugular  central venous catheter projects over the mid SVC. Feeding tube  courses below the diaphragm and out of the field of view. Stable  position of bilateral chest tubes. Right upper extremity PICC line  projects over the right axilla.    Trachea is midline. Cardiac  silhouette is largely obscured. Stable  appearance of the large left pleural effusion with mixed opacities  throughout the aerated portion of the left lung. Stable mixed  opacities throughout the right lung field. No significant change in  the small right pneumothorax with the pneumatic component along the  inferior lateral lung base.      Impression    IMPRESSION:   1. No significant change in the small right pneumothorax with  pneumatic component along the inferior lateral lung base.  2. Grossly unchanged mixed opacities throughout the lungs.  3. Stable large left pleural effusion.    I have personally reviewed the examination and initial interpretation  and I agree with the findings.    AUSTEN CALDERA MD         SYSTEM ID:  [id number]   XR Chest Port 1 View    Narrative    Portable chest 8/9/2021 at 0834 hours    INDICATION: Feeding tube placement    COMPARISON: Earlier today 0204 hours    Findings: Heart size appears normal. Prominent left pleural effusion  appears similar. Small right basilar pneumothorax appears similar.  Right IJ catheter tip is in the SVC. Endotracheal tube tip there is  approximately 5.9 cm above the yanet. Feeding tube progresses beyond  the inferior margin of the image. Left chest tubes appear similar.  Right PIC catheter tubing projects in the right axilla.      Impression    IMPRESSION: Large left pleural effusion unchanged within short  interval. Feeding tube progressing beyond the inferior margin of the  image. Other support tubes and devices as described. Unchanged small  right pneumothorax. Patchy opacities in the lungs, again concerning  for ARDS.    YESSENIA POLANCO MD         SYSTEM ID:  [id number]

## 2021-08-09 NOTE — CONSULTS
Care Management Initial Consult    General Information  Assessment completed with: Spouse or significant other, VM-chart review,    Type of CM/SW Visit: Initial Assessment  Primary Care Provider verified and updated as needed: Yes   Readmission within the last 30 days: no previous admission in last 30 days   Reason for Consult: other (see comments) (elevated risk score)  Advance Care Planning: Advance Care Planning Reviewed: no concerns identified       Communication Assessment  Patient's communication style: spoken language (English or Bilingual)    Hearing Difficulty or Deaf: no   Wear Glasses or Blind: yes    Cognitive  Cognitive/Neuro/Behavioral: .WDL except  Level of Consciousness: unresponsive  Arousal Level: unresponsive  Orientation:  (ILIA)  Mood/Behavior: hypoactive (quiet, withdrawn)  Best Language:  (ILIA)  Speech: endotracheal tube    Living Environment:   People in home: spouse     Current living Arrangements: house      Able to return to prior arrangements: yes    Family/Social Support:  Care provided by: self, spouse/significant other  Provides care for: no one  Marital Status:   Wife, Children  [place]onia       Description of Support System: Supportive, Involved    Support Assessment: Adequate family and caregiver support, Adequate social supports    Current Resources:   Patient receiving home care services: No  Community Resources: None  Equipment currently used at home: none  Supplies currently used at home: None    Employment/Financial:  Employment Status: disabled     Financial Concerns: No concerns identified   Referral to Financial Counselor: No       Lifestyle & Psychosocial Needs:  Social Determinants of Health     Tobacco Use: Medium Risk     Smoking Tobacco Use: Never Smoker     Smokeless Tobacco Use: Former User   Alcohol Use:      Frequency of Alcohol Consumption:      Average Number of Drinks:      Frequency of Binge Drinking:    Financial Resource Strain:      Difficulty of Paying  Living Expenses:    Food Insecurity:      Worried About Running Out of Food in the Last Year:      Ran Out of Food in the Last Year:    Transportation Needs:      Lack of Transportation (Medical):      Lack of Transportation (Non-Medical):    Physical Activity:      Days of Exercise per Week:      Minutes of Exercise per Session:    Stress:      Feeling of Stress :    Social Connections:      Frequency of Communication with Friends and Family:      Frequency of Social Gatherings with Friends and Family:      Attends Zoroastrian Services:      Active Member of Clubs or Organizations:      Attends Club or Organization Meetings:      Marital Status:    Intimate Partner Violence: Not At Risk     Fear of Current or Ex-Partner: No     Emotionally Abused: No     Physically Abused: No     Sexually Abused: No   Depression: Not at risk     PHQ-2 Score: 0   Housing Stability:      Unable to Pay for Housing in the Last Year:      Number of Places Lived in the Last Year:      Unstable Housing in the Last Year:        Functional Status:  Prior to admission patient needed assistance:      Mental Health Status:  Mental Health Status: No Current Concerns       Chemical Dependency Status:  Chemical Dependency Status: No Current Concerns           Values/Beliefs:  Spiritual, Cultural Beliefs, Zoroastrian Practices, Values that affect care: no               Additional Information:  SW spoke to pt's spouse (Latonia) by phone to offer support. Latonia reports that she is doing well and is well supported by her 3 children who are taking turns spending time with her while pt is in the hospital. SW encouraged Latonia to reach out with any concerns and for additional support/resources.    AYANNA Duffy, LGSW  ICU   [facility]  Ph: [phone]  [phone]

## 2021-08-09 NOTE — PROGRESS NOTES
[facility]  Transplant Infectious Disease Progress Note     Patient:  Byron Russo, Date of birth 1960, Medical record number [id number]  Date of Visit:  08/08/2021         Assessment and Recommendations:   Recommendations:  1) Await results of urine Histoplasma and urine Blastomyces antigens  2) Await results of cultures from pleural fluid, both right and left sided sampled  3) Await results of bronchoscopy - bacterial, fungal and AFB cultures, Nocardia cultures. Recommend adding Aspergillus galactomannan to BAL. 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 Post-operatively developed acute on chronic hypoxic respiratory failure and hypotension.      Infectious Disease issues include:  - Acute on chronic respiratory failure - with concern for opportunistic infection given chronic GVHD of the lungs. Has had two previous thoracenteses on 10/23/2020 and 7/15/2021 with negative cultures. Previous CT scans done in 10/2020 and 04/2021 did not show very significant changes. There were noted persistent right nodular consolidative opacities. While these pulmonary nodules had not changed  in appearance from 10/2020 to 04/2021 given his acute decompensation would be good to reassess these pulmonary nodules. Repeat CT scan would be a good start however understand that ability to obtain CT would be limited by current critical condition, recommend obtaining when feasible. Post further decompensation, is now s/p bronchoscopy with negative KOH prep and gram stain, however multiple studies including cultures sent and pending. Today, noted to have right pneumothorax s/p chest tube thoracostomy and pleural fluid cultures have also been sent. Serum Aspergillus Galactomannan and Beta-d-glucan negative. Serum CrAG negative as well    Other issues:  - PCP prophylaxis: Bactrim   - Fungal prophylaxis: Previously on Fluconazole, now on Micafungin   - Serostatus: CMV- EBV+ HSV1+/HSV2- on Acyclovir   - Gamma globulin status: 1228 on 1/8/2020  - Isolation status:  Good hand hygiene. HANG Pruitt  Staff Physician, Infectious Diseases  Pager [phone]         Interval History:   Since was last seen by ID 8/6/21, patient had briefly been off pressors. However, starting 8/7 PM, he was noted to have clinical worsening, with increased pressor requirements and progressive desaturation despite being on 100% FiO2. Bronch was performed, multiple vent settings attempted. Eventually, patient was paralyzed and proned on APRV. Overnight, showed slight improvement and was supined at 8AM. Sedation was weaned down without response. Pressors were also weaned down, although patient remained on Angiotensin along with low dose Norepi  Also noted to have spontaneous right pneumothorax s/p right chest thoracostomy, pleural fluid sent for analysis  Remains on broad spectrum antibiotics with Zosyn and Micafungin  BAL studies pending    Today is my first day seeing this hospital stay. Chart reviewed to date.        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 Post-operatively developed acute on chronic hypoxic respiratory failure and hypotension.          Current Medications & Allergies:       acyclovir  800 mg Oral BID     artificial tears   Both Eyes At Bedtime     heparin ANTICOAGULANT  5,000 Units Subcutaneous Q8H     hydrocortisone sodium succinate PF  50 mg Intravenous Q6H     iopamidol (ISOVUE-370)  135 mL Intravenous Once     levothyroxine  150 mcg Oral Daily     micafungin  150 mg Intravenous Q24H     multivitamins w/minerals  15 mL Per Feeding Tube Daily     pantoprazole  40 mg Per Feeding Tube QAM AC     piperacillin-tazobactam  4.5 g Intravenous Q6H     potassium chloride  20 mEq Oral or Feeding Tube Once     protein modular  1 packet Per Feeding Tube TID     rosuvastatin  5 mg Oral QPM     sodium chloride (PF)  3 mL Intracatheter Q8H     sodium chloride (PF)  84 mL Intravenous Once       Infusions/Drips:    angiotensin II (GIAPREZA) ADULT infusion 2.5mg/250 mL NS 10 ng/kg/min (08/08/21 1800)     dextrose       dextrose       D5W 75 mL/hr at 08/08/21 1600     epoprostenol (VELETRI) 20 mcg/mL in sterile water inhalation solution 20 ng/kg/min (08/08/21 1606)     fentaNYL 100 mcg/hr (08/08/21 1800)     insulin regular 1 Units/hr (08/08/21 1800)     midazolam Stopped (08/08/21 1500)     norepinephrine 0.03 mcg/kg/min (08/08/21 1800)     vasopressin Stopped (08/08/21 0830)       Allergies   Allergen Reactions     Atorvastatin Other (See Comments)     Back pain  Tolerates rosuvastatin     Augmentin [Amoxicillin-Pot Clavulanate] Itching and  Rash     Tolerated amoxicillin on its own 7/2020     Lifitegrast      Eyes stinging when using this for about 15 min after use.             Physical Exam:   Vitals were reviewed.  All vitals stable.  Patient Vitals for the past 24 hrs:   BP Temp Temp src Pulse Resp SpO2 Weight   08/08/21 1930 -- -- -- 113 13 96 % --   08/08/21 1915 -- -- -- 114 -- 96 % --   08/08/21 1900 -- -- -- 117 -- 96 % --   08/08/21 1845 -- -- -- 117 -- 96 % --   08/08/21 1830 -- -- -- 120 -- 96 % --   08/08/21 1815 -- -- -- (!) 121 -- 96 % --   08/08/21 1800 -- -- -- 120 28 95 % --   08/08/21 1745 -- -- -- (!) 122 -- 96 % --   08/08/21 1730 -- -- -- 120 -- 96 % --   08/08/21 1715 -- -- -- (!) 122 -- 96 % --   08/08/21 1700 -- -- -- (!) 122 -- 96 % --   08/08/21 1645 -- -- -- (!) 122 -- 96 % --   08/08/21 1630 -- -- -- (!) 122 -- 96 % --   08/08/21 1615 -- -- -- (!) 123 -- 96 % --   08/08/21 1602 -- -- -- -- -- 96 % --   08/08/21 1600 -- 100.1  F (37.8  C) Axillary (!) 122 21 95 % --   08/08/21 1545 -- -- -- (!) 123 -- 96 % --   08/08/21 1530 -- -- -- (!) 124 -- 96 % --   08/08/21 1515 -- -- -- (!) 124 -- 96 % --   08/08/21 1500 -- -- -- (!) 124 -- 96 % --   08/08/21 1445 -- -- -- (!) 123 -- 96 % --   08/08/21 1430 -- -- -- (!) 123 -- 96 % --   08/08/21 1415 -- -- -- (!) 123 -- 96 % --   08/08/21 1400 -- -- -- (!) 123 -- 96 % --   08/08/21 1345 -- -- -- (!) 123 -- 96 % --   08/08/21 1330 -- -- -- (!) 123 -- 96 % --   08/08/21 1315 -- -- -- (!) 124 -- 96 % --   08/08/21 1300 -- -- -- (!) 124 -- 96 % --   08/08/21 1245 -- -- -- (!) 123 -- 97 % --   08/08/21 1230 -- -- -- (!) 130 -- 95 % --   08/08/21 1215 -- -- -- (!) 130 -- 94 % --   08/08/21 1200 -- (P) 100.3  F (37.9  C) (P) Axillary (!) 129 (P) 11 94 % --   08/08/21 1157 -- -- -- -- -- 94 % --   08/08/21 1145 -- -- -- (!) 130 -- 94 % --   08/08/21 1130 -- -- -- (!) 130 -- 94 % --   08/08/21 1115 -- -- -- (!) 131 -- 94 % --   08/08/21 1100 -- -- -- (!) 131 -- 94 % --   08/08/21 1000 -- -- --  (!) 130 -- 94 % --   08/08/21 0915 -- -- -- (!) 130 -- 95 % --   08/08/21 0900 -- -- -- (!) 129 -- 96 % --   08/08/21 0845 -- -- -- (!) 126 -- 96 % --   08/08/21 0830 -- -- -- (!) 125 -- 96 % --   08/08/21 0815 -- -- -- (!) 122 -- 94 % --   08/08/21 0812 -- -- -- -- -- 95 % --   08/08/21 0800 -- 98.4  F (36.9  C) Axillary -- 11 94 % --   08/08/21 0700 -- -- -- (!) 128 11 94 % --   08/08/21 0645 -- -- -- (!) 128 -- 94 % --   08/08/21 0630 -- -- -- (!) 128 -- 93 % --   08/08/21 0620 -- -- -- (!) 128 -- 93 % --   08/08/21 0615 -- -- -- (!) 128 -- 93 % --   08/08/21 0600 -- -- -- (!) 126 11 93 % --   08/08/21 0545 -- -- -- (!) 125 -- 95 % --   08/08/21 0530 -- -- -- (!) 125 -- 94 % --   08/08/21 0525 -- -- -- (!) 125 -- 94 % --   08/08/21 0515 (!) 117/39 -- -- (!) 125 -- 93 % --   08/08/21 0500 110/57 -- -- (!) 125 11 93 % --   08/08/21 0445 (!) 112/34 -- -- (!) 126 -- 93 % --   08/08/21 0430 110/61 -- -- (!) 126 -- 93 % --   08/08/21 0415 (!) 114/22 -- -- (!) 128 -- 93 % --   08/08/21 0400 (!) 114/34 98.4  F (36.9  C) Axillary (!) 130 11 92 % --   08/08/21 0345 (!) 111/28 -- -- (!) 130 -- 93 % --   08/08/21 0338 -- -- -- (!) 133 -- 92 % --   08/08/21 0330 105/50 -- -- (!) 133 -- 93 % --   08/08/21 0318 106/68 -- -- (!) 133 -- 93 % --   08/08/21 0315 106/68 -- -- (!) 131 11 93 % --   08/08/21 0309 -- -- -- (!) 130 -- 92 % --   08/08/21 0300 94/47 -- -- (!) 137 -- 95 % 106.3 kg (234 lb 5.6 oz)   08/08/21 0247 -- -- -- (!) 141 -- 95 % --   08/08/21 0245 121/85 -- -- (!) 142 -- 94 % --   08/08/21 0230 (!) 108/25 -- -- (!) 147 -- 94 % --   08/08/21 0215 (!) 107/39 -- -- (!) 137 -- 93 % --   08/08/21 0200 120/55 -- -- (!) 134 11 93 % --   08/08/21 0145 106/70 -- -- (!) 135 -- 92 % --   08/08/21 0130 100/51 -- -- (!) 135 -- 90 % --   08/08/21 0124 (!) 82/38 -- -- (!) 135 -- (!) 89 % --   08/08/21 0115 (!) 82/38 -- -- (!) 134 -- (!) 89 % --   08/08/21 0114 -- -- -- (!) 134 -- (!) 89 % --   08/08/21 0103 -- -- -- (!) 137 -- (!)  89 % --   08/08/21 0100 96/56 -- -- (!) 146 -- (!) 80 % --   08/08/21 0045 -- -- -- (!) 125 -- 93 % --   08/08/21 0030 -- -- -- (!) 126 -- 93 % --   08/08/21 0015 -- -- -- (!) 126 -- 92 % --   08/08/21 0000 -- 97.7  F (36.5  C) Axillary (!) 128 11 92 % --   08/07/21 2345 -- -- -- (!) 128 -- 93 % --   08/07/21 2330 -- -- -- (!) 128 -- 92 % --   08/07/21 2315 -- -- -- (!) 129 -- 92 % --   08/07/21 2300 -- 98.4  F (36.9  C) Axillary (!) 131 11 91 % --   08/07/21 2245 -- -- -- (!) 138 -- 90 % --   08/07/21 2230 -- -- -- (!) 138 -- 90 % --   08/07/21 2215 -- -- -- (!) 142 -- (!) 89 % --   08/07/21 2200 -- -- -- (!) 142 11 90 % --   08/07/21 2145 -- -- -- (!) 141 -- (!) 88 % --   08/07/21 2144 -- -- -- (!) 142 -- (!) 89 % --   08/07/21 2130 -- -- -- (!) 141 -- (!) 88 % --   08/07/21 2115 -- -- -- (!) 142 -- (!) 88 % --   08/07/21 2100 -- -- -- (!) 140 11 (!) 87 % --   08/07/21 2045 -- -- -- (!) 128 -- 93 % --   08/07/21 2030 -- -- -- (!) 126 -- 91 % --   08/07/21 2026 -- -- -- (!) 126 -- 91 % --   08/07/21 2022 -- -- -- -- -- 90 % --   08/07/21 2015 -- -- -- (!) 126 -- 91 % --   08/07/21 2000 -- 97  F (36.1  C) Axillary (!) 128 11 90 % --   08/07/21 1945 -- -- -- (!) 128 12 90 % --     Ranges for vital signs:  Temp:  [97  F (36.1  C)-100.3  F (37.9  C)] 98.6  F (37  C)  Pulse:  [113-147] 113  Resp:  [11-28] 13  BP: ()/(22-85) 117/39  MAP:  [53 mmHg-102 mmHg] 79 mmHg  Arterial Line BP: ()/(45-86) 95/67  FiO2 (%):  [100 %] 100 %  SpO2:  [80 %-97 %] 96 %  Vitals:    08/06/21 0400 08/07/21 0330 08/08/21 0300   Weight: 99.4 kg (219 lb 2.2 oz) 102.6 kg (226 lb 3.1 oz) 106.3 kg (234 lb 5.6 oz)       Physical Examination:  GENERAL:  well-developed, intubated, sedated, critically ill  HEAD:  Head is normocephalic, atraumatic   EYES:  Eyes have anicteric sclerae without conjunctival injection   ENT:  ETT +  NECK:  Supple. No cervical lymphadenopathy  LUNGS:  Mechanically ventilated, APRV, breath sounds faint, right  chest tube +  CARDIOVASCULAR:  Regular rate and rhythm with no murmurs, gallops or rubs.  ABDOMEN:  Normal bowel sounds, soft, nontender. No appreciable hepatosplenomegaly.  SKIN:  No acute rashes.  Line in place without any surrounding erythema or exudate.  NEUROLOGIC:  Intubated and sedated, unable to assess         Laboratory Data:     Absolute CD4   Date Value Ref Range Status   05/12/2017 679 441 - 2,156 cells/uL Final   11/16/2016 288 (L) 441 - 2,156 cells/uL Final   08/19/2016 491 441 - 2,156 cells/uL Final       Inflammatory Markers    Recent Labs   Lab Test 02/06/19  0605   SED 66*   CRP 8.2*       Immune Globulin Studies     Recent Labs   Lab Test 01/08/20  1526 09/25/19  1522 02/13/19  1525 11/14/18  1515 07/18/18  1539 11/15/17  1502 02/21/17  1625 08/19/16  1017   IGG 1,228 1,150 1,330 1,110 1,230 742 359* 1,080   IGM  --   --   --   --   --   --  60 72   IGE  --   --   --   --   --   --   --  3   IGA  --   --   --   --   --   --   --  261       Metabolic Studies       Recent Labs   Lab Test 08/08/21  1759 08/08/21  1633 08/08/21  1041 08/08/21  0442 08/08/21  0441 08/08/21  0018 08/06/21  1659 08/06/21  1524 08/05/21  1601 08/05/21  1600 10/23/20  0332 10/22/20  1716   NA  --  154*  154* 155* 155*  --   --    < > 148*  --   --    < > 138   POTASSIUM  --  3.7  --  4.3  --   --    < > 4.8  --   --    < > 5.2   CHLORIDE  --  116*  --  118*  --   --    < > 118*  --   --    < > 104   CO2  --  36*  --  34*  --   --    < > 26  --   --    < > 28   ANIONGAP  --  2*  --  3  --   --    < > 4  --   --    < > 6   BUN  --  83*  --  82*  --   --    < > 49*  --   --    < > 18   CR  --  1.85*  --  1.90*  --   --    < > 1.23  --   --    < > 0.81   GFRESTIMATED  --  39*  --  38*  --   --    < > 64  --   --    < > >90   * 146*  --  153*  --    < >   < > 162*   < >  --    < > 119*   A1C  --   --   --   --   --   --   --  5.6  --   --   --   --    TRAEC  --  8.4*  --  8.3*  --   --    < > 8.7  --   --    < > 9.8    PHOS  --   --   --  3.7  --   --    < >  --   --   --    < >  --    MAG  --   --   --  3.1*  --   --    < >  --    < >  --    < >  --    LACT  --  1.8  --   --  2.2*  --    < >  --   --   --    < >  --    PCAL  --   --   --   --   --   --   --   --   --   --   --  0.10   FGTL  --   --   --   --   --   --   --   --   --  <31  --   --     < > = values in this interval not displayed.       Hepatic Studies    Recent Labs   Lab Test 08/08/21  0442 08/07/21  1733 08/05/21  0340 10/24/20  0828 10/23/20  0332 01/10/20  0336   BILITOTAL 0.8 0.7 1.3   < >  --   --    DBIL 0.6* 0.4* 0.7*   < >  --   --    ALKPHOS 130 132 114   < >  --   --    PROTTOTAL 5.9* 6.2* 6.1*   < > 7.4  --    ALBUMIN 1.9* 2.0* 2.4*   < >  --   --    AST 55* 52* 24   < >  --   --    ALT 27 28 16   < >  --   --    LDH  --   --   --   --  232* 291*    < > = values in this interval not displayed.       Pancreatitis testing    Recent Labs   Lab Test 08/10/16  1239   TRIG 435*       Gout Labs      Recent Labs   Lab Test 05/04/16  1129 04/18/16  1205   URIC 6.6 6.9       Hematology Studies      Recent Labs   Lab Test 08/08/21  1633 08/08/21  0442 08/07/21  1519 08/07/21  1339 08/07/21  0347 08/06/21  1524 07/12/21  1409 07/07/21  1515 06/25/21  1417 06/25/21  1417   WBC 18.5* 19.1* 25.4* 26.3* 28.4* 22.5*  22.5*  --  15.6*   < > 14.0*   ANEU  --   --   --   --   --   --   --  13.0*  --  11.0*   ALYM  --   --   --   --   --   --   --  1.2  --  1.6   SARA  --   --   --   --   --   --   --  1.2  --  1.1   AEOS  --   --   --   --   --   --   --  0.1  --  0.1   HGB 12.8* 13.1* 13.6 13.8 13.5 12.8*   < > 17.2   < > 16.6   HCT 40.4 41.0 44.8 44.2 42.2 39.6*  --  51.7   < > 48.8    283 316 313 337 318  --  287   < > 275    < > = values in this interval not displayed.       Clotting Studies    Recent Labs   Lab Test 08/08/21  1303 08/07/21  1733 07/15/21  0859 05/21/21  0632 10/22/20  1716   INR 1.70* 1.74* 1.12 1.06 1.28*   PTT  --   --   --   --  40*        Iron Testing    Recent Labs   Lab Test 08/08/21  1633   *       Arterial Blood Gas Testing    Recent Labs   Lab Test 08/08/21  1758 08/08/21  1633 08/08/21  1202 08/08/21  0853 08/08/21  0441   PH 7.40 7.40 7.41 7.42 7.38   PCO2 60* 61* 58* 52* 60*   PO2 85 81 71* 84 63*   HCO3 38* 38* 37* 34* 36*   O2PER 100 100 100 100 100        Thyroid Studies     Recent Labs   Lab Test 05/11/18  1130 05/12/17  1126   TSH 0.47 0.43   T4 1.39 1.15       Urine Studies     Recent Labs   Lab Test 08/04/21  2028 01/28/20  1543 03/13/17  1624 03/10/17  1526 04/18/16  1132   URINEPH 5.0 5.0 5.0 5.0 5.0   NITRITE Negative Negative Negative Negative Negative   LEUKEST Negative Negative Negative Negative Negative   WBCU 3 4 4* 5* 10*       CSF testing     Recent Labs   Lab Test 04/22/16  1120   CWBC 1   CRBC 7*   CGLU 55   CTP 47       Medication levels    Recent Labs   Lab Test 08/06/21  0630 07/28/21  1215 01/13/17  1325 01/11/17  1419   VANCOMYCIN 18.4  --    < >  --    CYCLSP  --  181   < >  --    RAPAMY  --   --   --  5.5    < > = values in this interval not displayed.       Body fluid stats    Recent Labs   Lab Test 08/07/21  1305 07/15/21  0935 07/15/21  0934 10/23/20  1330 01/09/20  0821 02/06/19  1123 02/06/19  1122   FTYP  --   --   --  Pleural fluid  --  Bronchial lavage Bronchial lavage   FCOL Colorless Orange*  --  Brown  --  Colorless Colorless   FAPR Cloudy* Clear  --  Cloudy   < > Clear Slightly Cloudy   FWBC 923 5  --  121  --  290 190   FNEU 82 1  --  27   < > 8 4   FLYM 1 79  --  54   < > 39 65   FMONO 17 19  --  17   < >  --  27   FBAS  --   --   --  1  --   --   --    FTP  --   --  3.7 4.4  --   --   --    GS  --   --   --  No organisms seen  No WBC's seen  Few  Red blood cells seen    --  <25 PMNs/low power field  No organisms seen  Gram stain and culture performed on concentrated specimen  --     < > = values in this interval not displayed.       Microbiology:  Fungal testing  Recent Labs   Lab Test  08/06/21  1835 08/05/21  1600 01/10/20  0336 02/06/19  1122 02/04/19 2050   FGTL  --  <31 <31  --  <31   FGTLI  --  Negative Negative  --  Negative   ASPGAI 0.05  --  0.07 0.06 0.05   ASPAG  --   --   --  Negative  --    ASPGAA Negative  --  Negative  --  Negative       Last Culture results with specimen source  Culture   Date Value Ref Range Status   08/07/2021 Culture in progress  Preliminary   08/07/2021 No growth after 1 day  Preliminary   08/07/2021 No Actinomyces species isolated after 1 day  Preliminary   08/05/2021 No growth after 2 days  Preliminary   08/05/2021 No growth after 2 days  Preliminary   08/05/2021 No growth after 2 days  Preliminary   08/05/2021 No growth after 2 days  Preliminary   08/05/2021 No anaerobic organisms isolated after 2 days  Preliminary   08/04/2021 No Growth  Final   08/04/2021 No growth after 3 days  Preliminary   08/04/2021 No growth after 3 days  Preliminary   07/15/2021 No Growth  Final   07/15/2021 No anaerobic organisms isolated  Final     Culture Micro   Date Value Ref Range Status   01/26/2021 No growth  Final   10/23/2020 No growth  Final   10/23/2020   Final    No anaerobes isolated  Since this specimen was not transported in the proper anaerobic transport media, the   absence of anaerobes in this culture does not rule out the presence of anaerobes in this   specimen.     10/23/2020 Culture negative after 4 weeks  Final   01/28/2020 No growth  Final   01/09/2020 Heavy growth  Normal karl    Final   01/09/2020 Heavy growth  Enterococcus faecalis   (A)  Final   02/06/2019 (A)  Final    Light growth  Actinomyces odontolyticus  This organism is part of normal karl, but on occasion, may be a true pathogen. Clinical   correlation must be applied to interpreting this microbiology result.     02/06/2019 Light growth  Normal respiratory karl    Final   02/06/2019 Susceptibility testing not routinely done  Final   02/06/2019 Culture negative for acid fast bacilli  Final    02/06/2019   Final    Assayed at [facility] [address] [place] [address] [phone]   02/06/2019 Candida glabrata  isolated   (A)  Final   02/06/2019   Final    No additional fungi cultured after 4 weeks incubation   02/06/2019 No growth after 4 weeks  Final   02/06/2019 Light growth  Normal respiratory karl    Final    Specimen Description   Date Value Ref Range Status   01/26/2021 Blood Right Peripheral blood  Final   10/23/2020 Fluid Pleural fluid  Final   10/23/2020 Fluid Pleural fluid  Final   10/23/2020 Fluid Pleural fluid  Final   10/23/2020 Fluid Pleural fluid  Final   01/28/2020 Midstream Urine  Final   01/11/2020 Feces  Final   01/09/2020 Sputum  Final   01/09/2020 Sputum  Final   01/08/2020 Catheterized Urine  Final   01/08/2020 Urine  Final   01/08/2020 Nares  Final   02/06/2019 Bronchial lavage Right upper lobe  Final   02/06/2019 Bronchial lavage Right upper lobe  Final   02/06/2019 Bronchial lavage Right upper lobe  Final   02/06/2019 Bronchial lavage Right upper lobe  Final   02/06/2019 Bronchial lavage Right upper lobe  Final   02/06/2019 Bronchial lavage Right upper lobe  Final   02/06/2019 Bronchial lavage Right upper lobe  Final        Last check of C difficile  C Diff Toxin B PCR   Date Value Ref Range Status   01/11/2020 Negative NEG^Negative Final     Comment:     Negative: C. difficile target DNA sequences NOT detected, presumed negative   for C.difficile toxin B or the number of bacteria present may be below the   limit of detection for the test.  FDA approved assay performed using [employer] GeneXpert real-time PCR.  A negative result does not exclude actual disease due to Clostridium difficile   and may be due to improper collection, handling and storage of the specimen   or the number of organisms in the specimen is below the detection limit of the   assay.         Infection Studies to assess Diarrhea   Recent Labs   Lab Test 05/13/16  1101 05/11/16 2046   ADENOVIRUSAG  Negative Canceled, Test credited   Specimen not labeled   Notification of test cancellation was given to  LEIGH IVORY RN. 05.11.16 2100 GJS  *   EPCAMP Not Detected Canceled, Test credited   Specimen not labeled   Notification of test cancellation was given to  LEIGH IVORY RN. 05.11.16 2100 GJS  *   EPSALM Not Detected Canceled, Test credited   Specimen not labeled   Notification of test cancellation was given to  LEIGH IVORY RN. 05.11.16 2100 GJS  *   EPSHGL Not Detected Canceled, Test credited   Specimen not labeled   Notification of test cancellation was given to  LEIGH IVORY RN. 05.11.16 2100 GJS  *   EPVIB Not Detected Canceled, Test credited   Specimen not labeled   Notification of test cancellation was given to  LEIGH IVORY RN. 05.11.16 2100 GJS  *   EPROTA Not Detected Canceled, Test credited   Specimen not labeled   Notification of test cancellation was given to  LEIGH IVORY RN. 05.11.16 2100 GJS  *   EPNORO Not Detected Canceled, Test credited   Specimen not labeled   Notification of test cancellation was given to  LEIGH IVORY RN. 05.11.16 2100 GJS  *   EPYER Not Detected Canceled, Test credited   Specimen not labeled   Notification of test cancellation was given to  LEIGH IVORY RN. 05.11.16 2100 GJS  *       Virology:  Coronavirus-19 testing    Recent Labs   Lab Test 08/07/21  1149 08/06/21  1323 08/02/21  1331 07/12/21  1300 06/27/21  1255 06/08/21  1511 05/19/21  1158 04/19/21  1302 10/13/20  1337 05/12/17  1126 11/16/16  0948 11/16/16  0948 08/19/16  1017   SCV2R Negative Negative Negative Negative  --   --   --   --   --   --   --   --   --    [id number]  --   --   --   --  Nasopharyngeal Nasopharyngeal Nasopharyngeal Nasopharyngeal   < >  --   --   --   --    SARSCOVRES  --   --   --   --  NEGATIVE NEGATIVE NEGATIVE NEGATIVE   < >  --   --   --   --    [id number]  --   --   --   --  Nasopharyngeal Nasopharyngeal Nasopharyngeal Nasopharyngeal  --   --    < >  --   --    [id number]  --   --    --   --  Test received-See reflex to IDDL test SARS CoV2 (COVID-19) Virus RT-PCR Test received-See reflex to IDDL test SARS CoV2 (COVID-19) Virus RT-PCR Test received-See reflex to IDDL test SARS CoV2 (COVID-19) Virus RT-PCR Test received-See reflex to IDDL test SARS CoV2 (COVID-19) Virus RT-PCR  --   --    < >  --   --    CD19  --   --   --   --   --   --   --   --   --  20 --  19 4*   ACD19  --   --   --   --   --   --   --   --   --  297  --  166 44*    < > = values in this interval not displayed.       Respiratory virus (non-coronavirus-19) testing    Recent Labs   Lab Test 01/09/20  0822 01/08/20  1736 12/19/19  1623 02/04/19  1943 02/04/19 1943   [facility]  --   --   --   --  Nasopharyngeal   AFLU Negative  --   --   --   --    IFLUA  --  Not Detected Not Detected  --  Negative   FLUAH1  --  Not Detected Not Detected  --  Negative   [id number]  --  Not Detected Not Detected  --  Negative   FLUAH3  --  Not Detected Not Detected  --  Negative   BFLU Negative  --   --   --   --    IFLUB  --  Not Detected Not Detected  --  Negative   PIV1  --  Not Detected Not Detected  --  Negative   PIV2  --  Not Detected Not Detected  --  Negative   PIV3  --  Not Detected Not Detected  --  Negative   PIV4  --  Not Detected Not Detected   < >  --    HRVS  --   --   --   --  Negative   RSVA  --  Not Detected Not Detected  --  Negative   RSVB  --  Not Detected Not Detected  --  Negative   RS Negative  --   --   --   --    HMPV  --  Not Detected Not Detected  --  Negative   SPEC Nasopharyngeal  --   --   --   --    ADVBE  --   --   --   --  Negative   ADVC  --   --   --   --  Negative   ADENOV  --  Not Detected Not Detected   < >  --    CORONA  --  Detected, Abnormal Result* Not Detected   < >  --     < > = values in this interval not displayed.       Log IU/mL of CMVQNT   Date Value Ref Range Status   10/15/2020 Not Calculated <2.1 [Log_IU]/mL Final   01/09/2020 Not Calculated <2.1 [Log_IU]/mL Final   02/06/2019 Not Calculated <2.1  [Log_IU]/mL Final   02/06/2019 Not Calculated <2.1 [Log_IU]/mL Final   03/21/2018 Not Calculated <2.1 [Log_IU]/mL Final   01/17/2018 Not Calculated <2.1 [Log_IU]/mL Final   09/13/2017 Not Calculated <2.1 [Log_IU]/mL Final   07/12/2017 Not Calculated <2.1 [Log_IU]/mL Final   04/19/2017 Not Calculated <2.1 [Log_IU]/mL Final   03/08/2017 Not Calculated <2.1 [Log_IU]/mL Final   02/24/2017 Not Calculated <2.1 [Log_IU]/mL Final   02/15/2017 Not Calculated <2.1 [Log_IU]/mL Final   02/01/2017 Not Calculated <2.1 [Log_IU]/mL Final   01/13/2017 Not Calculated <2.1 [Log_IU]/mL Final   01/04/2017 Not Calculated <2.1 [Log_IU]/mL Final   12/14/2016 Not Calculated <2.1 [Log_IU]/mL Final   12/07/2016 Not Calculated <2.1 [Log_IU]/mL Final   11/16/2016 Not Calculated <2.1 [Log_IU]/mL Final   09/14/2016 Not Calculated <2.1 [Log_IU]/mL Final   08/19/2016 Not Calculated <2.1 [Log_IU]/mL Final   08/10/2016 Not Calculated <2.1 [Log_IU]/mL Final   07/27/2016 Not Calculated <2.1 [Log_IU]/mL Final   07/20/2016 Not Calculated <2.1 [Log_IU]/mL Final   07/13/2016 Not Calculated <2.1 [Log_IU]/mL Final   07/06/2016 Not Calculated <2.1 [Log_IU]/mL Final   06/29/2016 Not Calculated <2.1 [Log_IU]/mL Final   06/13/2016 Not Calculated <2.1 [Log_IU]/mL Final   06/08/2016 Not Calculated <2.1 [Log_IU]/mL Final   05/27/2016 Not Calculated <2.1 [Log_IU]/mL Final   05/18/2016 Not Calculated <2.1 [Log_IU]/mL Final       HHV6 DNA Result   Date Value Ref Range Status   05/31/2016 No HHV6 DNA detected Copies/mL Final       EBV DNA Copies/mL   Date Value Ref Range Status   02/01/2017 EBV DNA Not Detected EBVNEG [Copies]/mL Final   01/04/2017 585 (A) EBVNEG [Copies]/mL Final   11/30/2016 2,452 (A) EBVNEG [Copies]/mL Final   11/16/2016 742 (A) EBVNEG [Copies]/mL Final       No results found for: 64060, BKRES    Parvovirus Testing  No lab results found.    Invalid input(s): PRVRES    Adenovirus Testing    Recent Labs   Lab Test 11/16/16  0948 05/13/16  1101  05/11/16 2046   ADRES No Adenovirus DNA detected.  --   --    ADENOVIRUSAG  --  Negative Canceled, Test credited   Specimen not labeled   Notification of test cancellation was given to  LEIGH IVORY RN. 05.11.16 2100 GJS  *       Imaging:  Recent Results (from the past 48 hour(s))   XR Chest Port 1 View    Narrative    Portable chest    INDICATION: History of trapped lung status post Pleurx, chest tube  placement and ongoing respiratory failure    COMPARISON: 8/6/2021 at 1732 hours    FINDINGS: Heart does not appear enlarged. Bilateral pleural effusions  appear similar. Left chest wall subcutaneous emphysema appears  similar.    Endotracheal tube, right IJ catheter, feeding tube and left chest tube  all appear similar.      Impression    IMPRESSION: Continued appearance of pleural effusions with probable  ARDS in the lungs, especially on the right, without significant  interval change. Support tubes and devices again present.    YESSENIA POLANCO MD         SYSTEM ID:  [id number]   XR Chest Port 1 View    Narrative    Portable chest  8/7/2021 at 1122 hours  INDICATION: Tube and line evaluation, dyspnea    COMPARISON: Earlier today 0909 hours    FINDINGS: Heart size appears normal. Left and right pleural effusions  appear similar. Patchy opacities in the lungs appears similar. Left  chest wall subcutaneous emphysema appears similar.    Endotracheal tube tip is approximately 7.4 cm above the yanet. Right  IJ catheter tip is in the mid to low SVC. Feeding tube progressive and  inferior margin of the image. Left chest tube appears similar.      Impression    IMPRESSION: No significant interval change radiographically from  earlier this morning. Pleural effusions and patchy  edema/atelectasis/infection in the lungs. Tubes and lines appear  grossly unchanged.    YESSENIA POLANCO MD         SYSTEM ID:  [id number]   Echo Limited   Result Value    LVEF  60-65%    Narrative     [id number]  [id number]  [id number]  [id number]^CELESTINA^DANNA^KANDI     [facility],[place]  Echocardiography Laboratory  [address] [place] [address]     Name: BRIGITTE JUNIOR  MRN: [id number]  : 1960  Study Date: 2021 10:37 AM  Age: 60 yrs  Gender: Male  Patient Location: [facility]  Reason For Study: Concern for pulmonary embolism  Ordering Physician: DANNA OSCAR  Performed By: Carol Leach RDCS     BSA: 2.3 m2  Height: 71 in  Weight: 234 lb  HR: 131  BP: 92/66 mmHg  ______________________________________________________________________________  Procedure  Limited Portable Echo Adult. Technically difficult study. Poor acoustic  windows.  ______________________________________________________________________________  Interpretation Summary  Technically difficult study. Poor acoustic windows.     Global and regional left ventricular function is normal with an EF of 60-65%.  The RV is not well visualized, the RV function appears to be moderately  reduced.  No pericardial effusion is present.  This study was compared with the study from 2021. No significant changes  noted.  ______________________________________________________________________________  Left Ventricle  Global and regional left ventricular function is normal with an EF of 60-65%.     Right Ventricle  The RV is not well visualized, the RV function appears to be moderately  reduced.     Mitral Valve  The mitral valve is normal. Trace mitral insufficiency is present.     Tricuspid Valve  Mild tricuspid insufficiency is present. The right ventricular systolic  pressure is approximated at 26.4 mmHg plus the right atrial pressure.     Pericardium  No pericardial effusion is present.     Compared to Previous Study  This study was compared with the study from 2021 . No significant changes  noted.  ______________________________________________________________________________  MMode/2D  Measurements & Calculations  IVSd: 1.2 cm  LVIDd: 3.5 cm  LVIDs: 2.5 cm  LVPWd: 0.92 cm  FS: 29.1 %  LV mass(C)d: 114.8 grams  LV mass(C)dI: 50.9 grams/m2  RWT: 0.52     Doppler Measurements & Calculations  TR max john: 257.0 cm/sec  TR max P.4 mmHg     ______________________________________________________________________________  Report approved by: MD Kervin Hutchison 2021 01:48 PM         US Lower Extremity Venous Bilateral Port    Narrative    EXAMINATION: DOPPLER VENOUS ULTRASOUND OF BILATERAL LOWER EXTREMITIES,  2021 11:22 AM     COMPARISON: None.    HISTORY: Swelling    TECHNIQUE:  Gray-scale evaluation with compression, spectral flow and  color Doppler assessment of the deep venous system of both legs from  groin to knee, and then at the ankles.    FINDINGS:  In both lower extremities, the common femoral, femoral, popliteal and  posterior tibial veins demonstrate normal compressibility and blood  flow.      Impression    IMPRESSION:  No evidence of deep venous thrombosis in either lower extremity.    I have personally reviewed the examination and initial interpretation  and I agree with the findings.    YESSENIA POLANCO MD         SYSTEM ID:  [id number]   XR Chest Port 1 View   Result Value    Radiologist flags Pneumothorax (Urgent)    Narrative    XR CHEST PORT 1 VIEW  2021 12:20 PM      HISTORY: ARDS    COMPARISON: 2021, 2021, 2021    FINDINGS: AP view of the chest. Endotracheal tube is in the mid  thoracic trachea. Right IJ CVC tip is in stable position. Enteric tube  courses beyond the field-of-view. Left-sided chest tubes in place.    Cardiac silhouette is obscured. There is a moderate right  pneumothorax. Stable bilateral pleural effusions. Left costophrenic  angle is collimated off the field-of-view Stable diffuse bilateral  opacities.      Impression    IMPRESSION:   1. Moderate-sized right pneumothorax.  2. Stable bilateral pleural effusions and  diffuse airspace opacities.    [Urgent Result: Pneumothorax]    Finding was identified on 8/8/2021 12:31 PM.     Daria Avila was contacted by Dr. Stevenson at 8/8/2021 12:40 PM  and verbalized understanding of the urgent finding.     I have personally reviewed the examination and initial interpretation  and I agree with the findings.    YESSENIA POLANCO MD         SYSTEM ID:  [id number]   XR Chest Port 1 View    Narrative    XR CHEST PORT 1 VIEW  8/8/2021 1:27 PM      HISTORY: post chest tube placement for pneumothorax    COMPARISON: 8/8/2021, 8/7/2021    FINDINGS: AP view of the chest. Endotracheal tube, right IJ CVC,  left-sided chest tubes are stable. Enteric tube courses down the  field-of-view. New right apical chest tube in place. Slightly  decreased size of right-sided pneumothorax. Status post VATS and  Pleurx catheter placement on left unchanged. Small bilateral pleural  effusions with diffuse bilateral opacities.      Impression    IMPRESSION:   1. Slightly decreased right pneumothorax status post chest tube  placement.  2. Stable bilateral pleural effusions and diffuse airspace opacities.    I have personally reviewed the examination and initial interpretation  and I agree with the findings.    SUZE SAEZ MD         SYSTEM ID:  [id number]

## 2021-08-09 NOTE — PROGRESS NOTES
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    Infectious workup thus far unrevealing; further workup including blastomyces ag, histoplasma ag, aspergillus galactomannan in process. His underlying PPFE unlikely to be contributing to his current presentation. Hospital course further complicated by new right-sided pneumothorax now status post chest tube 8/8/2021.     Recommendations:               - no new recommendations, we will sign off     Patient seen & discussed w/  Dr. Anil M.D., who is in agreement    08/09/2021    Interval History      Had worsening shock requiring three pressors (angiotensin II added) and acidosis requiring bicarb gtt -> now weaning down on pressors. Remains intubated, currently on APRV         Physical Exam Temp:  [98.1  F (36.7  C)-100.1  F (37.8  C)] 98.4  F (36.9  C)  Pulse:  [110-134] 130  Resp:  [11-40] 40  MAP:  [64 mmHg-94 mmHg] 84 mmHg  Arterial Line BP: ()/(51-78) 110/69  FiO2 (%):  [90 %-100 %] 90 %  SpO2:  [91 %-97 %] 91 %  I/O last 3 completed shifts:  In: 5031.63 [I.V.:3034.13; NG/GT:1637.5]  Out: 1935 [Urine:1595; Chest Tube:340]  Wt Readings from Last 1 Encounters:   08/09/21 106.3  kg (234 lb 5.6 oz)    Body mass index is 32.23 kg/m . Ventilation Mode: APRV  (Airway Pressure Release Ventilation)  FiO2 (%): 90 %  Rate Set (breaths/minute): 26 breaths/min  PEEP (cm H2O): 8 cmH2O  Oxygen Concentration (%): 90 %  Peak Inspiratory Pressure (cm H2O) (Drager Hoda): 12  Resp: (!) 40      Exam:  General: Sedated  HEENT: Intubated  CV: Regular  Resp: Decreased breath sounds left chest tube and Pleurx catheter in place serosanguinous drainage  Extremities: WWP, no LE edema  Neuro: Sedated    Data   Labs: reviewed in EMR and notable labs listed below.      Imaging: reviewed in EMR and notable imaging listed below.        Medications     acyclovir  800 mg Oral BID     artificial tears   Both Eyes At Bedtime     heparin ANTICOAGULANT  5,000 Units Subcutaneous Q8H     hydrocortisone sodium succinate PF  50 mg Intravenous Q6H     iopamidol (ISOVUE-370)  135 mL Intravenous Once     levothyroxine  150 mcg Oral Daily     micafungin  150 mg Intravenous Q24H     multivitamins w/minerals  15 mL Per Feeding Tube Daily     pantoprazole  40 mg Per Feeding Tube QAM AC     piperacillin-tazobactam  4.5 g Intravenous Q6H     protein modular  1 packet Per Feeding Tube TID     QUEtiapine  50 mg Oral Q8H     rosuvastatin  5 mg Oral QPM     sodium chloride (PF)  3 mL Intracatheter Q8H     sodium chloride (PF)  84 mL Intravenous Once

## 2021-08-09 NOTE — PROGRESS NOTES
Thoracic Surgery Progress Note  08/09/2021       Subjective:  Off paralytic and proning. Has been taken off vasopressin and decreasing remaining pressors. Right chest tube placed due to new pneumothorax with improvement in lung expansion.     Objective:  Temp:  [98.1  F (36.7  C)-100.3  F (37.9  C)] 99  F (37.2  C)  Pulse:  [110-131] 124  Resp:  [11-37] 35  MAP:  [64 mmHg-102 mmHg] 85 mmHg  Arterial Line BP: ()/(51-86) 111/70  FiO2 (%):  [100 %] 100 %  SpO2:  [92 %-97 %] 94 %    I/O last 3 completed shifts:  In: 5031.63 [I.V.:3034.13; NG/GT:1637.5]  Out: 1935 [Urine:1595; Chest Tube:340]   PleurX - 0  CT L - 100  CT R - 170    Vasopressin 0  Levo 0.03  Angiotensin 10  Flolan 20  Bicarb off    Gen: Intubated, sedated  Resp: mechanically ventilated 100% FiO2 on APRV  Chest: Pleurx dressing intact, left chest tube and right chest tube to suction, air leak in left  Abd: soft, nondistended  Incision: c/d/I  Ext: WWP, no edema     Labs:  Recent Labs   Lab 08/09/21  0411 08/08/21  1633 08/08/21  0442   WBC 19.1* 18.5* 19.1*   HGB 12.0* 12.8* 13.1*    288 283       Recent Labs   Lab 08/09/21  0913 08/09/21  0808 08/09/21  0622 08/09/21  0411 08/08/21  2151 08/08/21  1633 08/08/21  0444 08/08/21  0442 08/07/21  0349 08/07/21  0347   NA  --   --   --  150*  150* 153* 154*  154*   < > 155*   < > 149*   POTASSIUM  --   --   --  3.7  3.7  --  3.7  --  4.3   < > 4.6  4.6   CHLORIDE  --   --   --  114*  --  116*  --  118*   < > 119*   CO2  --   --   --  35*  --  36*  --  34*   < > 27   BUN  --   --   --  88*  --  83*  --  82*   < > 54*   CR  --   --   --  1.65*  --  1.85*  --  1.90*   < > 1.13   * 173* 142* 153*  --  146*  --  153*   < > 166*   TRACE  --   --   --  8.6  --  8.4*  --  8.3*   < > 8.7   MAG  --   --   --  3.1*  --   --   --  3.1*  --  2.7*   PHOS  --   --   --  3.7  --   --   --  3.7  --  3.8    < > = values in this interval not displayed.     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 Reynolds on 8/4. Post-operatively with worsening respiratory status ultimately requiring intubation. Ongoing issues with oxygenation/ventilation and hypotension with concern for septic shock in setting of immunosuppresion. Currently requiring proning and paralysis, bicarb gtt.     - Appreciate SICU care  - Sedation/pain per ICU  - Wean ventilator/flolan as able  - Pulmonology, BMT, Transplant ID consulted  - Ongoing infectious and opportunistic work-up  - Continue left chest tube and right chest tube suction  - Pleurx catheter okay to be used for suction  - Agree with antibiotics  - DNR, wife updated yesterday    Discussed with staff    Casper Paige MD  PGY-3, General Surgery

## 2021-08-09 NOTE — PLAN OF CARE
D:post Vats procedure, trapped L lung, post paralyzed and proned, continues unresponsive, but did just cough with turn.   Neuro: Unresponsive , pupils 2/sluggish. All extremities warm/normal pulses.   VS: Afeb, RR 11-37, -121, BP arterial /51-71 map 64-86.   CV: Sinus tach, PVC occasional.   Pulm: APRV 100%, LS diminished. L CT -20mmHg, 90 ml  serosang, L pleurex still with 75 ml in container, opened intermittently, when open L CT air leak. R CT -20mmHg  140 ml red to dark red with clots, lately lighter red outptut.   GI: TF at 20 ml hour, free water 125 every 2 hours, liquid stool in tubing.   : Frank with 60-80 ml/hour khadra urine.   Lines/Gtts:  R axilla arterial line with good waveform, R hand piv with D5W 75 ml/hour, R arm saline locked. R internal jugular with w-angiotensin 20ng/kg/min, Brown with levo0.03mcg/kg/min, fentanyl 100 mcg/hour, insulin 1 unit/hour, blue-tko .   Skin:   intact with CT's and L chest incision liquid bandage .   Drains: CT's, frank, rectal pouch.   Labs: see labs.   P: continue critical cares, assist family as able. Probable chest CT when off APRV.

## 2021-08-09 NOTE — PLAN OF CARE
Major Shift Events:  At beginning of shift, pt with air leak from ETT. RT and MD notified. ETT advanced 4 cm and CXR done. Pt without air leak after advancing ETT. Pt does not respond to stimuli. Pt coughs with suctioning. Pupils 2 mm and sluggish reaction. Pt does not move extremities to pain.Angiotensin and Levophed gtts titrated off. Pt's HR increased from 120s to 140s. MD notified. Pt given Versed 2 mg IV x1, LR bolus 500 ml x1 IV, and started on Precedex gtt at 1700. Pt's HR still in the 140s. FiO2 weaned from 100 to 90%. O2 sats 88-92%. Lung sounds diminished throughout. R and L chest tubes to - 20 cmH20 suction. Advancing TF to goal of 50 ml/hr. H2O flushes decreased to 125 ml q 4hrs and D5W IV infusion dc'd. Last Sodium = 150. Pt's wife updated.  Plan: Continue to monitor closely. Wean FiO2 as tolerated on vent.   For vital signs and complete assessments, please see documentation flowsheets.

## 2021-08-09 NOTE — PROGRESS NOTES
SURGICAL ICU PROGRESS NOTE  August 8, 2021      ASSESSMENT: 59 yo male with h/o BMT c/b GVHD, pleural parenchymal fibroelastosis, PAH, CAD, PE, trapped left lung and recurrent pleural effusions, now s/p VATS partial left decortication and pleurex placement on 8/4/21 who was hypoxic in the PACU, failed BiPAP, requiring mechanical ventilation and appeared to be in septic shock with hypotension, fever, hypoxia, tachypnea. Overall, now with worsening hypercarbic hypoxemic respiratory failure requiring paralysis and proning.      TODAY'S PROGRESS/PLANS:   -Wean fentanyl as tolerates  -Advance ETT 4 cm and recheck CXR  -Wean FiO2 as tolerates, goal sats >90%  -Stop angiotensin once bag empty, wean levo as tolerates  -Stop D5W, monitor Na+ for need to adjust H2O flushes further  -Adv TF to goal  -Add on pneumocystitis and galactomanan to BAL sputum cx yesterday  -Discuss need for Karius test with ID  -Discuss resuming PTA Bactrim prophy with BMT/ID    PLAN:   Neuro/ pain/ sedation:  #Acute post operative pain  #Sedation required in the setting of chemical paralysis  -Monitor neurological status. Notify the MD for any acute changes in exam.  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 On 3 L O2 at home. PFTs Oct 2020: FEV1 1.03L (27%), DLCO 52%.  - Pulm consult, recs appreciated. Per discussion with their team, unlikely to be PPFE which usually does not present with exacerbation. More likely pneumonia with underlying capillaritis 2/2 GVHD.  - VENT:  APRV P-high 40, P-low 0, T-high 5, T-low  0.38 with FiO2 100%.  Utilizing a low T-low given his underlying restrictive lung disease, and his flow waveforms previously showing peek expiratory flow rate between 25-50%. Some improvement in hypoxia with change to APRV.  Note did not tolerate transition back to conventional ventilation previously and proning and paralysis with refractory hypoxemia. Supinated since 8/8.   - Spontaneous right pneumothorax-> s/p chest thoracostomy with residual pneumothorax. Pleural fluid sent for analysis at the time of insertion.   - L CT to suction, intermittent air leak present. L  pleurex catheter to gravity drainage.   - Continue epoprostenol at full strength (started 8/4)  - 8/7 Given acute life threatening decompensation, started treatment for presumed PE. Echocardiogram and BLE DVT scans did not support this diagnosis. Stopped heparin infusion, changed to subcutaneous heparin.  - Bronchoscopy 8/7 was unrevealing, mucosa looked healthy and minimal secretions, a BAL was obtained.   -CXR: per my review, ETT appears shallow and rec adv 4-5 cm and repeat CXR; per report, No significant change in the small right pneumothorax with pneumatic component along the inferior lateral lung base. Grossly unchanged mixed opacities throughout the lungs. Stable large left pleural effusion.   -Noted air leak around ETT overnight and again this AM.  If ongoing air leak despite repositioning of ETT, may need anesthesia to replace ETT.     Cardiovascular:    #Septic shock  #Pulmonary artery hypertension  #Sinus tachycardia  - PRESSORS: Norepinephrine gtt. Vasopressin off since 8/7. Angiotensin II started 8/7 and to discontinue today.   -Goal: MAP goal >65.   -Hydrocortisone 50 q 6 hrs (started 8/7) - continue stress dose steroids.   -Last echo Jan 2020: EF 55-60%, RV pressure 27 mmHg above RA, no significant valvular abnormalities.   - Formal ECHO (8/5 and 8/7): per most recent (similar to 8/5), EF 60-65%, RV function moderately reduced, no  pericardial effusion.        GI care:   #Non-severe Protein calorie malnutrition (mild muscle wasting, inadequate TF x 6days)  -TF: trophic @ 20 ml/hr due to previous vasopressor support.  Advance with de-escalating pressor needs.  -Protonix every day       Renal/ Fluid Balance:    #BETH  #Hyperkalemia - resolved  #Hypernatremia - improving  - Developing BETH, oliguria improved with some fluid administration (after diuresis 8/7).   -K 3.7 .   -Na 150 (155). Was on bicarbonate infusion (in D5W), serum bicarb peaked at 36 and now 35 today.  Changed to D5W @ 75 ml/hr yesterday and will discontinue this today.  -H2O flushes 250 q 4 hrs.  Follow Na for need to adjust further.  -Will continue to monitor intake and output.  -Frank in place      Endocrine:    #Stress and steroid induced hyperglycemia  - insulin gtt - continue with fluctuating nutritional intakes.  - Stress dose steroids as above.   - Home levothyroxine      ID/ Antibiotics:  #Septic Shock  -Immunosuppressed with PTA cyclosporine and prednisone. Currently on hold.   - Persistent leukocytosis (19.1)  - Current cultures NGTD. BAL sent 8/7 gram stain and KOH prep with no organisms.   - BMT/ID rec add pneumocystis and aspergillus, galactomanan to bronch cx.  Additionally consider Karius test if infectious w/u remains negative.  - Pleural fluid sent during CT insertion.   - CT C/A/P ordered 8/7 however remain inapprop to complete with ongoing pulmonary instability.   - Continue Pip-Tazo, Acyclovir and Micafungin  - Discuss resuming PTA Bactrim prophy with BMT/ID.      Heme:     #Myelodysplastic syndrome s/p BMT 2016  -BMT consult, recs appreciated  -HgB stable  -Subcutaneous heparin for prophylaxis.       Prophylaxis:    -DVT PPX: PPI, Heparin SQ      MSK:    - None      Lines/ tubes/ drains:  -PIV x2, Left chest tubes x 2 (15.5 Fr and 24 Fr), R CT, arterial line, ETT, frank, feeding tube, R IJ      Disposition:  -Surgical ICU    Patient seen, findings and plan  discussed with surgical ICU staff, Dr. Palm.    Time spent on this Encounter   Billing:  I spent 60 minutes bedside and on the inpatient unit today managing the critical care of Byron Russo in relation to the issues listed in this note.      Nusrat Fernandez PA-C      SUBJECTIVE:   Patient with cuff leak on ETT overnight, RT inflated balloon and resolved initially but now returned upon exam.    OBJECTIVE:   1. VITAL SIGNS:   Temp:  [98.1  F (36.7  C)-100.3  F (37.9  C)] 98.8  F (37.1  C)  Pulse:  [110-131] 118  Resp:  [11-37] 28  MAP:  [64 mmHg-102 mmHg] 80 mmHg  Arterial Line BP: ()/(51-86) 103/66  FiO2 (%):  [100 %] 100 %  SpO2:  [93 %-97 %] 96 %  Ventilation Mode: APRV  (Airway Pressure Release Ventilation)  FiO2 (%): 100 %  Oxygen Concentration (%): 100 %  Resp: 28      2. INTAKE/ OUTPUT:   I/O last 3 completed shifts:  In: 5559.7 [I.V.:3940.2; NG/GT:1419.5]  Out: 1535 [Urine:1335; Stool:50; Chest Tube:150]    3. PHYSICAL EXAMINATION:   General: critically ill  HEENT: atraumatic, PERRL, anicteric sclera, FT secured in place with bridle  Neuro: not follows commands, intermittently moves all extremities spontaneously  Pulm/Resp: air leak with ETT secured in place/26 cm at lip, on APRV, lungs coarse on right, decreased lung sounds on left, CT with sanguinous outputs/tidaling   CV: RRR/sinus tachy 130s, S1/S2, no MRG   Abdomen: Soft, non-distended, non-tender no rebound tenderness or guarding  : frank catheter in place, urine yellow and clear  MSK/Extremities: trace peripheral edema, peripheral pulses intact, calves soft, extremities warm/well perfused     4. INVESTIGATIONS:   Arterial Blood Gases   Recent Labs   Lab 08/09/21  0410 08/08/21  2150 08/08/21  1758 08/08/21  1633   PH 7.39 7.40 7.40 7.40   PCO2 60* 59* 60* 61*   PO2 85 74* 85 81   HCO3 37* 36* 38* 38*     Complete Blood Count   Recent Labs   Lab 08/09/21  0411 08/08/21  1633 08/08/21  0442 08/07/21  1519   WBC 19.1* 18.5* 19.1*  25.4*   HGB 12.0* 12.8* 13.1* 13.6    288 283 316     Basic Metabolic Panel  Recent Labs   Lab 21  0413 21  0411 21  0219 21  0017 21  2151 21  1633 21  1041 21  0442 21  0018   NA  --  150*  150*  --   --  153* 154*  154* 155* 155* 153*   POTASSIUM  --  3.7  3.7  --   --   --  3.7  --  4.3 5.0   CHLORIDE  --  114*  --   --   --  116*  --  118* 119*   CO2  --  35*  --   --   --  36*  --  34* 31   BUN  --  88*  --   --   --  83*  --  82* 76*   CR  --  1.65*  --   --   --  1.85*  --  1.90* 1.81*   * 153* 148* 158*  --  146*  --  153* 199*     Liver Function Tests  Recent Labs   Lab 21  1303 21  0442 21  1733 21  0340   AST  --  55* 52* 24   ALT  --  27 28 16   ALKPHOS  --  130 132 114   BILITOTAL  --  0.8 0.7 1.3   ALBUMIN  --  1.9* 2.0* 2.4*   INR 1.70*  --  1.74*  --      Pancreatic Enzymes  No lab results found in last 7 days.  Coagulation Profile  Recent Labs   Lab 21  1303 21  1733   INR 1.70* 1.74*     Lactate  Invalid input(s): LACTATE    5. RADIOLOGY:   Recent Results (from the past 24 hour(s))   Echo Complete   Result Value    LVEF  > 65%    Narrative    [id number]  [id number]  [id number]  [id number]^BRENDEN^AMANUEL     [facility],[place]  Echocardiography Laboratory  [address] [place] [id number]     Name: BRIGITTE JUNIOR  MRN: [id number]  : 1960  Study Date: 2021 09:27 AM  Age: 60 yrs  Gender: Male  Patient Location: [facility]  Reason For Study: Shock  Ordering Physician: AMANUEL WILCOX  Performed By: ADI Alvarenga     BSA: 2.2 m2  Height: 71 in  Weight: 224 lb  HR: 117  BP: 86/56 mmHg  ______________________________________________________________________________  Procedure  Complete Portable Echo Adult.  ______________________________________________________________________________  Interpretation Summary  Technically difficult study due to dressing obscuring  some views.     Left ventricular size, and wall motion are normal. The function is  hyperdynamic with an estimated ejection fraction is > 65%.     Diastolic Doppler findings (E/E' ratio and/or other parameters) suggest left  ventricular filling pressures are increased.     Global right ventricular function is mildly to moderately reduced while there  is presevered wall motion and size.     Right ventricular systolic pressure is 26.7mmHg above the right atrial  pressure which was not estimated due to the patient being on a ventilator.     This study was compared with the study from 1/9/20 .  There has been no significant change.  ______________________________________________________________________________  Left Ventricle  Left ventricular size, wall motion and function are normal. The ejection  fraction is > 65%. Diastolic Doppler findings (E/E' ratio and/or other  parameters) suggest left ventricular filling pressures are increased. Left  ventricular diastolic function is not assessable.     Right Ventricle  The right ventricular wall motion is normal. The right ventricle is normal  size. Global right ventricular function is mildly to moderately reduced.     Atria  The atria cannot be assessed. The atrial septum is intact as assessed by color  Doppler .     Mitral Valve  The mitral valve is normal.     Aortic Valve  Mild aortic valve calcification is present. On Doppler interrogation, which is  limited, there is no significant stenosis or regurgitation.     Tricuspid Valve  The tricuspid valve is normal. Mild tricuspid insufficiency is present. Right  ventricular systolic pressure is 26.7mmHg above the right atrial pressure.     Pulmonic Valve  The valve leaflets are not well visualized. Trace pulmonic insufficiency is  present.     Vessels  The pulmonary artery and bifurcation cannot be assessed. The aorta root is  normal. Sinuses of Valsalva 3.8 cm. Ascending aorta 3.7 cm. Unable to assess  mean RA pressure  given the patient is on a ventilator.     Pericardium  Prominent epicardial fat is noted. Trivial pericardial effusion is present.     Miscellaneous  Technically difficult study due to dressing obscuring some views.     Compared to Previous Study  This study was compared with the study from 20 . There has been no change.  ______________________________________________________________________________  MMode/2D Measurements & Calculations  IVSd: 0.77 cm  LVIDd: 5.0 cm  LVIDs: 3.2 cm  LVPWd: 0.86 cm  FS: 36.0 %  LV mass(C)d: 140.5 grams  LV mass(C)dI: 63.5 grams/m2  asc Aorta Diam: 3.7 cm  LVOT diam: 2.5 cm  LVOT area: 5.1 cm2  RWT: 0.34  TAPSE: 1.8 cm     Doppler Measurements & Calculations  MV E max john: 90.4 cm/sec  MV A max john: 56.3 cm/sec  MV E/A: 1.6  MV dec slope: 611.1 cm/sec2  MV dec time: 0.15 sec  PA acc time: 0.11 sec     TR max john: 258.4 cm/sec  TR max P.7 mmHg  E/E' av.3  Lateral E/e': 22.0  Medial E/e': 22.5     ______________________________________________________________________________  Report approved by: Jah Templeton 2021 11:48 AM         XR Abdomen Port 1 View    Narrative    EXAMINATION:  XR ABDOMEN PORT 1 VIEWS 2021 12:08 PM     COMPARISON: Abdominal radiograph, 2021.    HISTORY: feeding tube placement.    FINDINGS: Frontal view of the abdomen. The tip of the feeding tube  projects over the third portion of the duodenum. Air-filled dilated  small bowel in the central abdomen. No pneumatosis or portal venous  gas. The lung bases are not completely visualized on this exam. Partly  visualized left chest tube. Vascular calcification of the iliac  vessels. Degenerative changes of the visualized spine.      Impression    IMPRESSION:   1. The tip of the feeding tube projects over the expected location of  the third portion of the duodenum.  2. Air-filled dilated small bowel loop in the central abdomen, which  may be seen with adynamic ileus versus small bowel  obstruction.    I have personally reviewed the examination and initial interpretation  and I agree with the findings.    JOY FLORIAN MD         SYSTEM ID:  [id number]

## 2021-08-10 NOTE — PROGRESS NOTES
Thoracic Surgery Progress Note  08/10/2021       Subjective:  Weaned off pressors at one point yesterday. Did require to be placed back on levophed but weaned back down to minimal amount. Some improvements in gas.    Objective:  Temp:  [97.2  F (36.2  C)-103.5  F (39.7  C)] 103.5  F (39.7  C)  Pulse:  [101-144] 101  Resp:  [32-42] 40  BP: (93)/(69) 93/69  MAP:  [58 mmHg-94 mmHg] 72 mmHg  Arterial Line BP: ()/(48-78) 80/63  FiO2 (%):  [90 %-100 %] 100 %  SpO2:  [89 %-97 %] 94 %    I/O last 3 completed shifts:  In: 4136.87 [I.V.:1391.87; NG/GT:1285; IV Piggyback:500]  Out: 2770 [Urine:2345; Stool:50; Chest Tube:375]   PleurX - 0  CT L - 170, 50  CT R - 200, 10    Vasopressin off  Levo 0.03  Angiotensin off  Flolan 20  Bicarb off  Precedex 0.7  Fentanyl 100    Gen: Intubated, sedated  Resp: mechanically ventilated 100% FiO2 on APRV  Chest: Pleurx dressing intact, left chest tube and right chest tube to suction, air leak in left  Abd: soft, nondistended  Incision: c/d/I  Ext: WWP, no edema     Labs:  Recent Labs   Lab 08/10/21  0320 08/09/21  0411 08/08/21  1633   WBC 17.9* 19.1* 18.5*   HGB 12.2* 12.0* 12.8*    265 288       Recent Labs   Lab 08/10/21  0601 08/10/21  0508 08/10/21  0319 08/09/21  2125 08/09/21  1544 08/09/21  0413 08/09/21  0411 08/08/21  0444 08/08/21  0442   NA  --   --  155*  155* 154* 150*  150*   < > 150*  150*   < > 155*   POTASSIUM  --   --  3.9  3.9  --  4.0  --  3.7  3.7   < > 4.3   CHLORIDE  --   --  114*  --  112*  --  114*   < > 118*   CO2  --   --  35*  --  36*  --  35*   < > 34*   BUN  --   --  79*  --  74*  --  88*   < > 82*   CR  --   --  1.51*  --  1.38*  --  1.65*   < > 1.90*   * 146* 164*  149*  --  154*  135*  --  153*   < > 153*   TRACE  --   --  8.3*  --  8.4*  --  8.6   < > 8.3*   MAG  --   --  3.0*  --   --   --  3.1*  --  3.1*   PHOS  --   --  3.1  --   --   --  3.7  --  3.7    < > = values in this interval not displayed.     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 Reynolds on 8/4. Post-operatively with worsening respiratory status ultimately requiring intubation. Ongoing issues with oxygenation/ventilation and hypotension with concern for septic shock in setting of immunosuppresion.     Has slowly been making improvements in hemodynamics. Still with significant respiratory support. CXR this morning with slight increase in basilar pneumothorax however sentinel hole intrathoracic and is draining therefore chest tube appears to be functional.    - Appreciate SICU care  - Sedation/pain per ICU  - Wean ventilator/flolan as able  - Pulmonology, BMT, Transplant ID consulted  - Ongoing infectious and opportunistic work-up  - 8/7 BAL with E. Faecalis, S haemolyticus, consider restarting Vancomycin - defer decision to ID and SICU  - Continue left chest tube and right chest tube suction; no need for additional right chest tube at this time  - Pleurx catheter okay to be used for suction  - DNR    Discussed with staff    Casper Paige MD  PGY-3, General Surgery

## 2021-08-10 NOTE — PROGRESS NOTES
BMT Consult Progress note       Patient ID:  Byron Russo is a 60 year old male, 5 years and 2 1/2 months of his HCT for Myelodysplastic syndrome.       ASSESSMENT AND PLAN    Byron Russo is a 60 year old male who is 5 years and 2 months of his HCT for Myeloproliferative disease. 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 He had several episodes of pleural effusions and hydropneumothorax     He is currently admitted in the ICU with acute hypoxic respiratory failure and possible septic shock after decortication and pleurX catheter placement on 8/4/2021.     1. S/p Myeloablative HSCT for Myelodysplastic syndrome (5yrs and 2 months)  2. Chronic GVHD   3. 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 Acute on Chronic Hypoxic respiratory failure and possible Septic shock    Patient continues to be on mechanical ventilation and pressor support. On antibiotics and anti-fungals.      PLAN :   - Continue to hold immunosuppressive therapy with Cyclosporine in the setting of possible fungal infection   - Continue Micafungin and Zosyn for broad spectrum coverage. Appreciate recommendations from Transplant ID.   - No new recommendations from BMT stand point, appreciate the SICU team for taking care of the patient.       HPI :     Yg Russo is a 60 year old gentle man  S/p myeloablative allo-sib transplant for MDS in 2016 and ongoing complication of chronic [id number] disease-related accumulation of pleural effusion and hydropneumothorax of the left lung.      He has been on 3-4 Liters of oxygen at home with shortness of breath and CUELLAR in the baseline.     He has trapped left lung and  had several thoracenteses over the last months.  He was seen by Dr. Reynolds in early May who thought he would be a candidate for a PleurX catheter.He was placed on a taper dose of prednisone  and was at a dose of 10 mg a day with the goal of 5 mg/ day by mid August, when he is planned for the procedure.     He follows Pulmonology with Dr. Brady as well who felt that pleurodesis would not be successful in his case given the rapid accumulation.  He was on prednisone at 5mg/day (since 7/21) and CSA at 100 mg bid     CXR In July 7,2021 - The chest X ray showed Left loculated pleural effusion with new multifocal bubbly lucency in the left apex and left base. Concern for empyema.   Unchanged interstitial opacity in the right lung. he subsequently underwent thoracentesis which was not infected. He was placed on Augmentin in July.     He underwent Left partial Decortication and PleurX catheter placement on 8/4/2021, intra operatively he was doing well. However following the procedure he was increasingly hypoxic and tachypenic and required mechanical intubation and ventilator support. There is  Concern for fungal infection versus aspiration pneumonia. He is on Zosyn and Micafungin. Pulmonology, Transplant ID, Thoracic Surgery following. He is under care of SICU team.      Transplant Essential Data:  Diagnosis AMLU Acute myelogeneous leukemia, Unknown  HCT Type Allogeneic    Prep Regimen Cytoxan  Fludarabine  TBI   Donor Source Related [facility]    GVHD Prophylaxis Cyclosporine  Mycophenolate  Clinical Trials          I have assessed all abnormal lab values for their clinical significance and any values considered clinically significant have been addressed in the assessment and plan    Family History:   Family History   Problem Relation Age of Onset     Myocardial Infarction Father 58     Coronary Artery Disease Father      Heart Failure Mother      Glaucoma Mother      Coronary Artery Disease Brother      Coronary Artery Disease Brother      Cancer Brother         GIST     Skin Cancer No family hx of      Melanoma No family hx of      Macular Degeneration No family hx of        Social History:   Social History      Socioeconomic History     Marital status:      Spouse name: Not on file     Number of children: Not on file     Years of education: Not on file     Highest education level: Not on file   Occupational History     Not on file   Tobacco Use     Smoking status: Never Smoker     Smokeless tobacco: Former User   Substance and Sexual Activity     Alcohol use: Yes     Alcohol/week: 0.0 standard drinks     Comment: Rare     Drug use: No     Sexual activity: Not on file   Other Topics Concern     Parent/sibling w/ CABG, MI or angioplasty before 65F 55M? Not Asked   Social History Narrative     Not on file     Social Determinants of Health     Financial Resource Strain:      Difficulty of Paying Living Expenses:    Food Insecurity:      Worried About Running Out of Food in the Last Year:      Ran Out of Food in the Last Year:    Transportation Needs:      Lack of Transportation (Medical):      Lack of Transportation (Non-Medical):    Physical Activity:      Days of Exercise per Week:      Minutes of Exercise per Session:    Stress:      Feeling of Stress :    Social Connections:      Frequency of Communication with Friends and Family:      Frequency of Social Gatherings with Friends and Family:      Attends Christianity Services:      Active Member of Clubs or Organizations:      Attends Club or Organization Meetings:      Marital Status:    Intimate Partner Violence: Not At Risk     Fear of Current or Ex-Partner: No     Emotionally Abused: No     Physically Abused: No     Sexually Abused: No       Past Medical History:   Past Medical History:   Diagnosis Date     Blepharitis      CAD (coronary artery disease) 2001     Rjydu-lmuwba-tlgv disease (H)      Hyperlipidemia      Hypertension     Controled by medication     Hypothyroidism      Obesity      Oxygen dependent     Indogen portable      Pulmonary embolism (H) 2/2016     Varicose veins         Past Surgical History:   Past Surgical History:   Procedure Laterality  Date     APPENDECTOMY       ARTHROSCOPY KNEE WITH MEDIAL MENISCECTOMY  6/20/2011    Procedure:ARTHROSCOPY KNEE WITH MEDIAL MENISCECTOMY; Surgeon:SACHIN SAMANO; Location:[facility]     BRONCHOSCOPY (RIGID OR FLEXIBLE), DIAGNOSTIC N/A 2/6/2019    Procedure: COMBINED BRONCHOSCOPY (RIGID OR FLEXIBLE), LAVAGE;  Surgeon: Louise Rogel MD;  Location: [facility]     coronary artery stents placed x 5 2001     IR THORACENTESIS  11/27/2020     IR THORACENTESIS  12/18/2020     IR THORACENTESIS  12/23/2020     IR THORACENTESIS  1/8/2021     IR THORACENTESIS  3/10/2021     IR THORACENTESIS  4/1/2021     IR THORACENTESIS  4/22/2021     IR THORACENTESIS  5/21/2021     IR THORACENTESIS  6/11/2021     IR THORACENTESIS  6/30/2021     IR THORACENTESIS  7/15/2021     THORACENTESIS Left 11/27/2020    Procedure: THORACENTESIS;  Surgeon: Fred Hahn MD;  Location: [facility] OR     THORACENTESIS Left 12/18/2020    Procedure: THORACENTESIS @1000;  Surgeon: Nasir Greene MD;  Location: [facility] OR     THORACENTESIS Left 12/23/2020    Procedure: THORACENTESIS;  Surgeon: Dary Wilkinson MD;  Location: [facility] OR     THORACENTESIS Left 1/8/2021    Procedure: THORACENTESIS;  Surgeon: Carl Singh PA-C;  Location: [facility] OR     THORACENTESIS Left 3/10/2021    Procedure: THORACENTESIS;  Surgeon: Dary Wilkinson MD;  Location: [facility] OR     THORACENTESIS Left 4/1/2021    Procedure: THORACENTESIS @1000;  Surgeon: Mikal Lema MD;  Location: [facility] OR     THORACENTESIS Left 4/22/2021    Procedure: THORACENTESIS;  Surgeon: Vladimir Mercado PA-C;  Location: [facility] OR     THORACENTESIS N/A 5/21/2021    Procedure: THORACENTESIS;  Surgeon: Aquiles Powers PA-C;  Location: [facility] OR     THORACENTESIS Left 6/11/2021    Procedure: THORACENTESIS LEFT;  Surgeon: Mikal Lema MD;  Location: [facility]     THORACENTESIS Left 6/30/2021    Procedure: THORACENTESIS;  Surgeon: Mikal Lema MD;  Location: [facility]     THORACENTESIS Left  7/15/2021    Procedure: THORACENTESIS LEFT;  Surgeon: Mikal Lema MD;  Location: [facility] OR     THORACOSCOPIC INSERTION DRAINAGE CATHETER Left 8/4/2021    Procedure: Left Video Assisted Thoracic Surgery, Placement of PleurX, Partial Decortication;  Surgeon: Lobo Reynolds MD;  Location: [facility]       Allergies:   Allergies   Allergen Reactions     Atorvastatin Other (See Comments)     Back pain  Tolerates rosuvastatin     Augmentin [Amoxicillin-Pot Clavulanate] Itching and Rash     Tolerated amoxicillin on its own 7/2020     Lifitegrast      Eyes stinging when using this for about 15 min after use.        Home Medications      Prior to Admission medications    Medication Sig Start Date End Date Taking? Authorizing Provider   acetaminophen (TYLENOL) 325 MG tablet Take 1-2 tablets (325-650 mg) by mouth every 4 hours as needed for mild pain or fever 2/7/19  Yes Gerald Doty   acyclovir (ZOVIRAX) 800 MG tablet Take 1 tablet (800 mg) by mouth 2 times daily 3/18/21  Yes Uli Enciso MD   amLODIPine (NORVASC) 5 MG tablet Take 1 tablet (5 mg) by mouth daily 1/7/21  Yes Uli Enciso MD   aspirin 81 MG EC tablet Take 81 mg by mouth daily Reported on 5/12/2017 7/27/16  Yes Aleta Donovan PA-C   carboxymethylcellulose PF (REFRESH PLUS) 0.5 % ophthalmic solution Place 1 drop into both eyes 3 times daily as needed for dry eyes   Yes Unknown, Entered By History   cycloSPORINE modified (GENERIC EQUIVALENT) 100 MG capsule Take 1 capsule (100 mg) by mouth 2 times daily 3/25/21  Yes Angela Bunch PA-C   cycloSPORINE modified (GENERIC EQUIVALENT) 25 MG capsule On HOLD, current dose is 200mg twice daily. 3/25/21  Yes Angela Bunch PA-C   fluconazole (DIFLUCAN) 100 MG tablet Take 1 tablet (100 mg) by mouth daily  Patient taking differently: Take 100 mg by mouth every evening  3/18/21  Yes Uli Enciso MD   levofloxacin (LEVAQUIN) 250 MG tablet Take 1 tablet (250 mg) by mouth daily 3/18/21  Yes Fernie  MD Uli   levothyroxine (SYNTHROID/LEVOTHROID) 150 MCG tablet Take 1 tablet (150 mcg) by mouth daily 3/18/21  Yes Uli Enciso MD   magnesium oxide (MAG-OX) 400 (241.3 Mg) MG tablet Take 800 mg by mouth every evening  10/29/20  Yes Maranda Mayes PA-C   metoprolol tartrate (LOPRESSOR) 25 MG tablet Take 1 tablet (25 mg) by mouth daily  Patient taking differently: Take 25 mg by mouth every evening  3/18/21  Yes Uli Enciso MD   predniSONE (DELTASONE) 5 MG tablet Take 1 and 0.5 tablets every day (7.5 mg every day)  Patient taking differently: Take 5 mg by mouth daily  7/7/21 8/7/21 Yes Cristal Arrington MD   rosuvastatin (CRESTOR) 5 MG tablet Take 1 tablet (5 mg) by mouth daily  Patient taking differently: Take 5 mg by mouth every evening  3/18/21  Yes Uli Enciso MD   sulfamethoxazole-trimethoprim (BACTRIM DS) 800-160 MG tablet Take 1 tablet by mouth 2 times daily On Monday's and Tuesday's only 3/18/21  Yes Uli Enciso MD   order for DME Equipment being ordered: Oxygen. Please provide patient with continuous flow oxygen at 3 LPM via nasal cannula for activity and while sleeping. Patient will need concentrator, conserving device, and portable oxygen. 3/23/20   Uli Enciso MD   order for DME Equipment being ordered: Oxygen. Please provide patient with continuous flow oxygen at 3 LPM via nasal cannula. Patient will need concentrator, conserving device, and portable oxygen. Length of need is up to 6 months; will continue to reassess. Patient will need transportation oxygen delivered to the [facility], [address] [place] [id number]. [address], Room [address]29. 1/15/20   Ryan Chan MD   order for DME Equipment being ordered: Please provide portable oxygen at 2 LPM with activity and with sleep via nasal canula. Patient requesting small tanks he can wear with back pack for his work. 1/7/20   Travis Brady MD       Review of Systems      Unable to do as  "patient was intubated     PHYSICAL EXAM      Weight     Wt Readings from Last 3 Encounters:   08/10/21 108.3 kg (238 lb 12.1 oz)   07/28/21 105.7 kg (233 lb)   07/15/21 106.6 kg (235 lb)          BP 93/69   Pulse (!) 133   Temp (!) 103.1  F (39.5  C)   Resp 23   Ht 1.816 m (5' 11.5\")   Wt 108.3 kg (238 lb 12.1 oz)   SpO2 92%   BMI 32.84 kg/m       General: Intubated and sedated   Respiratory : using mechanical ventilation  Cardiovascular: RRR, no M/R/G   Abdominal/Rectal: +BS  Lymphatics: +edema  Skin: No rashes or petechaie      Jackeline Yap MD  Hematology/Oncology/Transplant Fellow  Pgr : [phone]  "

## 2021-08-10 NOTE — PROGRESS NOTES
[facility]   Pulmonary   Progress Note  Byron Russo MRN: [id number]  1960  Date of Admission:8/4/2021  Date of Service: 08/10/2021        Assessment & Plan         History of Pleuralparanchemal Fibroelastosis   Acute on Chronic hypoxic respiratory failure   S/P VATS with Pleurx and thoracic (not lung) decortication   Severe restrictive lung disease with moderate diffusion impairment        Discussion:     Further literature review that there is a subset of PPFE that can be rapidly progressing. No significant treatment options to date have been shown to be beneficial.  However we favor a trial of pulse steroids given limited options and severe clinical decline  https://www.atsjournals.org/doi/pdf/10.1513/AnnalsATS.402021-026TDN     Recommendations:               -Trial of high-dose steroids, 125 methylprednisone every 6 hours for 3 days    Patient seen & discussed w/  Dr. Anil M.D., who is in agreement    08/10/2021    Interval History      Persistent fevers overnight even with cooling blanket     Physical Exam Temp:  [97.2  F (36.2  C)-103.6  F (39.8  C)] 102.9  F (39.4  C)  Pulse:  [] 147  Resp:  [23-42] 34  BP: (93)/(69) 93/69  MAP:  [58 mmHg-83 mmHg] 66 mmHg  Arterial Line BP: ()/(46-69) 93/54  FiO2 (%):  [90 %-100 %] 100 %  SpO2:  [88 %-95 %] 89 %  I/O last 3 completed shifts:  In: 4655.97 [I.V.:1605.97; NG/GT:1390; IV Piggyback:500]  Out: 2260 [Urine:2060; Chest Tube:200]  Wt Readings from Last 1 Encounters:   08/10/21 108.3 kg (238 lb 12.1 oz)    Body mass index is 32.84 kg/m . Ventilation Mode: APRV  (Airway Pressure Release Ventilation)  FiO2 (%): 100 %  Rate Set (breaths/minute): 26 breaths/min  PEEP (cm H2O): 8 cmH2O  Oxygen Concentration (%): 90 %  Peak Inspiratory Pressure (cm H2O) (Drager Hoda): 12  Resp: (!) 34      Exam:  General: Sedated  HEENT: Intubated  CV: Regular  Resp: Decreased breath sounds left chest tube and Pleurx catheter in place serosanguinous  drainage  Extremities: WWP, no LE edema  Neuro: Sedated    Bilateral chest tubes in place      Data   Labs: reviewed in EMR and notable labs listed below.  Imaging: reviewed in EMR and notable imaging listed below.    Medications     acyclovir  800 mg Oral BID     artificial tears   Both Eyes At Bedtime     heparin ANTICOAGULANT  5,000 Units Subcutaneous Q8H     iopamidol (ISOVUE-370)  135 mL Intravenous Once     levothyroxine  150 mcg Oral Daily     linezolid  600 mg Intravenous Q12H     meropenem  1 g Intravenous Q8H     methylPREDNISolone  125 mg Intravenous Q6H     micafungin  150 mg Intravenous Q24H     multivitamins w/minerals  15 mL Per Feeding Tube Daily     pantoprazole  40 mg Per Feeding Tube QAM AC     protein modular  1 packet Per Feeding Tube TID     QUEtiapine  50 mg Oral or Feeding Tube Q8H     rosuvastatin  5 mg Oral QPM     sodium chloride (PF)  3 mL Intracatheter Q8H     sodium chloride (PF)  84 mL Intravenous Once     sulfamethoxazole-trimethoprim  1 tablet Oral Q Mon Tues BID

## 2021-08-10 NOTE — PROGRESS NOTES
STAFF ADDENDUM:  I saw and evaluated Mr. Russo and agree with the resident s findings and plan of care as documented in the resident s note and edited by me, as applicable.      In summary, Mr. Russo is gradually improving from the hemodynamic point of view and is no longer paralyzed. He still requires 100% FIO2. We'll continue to slowly wean him.  The patient had all questions answered and was in agreement with the plan.  Lobo Reynolds MD

## 2021-08-10 NOTE — PROGRESS NOTES
[facility]  Transplant Infectious Disease Progress Note     Patient:  Byron Russo, Date of birth 1960, Medical record number [id number]  Date of Visit:  08/10/2021         Assessment and Recommendations:   Recommendations:  1) Given persistent fevers would recommend adding Linezolid 600 mg IV/PO BID  2) Recommend transitioning from Pip/Tazo to Meropenem 1 gram IV Q8H   3) Recommend continuing Micafungin. But if no improvement with change in abx will then change antifungal therapy. At this time there is no definitive evidence of a fungal infection and we are unable to obtain a CT chest given his clinical instability.   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 Post-operatively developed acute on chronic hypoxic respiratory failure and hypotension. Right pneumothorax s/p chest tube thoracostomy on 8/08.      Infectious Disease issues include:  - Acute on chronic respiratory failure - with concern for opportunistic infection given chronic GVHD of the lungs. Has had two previous thoracenteses on 10/23/2020 and 7/15/2021 with negative cultures. Previous CT scans done in 10/2020 and 04/2021 did not show very significant changes. There were noted persistent right nodular consolidative opacities. While these pulmonary nodules had not changed in appearance from 10/2020 to 04/2021 given his acute decompensation would be good to reassess these pulmonary  nodules. Repeat CT scan would be a good start however understand that ability to obtain CT would be limited by current critical condition, recommend obtaining when feasible.     Workup has included:   Pleural fluid cultures are no growth to date.     Serum Aspergillus Galactomannan and Beta-d-glucan negative. Serum CrAG negative as well.     S/p bronchoscopy 8/07 with negative KOH prep and gram stain. Aerobic culture growing +1 enterococcus faecalis and CoNS.  BAL Aspergillus Galactomannan negative. PJP PCR on BAL pending. BAL aerobic culture 1+ enterococcus faecalis, +1 staphylococcus haemolyticus.     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 VRE    [facility]   Transplant Infectious Diseases   5550         Interval History:   Sedated and unresponsive. APRV 100%. Increased FiO2 to 100% for sats <90% last evening  Persistently febrile today 103. Tachycardic 130's. On and off low doses of norepinephrine.   Unable to obtain a complete ROS given intubation and sedation.         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 Post-operatively developed acute on chronic hypoxic respiratory failure and hypotension.          Current Medications & Allergies:       acyclovir  800 mg Oral BID     artificial tears   Both Eyes At Bedtime     heparin ANTICOAGULANT  5,000 Units  Subcutaneous Q8H     iopamidol (ISOVUE-370)  135 mL Intravenous Once     levothyroxine  150 mcg Oral Daily     linezolid  600 mg Intravenous Q12H     meropenem  1 g Intravenous Q8H     methylPREDNISolone  125 mg Intravenous Q6H     micafungin  150 mg Intravenous Q24H     multivitamins w/minerals  15 mL Per Feeding Tube Daily     pantoprazole  40 mg Per Feeding Tube QAM AC     protein modular  1 packet Per Feeding Tube TID     QUEtiapine  50 mg Oral or Feeding Tube Q8H     rosuvastatin  5 mg Oral QPM     sodium chloride (PF)  3 mL Intracatheter Q8H     sodium chloride (PF)  84 mL Intravenous Once     sulfamethoxazole-trimethoprim  1 tablet Oral Q Mon Tues BID     Infusions/Drips:    dexmedetomidine 0.4 mcg/kg/hr (08/10/21 1309)     dextrose       dextrose       D5W 75 mL/hr at 08/10/21 1217     epoprostenol (VELETRI) 20 mcg/mL in sterile water inhalation solution 20 ng/kg/min (08/10/21 0949)     fentaNYL 100 mcg/hr (08/10/21 1503)     insulin regular 8 Units/hr (08/10/21 1502)     midazolam Stopped (08/08/21 1500)     norepinephrine Stopped (08/10/21 1530)     vasopressin Stopped (08/08/21 0830)     Allergies   Allergen Reactions     Atorvastatin Other (See Comments)     Back pain  Tolerates rosuvastatin     Augmentin [Amoxicillin-Pot Clavulanate] Itching and Rash     Tolerated amoxicillin on its own 7/2020     Lifitegrast      Eyes stinging when using this for about 15 min after use.           Physical Exam:   Ranges for vital signs:  Temp:  [97.2  F (36.2  C)-103.6  F (39.8  C)] 103.1  F (39.5  C)  Pulse:  [] 134  Resp:  [23-42] 23  BP: (93)/(69) 93/69  MAP:  [58 mmHg-84 mmHg] 71 mmHg  Arterial Line BP: ()/(46-70) 91/61  FiO2 (%):  [90 %-100 %] 100 %  SpO2:  [88 %-95 %] 91 %  Vitals:    08/08/21 0300 08/09/21 0000 08/10/21 0400   Weight: 106.3 kg (234 lb 5.6 oz) 106.3 kg (234 lb 5.6 oz) 108.3 kg (238 lb 12.1 oz)     Physical Examination:  GENERAL: intubated, sedated, critically ill  HEAD:  Head is  normocephalic, atraumatic   EYES:  Eye cap on to keep closed  ENT:  ETT +  NECK:  Supple. No cervical lymphadenopathy  LUNGS:  Mechanically ventilated, decreased breath sounds in the left lung  CARDIOVASCULAR:  Tachycardic   ABDOMEN:  Obese, soft  SKIN:  No acute rashes.    NEUROLOGIC:  Intubated and sedated, unable to assess         Laboratory Data:     Absolute CD4   Date Value Ref Range Status   05/12/2017 679 441 - 2,156 cells/uL Final   11/16/2016 288 (L) 441 - 2,156 cells/uL Final   08/19/2016 491 441 - 2,156 cells/uL Final       Inflammatory Markers    Recent Labs   Lab Test 02/06/19  0605   SED 66*   CRP 8.2*       Immune Globulin Studies     Recent Labs   Lab Test 01/08/20  1526 09/25/19  1522 02/13/19  1525 11/14/18  1515 07/18/18  1539 11/15/17  1502 02/21/17  1625 08/19/16  1017   IGG 1,228 1,150 1,330 1,110 1,230 742 359* 1,080   IGM  --   --   --   --   --   --  60 72   IGE  --   --   --   --   --   --   --  3   IGA  --   --   --   --   --   --   --  261       Metabolic Studies       Recent Labs   Lab Test 08/10/21  1501 08/10/21  1358 08/10/21  1003 08/10/21  0958 08/10/21  0319 08/10/21  0319 08/09/21  1545 08/09/21  1544 08/06/21  1659 08/06/21  1524 08/05/21  1601 08/05/21  1600 10/23/20  0332 10/22/20  1716   NA  --  153* 155*  --   --  155*  155*   < > 150*  150*   < > 148*  --   --    < > 138   POTASSIUM  --   --   --   --   --  3.9  3.9  --  4.0   < > 4.8  --   --    < > 5.2   CHLORIDE  --   --   --   --   --  114*  --  112*   < > 118*  --   --    < > 104   CO2  --   --   --   --   --  35*  --  36*   < > 26  --   --    < > 28   ANIONGAP  --   --   --   --   --  6  --  2*   < > 4  --   --    < > 6   BUN  --   --   --   --   --  79*  --  74*   < > 49*  --   --    < > 18   CR  --   --   --   --   --  1.51*  --  1.38*   < > 1.23  --   --    < > 0.81   GFRESTIMATED  --   --   --   --   --  50*  --  56*   < > 64  --   --    < > >90   *  --   --   --    < > 164*  149*   < > 154*  135*   <  > 162*   < >  --    < > 119*   A1C  --   --   --   --   --   --   --   --   --  5.6  --   --   --   --    TRACE  --   --   --   --   --  8.3*  --  8.4*   < > 8.7  --   --    < > 9.8   PHOS  --   --   --   --   --  3.1  --   --    < >  --   --   --    < >  --    MAG  --   --   --   --   --  3.0*  --   --    < >  --    < >  --    < >  --    LACT  --   --   --  2.2*  --  1.9  --   --    < >  --   --   --    < >  --    PCAL  --   --   --   --   --   --   --   --   --   --   --   --   --  0.10   FGTL  --   --   --   --   --   --   --   --   --   --   --  <31  --   --     < > = values in this interval not displayed.       Hepatic Studies    Recent Labs   Lab Test 08/08/21  0442 08/07/21  1733 08/05/21  0340 10/24/20  0828 10/23/20  0332 01/10/20  0336   BILITOTAL 0.8 0.7 1.3   < >  --   --    DBIL 0.6* 0.4* 0.7*   < >  --   --    ALKPHOS 130 132 114   < >  --   --    PROTTOTAL 5.9* 6.2* 6.1*   < > 7.4  --    ALBUMIN 1.9* 2.0* 2.4*   < >  --   --    AST 55* 52* 24   < >  --   --    ALT 27 28 16   < >  --   --    LDH  --   --   --   --  232* 291*    < > = values in this interval not displayed.       Pancreatitis testing    Recent Labs   Lab Test 08/10/16  1239   TRIG 435*       Gout Labs      Recent Labs   Lab Test 05/04/16  1129 04/18/16  1205   URIC 6.6 6.9       Hematology Studies      Recent Labs   Lab Test 08/10/21  0320 08/09/21  0411 08/08/21  1633 08/08/21  0442 08/07/21  1519 08/07/21  1339 07/12/21  1409 07/07/21  1515 06/25/21  1417 06/25/21  1417   WBC 17.9* 19.1* 18.5* 19.1* 25.4* 26.3*  --  15.6*   < > 14.0*   ANEU  --   --   --   --   --   --   --  13.0*  --  11.0*   ALYM  --   --   --   --   --   --   --  1.2  --  1.6   SARA  --   --   --   --   --   --   --  1.2  --  1.1   AEOS  --   --   --   --   --   --   --  0.1  --  0.1   HGB 12.2* 12.0* 12.8* 13.1* 13.6 13.8   < > 17.2   < > 16.6   HCT 38.5* 37.7* 40.4 41.0 44.8 44.2  --  51.7   < > 48.8    265 288 283 316 313  --  287   < > 275    < > = values  in this interval not displayed.       Clotting Studies    Recent Labs   Lab Test 08/08/21  1303 08/07/21  1733 07/15/21  0859 05/21/21  0632 10/22/20  1716   INR 1.70* 1.74* 1.12 1.06 1.28*   PTT  --   --   --   --  40*       Iron Testing    Recent Labs   Lab Test 08/10/21  0320   *       Arterial Blood Gas Testing    Recent Labs   Lab Test 08/10/21  0958 08/09/21  2125 08/09/21  1545 08/09/21  1007 08/09/21  0410   PH 7.39 7.41 7.37 7.39 7.39   PCO2 59* 58* 65* 62* 60*   PO2 74* 64* 63* 110* 85   HCO3 36* 36* 38* 38* 37*   O2PER 100 90 90 100 100        Thyroid Studies     Recent Labs   Lab Test 05/11/18  1130 05/12/17  1126   TSH 0.47 0.43   T4 1.39 1.15       Urine Studies     Recent Labs   Lab Test 08/10/21  0544 08/04/21  2028 01/28/20  1543 03/13/17  1624 03/10/17  1526   URINEPH 5.5 5.0 5.0 5.0 5.0   NITRITE Negative Negative Negative Negative Negative   LEUKEST Negative Negative Negative Negative Negative   WBCU 0 3 4 4* 5*       CSF testing     Recent Labs   Lab Test 04/22/16  1120   CWBC 1   CRBC 7*   CGLU 55   CTP 47     Medication levels    Recent Labs   Lab Test 08/06/21  0630 07/28/21  1215 01/13/17  1325 01/11/17  1419   VANCOMYCIN 18.4  --    < >  --    CYCLSP  --  181   < >  --    RAPAMY  --   --   --  5.5    < > = values in this interval not displayed.     Body fluid stats    Recent Labs   Lab Test 08/07/21  1305 07/15/21  0935 07/15/21  0934 10/23/20  1330 01/09/20  0821 02/06/19  1123 02/06/19  1122   FTYP  --   --   --  Pleural fluid  --  Bronchial lavage Bronchial lavage   FCOL Colorless Orange*  --  Brown  --  Colorless Colorless   FAPR Cloudy* Clear  --  Cloudy   < > Clear Slightly Cloudy   FWBC 923 5  --  121  --  290 190   FNEU 82 1  --  27   < > 8 4   FLYM 1 79  --  54   < > 39 65   FMONO 17 19  --  17   < >  --  27   FBAS  --   --   --  1  --   --   --    FTP  --   --  3.7 4.4  --   --   --    GS  --   --   --  No organisms seen  No WBC's seen  Few  Red blood cells seen    --   <25 PMNs/low power field  No organisms seen  Gram stain and culture performed on concentrated specimen  --     < > = values in this interval not displayed.       Microbiology:  Fungal testing  Recent Labs   Lab Test 08/09/21  0930 08/06/21  1835 08/05/21  1600 01/10/20  0336 02/06/19  1122 02/04/19 2050   FGTL  --   --  <31 <31  --  <31   FGTLI  --   --  Negative Negative  --  Negative   ASPGAI 0.12 0.05  --  0.07 0.06 0.05   ASPAG Negative  --   --   --  Negative  --    ASPGAA  --  Negative  --  Negative  --  Negative     Last Culture results with specimen source  Culture   Date Value Ref Range Status   08/10/2021 No growth after 12 hours  Preliminary   08/10/2021 No growth after 12 hours  Preliminary   08/08/2021 No growth after 1 day  Preliminary   08/08/2021 No growth after 2 days  Preliminary   08/07/2021 1+ Enterococcus faecalis (A)  Preliminary   08/07/2021 1+ Staphylococcus haemolyticus (A)  Preliminary     Comment:     Susceptibilities not routinely done   08/07/2021 No growth after 2 days  Preliminary   08/07/2021 No Actinomyces species isolated after 2 days  Preliminary   08/05/2021 No growth after 4 days  Preliminary   08/05/2021 No growth after 4 days  Preliminary   08/05/2021 No growth after 4 days  Preliminary   08/05/2021 No Growth  Final   08/05/2021 No anaerobic organisms isolated after 4 days  Preliminary   08/04/2021 No Growth  Final   08/04/2021 No Growth  Final   08/04/2021 No Growth  Final     Culture Micro   Date Value Ref Range Status   01/26/2021 No growth  Final   10/23/2020 No growth  Final   10/23/2020   Final    No anaerobes isolated  Since this specimen was not transported in the proper anaerobic transport media, the   absence of anaerobes in this culture does not rule out the presence of anaerobes in this   specimen.     10/23/2020 Culture negative after 4 weeks  Final   01/28/2020 No growth  Final   01/09/2020 Heavy growth  Normal karl    Final   01/09/2020 Heavy  growth  Enterococcus faecalis   (A)  Final   02/06/2019 (A)  Final    Light growth  Actinomyces odontolyticus  This organism is part of normal karl, but on occasion, may be a true pathogen. Clinical   correlation must be applied to interpreting this microbiology result.     02/06/2019 Light growth  Normal respiratory karl    Final   02/06/2019 Susceptibility testing not routinely done  Final   02/06/2019 Culture negative for acid fast bacilli  Final   02/06/2019   Final    Assayed at [facility], [address] [place] [id number] [phone]   02/06/2019 Candida glabrata  isolated   (A)  Final   02/06/2019   Final    No additional fungi cultured after 4 weeks incubation   02/06/2019 No growth after 4 weeks  Final   02/06/2019 Light growth  Normal respiratory karl    Final    Specimen Description   Date Value Ref Range Status   01/26/2021 Blood Right Peripheral blood  Final   10/23/2020 Fluid Pleural fluid  Final   10/23/2020 Fluid Pleural fluid  Final   10/23/2020 Fluid Pleural fluid  Final   10/23/2020 Fluid Pleural fluid  Final   01/28/2020 Midstream Urine  Final   01/11/2020 Feces  Final   01/09/2020 Sputum  Final   01/09/2020 Sputum  Final   01/08/2020 Catheterized Urine  Final   01/08/2020 Urine  Final   01/08/2020 Nares  Final   02/06/2019 Bronchial lavage Right upper lobe  Final   02/06/2019 Bronchial lavage Right upper lobe  Final   02/06/2019 Bronchial lavage Right upper lobe  Final   02/06/2019 Bronchial lavage Right upper lobe  Final   02/06/2019 Bronchial lavage Right upper lobe  Final   02/06/2019 Bronchial lavage Right upper lobe  Final   02/06/2019 Bronchial lavage Right upper lobe  Final        Last check of C difficile  C Diff Toxin B PCR   Date Value Ref Range Status   01/11/2020 Negative NEG^Negative Final     Comment:     Negative: C. difficile target DNA sequences NOT detected, presumed negative   for C.difficile toxin B or the number of bacteria present may be below the   limit of  detection for the test.  FDA approved assay performed using [employer] GeneXpert real-time PCR.  A negative result does not exclude actual disease due to Clostridium difficile   and may be due to improper collection, handling and storage of the specimen   or the number of organisms in the specimen is below the detection limit of the   assay.         Virology:  Coronavirus-19 testing    Recent Labs   Lab Test 08/07/21  1149 08/06/21  1323 08/02/21  1331 07/12/21  1300 06/27/21  1255 06/08/21  1511 05/19/21  1158 04/19/21  1302 10/13/20  1337 05/12/17  1126 11/16/16  0948 11/16/16  0948 08/19/16  1017   SCV2R Negative Negative Negative Negative  --   --   --   --   --   --   --   --   --    [id number]  --   --   --   --  Nasopharyngeal Nasopharyngeal Nasopharyngeal Nasopharyngeal   < >  --   --   --   --    SARSCOVRES  --   --   --   --  NEGATIVE NEGATIVE NEGATIVE NEGATIVE   < >  --   --   --   --    [id number]  --   --   --   --  Nasopharyngeal Nasopharyngeal Nasopharyngeal Nasopharyngeal  --   --    < >  --   --    [id number]  --   --   --   --  Test received-See reflex to IDDL test SARS CoV2 (COVID-19) Virus RT-PCR Test received-See reflex to IDDL test SARS CoV2 (COVID-19) Virus RT-PCR Test received-See reflex to IDDL test SARS CoV2 (COVID-19) Virus RT-PCR Test received-See reflex to IDDL test SARS CoV2 (COVID-19) Virus RT-PCR  --   --    < >  --   --    CD19  --   --   --   --   --   --   --   --   --  20  --  19 4*   ACD19  --   --   --   --   --   --   --   --   --  297  --  166 44*    < > = values in this interval not displayed.       Respiratory virus (non-coronavirus-19) testing    Recent Labs   Lab Test 01/09/20  0822 01/08/20  1736 12/19/19  1623 02/04/19  1943 02/04/19  1943   RVSPEC  --   --   --   --  Nasopharyngeal   AFLU Negative  --   --   --   --    IFLUA  --  Not Detected Not Detected  --  Negative   FLUAH1  --  Not Detected Not Detected  --  Negative   [id number]  --  Not Detected Not Detected  --   Negative   FLUAH3  --  Not Detected Not Detected  --  Negative   BFLU Negative  --   --   --   --    IFLUB  --  Not Detected Not Detected  --  Negative   PIV1  --  Not Detected Not Detected  --  Negative   PIV2  --  Not Detected Not Detected  --  Negative   PIV3  --  Not Detected Not Detected  --  Negative   PIV4  --  Not Detected Not Detected   < >  --    HRVS  --   --   --   --  Negative   RSVA  --  Not Detected Not Detected  --  Negative   RSVB  --  Not Detected Not Detected  --  Negative   RS Negative  --   --   --   --    HMPV  --  Not Detected Not Detected  --  Negative   SPEC Nasopharyngeal  --   --   --   --    ADVBE  --   --   --   --  Negative   ADVC  --   --   --   --  Negative   ADENOV  --  Not Detected Not Detected   < >  --    CORONA  --  Detected, Abnormal Result* Not Detected   < >  --     < > = values in this interval not displayed.       Log IU/mL of CMVQNT   Date Value Ref Range Status   10/15/2020 Not Calculated <2.1 [Log_IU]/mL Final   01/09/2020 Not Calculated <2.1 [Log_IU]/mL Final   02/06/2019 Not Calculated <2.1 [Log_IU]/mL Final   02/06/2019 Not Calculated <2.1 [Log_IU]/mL Final   03/21/2018 Not Calculated <2.1 [Log_IU]/mL Final   01/17/2018 Not Calculated <2.1 [Log_IU]/mL Final   09/13/2017 Not Calculated <2.1 [Log_IU]/mL Final   07/12/2017 Not Calculated <2.1 [Log_IU]/mL Final   04/19/2017 Not Calculated <2.1 [Log_IU]/mL Final   03/08/2017 Not Calculated <2.1 [Log_IU]/mL Final   02/24/2017 Not Calculated <2.1 [Log_IU]/mL Final   02/15/2017 Not Calculated <2.1 [Log_IU]/mL Final   02/01/2017 Not Calculated <2.1 [Log_IU]/mL Final   01/13/2017 Not Calculated <2.1 [Log_IU]/mL Final   01/04/2017 Not Calculated <2.1 [Log_IU]/mL Final   12/14/2016 Not Calculated <2.1 [Log_IU]/mL Final   12/07/2016 Not Calculated <2.1 [Log_IU]/mL Final   11/16/2016 Not Calculated <2.1 [Log_IU]/mL Final   09/14/2016 Not Calculated <2.1 [Log_IU]/mL Final   08/19/2016 Not Calculated <2.1 [Log_IU]/mL Final    08/10/2016 Not Calculated <2.1 [Log_IU]/mL Final   07/27/2016 Not Calculated <2.1 [Log_IU]/mL Final   07/20/2016 Not Calculated <2.1 [Log_IU]/mL Final   07/13/2016 Not Calculated <2.1 [Log_IU]/mL Final   07/06/2016 Not Calculated <2.1 [Log_IU]/mL Final   06/29/2016 Not Calculated <2.1 [Log_IU]/mL Final   06/13/2016 Not Calculated <2.1 [Log_IU]/mL Final   06/08/2016 Not Calculated <2.1 [Log_IU]/mL Final   05/27/2016 Not Calculated <2.1 [Log_IU]/mL Final   05/18/2016 Not Calculated <2.1 [Log_IU]/mL Final     HHV6 DNA Result   Date Value Ref Range Status   05/31/2016 No HHV6 DNA detected Copies/mL Final       EBV DNA Copies/mL   Date Value Ref Range Status   02/01/2017 EBV DNA Not Detected EBVNEG [Copies]/mL Final   01/04/2017 585 (A) EBVNEG [Copies]/mL Final   11/30/2016 2,452 (A) EBVNEG [Copies]/mL Final   11/16/2016 742 (A) EBVNEG [Copies]/mL Final     Adenovirus Testing    Recent Labs   Lab Test 11/16/16  0948 05/13/16  1101 05/11/16  2046   ADRES No Adenovirus DNA detected.  --   --    ADENOVIRUSAG  --  Negative Canceled, Test credited   Specimen not labeled   Notification of test cancellation was given to  LEIGH IVORY RN. 05.11.16 2100 GJS  *     Imaging:  Recent Results (from the past 48 hour(s))   XR Chest Port 1 View    Narrative    EXAM: XR CHEST PORT 1 VIEW  8/9/2021 2:15 AM     HISTORY:  ARDS, pneumothorax       COMPARISON:  8/8/2021    FINDINGS:   Portable supine radiograph of the chest. Endotracheal tube tip  projects over the upper thoracic trachea. Right internal jugular  central venous catheter projects over the mid SVC. Feeding tube  courses below the diaphragm and out of the field of view. Stable  position of bilateral chest tubes. Right upper extremity PICC line  projects over the right axilla.    Trachea is midline. Cardiac silhouette is largely obscured. Stable  appearance of the large left pleural effusion with mixed opacities  throughout the aerated portion of the left lung. Stable  mixed  opacities throughout the right lung field. No significant change in  the small right pneumothorax with the pneumatic component along the  inferior lateral lung base.      Impression    IMPRESSION:   1. No significant change in the small right pneumothorax with  pneumatic component along the inferior lateral lung base.  2. Grossly unchanged mixed opacities throughout the lungs.  3. Stable large left pleural effusion.    I have personally reviewed the examination and initial interpretation  and I agree with the findings.    AUSTEN CALDERA MD         SYSTEM ID:  [id number]   XR Chest Port 1 View    Narrative    Portable chest 8/9/2021 at 0834 hours    INDICATION: Feeding tube placement    COMPARISON: Earlier today 0204 hours    Findings: Heart size appears normal. Prominent left pleural effusion  appears similar. Small right basilar pneumothorax appears similar.  Right IJ catheter tip is in the SVC. Endotracheal tube tip there is  approximately 5.9 cm above the yanet. Feeding tube progresses beyond  the inferior margin of the image. Left chest tubes appear similar.  Right PIC catheter tubing projects in the right axilla.      Impression    IMPRESSION: Large left pleural effusion unchanged within short  interval. Feeding tube progressing beyond the inferior margin of the  image. Other support tubes and devices as described. Unchanged small  right pneumothorax. Patchy opacities in the lungs, again concerning  for ARDS.    YESSENIA POLANCO MD         SYSTEM ID:  [id number]   XR Chest Port 1 View   Result Value    Radiologist flags Increased pneumothorax (Urgent)    Narrative    EXAM: XR CHEST PORT 1 VIEW  8/10/2021 1:25 AM     HISTORY:  ARDS, pneumothorax       COMPARISON:  8/9/2021    FINDINGS:   Portable semiupright view of the chest. Endotracheal tube tip projects  over the mid thoracic trachea. Feeding tube courses below the  diaphragm and out of the field of view. Right internal jugular central  venous catheter  tip is in the at SVC. Stable position of left-sided  chest tubes. Stable position of right-sided chest tube    Trachea is midline. Cardiac silhouette is largely obscured. No  significant change in the large left pleural effusion with mixed  opacities throughout the aerated portion of the left lung. Stable  mixed opacities throughout the right lung field. Increased size of the  right pneumothorax with pneumatic component along the inferior lateral  right lung base.      Impression    IMPRESSION:   1. Increased size of the right pneumothorax along the inferior lateral  right lung base. Right-sided chest tube in place.  2. Grossly unchanged large left pleural effusion. Left-sided chest  tubes in place.  3. Stable mixed opacities throughout the lungs fields.       [Urgent Result: Increased pneumothorax]    Finding was identified on 8/10/2021 2:35 AM.     Dr. Castaneda was contacted by Dr. Donnelly at 8/10/2021 2:42 AM and  verbalized understanding of the urgent finding.     I have personally reviewed the examination and initial interpretation  and I agree with the findings.    KARL BARRY MD         SYSTEM ID:  [id number]   XR Chest Port 1 View    Narrative    EXAM: XR CHEST PORT 1 VIEW  8/10/2021 11:09 AM     HISTORY:  repeat CXR for comparison       COMPARISON:  Chest radiograph earlier today 8/10/2021    FINDINGS:   Portable semiupright view of the chest. ET tube can be seen with  distal tip proximally 6.9 cm cephalad to the yanet. Enteric tube to  be seen with distal tip projecting outside the field-of-view. Right IJ  catheter with distal tip projecting over the mid SVC. Left-sided chest  tubes are similar in position. Right-sided chest tube is in similar  position.    Trachea is midline. Cardiac silhouette remains largely obscured. No  significant change in large left pleural effusion with mixed opacities  throughout aerated portion of left lung. Stable to minimally increased  mixed opacities throughout the right lung  field. Small left  pneumothorax without visualized lung markings and left basilar lung  field. Unchanged right pneumothorax with associated pneumatic  component. Bones are grossly intact.      Impression    IMPRESSION:  1. Unchanged large left pleural effusion with associated mixed  opacities in the left lung field. Small left pneumothorax. Left-sided  chest tube stable position.  2. Stable to minimally increased mixed opacities throughout the right  lung field. Unchanged right pneumothorax.    I have personally reviewed the examination and initial interpretation  and I agree with the findings.    MORGAN WEBER MD         SYSTEM ID:  [id number]

## 2021-08-10 NOTE — CONSULTS
WO Nurse Inpatient Pressure Injury Assessment   Reason for consultation: Evaluate and treat R) nare      ASSESSMENT  Pressure Injury: on R) nare and columella , hospital acquired , if deemed to be PI- Unable to r/o pressure at this time due to not having direct pressure from the tube or bridle string. Unsure if tube could have been pressing against the area while proned. Area somewhat consistent with petechia on columella area and appears bruise r/t trauma on nare. Will re-evaluate on Friday to assess how this area would evolve.  This is a Medical Device Related Pressure Injury (MDRPI) due to Bridle string- undetermined yet  Pressure Injury is Stage Deep Tissue Pressure Injury (DTPI) vs bruise r/t trauma   Contributing factor of the pressure injury: pressure vs trauma injury  Status: initial assessment and evolving  Recommend provider order: None, at this time     TREATMENT PLAN  R) nare wound: Every shift   Monitor closely and ensure the area is free of pressure or tension from the string. Use a piece of optifoam around the area if pt needs to be proned.   Orders Written  WO Nurse follow-up plan:Tuesday/Friday  Nursing to notify the Provider(s) and re-consult the WO Nurse if wound(s) deteriorates or new skin concern.    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 Reynolds on 8/4. Post-operatively with worsening respiratory status ultimately requiring intubation. Ongoing issues with oxygenation/ventilation and hypotension with concern for septic shock in setting of immunosuppresion.     Objective Data  Containment of urine/stool: Indwelling catheter    Current Diet/ Nutrition:  Orders Placed This Encounter      NPO for Medical/Clinical Reasons Except for: NPO but receiving Tube  Feeding      Output:   I/O last 3 completed shifts:  In: 4136.87 [I.V.:1391.87; NG/GT:1285; IV Piggyback:500]  Out: 2770 [Urine:2345; Stool:50; Chest Tube:375]    Risk Assessment:   Sensory Perception: 2-->very limited  Moisture: 3-->occasionally moist  Activity: 1-->bedfast  Mobility: 1-->completely immobile  Nutrition: 3-->adequate  Friction and Shear: 2-->potential problem  Armand Score: 12      Labs: Recent Labs   Lab 08/10/21  0320 08/08/21  1303 08/08/21  0442 08/07/21  1733 08/07/21  0347 08/06/21  1524   ALBUMIN  --   --  1.9*  --    < >  --    HGB 12.2*  --  13.1*   < >  --  12.8*   INR  --  1.70*  --   --    < >  --    WBC 17.9*  --  19.1*   < >  --  22.5*  22.5*   A1C  --   --   --   --   --  5.6    < > = values in this interval not displayed.       Physical Exam  Skin inspection: focused Nose    Wound Location:  R) Nare and columella      Date of last Photo 8/10  Wound History: Per RN no direct pressure noted from the tube or string. Bridle is appropriately loose without any tension. Possible pressure component during proning.  Measurements (length x width x depth, in cm) R) nare- 0.5 cm x 0.6 cm  x  0.0 cm, two linear petechia type of injured area on columella  Wound Base:  100 % non-blanchable and maroon  Tunneling N/A  Undermining N/A  Palpation of the wound bed: normal   Periwound skin: intact  Color: normal and consistent with surrounding tissue  Temperature: normal   Drainage:, none  Description of drainage: none  Odor: none  Pain: unable to assess due to  sedation ,     Interventions  Current support surface: Standard  Low air loss mattress  Current off-loading measures: Pillows  Repositioning aid: Pillows  Visual inspection of wound(s) completed   Tube Securement: NG bridle  Wound Care: was done per plan of care.  Supplies: not necessary  Educated provided: importance of repositioning, plan of care, wound progress and Off-loading pressure  Education provided to: nurse  Discussed importance  of:repositioning every 2 hours and off-loading pressure to wound  Discussed plan of care with Nurse    Chelsey Denton RN

## 2021-08-10 NOTE — PLAN OF CARE
Major Shift Events: Pt is not withdrawing or following commands, pupils 2 and sluggish, no cough reflex (team aware), precedex & fentanyl (PRN bumps x2) titrated per MAR/provider verbal order; Tmax 103.6F with cooling blanket & tylenol, levo weaned/MAP > 65, sinus tach with -150s, vent APRV FiO2 100% and SpO2 89-92%, Veletri per MAR, LS dim, sputum sample needed, L Pleur-x capped, L CT -20 sx with airleak, R CT -40 sx without airleak, no crepitus; NJT TF 50 ml/hr with H2O flush 250mL q4h, frank with good UO, rectal pouch with no output, BS absent/abdomen more distended throughout day; chest incision CDI, WOC consult for nonblanchable redness on nose, blanchable redness on coccyx with mepi intact, pulsate mattress ordered; K replaced  Plan: continue plan of care, palliative consult  For vital signs and complete assessments, please see documentation flowsheets.

## 2021-08-10 NOTE — PROGRESS NOTES
SURGICAL ICU PROGRESS NOTE  August 8, 2021      ASSESSMENT: 59 yo male with h/o BMT c/b GVHD, pleural parenchymal fibroelastosis, PAH, CAD, PE, trapped left lung and recurrent pleural effusions, now s/p VATS partial left decortication and pleurex placement on 8/4/21 who was hypoxic in the PACU, failed BiPAP, requiring mechanical ventilation and appeared to be in septic shock with hypotension, fever, hypoxia, tachypnea. Overall, now with worsening hypercarbic hypoxemic respiratory failure requiring paralysis and proning.      TODAY'S PROGRESS/PLANS:   -Palliative consult  -Right CT suction to -40  -Recheck CXR  -Wean FiO2 as tolerates to maintain sats >90%  -Wean norepi, keep on MAR given pressures fluctuating with turns  -Na recheck at 10:00, if without improvement restart D5W @ 75, Na checks q 6 hrs.  -Per discussion with ID, broaden abs to linezolid, meropenem, continue adam.  Obtain BC from CVC.  -CT Chest and Head once/if stable    PLAN:   Neuro/ pain/ sedation:  #Acute post operative pain  #Sedation required in the setting of chemical paralysis  -Monitor neurological status. Notify the MD for any acute changes in exam.  -Pain: fentantyl gtt, dilaudid PRN, tylenol PRN  -Sedation: Precedex gtt, seroquel 50 q 8 hrs (started 8/9)  - Paralysis: vecuronium (stopped 8/8)  - Goal RAAS to -5 with tachycardia while on APRV  - Cough reflex suppressed, pupils minimally responsive per RN with pupilometer.  - Consider Head CT pending resp stability.     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 On 3 L O2 at home. PFTs Oct 2020: FEV1 1.03L (27%), DLCO 52%.  - Pulm consult, recs appreciated. Per discussion with their team, unlikely to be  PPFE which usually does not present with exacerbation. More likely pneumonia with underlying capillaritis 2/2 GVHD.  - VENT:  APRV P-high 40, P-low 0, T-high 5, T-low 0.38 with FiO2 %.     -Utilizing a low T-low given his underlying restrictive lung disease, and his flow waveforms previously showing peek expiratory flow rate between 25-50%.    -Some improvement in hypoxia with change to APRV.    -Note did not tolerate transition back to conventional ventilation previously and proning and paralysis with refractory hypoxemia.    -Supinated since 8/8.   - Spontaneous right pneumothorax-> s/p chest thoracostomy with residual pneumothorax. Pleural fluid sent for analysis at the time of insertion.   - L CT to suction, intermittent air leak present. L  pleurex catheter to gravity drainage.   - Continue epoprostenol at full strength (started 8/4)  - 8/7 Given acute life threatening decompensation, started treatment for presumed PE. Echocardiogram and BLE DVT scans did not support this diagnosis. Stopped heparin infusion, changed to subcutaneous heparin.  - Bronchoscopy 8/7 was unrevealing, mucosa looked healthy and minimal secretions, a BAL was obtained.   -CXR (8/10): Increased size of the right pneumothorax along the inferior lateral right lung base. Right-sided chest tube in place. Grossly unchanged large left pleural effusion. Left-sided chest tubes in place.   -Per overnight report, question if when repositioned pt on right side if kinked CT.   -Put right CT to -40 suction   -Repeat CXR   -No further air leak since ETT repositioned/advanced on 8/9.     Cardiovascular:    #Septic shock  #Pulmonary artery hypertension  #Sinus tachycardia  - PRESSORS: Norepinephrine gtt (intermittentl requires). Vasopressin off since 8/7. Angiotensin II 8/7-8/9.    -Goal: MAP goal >65.   -Hydrocortisone 50 q 6 hrs (started 8/7) - continue stress dose steroids.   -Last echo Jan 2020: EF 55-60%, RV pressure 27 mmHg above RA, no  significant valvular abnormalities.   - Formal ECHO (8/5 and 8/7): per most recent (similar to 8/5), EF 60-65%, RV function moderately reduced, no pericardial effusion.        GI care:   #Non-severe Protein calorie malnutrition (mild muscle wasting, inadequate TF x 6days)  -TF: trophic @ goal 50   -Protonix every day       Renal/ Fluid Balance:    #BETH  #Hyperkalemia - resolved  #Hypernatremia - ongoing  - Developing BETH, oliguria improved with some fluid administration (after diuresis 8/7).   -K 3.9   -Na 155 (155 <150 <154 >150).   -Bicarb infusion stopped 8/8  -H2O flushes: 125 q 4 hrs (decreased 8/9) - increased back to 250 q 4 hrs ~04:00.  Follow Na for need to adjust further.  -Consider restart D5W @ 75 ml/hr if Na without improvements with H2O adjustments.  -Na checks q 6 hrs  -Will continue to monitor intake and output.  -Net postitive 10L since admission, hold diuresis in the setting of hypernatremia  -Desai in place      Endocrine:    #Stress and steroid induced hyperglycemia  - Insulin gtt - continue with fluctuating nutritional intakes.  - Stress dose steroids as above.   - Home levothyroxine      ID/ Antibiotics:  #Septic Shock  # Leukocytosis  -Immunosuppressed with PTA cyclosporine and prednisone. Currently on hold.   - Persistent leukocytosis but somewhat improved today 17.9 (19.1) but now febrile to 103.1 overnight.  -Blood :   -8/10: BC pending, Karius test pending   -8/7: aspergillus galacotomannan NEG, cryptococcus NEG   -8/4: BC NEG  -Sputum:   -8/10: sputum cx: pending, per RN no sputum production to obtain at this time   -8/7 BAL: KOH neg/fungal cx NGTD, +1 enterococcus faecalis, 1+ staph hemolyticus, aspergillus NEG, actinomyces NGTD, pneumocystis pending, nocardia pending  -Pleural: 8/8 bacterial and fungal Cx NGTD  -Urine: 8/10 UA negative, UC reflex pending    - CT C/A/P ordered 8/7 however remain inapprop to complete with ongoing pulmonary instability.   -Antimicrobials:    -Pip-Tazo  (8/4-)   -Acyclovir (8/4-)   -Micafungin (8/5-), increased 150 on 8/7   -PTA Bactrim prophy resumed 8/9.  -Per discussion with BMT ID service 8/10, rec broaden abx to meropenem and add linezolid.      Heme:     #Myelodysplastic syndrome s/p BMT 2016  -BMT consult, recs appreciated  -HgB stable  -Subcutaneous heparin for prophylaxis.       Prophylaxis:    -DVT PPX: PPI, Heparin SQ      MSK:    - None      Lines/ tubes/ drains:  -PIV x2, Left chest tubes x 2 (15.5 Fr and 24 Fr), R CT, arterial line, ETT, frank, feeding tube, R IJ      Disposition:  -Surgical ICU    Patient seen, findings and plan discussed with surgical ICU staff, Dr. Palm.    Time spent on this Encounter   Billing:  I spent 60 minutes bedside and on the inpatient unit today managing the critical care of Byron Russo in relation to the issues listed in this note.      Nusrat Fernandez PA-C      SUBJECTIVE:   Spiked temp to 103.1 and resent BC, UA/UC (changed frank), sputum.  CXR noting increased right pneumothorax but nursing reported CT may have become kinked when turned on right side.  Also required restart norepi gtt when turned on left side.  Increased H2O flushes due to rising Na.  Nursing reported decreased cough with suctioning, possibly decreased pupil responses (with pupillometer).    OBJECTIVE:   1. VITAL SIGNS:   Temp:  [97.2  F (36.2  C)-103.1  F (39.5  C)] 102  F (38.9  C)  Pulse:  [102-144] 112  Resp:  [31-42] 40  BP: (93)/(69) 93/69  MAP:  [58 mmHg-94 mmHg] 77 mmHg  Arterial Line BP: ()/(48-78) 99/67  FiO2 (%):  [90 %-100 %] 100 %  SpO2:  [89 %-97 %] 93 %  Ventilation Mode: APRV  (Airway Pressure Release Ventilation)  FiO2 (%): 100 %  Rate Set (breaths/minute): 26 breaths/min  PEEP (cm H2O): 8 cmH2O  Oxygen Concentration (%): 90 %  Peak Inspiratory Pressure (cm H2O) (Drager Hoda): 12  Resp: (!) 40      2. INTAKE/ OUTPUT:   I/O last 3 completed shifts:  In: 4471.9 [I.V.:1946.9; NG/GT:1305; IV Piggyback:500]  Out: 3040  [Urine:2475; Stool:50; Chest Tube:515]    3. PHYSICAL EXAMINATION:   General: critically ill  HEENT: atraumatic, pupils 2-3 mm/minimally responsive to light, anicteric sclera, FT secured in place with bridle/noted possible wound on columnella  Neuro: not follows commands, not moving extremities spontaneously, no cough when suctioned  Pulm/Resp: ETT secured in place, on APRV, lungs coarse/diminished on right, decreased lung sounds on left, CT with sanguinous outputs/tidaling    CV: RRR/sinus tachy low 100's, S1/S2, no MRG   Abdomen: Soft, non-distended, non-tender no rebound tenderness or guarding  : frank catheter in place, urine yellow and clear  MSK/Extremities: trace peripheral edema, peripheral pulses intact, calves soft, extremities warm/well perfused.     4. INVESTIGATIONS:   Arterial Blood Gases   Recent Labs   Lab 08/09/21 2125 08/09/21  1545 08/09/21  1007 08/09/21  0410   PH 7.41 7.37 7.39 7.39   PCO2 58* 65* 62* 60*   PO2 64* 63* 110* 85   HCO3 36* 38* 38* 37*     Complete Blood Count   Recent Labs   Lab 08/10/21  0320 08/09/21  0411 08/08/21  1633 08/08/21  0442   WBC 17.9* 19.1* 18.5* 19.1*   HGB 12.2* 12.0* 12.8* 13.1*    265 288 283     Basic Metabolic Panel  Recent Labs   Lab 08/10/21  0508 08/10/21  0319 08/10/21  0213 08/09/21  2125 08/09/21  1544 08/09/21  1008 08/09/21  0411 08/08/21  1637 08/08/21  1633   NA  --  155*  155*  --  154* 150*  150* 148* 150*  150*   < > 154*  154*   POTASSIUM  --  3.9  3.9  --   --  4.0  --  3.7  3.7  --  3.7   CHLORIDE  --  114*  --   --  112*  --  114*  --  116*   CO2  --  35*  --   --  36*  --  35*  --  36*   BUN  --  79*  --   --  74*  --  88*  --  83*   CR  --  1.51*  --   --  1.38*  --  1.65*  --  1.85*   * 164*  149* 144*  --  154*  135*  --  153*  --  146*    < > = values in this interval not displayed.     Liver Function Tests  Recent Labs   Lab 08/08/21  1303 08/08/21  0442 08/07/21  1733 08/05/21  0340   AST  --  55* 52* 24   ALT   --  27 28 16   ALKPHOS  --  130 132 114   BILITOTAL  --  0.8 0.7 1.3   ALBUMIN  --  1.9* 2.0* 2.4*   INR 1.70*  --  1.74*  --      Pancreatic Enzymes  No lab results found in last 7 days.  Coagulation Profile  Recent Labs   Lab 21  1303 21  1733   INR 1.70* 1.74*     Lactate  Invalid input(s): LACTATE    5. RADIOLOGY:   Recent Results (from the past 24 hour(s))   Echo Complete   Result Value    LVEF  > 65%    Narrative    [id number]  [id number]  [id number]  [id number]^BRENDEN^AMANUEL     [facility],[place]  Echocardiography Laboratory  [address] [place] [address]     Name: BRIGITTE JUNIOR  MRN: [id number]  : 1960  Study Date: 2021 09:27 AM  Age: 60 yrs  Gender: Male  Patient Location: [facility]  Reason For Study: Shock  Ordering Physician: AMANUEL WILCOX  Performed By: ADI Alvarenga     BSA: 2.2 m2  Height: 71 in  Weight: 224 lb  HR: 117  BP: 86/56 mmHg  ______________________________________________________________________________  Procedure  Complete Portable Echo Adult.  ______________________________________________________________________________  Interpretation Summary  Technically difficult study due to dressing obscuring some views.     Left ventricular size, and wall motion are normal. The function is  hyperdynamic with an estimated ejection fraction is > 65%.     Diastolic Doppler findings (E/E' ratio and/or other parameters) suggest left  ventricular filling pressures are increased.     Global right ventricular function is mildly to moderately reduced while there  is presevered wall motion and size.     Right ventricular systolic pressure is 26.7mmHg above the right atrial  pressure which was not estimated due to the patient being on a ventilator.     This study was compared with the study from 20 .  There has been no significant change.  ______________________________________________________________________________  Left Ventricle  Left  ventricular size, wall motion and function are normal. The ejection  fraction is > 65%. Diastolic Doppler findings (E/E' ratio and/or other  parameters) suggest left ventricular filling pressures are increased. Left  ventricular diastolic function is not assessable.     Right Ventricle  The right ventricular wall motion is normal. The right ventricle is normal  size. Global right ventricular function is mildly to moderately reduced.     Atria  The atria cannot be assessed. The atrial septum is intact as assessed by color  Doppler .     Mitral Valve  The mitral valve is normal.     Aortic Valve  Mild aortic valve calcification is present. On Doppler interrogation, which is  limited, there is no significant stenosis or regurgitation.     Tricuspid Valve  The tricuspid valve is normal. Mild tricuspid insufficiency is present. Right  ventricular systolic pressure is 26.7mmHg above the right atrial pressure.     Pulmonic Valve  The valve leaflets are not well visualized. Trace pulmonic insufficiency is  present.     Vessels  The pulmonary artery and bifurcation cannot be assessed. The aorta root is  normal. Sinuses of Valsalva 3.8 cm. Ascending aorta 3.7 cm. Unable to assess  mean RA pressure given the patient is on a ventilator.     Pericardium  Prominent epicardial fat is noted. Trivial pericardial effusion is present.     Miscellaneous  Technically difficult study due to dressing obscuring some views.     Compared to Previous Study  This study was compared with the study from 1/9/20 . There has been no change.  ______________________________________________________________________________  MMode/2D Measurements & Calculations  IVSd: 0.77 cm  LVIDd: 5.0 cm  LVIDs: 3.2 cm  LVPWd: 0.86 cm  FS: 36.0 %  LV mass(C)d: 140.5 grams  LV mass(C)dI: 63.5 grams/m2  asc Aorta Diam: 3.7 cm  LVOT diam: 2.5 cm  LVOT area: 5.1 cm2  RWT: 0.34  TAPSE: 1.8 cm     Doppler Measurements & Calculations  MV E max ojhn: 90.4 cm/sec  MV A max  john: 56.3 cm/sec  MV E/A: 1.6  MV dec slope: 611.1 cm/sec2  MV dec time: 0.15 sec  PA acc time: 0.11 sec     TR max john: 258.4 cm/sec  TR max P.7 mmHg  E/E' av.3  Lateral E/e': 22.0  Medial E/e': 22.5     ______________________________________________________________________________  Report approved by: Jah Templeton 2021 11:48 AM         XR Abdomen Port 1 View    Narrative    EXAMINATION:  XR ABDOMEN PORT 1 VIEWS 2021 12:08 PM     COMPARISON: Abdominal radiograph, 2021.    HISTORY: feeding tube placement.    FINDINGS: Frontal view of the abdomen. The tip of the feeding tube  projects over the third portion of the duodenum. Air-filled dilated  small bowel in the central abdomen. No pneumatosis or portal venous  gas. The lung bases are not completely visualized on this exam. Partly  visualized left chest tube. Vascular calcification of the iliac  vessels. Degenerative changes of the visualized spine.      Impression    IMPRESSION:   1. The tip of the feeding tube projects over the expected location of  the third portion of the duodenum.  2. Air-filled dilated small bowel loop in the central abdomen, which  may be seen with adynamic ileus versus small bowel obstruction.    I have personally reviewed the examination and initial interpretation  and I agree with the findings.    JOY FLORIAN MD         SYSTEM ID:  [id number]

## 2021-08-10 NOTE — PLAN OF CARE
[id number]:  Neuro: Unresponsive to stimuli; sedated on Dex @ 0.7 and Fent @ 100. Fent PRN x1. Pupils +2, sluggish. No cough with suctioning and MD aware.   Card: -130s w/ PVCs. Levo restarted to keep MAP>65. Tmax 103.3; MD ordered for pan cx, Tylenol given, and cooling blanket in place.   Pulm: APRV 100%. Increased FiO2 to 100% for sats <90% last evening. Lungs diminished throughout. L PleurX clamped, L chest tube to suction w/ air leak, and R chest tube to suction. Serosanguinous output. Flolan @ 20.   GI: NPO w/ TF@ goal 50/hr and 250 q4h FWF. Rectal pouch in place, no BM.   : Desai with good UOP. Replaced this am with sending UA/UC.   Endo: Insulin gtt Alg 2.   Skin: Mepilex on sacrum. L incision MARILEE. CT drsgs changed.     Will continue to monitor and notify team with updates.

## 2021-08-10 NOTE — PROGRESS NOTES
Attempted visit today and family was not present. Our team will continue to follow and make connection with family to provide support.

## 2021-08-11 NOTE — PROGRESS NOTES
SURGICAL ICU PROGRESS NOTE  August 8, 2021      ASSESSMENT: 61 yo male with h/o BMT c/b GVHD, pleural parenchymal fibroelastosis, PAH, CAD, PE, trapped left lung and recurrent pleural effusions, now s/p VATS partial left decortication and pleurex placement on 8/4/21 who was hypoxic in the PACU, failed BiPAP, requiring mechanical ventilation and appeared to be in septic shock with hypotension, fever, hypoxia, tachypnea. Overall, now with worsening hypercarbic hypoxemic respiratory failure requiring paralysis and proning.      TODAY'S PROGRESS/PLANS:   -Goals of care discussion with wife today (per discussion 8/10 with wife, may desire to move towards comfort cares pending discussion with adult children).  -Methylpred per pulm recs  -Lantus 25 bid, adjust insulin gtt   -Senna-doc, miralax    PLAN:   Neuro/ pain/ sedation:  #Acute post operative pain  #Sedation required in the setting of chemical paralysis  -Monitor neurological status. Notify the MD for any acute changes in exam.  -Pain: fentantyl gtt, dilaudid PRN, tylenol PRN  -Sedation: Precedex gtt, seroquel 50 q 8 hrs (started 8/9)  - Paralysis: vecuronium (stopped 8/8)  - Goal RAAS to -5 with tachycardia while on APRV  - Cough reflex suppressed, pupils sluggish per RN with pupilometer.  - Consider Head CT pending resp stability.     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 On 3 L O2 at home. PFTs Oct 2020: FEV1 1.03L (27%), DLCO 52%.  - Pulm consult, recs appreciated. Per discussion, initially thought unlikely to be PPFE (as usually does not present with exacerbation) and more likely pneumonia with underlying capillaritis 2/2 GVHD.  However, per 8/11 discussion, can be  subset of PPFE that can rapidly progress and recommended a trial of pulse steroids given severe decline in course.     -Methylpred 125 mg q 6 hrs (8/10-)  -AB.30/72/63/35 on 100% FiO2 - tolerating permissive hypercapnia as long as pH >7.2 as do not want to compromise oxygenation with adjustments to Thigh.  - VENT:  APRV P-high 40, P-low 0, T-high 5, T-low 0.38 with FiO2 100%.   Sats 88-90%   -Utilizing a low T-low given his underlying restrictive lung disease, and his flow waveforms previously showing peek expiratory flow rate between 25-50%.    -Some improvement in hypoxia initially with change to APRV.    -Note did not tolerate transition back to conventional ventilation previously and proning and paralysis with refractory hypoxemia.    -Supinated since .   - Spontaneous right pneumothorax-> s/p chest thoracostomy with residual pneumothorax. Pleural fluid sent for analysis at the time of insertion.   - L CT to suction, intermittent air leak present.   - L  pleurex catheter capped.   - Right CT to suction (-40)  - Epoprostenol gtt at full strength (started )  -  Given acute life threatening decompensation, started treatment for presumed PE. Echocardiogram and BLE DVT scans did not support this diagnosis. Stopped heparin infusion, changed to subcutaneous heparin.  - Bronchoscopy : unrevealing, mucosa looked healthy and minimal secretions.  -No further air leak since ETT repositioned/advanced on .  -CXR (): Previously seen pneumatic component of the right basilar pneumothorax is now opacified, suggesting replacement by fluid and possibly representing a small hydropneumothorax. Unchanged diffuse mixed opacities R lung, stable large left pleural effusion, left-sided chest tubes in place       Cardiovascular:    #Septic shock  #Pulmonary artery hypertension  #Sinus tachycardia  - PRESSORS: Norepinephrine gtt.   -Goal: MAP goal >65.   -Stopped hydrocortisone with change to Methylpred (per pulm  above) 8/10  -Last echo Jan 2020: EF 55-60%, RV pressure 27 mmHg above RA, no significant valvular abnormalities.   - Formal ECHO (8/5 and 8/7): per most recent (similar to 8/5), EF 60-65%, RV function moderately reduced, no pericardial effusion.        GI care:   #Non-severe Protein calorie malnutrition (mild muscle wasting, inadequate TF x 6days)  -TF: @ goal 50   -PPI daily  -Stools: none 8/10, 50 ml 8/9, none 8/8, 50 ml and x4 8/7  -Bowel regimen: none - begin scheduled senna-doc, miralax.       Renal/ Fluid Balance:    #BETH - improving  #Hyperkalemia - resolved  #Hypernatremia - ongoing  - Developing BETH, oliguria improved with some fluid administration (after diuresis 8/7) and BUN/Cr continue to trend downwards.  -Na 151 (155 <150 <154 >150).   -Bicarb infusion stopped 8/8  -H2O flushes: 250 q 4 hrs   -Restart D5W @ 75 ml/hr as Na not improving with H2O alone.  -Na checks q 6 hrs  -Will continue to monitor intake and output.  -Net postitive 14 L since admission, hold diuresis in the setting of hypernatremia.  -Desai in place      Endocrine:    #Stress and steroid induced hyperglycemia  - Insulin gtt - currently @ 15 units/hr with rising requirements since  change/start to methylpred 8/10.  Used ~100 units yesterday. Add Lantus 25 units bid.  - Home levothyroxine      ID/ Antibiotics:  #Septic Shock  # Leukocytosis  -Immunosuppressed with PTA cyclosporine and prednisone. Currently on hold.   - Persistent leukocytosis but somewhat improved today 17.9 (19.1) but now febrile to 103.1 overnight.  -Blood :   -8/10: BC NGTD, CVC cx NGTD, Karius test pending   -8/7: aspergillus galacotomannan NEG, cryptococcus NEG   -8/4: BC NEG  -Sputum:   -8/10: sputum cx: pending, per RN no sputum production to obtain at this time   -8/7 BAL: KOH neg/fungal cx NGTD, +1 enterococcus faecalis (pcn/vanc susceptible), 1+ staph hemolyticus (pending susceptibilities), aspergillus NEG, actinomyces NGTD, pneumocystis pending, nocardia  pending   -8/5: Histo NEG, blasto NEG  -Pleural: 8/8 bacterial and fungal Cx NGTD  -Urine: 8/10 UA negative, UC reflex pending    - CT C/A/P ordered 8/7 however remain inapprop to complete with ongoing pulmonary instability.   -Antimicrobials:    -Pip-Tazo (8/4-8/10)   -Acyclovir (8/4-)   -Micafungin (8/5-), increased 150 on 8/7   -PTA Bactrim prophy resumed 8/9.   -Meropenem (8/11-)   -Linezolid (8/11-)      Heme:     #Myelodysplastic syndrome s/p BMT 2016  -BMT consult, recs appreciated  -HgB stable  -Subcutaneous heparin for prophylaxis.       Prophylaxis:    -DVT PPX: PPI, Heparin SQ      MSK:    - None      Lines/ tubes/ drains:  -PIV x2, Left chest tubes x 2 (15.5 Fr and 24 Fr), R CT, arterial line, ETT, frank, feeding tube, R IJ      Disposition:  -Surgical ICU    Patient seen, findings and plan discussed with surgical ICU staff, Dr. Palm.    Time spent on this Encounter   Billing:  I spent 45 minutes bedside and on the inpatient unit today managing the critical care of Byron Russo in relation to the issues listed in this note.      Nusrat Fernandez PA-C      SUBJECTIVE:   NAEO.  Remains on 100% FiO2 with sats 88-90%.    OBJECTIVE:   1. VITAL SIGNS:   Temp:  [99.3  F (37.4  C)-103.6  F (39.8  C)] 99.3  F (37.4  C)  Pulse:  [] 105  Resp:  [23-43] 38  MAP:  [58 mmHg-81 mmHg] 71 mmHg  Arterial Line BP: ()/(46-68) 93/59  FiO2 (%):  [90 %-100 %] 100 %  SpO2:  [86 %-94 %] 91 %  Ventilation Mode: APRV  (Airway Pressure Release Ventilation)  FiO2 (%): 100 %  Rate Set (breaths/minute): 26 breaths/min  PEEP (cm H2O): 8 cmH2O  Oxygen Concentration (%): 100 %  Resp: (!) 38      2. INTAKE/ OUTPUT:   I/O last 3 completed shifts:  In: 5254.11 [I.V.:2279.11; NG/GT:1775]  Out: 2330 [Urine:2140; Chest Tube:190]    3. PHYSICAL EXAMINATION:   General: critically ill  HEENT: atraumatic, pupils 2-3 mm/sluggish, anicteric sclera, FT secured in place with bridle/noted wound on columnella  Neuro: not follows  commands, not moving extremities spontaneously, no cough when suctioned  Pulm/Resp: ETT secured in place, on APRV, lungs coarse/diminished on right, decreased lung sounds on left, right CT with serosanguinous outputs    CV: remains sinus tachy low 100's, S1/S2, no MRG   Abdomen: Soft, non-distended, non-tender no rebound tenderness or guarding, minimal brown/liquid stool in bag.  : frank catheter in place, urine yellow and clear  MSK/Extremities: 2+ peripheral edema, peripheral pulses intact, calves soft, extremities warm/well perfused.     4. INVESTIGATIONS:   Arterial Blood Gases   Recent Labs   Lab 08/11/21  0302 08/10/21  2152 08/10/21  1709 08/10/21  0958   PH 7.30* 7.34* 7.32* 7.39   PCO2 72* 63* 59* 59*   PO2 63* 66* 64* 74*   HCO3 35* 34* 31* 36*     Complete Blood Count   Recent Labs   Lab 08/11/21  0302 08/10/21  0320 08/09/21  0411 08/08/21  1633   WBC 20.5* 17.9* 19.1* 18.5*   HGB 12.5* 12.2* 12.0* 12.8*    278 265 288     Basic Metabolic Panel  Recent Labs   Lab 08/11/21  0503 08/11/21  0403 08/11/21  0302 08/11/21  0301 08/10/21  2152 08/10/21  1621 08/10/21  1358 08/10/21  0508 08/10/21  0319 08/09/21  1715 08/09/21  1544   NA  --   --  151*  151*  --  152* 154*  154* 153*   < > 155*  155*   < > 150*  150*   POTASSIUM  --   --  3.9  --  3.8 3.3*  --   --  3.9  3.9  --  4.0   CHLORIDE  --   --  114*  --   --  117*  --   --  114*  --  112*   CO2  --   --  35*  --   --  35*  --   --  35*  --  36*   BUN  --   --  70*  --   --  75*  --   --  79*  --  74*   CR  --   --  1.35*  --   --  1.43*  --   --  1.51*  --  1.38*   * 143* 194* 176*  --  162*  --   --  164*  149*  --  154*  135*    < > = values in this interval not displayed.     Liver Function Tests  Recent Labs   Lab 08/08/21  1303 08/08/21  0442 08/07/21  1733 08/05/21  0340   AST  --  55* 52* 24   ALT  --  27 28 16   ALKPHOS  --  130 132 114   BILITOTAL  --  0.8 0.7 1.3   ALBUMIN  --  1.9* 2.0* 2.4*   INR 1.70*  --  1.74*  --       Pancreatic Enzymes  No lab results found in last 7 days.  Coagulation Profile  Recent Labs   Lab 21  1303 21  1733   INR 1.70* 1.74*     Lactate  Invalid input(s): LACTATE    5. RADIOLOGY:   Recent Results (from the past 24 hour(s))   Echo Complete   Result Value    LVEF  > 65%    Narrative    [id number]  [id number]  [id number]  [id number]^BRENDEN^AMANUEL     [facility],[place]  Echocardiography Laboratory  [address] [place] [address]     Name: BRIGITTE JNUIOR  MRN: [id number]  : 1960  Study Date: 2021 09:27 AM  Age: 60 yrs  Gender: Male  Patient Location: [facility]  Reason For Study: Shock  Ordering Physician: AMANUEL WILCOX  Performed By: ADI Alvarenga     BSA: 2.2 m2  Height: 71 in  Weight: 224 lb  HR: 117  BP: 86/56 mmHg  ______________________________________________________________________________  Procedure  Complete Portable Echo Adult.  ______________________________________________________________________________  Interpretation Summary  Technically difficult study due to dressing obscuring some views.     Left ventricular size, and wall motion are normal. The function is  hyperdynamic with an estimated ejection fraction is > 65%.     Diastolic Doppler findings (E/E' ratio and/or other parameters) suggest left  ventricular filling pressures are increased.     Global right ventricular function is mildly to moderately reduced while there  is presevered wall motion and size.     Right ventricular systolic pressure is 26.7mmHg above the right atrial  pressure which was not estimated due to the patient being on a ventilator.     This study was compared with the study from 20 .  There has been no significant change.  ______________________________________________________________________________  Left Ventricle  Left ventricular size, wall motion and function are normal. The ejection  fraction is > 65%. Diastolic Doppler findings (E/E' ratio and/or  other  parameters) suggest left ventricular filling pressures are increased. Left  ventricular diastolic function is not assessable.     Right Ventricle  The right ventricular wall motion is normal. The right ventricle is normal  size. Global right ventricular function is mildly to moderately reduced.     Atria  The atria cannot be assessed. The atrial septum is intact as assessed by color  Doppler .     Mitral Valve  The mitral valve is normal.     Aortic Valve  Mild aortic valve calcification is present. On Doppler interrogation, which is  limited, there is no significant stenosis or regurgitation.     Tricuspid Valve  The tricuspid valve is normal. Mild tricuspid insufficiency is present. Right  ventricular systolic pressure is 26.7mmHg above the right atrial pressure.     Pulmonic Valve  The valve leaflets are not well visualized. Trace pulmonic insufficiency is  present.     Vessels  The pulmonary artery and bifurcation cannot be assessed. The aorta root is  normal. Sinuses of Valsalva 3.8 cm. Ascending aorta 3.7 cm. Unable to assess  mean RA pressure given the patient is on a ventilator.     Pericardium  Prominent epicardial fat is noted. Trivial pericardial effusion is present.     Miscellaneous  Technically difficult study due to dressing obscuring some views.     Compared to Previous Study  This study was compared with the study from 1/9/20 . There has been no change.  ______________________________________________________________________________  MMode/2D Measurements & Calculations  IVSd: 0.77 cm  LVIDd: 5.0 cm  LVIDs: 3.2 cm  LVPWd: 0.86 cm  FS: 36.0 %  LV mass(C)d: 140.5 grams  LV mass(C)dI: 63.5 grams/m2  asc Aorta Diam: 3.7 cm  LVOT diam: 2.5 cm  LVOT area: 5.1 cm2  RWT: 0.34  TAPSE: 1.8 cm     Doppler Measurements & Calculations  MV E max john: 90.4 cm/sec  MV A max john: 56.3 cm/sec  MV E/A: 1.6  MV dec slope: 611.1 cm/sec2  MV dec time: 0.15 sec  PA acc time: 0.11 sec     TR max john: 258.4 cm/sec  TR  max P.7 mmHg  E/E' av.3  Lateral E/e': 22.0  Medial E/e': 22.5     ______________________________________________________________________________  Report approved by: Jah Templeton 2021 11:48 AM         XR Abdomen Port 1 View    Narrative    EXAMINATION:  XR ABDOMEN PORT 1 VIEWS 2021 12:08 PM     COMPARISON: Abdominal radiograph, 2021.    HISTORY: feeding tube placement.    FINDINGS: Frontal view of the abdomen. The tip of the feeding tube  projects over the third portion of the duodenum. Air-filled dilated  small bowel in the central abdomen. No pneumatosis or portal venous  gas. The lung bases are not completely visualized on this exam. Partly  visualized left chest tube. Vascular calcification of the iliac  vessels. Degenerative changes of the visualized spine.      Impression    IMPRESSION:   1. The tip of the feeding tube projects over the expected location of  the third portion of the duodenum.  2. Air-filled dilated small bowel loop in the central abdomen, which  may be seen with adynamic ileus versus small bowel obstruction.    I have personally reviewed the examination and initial interpretation  and I agree with the findings.    JOY FLORIAN MD         SYSTEM ID:  [id number]

## 2021-08-11 NOTE — PLAN OF CARE
Major Shift Events:    Pt unrousable, pupils round/sluggish, not following commands, not withdrawing in any extremities, tele ST with oaccasional PVCs, MAP >65 see MAR for interventions, APRV 100%/PH 40/PL 0/TH 5/ TL 0.38, NJ in place with tube feeds running at goal, frank in place with good output, rectal pouch in place with moderate output, chest tube x2 in place with serosanguinous output, pleurex drain in place capped, afebrile during shift    Plan:   Continue to monitor cardiac status, possible withdrawal of care in the AM    For vital signs and complete assessments, please see documentation flowsheets.

## 2021-08-11 NOTE — PROGRESS NOTES
SICU Progress Note - Addendum to plan of care  Per discussion with wife (Latonia) today, family discussed patient's medical course and poor prognosis and believes transitioning to comfort care would be most consistent with patient's wishes to avoid further suffering.  Family requesting to withdraw ventilator support and implement comfort cares tomorrow, 8/12, around 10 AM.  Discussed that patient is currently on maximum ventilator support and that patient could decompensate in the interim.  Family acknowledged this possibility, confirmed desire to  continue with DNR/no CPR status and current cares (okay to titrate current pressors to maintain pressures but no proning/paralytics if resp status decompensates) and would like to be notified if does decompensate prior to planned withdrawal of support 8/12.     Family initially declined palliative involvement and care conference this morning but now requesting palliative support/services and recommendations.  Specifically would like to print heart strips, do fingerprinting and have a lock of patient's hair.  Additionally requested patient have last rites prior to passing.  Family also inquiring about if patient would be an organ donor (per pt's wishes, except for his eyes).    Plan/Interventions:  -Explained visitor policy to family during transition to comfort cares (family currently planning on wife/dtrs x2/son come in but aware can have 2 people at bedside at a time).  -Relayed patient's wishes to bedside RN who will contact [facility] to discuss organ donation candidacy per pt/family's wishes .  -Contacted on-call  who will plan to perform last rites at bedside this evening (~7:00)  -Will reach out to palliative in AM for assistance with transition to comfort cares and family's requests per above.    Nusrat Fernandez PA-C

## 2021-08-11 NOTE — PROGRESS NOTES
Thoracic Surgery Progress Note  08/11/2021       Subjective:  Weaned off of pressors overnight but this AM requiring increasing needs. Also worse gas this AM. Broadened to meropenem and linezolid yesterday. Palliative consulted    Objective:  Temp:  [98.8  F (37.1  C)-103.6  F (39.8  C)] 98.8  F (37.1  C)  Pulse:  [101-149] 112  Resp:  [23-43] 38  MAP:  [58 mmHg-81 mmHg] 75 mmHg  Arterial Line BP: ()/(46-68) 103/61  FiO2 (%):  [90 %-100 %] 100 %  SpO2:  [86 %-94 %] 92 %    I/O last 3 completed shifts:  In: 6371.05 [I.V.:3251.05; NG/GT:1920]  Out: 2640 [Urine:2350; Chest Tube:290]   PleurX - 0  CT L - 100, 50  CT R - 90, 100    Vasopressin off  Levo 0.08  Angiotensin off  Flolan 20  Bicarb off  Precedex 0.7  Fentanyl 100    Gen: Intubated, sedated  Resp: mechanically ventilated 100% FiO2 on APRV  Chest: Pleurx dressing intact, left chest tube and right chest tube to suction, air leak in left  Abd: soft, nondistended  Incision: c/d/I  Ext: WWP, no edema     Labs:  Recent Labs   Lab 08/11/21  0302 08/10/21  0320 08/09/21  0411   WBC 20.5* 17.9* 19.1*   HGB 12.5* 12.2* 12.0*    278 265       Recent Labs   Lab 08/11/21  0700 08/11/21  0559 08/11/21  0503 08/11/21  0302 08/10/21  2152 08/10/21  1621 08/10/21  0508 08/10/21  0319 08/09/21  0413 08/09/21  0411   NA  --   --   --  151*  151* 152* 154*  154*   < > 155*  155*   < > 150*  150*   POTASSIUM  --   --   --  3.9 3.8 3.3*  --  3.9  3.9   < > 3.7  3.7   CHLORIDE  --   --   --  114*  --  117*  --  114*   < > 114*   CO2  --   --   --  35*  --  35*  --  35*   < > 35*   BUN  --   --   --  70*  --  75*  --  79*   < > 88*   CR  --   --   --  1.35*  --  1.43*  --  1.51*   < > 1.65*   * 145* 161* 194*  --  162*  --  164*  149*   < > 153*   TRACE  --   --   --  8.1*  --  7.5*  --  8.3*   < > 8.6   MAG  --   --   --  3.1*  --   --   --  3.0*  --  3.1*   PHOS  --   --   --  3.6  --   --   --  3.1  --  3.7    < > = values in this interval not displayed.      ABG 7.41/58/64/36    8/10 Blood Cx - NGTD  8/7 BAL - enterococcus faecalis, staph haemolyticus     Imaging:  CXR - pending     Assessment/Plan:   60 year old male with 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 Reynolds on 8/4. Post-operatively with worsening respiratory status ultimately requiring intubation. Ongoing issues with oxygenation/ventilation and hypotension with concern for septic shock in setting of immunosuppresion. Continues on maximal respiratory support, may require proning and paralysis again. Will need to have goals of care discussions with family.    - Appreciate SICU care  - Sedation/pain per ICU  - Wean ventilator/flolan as able  - Pulmonology, BMT, Transplant ID consulted  - Ongoing infectious and opportunistic work-up  - Broadened to linezolid, meropenem, micafungin   - Continue left chest tube and right chest tube suction; no need for additional right chest tube at this time  - Pleurx catheter okay to be used for suction  - DNR  - Agree with palliative care consult, ideally will be able to set up care conference to discuss goals of care    Discussed with staff    Casper Paige MD  PGY-3, General Surgery

## 2021-08-11 NOTE — PLAN OF CARE
2650-5016:  Neuro: Unresponsive to stimuli; sedated on Dex @ 0.7 and Fent @ 100. Pupils +2, sluggish. No cough with suctioning.  Card: -130s w/ PVCs. Levo restarted to keep MAP>65. Tmax 102.2; cooling measures continue. K replaced.   Pulm: APRV 100%. Increased FiO2 to 100% for sats <90% last evening and sats remain 88-89% and MD aware. Notified MD of 0400 ABG, no new interventions at this time. Lungs diminished throughout. L PleurX capped, L chest tube to suction w/ air leak, and R chest tube to suction. Serosanguinous output. Flolan @ 20.   GI: NPO w/ TF@ goal 50/hr and 250 q4h FWF. Rectal pouch in place, no BM.   : Desai with good UOP.  Endo: Insulin gtt Alg 4+, continued hyperglycemia with steroids and MD aware.   Skin: Mepilex on sacrum. L incision MARILEE. CT drsgs changed.      Will continue to monitor and notify team with updates.

## 2021-08-12 NOTE — PROGRESS NOTES
Pt terminally extubated at 1340. Comfort medications given prior to extubation. Time of death 13:49. Pt family present at bedside at time of passing, no belongings present to be sent with family.

## 2021-08-12 NOTE — PROGRESS NOTES
PALLIATIVE CARE SOCIAL WORK Progress Note   [facility] [address]    REFERRAL SOURCE: Palliative care; legacy work    PCSW met this morning with patient's wife Latonia and three adult children. Bedside RN Alison had printed rhythm strips and PCSW completed heartbeats in a bottle for family, providing an extra strip as well. Family also interested in obtaining fingerprints and/or handprints of patient, PCSW to assist.    Plan: PCSW to assist with fingerprints this afternoon before withdrawal.    Geri Tamez [facility]  Palliative Care   Pager [phone]    [facility] Inpatient Team Consult pager [phone] (M-F 8-4:30)  After-hours Answering Service [phone]

## 2021-08-12 NOTE — PLAN OF CARE
Major Shift Events:      Neuro: No movement or eye movement to stimulation. PERRLA. On precedex and fentanyl per MAR for sedation. Several PRN fentanyl given overnight when RR 35-40s.    Card: -130s overnight. Remains on norepi at 0.06mcg/kg/min for MAP>65mmHg.     Resp: APRV 100%FiO2 overnight. L CT with airleak to -20 suction and minimal sersang output. R CT without airleak to -40 suction and minimal serosang output. SpO2 88-92% at beginning of shift, now 83-85%, MD aware without any plan of vent changes.     GI/: Adequate urine output. TF at 50mL/hr and D5 at 75mL/hr. Insulin at 14units/hr. Liquid stool per rectal pouch.    Plan: Family plans to be at bedside between 09:30-10:00 per wife Latonia for compassionate withdrawal of care.     For vital signs and complete assessments, please see documentation flowsheets.

## 2021-08-12 NOTE — PROGRESS NOTES
I spent some time at Dwight's bedside with Latonia and their children, Maranda, Tala, and Bart. We talked about his disease process and about what we knew about his condition. I explained that we exhausted everything we could do. Latonia and their children were able to ask questions and I answered to the best of my ability.    They will withdraw care when they can.

## 2021-08-12 NOTE — DISCHARGE SUMMARY
NAME: Byron Russo   MRN: [id number]   : 1960     DATE OF ADMISSION: 2021     PRE/POSTOPERATIVE DIAGNOSES: Trapped Lung    PROCEDURES PERFORMED: Left video assisted thoracic surgery, placement of PleurX, partial left lung decortication    PATHOLOGY RESULTS: N/a    CULTURE RESULTS: E. Faecalis, S. Haemolyticus    INTRAOPERATIVE COMPLICATIONS: None    POSTOPERATIVE COMPLICATIONS: Acute Respiratory Distress Syndrome    DRAINS/TUBES PRESENT AT DISCHARGE: N/a    DATE OF DISCHARGE:  21    HOSPITAL COURSE: Byron Russo is a 60 year old male who on 2021  underwent the above-named procedures under BiPAP and not intubated. He was brought to PACU you post-operatively where he began desating and developed acute respiratory failure ultimately requiring intubation. He was transferred to the SICU that evening where he required maximum ventilator support in addition to multiple pressor medications. The Bone Marrow team, Pulmonology, and Infectious disease were all consulted with a working diagnosis of septic shock. Broad spectrum antibiotics were initiated and the patient weaned down from pressor support but unfortunately developed Acute Respiratory Distress Syndrome and was unable to wean off maximum ventilator support. On  after family discussion, the family decided to proceed with comfort cares, and the patient was terminally extubated and  at 1349 on 2021.     PHYSICAL EXAM: Unresponsive to noxious stimuli, Spontaneous respirations absent, Breath sounds absent, Carotid pulse absent, Heart sounds absent, Pupillary light reflex absent and Corneal blink reflex absent      DISCHARGE INSTRUCTIONS:  No discharge procedures on file.    DISCHARGE MEDICATIONS:    Rafaela Yg BARRON   Home Medication Instructions TESS:[phone]    Printed on:21 1537   Medication Information                      acetaminophen (TYLENOL) 325 MG tablet  Take 1-2 tablets (325-650 mg) by mouth every 4 hours as  needed for mild pain or fever             acyclovir (ZOVIRAX) 800 MG tablet  Take 1 tablet (800 mg) by mouth 2 times daily             amLODIPine (NORVASC) 5 MG tablet  Take 1 tablet (5 mg) by mouth daily             aspirin 81 MG EC tablet  Take 81 mg by mouth daily Reported on 5/12/2017             carboxymethylcellulose PF (REFRESH PLUS) 0.5 % ophthalmic solution  Place 1 drop into both eyes 3 times daily as needed for dry eyes             cycloSPORINE modified (GENERIC EQUIVALENT) 100 MG capsule  Take 1 capsule (100 mg) by mouth 2 times daily             cycloSPORINE modified (GENERIC EQUIVALENT) 25 MG capsule  On HOLD, current dose is 200mg twice daily.             fluconazole (DIFLUCAN) 100 MG tablet  Take 1 tablet (100 mg) by mouth daily             levofloxacin (LEVAQUIN) 250 MG tablet  Take 1 tablet (250 mg) by mouth daily             levothyroxine (SYNTHROID/LEVOTHROID) 150 MCG tablet  Take 1 tablet (150 mcg) by mouth daily             magnesium oxide (MAG-OX) 400 (241.3 Mg) MG tablet  Take 800 mg by mouth every evening              metoprolol tartrate (LOPRESSOR) 25 MG tablet  Take 1 tablet (25 mg) by mouth daily             order for DME  Equipment being ordered: Please provide portable oxygen at 2 LPM with activity and with sleep via nasal canula. Patient requesting small tanks he can wear with back pack for his work.             order for DME  Equipment being ordered: Oxygen. Please provide patient with continuous flow oxygen at 3 LPM via nasal cannula. Patient will need concentrator, conserving device, and portable oxygen. Length of need is up to 6 months; will continue to reassess. Patient will need transportation oxygen delivered to the [facility], [address] [place] [id number]. [address], Room 5429.             order for DME  Equipment being ordered: Oxygen. Please provide patient with continuous flow oxygen at 3 LPM via nasal cannula for activity and while sleeping.  Patient will need concentrator, conserving device, and portable oxygen.             rosuvastatin (CRESTOR) 5 MG tablet  Take 1 tablet (5 mg) by mouth daily             sulfamethoxazole-trimethoprim (BACTRIM DS) 800-160 MG tablet  Take 1 tablet by mouth 2 times daily On Monday's and Tuesday's only

## 2021-08-12 NOTE — PROGRESS NOTES
MD DEATH PRONOUNCEMENT    Called to pronounce Byron Russo dead.    Physical Exam: Unresponsive to noxious stimuli, Spontaneous respirations absent, Breath sounds absent, Carotid pulse absent, Heart sounds absent, Pupillary light reflex absent and Corneal blink reflex absent    Patient was pronounced dead at 1349, 2021.    Cause of death: ARDS, Bacterial pneumonia    Principal Problem:    Trapped lung       Infectious disease present?: YES    Communicable disease present? (examples: HIV, chicken pox, TB, Ebola, CJD) :  NO    Multi-drug resistant organism present? (example: MRSA): YES    Please consider an autopsy if any of the following exist:  NO Unexpected or unexplained death during or following any dental, medical, or surgical diagnostic treatment procedures.   NO Death of mother at or up to seven days after delivery.     NO All  and pediatric deaths.     NO Death where the cause is sufficiently obscure to delay completion of the death certificate.   NO Deaths in which autopsy would confirm a suspected illness/condition that would affect surviving family members or recipients of transplanted organs.     The following deaths must be reported to the 's Office:  NO A death that may be due entirely or in part to any factors other than natural disease (recent surgery, recent trauma, suspected abuse/neglect).   NO A death that may be an accident, suicide, or homicide.     NO Any sudden, unexpected death in which there is no prior history of significant heart disease or any other condition associated with sudden death.   NO A death under suspicious, unusual, or unexpected circumstances.    NO Any death which is apparently due to natural causes but in which the  does not have a personal physician familiar with the patient s medical history, social, or environmental situation or the circumstances of the terminal event.   NO Any death apparently due to Sudden Infant Death Syndrome.      NO Deaths that occur during, in association with, or as consequences of a diagnostic, therapeutic, or anesthetic procedure.   NO Any death in which a fracture of a major bone has occurred within the past (6) six months.   NO A death of persons note seen by their physician within 120 days of demise.     NO Any death in which the  was an inmate of a public institution or was in the custody of Law Enforcement personnel.   NO  All unexpected deaths of children   NO Solid organ donors   NO Unidentified bodies   NO Deaths of persons whose bodies are to be cremated or otherwise disposed of so that the bodies will later be unavailable for examination;   NO Deaths unattended by a physician outside of a licensed healthcare facility or licensed residential hospice program   NO Deaths occurring within 24 hours of arrival to a health care facility if death is unexpected.    NO Deaths associated with the decedent s employment.   NO Deaths attributed to acts of terrorism.   NO Any death in which there is uncertainty as to whether it is a medical examiner s care should be discussed with the medical investigator.        Body disposition: Autopsy was discussed with family member:  Spouse in person.  Permission for autopsy pending. RN will follow up.     Daria Wheatley La Mater

## 2021-08-12 NOTE — SIGNIFICANT EVENT
SPIRITUAL HEALTH SERVICES Significant Event  [employer] Sacrament of ANOINTING  [facility] ([place]) 4a    Pt anointed by Father Wili Alexander   Pager [phone]

## 2021-08-12 NOTE — PROGRESS NOTES
SURGICAL ICU PROGRESS NOTE  August 12, 2021      ASSESSMENT: 59 yo male with h/o BMT c/b GVHD, pleural parenchymal fibroelastosis, PAH, CAD, PE, trapped left lung and recurrent pleural effusions, now s/p VATS partial left decortication and pleurex placement on 8/4/21 who was hypoxic in the PACU, failed BiPAP, requiring mechanical ventilation and appeared to be in septic shock with hypotension, fever, hypoxia, tachypnea. Overall, now with worsening hypercarbic hypoxemic respiratory failure requiring paralysis and proning. Now remains persistently  Hypoxemic with ARDS physiology, complicated by spontaneous right pneumothorax 2/2 PPV, septic shock 2/2 pneumonia and BETH.      TODAY'S PROGRESS/PLANS:     Discussed case with thoracic surgery as well as with the patient's spouse and children. Mr. Russo remains sedated on maximum ventilatory support and septic shock requiring vasopressor therapy. His oxygen saturations have been fluctuating between 70 to 85% today.  We have exhausted measures available for restorative care. They would like to proceed with withdrawal of care and would like to terminally extubate.   We will premedicate with Morphine, Midazolam and glycopyrrolate. Wean the ventilator per protocol and terminally extubate.   Palliative is at the bedside providing supportive care to family.        Daria De La Mater  CC time 30 minutes     SUBJECTIVE:   NAEO.  Remains on 100% FiO2 with sats 70 to 85%. Increasing norepinephrine needs.     OBJECTIVE:   1. VITAL SIGNS:   Temp:  [97.5  F (36.4  C)-100.8  F (38.2  C)] 100.8  F (38.2  C)  Pulse:  [104-140] 138  Resp:  [22-44] 44  MAP:  [67 mmHg-86 mmHg] 70 mmHg  Arterial Line BP: ()/(54-72) 102/58  FiO2 (%):  [100 %] 100 %  SpO2:  [76 %-96 %] 76 %  Ventilation Mode: APRV  (Airway Pressure Release Ventilation)  FiO2 (%): 100 %  Oxygen Concentration (%): 100 %  Resp: (!) 44      2. INTAKE/ OUTPUT:   I/O last 3 completed shifts:  In: 5892.11 [I.V.:3092.11;  NG/GT:1600]  Out: 2755 [Urine:1745; Stool:500; Chest Tube:510]    3. PHYSICAL EXAMINATION:   General: critically ill  HEENT: atraumatic, pupils 2-3 mm/sluggish, anicteric sclera, FT secured in place with bridle/noted wound on columnella  Neuro: not follows commands, not moving extremities spontaneously  Pulm/Resp: ETT secured in place, on APRV, lungs coarse/diminished on right, decreased lung sounds on left, right CT with serosanguinous output  CV: remains sinus tachy low 100's, S1/S2, no MRG   Abdomen: Soft, non-distended, non-tender no rebound tenderness or guarding, minimal brown/liquid stool in bag.  : frank catheter in place, urine yellow and clear  MSK/Extremities: 2+ peripheral edema, peripheral pulses intact, calves soft, extremities warm/well perfused.     4. INVESTIGATIONS:   Arterial Blood Gases   Recent Labs   Lab 08/12/21  0341 08/11/21 2147 08/11/21  1622 08/11/21  1031   PH 7.27* 7.29* 7.28* 7.31*   PCO2 80* 75* 76* 69*   PO2 57* 63* 62* 63*   HCO3 36* 36* 36* 35*     Complete Blood Count   Recent Labs   Lab 08/12/21  0341 08/11/21  0302 08/10/21  0320 08/09/21  0411   WBC 32.7* 20.5* 17.9* 19.1*   HGB 12.5* 12.5* 12.2* 12.0*    245 278 265     Basic Metabolic Panel  Recent Labs   Lab 08/12/21  1248 08/12/21  1114 08/12/21  1014 08/12/21  0913 08/12/21  0341 08/11/21  2147 08/11/21  1623 08/11/21  1033 08/11/21  0302 08/10/21  2152 08/10/21  1621   NA  --   --   --   --  148*  148* 148* 148* 150* 151*  151* 152* 154*  154*   POTASSIUM  --   --   --   --  4.2  4.2  --   --  3.4 3.9 3.8 3.3*   CHLORIDE  --   --   --   --  110*  --   --  114* 114*  --  117*   CO2  --   --   --   --  37*  --   --  34* 35*  --  35*   BUN  --   --   --   --  71*  --   --  73* 70*  --  75*   CR  --   --   --   --  1.23  --   --  1.18 1.35*  --  1.43*   * 118* 119* 136* 118*  129* 132*  --  149* 194*  --  162*     Liver Function Tests  Recent Labs   Lab 08/08/21  1303 08/08/21  0442 08/07/21  3343   AST   --  55* 52*   ALT  --  27 28   ALKPHOS  --  130 132   BILITOTAL  --  0.8 0.7   ALBUMIN  --  1.9* 2.0*   INR 1.70*  --  1.74*     Pancreatic Enzymes  No lab results found in last 7 days.  Coagulation Profile  Recent Labs   Lab 21  1303 21  1733   INR 1.70* 1.74*     Lactate  Invalid input(s): LACTATE    5. RADIOLOGY:   Recent Results (from the past 24 hour(s))   Echo Complete   Result Value    LVEF  > 65%    Narrative    [id number]  [id number]  [id number]  [id number]^BRENDEN^AMANUEL     [facility],[place]  Echocardiography Laboratory  [address] [id number]     Name: BRIGITTE JUNIOR  MRN: [id number]  : 1960  Study Date: 2021 09:27 AM  Age: 60 yrs  Gender: Male  Patient Location: [facility]  Reason For Study: Shock  Ordering Physician: AMANUEL WILCOX  Performed By: ADI Alvarenga     BSA: 2.2 m2  Height: 71 in  Weight: 224 lb  HR: 117  BP: 86/56 mmHg  ______________________________________________________________________________  Procedure  Complete Portable Echo Adult.  ______________________________________________________________________________  Interpretation Summary  Technically difficult study due to dressing obscuring some views.     Left ventricular size, and wall motion are normal. The function is  hyperdynamic with an estimated ejection fraction is > 65%.     Diastolic Doppler findings (E/E' ratio and/or other parameters) suggest left  ventricular filling pressures are increased.     Global right ventricular function is mildly to moderately reduced while there  is presevered wall motion and size.     Right ventricular systolic pressure is 26.7mmHg above the right atrial  pressure which was not estimated due to the patient being on a ventilator.     This study was compared with the study from 20 .  There has been no significant change.  ______________________________________________________________________________  Left Ventricle  Left ventricular  size, wall motion and function are normal. The ejection  fraction is > 65%. Diastolic Doppler findings (E/E' ratio and/or other  parameters) suggest left ventricular filling pressures are increased. Left  ventricular diastolic function is not assessable.     Right Ventricle  The right ventricular wall motion is normal. The right ventricle is normal  size. Global right ventricular function is mildly to moderately reduced.     Atria  The atria cannot be assessed. The atrial septum is intact as assessed by color  Doppler .     Mitral Valve  The mitral valve is normal.     Aortic Valve  Mild aortic valve calcification is present. On Doppler interrogation, which is  limited, there is no significant stenosis or regurgitation.     Tricuspid Valve  The tricuspid valve is normal. Mild tricuspid insufficiency is present. Right  ventricular systolic pressure is 26.7mmHg above the right atrial pressure.     Pulmonic Valve  The valve leaflets are not well visualized. Trace pulmonic insufficiency is  present.     Vessels  The pulmonary artery and bifurcation cannot be assessed. The aorta root is  normal. Sinuses of Valsalva 3.8 cm. Ascending aorta 3.7 cm. Unable to assess  mean RA pressure given the patient is on a ventilator.     Pericardium  Prominent epicardial fat is noted. Trivial pericardial effusion is present.     Miscellaneous  Technically difficult study due to dressing obscuring some views.     Compared to Previous Study  This study was compared with the study from 1/9/20 . There has been no change.  ______________________________________________________________________________  MMode/2D Measurements & Calculations  IVSd: 0.77 cm  LVIDd: 5.0 cm  LVIDs: 3.2 cm  LVPWd: 0.86 cm  FS: 36.0 %  LV mass(C)d: 140.5 grams  LV mass(C)dI: 63.5 grams/m2  asc Aorta Diam: 3.7 cm  LVOT diam: 2.5 cm  LVOT area: 5.1 cm2  RWT: 0.34  TAPSE: 1.8 cm     Doppler Measurements & Calculations  MV E max john: 90.4 cm/sec  MV A max john: 56.3  cm/sec  MV E/A: 1.6  MV dec slope: 611.1 cm/sec2  MV dec time: 0.15 sec  PA acc time: 0.11 sec     TR max john: 258.4 cm/sec  TR max P.7 mmHg  E/E' av.3  Lateral E/e': 22.0  Medial E/e': 22.5     ______________________________________________________________________________  Report approved by: Jah Templeton 2021 11:48 AM         XR Abdomen Port 1 View    Narrative    EXAMINATION:  XR ABDOMEN PORT 1 VIEWS 2021 12:08 PM     COMPARISON: Abdominal radiograph, 2021.    HISTORY: feeding tube placement.    FINDINGS: Frontal view of the abdomen. The tip of the feeding tube  projects over the third portion of the duodenum. Air-filled dilated  small bowel in the central abdomen. No pneumatosis or portal venous  gas. The lung bases are not completely visualized on this exam. Partly  visualized left chest tube. Vascular calcification of the iliac  vessels. Degenerative changes of the visualized spine.      Impression    IMPRESSION:   1. The tip of the feeding tube projects over the expected location of  the third portion of the duodenum.  2. Air-filled dilated small bowel loop in the central abdomen, which  may be seen with adynamic ileus versus small bowel obstruction.    I have personally reviewed the examination and initial interpretation  and I agree with the findings.    JOY FLORIAN MD         SYSTEM ID:  [id number]

## 2021-08-12 NOTE — PROGRESS NOTES
CLINICAL NUTRITION SERVICES    Informed decision made to change pt s status to comfort care. Nutrition interventions discontinued and no further interventions planned at this time. RD can be consulted if needed.    RD signing off on 8/12/2021.

## 2021-08-12 NOTE — PROGRESS NOTES
Thoracic Surgery Progress Note  08/12/2021       Subjective:  Decision made by family for comfort care this AM.    Objective:  Temp:  [97.5  F (36.4  C)-100.4  F (38  C)] 100.4  F (38  C)  Pulse:  [104-133] 133  Resp:  [22-44] 44  MAP:  [67 mmHg-86 mmHg] 76 mmHg  Arterial Line BP: ()/(55-72) 107/60  FiO2 (%):  [100 %] 100 %  SpO2:  [82 %-96 %] 84 %    I/O last 3 completed shifts:  In: 5892.11 [I.V.:3092.11; NG/GT:1600]  Out: 2755 [Urine:1745; Stool:500; Chest Tube:510]     Gen: Intubated, sedated  Resp: mechanically ventilated 100% FiO2 on APRV  Chest: Pleurx dressing intact, left chest tube and right chest tube to suction, air leak in left  Abd: soft, nondistended  Incision: c/d/I  Ext: WWP, no edema     Labs:  Recent Labs   Lab 08/12/21  0341 08/11/21  0302 08/10/21  0320   WBC 32.7* 20.5* 17.9*   HGB 12.5* 12.5* 12.2*    245 278       Recent Labs   Lab 08/12/21  0807 08/12/21  0702 08/12/21  0627 08/12/21  0341 08/11/21  2147 08/11/21  1623 08/11/21  1033 08/11/21  0302 08/10/21  0508 08/10/21  0319   NA  --   --   --  148*  148* 148* 148* 150* 151*  151*   < > 155*  155*   POTASSIUM  --   --   --  4.2  4.2  --   --  3.4 3.9   < > 3.9  3.9   CHLORIDE  --   --   --  110*  --   --  114* 114*   < > 114*   CO2  --   --   --  37*  --   --  34* 35*   < > 35*   BUN  --   --   --  71*  --   --  73* 70*   < > 79*   CR  --   --   --  1.23  --   --  1.18 1.35*   < > 1.51*   * 86 91 118*  129* 132*  --  149* 194*   < > 164*  149*   TRACE  --   --   --  8.1*  --   --  7.8* 8.1*   < > 8.3*   MAG  --   --   --  3.1*  --   --   --  3.1*  --  3.0*   PHOS  --   --   --  3.4  --   --   --  3.6  --  3.1    < > = values in this interval not displayed.     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 Reynolds on 8/4. Post-operatively with worsening respiratory status ultimately requiring intubation. Ongoing issues with oxygenation/ventilation and hypotension with concern for septic shock in setting of immunosuppresion. Plan for comfort care this AM.    - Appreciate SICU care  - Will be present when patient transitions to comfort care    Discussed with staff    Casper Paige MD  PGY-3, General Surgery

## 2021-08-13 LAB — BACTERIA PLR CULT: NO GROWTH

## 2021-08-15 LAB
BACTERIA BLD CULT: NO GROWTH

## 2021-08-21 LAB — BACTERIA BRONCH: NORMAL

## 2021-08-25 NOTE — OP NOTE
Procedure Date: 2021    DATE OF PROCEDURE:  2021    PREPROCEDURE DIAGNOSIS:  Severe respiratory failure and hypoxia.    POST-PROCEDURE DIAGNOSIS:  Severe respiratory failure and hypoxia.    PROCEDURE:  Flexible bronchoscopy.    SURGEON:  Afsaneh Nowak MD    CLINICAL INDICATIONS FOR THE PROCEDURE:  This is a 60-year-old gentleman with significant pulmonary failure who was undergoing a procedure for trapped lung.  He became severely hypoxic, was intubated and then transferred to the ICU.  Over the ensuing time, the patient became more difficult to ventilate and oxygenate, and therefore, bronchoscopy was performed to evaluate for mucus plugging, etc.  The procedure was discussed with the wife and consent was obtained.    DESCRIPTION OF PROCEDURE:  After consent was obtained, A timeout was then performed.  A flexible bronchoscope was then placed via the patient's endotracheal tube.  As we entered into the endotracheal tube, the trachea looked within normal limits.  We entered the left main stem bronchus first.  There was minimal erythema with almost no mucus produced.  We went to the segmental regions of the bronchi, irrigated and aspirated to assure there was no undrained mucus or clotting.  There was none that was noted.  The specimen was sent for microbiologic evaluation.  The right lobe revealed the same.  We went into the segment and subsegmental regions again. The patient had no mucus, minimal erythema.  No edema was identified.  The bronchoscope was then removed.  The patient tolerated the procedure well.    Afsaneh Nowak MD        D: 2021   T: 2021   MT: jennifer    Name:     BRIGITTE JUNIOR  MRN:      [id number]        Account:        [id number]   :      1960           Procedure Date: 2021     Document: [id number]

## 2021-09-02 LAB
BACTERIA BLD CULT: NO GROWTH
BACTERIA PLR CULT: NO GROWTH
BACTERIA SPT CULT: ABNORMAL
BACTERIA SPT CULT: ABNORMAL

## 2021-09-06 LAB
BACTERIA BRONCH: ABNORMAL
BACTERIA BRONCH: NORMAL
BACTERIA PLR CULT: NO GROWTH

## 2021-10-03 LAB — ACID FAST STAIN (ARUP): NORMAL

## 2021-10-05 NOTE — ADDENDUM NOTE
Addendum  created 10/05/21 1630 by David Harrell MD    Attestation recorded in Intraprocedure, Intraprocedure Attestations deleted, Intraprocedure Attestations filed, Intraprocedure Staff edited

## 2021-10-05 NOTE — ADDENDUM NOTE
Addendum  created 10/05/21 1328 by Harpreet Fischer MD    Clinical Note Signed, Diagnosis association updated, Intraprocedure Blocks edited

## 2021-10-27 NOTE — ADDENDUM NOTE
Addendum  created 10/27/21 1643 by David Harrell MD    Attestation recorded in Intraprocedure, Intraprocedure Attestations filed

## 2022-05-06 NOTE — NURSING NOTE
Oncology Rooming Note    November 11, 2020 1:17 PM   Byron Russo is a 60 year old male who presents for:    Chief Complaint   Patient presents with     RECHECK     Return: MDS (myelodysplastic syndrome)     Initial Vitals: /78 (BP Location: Right arm, Patient Position: Sitting, Cuff Size: Adult Regular)   Pulse 90   Temp 98.3  F (36.8  C) (Tympanic)   Resp 16   Ht 1.829 m (6')   Wt 100.4 kg (221 lb 6.4 oz)   SpO2 96%   BMI 30.03 kg/m   Estimated body mass index is 30.03 kg/m  as calculated from the following:    Height as of this encounter: 1.829 m (6').    Weight as of this encounter: 100.4 kg (221 lb 6.4 oz). Body surface area is 2.26 meters squared.  No Pain (0) Comment: Data Unavailable   No LMP for male patient.  Allergies reviewed: Yes  Medications reviewed: Yes    Medications: Medication refills not needed today.  Pharmacy name entered into [employer]: [place], [place] - [address] [facility] [phone]    Clinical concerns: N/A      Marlin Summers CMA               - - -

## 2022-10-05 NOTE — PLAN OF CARE
Discharge Planner SLP   Patient plan for discharge: home-- Pt hopes to leave later today  Current status: Recommend continue regular diet with nectar-thick liquids as tolerated. Pt to sit upright for all PO intake, take small bites/sips and alternate consistencies. Further recommend free water protocol (thin water between meals only after thorough oral cares). 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 Pt has ST appt scheduled on 1/28/2020       Entered by: Julia Brand 01/15/2020 9:49 AM        Siliq Counseling:  I discussed with the patient the risks of Siliq including but not limited to new or worsening depression, suicidal thoughts and behavior, immunosuppression, malignancy, posterior leukoencephalopathy syndrome, and serious infections.  The patient understands that monitoring is required including a PPD at baseline and must alert us or the primary physician if symptoms of infection or other concerning signs are noted. There is also a special program designed to monitor depression which is required with Siliq.

## 2022-12-06 NOTE — ADDENDUM NOTE
Addended by: ELLIE MAY on: 5/1/2017 10:58 AM     Modules accepted: Orders     Complex Repair And Graft Additional Text (Will Appearing After The Standard Complex Repair Text): The complex repair was not sufficient to completely close the primary defect. The remaining additional defect was repaired with the graft mentioned below.

## 2023-06-06 NOTE — NURSING NOTE
Chief Complaint   Patient presents with     Blood Draw     Labs drawn via  by rn in lab. VS taken.     Labs collected from venipuncture by RN. Vitals taken. Checked in for appointment(s).    Surnedra Abebe RN    
Oncology Rooming Note    June 25, 2021 2:25 PM   Byron Russo is a 60 year old male who presents for:    Chief Complaint   Patient presents with     Blood Draw     Labs drawn via  by rn in lab. VS taken.     Initial Vitals: Blood Pressure 125/88 (BP Location: Right arm, Patient Position: Sitting, Cuff Size: Adult Regular)   Pulse 115   Temperature 98.3  F (36.8  C) (Oral)   Respiration 16   Weight 106.7 kg (235 lb 3.2 oz)   Oxygen Saturation 96%   Body Mass Index 31.90 kg/m   Estimated body mass index is 31.9 kg/m  as calculated from the following:    Height as of 6/11/21: 1.829 m (6').    Weight as of this encounter: 106.7 kg (235 lb 3.2 oz). Body surface area is 2.33 meters squared.  No Pain (0) Comment: Data Unavailable   No LMP for male patient.  Allergies reviewed: Yes  Medications reviewed: Yes    Medications: Medication refills not needed today.  Pharmacy name entered into IDverge: [facility] [place], [place] - [address] [place] [address] [phone]    Clinical concerns: none       Fe Aguillon MA            
Expected Date Of Service: 06/06/2023
Billing Type: Third-Party Bill
Performing Laboratory: [id number]
Bill For Surgical Tray: no

## 2023-08-27 NOTE — MR AVS SNAPSHOT
After Visit Summary   7/20/2017    Byron Russo    MRN: [id number]           Patient Information     Date Of Birth          1960        Visit Information        Provider Department      7/20/2017 3:45 PM Raymundo Chan MD [facility] Dermatology        Care Instructions    - Please continue careful sun protection     - Follow up as needed      Sunscreens topical dosage forms  What are Sunscreens topical dosage forms?  SUNSCREENS protect your skin from the harmful effects of the sun and help to prevent sunburn. There are 2 different kinds of sunscreens called 'physical sunscreens' and 'chemical sunscreens.' Physical sunscreens reflect the sun's UV radiation. Chemical sunscreens absorb the sun's UV radiation. All physical sunscreens give UVA and UVB protection. All chemical sunscreens give UVB protection. Some chemical sunscreens give both UVA and UVB protection. Limiting the amount of time you spend in the sun and using sunscreens can help prevent wrinkles and skin damage, such as skin cancer.  What should my health care professional know before I receive Sunscreens?  They need to know if you have any of these conditions:    an unusual reaction to sunscreens, PABA, other medicines, foods, dyes, or preservatives    pregnant or trying to get pregnant    breast-feeding  How should this medicine be used?  The sun protection factor (SPF) found on the product label tells you how much protection a sunscreen offers. Products with high SPFs give more protection against the sun than products with low SPF. Choose a sunscreen product based on the type of activity in which you are involved, your age, site of application, your skin condition, and your skin type. Ask your pharmacist or health care professional about which sunscreen product is best for you.  Sunscreens are for external use only; apply only to the skin. Do not take by mouth. Apply evenly and liberally to all exposed areas of the skin 30 minutes  before any sun exposure. Reapply sunscreens every 1--2 hours and after swimming, excessive sweating, or towel drying. Follow the directions on the product label.  Sunscreens are not recommended for infants less than 6 months of age. Infants in this age group should be kept out of the sun. Children and infants who are 6 months of age and older should use sunscreens that contain an SPF of 15 or higher. Contact your pediatrician or health care professional regarding the use of this medicine in children.  Use topical insect repellants containing diethyltoluamide, DEET cautiously while using sunscreens. Sunscreens may increase the absorption of diethyltoluamide, DEET into the skin. This is especially important in children.  What if I miss a dose?  Apply it as soon as you remember.  What drug(s) may interact with Sunscreens?    Estrasorb  topical estrogen emulsion    insect repellants containing diethyltoluamide, DEET  Tell your prescriber or health care professional about all other medicines you are taking, including non-prescription medicines, nutritional supplements, or herbal products. Check with your health care professional before stopping or starting any of your medicines.  What should I watch for while taking Sunscreens?  Do not get sunscreen in your eyes. If you do, rinse out with plenty of cool water.  Minimize your exposure to the sun between the hours of 10 a.m. and 2 p.m. (11 a.m. and 3 p.m. daylight savings time). Be extra careful on cloudy or overcast days and around reflective surfaces such as concrete, sand, snow, or water. You should also wear protective clothing including a hat, long-sleeved shirt, and sunglasses. Avoid sunlamps and tanning beds.  Some sunscreens may discolor and stain light-colored fabrics yellow. Allow sunscreen to dry before covering the area to which the sunscreen was applied.  What side effects may I notice from receiving Sunscreens?  Side effects that you should report to your  prescriber or health care professional as soon as possible:    dark red spots on the skin    painful, red, pus-filled blisters in hair follicles  Side effects that usually do not require medical attention (report to your prescriber or health care professional if they continue or are bothersome):    acne    burning or itching of the skin    dry or irritated skin  If you experience skin irritation from a sunscreen you can try a different formulation to prevent the reaction from recurring.  Where can I keep my medicine?  Keep out of reach of children.  Store below 40 degrees C (104 degrees F), preferably at room temperature between 15--30 degrees C (59 and 86 degrees F), unless otherwise specified by the . Store away from heat and direct light. Replace sunscreens yearly to maintain their effectiveness. Discard after expiration date on the bottle.  NOTE:This sheet is a summary. It may not cover all possible information. If you have questions about this medicine, talk to your doctor, pharmacist, or health care provider. Copyright  2016 Gold Standard                Follow-ups after your visit        Follow-up notes from your care team     Return if symptoms worsen or fail to improve.      Your next 10 appointments already scheduled     Jul 26, 2017  4:00 PM CDT   [facility] Lab Draw with  [facility] LAB DRAW   [facility] Lab Draw ([facility])    [address] [id number] [phone]            Sep 13, 2017  3:30 PM CDT   [facility] Lab Draw with  MASONIC LAB DRAW   [facility] Lab Draw ([facility])    [address] [id number] [phone]            Sep 13, 2017  4:00 PM CDT   Return with Uli Enciso MD   [facility] Blood and Marrow Transplant ([facility])    [address] [place] [id number] [phone]              Who to  contact     Please call your clinic at [phone] to:    Ask questions about your health    Make or cancel appointments    Discuss your medicines    Learn about your test results    Speak to your doctor   If you have compliments or concerns about an experience at your clinic, or if you wish to file a complaint, please contact [facility] Patient Relations at [phone] or email us at [email or web address]         Additional Information About Your Visit        Pharmworkshart Information     [employer] gives you secure access to your electronic health record. If you see a primary care provider, you can also send messages to your care team and make appointments. If you have questions, please call your primary care clinic.  If you do not have a primary care provider, please call [phone] and they will assist you.      Crowdly is an electronic gateway that provides easy, online access to your medical records. With [employer]dly, you can request a clinic appointment, read your test results, renew a prescription or communicate with your care team.     To access your existing account, please contact your [facility] or call [phone] for assistance.        Care EveryWhere ID     This is your Care EveryWhere ID. This could be used by other organizations to access your [facility] medical records  [id number]         Blood Pressure from Last 3 Encounters:   07/12/17 140/88   05/26/17 130/80   05/18/17 120/80    Weight from Last 3 Encounters:   07/12/17 131.7 kg (290 lb 4.8 oz)   05/12/17 122.6 kg (270 lb 4.5 oz)   04/19/17 123.4 kg (272 lb)              Today, you had the following     No orders found for display       Primary Care Provider Office Phone # Fax #    Cole Duong [phone]       [facility] [address] [facility] [address]        Equal Access to Services     FELI ERICKSON : farshad Urbina,  kizzy hinds chantaljohn galvanaan ah. So [facility] [phone].    ATENCIÓN: Si krystal patiño, tiene a hernandez disposición servicios gratuitos de asistencia lingüística. Shay al [phone].    We comply with applicable federal civil rights laws and [place] laws. We do not discriminate on the basis of race, color, national origin, age, disability sex, sexual orientation or gender identity.            Thank you!     Thank you for choosing [facility] DERMATOLOGY  for your care. Our goal is always to provide you with excellent care. Hearing back from our patients is one way we can continue to improve our services. Please take a few minutes to complete the written survey that you may receive in the mail after your visit with us. Thank you!             Your Updated Medication List - Protect others around you: Learn how to safely use, store and throw away your medicines at www.disposemymeds.org.          This list is accurate as of: 7/20/17  4:49 PM.  Always use your most recent med list.                   Brand Name Dispense Instructions for use Diagnosis    acyclovir 800 MG tablet    ZOVIRAX    60 tablet    Take 1 tablet (800 mg) by mouth 2 times daily    MDS (myelodysplastic syndrome) (H)       aspirin 81 MG EC tablet      Take 81 mg by mouth daily Reported on 5/12/2017        carboxymethylcellulose 0.5 % Soln ophthalmic solution    carboxymethylcellulose sodium    1 Bottle    Place 1 drop into both eyes 3 times daily as needed for dry eyes    MDS (myelodysplastic syndrome) (H)       * cycloSPORINE modified 100 MG capsule    GENERIC EQUIVALENT    120 capsule    Take 1 capsule (100 mg) by mouth 2 times daily    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)       * cycloSPORINE modified 25 MG capsule    GENERIC EQUIVALENT    60 capsule    Reported on 5/12/2017    MDS (myelodysplastic syndrome) (H)       desonide 0.05 % cream    DESOWEN    60 g    Apply topically 2 times daily     Zladl-xmchxg-drkd disease of skin (H)       EUCERIN EX      PRN        fluconazole 100 MG tablet    DIFLUCAN    30 tablet    Take 1 tablet (100 mg) by mouth daily    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)       furosemide 20 MG tablet    LASIX    30 tablet    Take 1 tablet (20 mg) by mouth daily    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)       levofloxacin 250 MG tablet    LEVAQUIN    30 tablet    Take 1 tablet (250 mg) by mouth daily    MDS (myelodysplastic syndrome) (H)       levothyroxine 150 MCG tablet    SYNTHROID/LEVOTHROID    30 tablet    Take 1 tablet (150 mcg) by mouth daily    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)       lisinopril 10 MG tablet    PRINIVIL/ZESTRIL    30 tablet    Take 1 tablet (10 mg) by mouth daily    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)       magnesium oxide 400 (241.3 MG) MG tablet    MAG-OX     Take 2 tablets daily        metoprolol 25 MG tablet    LOPRESSOR    60 tablet    Take 2 tablets (50 mg) by mouth 2 times daily    MDS (myelodysplastic syndrome) (H)       NITROGLYCERIN ER PO      Take by mouth as needed Reported on 5/12/2017        * predniSONE 20 MG tablet    DELTASONE     Take 20 mg by mouth daily    MDS (myelodysplastic syndrome) (H)       * predniSONE 10 MG tablet    DELTASONE    30 tablet    Take 1 tablet (10 mg) by mouth daily    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)       * predniSONE 5 MG tablet    DELTASONE    30 tablet    Take 1 tablet (5 mg) by mouth daily    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)       ranitidine 150 MG tablet    ZANTAC    60 tablet    Take 1 tablet (150 mg) by mouth 2 times daily    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 Read the directions carefully, and ask your doctor or other care provider to review them with you.       Spontaneous, unlabored and symmetrical

## 2025-05-20 NOTE — NURSING NOTE
"Oncology Rooming Note    December 19, 2019 3:23 PM   Byron Russo is a 59 year old male who presents for:    Chief Complaint   Patient presents with     Oncology Clinic Visit     [facility] RETURN; MDS (myelodysplastic syndrome)     Initial Vitals: /75   Pulse 95   Temp 98.8  F (37.1  C) (Oral)   Ht 1.778 m (5' 10\")   Wt 103.9 kg (229 lb 1.6 oz)   SpO2 93%   BMI 32.87 kg/m   Estimated body mass index is 32.87 kg/m  as calculated from the following:    Height as of this encounter: 1.778 m (5' 10\").    Weight as of this encounter: 103.9 kg (229 lb 1.6 oz). Body surface area is 2.27 meters squared.  No Pain (0) Comment: Data Unavailable   No LMP for male patient.  Allergies reviewed: Yes  Medications reviewed: Yes    Medications: Medication refills not needed today.  Pharmacy name entered into EPIC:    [place], [place] - [address] [facility] [phone]  [facility] [address] [place] [id number] [facility]    Clinical concerns: No additional concerns.  Dr. Brady was notified.      Cecilia Kincaid, [facility]              "
Labs drawn venipuncture in right arm. Patient tolerated well and had no issues.    Nasal swabs done. Patient tolerated well and had no issues.    Virgilio Bruce LPN  
Walking test done per provider request.  Base line oxygen was at 93% before starting.  Dropped to 86% after walking with some shortness of breath noted by patient    Cecilia Kincaid, CMA    
no

## (undated) DEVICE — LINEN GOWN XLG 5407

## (undated) DEVICE — LINEN TOWEL PACK X5 5464

## (undated) DEVICE — GOWN XLG DISP 9545

## (undated) DEVICE — STOPCOCK ANGIO 1-WAY MARQUIS MLL  H1RC

## (undated) DEVICE — SU VICRYL 4-0 PS-2 18" UND J496H

## (undated) DEVICE — CONTAINER EVACUATOR GLASS VACUTAINER 1000ML 1A8504

## (undated) DEVICE — NDL BLUNT 18GA 1" W/O FILTER 305181

## (undated) DEVICE — CONNECTOR STOPCOCK 3 WAY MALE LL MX4311L

## (undated) DEVICE — DRSG PRIMAPORE 02X3" 7133

## (undated) DEVICE — Device

## (undated) DEVICE — NDL 15GA 1.5" 8881200029

## (undated) DEVICE — NDL YUEH CENTESIS 5FRX10CM G09490 DTVN-5.0-19-10.0-YUEH

## (undated) DEVICE — GLOVE PROTEXIS POWDER FREE SMT 8.0  2D72PT80X

## (undated) DEVICE — DRSG TEGADERM 2 3/8X2 3/4" 1624W

## (undated) DEVICE — TUBING MEDRAD 48" HIGH PRESSURE MX694

## (undated) DEVICE — CONNECTOR MALE TO MALE LL

## (undated) DEVICE — COVER ULTRASOUND PROBE W/GEL FLEXI-FEEL 6"X58" LF  25-FF658

## (undated) DEVICE — SYR 30ML LL W/O NDL 302832

## (undated) DEVICE — LINEN TOWEL PACK X6 WHITE 5487

## (undated) DEVICE — ESU GROUND PAD ADULT W/CORD E7507

## (undated) DEVICE — NDL YUEH CENTESIS 5FRX15CM DTVN-5.0-19-15.0-YUEH

## (undated) DEVICE — TUBING SUCTION 10'X3/16" N510

## (undated) DEVICE — DRAPE IOBAN INCISE 23X17" 6650EZ

## (undated) DEVICE — DRAPE U SPLIT 74X120" 29440

## (undated) DEVICE — DRAIN PLEURAL CATH KIT PLEURX RESERVOIR 50-7500B

## (undated) DEVICE — SU SILK 0 SH 30" K834H

## (undated) DEVICE — SUCTION TIP YANKAUER STR K87

## (undated) DEVICE — ESU PENCIL W/COATED BLADE E2450H

## (undated) DEVICE — ENDO TROCAR SLEEVE KII Z-THREADED 05X100MM CTS02

## (undated) DEVICE — SUCTION MANIFOLD NEPTUNE 2 SYS 4 PORT 0702-020-000

## (undated) DEVICE — GLOVE PROTEXIS POWDER FREE SMT 7.5  2D72PT75X

## (undated) DEVICE — SOL NACL 0.9% IRRIG 1000ML BOTTLE 2F7124

## (undated) DEVICE — PREP CHLORAPREP 26ML TINTED ORANGE  260815

## (undated) DEVICE — DRAIN PLEURAL CATH KIT PLEURX 15.5FR 50-7000B

## (undated) DEVICE — WIRE GUIDE BENSON STR .035X50CM G00661

## (undated) DEVICE — GLOVE PROTEXIS POWDER FREE 8.0 ORTHOPEDIC 2D73ET80

## (undated) DEVICE — ADH SKIN CLOSURE PREMIERPRO EXOFIN 1.0ML 3470

## (undated) DEVICE — ESU ELEC BLADE 2.75" COATED/INSULATED E1455

## (undated) DEVICE — SOL NACL 0.9% IRRIG 1000ML BOTTLE 07138-09

## (undated) DEVICE — SYR 50ML LL W/O NDL 309653

## (undated) DEVICE — ENDO TROCAR FIRST ENTRY KII FIOS Z-THRD 05X100MM CTF03

## (undated) RX ORDER — ALBUTEROL SULFATE 0.83 MG/ML
SOLUTION RESPIRATORY (INHALATION)
Status: DISPENSED
Start: 2019-02-06

## (undated) RX ORDER — ALBUTEROL SULFATE 0.83 MG/ML
SOLUTION RESPIRATORY (INHALATION)
Status: DISPENSED
Start: 2017-02-21

## (undated) RX ORDER — CEFAZOLIN SODIUM 2 G/100ML
INJECTION, SOLUTION INTRAVENOUS
Status: DISPENSED
Start: 2021-01-01

## (undated) RX ORDER — DEXAMETHASONE SODIUM PHOSPHATE 4 MG/ML
INJECTION, SOLUTION INTRA-ARTICULAR; INTRALESIONAL; INTRAMUSCULAR; INTRAVENOUS; SOFT TISSUE
Status: DISPENSED
Start: 2021-01-01

## (undated) RX ORDER — KETOROLAC TROMETHAMINE 30 MG/ML
INJECTION, SOLUTION INTRAMUSCULAR; INTRAVENOUS
Status: DISPENSED
Start: 2021-01-01

## (undated) RX ORDER — METOPROLOL TARTRATE 1 MG/ML
INJECTION, SOLUTION INTRAVENOUS
Status: DISPENSED
Start: 2021-01-01

## (undated) RX ORDER — LIDOCAINE HYDROCHLORIDE 20 MG/ML
INJECTION, SOLUTION EPIDURAL; INFILTRATION; INTRACAUDAL; PERINEURAL
Status: DISPENSED
Start: 2021-01-01

## (undated) RX ORDER — BUPIVACAINE HYDROCHLORIDE AND EPINEPHRINE 5; 5 MG/ML; UG/ML
INJECTION, SOLUTION PERINEURAL
Status: DISPENSED
Start: 2021-01-01

## (undated) RX ORDER — LIDOCAINE HYDROCHLORIDE 20 MG/ML
SOLUTION OROPHARYNGEAL
Status: DISPENSED
Start: 2019-10-24

## (undated) RX ORDER — HEPARIN SODIUM (PORCINE) LOCK FLUSH IV SOLN 100 UNIT/ML 100 UNIT/ML
SOLUTION INTRAVENOUS
Status: DISPENSED
Start: 2021-01-01

## (undated) RX ORDER — FENTANYL CITRATE-0.9 % NACL/PF 10 MCG/ML
PLASTIC BAG, INJECTION (ML) INTRAVENOUS
Status: DISPENSED
Start: 2021-01-01

## (undated) RX ORDER — ACETAMINOPHEN 325 MG/1
TABLET ORAL
Status: DISPENSED
Start: 2021-01-01

## (undated) RX ORDER — MORPHINE SULFATE 2 MG/ML
INJECTION, SOLUTION INTRAMUSCULAR; INTRAVENOUS
Status: DISPENSED
Start: 2021-01-01

## (undated) RX ORDER — HEPARIN SODIUM,PORCINE 10 UNIT/ML
VIAL (ML) INTRAVENOUS
Status: DISPENSED
Start: 2021-01-01

## (undated) RX ORDER — FENTANYL CITRATE 50 UG/ML
INJECTION, SOLUTION INTRAMUSCULAR; INTRAVENOUS
Status: DISPENSED
Start: 2021-01-01

## (undated) RX ORDER — LIDOCAINE HYDROCHLORIDE 10 MG/ML
INJECTION, SOLUTION EPIDURAL; INFILTRATION; INTRACAUDAL; PERINEURAL
Status: DISPENSED
Start: 2021-01-01

## (undated) RX ORDER — ESMOLOL HYDROCHLORIDE 10 MG/ML
INJECTION INTRAVENOUS
Status: DISPENSED
Start: 2021-01-01

## (undated) RX ORDER — GLYCOPYRROLATE 0.2 MG/ML
INJECTION, SOLUTION INTRAMUSCULAR; INTRAVENOUS
Status: DISPENSED
Start: 2021-01-01

## (undated) RX ORDER — PROPOFOL 10 MG/ML
INJECTION, EMULSION INTRAVENOUS
Status: DISPENSED
Start: 2021-01-01

## (undated) RX ORDER — FUROSEMIDE 10 MG/ML
INJECTION INTRAMUSCULAR; INTRAVENOUS
Status: DISPENSED
Start: 2021-01-01

## (undated) RX ORDER — ONDANSETRON 2 MG/ML
INJECTION INTRAMUSCULAR; INTRAVENOUS
Status: DISPENSED
Start: 2021-01-01

## (undated) RX ORDER — LIDOCAINE HYDROCHLORIDE 10 MG/ML
INJECTION, SOLUTION EPIDURAL; INFILTRATION; INTRACAUDAL; PERINEURAL
Status: DISPENSED
Start: 2019-02-06

## (undated) RX ORDER — ALBUTEROL SULFATE 0.83 MG/ML
SOLUTION RESPIRATORY (INHALATION)
Status: DISPENSED
Start: 2021-01-01

## (undated) RX ORDER — FENTANYL CITRATE 50 UG/ML
INJECTION, SOLUTION INTRAMUSCULAR; INTRAVENOUS
Status: DISPENSED
Start: 2019-02-06

## (undated) RX ORDER — ALBUTEROL SULFATE 0.83 MG/ML
SOLUTION RESPIRATORY (INHALATION)
Status: DISPENSED
Start: 2020-01-01

## (undated) RX ORDER — ALBUTEROL SULFATE 0.83 MG/ML
SOLUTION RESPIRATORY (INHALATION)
Status: DISPENSED
Start: 2019-02-13

## (undated) RX ORDER — LIDOCAINE HYDROCHLORIDE AND EPINEPHRINE 10; 10 MG/ML; UG/ML
INJECTION, SOLUTION INFILTRATION; PERINEURAL
Status: DISPENSED
Start: 2021-01-01

## (undated) RX ORDER — ALBUMIN, HUMAN INJ 5% 5 %
SOLUTION INTRAVENOUS
Status: DISPENSED
Start: 2021-01-01